# Patient Record
Sex: FEMALE | Race: WHITE | NOT HISPANIC OR LATINO | URBAN - METROPOLITAN AREA
[De-identification: names, ages, dates, MRNs, and addresses within clinical notes are randomized per-mention and may not be internally consistent; named-entity substitution may affect disease eponyms.]

---

## 2018-10-08 ENCOUNTER — OUTPATIENT (OUTPATIENT)
Dept: OUTPATIENT SERVICES | Facility: HOSPITAL | Age: 83
LOS: 1 days | End: 2018-10-08
Payer: MEDICARE

## 2018-10-08 DIAGNOSIS — Z98.41 CATARACT EXTRACTION STATUS, RIGHT EYE: Chronic | ICD-10-CM

## 2018-10-08 DIAGNOSIS — M54.16 RADICULOPATHY, LUMBAR REGION: ICD-10-CM

## 2018-10-08 PROCEDURE — 77003 FLUOROGUIDE FOR SPINE INJECT: CPT

## 2018-10-08 PROCEDURE — 62323 NJX INTERLAMINAR LMBR/SAC: CPT

## 2020-07-14 ENCOUNTER — OUTPATIENT (OUTPATIENT)
Dept: OUTPATIENT SERVICES | Facility: HOSPITAL | Age: 85
LOS: 1 days | End: 2020-07-14
Payer: MEDICARE

## 2020-07-14 DIAGNOSIS — Z98.41 CATARACT EXTRACTION STATUS, RIGHT EYE: Chronic | ICD-10-CM

## 2020-07-14 DIAGNOSIS — Z11.59 ENCOUNTER FOR SCREENING FOR OTHER VIRAL DISEASES: ICD-10-CM

## 2020-07-14 PROCEDURE — U0003: CPT

## 2020-07-15 LAB — SARS-COV-2 RNA SPEC QL NAA+PROBE: SIGNIFICANT CHANGE UP

## 2020-07-16 ENCOUNTER — OUTPATIENT (OUTPATIENT)
Dept: OUTPATIENT SERVICES | Facility: HOSPITAL | Age: 85
LOS: 1 days | Discharge: HOME | End: 2020-07-16
Payer: MEDICARE

## 2020-07-16 DIAGNOSIS — Z98.41 CATARACT EXTRACTION STATUS, RIGHT EYE: Chronic | ICD-10-CM

## 2020-07-16 DIAGNOSIS — M54.16 RADICULOPATHY, LUMBAR REGION: ICD-10-CM

## 2020-07-16 PROCEDURE — 62323 NJX INTERLAMINAR LMBR/SAC: CPT

## 2020-07-16 PROCEDURE — 77003 FLUOROGUIDE FOR SPINE INJECT: CPT

## 2020-10-26 ENCOUNTER — APPOINTMENT (OUTPATIENT)
Dept: OTOLARYNGOLOGY | Facility: CLINIC | Age: 85
End: 2020-10-26
Payer: MEDICARE

## 2020-10-26 ENCOUNTER — NON-APPOINTMENT (OUTPATIENT)
Age: 85
End: 2020-10-26

## 2020-10-26 VITALS
BODY MASS INDEX: 28 KG/M2 | HEIGHT: 63 IN | TEMPERATURE: 97.9 F | WEIGHT: 158 LBS | HEART RATE: 73 BPM | SYSTOLIC BLOOD PRESSURE: 167 MMHG | DIASTOLIC BLOOD PRESSURE: 74 MMHG

## 2020-10-26 DIAGNOSIS — H60.62 UNSPECIFIED CHRONIC OTITIS EXTERNA, LEFT EAR: ICD-10-CM

## 2020-10-26 DIAGNOSIS — Z87.09 PERSONAL HISTORY OF OTHER DISEASES OF THE RESPIRATORY SYSTEM: ICD-10-CM

## 2020-10-26 DIAGNOSIS — Z86.69 PERSONAL HISTORY OF OTHER DISEASES OF THE NERVOUS SYSTEM AND SENSE ORGANS: ICD-10-CM

## 2020-10-26 DIAGNOSIS — H60.399 OTHER INFECTIVE OTITIS EXTERNA, UNSPECIFIED EAR: ICD-10-CM

## 2020-10-26 DIAGNOSIS — H90.3 SENSORINEURAL HEARING LOSS, BILATERAL: ICD-10-CM

## 2020-10-26 PROCEDURE — 99213 OFFICE O/P EST LOW 20 MIN: CPT | Mod: 25

## 2020-10-26 PROCEDURE — 30901 CONTROL OF NOSEBLEED: CPT

## 2020-10-26 PROCEDURE — 69210 REMOVE IMPACTED EAR WAX UNI: CPT

## 2020-10-26 NOTE — PROCEDURE
[FreeTextEntry1] : Right Nasal Cauterization [FreeTextEntry3] : After topical 4% xylocaine and oxymetazoline, the nasal vault was re-evaluated. Bleeding site identified, small vascular lesion on the septum. Cauterized with silver nitrate. Post-procedure instructions given. Patient tolerated procedure well.

## 2020-10-26 NOTE — CONSULT LETTER
[Dear  ___] : Dear  [unfilled], [Courtesy Letter:] : I had the pleasure of seeing your patient, [unfilled], in my office today. [Please see my note below.] : Please see my note below. [Consult Closing:] : Thank you very much for allowing me to participate in the care of this patient.  If you have any questions, please do not hesitate to contact me. [Sincerely,] : Sincerely, [FreeTextEntry3] : Dae Wagoner MD FACS

## 2020-10-26 NOTE — PHYSICAL EXAM
[Midline] : trachea located in midline position [Normal] : no rashes [FreeTextEntry8] : cerumen removed via curettage [FreeTextEntry9] : cerumen impaction removed via suction and alligator forceps [FreeTextEntry1] : deviated septum, bleeding site right side [FreeTextEntry2] : sinuses nontender to percussion

## 2020-10-26 NOTE — ASSESSMENT
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 Right nasal cauterization. Saline gel spray 3-4x per day starting tonight. Don't blow the nose or smear, only dab and throw tissue away. Sneeze with the mouth open. Peroxide in the ears a few days before next appointment. Continue amplification. FU 1 month.

## 2020-10-26 NOTE — HISTORY OF PRESENT ILLNESS
[de-identified] : The patient presents with h/o bilateral SNHL for which she wears conchita hearing aids that are changed every 6-7 weeks. Pt states that she no longer uses the conchita hearing aids and switched to a different type of hearing aid but can't recall the name and didn't bring them with her. She presents today for cerumen removal because she feels the left ear is accumulating cerumen. She used peroxide in the left ear yesterday. Pt also c/o right epistaxis. Denies taking Aspirin but notes that she takes Aleve occasionally.

## 2020-11-17 ENCOUNTER — OUTPATIENT (OUTPATIENT)
Dept: OUTPATIENT SERVICES | Facility: HOSPITAL | Age: 85
LOS: 1 days | End: 2020-11-17
Payer: MEDICARE

## 2020-11-17 DIAGNOSIS — Z98.41 CATARACT EXTRACTION STATUS, RIGHT EYE: Chronic | ICD-10-CM

## 2020-11-17 DIAGNOSIS — Z11.59 ENCOUNTER FOR SCREENING FOR OTHER VIRAL DISEASES: ICD-10-CM

## 2020-11-17 LAB — SARS-COV-2 RNA SPEC QL NAA+PROBE: SIGNIFICANT CHANGE UP

## 2020-11-17 PROCEDURE — U0003: CPT

## 2020-11-19 ENCOUNTER — OUTPATIENT (OUTPATIENT)
Dept: OUTPATIENT SERVICES | Facility: HOSPITAL | Age: 85
LOS: 1 days | End: 2020-11-19
Payer: MEDICARE

## 2020-11-19 DIAGNOSIS — Z98.41 CATARACT EXTRACTION STATUS, RIGHT EYE: Chronic | ICD-10-CM

## 2020-11-19 DIAGNOSIS — M54.16 RADICULOPATHY, LUMBAR REGION: ICD-10-CM

## 2020-11-19 PROCEDURE — 62323 NJX INTERLAMINAR LMBR/SAC: CPT

## 2020-12-01 ENCOUNTER — APPOINTMENT (OUTPATIENT)
Dept: OTOLARYNGOLOGY | Facility: CLINIC | Age: 85
End: 2020-12-01
Payer: MEDICARE

## 2020-12-01 VITALS
BODY MASS INDEX: 27.64 KG/M2 | WEIGHT: 156 LBS | SYSTOLIC BLOOD PRESSURE: 165 MMHG | DIASTOLIC BLOOD PRESSURE: 74 MMHG | TEMPERATURE: 97.5 F | HEIGHT: 63 IN

## 2020-12-01 PROCEDURE — 99213 OFFICE O/P EST LOW 20 MIN: CPT | Mod: 25

## 2020-12-01 PROCEDURE — 30901 CONTROL OF NOSEBLEED: CPT

## 2020-12-01 RX ORDER — CIPROFLOXACIN AND DEXAMETHASONE 3; 1 MG/ML; MG/ML
0.3-0.1 SUSPENSION/ DROPS AURICULAR (OTIC)
Refills: 0 | Status: COMPLETED | COMMUNITY
End: 2020-12-01

## 2020-12-01 RX ORDER — TOBRAMYCIN AND DEXAMETHASONE 3; 1 MG/ML; MG/ML
0.3-0.1 SUSPENSION/ DROPS OPHTHALMIC
Refills: 0 | Status: COMPLETED | COMMUNITY
End: 2020-12-01

## 2020-12-01 NOTE — HISTORY OF PRESENT ILLNESS
[de-identified] : The patient presents with h/o bilateral SNHL for which she wears amplification. S/p Right Nasal Cauterization on 10/26/2020.  The patient reports that the bleeding in the nose did not stop. She says that her nose is dry and uses nasal gel. She started bleeding this morning while she was brushing her teeth. She denies going too deep or scraping. She denies taking blood thinners.

## 2020-12-01 NOTE — PROCEDURE
[FreeTextEntry1] : Right Nasal Cauterization. [FreeTextEntry3] : After topical 4% xylocaine and oxymetazoline, the nasal vault was re-evaluated. Bleeding site identified. Cauterized with silver nitrate. Post-procedure instructions given. Patient tolerated procedure well

## 2020-12-01 NOTE — ADDENDUM
[FreeTextEntry1] : Documented by Bety Mcdonnell acting as scribe for Dr. Wagoner on 12/01/2020.\par \par All Medical record entries made by the Scribe were at my, Dr. Wagoner, direction and personally dictated by me on 12/01/2020 . I have reviewed the chart and agree that the record accurately reflects my personal performance of the history, physical exam, assessment and plan. I have also personally directed, reviewed, and agreed with the discharge instructions.

## 2020-12-01 NOTE — ASSESSMENT
[FreeTextEntry1] : Recurrent right epistaxis\par S/p right nasal Cauterization 10/26/2020\par \par Plan: Right nasal cautery. Saline gel spray. Don't blow the nose or smear, only dab and throw tissue away. Sneeze with the mouth open. Bactroban - squeeze into the nose, sniff up and mush around, three times a day. FU 10 days. Discussed r/b/a of electrocautery if there is further bleeding.

## 2020-12-01 NOTE — PHYSICAL EXAM
[Normal] : mucosa is normal [Midline] : trachea located in midline position [FreeTextEntry8] : cerumen removed via curettage  [FreeTextEntry9] : A lip caused by implantable hearing aids.  [FreeTextEntry1] : Bleeding site right side.

## 2020-12-11 ENCOUNTER — APPOINTMENT (OUTPATIENT)
Dept: OTOLARYNGOLOGY | Facility: CLINIC | Age: 85
End: 2020-12-11
Payer: MEDICARE

## 2020-12-11 VITALS
BODY MASS INDEX: 27.64 KG/M2 | HEART RATE: 74 BPM | DIASTOLIC BLOOD PRESSURE: 98 MMHG | HEIGHT: 63 IN | WEIGHT: 156 LBS | SYSTOLIC BLOOD PRESSURE: 173 MMHG | TEMPERATURE: 97.3 F

## 2020-12-11 PROCEDURE — 30901 CONTROL OF NOSEBLEED: CPT

## 2020-12-11 PROCEDURE — 99213 OFFICE O/P EST LOW 20 MIN: CPT | Mod: 25

## 2020-12-11 NOTE — HISTORY OF PRESENT ILLNESS
[de-identified] : The patient presents with h/o bilateral SNHL for which she wears amplification, recurrent right epistaxis. s/p right nasal cauterization 10/26/2020 and 12/1/2020. Pt states that she had bleeding after blowing her nose. She has been using the Bactroban but not the saline gel spray.

## 2020-12-11 NOTE — ADDENDUM
[FreeTextEntry1] : Documented by Bety Mcdonnell acting as scribe for Dr. Wagoner on 12/11/2020.\par \par All Medical record entries made by the Scribe were at my, Dr. Wagoner, direction and personally dictated by me on 12/11/2020 . I have reviewed the chart and agree that the record accurately reflects my personal performance of the history, physical exam, assessment and plan. I have also personally directed, reviewed, and agreed with the discharge instructions.

## 2020-12-11 NOTE — PROCEDURE
[FreeTextEntry1] : Right Nasal Cauterization [FreeTextEntry3] : After topical 4% xylocaine and oxymetazoline, the nasal vault was re-evaluated. Bleeding site identified. Cauterized with silver nitrate. Post-procedure instructions given. Patient tolerated procedure well.

## 2020-12-11 NOTE — ASSESSMENT
[FreeTextEntry1] : h/o bilateral SNHL for which she wears amplification, recurrent right epistaxis\par s/p right nasal cauterization 10/26/2020 and 12/1/2020 - further bleeding with blowing the nose\par \par Plan:\par Right nasal cautery. Don't blow the nose or smear, only dab and throw tissue away. Sneeze with the mouth open. Continue Bactroban TID and saline gel spray. FU 2 weeks.

## 2020-12-24 ENCOUNTER — APPOINTMENT (OUTPATIENT)
Dept: OTOLARYNGOLOGY | Facility: CLINIC | Age: 85
End: 2020-12-24
Payer: MEDICARE

## 2020-12-24 VITALS
SYSTOLIC BLOOD PRESSURE: 173 MMHG | BODY MASS INDEX: 27.46 KG/M2 | TEMPERATURE: 97 F | HEIGHT: 63 IN | WEIGHT: 155 LBS | DIASTOLIC BLOOD PRESSURE: 97 MMHG

## 2020-12-24 PROCEDURE — 30901 CONTROL OF NOSEBLEED: CPT

## 2020-12-24 PROCEDURE — 99213 OFFICE O/P EST LOW 20 MIN: CPT | Mod: 25

## 2020-12-24 RX ORDER — MUPIROCIN 20 MG/G
2 OINTMENT TOPICAL 3 TIMES DAILY
Qty: 1 | Refills: 3 | Status: COMPLETED | COMMUNITY
Start: 2020-12-01 | End: 2020-12-24

## 2020-12-24 NOTE — HISTORY OF PRESENT ILLNESS
[de-identified] : The patient presents with h/o bilateral SNHL for which she wears amplification, recurrent right epistaxis. s/p right nasal cauterization 10/26/2020, 12/1/2020 and 12/11/2020. Pt denies any further bleeding and has not been blowing her nose. She has been using the saline gel spray 2-3x per day and the Bactroban.

## 2020-12-24 NOTE — PHYSICAL EXAM
[Normal] : mucosa is normal [Midline] : trachea located in midline position [FreeTextEntry1] : healing well, no scabs or bleeding, one bleeding site right side

## 2020-12-24 NOTE — ASSESSMENT
[FreeTextEntry1] : h/o bilateral SNHL - wears amplification, recurrent right epistaxis\par s/p right nasal cauterization 10/26/2020, 12/1/2020 and 12/11/2020 - no further bleeding\par one bleeding site right side on exam\par \par Plan:\par Right nasal cautery. Continue saline gel spray and Bactroban or nasal gel. FU 1 month.

## 2020-12-24 NOTE — ADDENDUM
[FreeTextEntry1] : Documented by Bety Mcdonnell acting as scribe for Dr. Wagoner on 12/24/2020.\par \par All Medical record entries made by the Scribe were at my, Dr. Wagoner, direction and personally dictated by me on 12/24/2020 . I have reviewed the chart and agree that the record accurately reflects my personal performance of the history, physical exam, assessment and plan. I have also personally directed, reviewed, and agreed with the discharge instructions.

## 2021-01-23 ENCOUNTER — OUTPATIENT (OUTPATIENT)
Dept: OUTPATIENT SERVICES | Facility: HOSPITAL | Age: 86
LOS: 1 days | End: 2021-01-23
Payer: MEDICARE

## 2021-01-23 DIAGNOSIS — Z98.41 CATARACT EXTRACTION STATUS, RIGHT EYE: Chronic | ICD-10-CM

## 2021-01-23 DIAGNOSIS — Z20.828 CONTACT WITH AND (SUSPECTED) EXPOSURE TO OTHER VIRAL COMMUNICABLE DISEASES: ICD-10-CM

## 2021-01-23 LAB — SARS-COV-2 RNA SPEC QL NAA+PROBE: SIGNIFICANT CHANGE UP

## 2021-01-23 PROCEDURE — U0005: CPT

## 2021-01-23 PROCEDURE — U0003: CPT

## 2021-01-25 ENCOUNTER — OUTPATIENT (OUTPATIENT)
Dept: OUTPATIENT SERVICES | Facility: HOSPITAL | Age: 86
LOS: 1 days | End: 2021-01-25
Payer: MEDICARE

## 2021-01-25 DIAGNOSIS — Z98.41 CATARACT EXTRACTION STATUS, RIGHT EYE: Chronic | ICD-10-CM

## 2021-01-25 DIAGNOSIS — M54.16 RADICULOPATHY, LUMBAR REGION: ICD-10-CM

## 2021-01-25 PROCEDURE — 62323 NJX INTERLAMINAR LMBR/SAC: CPT

## 2021-08-03 ENCOUNTER — APPOINTMENT (OUTPATIENT)
Dept: OTOLARYNGOLOGY | Facility: CLINIC | Age: 86
End: 2021-08-03
Payer: MEDICARE

## 2021-08-03 VITALS
BODY MASS INDEX: 27.11 KG/M2 | DIASTOLIC BLOOD PRESSURE: 79 MMHG | HEIGHT: 63 IN | WEIGHT: 153 LBS | HEART RATE: 70 BPM | SYSTOLIC BLOOD PRESSURE: 156 MMHG

## 2021-08-03 DIAGNOSIS — R04.0 EPISTAXIS: ICD-10-CM

## 2021-08-03 PROCEDURE — 69210 REMOVE IMPACTED EAR WAX UNI: CPT

## 2021-08-03 PROCEDURE — 99213 OFFICE O/P EST LOW 20 MIN: CPT | Mod: 25

## 2021-08-03 NOTE — HISTORY OF PRESENT ILLNESS
[de-identified] : The patient presents with h/o bilateral SNHL for which she wears amplification, recurrent right epistaxis. s/p right nasal cauterization 10/26/2020, 12/1/2020 and 12/11/2020. Her pt was cauterized on the right 7/24/21, and has not had an episode of epistaxis since. The pt states having dry ears and dry eyes. The pt is using oil drops in her ears as needed.

## 2021-08-03 NOTE — PHYSICAL EXAM
[Normal] : mucosa is normal [Midline] : trachea located in midline position [FreeTextEntry6] : cerumen removed via curettage and suction [FreeTextEntry7] : cerumen impaction removed via curettage and suction and alligator forcepsLayers of dead skin removed over TM and canal wall. . [FreeTextEntry1] : Deviated septum. No active bleeding.

## 2021-08-03 NOTE — REASON FOR VISIT
[Subsequent Evaluation] : a subsequent evaluation for [FreeTextEntry2] : Patient here for ear wax removal

## 2021-08-03 NOTE — ASSESSMENT
[FreeTextEntry1] : Reviewed and reconciled medications, allergies, PMHx, PSHx, SocHx, FMHx.\par \par h/o bilateral SNHL for which she wears amplification, recurrent right epistaxis. s/p right nasal cauterization 10/26/2020, 12/1/2020 and 12/11/2020\par left cerumen impaction\par Deviated septum. \par No active bleeding.\par \par Plan:\par Cerumen removed. One drop of olive oil in the ears before bed, alternating ears per night. Dropper for olive oil provided. FU 3-4 months\par

## 2021-08-03 NOTE — ADDENDUM
[FreeTextEntry1] : Documented by Constantino Lou acting as scribe for Dr. Wagoner on 08/03/2021.\par \par All Medical record entries made by the Scribe were at my, Dr. Wagoner, direction and personally dictated by me on 08/03/2021 . I have reviewed the chart and agree that the record accurately reflects my personal performance of the history, physical exam, assessment and plan. I have also personally directed, reviewed, and agreed with the discharge instructions.

## 2021-12-15 ENCOUNTER — APPOINTMENT (OUTPATIENT)
Dept: OTOLARYNGOLOGY | Facility: CLINIC | Age: 86
End: 2021-12-15
Payer: MEDICARE

## 2021-12-15 VITALS
BODY MASS INDEX: 27.82 KG/M2 | WEIGHT: 157 LBS | HEIGHT: 63 IN | SYSTOLIC BLOOD PRESSURE: 177 MMHG | DIASTOLIC BLOOD PRESSURE: 84 MMHG | HEART RATE: 67 BPM

## 2021-12-15 DIAGNOSIS — J34.89 OTHER SPECIFIED DISORDERS OF NOSE AND NASAL SINUSES: ICD-10-CM

## 2021-12-15 PROCEDURE — 99213 OFFICE O/P EST LOW 20 MIN: CPT

## 2021-12-15 NOTE — ADDENDUM
[FreeTextEntry1] : Documented by Tanner Camargo acting as scribe for Dr. Wagoner on 12/15/2021.\par \par All Medical record entries made by the Scribe were at my, Dr. Wagoner, direction and personally dictated by me on 12/15/2021. I have reviewed the chart and agree that the record accurately reflects my personal performance of the history, physical exam, assessment and plan. I have also personally directed, reviewed, and agreed with the discharge instructions.

## 2021-12-15 NOTE — PHYSICAL EXAM
[Normal] : mucosa is normal [Midline] : trachea located in midline position [FreeTextEntry8] : dry skin and dry cerumen removed via curettage and suction [FreeTextEntry9] : dry skin and dry cerumen removed via curettage, alligator forceps, and suction [FreeTextEntry5] : no response to tuning fork.  [de-identified] : minimal dried mucus [FreeTextEntry2] : Sinuses nontender to percussion.

## 2021-12-15 NOTE — ASSESSMENT
[FreeTextEntry1] : Reviewed and reconciled medications, allergies, PMHx, PSHx, SocHx, FMHx. \par \par h/o bilateral SNHL for which she wears amplification, right epistaxis, dry external canals b/l,  s/p right nasal cauterization 10/26/2020, 12/1/2020 and 12/11/2020, 7/24/21\par dry external canals b/l\par \par Plan:\par Cerumen removed. FU 4 months.

## 2021-12-15 NOTE — HISTORY OF PRESENT ILLNESS
[de-identified] : The patient presents with h/o bilateral SNHL for which she wears amplification, dry external canals b/l, right epistaxis, s/p right nasal cauterization 10/26/2020, 12/1/2020 and 12/11/2020, 7/24/21. Pt presents today for follow up for her dry ears. Denies change in hearing. Pt hasn't been putting olive oil in ears and instead has been using some ear drops she got from her hearing aid supplier. Reports that she doesn't like putting olive oil in her ears. States that she used to have permanent hearing aids but kept getting recurrent infections now has the removable hearing aids. However, she states that there is a piece that continues to break.

## 2022-04-04 ENCOUNTER — APPOINTMENT (OUTPATIENT)
Dept: OTOLARYNGOLOGY | Facility: CLINIC | Age: 87
End: 2022-04-04
Payer: MEDICARE

## 2022-04-04 VITALS
SYSTOLIC BLOOD PRESSURE: 157 MMHG | BODY MASS INDEX: 26.63 KG/M2 | DIASTOLIC BLOOD PRESSURE: 52 MMHG | HEIGHT: 64 IN | WEIGHT: 156 LBS | HEART RATE: 66 BPM

## 2022-04-04 PROCEDURE — 99213 OFFICE O/P EST LOW 20 MIN: CPT | Mod: 25

## 2022-04-04 PROCEDURE — 69210 REMOVE IMPACTED EAR WAX UNI: CPT

## 2022-04-04 NOTE — ADDENDUM
[FreeTextEntry1] : Documented by Tray Law acting as scribe for Dr. Wagoner on 04/04/2022. \par \par All Medical record entries made by the scribe were at my. Dr. Wagoner direction and personally dictated by me on 04/04/2022. I have reviewed the chart and agree that the record accurately reflects my personal performance of the history, physical exam, assessment and plan. I have also personally directed, reviewed, and agreed with the discharge instructions.

## 2022-04-04 NOTE — ASSESSMENT
[FreeTextEntry1] : Reviewed and reconciled medications, allergies, PMHx, PSHx, SocHx, FMHx. \par \par h/o bilateral SNHL for which she wears amplification, s/p right nasal cauterization 10/26/2020, 12/1/2020 and 12/11/2020, 7/24/21.\par \par pt doesn't know which meds she is taking \par \par Plan:\par cerumen removed. ciprodex prescribed. FU 6 months

## 2022-04-04 NOTE — PHYSICAL EXAM
[Normal] : no abnormal secretions [Hearing Loss Right Only] : normal [Hearing Loss Left Only] : normal [FreeTextEntry8] : cerumen removed via curettage - half filled with wax  [FreeTextEntry9] : cerumen impaction removed via curettage and forceps - dry crusting and scabbing; maybe dry blood - has an infection -   [FreeTextEntry1] : deviated septum

## 2022-04-04 NOTE — HISTORY OF PRESENT ILLNESS
[de-identified] : The patient presents with h/o bilateral SNHL for which she wears hearing aids, s/p right nasal cauterization 10/26/2020, 12/1/2020 and 12/11/2020, 7/24/21. The patient presents today for a follow up. Pt reports she will be going on a trip soon so she wants to get everything checked out. Pt

## 2022-04-25 ENCOUNTER — APPOINTMENT (OUTPATIENT)
Dept: OTOLARYNGOLOGY | Facility: CLINIC | Age: 87
End: 2022-04-25
Payer: MEDICARE

## 2022-04-25 VITALS
HEIGHT: 64 IN | SYSTOLIC BLOOD PRESSURE: 169 MMHG | WEIGHT: 156 LBS | HEART RATE: 68 BPM | DIASTOLIC BLOOD PRESSURE: 77 MMHG | BODY MASS INDEX: 26.63 KG/M2

## 2022-04-25 PROCEDURE — 99213 OFFICE O/P EST LOW 20 MIN: CPT

## 2022-04-25 NOTE — PHYSICAL EXAM
[Midline] : trachea located in midline position [Normal] : no rashes [FreeTextEntry8] : cerumen removed via curettage - dry, flaky, dead skin  [FreeTextEntry9] : cerumen removed via curettage and suction - dry excoriated and flaky  [FreeTextEntry1] : deviated septum

## 2022-04-25 NOTE — ADDENDUM
[FreeTextEntry1] : Documented by Tray Law acting as scribe for Dr. Wagoner on 04/25/2022. \par \par All Medical record entries made by the scribe were at my. Dr. Wagoner direction and personally dictated by me on 04/25/2022. I have reviewed the chart and agree that the record accurately reflects my personal performance of the history, physical exam, assessment and plan. I have also personally directed, reviewed, and agreed with the discharge instructions.

## 2022-04-25 NOTE — ASSESSMENT
[FreeTextEntry1] : Reviewed and reconciled medications, allergies, PMHx, PSHx, SocHx, FMHx. \par \par h/o bilateral SNHL for which she wears hearing aids, s/p right nasal cauterization 10/26/2020, 12/1/2020 and 12/11/2020, 7/24/21.\par \par The patient presents today for ears since she went to the audiologist and was told she still has ear infection \par \par Plan:\par cerumen removed. One drop of olive oil in the ears once per week. ofloxacin prescribed - use once a week in left ear. keep left ear dry and shower with wax or silicone ear plug. FU 4 weeks

## 2022-04-25 NOTE — HISTORY OF PRESENT ILLNESS
[de-identified] : The patient presents with h/o bilateral SNHL for which she wears hearing aids, s/p right nasal cauterization 10/26/2020, 12/1/2020 and 12/11/2020, 7/24/21. The patient presents today for ears. Pt reports going to the audiologist and was told she still have an infection in the ears. Pt reports using the ciprodex drops and finish them.  Pt wants to know if its okay to use to use different types of oil in her ears.

## 2022-05-23 ENCOUNTER — APPOINTMENT (OUTPATIENT)
Dept: OTOLARYNGOLOGY | Facility: CLINIC | Age: 87
End: 2022-05-23
Payer: MEDICARE

## 2022-05-23 VITALS — WEIGHT: 156 LBS | BODY MASS INDEX: 26.63 KG/M2 | HEIGHT: 64 IN | HEART RATE: 93 BPM

## 2022-05-23 DIAGNOSIS — E04.9 NONTOXIC GOITER, UNSPECIFIED: ICD-10-CM

## 2022-05-23 PROCEDURE — 99214 OFFICE O/P EST MOD 30 MIN: CPT

## 2022-05-23 RX ORDER — OFLOXACIN OTIC 3 MG/ML
0.3 SOLUTION AURICULAR (OTIC) WEEKLY
Qty: 1 | Refills: 5 | Status: DISCONTINUED | COMMUNITY
Start: 2022-04-25 | End: 2022-05-23

## 2022-05-23 RX ORDER — IBUPROFEN 200 MG/1
200 TABLET, FILM COATED ORAL 3 TIMES DAILY
Refills: 0 | Status: DISCONTINUED | COMMUNITY
Start: 2020-12-01 | End: 2022-05-23

## 2022-05-23 RX ORDER — ENALAPRIL MALEATE 2.5 MG/1
2.5 TABLET ORAL
Qty: 90 | Refills: 0 | Status: ACTIVE | COMMUNITY
Start: 2021-12-16

## 2022-05-23 RX ORDER — CIPROFLOXACIN AND DEXAMETHASONE 3; 1 MG/ML; MG/ML
0.3-0.1 SUSPENSION/ DROPS AURICULAR (OTIC)
Qty: 1 | Refills: 1 | Status: DISCONTINUED | COMMUNITY
Start: 2022-04-04 | End: 2022-05-23

## 2022-05-23 NOTE — ADDENDUM
[FreeTextEntry1] : Documented by Usha Oleary acting as scribe for Dr. Wagoner on 05/23/2022.\par \par All Medical record entries made by the scribe were at my, Dr. Wagoner,direction and personally dictated by me on 05/23/2022. I have reviewed the chart and agree that the record accurately reflects my personal performance of the history, physical exam, assessment and plan. I have also personally directed, reviewed, and agreed with the discharge instructions.

## 2022-05-23 NOTE — PHYSICAL EXAM
[Hearing Loss Right Only] : diminished [Hearing Loss Left Only] : diminished [Hearing Vaughan Test (Tuning Fork On Forehead)] : no lateralization of tone [Midline] : trachea located in midline position [Removed] : palatine tonsils previously removed [Normal] : orientation to person, place, and time: normal [de-identified] : enlarged thyroid b/l [FreeTextEntry6] : diffuse erythema [FreeTextEntry8] : debris, dead skin cerumen removed via curettage and suction [FreeTextEntry9] : cerumen removed via curettage  [FreeTextEntry1] : deviated septum\par inflamed turbs [FreeTextEntry2] : sinuses nontender to percussion.

## 2022-05-23 NOTE — ASSESSMENT
[FreeTextEntry1] : Reviewed and reconciled medications, allergies, PMHx, PSHx, SocHx, FMHx.\par \par h/o bilateral SNHL for which she wears hearing aids, s/p right nasal cauterization 10/26/2020, 12/1/2020 and 12/11/2020, 7/24/21.\par c/o dry ears\par diffuse erythema right external ear\par cerumen removed b/l\par enlarged thyroid b/l\par \par Plan:\par cerumen removed. keep using olive oil for ears. US thyroid ordered. start prednisolone 2 drops at bedtime in the right ear. FU 4-6 weeks.\par \par . \par \par \par \par

## 2022-05-23 NOTE — HISTORY OF PRESENT ILLNESS
[de-identified] : Pt presents with h/o bilateral SNHL for which she wears hearing aids, s/p right nasal cauterization 10/26/2020, 12/1/2020 and 12/11/2020, 7/24/21. Pt presents today feeling okay c/o dry ears. Pt reports getting covid within the last month. Pt reports she has a hard time hearing even with aids. Pt reports having dry ears and putting oil in them to help with the dryness.

## 2022-05-31 LAB
T3FREE SERPL-MCNC: 2.68 PG/ML
T4 FREE SERPL-MCNC: 1.3 NG/DL
TSH SERPL-ACNC: 0.39 UIU/ML

## 2022-06-01 LAB
THYROGLOB AB SERPL-ACNC: <20 IU/ML
THYROPEROXIDASE AB SERPL IA-ACNC: 30.2 IU/ML

## 2022-07-08 ENCOUNTER — APPOINTMENT (OUTPATIENT)
Dept: OTOLARYNGOLOGY | Facility: CLINIC | Age: 87
End: 2022-07-08

## 2022-07-08 VITALS
BODY MASS INDEX: 26.63 KG/M2 | SYSTOLIC BLOOD PRESSURE: 142 MMHG | HEART RATE: 66 BPM | HEIGHT: 64 IN | DIASTOLIC BLOOD PRESSURE: 78 MMHG | WEIGHT: 156 LBS

## 2022-07-08 PROCEDURE — 99213 OFFICE O/P EST LOW 20 MIN: CPT

## 2022-07-08 RX ORDER — SULFAMETHOXAZOLE AND TRIMETHOPRIM 800; 160 MG/1; MG/1
800-160 TABLET ORAL
Qty: 14 | Refills: 0 | Status: DISCONTINUED | COMMUNITY
Start: 2022-05-02

## 2022-07-08 RX ORDER — PREDNISOLONE SODIUM PHOSPHATE 10 MG/ML
1 SOLUTION/ DROPS OPHTHALMIC
Qty: 1 | Refills: 5 | Status: DISCONTINUED | COMMUNITY
Start: 2022-05-23 | End: 2022-07-08

## 2022-07-08 NOTE — HISTORY OF PRESENT ILLNESS
[de-identified] : The patient presents with h/o bilateral SNHL - wears oticon opn 2 hearing aids, s/p right nasal cauterization 10/26/2020, 12/1/2020 and 12/11/2020, 7/24/21. The patient presents today for ears follow up. Pt reports her hearing aids are not powerful enough and she will be going to get them evaluated. Pt states having left tragus pain - she thinks its because she sleeps on her left side.

## 2022-07-08 NOTE — ADDENDUM
[FreeTextEntry1] : Documented by Tray Law acting as scribe for Dr. Wagoner on 07/08/2022. \par \par All Medical record entries made by the scribe were at my. Dr. Wagoner direction and personally dictated by me on 07/08/2022. I have reviewed the chart and agree that the record accurately reflects my personal performance of the history, physical exam, assessment and plan. I have also personally directed, reviewed, and agreed with the discharge instructions.

## 2022-07-08 NOTE — ASSESSMENT
[FreeTextEntry1] : Reviewed and reconciled medications, allergies, PMHx, PSHx, SocHx, FMHx. \par \par h/o bilateral SNHL - wears oticon opn 2 hearing aids, s/p right nasal cauterization 10/26/2020, 12/1/2020 and 12/11/2020, 7/24/21. \par \par Physical exam - \par right ear: cerumen removed via curettage - hard crusting \par mildly deviated septum, mildly inflamed turbs \par \par Plan:\par cerumen removed. One drop of olive oil in the ears once per week. FU 3 months

## 2022-07-08 NOTE — PHYSICAL EXAM
[Normal] : mucosa is normal [Midline] : trachea located in midline position [Removed] : palatine tonsils previously removed [FreeTextEntry8] : cerumen removed via curettage - hard crusting  [FreeTextEntry1] : mildly deviated septum \par mildly inflamed turbs

## 2022-10-03 ENCOUNTER — APPOINTMENT (OUTPATIENT)
Dept: OTOLARYNGOLOGY | Facility: CLINIC | Age: 87
End: 2022-10-03

## 2022-10-17 ENCOUNTER — APPOINTMENT (OUTPATIENT)
Dept: OTOLARYNGOLOGY | Facility: CLINIC | Age: 87
End: 2022-10-17

## 2022-10-17 VITALS
DIASTOLIC BLOOD PRESSURE: 82 MMHG | SYSTOLIC BLOOD PRESSURE: 154 MMHG | WEIGHT: 156 LBS | HEART RATE: 67 BPM | HEIGHT: 63 IN | BODY MASS INDEX: 27.64 KG/M2

## 2022-10-17 DIAGNOSIS — H61.22 IMPACTED CERUMEN, LEFT EAR: ICD-10-CM

## 2022-10-17 DIAGNOSIS — H60.312 DIFFUSE OTITIS EXTERNA, LEFT EAR: ICD-10-CM

## 2022-10-17 PROCEDURE — 99213 OFFICE O/P EST LOW 20 MIN: CPT | Mod: 25

## 2022-10-17 PROCEDURE — 92567 TYMPANOMETRY: CPT

## 2022-10-17 PROCEDURE — G0268 REMOVAL OF IMPACTED WAX MD: CPT

## 2022-10-17 NOTE — HISTORY OF PRESENT ILLNESS
[de-identified] : Pt. with h/o bilateral SNHL - wears oticon opn 2 hearing aids, s/p right nasal cauterization 10/26/2020, 12/1/2020 and 12/11/2020, and 7/24/21 presents today stating that on Thursday complaining of left ear pain went to urgent care and was prescribed neomycin-polymyxin. States this morning woke up with worsening pain on her left ear.

## 2022-10-17 NOTE — ASSESSMENT
[FreeTextEntry1] : Reviewed and reconciled medications, allergies, PMHx, PSHx, SocHx, FMHx. \par \par h/o bilateral SNHL - wears oticon opn 2 hearing aids, s/p right nasal cauterization 10/26/2020, 12/1/2020 and 12/11/2020, 7/24/21. \par \par Physical Exam:\par right ear cerumen removed via curettage\par left ear fungal infection appearing ear with dead skin, cerumen, and debris of the left ear removed via suction \par \par Audio:Tympanic membrane intact\par \par Plan:\par Lotrimin 4 drops left ear twice a day\par Follow up in 10-14 days\par Don't use drops on day that your scheduled to come back.

## 2022-10-17 NOTE — CONSULT LETTER
[Dear  ___] : Dear  [unfilled], [Courtesy Letter:] : I had the pleasure of seeing your patient, [unfilled], in my office today. [Please see my note below.] : Please see my note below. [Consult Closing:] : Thank you very much for allowing me to participate in the care of this patient.  If you have any questions, please do not hesitate to contact me. [Sincerely,] : Sincerely, [FreeTextEntry1] : Dae Wagoner MD FACS

## 2022-10-17 NOTE — ADDENDUM
[FreeTextEntry1] : Documented by Alpesh Boucher acting as scribe for Dr. Wagoner on 10/17/2022 All Medical record entries made by the Scribe were at my, Dr. Wagoner, direction and personally dictated by me on 10/17/2022  . I have reviewed the chart and agree that the record accurately reflects my personal performance of the history, physical exam, assessment and plan. I have also personally directed, reviewed, and agreed with the discharge instructions.

## 2022-10-17 NOTE — PHYSICAL EXAM
[Normal] : no rashes [FreeTextEntry6] : cerumen removed via curettage [FreeTextEntry7] : fungal infection appearing  [FreeTextEntry9] : erosion of the bone  skin, cerumen debris in ear

## 2022-10-21 ENCOUNTER — APPOINTMENT (OUTPATIENT)
Dept: OTOLARYNGOLOGY | Facility: CLINIC | Age: 87
End: 2022-10-21

## 2022-10-21 VITALS
HEART RATE: 62 BPM | SYSTOLIC BLOOD PRESSURE: 155 MMHG | DIASTOLIC BLOOD PRESSURE: 80 MMHG | BODY MASS INDEX: 27.64 KG/M2 | HEIGHT: 63 IN | WEIGHT: 156 LBS

## 2022-10-21 PROCEDURE — 99213 OFFICE O/P EST LOW 20 MIN: CPT

## 2022-10-21 NOTE — PHYSICAL EXAM
[Normal] : the left mastoid was normal [FreeTextEntry8] : cerumen removed via suction - dry and flaky - large canal  [FreeTextEntry9] : cerumen removed via suction - dry and flaky - large canal - no fungas

## 2022-10-21 NOTE — ASSESSMENT
[FreeTextEntry1] : Reviewed and reconciled medications, allergies, PMHx, PSHx, SocHx, FMHx. \par \par h/o bilateral SNHL - wears oticon opn 2 hearing aids, s/p right nasal cauterization 10/26/2020, 12/1/2020 and 12/11/2020, and 7/24/21\par The patient presents today for follow up on left ear fungus \par \par Physical exam - \par cerumen removed via suction - dry and flaky b/l \par \par Plan:\par cerumen removed b/l. clean the tip of the hearing aids with alcohol wipes and don’t wear hearing aids for more then 3 hours at a time - take it out for 30 mins. when take out the hearing aids, dry the ear canal with hair dryer. FU 2 months

## 2022-10-21 NOTE — HISTORY OF PRESENT ILLNESS
[de-identified] : The patient presents with h/o bilateral SNHL - wears oticon opn 2 hearing aids, s/p right nasal cauterization 10/26/2020, 12/1/2020 and 12/11/2020, and 7/24/21. The patient presents today for follow up on left ear fungus. Pt reports she had to stop the ear drops after 1 day of use since she started feeling itchy.

## 2022-10-21 NOTE — ADDENDUM
[FreeTextEntry1] : Documented by Tray Law acting as scribe for Dr. Wagoner on 10/21/2022. \par \par All Medical record entries made by the scribe were at my. Dr. Wagoner direction and personally dictated by me on 10/21/2022. I have reviewed the chart and agree that the record accurately reflects my personal performance of the history, physical exam, assessment and plan. I have also personally directed, reviewed, and agreed with the discharge instructions.

## 2022-10-28 ENCOUNTER — APPOINTMENT (OUTPATIENT)
Dept: OTOLARYNGOLOGY | Facility: CLINIC | Age: 87
End: 2022-10-28

## 2022-10-28 VITALS — WEIGHT: 156 LBS | BODY MASS INDEX: 27.64 KG/M2 | HEIGHT: 63 IN

## 2022-10-28 PROCEDURE — 99213 OFFICE O/P EST LOW 20 MIN: CPT

## 2022-10-28 NOTE — ASSESSMENT
[FreeTextEntry1] : Reviewed and reconciled medications, allergies, PMHx, PSHx, SocHx, FMHx. \par \par h/o bilateral SNHL - wears oticon opn 2 hearing aids, s/p right nasal cauterization 10/26/2020, 12/1/2020 and 12/11/2020, and 7/24/21\par The patient presents today for 1 week follow up on ears\par \par Physical exam - \par cerumen removed via suction b/l \par \par Plan:\par cerumen removed b/l. One drop of olive oil in the ears every other night. FU 2 weeks

## 2022-10-28 NOTE — HISTORY OF PRESENT ILLNESS
[de-identified] : The patient presents with h/o bilateral SNHL - wears oticon opn 2 hearing aids, s/p right nasal cauterization 10/26/2020, 12/1/2020 and 12/11/2020, and 7/24/21. The patient presents today for 1 week follow up on ears. Pt reports she has very dry skin around her right ear which is causing lot of discomfort. Pt denies using olive oil in the ear but she does use some other oil around the ear but not inside.

## 2022-10-28 NOTE — ADDENDUM
[FreeTextEntry1] : Documented by Tray Law acting as scribe for Dr. Wagoner on 10/28/2022. \par \par All Medical record entries made by the scribe were at my. Dr. Wagoner direction and personally dictated by me on 10/28/2022. I have reviewed the chart and agree that the record accurately reflects my personal performance of the history, physical exam, assessment and plan. I have also personally directed, reviewed, and agreed with the discharge instructions.

## 2022-10-28 NOTE — PHYSICAL EXAM
[Normal] : the left mastoid was normal [FreeTextEntry8] : cerumen removed via suction - no fungus, new instruments was used  [FreeTextEntry9] : cerumen removed via suction

## 2022-11-08 ENCOUNTER — APPOINTMENT (OUTPATIENT)
Dept: OTOLARYNGOLOGY | Facility: CLINIC | Age: 87
End: 2022-11-08

## 2022-11-08 VITALS
DIASTOLIC BLOOD PRESSURE: 71 MMHG | HEIGHT: 63 IN | WEIGHT: 151 LBS | SYSTOLIC BLOOD PRESSURE: 162 MMHG | HEART RATE: 58 BPM | BODY MASS INDEX: 26.75 KG/M2

## 2022-11-08 DIAGNOSIS — B36.9 SUPERFICIAL MYCOSIS, UNSPECIFIED: ICD-10-CM

## 2022-11-08 DIAGNOSIS — H60.90 UNSPECIFIED OTITIS EXTERNA, UNSPECIFIED EAR: ICD-10-CM

## 2022-11-08 DIAGNOSIS — H62.42 SUPERFICIAL MYCOSIS, UNSPECIFIED: ICD-10-CM

## 2022-11-08 PROCEDURE — 99214 OFFICE O/P EST MOD 30 MIN: CPT

## 2022-11-08 NOTE — ASSESSMENT
[FreeTextEntry1] : Reviewed and reconciled medications, allergies, PMHx, PSHx, SocHx, FMHx \par \par Pt. with h/o bilateral SNHL - wears oticon opn 2 hearing aids, s/p right nasal cauterization 10/26/2020, 12/1/2020 and 12/11/2020, and 7/24/21. Patient presents stating her left ear started hurting over the weekend. She applied clotrimazole drops on Sunday. She states the left ear is still hurting now\par \par Physical Exam:\par -right ear: dry and flaky cerumen removed with curettage\par -left ear: completely filled with fungus, granulation, and peeling skin of the canal. Suctioned. Clotrimazole ointment applied. \par \par Plan: Apply Clotrimazole ointment to the left ear once a day for 5 days. (one alma from syringe). Patient instructed on how to apply the medication. FU on Monday.

## 2022-11-08 NOTE — HISTORY OF PRESENT ILLNESS
[de-identified] : Pt. with h/o bilateral SNHL - wears oticon opn 2 hearing aids, s/p right nasal cauterization 10/26/2020, 12/1/2020 and 12/11/2020, and 7/24/21. Patient presents stating her left ear started hurting over the weekend. She applied clotrimazole drops on Sunday. She states the left ear is still hurting now.

## 2022-11-08 NOTE — PHYSICAL EXAM
[Normal] : the left mastoid was normal [Hearing Loss Right Only] : normal [Hearing Loss Left Only] : normal [FreeTextEntry8] : dry and flaky cerumen removed with curettage [FreeTextEntry9] : completely filled with fungus, granulation, and peeling skin of the canal. Suctioned. Clotrimazole ointment applied.

## 2022-11-11 ENCOUNTER — APPOINTMENT (OUTPATIENT)
Dept: OTOLARYNGOLOGY | Facility: CLINIC | Age: 87
End: 2022-11-11

## 2022-11-14 ENCOUNTER — APPOINTMENT (OUTPATIENT)
Dept: OTOLARYNGOLOGY | Facility: CLINIC | Age: 87
End: 2022-11-14

## 2022-11-14 VITALS
WEIGHT: 151 LBS | SYSTOLIC BLOOD PRESSURE: 149 MMHG | HEART RATE: 70 BPM | BODY MASS INDEX: 26.75 KG/M2 | HEIGHT: 63 IN | DIASTOLIC BLOOD PRESSURE: 75 MMHG

## 2022-11-14 PROCEDURE — 99213 OFFICE O/P EST LOW 20 MIN: CPT

## 2022-11-14 NOTE — PHYSICAL EXAM
[Normal] : the left mastoid was normal [Hearing Loss Right Only] : normal [Hearing Loss Left Only] : normal [FreeTextEntry8] : Otomycosis. ear suctioned [FreeTextEntry9] : Full of Clotrimazole

## 2022-11-14 NOTE — HISTORY OF PRESENT ILLNESS
[de-identified] : Pt. with h/o bilateral SNHL - wears oticon opn 2 hearing aids, s/p right nasal cauterization 10/26/2020, 12/1/2020 and 12/11/2020, and 7/24/21, and ear pain from last visit. Patient presents stating she put the ointment in her ear for 5 days. Patient states her right ear is itchy now.

## 2022-11-14 NOTE — ASSESSMENT
[FreeTextEntry1] : Reviewed and reconciled medications, allergies, PMHx, PSHx, SocHx, FMHx \par \par Pt. with h/o bilateral SNHL - wears oticon opn 2 hearing aids, s/p right nasal cauterization 10/26/2020, 12/1/2020 and 12/11/2020, and 7/24/21, and ear pain from last visit. Patient presents stating she put the ointment in her ear for 5 days. Patient states her right ear is itchy now. \par \par Physical Exam:\par -right ear: otomycosis. ear suctioned \par -left ear: Full of Clotrimazole\par \par Plan: Start Clotrimazole drops in right ear - 2 in the morning and 4 at bedtime. Clean top of the bottle with alcohol before using. Leave left ear alone until the ointment dissolves. FU 7-10 days

## 2022-11-23 ENCOUNTER — APPOINTMENT (OUTPATIENT)
Dept: OTOLARYNGOLOGY | Facility: CLINIC | Age: 87
End: 2022-11-23

## 2022-11-23 VITALS
HEART RATE: 89 BPM | BODY MASS INDEX: 26.75 KG/M2 | WEIGHT: 151 LBS | DIASTOLIC BLOOD PRESSURE: 82 MMHG | SYSTOLIC BLOOD PRESSURE: 138 MMHG | HEIGHT: 63 IN

## 2022-11-23 DIAGNOSIS — B36.9 SUPERFICIAL MYCOSIS, UNSPECIFIED: ICD-10-CM

## 2022-11-23 DIAGNOSIS — H62.43 SUPERFICIAL MYCOSIS, UNSPECIFIED: ICD-10-CM

## 2022-11-23 PROCEDURE — 99213 OFFICE O/P EST LOW 20 MIN: CPT

## 2022-11-23 NOTE — PHYSICAL EXAM
[Normal] : orientation to person, place, and time: normal [FreeTextEntry9] : Has some ointment in it, suctioned out

## 2022-11-23 NOTE — ASSESSMENT
[FreeTextEntry1] : Reviewed and reconciled medications, allergies, PMHx, PSHx, SocHx, FMHx.\par \par h/o bilateral SNHL - wears oticon opn 2 hearing aids, s/p right nasal cauterization 10/26/2020, 12/1/2020 and 12/11/2020, and 7/24/21,\par presents today to check left ear.\par \par Physical Exam\par - left ear: ointment suctioned out\par no signs of fungus in left ear\par \par Plan:\par Continue Clotrimazole - Use 3 drops twice a week in both ears (alternate ears each night), wipe clean in the morning and use hearing aids. treat fungus first, before treating dryness in ears. FU 2-3 weeks.

## 2022-11-23 NOTE — ADDENDUM
[FreeTextEntry1] : Documented by Usha Oleary acting as scribe for Dr. Wagoner on 11/23/2022.\par \par All Medical record entries made by the scribe were at my, Dr. Wagoner,direction and personally dictated by me on 11/23/2022. I have reviewed the chart and agree that the record accurately reflects my personal performance of the history, physical exam, assessment and plan. I have also personally directed, reviewed, and agreed with the discharge instructions.

## 2022-11-23 NOTE — HISTORY OF PRESENT ILLNESS
[de-identified] : Pt presents with h/o bilateral SNHL - wears oticon opn 2 hearing aids, s/p right nasal cauterization 10/26/2020, 12/1/2020 and 12/11/2020, and 7/24/21. Pt presents today to check left ear fungus. Pt notes her ears are doing better. Pt notes she didn't put drops in her ears this morning.

## 2022-12-21 ENCOUNTER — APPOINTMENT (OUTPATIENT)
Dept: OTOLARYNGOLOGY | Facility: CLINIC | Age: 87
End: 2022-12-21

## 2022-12-21 VITALS
HEIGHT: 63 IN | SYSTOLIC BLOOD PRESSURE: 142 MMHG | DIASTOLIC BLOOD PRESSURE: 82 MMHG | BODY MASS INDEX: 26.75 KG/M2 | HEART RATE: 87 BPM | WEIGHT: 151 LBS

## 2022-12-21 DIAGNOSIS — J01.90 ACUTE SINUSITIS, UNSPECIFIED: ICD-10-CM

## 2022-12-21 DIAGNOSIS — E04.2 NONTOXIC MULTINODULAR GOITER: ICD-10-CM

## 2022-12-21 DIAGNOSIS — R05.9 COUGH, UNSPECIFIED: ICD-10-CM

## 2022-12-21 PROCEDURE — 99214 OFFICE O/P EST MOD 30 MIN: CPT

## 2022-12-21 NOTE — PHYSICAL EXAM
[Midline] : trachea located in midline position [Removed] : palatine tonsils previously removed [Normal] : orientation to person, place, and time: normal [FreeTextEntry8] : layers of dry dead skin removed via suction [FreeTextEntry9] : debris and cerumen impaction removed with suction, some scarring in the canal [FreeTextEntry2] : sinuses nontender to percussion.

## 2022-12-21 NOTE — ASSESSMENT
[FreeTextEntry1] : Reviewed and reconciled medications, allergies, PMHx, PSHx, SocHx, FMHx.\par \par h/o bilateral SNHL - wears oticon opn 2 hearing aids, s/p right nasal cauterization 10/26/2020, 12/1/2020 and 12/11/2020, and 7/24/21.\par presents today c/o congestion, cough, check ears \par \par Physical Exam\par right ear layers of dry skin removed\par left ear debris and cerumen impaction removed, some scarring \par \par Plan:\par Cerumen removed b/l. One drop of olive oil in the ears once per week. Saline gel spray for dryness. Mometasone drops - 2 drops both ears every other night as needed. Azithromycin - 1 pill a day for 7 days. FU 1 month to check ears - return sooner if cough doesn't improve in 1 week

## 2022-12-21 NOTE — HISTORY OF PRESENT ILLNESS
[de-identified] : Pt presents with h/o bilateral SNHL - wears oticon opn 2 hearing aids, s/p right nasal cauterization 10/26/2020, 12/1/2020 and 12/11/2020, and 7/24/21. Pt presents today c/o congestion, cough, check ears. Pt notes last week she woke up coughing a lot and congestion. Pt notes she went to urgent care and covid, flu, rsv all came back negative and she didn't have a fever. Pt notes she was prescribed tesslon perles and recommended to radha Nyquil/dayquil. Pt notes she coughs less, but still coughs. Pt notes nothing comes up when she coughs.

## 2022-12-21 NOTE — ADDENDUM
[FreeTextEntry1] : Documented by Usha Oleary acting as scribe for Dr. Waogner on 12/21/2022.\par \par All Medical record entries made by the scribe were at my, Dr. Wagoner,direction and personally dictated by me on 12/21/2022. I have reviewed the chart and agree that the record accurately reflects my personal performance of the history, physical exam, assessment and plan. I have also personally directed, reviewed, and agreed with the discharge instructions.

## 2023-07-04 ENCOUNTER — RESULT REVIEW (OUTPATIENT)
Age: 88
End: 2023-07-04

## 2023-08-29 ENCOUNTER — APPOINTMENT (OUTPATIENT)
Dept: OTOLARYNGOLOGY | Facility: CLINIC | Age: 88
End: 2023-08-29
Payer: MEDICARE

## 2023-08-29 VITALS
WEIGHT: 146 LBS | DIASTOLIC BLOOD PRESSURE: 85 MMHG | SYSTOLIC BLOOD PRESSURE: 175 MMHG | HEIGHT: 63 IN | BODY MASS INDEX: 25.87 KG/M2

## 2023-08-29 PROCEDURE — 99213 OFFICE O/P EST LOW 20 MIN: CPT

## 2023-08-29 NOTE — ADDENDUM
[FreeTextEntry1] : Documented by Brittny Zambrano acting as a scribe for Dr. Wagoner on [mm//dd/yyyy].   All Medical record entries made by the scribe were at my, Dr. Wagoner, direction and personally directed by me on 08/29/2023. I have reviewed the chart and agree that the record accurately reflects my personal performance of the history, physical exam, assessment, and plan. I have also personally directed, reviewed, and agreed with the discharge instructions.

## 2023-08-29 NOTE — PHYSICAL EXAM
[Hearing Loss Right Only] : normal [Hearing Loss Left Only] : normal [FreeTextEntry9] : cerumen removed via curettage  [de-identified] : mildly deviated septum Mildly Inflamed Turbinates  [Midline] : trachea located in midline position [Removed] : palatine tonsils previously removed [Normal] : orientation to person, place, and time: normal

## 2023-08-29 NOTE — HISTORY OF PRESENT ILLNESS
[de-identified] :  Pt presents with h/o bilateral SNHL - wears oticon opn 2 hearing aids, s/p right nasal cauterization 10/26/2020, 12/1/2020 and 12/11/2020, and 7/24/21. Pt presents today for follow-up. She recently had a pacemaker inserted. She recently slipped in her living room. She reports swelling of her leg

## 2023-08-29 NOTE — ASSESSMENT
[FreeTextEntry1] : Reviewed and Reconciled the pmhx, fmhx, sochx, and medhx  Pt presents with h/o bilateral SNHL - wears oticon opn 2 hearing aids, s/p right nasal cauterization 10/26/2020, 12/1/2020 and 12/11/2020, and 7/24/21. Pt presents today for follow-up. She recently had a pacemaker inserted. She recently slipped in her living room.   Physical Exam: cerumen removed via curettage left mildly deviated septum Mildly Inflamed Turbinates  Plan: FU 6 months

## 2023-11-10 ENCOUNTER — OUTPATIENT (OUTPATIENT)
Dept: OUTPATIENT SERVICES | Facility: HOSPITAL | Age: 88
LOS: 1 days | Discharge: HOME | End: 2023-11-10
Payer: MEDICARE

## 2023-11-10 ENCOUNTER — APPOINTMENT (OUTPATIENT)
Dept: WOUND CARE | Facility: HOSPITAL | Age: 88
End: 2023-11-10
Payer: MEDICARE

## 2023-11-10 VITALS
RESPIRATION RATE: 18 BRPM | DIASTOLIC BLOOD PRESSURE: 83 MMHG | SYSTOLIC BLOOD PRESSURE: 143 MMHG | TEMPERATURE: 97.7 F | HEIGHT: 63 IN | OXYGEN SATURATION: 97 % | HEART RATE: 64 BPM | WEIGHT: 146 LBS | BODY MASS INDEX: 25.87 KG/M2

## 2023-11-10 DIAGNOSIS — Z98.41 CATARACT EXTRACTION STATUS, RIGHT EYE: Chronic | ICD-10-CM

## 2023-11-10 DIAGNOSIS — L97.301 NON-PRESSURE CHRONIC ULCER OF UNSPECIFIED ANKLE LIMITED TO BREAKDOWN OF SKIN: ICD-10-CM

## 2023-11-10 PROCEDURE — 99203 OFFICE O/P NEW LOW 30 MIN: CPT

## 2023-11-10 PROCEDURE — G0463: CPT

## 2023-11-10 RX ORDER — AZITHROMYCIN 500 MG/1
500 TABLET, FILM COATED ORAL DAILY
Qty: 7 | Refills: 0 | Status: COMPLETED | COMMUNITY
Start: 2022-12-21 | End: 2023-11-10

## 2023-11-10 RX ORDER — DIAPER,BRIEF,ADULT, DISPOSABLE
1 EACH MISCELLANEOUS TWICE DAILY
Qty: 1 | Refills: 3 | Status: COMPLETED | COMMUNITY
Start: 2022-10-17 | End: 2023-11-10

## 2023-11-10 RX ORDER — ERYTHROMYCIN 5 MG/G
5 OINTMENT OPHTHALMIC
Qty: 4 | Refills: 0 | Status: COMPLETED | COMMUNITY
Start: 2022-03-11 | End: 2023-11-10

## 2023-11-10 RX ORDER — MOMETASONE FUROATE 1 MG/ML
0.1 SOLUTION TOPICAL
Qty: 1 | Refills: 5 | Status: COMPLETED | COMMUNITY
Start: 2022-12-21 | End: 2023-11-10

## 2023-11-10 RX ORDER — ADHESIVE TAPE 1.5"X360"
TAPE, NON-MEDICATED TOPICAL
Refills: 0 | Status: COMPLETED | COMMUNITY
Start: 2020-12-24 | End: 2023-11-10
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
## 2023-11-16 DIAGNOSIS — Z87.09 PERSONAL HISTORY OF OTHER DISEASES OF THE RESPIRATORY SYSTEM: ICD-10-CM

## 2023-11-16 DIAGNOSIS — E04.2 NONTOXIC MULTINODULAR GOITER: ICD-10-CM

## 2023-11-16 DIAGNOSIS — S81.812D LACERATION WITHOUT FOREIGN BODY, LEFT LOWER LEG, SUBSEQUENT ENCOUNTER: ICD-10-CM

## 2023-11-16 DIAGNOSIS — Z98.890 OTHER SPECIFIED POSTPROCEDURAL STATES: ICD-10-CM

## 2023-11-16 DIAGNOSIS — Z79.899 OTHER LONG TERM (CURRENT) DRUG THERAPY: ICD-10-CM
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
## 2023-11-16 DIAGNOSIS — Z86.69 PERSONAL HISTORY OF OTHER DISEASES OF THE NERVOUS SYSTEM AND SENSE ORGANS: ICD-10-CM

## 2023-11-16 DIAGNOSIS — Z90.12 ACQUIRED ABSENCE OF LEFT BREAST AND NIPPLE: ICD-10-CM

## 2023-11-16 DIAGNOSIS — Z80.3 FAMILY HISTORY OF MALIGNANT NEOPLASM OF BREAST: ICD-10-CM

## 2023-11-16 DIAGNOSIS — Z80.6 FAMILY HISTORY OF LEUKEMIA: ICD-10-CM

## 2023-11-16 DIAGNOSIS — Z85.3 PERSONAL HISTORY OF MALIGNANT NEOPLASM OF BREAST: ICD-10-CM
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
## 2023-11-16 DIAGNOSIS — Z88.0 ALLERGY STATUS TO PENICILLIN: ICD-10-CM

## 2023-11-17 ENCOUNTER — OUTPATIENT (OUTPATIENT)
Dept: OUTPATIENT SERVICES | Facility: HOSPITAL | Age: 88
LOS: 1 days | Discharge: HOME | End: 2023-11-17
Payer: MEDICARE

## 2023-11-17 ENCOUNTER — APPOINTMENT (OUTPATIENT)
Dept: WOUND CARE | Facility: HOSPITAL | Age: 88
End: 2023-11-17
Payer: MEDICARE

## 2023-11-17 VITALS
WEIGHT: 146 LBS | DIASTOLIC BLOOD PRESSURE: 108 MMHG | SYSTOLIC BLOOD PRESSURE: 162 MMHG | RESPIRATION RATE: 20 BRPM | BODY MASS INDEX: 25.87 KG/M2 | HEIGHT: 63 IN | TEMPERATURE: 98.3 F | HEART RATE: 89 BPM | OXYGEN SATURATION: 98 %

## 2023-11-17 DIAGNOSIS — Z98.41 CATARACT EXTRACTION STATUS, RIGHT EYE: Chronic | ICD-10-CM

## 2023-11-17 DIAGNOSIS — L97.301 NON-PRESSURE CHRONIC ULCER OF UNSPECIFIED ANKLE LIMITED TO BREAKDOWN OF SKIN: ICD-10-CM

## 2023-11-17 PROCEDURE — 99213 OFFICE O/P EST LOW 20 MIN: CPT

## 2023-11-17 PROCEDURE — G0463: CPT

## 2023-11-23 DIAGNOSIS — Z85.3 PERSONAL HISTORY OF MALIGNANT NEOPLASM OF BREAST: ICD-10-CM
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
## 2023-11-23 DIAGNOSIS — Z80.3 FAMILY HISTORY OF MALIGNANT NEOPLASM OF BREAST: ICD-10-CM

## 2023-11-23 DIAGNOSIS — E04.2 NONTOXIC MULTINODULAR GOITER: ICD-10-CM

## 2023-11-23 DIAGNOSIS — Z90.12 ACQUIRED ABSENCE OF LEFT BREAST AND NIPPLE: ICD-10-CM
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
## 2023-11-23 DIAGNOSIS — S81.812D LACERATION WITHOUT FOREIGN BODY, LEFT LOWER LEG, SUBSEQUENT ENCOUNTER: ICD-10-CM

## 2023-11-23 DIAGNOSIS — Z86.69 PERSONAL HISTORY OF OTHER DISEASES OF THE NERVOUS SYSTEM AND SENSE ORGANS: ICD-10-CM

## 2023-11-23 DIAGNOSIS — Z87.09 PERSONAL HISTORY OF OTHER DISEASES OF THE RESPIRATORY SYSTEM: ICD-10-CM
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
## 2023-11-23 DIAGNOSIS — Z80.6 FAMILY HISTORY OF LEUKEMIA: ICD-10-CM

## 2023-11-23 DIAGNOSIS — Z98.890 OTHER SPECIFIED POSTPROCEDURAL STATES: ICD-10-CM
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
## 2023-11-23 DIAGNOSIS — Z88.0 ALLERGY STATUS TO PENICILLIN: ICD-10-CM

## 2023-11-23 DIAGNOSIS — Z79.899 OTHER LONG TERM (CURRENT) DRUG THERAPY: ICD-10-CM

## 2023-11-29 ENCOUNTER — APPOINTMENT (OUTPATIENT)
Dept: WOUND CARE | Facility: HOSPITAL | Age: 88
End: 2023-11-29
Payer: MEDICARE

## 2023-11-29 ENCOUNTER — OUTPATIENT (OUTPATIENT)
Dept: OUTPATIENT SERVICES | Facility: HOSPITAL | Age: 88
LOS: 1 days | Discharge: HOME | End: 2023-11-29
Payer: MEDICARE

## 2023-11-29 VITALS
SYSTOLIC BLOOD PRESSURE: 148 MMHG | RESPIRATION RATE: 20 BRPM | HEIGHT: 63 IN | DIASTOLIC BLOOD PRESSURE: 66 MMHG | BODY MASS INDEX: 25.87 KG/M2 | TEMPERATURE: 97.7 F | WEIGHT: 146 LBS | HEART RATE: 77 BPM | OXYGEN SATURATION: 98 %

## 2023-11-29 DIAGNOSIS — S81.812D LACERATION W/OUT FOREIGN BODY, LEFT LOWER LEG, SUBSEQUENT ENCOUNTER: ICD-10-CM

## 2023-11-29 DIAGNOSIS — L97.301 NON-PRESSURE CHRONIC ULCER OF UNSPECIFIED ANKLE LIMITED TO BREAKDOWN OF SKIN: ICD-10-CM

## 2023-11-29 DIAGNOSIS — Z98.41 CATARACT EXTRACTION STATUS, RIGHT EYE: Chronic | ICD-10-CM

## 2023-11-29 PROCEDURE — G0463: CPT

## 2023-11-29 PROCEDURE — 99213 OFFICE O/P EST LOW 20 MIN: CPT

## 2023-11-30 DIAGNOSIS — Z87.09 PERSONAL HISTORY OF OTHER DISEASES OF THE RESPIRATORY SYSTEM: ICD-10-CM

## 2023-11-30 DIAGNOSIS — Z79.899 OTHER LONG TERM (CURRENT) DRUG THERAPY: ICD-10-CM

## 2023-11-30 DIAGNOSIS — S81.812D LACERATION WITHOUT FOREIGN BODY, LEFT LOWER LEG, SUBSEQUENT ENCOUNTER: ICD-10-CM
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
## 2023-11-30 DIAGNOSIS — Z80.6 FAMILY HISTORY OF LEUKEMIA: ICD-10-CM
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
## 2023-11-30 DIAGNOSIS — Z98.890 OTHER SPECIFIED POSTPROCEDURAL STATES: ICD-10-CM
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
## 2023-11-30 DIAGNOSIS — E04.2 NONTOXIC MULTINODULAR GOITER: ICD-10-CM

## 2023-11-30 DIAGNOSIS — Z90.12 ACQUIRED ABSENCE OF LEFT BREAST AND NIPPLE: ICD-10-CM

## 2023-11-30 DIAGNOSIS — Z86.69 PERSONAL HISTORY OF OTHER DISEASES OF THE NERVOUS SYSTEM AND SENSE ORGANS: ICD-10-CM

## 2023-11-30 DIAGNOSIS — Z85.3 PERSONAL HISTORY OF MALIGNANT NEOPLASM OF BREAST: ICD-10-CM
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
## 2023-11-30 DIAGNOSIS — Z88.0 ALLERGY STATUS TO PENICILLIN: ICD-10-CM

## 2023-11-30 DIAGNOSIS — Z80.3 FAMILY HISTORY OF MALIGNANT NEOPLASM OF BREAST: ICD-10-CM

## 2023-12-01 ENCOUNTER — NON-APPOINTMENT (OUTPATIENT)
Age: 88
End: 2023-12-01

## 2023-12-01 ENCOUNTER — APPOINTMENT (OUTPATIENT)
Dept: OTOLARYNGOLOGY | Facility: CLINIC | Age: 88
End: 2023-12-01
Payer: MEDICARE

## 2023-12-01 VITALS
WEIGHT: 148 LBS | SYSTOLIC BLOOD PRESSURE: 156 MMHG | HEART RATE: 60 BPM | BODY MASS INDEX: 26.22 KG/M2 | HEIGHT: 63 IN | DIASTOLIC BLOOD PRESSURE: 99 MMHG

## 2023-12-01 DIAGNOSIS — J31.0 CHRONIC RHINITIS: ICD-10-CM

## 2023-12-01 DIAGNOSIS — H60.63 UNSPECIFIED CHRONIC OTITIS EXTERNA, BILATERAL: ICD-10-CM

## 2023-12-01 DIAGNOSIS — J34.2 DEVIATED NASAL SEPTUM: ICD-10-CM

## 2023-12-01 DIAGNOSIS — H90.5 UNSPECIFIED SENSORINEURAL HEARING LOSS: ICD-10-CM

## 2023-12-01 PROCEDURE — 99213 OFFICE O/P EST LOW 20 MIN: CPT

## 2023-12-02 ENCOUNTER — INPATIENT (INPATIENT)
Facility: HOSPITAL | Age: 88
LOS: 5 days | Discharge: ANOTHER HOSPITAL | DRG: 64 | End: 2023-12-08
Attending: INTERNAL MEDICINE | Admitting: INTERNAL MEDICINE
Payer: MEDICARE

## 2023-12-02 VITALS
OXYGEN SATURATION: 96 % | TEMPERATURE: 98 F | RESPIRATION RATE: 20 BRPM | DIASTOLIC BLOOD PRESSURE: 81 MMHG | HEART RATE: 62 BPM | SYSTOLIC BLOOD PRESSURE: 176 MMHG | HEIGHT: 63 IN | WEIGHT: 149.91 LBS

## 2023-12-02 DIAGNOSIS — Z98.41 CATARACT EXTRACTION STATUS, RIGHT EYE: Chronic | ICD-10-CM

## 2023-12-02 DIAGNOSIS — R55 SYNCOPE AND COLLAPSE: ICD-10-CM

## 2023-12-02 LAB
ALBUMIN SERPL ELPH-MCNC: 3.5 G/DL — SIGNIFICANT CHANGE UP (ref 3.3–5)
ALBUMIN SERPL ELPH-MCNC: 3.5 G/DL — SIGNIFICANT CHANGE UP (ref 3.3–5)
ALP SERPL-CCNC: 105 U/L — SIGNIFICANT CHANGE UP (ref 30–120)
ALP SERPL-CCNC: 105 U/L — SIGNIFICANT CHANGE UP (ref 30–120)
ALT FLD-CCNC: 25 U/L — SIGNIFICANT CHANGE UP (ref 10–60)
ALT FLD-CCNC: 25 U/L — SIGNIFICANT CHANGE UP (ref 10–60)
ANION GAP SERPL CALC-SCNC: 8 MMOL/L — SIGNIFICANT CHANGE UP (ref 5–17)
ANION GAP SERPL CALC-SCNC: 8 MMOL/L — SIGNIFICANT CHANGE UP (ref 5–17)
APPEARANCE UR: CLEAR — SIGNIFICANT CHANGE UP
APPEARANCE UR: CLEAR — SIGNIFICANT CHANGE UP
APTT BLD: 29.6 SEC — SIGNIFICANT CHANGE UP (ref 24.5–35.6)
APTT BLD: 29.6 SEC — SIGNIFICANT CHANGE UP (ref 24.5–35.6)
AST SERPL-CCNC: 26 U/L — SIGNIFICANT CHANGE UP (ref 10–40)
AST SERPL-CCNC: 26 U/L — SIGNIFICANT CHANGE UP (ref 10–40)
BASE EXCESS BLDA CALC-SCNC: -5.6 MMOL/L — LOW (ref -2–3)
BASE EXCESS BLDA CALC-SCNC: -5.6 MMOL/L — LOW (ref -2–3)
BASOPHILS # BLD AUTO: 0.07 K/UL — SIGNIFICANT CHANGE UP (ref 0–0.2)
BASOPHILS # BLD AUTO: 0.07 K/UL — SIGNIFICANT CHANGE UP (ref 0–0.2)
BASOPHILS NFR BLD AUTO: 0.7 % — SIGNIFICANT CHANGE UP (ref 0–2)
BASOPHILS NFR BLD AUTO: 0.7 % — SIGNIFICANT CHANGE UP (ref 0–2)
BILIRUB SERPL-MCNC: 0.2 MG/DL — SIGNIFICANT CHANGE UP (ref 0.2–1.2)
BILIRUB SERPL-MCNC: 0.2 MG/DL — SIGNIFICANT CHANGE UP (ref 0.2–1.2)
BILIRUB UR-MCNC: NEGATIVE — SIGNIFICANT CHANGE UP
BILIRUB UR-MCNC: NEGATIVE — SIGNIFICANT CHANGE UP
BUN SERPL-MCNC: 24 MG/DL — HIGH (ref 7–23)
BUN SERPL-MCNC: 24 MG/DL — HIGH (ref 7–23)
CALCIUM SERPL-MCNC: 9.1 MG/DL — SIGNIFICANT CHANGE UP (ref 8.4–10.5)
CALCIUM SERPL-MCNC: 9.1 MG/DL — SIGNIFICANT CHANGE UP (ref 8.4–10.5)
CHLORIDE SERPL-SCNC: 101 MMOL/L — SIGNIFICANT CHANGE UP (ref 96–108)
CHLORIDE SERPL-SCNC: 101 MMOL/L — SIGNIFICANT CHANGE UP (ref 96–108)
CO2 SERPL-SCNC: 26 MMOL/L — SIGNIFICANT CHANGE UP (ref 22–31)
CO2 SERPL-SCNC: 26 MMOL/L — SIGNIFICANT CHANGE UP (ref 22–31)
COLOR SPEC: YELLOW — SIGNIFICANT CHANGE UP
COLOR SPEC: YELLOW — SIGNIFICANT CHANGE UP
CREAT SERPL-MCNC: 0.84 MG/DL — SIGNIFICANT CHANGE UP (ref 0.5–1.3)
CREAT SERPL-MCNC: 0.84 MG/DL — SIGNIFICANT CHANGE UP (ref 0.5–1.3)
DIFF PNL FLD: ABNORMAL
DIFF PNL FLD: ABNORMAL
EGFR: 67 ML/MIN/1.73M2 — SIGNIFICANT CHANGE UP
EGFR: 67 ML/MIN/1.73M2 — SIGNIFICANT CHANGE UP
EOSINOPHIL # BLD AUTO: 0.19 K/UL — SIGNIFICANT CHANGE UP (ref 0–0.5)
EOSINOPHIL # BLD AUTO: 0.19 K/UL — SIGNIFICANT CHANGE UP (ref 0–0.5)
EOSINOPHIL NFR BLD AUTO: 1.8 % — SIGNIFICANT CHANGE UP (ref 0–6)
EOSINOPHIL NFR BLD AUTO: 1.8 % — SIGNIFICANT CHANGE UP (ref 0–6)
EPI CELLS # UR: PRESENT
EPI CELLS # UR: PRESENT
GAS PNL BLDA: SIGNIFICANT CHANGE UP
GAS PNL BLDA: SIGNIFICANT CHANGE UP
GLUCOSE SERPL-MCNC: 121 MG/DL — HIGH (ref 70–99)
GLUCOSE SERPL-MCNC: 121 MG/DL — HIGH (ref 70–99)
GLUCOSE UR QL: NEGATIVE MG/DL — SIGNIFICANT CHANGE UP
GLUCOSE UR QL: NEGATIVE MG/DL — SIGNIFICANT CHANGE UP
HCO3 BLDA-SCNC: 26 MMOL/L — SIGNIFICANT CHANGE UP (ref 21–28)
HCO3 BLDA-SCNC: 26 MMOL/L — SIGNIFICANT CHANGE UP (ref 21–28)
HCT VFR BLD CALC: 46.2 % — HIGH (ref 34.5–45)
HCT VFR BLD CALC: 46.2 % — HIGH (ref 34.5–45)
HGB BLD-MCNC: 14 G/DL — SIGNIFICANT CHANGE UP (ref 11.5–15.5)
HGB BLD-MCNC: 14 G/DL — SIGNIFICANT CHANGE UP (ref 11.5–15.5)
HOROWITZ INDEX BLDA+IHG-RTO: 100 — SIGNIFICANT CHANGE UP
HOROWITZ INDEX BLDA+IHG-RTO: 100 — SIGNIFICANT CHANGE UP
IMM GRANULOCYTES NFR BLD AUTO: 0.4 % — SIGNIFICANT CHANGE UP (ref 0–0.9)
IMM GRANULOCYTES NFR BLD AUTO: 0.4 % — SIGNIFICANT CHANGE UP (ref 0–0.9)
INR BLD: 0.93 RATIO — SIGNIFICANT CHANGE UP (ref 0.85–1.18)
INR BLD: 0.93 RATIO — SIGNIFICANT CHANGE UP (ref 0.85–1.18)
KETONES UR-MCNC: NEGATIVE MG/DL — SIGNIFICANT CHANGE UP
KETONES UR-MCNC: NEGATIVE MG/DL — SIGNIFICANT CHANGE UP
LACTATE SERPL-SCNC: 1.8 MMOL/L — SIGNIFICANT CHANGE UP (ref 0.7–2)
LACTATE SERPL-SCNC: 1.8 MMOL/L — SIGNIFICANT CHANGE UP (ref 0.7–2)
LEUKOCYTE ESTERASE UR-ACNC: NEGATIVE — SIGNIFICANT CHANGE UP
LEUKOCYTE ESTERASE UR-ACNC: NEGATIVE — SIGNIFICANT CHANGE UP
LYMPHOCYTES # BLD AUTO: 29.8 % — SIGNIFICANT CHANGE UP (ref 13–44)
LYMPHOCYTES # BLD AUTO: 29.8 % — SIGNIFICANT CHANGE UP (ref 13–44)
LYMPHOCYTES # BLD AUTO: 3.11 K/UL — SIGNIFICANT CHANGE UP (ref 1–3.3)
LYMPHOCYTES # BLD AUTO: 3.11 K/UL — SIGNIFICANT CHANGE UP (ref 1–3.3)
MCHC RBC-ENTMCNC: 25.4 PG — LOW (ref 27–34)
MCHC RBC-ENTMCNC: 25.4 PG — LOW (ref 27–34)
MCHC RBC-ENTMCNC: 30.3 GM/DL — LOW (ref 32–36)
MCHC RBC-ENTMCNC: 30.3 GM/DL — LOW (ref 32–36)
MCV RBC AUTO: 83.8 FL — SIGNIFICANT CHANGE UP (ref 80–100)
MCV RBC AUTO: 83.8 FL — SIGNIFICANT CHANGE UP (ref 80–100)
MONOCYTES # BLD AUTO: 1.22 K/UL — HIGH (ref 0–0.9)
MONOCYTES # BLD AUTO: 1.22 K/UL — HIGH (ref 0–0.9)
MONOCYTES NFR BLD AUTO: 11.7 % — SIGNIFICANT CHANGE UP (ref 2–14)
MONOCYTES NFR BLD AUTO: 11.7 % — SIGNIFICANT CHANGE UP (ref 2–14)
NEUTROPHILS # BLD AUTO: 5.82 K/UL — SIGNIFICANT CHANGE UP (ref 1.8–7.4)
NEUTROPHILS # BLD AUTO: 5.82 K/UL — SIGNIFICANT CHANGE UP (ref 1.8–7.4)
NEUTROPHILS NFR BLD AUTO: 55.6 % — SIGNIFICANT CHANGE UP (ref 43–77)
NEUTROPHILS NFR BLD AUTO: 55.6 % — SIGNIFICANT CHANGE UP (ref 43–77)
NITRITE UR-MCNC: NEGATIVE — SIGNIFICANT CHANGE UP
NITRITE UR-MCNC: NEGATIVE — SIGNIFICANT CHANGE UP
NRBC # BLD: 0 /100 WBCS — SIGNIFICANT CHANGE UP (ref 0–0)
NRBC # BLD: 0 /100 WBCS — SIGNIFICANT CHANGE UP (ref 0–0)
NT-PROBNP SERPL-SCNC: 1066 PG/ML — HIGH (ref 0–450)
NT-PROBNP SERPL-SCNC: 1066 PG/ML — HIGH (ref 0–450)
PCO2 BLDA: 101 MMHG — CRITICAL HIGH (ref 32–35)
PCO2 BLDA: 101 MMHG — CRITICAL HIGH (ref 32–35)
PH BLDA: 7.01 — CRITICAL LOW (ref 7.35–7.45)
PH BLDA: 7.01 — CRITICAL LOW (ref 7.35–7.45)
PH UR: 5.5 — SIGNIFICANT CHANGE UP (ref 5–8)
PH UR: 5.5 — SIGNIFICANT CHANGE UP (ref 5–8)
PLATELET # BLD AUTO: 310 K/UL — SIGNIFICANT CHANGE UP (ref 150–400)
PLATELET # BLD AUTO: 310 K/UL — SIGNIFICANT CHANGE UP (ref 150–400)
PO2 BLDA: 79 MMHG — LOW (ref 83–108)
PO2 BLDA: 79 MMHG — LOW (ref 83–108)
POTASSIUM SERPL-MCNC: 4.5 MMOL/L — SIGNIFICANT CHANGE UP (ref 3.5–5.3)
POTASSIUM SERPL-MCNC: 4.5 MMOL/L — SIGNIFICANT CHANGE UP (ref 3.5–5.3)
POTASSIUM SERPL-SCNC: 4.5 MMOL/L — SIGNIFICANT CHANGE UP (ref 3.5–5.3)
POTASSIUM SERPL-SCNC: 4.5 MMOL/L — SIGNIFICANT CHANGE UP (ref 3.5–5.3)
PROT SERPL-MCNC: 7.9 G/DL — SIGNIFICANT CHANGE UP (ref 6–8.3)
PROT SERPL-MCNC: 7.9 G/DL — SIGNIFICANT CHANGE UP (ref 6–8.3)
PROT UR-MCNC: 30 MG/DL
PROT UR-MCNC: 30 MG/DL
PROTHROM AB SERPL-ACNC: 10.4 SEC — SIGNIFICANT CHANGE UP (ref 9.5–13)
PROTHROM AB SERPL-ACNC: 10.4 SEC — SIGNIFICANT CHANGE UP (ref 9.5–13)
RBC # BLD: 5.51 M/UL — HIGH (ref 3.8–5.2)
RBC # BLD: 5.51 M/UL — HIGH (ref 3.8–5.2)
RBC # FLD: 17.5 % — HIGH (ref 10.3–14.5)
RBC # FLD: 17.5 % — HIGH (ref 10.3–14.5)
RBC CASTS # UR COMP ASSIST: 1 /HPF — SIGNIFICANT CHANGE UP (ref 0–4)
RBC CASTS # UR COMP ASSIST: 1 /HPF — SIGNIFICANT CHANGE UP (ref 0–4)
SAO2 % BLDA: 90.7 % — LOW (ref 94–98)
SAO2 % BLDA: 90.7 % — LOW (ref 94–98)
SODIUM SERPL-SCNC: 135 MMOL/L — SIGNIFICANT CHANGE UP (ref 135–145)
SODIUM SERPL-SCNC: 135 MMOL/L — SIGNIFICANT CHANGE UP (ref 135–145)
SP GR SPEC: 1.02 — SIGNIFICANT CHANGE UP (ref 1–1.03)
SP GR SPEC: 1.02 — SIGNIFICANT CHANGE UP (ref 1–1.03)
TROPONIN I, HIGH SENSITIVITY RESULT: 16.2 NG/L — SIGNIFICANT CHANGE UP
TROPONIN I, HIGH SENSITIVITY RESULT: 16.2 NG/L — SIGNIFICANT CHANGE UP
TROPONIN I, HIGH SENSITIVITY RESULT: 64.3 NG/L — HIGH
TROPONIN I, HIGH SENSITIVITY RESULT: 64.3 NG/L — HIGH
UROBILINOGEN FLD QL: 0.2 MG/DL — SIGNIFICANT CHANGE UP (ref 0.2–1)
UROBILINOGEN FLD QL: 0.2 MG/DL — SIGNIFICANT CHANGE UP (ref 0.2–1)
WBC # BLD: 10.45 K/UL — SIGNIFICANT CHANGE UP (ref 3.8–10.5)
WBC # BLD: 10.45 K/UL — SIGNIFICANT CHANGE UP (ref 3.8–10.5)
WBC # FLD AUTO: 10.45 K/UL — SIGNIFICANT CHANGE UP (ref 3.8–10.5)
WBC # FLD AUTO: 10.45 K/UL — SIGNIFICANT CHANGE UP (ref 3.8–10.5)
WBC UR QL: 2 /HPF — SIGNIFICANT CHANGE UP (ref 0–5)
WBC UR QL: 2 /HPF — SIGNIFICANT CHANGE UP (ref 0–5)

## 2023-12-02 PROCEDURE — 70498 CT ANGIOGRAPHY NECK: CPT | Mod: 26,MA

## 2023-12-02 PROCEDURE — 31500 INSERT EMERGENCY AIRWAY: CPT

## 2023-12-02 PROCEDURE — 99285 EMERGENCY DEPT VISIT HI MDM: CPT | Mod: 25

## 2023-12-02 PROCEDURE — 70450 CT HEAD/BRAIN W/O DYE: CPT | Mod: 26,MA,XU

## 2023-12-02 PROCEDURE — 70496 CT ANGIOGRAPHY HEAD: CPT | Mod: 26,MA

## 2023-12-02 PROCEDURE — G0508: CPT | Mod: 95

## 2023-12-02 PROCEDURE — 71045 X-RAY EXAM CHEST 1 VIEW: CPT | Mod: 26,76

## 2023-12-02 PROCEDURE — 93010 ELECTROCARDIOGRAM REPORT: CPT

## 2023-12-02 PROCEDURE — 99223 1ST HOSP IP/OBS HIGH 75: CPT | Mod: AI

## 2023-12-02 RX ORDER — INFLUENZA VIRUS VACCINE 15; 15; 15; 15 UG/.5ML; UG/.5ML; UG/.5ML; UG/.5ML
0.7 SUSPENSION INTRAMUSCULAR ONCE
Refills: 0 | Status: DISCONTINUED | OUTPATIENT
Start: 2023-12-02 | End: 2023-12-08

## 2023-12-02 RX ORDER — CHLORHEXIDINE GLUCONATE 213 G/1000ML
15 SOLUTION TOPICAL EVERY 12 HOURS
Refills: 0 | Status: DISCONTINUED | OUTPATIENT
Start: 2023-12-02 | End: 2023-12-04

## 2023-12-02 RX ORDER — FUROSEMIDE 40 MG
80 TABLET ORAL DAILY
Refills: 0 | Status: DISCONTINUED | OUTPATIENT
Start: 2023-12-02 | End: 2023-12-03

## 2023-12-02 RX ORDER — ACETAZOLAMIDE 250 MG/1
500 TABLET ORAL ONCE
Refills: 0 | Status: COMPLETED | OUTPATIENT
Start: 2023-12-02 | End: 2023-12-02

## 2023-12-02 RX ORDER — BUMETANIDE 0.25 MG/ML
2 INJECTION INTRAMUSCULAR; INTRAVENOUS ONCE
Refills: 0 | Status: COMPLETED | OUTPATIENT
Start: 2023-12-02 | End: 2023-12-02

## 2023-12-02 RX ORDER — ALBUTEROL 90 UG/1
2.5 AEROSOL, METERED ORAL ONCE
Refills: 0 | Status: COMPLETED | OUTPATIENT
Start: 2023-12-02 | End: 2023-12-02

## 2023-12-02 RX ORDER — IPRATROPIUM/ALBUTEROL SULFATE 18-103MCG
3 AEROSOL WITH ADAPTER (GRAM) INHALATION ONCE
Refills: 0 | Status: COMPLETED | OUTPATIENT
Start: 2023-12-02 | End: 2023-12-02

## 2023-12-02 RX ORDER — MIDAZOLAM HYDROCHLORIDE 1 MG/ML
2 INJECTION, SOLUTION INTRAMUSCULAR; INTRAVENOUS ONCE
Refills: 0 | Status: DISCONTINUED | OUTPATIENT
Start: 2023-12-02 | End: 2023-12-02

## 2023-12-02 RX ORDER — DIPHENHYDRAMINE HCL 50 MG
50 CAPSULE ORAL ONCE
Refills: 0 | Status: COMPLETED | OUTPATIENT
Start: 2023-12-02 | End: 2023-12-02

## 2023-12-02 RX ORDER — PROPOFOL 10 MG/ML
20 INJECTION, EMULSION INTRAVENOUS
Qty: 1000 | Refills: 0 | Status: DISCONTINUED | OUTPATIENT
Start: 2023-12-02 | End: 2023-12-04

## 2023-12-02 RX ORDER — FAMOTIDINE 10 MG/ML
20 INJECTION INTRAVENOUS ONCE
Refills: 0 | Status: COMPLETED | OUTPATIENT
Start: 2023-12-02 | End: 2023-12-02

## 2023-12-02 RX ORDER — EPINEPHRINE 0.3 MG/.3ML
0.3 INJECTION INTRAMUSCULAR; SUBCUTANEOUS ONCE
Refills: 0 | Status: COMPLETED | OUTPATIENT
Start: 2023-12-02 | End: 2023-12-02

## 2023-12-02 RX ORDER — NITROGLYCERIN 6.5 MG
200 CAPSULE, EXTENDED RELEASE ORAL
Qty: 50 | Refills: 0 | Status: DISCONTINUED | OUTPATIENT
Start: 2023-12-02 | End: 2023-12-04

## 2023-12-02 RX ORDER — FENTANYL CITRATE 50 UG/ML
25 INJECTION INTRAVENOUS
Refills: 0 | Status: DISCONTINUED | OUTPATIENT
Start: 2023-12-02 | End: 2023-12-04

## 2023-12-02 RX ADMIN — BUMETANIDE 2 MILLIGRAM(S): 0.25 INJECTION INTRAMUSCULAR; INTRAVENOUS at 21:47

## 2023-12-02 RX ADMIN — Medication 15 MICROGRAM(S)/MIN: at 20:35

## 2023-12-02 RX ADMIN — Medication 125 MILLIGRAM(S): at 19:15

## 2023-12-02 RX ADMIN — ACETAZOLAMIDE 110 MILLIGRAM(S): 250 TABLET ORAL at 21:41

## 2023-12-02 RX ADMIN — Medication 50 MILLIGRAM(S): at 19:15

## 2023-12-02 RX ADMIN — FAMOTIDINE 20 MILLIGRAM(S): 10 INJECTION INTRAVENOUS at 19:15

## 2023-12-02 RX ADMIN — Medication 60 MICROGRAM(S)/MIN: at 21:37

## 2023-12-02 RX ADMIN — EPINEPHRINE 0.3 MILLIGRAM(S): 0.3 INJECTION INTRAMUSCULAR; SUBCUTANEOUS at 19:15

## 2023-12-02 RX ADMIN — Medication 80 MILLIGRAM(S): at 20:00

## 2023-12-02 RX ADMIN — PROPOFOL 8.16 MICROGRAM(S)/KG/MIN: 10 INJECTION, EMULSION INTRAVENOUS at 20:36

## 2023-12-02 RX ADMIN — MIDAZOLAM HYDROCHLORIDE 2 MILLIGRAM(S): 1 INJECTION, SOLUTION INTRAMUSCULAR; INTRAVENOUS at 19:50

## 2023-12-02 RX ADMIN — ALBUTEROL 2.5 MILLIGRAM(S): 90 AEROSOL, METERED ORAL at 19:15

## 2023-12-02 NOTE — ED PROVIDER NOTE - CARE PLAN
Principal Discharge DX:	Syncope   1 Principal Discharge DX:	Syncope  Secondary Diagnosis:	Acute pulmonary edema

## 2023-12-02 NOTE — ED PROVIDER NOTE - NSICDXPASTSURGICALHX_GEN_ALL_CORE_FT
PAST SURGICAL HISTORY:  Cataract extraction status of eye, right     History of Left Mastectomy     Status post right cataract extraction

## 2023-12-02 NOTE — PATIENT PROFILE ADULT - FALL HARM RISK - HARM RISK INTERVENTIONS
Assistance with ambulation/Assistance OOB with selected safe patient handling equipment/Communicate Risk of Fall with Harm to all staff/Discuss with provider need for PT consult/Monitor gait and stability/Provide patient with walking aids - walker, cane, crutches/Reinforce activity limits and safety measures with patient and family/Tailored Fall Risk Interventions/Visual Cue: Yellow wristband and red socks/Bed in lowest position, wheels locked, appropriate side rails in place/Call bell, personal items and telephone in reach/Instruct patient to call for assistance before getting out of bed or chair/Non-slip footwear when patient is out of bed/[place] to call system/Physically safe environment - no spills, clutter or unnecessary equipment/Purposeful Proactive Rounding/Room/bathroom lighting operational, light cord in reach Assistance with ambulation/Assistance OOB with selected safe patient handling equipment/Communicate Risk of Fall with Harm to all staff/Discuss with provider need for PT consult/Monitor gait and stability/Provide patient with walking aids - walker, cane, crutches/Reinforce activity limits and safety measures with patient and family/Tailored Fall Risk Interventions/Visual Cue: Yellow wristband and red socks/Bed in lowest position, wheels locked, appropriate side rails in place/Call bell, personal items and telephone in reach/Instruct patient to call for assistance before getting out of bed or chair/Non-slip footwear when patient is out of bed/[place] to call system/Physically safe environment - no spills, clutter or unnecessary equipment/Purposeful Proactive Rounding/Room/bathroom lighting operational, light cord in reach Assistance with ambulation/Assistance OOB with selected safe patient handling equipment/Communicate Risk of Fall with Harm to all staff/Discuss with provider need for PT consult/Monitor gait and stability/Provide patient with walking aids - walker, cane, crutches/Reinforce activity limits and safety measures with patient and family/Tailored Fall Risk Interventions/Visual Cue: Yellow wristband and red socks/Bed in lowest position, wheels locked, appropriate side rails in place/Call bell, personal items and telephone in reach/Instruct patient to call for assistance before getting out of bed or chair/Non-slip footwear when patient is out of bed/[place] to call system/Physically safe environment - no spills, clutter or unnecessary equipment/Purposeful Proactive Rounding/Room/bathroom lighting operational, light cord in reach
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
## 2023-12-02 NOTE — ED ADULT NURSE REASSESSMENT NOTE - NS ED NURSE REASSESS COMMENT FT1
@ aprox 1900 Pt returned to ED room from CT scan.  Pt seen to be having difficulty breathing, lips were ecchymotic, when placed on , O2 sat of 68% noted.  Dr. Camarena immediately called to bedside, pt was given allergic reaction meds and started to receive resp treatment.  No improvement was seen aprox 15 mins. after all meds were given, O2 sat dropped to 50% on nonrebreather and became unresponsive.  Pt intubated. @ aprox 1900 Pt returned to ED room from CT scan.  Pt seen to be having difficulty breathing, lips were ecchymotic, when placed on , O2 sat of 68% noted.  Dr. Camarena immediately called to bedside, pt was given allergic reaction meds and started to receive resp treatment.  No improvement was seen aprox 15 mins. after all meds were given, O2 sat dropped to 50% on nonrebreather and became unresponsive.  Pt intubated, no sedatives or paralytics given for intubation procedure due to unresponsiveness.

## 2023-12-02 NOTE — ED PROVIDER NOTE - PROGRESS NOTE DETAILS
Patient returned from CAT scan with shortness of breath. Wheezing and hypoxic. Meds given, patient required emergent intubation

## 2023-12-02 NOTE — H&P ADULT - NSTOBACCOSCREENHP_GEN_A_NCS
[facility]   [facility]  [address] [place] [id number]    Developmental and Behavioral Pediatric Follow Up    Name:  Se Daugherty   Patient ID:  [id number]   YOB: 2013   Age:  8 year old   Date of Service:  4/27/2022       Clinician: Ondina Richards CNP     As a precaution due to the Covid-19 pandemic, this visit was performed via telemedicine using two-way, real-time interactive telecommunications between the patient and the clinician. The interactive telecommunication technology included audio and video. The patient was offered telemedicine as an option for care delivery and consented to this option.    Date: April 27, 2022  Time of patient contact: 8:08am  Clinician Location: Provider from [facility], [address] [place] [address]. Provider located in offsite office in [place], [place].   Patient Location: Home  Other participants present with patient: Mother. Se not present, at school.   Other participants present with provider, with patient's verbal consent: none    Update from last visit:  Se is a 8 year old male with autism spectrum disorder.  Se was last seen 6 months ago by Dr. Yanez.     He was previously prescribed guanfacine but this was never started. He does drink caffeine in the morning, parent notices this helps him to focus throughout the day.     Se is in 2nd grade and is doing great this year. There are no complaints from his teachers this year. He is described as independent and is doing well completing academic work. He is asking questions when he doesn't understand a new concept. He can get distracted at times but responds well to redirection. He has classroom accommodations to allow him to take movement breaks as needed. Teachers report he is well liked in the classroom and has several friends that he will play with at  rece.     He was recently accepted to [employer] swim team. He plays piano.     At home, parent observes he is starting to test boundaries more. It is difficult to get him to do nonpreferred tasks. He struggles with more refusals. Parents have managed this by taking his ipad away.     Over the summer his family will be traveling to [facility]. He will also participate in summer camp through his school Parents hope to have more play dates over the summer.     Se is recovering from covid.     Current Medications:    Current Outpatient Medications   Medication Sig Dispense Refill   • guanFACINE (TENEX) 1 MG tablet Take 1 tablet by mouth daily. Take half a tab (0.5mg) in the evening for the first week and then half a tab (0.5mg) in the morning and half a tab (0.5mg) in the evening. 30 tablet 3     No current facility-administered medications for this visit.     ALLERGIES:  No Known Allergies    Educational and Therapeutic History:  School: [employer] in [place], [place]  Grade: 2nd grade   Educational Supports: None  Academic Performance:  Excelling in reading, math, and writing which are above grade level. MAP testing was in the 99th percentile for math and 98th percentile for reading.   Clinic Based Therapies: He has previously done occupational therapy and a social group. He attends social group when he is available on Saturdays.   Community Activities: swimming, piano     Sleep: No changes reported  Appetite/Diet: No changes reported     Past Medical History:  No changes reported since last visit.    Family History:  No changes reported since last visit.    Social History:  No changes reported since last visit.    Review of systems:  No reported ongoing health issues interfering with this child's development.      Impression:  Se is a 8 year old male with autism spectrum disorder.   Se benefits from the following home, community and school-based interventions.  Close follow up is  recommended.    Recommendations:  Medications: Medication deferred at this time. Se is doing great with current supports.      School: Continue current school supports    Therapies:  Continue private therapy services including outpatient social group.     Resources: Consider resources through the Social Thinking Curriculum.  The curriculum is intended for students with social learning disabilities.  Its main focus is on teaching students to think about how others perceive them.  It provides a framework that can help all people better understand the social experience and how it can be taught through cognitive behavioral techniques.  These strategies help individuals as young as four and across the lifespan develop their social thinking and social skills to meet their personal social goals.  Consider referencing the following website for more information:  Https://[email or web address]/     Follow up  in 6 months.                   If you have questions, please call [phone].    Sincerely,    Ondina Richards  Pediatric Nurse Practitioner  [facility]  [facility]  [address] [place] [address]    Duration of Visit: Total time spent on patient care, including face-to-face and non-face-to-face time on date of encounter: 40 minutes          No

## 2023-12-02 NOTE — ED PROVIDER NOTE - SKIN, MLM
Skin normal color for race, warm, dry and intact. There is a dry ulceration on the left lower leg no infection or drainage or bleeding.

## 2023-12-02 NOTE — H&P ADULT - NSICDXFAMILYHX_GEN_ALL_CORE_FT
FAMILY HISTORY:  Father  Still living? No  FH: leukemia, Age at diagnosis: Age Unknown    Mother  Still living? No  FH: breast cancer, Age at diagnosis: Age Unknown

## 2023-12-02 NOTE — ED ADULT NURSE NOTE - NS ED NURSE TRANSPORT WITH
Cardiac Monitor/Defib/ACLS/Rescue Kit/O2/BVM/oxygen/IV pump/pulse ox/ventilator/drains/ACLS Rescue Kit

## 2023-12-02 NOTE — ED ADULT NURSE NOTE - NSFALLRISKINTERV_ED_ALL_ED

## 2023-12-02 NOTE — ED PROVIDER NOTE - OBJECTIVE STATEMENT
88-year-old female with history of hypertension pacemaker right hip replacement brought by ambulance from home for evaluation of episode of weakness and facial droop around 3 PM today.  Daughter who is at bedside states patient was in usual state of health and then was noted to spill water all over herself.  While trying to clean up spilled water patient became profoundly weak and seem to be speaking out of 1 side of her mouth.  Lasted for few seconds and then resolved.  Patient denies any symptoms but has no memory of difficulty with speech or facial droop.  Feels well at present.    Her PCP is Dr. Dunham

## 2023-12-02 NOTE — ED PROVIDER NOTE - CLINICAL SUMMARY MEDICAL DECISION MAKING FREE TEXT BOX
88-year-old female with history of hypertension pacemaker right hip replacement brought by ambulance from home for evaluation of episode of weakness and facial droop around 3 PM today.  Daughter who is at bedside states patient was in usual state of health and then was noted to spill water all over herself.  While trying to clean up spilled water patient became profoundly weak and seem to be speaking out of 1 side of her mouth.  Lasted for few seconds and then resolved.  Patient denies any symptoms but has no memory of difficulty with speech or facial droop.  Feels well at present.    Her PCP is Dr. Dunham    Physical exam is now normal.  NIHSS equals 0.  Impression is syncope versus TIA.  Completely resolved with return to baseline.  Plan is we will get stroke workup.  May need cardio/neuro evaluation.  May need admission for MRI and further workup.

## 2023-12-02 NOTE — H&P ADULT - NSICDXPASTMEDICALHX_GEN_ALL_CORE_FT
PAST MEDICAL HISTORY:  Breast cancer 2009 left - s/neida castellanos radical mastectomy    Cyst of left ovary     [place] (hard of hearing) bilateral HA (Yvette)    Hyperlipidemia no current medications    Meniscus tear left - 10/2015    SDH (subdural hematoma) 3/2011 - s/p fall - no surgical intervention     PAST MEDICAL HISTORY:  Breast cancer 2009 left - s/neida castellanos radical mastectomy    Cyst of left ovary     [place] (hard of hearing) bilateral HA (Yvette)    Hyperlipidemia no current medications    Meniscus tear left - 10/2015    SDH (subdural hematoma) 3/2011 - s/p fall - no surgical intervention     PAST MEDICAL HISTORY:  Breast cancer 2009 left - s/neida castellanos radical mastectomy    Cyst of left ovary     [place] (hard of hearing) bilateral HA (Yvette)    Hyperlipidemia no current medications    Meniscus tear left - 10/2015    SDH (subdural hematoma) 3/2011 - s/p fall - no surgical intervention

## 2023-12-02 NOTE — H&P ADULT - NSHPLABSRESULTS_GEN_ALL_CORE
-                          14.0   10.45 )-----------( 310      ( 02 Dec 2023 19:26 )             46.2       02 Dec 2023 19:26    135    |  101    |  24     ----------------------------<  121    4.5     |  26     |  0.84     Ca    9.1        02 Dec 2023 19:26    TPro  7.9    /  Alb  3.5    /  TBili  0.2    /  DBili  x      /  AST  26     /  ALT  25     /  AlkPhos  105    02 Dec 2023 19:26    LIVER FUNCTIONS - ( 02 Dec 2023 19:26 )  Alb: 3.5 g/dL / Pro: 7.9 g/dL / ALK PHOS: 105 U/L / ALT: 25 U/L / AST: 26 U/L / GGT: x           PT/INR - ( 02 Dec 2023 19:26 )   PT: 10.4 sec;   INR: 0.93 ratio    PTT - ( 02 Dec 2023 19:26 )  PTT:29.6 sec    Urinalysis Basic - ( 02 Dec 2023 22:28 )  Color: Yellow / Appearance: Clear / S.022 / pH: x  Gluc: x / Ketone: Negative mg/dL  / Bili: Negative / Urobili: 0.2 mg/dL   Blood: x / Protein: 30 mg/dL / Nitrite: Negative   Leuk Esterase: Negative / RBC: 1 /HPF / WBC 2 /HPF   Sq Epi: x / Non Sq Epi: x / Bacteria: x -                          14.0   10.45 )-----------( 310      ( 02 Dec 2023 19:26 )             46.2       02 Dec 2023 19:26    135    |  101    |  24     ----------------------------<  121    4.5     |  26     |  0.84     Ca    9.1        02 Dec 2023 19:26    TPro  7.9    /  Alb  3.5    /  TBili  0.2    /  DBili  x      /  AST  26     /  ALT  25     /  AlkPhos  105    02 Dec 2023 19:26    LIVER FUNCTIONS - ( 02 Dec 2023 19:26 )  Alb: 3.5 g/dL / Pro: 7.9 g/dL / ALK PHOS: 105 U/L / ALT: 25 U/L / AST: 26 U/L / GGT: x           PT/INR - ( 02 Dec 2023 19:26 )   PT: 10.4 sec;   INR: 0.93 ratio    PTT - ( 02 Dec 2023 19: )  PTT:29.6 sec    Urinalysis Basic - ( 02 Dec 2023 22:28 )  Color: Yellow / Appearance: Clear / S.022 / pH: x  Gluc: x / Ketone: Negative mg/dL  / Bili: Negative / Urobili: 0.2 mg/dL   Blood: x / Protein: 30 mg/dL / Nitrite: Negative   Leuk Esterase: Negative / RBC: 1 /HPF / WBC 2 /HPF   Sq Epi: x / Non Sq Epi: x / Bacteria: x      ABG - ( 02 Dec 2023 23:37 )  pH, Arterial: 7.34  pH, Blood: x     /  pCO2: 50    /  pO2: 282   / HCO3: 27    / Base Excess: 1.2   /  SaO2: 99.9        Troponin I, High Sensitivity (23 @ 22:28)   Troponin I, High Sensitivity Result: 64.3  Troponin I, High Sensitivity (23 @ 19:26)   Troponin I, High Sensitivity Result: 16.2      Pro-Brain Natriuretic Peptide (23 @ 22:28)   Pro-Brain Natriuretic Peptide: 1066 pg/mL      Lactate, Blood (23 @ 22:28)   Lactate, Blood: 1.8 mmol/L       CT ANGIO BRAIN (W)AW IC,  CT ANGIO NECK (W)AW IC & CT BRAIN     PROCEDURE DATE:  2023    COMPARISON: CT head from 2011.  IMPRESSION:  NONCONTRAST HEAD CT SCAN: No CT evidence of acute intracranial pathology.  CT ANGIOGRAPHY NECK:  1.  Mild stenosis in the proximal right internal carotid artery measures approximately 40% using NASCET criteria.  2.  Minimal stenosis in the proximal left internal carotid artery measures approximately 20% using NASCET criteria.  3.  Multiple mild stenoses in the V2 and V3 segments of the left vertebral artery and a moderate stenosis in the left vertebral artery as it pierces the dura.  4.  Heterogeneous thyroid with multiple nodules measuring up to at least 1.5 cm.  A nonemergent outpatient thyroid ultrasound may be obtained as clinically warranted.  5.  Ill-defined groundglass opacity and interstitial or septal thickening in the lung apices may reflect pulmonary edema.  Small pleural effusions bilaterally.  CT ANGIOGRAPHY BRAIN:  1. No major vessel occlusion or aneurysm about the [place].  2. There is mild stenosis in the proximal M1 segment of the left middle cerebral artery followed by fusiform dilatation of the vessel to 3.5 mm, followed by a short segment severe stenosis in the distal left M1 segment, with distal flow.  3.  Diffuse long segment mild stenosis in the M1 segment of the right middle cerebral artery.  4.  Multiple stenoses in the A4 and A5 segments of the right anterior cerebral artery, without occlusion.  5.  Approximately 6 mm long moderate to severe stenosis in the distal aspect of the left vertebral artery, just proximal to the vertebrobasilar junction.  6.  Approximately 3 mm long moderate stenosis in the proximal left posterior inferior cerebellar artery.  7. Minimal stenosis in the mid basilar artery, which does not appear to be flow limiting.      CXR:    As per my review shows right sided dual chamber pacemaker with leads in place, normal cardiac shadow size, almost complete opacification of both lungs with a massive pulmonary edema, no pneumothorax. Pending official report.    -- -                          14.0   10.45 )-----------( 310      ( 02 Dec 2023 19:26 )             46.2       02 Dec 2023 19:26    135    |  101    |  24     ----------------------------<  121    4.5     |  26     |  0.84     Ca    9.1        02 Dec 2023 19:26    TPro  7.9    /  Alb  3.5    /  TBili  0.2    /  DBili  x      /  AST  26     /  ALT  25     /  AlkPhos  105    02 Dec 2023 19:26    LIVER FUNCTIONS - ( 02 Dec 2023 19:26 )  Alb: 3.5 g/dL / Pro: 7.9 g/dL / ALK PHOS: 105 U/L / ALT: 25 U/L / AST: 26 U/L / GGT: x           PT/INR - ( 02 Dec 2023 19:26 )   PT: 10.4 sec;   INR: 0.93 ratio    PTT - ( 02 Dec 2023 19: )  PTT:29.6 sec    Urinalysis Basic - ( 02 Dec 2023 22:28 )  Color: Yellow / Appearance: Clear / S.022 / pH: x  Gluc: x / Ketone: Negative mg/dL  / Bili: Negative / Urobili: 0.2 mg/dL   Blood: x / Protein: 30 mg/dL / Nitrite: Negative   Leuk Esterase: Negative / RBC: 1 /HPF / WBC 2 /HPF   Sq Epi: x / Non Sq Epi: x / Bacteria: x      ABG - ( 02 Dec 2023 23:37 )  pH, Arterial: 7.34  pH, Blood: x     /  pCO2: 50    /  pO2: 282   / HCO3: 27    / Base Excess: 1.2   /  SaO2: 99.9        Troponin I, High Sensitivity (23 @ 22:28)   Troponin I, High Sensitivity Result: 64.3  Troponin I, High Sensitivity (23 @ 19:26)   Troponin I, High Sensitivity Result: 16.2      Pro-Brain Natriuretic Peptide (23 @ 22:28)   Pro-Brain Natriuretic Peptide: 1066 pg/mL      Lactate, Blood (23 @ 22:28)   Lactate, Blood: 1.8 mmol/L       CT ANGIO BRAIN (W)AW IC,  CT ANGIO NECK (W)AW IC & CT BRAIN     PROCEDURE DATE:  2023    COMPARISON: CT head from 2011.  IMPRESSION:  NONCONTRAST HEAD CT SCAN: No CT evidence of acute intracranial pathology.  CT ANGIOGRAPHY NECK:  1.  Mild stenosis in the proximal right internal carotid artery measures approximately 40% using NASCET criteria.  2.  Minimal stenosis in the proximal left internal carotid artery measures approximately 20% using NASCET criteria.  3.  Multiple mild stenoses in the V2 and V3 segments of the left vertebral artery and a moderate stenosis in the left vertebral artery as it pierces the dura.  4.  Heterogeneous thyroid with multiple nodules measuring up to at least 1.5 cm.  A nonemergent outpatient thyroid ultrasound may be obtained as clinically warranted.  5.  Ill-defined groundglass opacity and interstitial or septal thickening in the lung apices may reflect pulmonary edema.  Small pleural effusions bilaterally.  CT ANGIOGRAPHY BRAIN:  1. No major vessel occlusion or aneurysm about the [place].  2. There is mild stenosis in the proximal M1 segment of the left middle cerebral artery followed by fusiform dilatation of the vessel to 3.5 mm, followed by a short segment severe stenosis in the distal left M1 segment, with distal flow.  3.  Diffuse long segment mild stenosis in the M1 segment of the right middle cerebral artery.  4.  Multiple stenoses in the A4 and A5 segments of the right anterior cerebral artery, without occlusion.  5.  Approximately 6 mm long moderate to severe stenosis in the distal aspect of the left vertebral artery, just proximal to the vertebrobasilar junction.  6.  Approximately 3 mm long moderate stenosis in the proximal left posterior inferior cerebellar artery.  7. Minimal stenosis in the mid basilar artery, which does not appear to be flow limiting.      CXR:    As per my review shows right sided dual chamber pacemaker with leads in place, normal cardiac shadow size, almost complete opacification of both lungs with a massive pulmonary edema, no pneumothorax. Pending official report.    -- -                          14.0   10.45 )-----------( 310      ( 02 Dec 2023 19:26 )             46.2       02 Dec 2023 19:26    135    |  101    |  24     ----------------------------<  121    4.5     |  26     |  0.84     Ca    9.1        02 Dec 2023 19:26    TPro  7.9    /  Alb  3.5    /  TBili  0.2    /  DBili  x      /  AST  26     /  ALT  25     /  AlkPhos  105    02 Dec 2023 19:26    LIVER FUNCTIONS - ( 02 Dec 2023 19:26 )  Alb: 3.5 g/dL / Pro: 7.9 g/dL / ALK PHOS: 105 U/L / ALT: 25 U/L / AST: 26 U/L / GGT: x           PT/INR - ( 02 Dec 2023 19:26 )   PT: 10.4 sec;   INR: 0.93 ratio    PTT - ( 02 Dec 2023 19: )  PTT:29.6 sec    Urinalysis Basic - ( 02 Dec 2023 22:28 )  Color: Yellow / Appearance: Clear / S.022 / pH: x  Gluc: x / Ketone: Negative mg/dL  / Bili: Negative / Urobili: 0.2 mg/dL   Blood: x / Protein: 30 mg/dL / Nitrite: Negative   Leuk Esterase: Negative / RBC: 1 /HPF / WBC 2 /HPF   Sq Epi: x / Non Sq Epi: x / Bacteria: x      ABG - ( 02 Dec 2023 23:37 )  pH, Arterial: 7.34  pH, Blood: x     /  pCO2: 50    /  pO2: 282   / HCO3: 27    / Base Excess: 1.2   /  SaO2: 99.9        Troponin I, High Sensitivity (23 @ 22:28)   Troponin I, High Sensitivity Result: 64.3  Troponin I, High Sensitivity (23 @ 19:26)   Troponin I, High Sensitivity Result: 16.2      Pro-Brain Natriuretic Peptide (23 @ 22:28)   Pro-Brain Natriuretic Peptide: 1066 pg/mL      Lactate, Blood (23 @ 22:28)   Lactate, Blood: 1.8 mmol/L       CT ANGIO BRAIN (W)AW IC,  CT ANGIO NECK (W)AW IC & CT BRAIN     PROCEDURE DATE:  2023    COMPARISON: CT head from 2011.  IMPRESSION:  NONCONTRAST HEAD CT SCAN: No CT evidence of acute intracranial pathology.  CT ANGIOGRAPHY NECK:  1.  Mild stenosis in the proximal right internal carotid artery measures approximately 40% using NASCET criteria.  2.  Minimal stenosis in the proximal left internal carotid artery measures approximately 20% using NASCET criteria.  3.  Multiple mild stenoses in the V2 and V3 segments of the left vertebral artery and a moderate stenosis in the left vertebral artery as it pierces the dura.  4.  Heterogeneous thyroid with multiple nodules measuring up to at least 1.5 cm.  A nonemergent outpatient thyroid ultrasound may be obtained as clinically warranted.  5.  Ill-defined groundglass opacity and interstitial or septal thickening in the lung apices may reflect pulmonary edema.  Small pleural effusions bilaterally.  CT ANGIOGRAPHY BRAIN:  1. No major vessel occlusion or aneurysm about the [place].  2. There is mild stenosis in the proximal M1 segment of the left middle cerebral artery followed by fusiform dilatation of the vessel to 3.5 mm, followed by a short segment severe stenosis in the distal left M1 segment, with distal flow.  3.  Diffuse long segment mild stenosis in the M1 segment of the right middle cerebral artery.  4.  Multiple stenoses in the A4 and A5 segments of the right anterior cerebral artery, without occlusion.  5.  Approximately 6 mm long moderate to severe stenosis in the distal aspect of the left vertebral artery, just proximal to the vertebrobasilar junction.  6.  Approximately 3 mm long moderate stenosis in the proximal left posterior inferior cerebellar artery.  7. Minimal stenosis in the mid basilar artery, which does not appear to be flow limiting.      CXR:    As per my review shows right sided dual chamber pacemaker with leads in place, normal cardiac shadow size, almost complete opacification of both lungs with a massive pulmonary edema, no pneumothorax. Pending official report.    --

## 2023-12-02 NOTE — H&P ADULT - NSHPPHYSICALEXAM_GEN_ALL_CORE
-    Vital Signs Last 24 Hrs  T(C): 36.8 (03 Dec 2023 04:30), Max: 37.1 (02 Dec 2023 21:24)  T(F): 98.2 (03 Dec 2023 04:30), Max: 98.8 (02 Dec 2023 21:24)  HR: 67 (03 Dec 2023 04:47) (58 - 99)  BP: 122/58 (03 Dec 2023 04:30) (69/54 - 245/114)  BP(mean): 78 (03 Dec 2023 04:30) (59 - 164)  RR: 22 (03 Dec 2023 04:30) (18 - 48)  SpO2: 99% (03 Dec 2023 04:47) (50% - 100%)    Parameters below as of 03 Dec 2023 04:30  Patient On (Oxygen Delivery Method): ventilator    O2 Concentration (%): 40        PHYSICAL EXAM:  		  GENERAL: NAD, well-groomed, well-developed.  HEAD:  Atraumatic, Norm cephalic.  EYES: PERRLA, conjunctiva clear, (+) IOL.  ENMT: Intubated on vent support.   NECK: Supple, No JVD.  NERVOUS SYSTEM:  Currently intubated on Propofol, half awake, responds to tactile stimuli.  CHEST/LUNG: Good air entry B/L, no bibasilar rales, currently no rhonchi, or wheezing.  HEART: Normal S1 & S2, no murmurs, or extra sounds.  ABDOMEN: Soft, non-distended; bowel sounds present, no palpable masses or organomegaly, just got a large BM.  EXTREMITIES:  No clubbing, cyanosis, or edema, left lower leg wound with dressing on.  VASCULAR: 2+ radial, brachial pulses B/L.  SKIN: No more rashes, no lesions.  PSYCH: no agitation.

## 2023-12-02 NOTE — ED PROVIDER NOTE - NSICDXPASTMEDICALHX_GEN_ALL_CORE_FT
PAST MEDICAL HISTORY:  Breast cancer 2009 left - s/p radical mastectomy    Cyst of left ovary     [place] (hard of hearing) bilateral HA ([place])    Hyperlipidemia no current medications    Meniscus tear left - 10/2015    SDH (subdural hematoma) 3/2011 - s/p fall - no surgical intervention     PAST MEDICAL HISTORY:  Breast cancer 2009 left - s/p radical mastectomy    Cyst of left ovary     [place] (hard of hearing) bilateral HA (Yvette)    Hyperlipidemia no current medications    Meniscus tear left - 10/2015    SDH (subdural hematoma) 3/2011 - s/p fall - no surgical intervention     PAST MEDICAL HISTORY:  Breast cancer 2009 left - s/p radical mastectomy    Cyst of left ovary     [place] (hard of hearing) bilateral HA (Yvette)    Hyperlipidemia no current medications    Meniscus tear left - 10/2015    SDH (subdural hematoma) 3/2011 - s/p fall - no surgical intervention

## 2023-12-02 NOTE — H&P ADULT - PROBLEM SELECTOR PLAN 5
mild elevation, ML a demand ischemia in relation to the above, trend, get a TTE in am , cardiology consulted.

## 2023-12-02 NOTE — ED ADULT NURSE NOTE - CCCP TRG CHIEF CMPLNT
[FreeTextEntry1] : banged  right lower leg 1 mo ago the area got black  and hurt a lot but the pain went away but 5 days ago she says  a small part of the injured area split open and the area is denuded she says she  has a history of  circulation problems  she also has had  intermittant  diarrhea over the last 2-3 months.  it is watery but does not occur every day. she  has a lot of gas associated with it   she has taken probiotics  says does not help.  she cannot linf to any particular food
see chief complaint quote

## 2023-12-02 NOTE — H&P ADULT - PROBLEM SELECTOR PLAN 2
ML a part of the severe allergic reaction with possible lung injury, also acute LV afterload surge in response to hypertensive emergency, received IV steroid, IVP H1 & H2 blockers, Albuterol & Albuterol/Ipratropium nebs, in addition to Epinephrine, also received IVP Furosemide, IVP Bumetanide, and Acetazolamide IVPB, was started on IV infusion of NG with good response, trend troponin, TTE in am, cardiology consult with Dr. Charles was called.

## 2023-12-02 NOTE — H&P ADULT - HISTORY OF PRESENT ILLNESS
This is an 89 y/o F with PMH of HTN, PPM, Breast CA s/p left mastectomy, Nodular Goitre, and Allergic Rhinitis who presented with an acute onset of right sided weakness with right facial droop & slurred speech that was witnessed by the daughter, no AMS, no seizure activity. By time she arrived at the ED she was already back to her baseline, NIHSS was zero by ED staff. Patient was sent to CT suit for a brain CT & Brain CTA, and after she returned form CT she started showing diffuse skin rash, SOB with loud chest wheeze, didn't respond to steroids, Epinephrine, and H1 blockers, her SPO2 dropped, and had to be emergently intubated by ED team. She was found with massive acute pulmonary edema. No known previous history of allergy to iodine or IV contrast. This is an 87 y/o F with PMH of HTN, PPM, Breast CA s/p left mastectomy, Nodular Goitre, and Allergic Rhinitis who presented with an acute onset of right sided weakness with right facial droop & slurred speech that was witnessed by the daughter, no AMS, no seizure activity. By time she arrived at the ED she was already back to her baseline, NIHSS was zero by ED staff. Patient was sent to CT suit for a brain CT & Brain CTA, and after she returned form CT she started showing diffuse skin rash, SOB with loud chest wheeze, didn't respond to steroids, Epinephrine, and H1 blockers, her SPO2 dropped, and had to be emergently intubated by ED team. She was found with massive acute pulmonary edema. No known previous history of allergy to iodine or IV contrast. This is an 89 y/o F with PMH of HTN, PPM, Breast CA s/p left mastectomy, Nodular Goitre, and Allergic Rhinitis who presented with an acute onset of right sided weakness with right facial droop & slurred speech that was witnessed by the daughter, no AMS, no seizure activity. By time she arrived at the ED she was already back to her baseline, NIHSS was zero by ED staff. Patient was sent to CT suit for a brain CT & Brain CTA, and after she returned form CT she started showing diffuse skin rash, SOB with loud chest wheeze, didn't respond to steroids, Epinephrine, and H1 blockers, her SPO2 dropped, had a severe hypertensive response, and had to be emergently intubated by ED team. She was found with massive acute pulmonary edema. No known previous history of allergy to iodine or IV contrast. This is an 87 y/o F with PMH of HTN, PPM, Breast CA s/p left mastectomy, Nodular Goitre, and Allergic Rhinitis who presented with an acute onset of right sided weakness with right facial droop & slurred speech that was witnessed by the daughter, no AMS, no seizure activity. By time she arrived at the ED she was already back to her baseline, NIHSS was zero by ED staff. Patient was sent to CT suit for a brain CT & Brain CTA, and after she returned form CT she started showing diffuse skin rash, SOB with loud chest wheeze, didn't respond to steroids, Epinephrine, and H1 blockers, her SPO2 dropped, had a severe hypertensive response, and had to be emergently intubated by ED team. She was found with massive acute pulmonary edema. No known previous history of allergy to iodine or IV contrast.

## 2023-12-02 NOTE — H&P ADULT - ASSESSMENT
87 y/o F with PMH of HTN, PPM, Breast CA s/p left mastectomy, Nodular Goitre, and Allergic Rhinitis presented for a right sided weakness with right facial droop & slurred speech, developed a massive reaction to IV contrast.  89 y/o F with PMH of HTN, PPM, Breast CA s/p left mastectomy, Nodular Goitre, and Allergic Rhinitis presented for a right sided weakness with right facial droop & slurred speech, developed a massive reaction to IV contrast.

## 2023-12-02 NOTE — PROVIDER CONTACT NOTE (EICU) - RECOMMENDATIONS
Admit to ICU. Continue with NTG gtt for BP control. Aggressive diuresis for edema. Vasopressors to maintain MAP<65.

## 2023-12-02 NOTE — ED ADULT TRIAGE NOTE - CHIEF COMPLAINT QUOTE
At the dinner table the pt drop a glass and the daughter mention she noticed a facial droop as per ems

## 2023-12-02 NOTE — H&P ADULT - PROBLEM SELECTOR PLAN 3
Neurological manifestations were already resolved completely upon arrival to ED, CT & CTA head & neck showed advanced atherosclerotic disease with multiple severely stenotic arterial segments, consider neurology consult after extubation to evaluate.

## 2023-12-02 NOTE — PATIENT PROFILE ADULT - FUNCTIONAL ASSESSMENT - DAILY ACTIVITY SECTION LABEL
This is an 88 year old female with a PMH/PSH of HTN, HLD, CHF, CAD s/p CABG, osteoporosis, umbilical and right inguinal hernia repair, and acute cholecystitis s/p perc tony on 6/5/2020 and removal of tube on 9/22/2020 who presented to the ED with worsening RUQ pain x 2 days. Patient is afebrile and diffusely TTP worse over RUQ/epigastrum. Labs are significant for a WBC 14.26 and ultrasound revealed cholelithiasis.    PLAN:  -CT abdomen/pelvis  -IVFs  -Pain control  -Nausea control This is an 88 year old female with a PMH/PSH of HTN, HLD, CHF, CAD s/p CABG, osteoporosis, umbilical and right inguinal hernia repair, and acute cholecystitis s/p perc tony on 6/5/2020 and removal of tube on 9/22/2020 who presented to the ED with worsening RUQ pain x 2 days. Patient is afebrile and diffusely TTP worse over RUQ/epigastrum. Labs are significant for a WBC 14.26 and ultrasound revealed cholelithiasis.    PLAN:  -CT abdomen/pelvis  -IVFs  -Pain control  -Nausea control      Senior Resident Addendum  Please see H&P for detailed assessment and plan .

## 2023-12-02 NOTE — PATIENT PROFILE ADULT - NS PRO AD PATIENT TYPE
----- Message from Chelsea Chua NP sent at 4/21/2023 12:56 PM CDT -----  Please call Vicky, the xray shows moderate to severe disc space height loss from L5-S1 and degenerative changes at L4-L5 that may be contributing to her pain. She should keep the appointment with neurology as discussed.    patient to bring in/Living Will

## 2023-12-02 NOTE — PROVIDER CONTACT NOTE (EICU) - SITUATION
88 y.o. female with HTN, PPM presenting with TIA symptoms. Following CTA of the head and neck patient developed anaphylaxis requiring intubation. Developed flash pulmonary edema due to HTN emergency.

## 2023-12-02 NOTE — ED PROVIDER NOTE - NS ED MD SHIFT CHANGE PLANNED DISPOSITION
clinical impression documented on template/pending test results documented on template/counseled patient/family regarding diagnosis and plan

## 2023-12-02 NOTE — PATIENT PROFILE ADULT - NSPROHMSYMPCOND_GEN_A_NUR
Chief Complaint   Patient presents with    Hyperlipidemia     Pre diabetes: A1C has improved. Trying to watch his diet and exercise. Denies any chest pain, SOB at rest.  No cough. No abdominal pain, nausea, vomiting. Hyperlipidemia: was on pravastatin but ran out of medications. Elevated liver enzymes: is drinking only ocassionally. No abdominal pain, fever, chills. Tobacco use: wants to quit. Past Medical History:   Diagnosis Date    Encounter for screening colonoscopy 1/17/22    Hyperlipidemia 10/26/2015    Vitamin D deficiency 7/29/2015     O: Blood pressure (!) 99/53, pulse 61, temperature 97.5 °F (36.4 °C), temperature source Temporal, resp. rate 16, height 5' 6\" (1.676 m), weight 160 lb (72.6 kg). Physical Examination:  General appearance - alert, well appearing, and in no distress  Chest - clear to auscultation, no wheezes, rales or rhonchi, symmetric air entry  CVS: S1S2, RRR  Neurological - alert, oriented, normal speech, no focal findings or movement disorder noted  Extremities - no pedal edema  Skin- warm, dry  Mental status - Normal mood, judgement and thought process    A/P:    Allen was seen today for hyperlipidemia. Diagnoses and all orders for this visit:    Hyperlipidemia, unspecified hyperlipidemia type  -     pravastatin (PRAVACHOL) 40 MG tablet; TAKE 1 TABLET BY MOUTH DAILY  -     Comprehensive Metabolic Panel; Future  -     Lipid Panel; Future  -     TSH; Future  -      continue lifestyle modification    Pre-diabetes  -     CBC; Future  -     Hemoglobin A1C; Future  -     TSH; Future    Need for pneumococcal vaccination  -     Pneumococcal, PCV20, PREVNAR 20, (age 25 yrs+), IM, PF    Tobacco use  -     nicotine (NICODERM CQ) 14 MG/24HR; Place 1 patch onto the skin daily       Counseled regarding above diagnosis, including possible risks and complications,  especially if left uncontrolled. Dot Fermin was instructed to call if any new symptoms develop prior to next visit.
musculoskeletal

## 2023-12-03 DIAGNOSIS — J96.01 ACUTE RESPIRATORY FAILURE WITH HYPOXIA: ICD-10-CM

## 2023-12-03 DIAGNOSIS — I10 ESSENTIAL (PRIMARY) HYPERTENSION: ICD-10-CM

## 2023-12-03 DIAGNOSIS — G45.9 TRANSIENT CEREBRAL ISCHEMIC ATTACK, UNSPECIFIED: ICD-10-CM

## 2023-12-03 DIAGNOSIS — Z29.9 ENCOUNTER FOR PROPHYLACTIC MEASURES, UNSPECIFIED: ICD-10-CM

## 2023-12-03 DIAGNOSIS — R79.89 OTHER SPECIFIED ABNORMAL FINDINGS OF BLOOD CHEMISTRY: ICD-10-CM

## 2023-12-03 DIAGNOSIS — J81.0 ACUTE PULMONARY EDEMA: ICD-10-CM

## 2023-12-03 LAB
ANION GAP SERPL CALC-SCNC: 13 MMOL/L — SIGNIFICANT CHANGE UP (ref 5–17)
ANION GAP SERPL CALC-SCNC: 13 MMOL/L — SIGNIFICANT CHANGE UP (ref 5–17)
BASE EXCESS BLDA CALC-SCNC: 1.2 MMOL/L — SIGNIFICANT CHANGE UP (ref -2–3)
BASE EXCESS BLDA CALC-SCNC: 1.2 MMOL/L — SIGNIFICANT CHANGE UP (ref -2–3)
BLOOD GAS COMMENTS ARTERIAL: SIGNIFICANT CHANGE UP
BLOOD GAS COMMENTS ARTERIAL: SIGNIFICANT CHANGE UP
BUN SERPL-MCNC: 33 MG/DL — HIGH (ref 7–23)
BUN SERPL-MCNC: 33 MG/DL — HIGH (ref 7–23)
CALCIUM SERPL-MCNC: 8.7 MG/DL — SIGNIFICANT CHANGE UP (ref 8.4–10.5)
CALCIUM SERPL-MCNC: 8.7 MG/DL — SIGNIFICANT CHANGE UP (ref 8.4–10.5)
CHLORIDE SERPL-SCNC: 102 MMOL/L — SIGNIFICANT CHANGE UP (ref 96–108)
CHLORIDE SERPL-SCNC: 102 MMOL/L — SIGNIFICANT CHANGE UP (ref 96–108)
CO2 SERPL-SCNC: 24 MMOL/L — SIGNIFICANT CHANGE UP (ref 22–31)
CO2 SERPL-SCNC: 24 MMOL/L — SIGNIFICANT CHANGE UP (ref 22–31)
CREAT SERPL-MCNC: 1.13 MG/DL — SIGNIFICANT CHANGE UP (ref 0.5–1.3)
CREAT SERPL-MCNC: 1.13 MG/DL — SIGNIFICANT CHANGE UP (ref 0.5–1.3)
EGFR: 47 ML/MIN/1.73M2 — LOW
EGFR: 47 ML/MIN/1.73M2 — LOW
GAS PNL BLDA: SIGNIFICANT CHANGE UP
GAS PNL BLDA: SIGNIFICANT CHANGE UP
GLUCOSE SERPL-MCNC: 146 MG/DL — HIGH (ref 70–99)
GLUCOSE SERPL-MCNC: 146 MG/DL — HIGH (ref 70–99)
HCO3 BLDA-SCNC: 27 MMOL/L — SIGNIFICANT CHANGE UP (ref 21–28)
HCO3 BLDA-SCNC: 27 MMOL/L — SIGNIFICANT CHANGE UP (ref 21–28)
HCT VFR BLD CALC: 36.8 % — SIGNIFICANT CHANGE UP (ref 34.5–45)
HCT VFR BLD CALC: 36.8 % — SIGNIFICANT CHANGE UP (ref 34.5–45)
HGB BLD-MCNC: 11.7 G/DL — SIGNIFICANT CHANGE UP (ref 11.5–15.5)
HGB BLD-MCNC: 11.7 G/DL — SIGNIFICANT CHANGE UP (ref 11.5–15.5)
HOROWITZ INDEX BLDA+IHG-RTO: 100 — SIGNIFICANT CHANGE UP
HOROWITZ INDEX BLDA+IHG-RTO: 100 — SIGNIFICANT CHANGE UP
LACTATE SERPL-SCNC: 1.2 MMOL/L — SIGNIFICANT CHANGE UP (ref 0.7–2)
LACTATE SERPL-SCNC: 1.2 MMOL/L — SIGNIFICANT CHANGE UP (ref 0.7–2)
MAGNESIUM SERPL-MCNC: 1.9 MG/DL — SIGNIFICANT CHANGE UP (ref 1.6–2.6)
MAGNESIUM SERPL-MCNC: 1.9 MG/DL — SIGNIFICANT CHANGE UP (ref 1.6–2.6)
MCHC RBC-ENTMCNC: 26.2 PG — LOW (ref 27–34)
MCHC RBC-ENTMCNC: 26.2 PG — LOW (ref 27–34)
MCHC RBC-ENTMCNC: 31.8 GM/DL — LOW (ref 32–36)
MCHC RBC-ENTMCNC: 31.8 GM/DL — LOW (ref 32–36)
MCV RBC AUTO: 82.3 FL — SIGNIFICANT CHANGE UP (ref 80–100)
MCV RBC AUTO: 82.3 FL — SIGNIFICANT CHANGE UP (ref 80–100)
NRBC # BLD: 0 /100 WBCS — SIGNIFICANT CHANGE UP (ref 0–0)
NRBC # BLD: 0 /100 WBCS — SIGNIFICANT CHANGE UP (ref 0–0)
PCO2 BLDA: 50 MMHG — HIGH (ref 32–35)
PCO2 BLDA: 50 MMHG — HIGH (ref 32–35)
PH BLDA: 7.34 — LOW (ref 7.35–7.45)
PH BLDA: 7.34 — LOW (ref 7.35–7.45)
PHOSPHATE SERPL-MCNC: 5.4 MG/DL — HIGH (ref 2.5–4.5)
PHOSPHATE SERPL-MCNC: 5.4 MG/DL — HIGH (ref 2.5–4.5)
PLATELET # BLD AUTO: 227 K/UL — SIGNIFICANT CHANGE UP (ref 150–400)
PLATELET # BLD AUTO: 227 K/UL — SIGNIFICANT CHANGE UP (ref 150–400)
PO2 BLDA: 282 MMHG — HIGH (ref 83–108)
PO2 BLDA: 282 MMHG — HIGH (ref 83–108)
POTASSIUM SERPL-MCNC: 3.4 MMOL/L — LOW (ref 3.5–5.3)
POTASSIUM SERPL-MCNC: 3.4 MMOL/L — LOW (ref 3.5–5.3)
POTASSIUM SERPL-SCNC: 3.4 MMOL/L — LOW (ref 3.5–5.3)
POTASSIUM SERPL-SCNC: 3.4 MMOL/L — LOW (ref 3.5–5.3)
RBC # BLD: 4.47 M/UL — SIGNIFICANT CHANGE UP (ref 3.8–5.2)
RBC # BLD: 4.47 M/UL — SIGNIFICANT CHANGE UP (ref 3.8–5.2)
RBC # FLD: 17.3 % — HIGH (ref 10.3–14.5)
RBC # FLD: 17.3 % — HIGH (ref 10.3–14.5)
SAO2 % BLDA: 99.9 % — HIGH (ref 94–98)
SAO2 % BLDA: 99.9 % — HIGH (ref 94–98)
SODIUM SERPL-SCNC: 139 MMOL/L — SIGNIFICANT CHANGE UP (ref 135–145)
SODIUM SERPL-SCNC: 139 MMOL/L — SIGNIFICANT CHANGE UP (ref 135–145)
TROPONIN I, HIGH SENSITIVITY RESULT: 84.6 NG/L — HIGH
TROPONIN I, HIGH SENSITIVITY RESULT: 84.6 NG/L — HIGH
WBC # BLD: 12.98 K/UL — HIGH (ref 3.8–10.5)
WBC # BLD: 12.98 K/UL — HIGH (ref 3.8–10.5)
WBC # FLD AUTO: 12.98 K/UL — HIGH (ref 3.8–10.5)
WBC # FLD AUTO: 12.98 K/UL — HIGH (ref 3.8–10.5)

## 2023-12-03 PROCEDURE — 71045 X-RAY EXAM CHEST 1 VIEW: CPT | Mod: 26

## 2023-12-03 PROCEDURE — 99233 SBSQ HOSP IP/OBS HIGH 50: CPT

## 2023-12-03 RX ORDER — HEPARIN SODIUM 5000 [USP'U]/ML
5000 INJECTION INTRAVENOUS; SUBCUTANEOUS EVERY 8 HOURS
Refills: 0 | Status: DISCONTINUED | OUTPATIENT
Start: 2023-12-03 | End: 2023-12-05

## 2023-12-03 RX ORDER — ACETAMINOPHEN 500 MG
650 TABLET ORAL EVERY 6 HOURS
Refills: 0 | Status: DISCONTINUED | OUTPATIENT
Start: 2023-12-03 | End: 2023-12-07

## 2023-12-03 RX ORDER — POTASSIUM CHLORIDE 20 MEQ
40 PACKET (EA) ORAL EVERY 4 HOURS
Refills: 0 | Status: DISCONTINUED | OUTPATIENT
Start: 2023-12-03 | End: 2023-12-03

## 2023-12-03 RX ORDER — POTASSIUM CHLORIDE 20 MEQ
40 PACKET (EA) ORAL EVERY 4 HOURS
Refills: 0 | Status: COMPLETED | OUTPATIENT
Start: 2023-12-03 | End: 2023-12-03

## 2023-12-03 RX ORDER — PANTOPRAZOLE SODIUM 20 MG/1
40 TABLET, DELAYED RELEASE ORAL DAILY
Refills: 0 | Status: DISCONTINUED | OUTPATIENT
Start: 2023-12-03 | End: 2023-12-04

## 2023-12-03 RX ADMIN — FENTANYL CITRATE 25 MICROGRAM(S): 50 INJECTION INTRAVENOUS at 04:55

## 2023-12-03 RX ADMIN — FENTANYL CITRATE 25 MICROGRAM(S): 50 INJECTION INTRAVENOUS at 05:33

## 2023-12-03 RX ADMIN — Medication 40 MILLIEQUIVALENT(S): at 18:25

## 2023-12-03 RX ADMIN — HEPARIN SODIUM 5000 UNIT(S): 5000 INJECTION INTRAVENOUS; SUBCUTANEOUS at 13:21

## 2023-12-03 RX ADMIN — FENTANYL CITRATE 25 MICROGRAM(S): 50 INJECTION INTRAVENOUS at 23:51

## 2023-12-03 RX ADMIN — PANTOPRAZOLE SODIUM 40 MILLIGRAM(S): 20 TABLET, DELAYED RELEASE ORAL at 13:22

## 2023-12-03 RX ADMIN — CHLORHEXIDINE GLUCONATE 15 MILLILITER(S): 213 SOLUTION TOPICAL at 06:15

## 2023-12-03 RX ADMIN — CHLORHEXIDINE GLUCONATE 15 MILLILITER(S): 213 SOLUTION TOPICAL at 18:25

## 2023-12-03 RX ADMIN — PROPOFOL 8.16 MICROGRAM(S)/KG/MIN: 10 INJECTION, EMULSION INTRAVENOUS at 06:19

## 2023-12-03 RX ADMIN — PROPOFOL 8.16 MICROGRAM(S)/KG/MIN: 10 INJECTION, EMULSION INTRAVENOUS at 18:25

## 2023-12-03 RX ADMIN — Medication 650 MILLIGRAM(S): at 23:52

## 2023-12-03 RX ADMIN — HEPARIN SODIUM 5000 UNIT(S): 5000 INJECTION INTRAVENOUS; SUBCUTANEOUS at 21:40

## 2023-12-03 RX ADMIN — Medication 40 MILLIEQUIVALENT(S): at 13:22

## 2023-12-03 NOTE — PROGRESS NOTE ADULT - ASSESSMENT
88 year old female with acute hypoxic respiratory failure      acute hypoxic respiratory failure   -thought to be secondary to Anaphylactic Shock from Iodine Contrast Dye  -also element of Sympathetic Crashing Pulmonary Edema) Hypertensive crisis   successful diuresis with Bumex and Diamox   -Patient currently on Full vent support  -overnight will titrate settings to maintain SaO2 >90%, or pH >7.25  -consider low titdal volume ventilation strategy w/ goal Tv 4-6 cc/kg of ideal body weight  -plateu pressure goal <30  -Peridex oral care and VAP prophylaxis with HOB 30 degrees   -aggressive chest PT and suctioning   -will repeart chest xray in AM to look for significant improvement in pulmonary edema  -daily sedation vacation with spontaneous breathing trial if clinical condition warrants, discuss with respiratory therapy for plans of possible extubation      elevated troponin   -likley demand ischemia form hypoxia   -not thought to to be myocardial damage     prophylaxtic measures   -continue lovenox  for DVT with SD'S  -continue protonix for GI  -contiue aspiration precautions by keeping head of bed at 30 degrees     Note: Date of entry of this note is equal to the date of services rendered   Critical Care time: 35 mins assessing presenting problems of acute illness that poses high probability of life threatening deterioration or end organ damage/dysfunction.  Medical decision making inculding Initiating plan of care, reviewing data, reviewing radiology,direct patient bedside evaluation and interpretation of vital signs, any necessary ventilator management , discusion with multidisciplinary team, discussing goals of care with patient/family, all non inclusive of procedures, COVID 19 specific considerations and therapeutic  options based on the available and rapidly changing literature  88 year old female with acute hypoxic respiratory failure      acute hypoxic respiratory failure   -thought to be secondary to Anaphylactic Shock from Iodine Contrast Dye  -also element of Sympathetic Crashing Pulmonary Edema /  Hypertensive crisis   -successful diuresis with Bumex and Diamox   -Patient currently on Full vent support  -overnight will titrate settings to maintain SaO2 >90%, or pH >7.25  -consider low titdal volume ventilation strategy w/ goal Tv 4-6 cc/kg of ideal body weight  -plateu pressure goal <30  -Peridex oral care and VAP prophylaxis with HOB 30 degrees   -aggressive chest PT and suctioning   -will repeart chest xray in AM to look for significant improvement in pulmonary edema  -daily sedation vacation with spontaneous breathing trial if clinical condition warrants, discuss with respiratory therapy for plans of possible extubation      elevated troponin   -likley demand ischemia form hypoxia   -not thought to to be myocardial damage     prophylaxtic measures   -continue lovenox  for DVT with SD'S  -continue protonix for GI  -contiue aspiration precautions by keeping head of bed at 30 degrees     Note: Date of entry of this note is equal to the date of services rendered   Critical Care time: 35 mins assessing presenting problems of acute illness that poses high probability of life threatening deterioration or end organ damage/dysfunction.  Medical decision making inculding Initiating plan of care, reviewing data, reviewing radiology,direct patient bedside evaluation and interpretation of vital signs, any necessary ventilator management , discusion with multidisciplinary team, discussing goals of care with patient/family, all non inclusive of procedures, COVID 19 specific considerations and therapeutic  options based on the available and rapidly changing literature

## 2023-12-03 NOTE — CONSULT NOTE ADULT - ASSESSMENT
88-year-old female with history of hypertension pacemaker right hip replacement brought by ambulance from home for evaluation of episode of weakness and facial droop 88-year-old female with history of hypertension pacemaker right hip replacement brought by ambulance from home for evaluation of episode of weakness and facial droop    wean to CPAP PS - SBT in am  CNS imaging reviewed  HOB elev  asp prec  oral hygiene  suction PRN  ABG in AM  hopeful for am Extubation  I and O  SPCU care and support

## 2023-12-03 NOTE — PROGRESS NOTE ADULT - SUBJECTIVE AND OBJECTIVE BOX
Subjective: Patient seen and examined. Remains intubated and sedated.  Daughter was at bedside inquiring about extubation.     MEDICATIONS  (STANDING):  chlorhexidine 0.12% Liquid 15 milliLiter(s) Oral Mucosa every 12 hours  heparin   Injectable 5000 Unit(s) SubCutaneous every 8 hours  influenza  Vaccine (HIGH DOSE) 0.7 milliLiter(s) IntraMuscular once  nitroglycerin  Infusion 200 MICROgram(s)/Min (60 mL/Hr) IV Continuous <Continuous>  pantoprazole  Injectable 40 milliGRAM(s) IV Push daily  propofol Infusion 20 MICROgram(s)/kG/Min (8.16 mL/Hr) IV Continuous <Continuous>    MEDICATIONS  (PRN):  fentaNYL    Injectable 25 MICROGram(s) IV Push every 1 hour PRN sedation      Allergies    penicillin (Rash)  IV Contrast (Anaphylaxis; Urticaria)    Intolerances        Vital Signs Last 24 Hrs  T(C): 36.9 (03 Dec 2023 08:00), Max: 37.1 (02 Dec 2023 21:24)  T(F): 98.4 (03 Dec 2023 08:00), Max: 98.8 (02 Dec 2023 21:24)  HR: 78 (03 Dec 2023 08:00) (58 - 99)  BP: 103/68 (03 Dec 2023 08:00) (69/54 - 245/114)  BP(mean): 76 (03 Dec 2023 08:00) (59 - 164)  RR: 19 (03 Dec 2023 08:00) (18 - 48)  SpO2: 95% (03 Dec 2023 08:00) (50% - 100%)    Parameters below as of 03 Dec 2023 08:00  Patient On (Oxygen Delivery Method): ventilator  O2 Flow (L/min): 40      PHYSICAL EXAM:  GENERAL: Intubated and Sedated  HEAD:  Atraumatic, Normocephalic  ENMT: Moist mucous membranes,   NECK: Supple, No JVD, Normal thyroid  CHEST/LUNG: Clear to auscultation bilaterally; No rales, rhonchi, wheezing, or rubs  HEART: Regular rate and rhythm; No murmurs, rubs, or gallops  ABDOMEN: Soft, Nontender, Nondistended; Bowel sounds present  EXTREMITIES:  2+ Peripheral Pulses, No clubbing, cyanosis, or edema      LABS:                        11.7   12.98 )-----------( 227      ( 03 Dec 2023 06:11 )             36.8     03 Dec 2023 06:11    139    |  102    |  33     ----------------------------<  146    3.4     |  24     |  1.13     Ca    8.7        03 Dec 2023 06:11  Phos  5.4       03 Dec 2023 06:11  Mg     1.9       03 Dec 2023 06:11    TPro  7.9    /  Alb  3.5    /  TBili  0.2    /  DBili  x      /  AST  26     /  ALT  25     /  AlkPhos  105    02 Dec 2023 19:26    PT/INR - ( 02 Dec 2023 19:26 )   PT: 10.4 sec;   INR: 0.93 ratio         PTT - ( 02 Dec 2023 19:26 )  PTT:29.6 sec  Urinalysis Basic - ( 03 Dec 2023 06:11 )    Color: x / Appearance: x / SG: x / pH: x  Gluc: 146 mg/dL / Ketone: x  / Bili: x / Urobili: x   Blood: x / Protein: x / Nitrite: x   Leuk Esterase: x / RBC: x / WBC x   Sq Epi: x / Non Sq Epi: x / Bacteria: x      CAPILLARY BLOOD GLUCOSE          RADIOLOGY & ADDITIONAL TESTS:    Imaging Personally Reviewed:  [ ] YES     Consultant(s) Notes Reviewed:      Care Discussed with Consultants/Other Providers:    Advanced Directives: [ ] DNR  [ ] No feeding tube  [ ] MOLST in chart  [ ] MOLST completed today  [ ] Unknown

## 2023-12-03 NOTE — DIETITIAN INITIAL EVALUATION ADULT - PROBLEM SELECTOR PLAN 1
ML 2ry to acute pulmonary edema, was intubated emergently at the ED, now improving, repeat ABG in am, f/u CXR in am, vent management as per CC team, CC/pulmonary consult with Dr. Reeves was called.

## 2023-12-03 NOTE — CONSULT NOTE ADULT - ASSESSMENT
87y/o seen at [facility] ICU. Daughter at beside who states that patient is normally very active  History HTN, thyroid disease  S/P PPM for slow heart rate  S/P right hip surgery  S/P left mastectomy for cancer  S/P left knee surgery    Admitted for transient weakness, facial droop, right-sided weakness, slurred speech  Upon returning from CAT scan patient developed skin rash, low PO2 and shortness of breath  Presently patient is intubated, on propofol  Troponin-16.2/64.3/84.6     BNP-1066  Potassium-3.4    Impression  Probably allergic reaction to contrast dye as above  PPM-ventricular paced with probable p-waves marching through    Plan:  - Slightly elevated troponin probably does not represent significant myocardial damage, but probably from demand ischemia  - Will try to interrogate the [employer] pacemaker  - K-supps and follow labs  - Neurology consult  - Seen by Pulmonary 89y/o seen at [facility] ICU. Daughter at beside who states that patient is normally very active  History HTN, thyroid disease  S/P PPM for slow heart rate  S/P right hip surgery  S/P left mastectomy for cancer  S/P left knee surgery    Admitted for transient weakness, facial droop, right-sided weakness, slurred speech  Upon returning from CAT scan patient developed skin rash, low PO2 and shortness of breath  Presently patient is intubated, on propofol  Troponin-16.2/64.3/84.6     BNP-1066  Potassium-3.4    Impression  Probably allergic reaction to contrast dye as above  PPM-ventricular paced with probable p-waves marching through    Plan:  - Slightly elevated troponin probably does not represent significant myocardial damage, but probably from demand ischemia  - Will try to interrogate the [employer] pacemaker  - K-supps and follow labs  - Neurology consult  - Seen by Pulmonary 89y/o seen at [facility] ICU. Daughter at beside who states that patient is normally very active  History HTN, thyroid disease  S/P PPM for slow heart rate  S/P right hip surgery  S/P left mastectomy for cancer  S/P left knee surgery    Admitted for transient weakness, facial droop, right-sided weakness, slurred speech  Upon returning from CAT scan patient developed skin rash, low PO2 and shortness of breath  Presently patient is intubated, on propofol  Troponin-16.2/64.3/84.6     BNP-1066  Potassium-3.4    Impression  Probably allergic reaction to contrast dye as above  PPM-ventricular paced with probable p-waves marching through    Plan:  - Slightly elevated troponin probably does not represent significant myocardial damage, but probably from demand ischemia  - Will try to interrogate the [employer] pacemaker  - K-supps and follow labs  - Neurology consult  - Seen by Pulmonary

## 2023-12-03 NOTE — DIETITIAN INITIAL EVALUATION ADULT - REASON FOR ADMISSION
Syncope and collapse    Per H&P, "This is an 89 y/o F with PMH of HTN, PPM, Breast CA s/p left mastectomy, Nodular Goitre, and Allergic Rhinitis who presented with an acute onset of right sided weakness with right facial droop & slurred speech that was witnessed by the daughter, no AMS, no seizure activity. By time she arrived at the ED she was already back to her baseline, NIHSS was zero by ED staff. Patient was sent to CT suit for a brain CT & Brain CTA, and after she returned form CT she started showing diffuse skin rash, SOB with loud chest wheeze, didn't respond to steroids, Epinephrine, and H1 blockers, her SPO2 dropped, had a severe hypertensive response, and had to be emergently intubated by ED team. She was found with massive acute pulmonary edema. No known previous history of allergy to iodine or IV contrast." Syncope and collapse    Per H&P, "This is an 87 y/o F with PMH of HTN, PPM, Breast CA s/p left mastectomy, Nodular Goitre, and Allergic Rhinitis who presented with an acute onset of right sided weakness with right facial droop & slurred speech that was witnessed by the daughter, no AMS, no seizure activity. By time she arrived at the ED she was already back to her baseline, NIHSS was zero by ED staff. Patient was sent to CT suit for a brain CT & Brain CTA, and after she returned form CT she started showing diffuse skin rash, SOB with loud chest wheeze, didn't respond to steroids, Epinephrine, and H1 blockers, her SPO2 dropped, had a severe hypertensive response, and had to be emergently intubated by ED team. She was found with massive acute pulmonary edema. No known previous history of allergy to iodine or IV contrast."

## 2023-12-03 NOTE — DIETITIAN INITIAL EVALUATION ADULT - PERTINENT LABORATORY DATA
12-03    139  |  102  |  33<H>  ----------------------------<  146<H>  3.4<L>   |  24  |  1.13    Ca    8.7      03 Dec 2023 06:11  Phos  5.4     12-03  Mg     1.9     12-03    TPro  7.9  /  Alb  3.5  /  TBili  0.2  /  DBili  x   /  AST  26  /  ALT  25  /  AlkPhos  105  12-02

## 2023-12-03 NOTE — DIETITIAN INITIAL EVALUATION ADULT - NSICDXPASTMEDICALHX_GEN_ALL_CORE_FT
PAST MEDICAL HISTORY:  Breast cancer 2009 left - s/neida castellanos radical mastectomy    Cyst of left ovary     [place] (hard of hearing) bilateral HA (Yvette)    Hyperlipidemia no current medications    Meniscus tear left - 10/2015    SDH (subdural hematoma) 3/2011 - s/p fall - no surgical intervention     PAST MEDICAL HISTORY:  Breast cancer 2009 left - s/neida castellanos radical mastectomy    Cyst of left ovary     [place] (hard of hearing) bilateral HA (Yvette)    Hyperlipidemia no current medications    Meniscus tear left - 10/2015    SDH (subdural hematoma) 3/2011 - s/p fall - no surgical intervention     PAST MEDICAL HISTORY:  Breast cancer 2009 left - s/neida castellanos radical mastectomy    Cyst of left ovary     [place] (hard of hearing) bilateral HA (Yvette)    Hyperlipidemia no current medications    Meniscus tear left - 10/2015    SDH (subdural hematoma) 3/2011 - s/p fall - no surgical intervention

## 2023-12-03 NOTE — DIETITIAN INITIAL EVALUATION ADULT - ORAL INTAKE PTA/DIET HISTORY
No wt loss noted x 1 year per HIE. UBW 68.5kg. No wt loss noted x 1 year per HIE. UBW 68.5kg. Unable to obtain diet/weight hx at this time.

## 2023-12-03 NOTE — PROGRESS NOTE ADULT - SUBJECTIVE AND OBJECTIVE BOX
88y  Female  penicillin (Rash)  IV Contrast (Anaphylaxis; Urticaria)  She has no known previous history of allergy to iodine or IV contrast       CC; Patient is a 88y old  Female who presents with a chief complaint of Syncope and collapse    HPI:  88 year old female  PMHx of HTN, PPM, Breast CA s/p left mastectomy, Nodular Goitre, and Allergic Rhinitis who presented with an acute onset of right sided weakness with right facial droop & slurred speech that was witnessed by the daughter, no AMS, no seizure activity. By time she arrived at the ED she was already back to her baseline, NIHSS was zero by ED staff. Patient was sent to CT suit for a brain CT & Brain CTA, and after she returned form CT she started showing diffuse skin rash, SOB with loud chest wheeze, didn't respond to steroids, Epinephrine, and H1 blockers, her SPO2 dropped, found with massive acute pulmonary edema, had a severe hypertensive response and was emergently intubated in the ER  team. when the Profound hypoxemia was noted bedside pocus reported diffuse BP lines and imaging shosed bilateral diffuse pulm edema. There was aslo report of  copious pink frothy secretions from ETT combined is consistent with SCAPE she was treated with  Bumex and a sose Diamox for the pulmary edema.       PAST MEDICAL & SURGICAL HISTORY:  Hyperlipidemia  no current medications  Breast cancer  2009 left - s/pineitan castellanos radical mastectomy  hard of hearing  Meniscus tear  left - 10/2015  Cyst of left ovary  SDH (subdural hematoma)  3/2011 - s/p fall - no surgical intervention  History of Left Mastecto  Cataract extraction status of eye, rig  Status post right cataract extractio    FAMILY HISTORY:  FH: leukemia (Father)  FH: breast cancer (Mother)    Vital Signs Last 24 Hrs  T(C): 38 (03 Dec 2023 19:00), Max: 38.2 (03 Dec 2023 17:00)  T(F): 100.4 (03 Dec 2023 19:00), Max: 100.8 (03 Dec 2023 17:00)  HR: 79 (03 Dec 2023 19:00) (62 - 99)  BP: 123/66 (03 Dec 2023 19:00) (69/54 - 231/109)  BP(mean): 74 (03 Dec 2023 19:00) (59 - 157)  RR: 18 (03 Dec 2023 19:00) (16 - 48)  SpO2: 95% (03 Dec 2023 19:00) (70% - 100%)    Parameters below as of 03 Dec 2023 19:00  Patient On (Oxygen Delivery Method): ventilator    O2 Concentration (%): 40  ABG - ( 02 Dec 2023 23:37 )  pH, Arterial: 7.34  pH, Blood: x     /  pCO2: 50    /  pO2: 282   / HCO3: 27    / Base Excess: 1.2   /  SaO2: 99.9        I&O's Summary  02 Dec 2023 07:01  -  03 Dec 2023 07:00  --------------------------------------------------------  IN: 48.9 mL / OUT: 1070 mL / NET: -1021.1 mL    03 Dec 2023 07:01  -  03 Dec 2023 20:15  --------------------------------------------------------  IN: 0 mL / OUT: 350 mL / NET: -350 mL    LABS  12-03  139  |  102  |  33<H>  ----------------------------<  146<H>  3.4<L>   |  24  |  1.13  Ca    8.7      03 Dec 2023 06:11  Phos  5.4     12-03  Mg     1.9     12-03  TPro  7.9  /  Alb  3.5  /  TBili  0.2  /  DBili  x   /  AST  26  /  ALT  25  /  AlkPhos  105  12-02                        11.7   12.98 )-----------( 227      ( 03 Dec 2023 06:11 )             36.8   PT/INR - ( 02 Dec 2023 19:26 )   PT: 10.4 sec;   INR: 0.93 ratio       PTT - ( 02 Dec 2023 19:26 )  PTT:29.6 sec    LIVER FUNCTIONS - ( 02 Dec 2023 19:26 )  Alb: 3.5 g/dL / Pro: 7.9 g/dL / ALK PHOS: 105 U/L / ALT: 25 U/L / AST: 26 U/L / GGT: x           Urinalysis Basic - ( 03 Dec 2023 06:11 )  Color: x / Appearance: x / SG: x / pH: x  Gluc: 146 mg/dL / Ketone: x  / Bili: x / Urobili: x   Blood: x / Protein: x / Nitrite: x   Leuk Esterase: x / RBC: x / WBC x   Sq Epi: x / Non Sq Epi: x / Bacteria: x    VENT SETTINGS   Mode: AC/ CMV (Assist Control/ Continuous Mandatory Ventilation)  RR (machine): 18  TV (machine): 400  FiO2: 40  PEEP: 5  ITime: 1  MAP: 9  PIP: 18  MEDICATIONS  (STANDING):  chlorhexidine 0.12% Liquid 15 milliLiter(s) Oral Mucosa every 12 hours  heparin   Injectable 5000 Unit(s) SubCutaneous every 8 hours  influenza  Vaccine (HIGH DOSE) 0.7 milliLiter(s) IntraMuscular once  nitroglycerin  Infusion 200 MICROgram(s)/Min (60 mL/Hr) IV Continuous <Continuous>  pantoprazole  Injectable 40 milliGRAM(s) IV Push daily  propofol Infusion 20 MICROgram(s)/kG/Min (8.16 mL/Hr) IV Continuous <Continuous>      REVIEW OF SYSTEMS:  Unable to obtain due to mechanical ventilation, sedation     Physicial Exam:   HEENT: PERRLA, EOMI, no drainage or redness  Neck:   No JVD  Respiratory: Breath Sounds equal & clear to percussion & auscultation, no accessory muscle use  Cardiovascular: Regular rate & rhythm, normal S1, S2; no murmurs, gallops or rubs; no S3, S4  Gastrointestinal: Soft, non-tender, non distended no hepatosplenomegaly, normal bowel sounds  Extremities: No peripheral edema, No cyanosis, clubbing   Vascular: Equal and normal pulses: 2+ peripheral pulses throughout  Neurological: intubated and sedated but appropriate response to tactile stimulation ; no sensory, motor  deficits, normal reflexes  Musculoskeletal: No joint pain, swelling or deformity; no limitation of movement  Skin: No rashes

## 2023-12-03 NOTE — DIETITIAN INITIAL EVALUATION ADULT - OTHER INFO
Visited patient in room, pt unable to answer questions as she remains intubated/sedated. Currently NPO. No v/d/c noted. last BM 12/2 . No reported difficulty chewing or swallowing. NKFA. Current adm weight 68kg, weight appears to be stable, will continue to monitor weight trends as able.    Pertinent labs/meds reviewed: pt receiving propofol at rate of 8ml/hr (to provide 211kcal/day if continued over 24hr)    Education provided/not provided at this time. Pt to progress to enteral feeds or PO diet as prescribed and medically indicated by MD. RD to remain available per protocol.  Visited patient in room, pt unable to answer questions as she remains intubated/sedated. Currently NPO. No v/d/c noted. last BM 12/2. Current adm weight 68kg, weight appears to be stable, will continue to monitor weight trends as able.    Pertinent labs/meds reviewed: pt receiving propofol at rate of 8ml/hr (to provide 211kcal/day if continued over 24hr)    Education provided/not provided at this time. Pt to progress to enteral feeds or PO diet as prescribed and medically indicated by MD. Recommend SLP eval if progressing to PO diet. RD to remain available per protocol.

## 2023-12-03 NOTE — CONSULT NOTE ADULT - SUBJECTIVE AND OBJECTIVE BOX
CARDIOLOGY CONSULT NOTE    Patient is a 88y Female with a known history of :  Acute respiratory failure with hypoxia and hypercapnia [J96.01]    Acute pulmonary edema [J81.0]    TIA (transient ischemic attack) [G45.9]    Acute pulmonary edema [J81.0]    HTN (hypertension) [I10]    Elevated troponin [R79.89]    Need for prophylactic measure [Z29.9]      HPI:  This is an 87 y/o F with PMH of HTN, PPM, Breast CA s/p left mastectomy, Nodular Goitre, and Allergic Rhinitis who presented with an acute onset of right sided weakness with right facial droop & slurred speech that was witnessed by the daughter, no AMS, no seizure activity. By time she arrived at the ED she was already back to her baseline, NIHSS was zero by ED staff. Patient was sent to CT suit for a brain CT & Brain CTA, and after she returned form CT she started showing diffuse skin rash, SOB with loud chest wheeze, didn't respond to steroids, Epinephrine, and H1 blockers, her SPO2 dropped, had a severe hypertensive response, and had to be emergently intubated by ED team. She was found with massive acute pulmonary edema. No known previous history of allergy to iodine or IV contrast. (02 Dec 2023 21:38)      REVIEW OF SYSTEMS:    CONSTITUTIONAL: No fever, weight loss, or fatigue  EYES: No eye pain, visual disturbances, or discharge  ENMT:  No difficulty hearing, tinnitus, vertigo; No sinus or throat pain  NECK: No pain or stiffness  BREASTS: No pain, masses, or nipple discharge  RESPIRATORY: No cough, wheezing, chills or hemoptysis; No shortness of breath  CARDIOVASCULAR: No chest pain, palpitations, dizziness, or leg swelling  GASTROINTESTINAL: No abdominal or epigastric pain. No nausea, vomiting, or hematemesis; No diarrhea or constipation. No melena or hematochezia.  GENITOURINARY: No dysuria, frequency, hematuria, or incontinence  NEUROLOGICAL: No headaches, memory loss, loss of strength, numbness, or tremors  SKIN: No itching, burning, rashes, or lesions   LYMPH NODES: No enlarged glands  ENDOCRINE: No heat or cold intolerance; No hair loss  MUSCULOSKELETAL: No joint pain or swelling; No muscle, back, or extremity pain  PSYCHIATRIC: No depression, anxiety, mood swings, or difficulty sleeping  HEME/LYMPH: No easy bruising, or bleeding gums  ALLERGY AND IMMUNOLOGIC: No hives or eczema    MEDICATIONS  (STANDING):  chlorhexidine 0.12% Liquid 15 milliLiter(s) Oral Mucosa every 12 hours  heparin   Injectable 5000 Unit(s) SubCutaneous every 8 hours  influenza  Vaccine (HIGH DOSE) 0.7 milliLiter(s) IntraMuscular once  nitroglycerin  Infusion 200 MICROgram(s)/Min (60 mL/Hr) IV Continuous <Continuous>  pantoprazole  Injectable 40 milliGRAM(s) IV Push daily  propofol Infusion 20 MICROgram(s)/kG/Min (8.16 mL/Hr) IV Continuous <Continuous>    MEDICATIONS  (PRN):  fentaNYL    Injectable 25 MICROGram(s) IV Push every 1 hour PRN sedation      ALLERGIES: penicillin (Rash)  IV Contrast (Anaphylaxis; Urticaria)      FAMILY HISTORY:  FH: leukemia (Father)    FH: breast cancer (Mother)        Social History:  Alochol:   Smoking:   Drug Use:   Marital Status:     I&O's Detail    02 Dec 2023 07:01  -  03 Dec 2023 07:00  --------------------------------------------------------  IN:    Propofol: 48.9 mL  Total IN: 48.9 mL    OUT:    Ureteral Catheter (mL): 1070 mL  Total OUT: 1070 mL    Total NET: -1021.1 mL          PHYSICAL EXAMINATION:  -----------------------------  T(C): 36.8 (12-03-23 @ 04:30), Max: 37.1 (12-02-23 @ 21:24)  HR: 65 (12-03-23 @ 06:58) (58 - 99)  BP: 122/58 (12-03-23 @ 04:30) (69/54 - 245/114)  RR: 22 (12-03-23 @ 04:30) (18 - 48)  SpO2: 94% (12-03-23 @ 06:58) (50% - 100%)  Wt(kg): --    12-02 @ 07:01  -  12-03 @ 07:00  --------------------------------------------------------  IN:    Propofol: 48.9 mL  Total IN: 48.9 mL    OUT:    Ureteral Catheter (mL): 1070 mL  Total OUT: 1070 mL    Total NET: -1021.1 mL        Height (cm): 160 (12-02 @ 16:30)  Weight (kg): 68 (12-02 @ 16:30)  BMI (kg/m2): 26.6 (12-02 @ 16:30)  BSA (m2): 1.71 (12-02 @ 16:30)    Constitutional: well developed, normal appearance, well groomed, well nourished, no deformities and no acute distress.   Eyes: the conjunctiva exhibited no abnormalities and the eyelids demonstrated no xanthelasmas.   HEENT: normal oral mucosa, no oral pallor and no oral cyanosis.   Neck: normal jugular venous A waves present, normal jugular venous V waves present and no jugular venous doshi A waves.   Pulmonary: no respiratory distress, normal respiratory rhythm and effort, no accessory muscle use and lungs were clear to auscultation bilaterally. Anteriorly  Cardiovascular: heart rate and rhythm were normal, normal S1 and S2 and no murmur, gallop, rub, heave or thrill are present.   Musculoskeletal: the gait could not be assessed.   Extremities: no clubbing of the fingernails, no localized cyanosis, no petechial hemorrhages and no ischemic changes.   Skin: normal skin color and pigmentation, no rash, no venous stasis, no skin lesions, no skin ulcer and no xanthoma was observed.       LABS:   --------  12-03    139  |  102  |  33<H>  ----------------------------<  146<H>  3.4<L>   |  24  |  1.13    Ca    8.7      03 Dec 2023 06:11  Phos  5.4     12-03  Mg     1.9     12-03    TPro  7.9  /  Alb  3.5  /  TBili  0.2  /  DBili  x   /  AST  26  /  ALT  25  /  AlkPhos  105  12-02                         11.7   12.98 )-----------( 227      ( 03 Dec 2023 06:11 )             36.8     PT/INR - ( 02 Dec 2023 19:26 )   PT: 10.4 sec;   INR: 0.93 ratio         PTT - ( 02 Dec 2023 19:26 )  PTT:29.6 sec              RADIOLOGY:  -----------------        ECG: Ventricular paced rhythm with p-waves marching through CARDIOLOGY CONSULT NOTE    Patient is a 88y Female with a known history of :  Acute respiratory failure with hypoxia and hypercapnia [J96.01]    Acute pulmonary edema [J81.0]    TIA (transient ischemic attack) [G45.9]    Acute pulmonary edema [J81.0]    HTN (hypertension) [I10]    Elevated troponin [R79.89]    Need for prophylactic measure [Z29.9]      HPI:  This is an 89 y/o F with PMH of HTN, PPM, Breast CA s/p left mastectomy, Nodular Goitre, and Allergic Rhinitis who presented with an acute onset of right sided weakness with right facial droop & slurred speech that was witnessed by the daughter, no AMS, no seizure activity. By time she arrived at the ED she was already back to her baseline, NIHSS was zero by ED staff. Patient was sent to CT suit for a brain CT & Brain CTA, and after she returned form CT she started showing diffuse skin rash, SOB with loud chest wheeze, didn't respond to steroids, Epinephrine, and H1 blockers, her SPO2 dropped, had a severe hypertensive response, and had to be emergently intubated by ED team. She was found with massive acute pulmonary edema. No known previous history of allergy to iodine or IV contrast. (02 Dec 2023 21:38)      REVIEW OF SYSTEMS:    CONSTITUTIONAL: No fever, weight loss, or fatigue  EYES: No eye pain, visual disturbances, or discharge  ENMT:  No difficulty hearing, tinnitus, vertigo; No sinus or throat pain  NECK: No pain or stiffness  BREASTS: No pain, masses, or nipple discharge  RESPIRATORY: No cough, wheezing, chills or hemoptysis; No shortness of breath  CARDIOVASCULAR: No chest pain, palpitations, dizziness, or leg swelling  GASTROINTESTINAL: No abdominal or epigastric pain. No nausea, vomiting, or hematemesis; No diarrhea or constipation. No melena or hematochezia.  GENITOURINARY: No dysuria, frequency, hematuria, or incontinence  NEUROLOGICAL: No headaches, memory loss, loss of strength, numbness, or tremors  SKIN: No itching, burning, rashes, or lesions   LYMPH NODES: No enlarged glands  ENDOCRINE: No heat or cold intolerance; No hair loss  MUSCULOSKELETAL: No joint pain or swelling; No muscle, back, or extremity pain  PSYCHIATRIC: No depression, anxiety, mood swings, or difficulty sleeping  HEME/LYMPH: No easy bruising, or bleeding gums  ALLERGY AND IMMUNOLOGIC: No hives or eczema    MEDICATIONS  (STANDING):  chlorhexidine 0.12% Liquid 15 milliLiter(s) Oral Mucosa every 12 hours  heparin   Injectable 5000 Unit(s) SubCutaneous every 8 hours  influenza  Vaccine (HIGH DOSE) 0.7 milliLiter(s) IntraMuscular once  nitroglycerin  Infusion 200 MICROgram(s)/Min (60 mL/Hr) IV Continuous <Continuous>  pantoprazole  Injectable 40 milliGRAM(s) IV Push daily  propofol Infusion 20 MICROgram(s)/kG/Min (8.16 mL/Hr) IV Continuous <Continuous>    MEDICATIONS  (PRN):  fentaNYL    Injectable 25 MICROGram(s) IV Push every 1 hour PRN sedation      ALLERGIES: penicillin (Rash)  IV Contrast (Anaphylaxis; Urticaria)      FAMILY HISTORY:  FH: leukemia (Father)    FH: breast cancer (Mother)        Social History:  Alochol:   Smoking:   Drug Use:   Marital Status:     I&O's Detail    02 Dec 2023 07:01  -  03 Dec 2023 07:00  --------------------------------------------------------  IN:    Propofol: 48.9 mL  Total IN: 48.9 mL    OUT:    Ureteral Catheter (mL): 1070 mL  Total OUT: 1070 mL    Total NET: -1021.1 mL          PHYSICAL EXAMINATION:  -----------------------------  T(C): 36.8 (12-03-23 @ 04:30), Max: 37.1 (12-02-23 @ 21:24)  HR: 65 (12-03-23 @ 06:58) (58 - 99)  BP: 122/58 (12-03-23 @ 04:30) (69/54 - 245/114)  RR: 22 (12-03-23 @ 04:30) (18 - 48)  SpO2: 94% (12-03-23 @ 06:58) (50% - 100%)  Wt(kg): --    12-02 @ 07:01  -  12-03 @ 07:00  --------------------------------------------------------  IN:    Propofol: 48.9 mL  Total IN: 48.9 mL    OUT:    Ureteral Catheter (mL): 1070 mL  Total OUT: 1070 mL    Total NET: -1021.1 mL        Height (cm): 160 (12-02 @ 16:30)  Weight (kg): 68 (12-02 @ 16:30)  BMI (kg/m2): 26.6 (12-02 @ 16:30)  BSA (m2): 1.71 (12-02 @ 16:30)    Constitutional: well developed, normal appearance, well groomed, well nourished, no deformities and no acute distress.   Eyes: the conjunctiva exhibited no abnormalities and the eyelids demonstrated no xanthelasmas.   HEENT: normal oral mucosa, no oral pallor and no oral cyanosis.   Neck: normal jugular venous A waves present, normal jugular venous V waves present and no jugular venous doshi A waves.   Pulmonary: no respiratory distress, normal respiratory rhythm and effort, no accessory muscle use and lungs were clear to auscultation bilaterally. Anteriorly  Cardiovascular: heart rate and rhythm were normal, normal S1 and S2 and no murmur, gallop, rub, heave or thrill are present.   Musculoskeletal: the gait could not be assessed.   Extremities: no clubbing of the fingernails, no localized cyanosis, no petechial hemorrhages and no ischemic changes.   Skin: normal skin color and pigmentation, no rash, no venous stasis, no skin lesions, no skin ulcer and no xanthoma was observed.       LABS:   --------  12-03    139  |  102  |  33<H>  ----------------------------<  146<H>  3.4<L>   |  24  |  1.13    Ca    8.7      03 Dec 2023 06:11  Phos  5.4     12-03  Mg     1.9     12-03    TPro  7.9  /  Alb  3.5  /  TBili  0.2  /  DBili  x   /  AST  26  /  ALT  25  /  AlkPhos  105  12-02                         11.7   12.98 )-----------( 227      ( 03 Dec 2023 06:11 )             36.8     PT/INR - ( 02 Dec 2023 19:26 )   PT: 10.4 sec;   INR: 0.93 ratio         PTT - ( 02 Dec 2023 19:26 )  PTT:29.6 sec              RADIOLOGY:  -----------------        ECG: Ventricular paced rhythm with p-waves marching through

## 2023-12-03 NOTE — PROGRESS NOTE ADULT - ASSESSMENT
88F HTN, Breast CA admitted for Acute Hypoxic Respiratory Failure    Acute Hypoxic Respiratory Failure  Related to Anaphylactic Shock from Iodine Contrast Dye from CT  Explained to daughter that it maybe too soon to extubate  CXR from this AM shows significant improvement in pulmonary edema  Will repeat in AM and attempt weaning trial tomorrow  No need for antibiotics at this time as no signs of infection  Pulmonary Consult noted    HTN  Hold medications as patient is on Propofol    Breast CA  History of Mastectomy    TIA  CTA of head neck demonstrates multiple areas of stenosis  Will check A1C and Lipid Profile   Neurology consult when more stable can accurately assess neurologic status and function    Diet  Enteral Feeding     DVT Prophylaxis  Heparin SC TID    Disposition   Discharge planning pending hospital course

## 2023-12-03 NOTE — DIETITIAN INITIAL EVALUATION ADULT - PERTINENT MEDS FT
MEDICATIONS  (STANDING):  chlorhexidine 0.12% Liquid 15 milliLiter(s) Oral Mucosa every 12 hours  heparin   Injectable 5000 Unit(s) SubCutaneous every 8 hours  influenza  Vaccine (HIGH DOSE) 0.7 milliLiter(s) IntraMuscular once  nitroglycerin  Infusion 200 MICROgram(s)/Min (60 mL/Hr) IV Continuous <Continuous>  pantoprazole  Injectable 40 milliGRAM(s) IV Push daily  potassium chloride    Tablet ER 40 milliEquivalent(s) Oral every 4 hours  propofol Infusion 20 MICROgram(s)/kG/Min (8.16 mL/Hr) IV Continuous <Continuous>    MEDICATIONS  (PRN):  fentaNYL    Injectable 25 MICROGram(s) IV Push every 1 hour PRN sedation

## 2023-12-03 NOTE — DIETITIAN INITIAL EVALUATION ADULT - ADD RECOMMEND
1) Pt to progress to diet order as medically feasible (EN vs PO diet) 1) Pt to progress to diet order as medically feasible (EN vs PO diet)  2) SLP evaluation if pt progresses to PO diet

## 2023-12-03 NOTE — CONSULT NOTE ADULT - SUBJECTIVE AND OBJECTIVE BOX
Date/Time Patient Seen:  		  Referring MD:   Data Reviewed	       Patient is a 88y old  Female who presents with a chief complaint of     Subjective/HPI   ief Complaint: see chief complaint quote.    · Chief Complaint: The patient is a 88y Female complaining of see chief complaint quote.  · HPI Objective Statement: 88-year-old female with history of hypertension pacemaker right hip replacement brought by ambulance from home for evaluation of episode of weakness and facial droop around 3 PM today.  Daughter who is at bedside states patient was in usual state of health and then was noted to spill water all over herself.  While trying to clean up spilled water patient became profoundly weak and seem to be speaking out of 1 side of her mouth.  Lasted for few seconds and then resolved.  Patient denies any symptoms but has no memory of difficulty with speech or facial droop.  Feels well at present.    Her PCP is Dr. Dunham    PAST MEDICAL & SURGICAL HISTORY:  Hyperlipidemia  no current medications    Breast cancer  2009 left - s/ping castellanos radical mastectomy    [place] (hard of hearing)  bilateral HA (Yvette)    Meniscus tear  left - 10/2015    Cyst of left ovary    SDH (subdural hematoma)  3/2011 - s/p fall - no surgical intervention    History of Left Mastectomy    Cataract extraction status of eye, right    Status post right cataract extraction     Past Medical, Past Surgical, and Family History:  PAST MEDICAL HISTORY:  Breast cancer 2009 left - s/p radical mastectomy    Cyst of left ovary     [place] (hard of hearing) bilateral HA (Yvette)    Hyperlipidemia no current medications    Meniscus tear left - 10/2015    SDH (subdural hematoma) 3/2011 - s/p fall - no surgical intervention.     PAST SURGICAL HISTORY:  Cataract extraction status of eye, right     History of Left Mastectomy     Status post right cataract extraction.     Tobacco Usage:  · Tobacco Usage	Never smoker    · Attestation Comment: I have reviewed and confirmed nurses' notes for patient's medications, allergies, medical history, and surgical history.    ALLERGIES AND HOME MEDICATIONS:   Allergies:        Allergies:  	penicillin: Drug, Rash    Home Medications:   * Patient Currently Takes Medications as of 19-Jan-2016 17:43 documented in Structured Notes  · 	Tylenol 325 mg oral tablet: 2 tab(s) orally every 6 hours, As Needed  · 	Motrin 600 mg oral tablet: 1 tab(s) orally every 6 hours, As Needed  · 	oxyCODONE 5 mg oral tablet: 1 tab(s) orally every 4 hours, As Needed    REVIEW OF SYSTEMS:    Review of Systems:  · CONSTITUTIONAL: no fever and no chills.  · EYES: no discharge, no irritation, no pain, no redness, and no visual changes.  · ENMT: Ears: no ear pain and no hearing problems. Nose: no nasal congestion and no nasal drainage. Mouth/Throat: no dysphagia, no hoarseness and no throat pain. Neck: no lumps, no pain, no stiffness and no swollen glands.  · CARDIOVASCULAR: no chest pain and no edema.  · RESPIRATORY: no chest pain, no cough, and no shortness of breath.  · GASTROINTESTINAL: no abdominal pain, no bloating, no constipation, no diarrhea, no nausea and no vomiting.  · MUSCULOSKELETAL: no back pain, no gout, no musculoskeletal pain, no neck pain, and no weakness.  · SKIN: no abrasions, no jaundice, no lesions, no pruritis, and no rashes.  · NEUROLOGICAL: - - -  · Neurological [-]: no change in level of consciousness, no difficulty walking/imbalance, no headache, no seizures        Medication list         MEDICATIONS  (STANDING):  chlorhexidine 0.12% Liquid 15 milliLiter(s) Oral Mucosa every 12 hours  furosemide   Injectable 80 milliGRAM(s) IV Push daily  influenza  Vaccine (HIGH DOSE) 0.7 milliLiter(s) IntraMuscular once  nitroglycerin  Infusion 200 MICROgram(s)/Min (60 mL/Hr) IV Continuous <Continuous>  propofol Infusion 20 MICROgram(s)/kG/Min (8.16 mL/Hr) IV Continuous <Continuous>    MEDICATIONS  (PRN):  fentaNYL    Injectable 25 MICROGram(s) IV Push every 1 hour PRN sedation         Vitals log        ICU Vital Signs Last 24 Hrs  T(C): 36.8 (03 Dec 2023 04:30), Max: 37.1 (02 Dec 2023 21:24)  T(F): 98.2 (03 Dec 2023 04:30), Max: 98.8 (02 Dec 2023 21:24)  HR: 67 (03 Dec 2023 04:47) (58 - 99)  BP: 122/58 (03 Dec 2023 04:30) (69/54 - 245/114)  BP(mean): 78 (03 Dec 2023 04:30) (59 - 164)  ABP: --  ABP(mean): --  RR: 22 (03 Dec 2023 04:30) (18 - 48)  SpO2: 99% (03 Dec 2023 04:47) (50% - 100%)    O2 Parameters below as of 03 Dec 2023 04:30  Patient On (Oxygen Delivery Method): ventilator    O2 Concentration (%): 40         Mode: AC/ CMV (Assist Control/ Continuous Mandatory Ventilation)  RR (machine): 24  TV (machine): 400  FiO2: 40  PEEP: 10  ITime: 1  MAP: 14  PIP: 27      Input and Output:  I&O's Detail    02 Dec 2023 07:01  -  03 Dec 2023 05:00  --------------------------------------------------------  IN:    Propofol: 48.9 mL  Total IN: 48.9 mL    OUT:    Ureteral Catheter (mL): 1070 mL  Total OUT: 1070 mL    Total NET: -1021.1 mL          Lab Data                        14.0   10.45 )-----------( 310      ( 02 Dec 2023 19:26 )             46.2     12-02    135  |  101  |  24<H>  ----------------------------<  121<H>  4.5   |  26  |  0.84    Ca    9.1      02 Dec 2023 19:26    TPro  7.9  /  Alb  3.5  /  TBili  0.2  /  DBili  x   /  AST  26  /  ALT  25  /  AlkPhos  105  12-02    ABG - ( 02 Dec 2023 23:37 )  pH, Arterial: 7.34  pH, Blood: x     /  pCO2: 50    /  pO2: 282   / HCO3: 27    / Base Excess: 1.2   /  SaO2: 99.9                    Review of Systems	      Objective     Physical Examination        Pertinent Lab findings & Imaging      Christiane:  NO   Adequate UO     I&O's Detail    02 Dec 2023 07:01  -  03 Dec 2023 05:00  --------------------------------------------------------  IN:    Propofol: 48.9 mL  Total IN: 48.9 mL    OUT:    Ureteral Catheter (mL): 1070 mL  Total OUT: 1070 mL    Total NET: -1021.1 mL               Discussed with:     Cultures:	        Radiology    ACC: [id number] EXAM:  CT ANGIO BRAIN (W)AW IC   ORDERED BY: MAGDY NESS     ACC: [id number] EXAM:  CT ANGIO NECK (W)AW IC   ORDERED BY: MAGDY NESS     ACC: [id number] EXAM:  CT BRAIN   ORDERED BY: MAGDY NESS     PROCEDURE DATE:  12/02/2023          INTERPRETATION:  CLINICAL INFORMATION: Syncope.    COMPARISON: CT head from 04/06/2011.    CONTRAST/COMPLICATIONS:  IV Contrast: Omnipaque 350 (accession [id number]), IV contrast documented   in unlinked concurrent exam (accession [id number]), Omnipaque 350   (accession [id number])  90 cc administered   10 cc discarded  Complications: None reported at time of study completion    TECHNIQUE:  1.  Noncontrast head CT scan was performed; sagittal and coronal   reformats were generated.  2.  Contrast enhanced CT angiography of the neck and head was performed.     MIP reformats were generated.      FINDINGS:  NONCONTRAST HEAD CT SCAN:  There is no CT evidence of acute intracranial hemorrhage, mass effect or   midline shift.  Gray matter-white matter differentiation is grossly   preserved.    There is moderate generalized cerebral volume loss and moderate confluent   periventricular white matter hypoattenuation compatible with chronic   microvascular ischemic disease.    An empty sella is incidentally noted.    The paranasal sinuses and mastoids are clear.    The patient is status post intraocular lens replacement bilaterally.    The calvarium and skull base appear within normal limits.      CT ANGIOGRAPHY NECK:  Thoracic aorta and branch vessels: Three-vessel arch with moderate   atherosclerotic changes.  No occlusion, flow limiting stenosis or   dissection.    Right carotid system: There is calcified and noncalcified atherosclerotic   plaque at the distal common carotid artery, carotid bifurcation and   proximal right internal carotid artery.  There is a mild stenosis in the   proximal right internal carotid artery measuring approximately 40% using   NASCET criteria (minimal vessel caliber approximately 2.7 mm and normal   caliber distal ICA of approximately 4.5 mm).  No carotid occlusion or   dissection.    Left carotid system: There is mild calcified and noncalcified   atherosclerotic plaque throughout the left common carotid artery.  A mild   stenosis in the proximal left common carotid artery does not appear to be   flow-limiting.  There is moderate calcified and noncalcified   atherosclerotic plaque at the left carotid bifurcation, left carotid bulb   and proximal left internal carotid artery.  A minimal stenosis in the   proximal left internal carotid artery is approximately 20% using NASCET   criteria (minimal vessel caliber of approximately 4.1 mm and normal   caliber distal ICA of approximately 5.1 mm).    Vertebral arteries: There are multiple mild stenoses in the V2 and V3   segments of the left vertebral artery, and a moderate stenosis in the   left vertebral artery as it pierces the dura.  No significant right   vertebral artery stenosis.  No vertebral occlusion or dissection.  The   right vertebral artery is larger than the left.    Soft tissues of the neck: Diffusely heterogeneous thyroid, partially   obscured by artifact with multiple poorly visualized nodules measuring up   to at least 1.5 cm in the right thyroid lobe (3:109) and at least 1.4 cm   a left thyroid lobe (3:150).    Cervical spine: Straightening of the cervical lordosis.  Multilevel   degenerative changes.  Minimal anterolisthesis at C2-C3.  Small disc   osteophyte complexes at C3-C4, C4-C5, C5-C6 and C6-C7.  Minimal diastases   at C7-T1.  Diffuse mild cervical spinal canal stenosis greatest at C4-C5.    Diffuse moderate neural foraminal narrowing at C3-C4, C4-C5 and C5-C6   bilaterally.    Visualized upper chest:  Small pleural effusions are partially visualized   bilaterally.  There is ill-defined groundglass opacity and interlobular   septal thickening in the lung apices which may reflect pulmonary edema.    A right chest wall pacemaker battery is partially visualized.  A   retropectoral left breast implant is partially visualized.    CT ANGIOGRAPHY BRAIN:  Internal carotid arteries: There is noncalcified atherosclerotic plaque   in the skull base segment of left internal carotid artery with areas of   minimal luminal narrowing.  There is mild atherosclerotic calcification   in the cavernous segments of both internal carotid arteries and in the   proximal cervical segment right internal carotid artery.  No occlusion,   flow-limiting stenosis or aneurysm.    Anterior cerebral arteries: Multiple stenoses in the A4 and A5 segments   of the right anterior cerebral artery, without occlusion (4:31 and 6:59).    No occlusion, proximal stenosis or aneurysm.    Middle cerebral arteries: There is mild stenosis in the proximal M1   segment of the left middle cerebral artery followed by fusiform   dilatation of the vessel to 3.5 mm, followed by a short segment severe   stenosis in the distal left M1 segment, with distal flow (4:54 and 5:56).    There is long segment diffuse mild stenosis in the M1 segment of the   right middle cerebral artery which does not appear to be flow-limiting.    No occlusion or aneurysm.    Anterior communicating artery: Patent without aneurysm.  Right posterior communicating artery: Not visualized.  No aneurysm.  Left posterior communicating artery: Not visualized.  No aneurysm.    Posterior cerebral arteries: Patent bilaterally without stenosis or   aneurysm.    Vertebrobasilar: There is an approximately 6 mm long moderate to severe   stenosis in the distal aspect of the left vertebral artery, just proximal   to the vertebrobasilar junction.  There is a 3 mm long moderate stenosis   in the proximal portion of the left posterior inferior cerebellar artery   (4:70).  No stenosis in the intradural right vertebral artery.  Minimal   in the mid basilar artery which does not appear to be flow-limiting.  No   occlusion or aneurysm.  The distal right vertebral artery is dominant.    Posterior inferior cerebellar arteries, anterior inferior cerebellar   arteries and superior cerebellar arteries are patent bilaterally.    Venous sinuses: The superficial and deep venous systems are patent.    Brain: No abnormal early arterial phase enhancement on CTA images.  No   evidence of a large arteriovenous malformation.    IMPRESSION:  NONCONTRAST HEAD CT SCAN: No CT evidence of acute intracranial pathology.    CT ANGIOGRAPHY NECK:  1.  Mild stenosis in the proximal right internal carotid artery measures   approximately 40% using NASCET criteria.  2.  Minimal stenosis in the proximal left internal carotid artery   measures approximately 20% using NASCET criteria.  3.  Multiple mild stenoses in the V2 and V3 segments of the left   vertebral artery and a moderate stenosis in the left vertebral artery as   it pierces the dura.  4.  Heterogeneous thyroid with multiple nodules measuring up to at least   1.5 cm.  A nonemergent outpatient thyroid ultrasound may be obtained as   clinically warranted.  5.  Ill-defined groundglass opacity and interstitial or septal thickening   in the lung apices may reflect pulmonary edema.  Small pleural effusions   bilaterally.    CT ANGIOGRAPHY BRAIN:  1. No major vessel occlusion or aneurysm about the [place].  2. There is mild stenosis in the proximal M1 segment of the left middle   cerebral artery followed by fusiform dilatation of the vessel to 3.5 mm,   followed by a short segment severe stenosis in the distal left M1   segment, with distal flow.  3.  Diffuse long segment mild stenosis in the M1 segment of the right   middle cerebral artery.  4.  Multiple stenoses in the A4 and A5 segments of the right anterior   cerebral artery, without occlusion.  5.  Approximately 6 mm long moderate to severe stenosis in the distal   aspect of the left vertebral artery, just proximal to the vertebrobasilar   junction.  6.  Approximately 3 mm long moderate stenosis in the proximal left   posterior inferior cerebellar artery.  7. Minimal stenosis in the mid basilar artery, which does not appear to   be flow limiting.    --- End of Report ---            JODI WYNNE MD; Attending Radiologist  This document has been electronically signed. Dec  2 2023  9:23PM                           Date/Time Patient Seen:  		  Referring MD:   Data Reviewed	       Patient is a 88y old  Female who presents with a chief complaint of     Subjective/HPI   ief Complaint: see chief complaint quote.    · Chief Complaint: The patient is a 88y Female complaining of see chief complaint quote.  · HPI Objective Statement: 88-year-old female with history of hypertension pacemaker right hip replacement brought by ambulance from home for evaluation of episode of weakness and facial droop around 3 PM today.  Daughter who is at bedside states patient was in usual state of health and then was noted to spill water all over herself.  While trying to clean up spilled water patient became profoundly weak and seem to be speaking out of 1 side of her mouth.  Lasted for few seconds and then resolved.  Patient denies any symptoms but has no memory of difficulty with speech or facial droop.  Feels well at present.    Her PCP is Dr. Dunham    PAST MEDICAL & SURGICAL HISTORY:  Hyperlipidemia  no current medications    Breast cancer  2009 left - s/ping castellanos radical mastectomy    [place] (hard of hearing)  bilateral HA (Yvette)    Meniscus tear  left - 10/2015    Cyst of left ovary    SDH (subdural hematoma)  3/2011 - s/p fall - no surgical intervention    History of Left Mastectomy    Cataract extraction status of eye, right    Status post right cataract extraction     Past Medical, Past Surgical, and Family History:  PAST MEDICAL HISTORY:  Breast cancer 2009 left - s/p radical mastectomy    Cyst of left ovary     [place] (hard of hearing) bilateral HA (Yvette)    Hyperlipidemia no current medications    Meniscus tear left - 10/2015    SDH (subdural hematoma) 3/2011 - s/p fall - no surgical intervention.     PAST SURGICAL HISTORY:  Cataract extraction status of eye, right     History of Left Mastectomy     Status post right cataract extraction.     Tobacco Usage:  · Tobacco Usage	Never smoker    · Attestation Comment: I have reviewed and confirmed nurses' notes for patient's medications, allergies, medical history, and surgical history.    ALLERGIES AND HOME MEDICATIONS:   Allergies:        Allergies:  	penicillin: Drug, Rash    Home Medications:   * Patient Currently Takes Medications as of 19-Jan-2016 17:43 documented in Structured Notes  · 	Tylenol 325 mg oral tablet: 2 tab(s) orally every 6 hours, As Needed  · 	Motrin 600 mg oral tablet: 1 tab(s) orally every 6 hours, As Needed  · 	oxyCODONE 5 mg oral tablet: 1 tab(s) orally every 4 hours, As Needed    REVIEW OF SYSTEMS:    Review of Systems:  · CONSTITUTIONAL: no fever and no chills.  · EYES: no discharge, no irritation, no pain, no redness, and no visual changes.  · ENMT: Ears: no ear pain and no hearing problems. Nose: no nasal congestion and no nasal drainage. Mouth/Throat: no dysphagia, no hoarseness and no throat pain. Neck: no lumps, no pain, no stiffness and no swollen glands.  · CARDIOVASCULAR: no chest pain and no edema.  · RESPIRATORY: no chest pain, no cough, and no shortness of breath.  · GASTROINTESTINAL: no abdominal pain, no bloating, no constipation, no diarrhea, no nausea and no vomiting.  · MUSCULOSKELETAL: no back pain, no gout, no musculoskeletal pain, no neck pain, and no weakness.  · SKIN: no abrasions, no jaundice, no lesions, no pruritis, and no rashes.  · NEUROLOGICAL: - - -  · Neurological [-]: no change in level of consciousness, no difficulty walking/imbalance, no headache, no seizures        Medication list         MEDICATIONS  (STANDING):  chlorhexidine 0.12% Liquid 15 milliLiter(s) Oral Mucosa every 12 hours  furosemide   Injectable 80 milliGRAM(s) IV Push daily  influenza  Vaccine (HIGH DOSE) 0.7 milliLiter(s) IntraMuscular once  nitroglycerin  Infusion 200 MICROgram(s)/Min (60 mL/Hr) IV Continuous <Continuous>  propofol Infusion 20 MICROgram(s)/kG/Min (8.16 mL/Hr) IV Continuous <Continuous>    MEDICATIONS  (PRN):  fentaNYL    Injectable 25 MICROGram(s) IV Push every 1 hour PRN sedation         Vitals log        ICU Vital Signs Last 24 Hrs  T(C): 36.8 (03 Dec 2023 04:30), Max: 37.1 (02 Dec 2023 21:24)  T(F): 98.2 (03 Dec 2023 04:30), Max: 98.8 (02 Dec 2023 21:24)  HR: 67 (03 Dec 2023 04:47) (58 - 99)  BP: 122/58 (03 Dec 2023 04:30) (69/54 - 245/114)  BP(mean): 78 (03 Dec 2023 04:30) (59 - 164)  ABP: --  ABP(mean): --  RR: 22 (03 Dec 2023 04:30) (18 - 48)  SpO2: 99% (03 Dec 2023 04:47) (50% - 100%)    O2 Parameters below as of 03 Dec 2023 04:30  Patient On (Oxygen Delivery Method): ventilator    O2 Concentration (%): 40         Mode: AC/ CMV (Assist Control/ Continuous Mandatory Ventilation)  RR (machine): 24  TV (machine): 400  FiO2: 40  PEEP: 10  ITime: 1  MAP: 14  PIP: 27      Input and Output:  I&O's Detail    02 Dec 2023 07:01  -  03 Dec 2023 05:00  --------------------------------------------------------  IN:    Propofol: 48.9 mL  Total IN: 48.9 mL    OUT:    Ureteral Catheter (mL): 1070 mL  Total OUT: 1070 mL    Total NET: -1021.1 mL          Lab Data                        14.0   10.45 )-----------( 310      ( 02 Dec 2023 19:26 )             46.2     12-02    135  |  101  |  24<H>  ----------------------------<  121<H>  4.5   |  26  |  0.84    Ca    9.1      02 Dec 2023 19:26    TPro  7.9  /  Alb  3.5  /  TBili  0.2  /  DBili  x   /  AST  26  /  ALT  25  /  AlkPhos  105  12-02    ABG - ( 02 Dec 2023 23:37 )  pH, Arterial: 7.34  pH, Blood: x     /  pCO2: 50    /  pO2: 282   / HCO3: 27    / Base Excess: 1.2   /  SaO2: 99.9                    Review of Systems	      Objective     Physical Examination        Pertinent Lab findings & Imaging      Christiane:  NO   Adequate UO     I&O's Detail    02 Dec 2023 07:01  -  03 Dec 2023 05:00  --------------------------------------------------------  IN:    Propofol: 48.9 mL  Total IN: 48.9 mL    OUT:    Ureteral Catheter (mL): 1070 mL  Total OUT: 1070 mL    Total NET: -1021.1 mL               Discussed with:     Cultures:	        Radiology    ACC: [id number] EXAM:  CT ANGIO BRAIN (W)AW IC   ORDERED BY: MAGDY NESS     ACC: [id number] EXAM:  CT ANGIO NECK (W)AW IC   ORDERED BY: MAGDY NESS     ACC: [id number] EXAM:  CT BRAIN   ORDERED BY: MAGDY NESS     PROCEDURE DATE:  12/02/2023          INTERPRETATION:  CLINICAL INFORMATION: Syncope.    COMPARISON: CT head from 04/06/2011.    CONTRAST/COMPLICATIONS:  IV Contrast: Omnipaque 350 (accession [id number]), IV contrast documented   in unlinked concurrent exam (accession [id number]), Omnipaque 350   (accession [id number])  90 cc administered   10 cc discarded  Complications: None reported at time of study completion    TECHNIQUE:  1.  Noncontrast head CT scan was performed; sagittal and coronal   reformats were generated.  2.  Contrast enhanced CT angiography of the neck and head was performed.     MIP reformats were generated.      FINDINGS:  NONCONTRAST HEAD CT SCAN:  There is no CT evidence of acute intracranial hemorrhage, mass effect or   midline shift.  Gray matter-white matter differentiation is grossly   preserved.    There is moderate generalized cerebral volume loss and moderate confluent   periventricular white matter hypoattenuation compatible with chronic   microvascular ischemic disease.    An empty sella is incidentally noted.    The paranasal sinuses and mastoids are clear.    The patient is status post intraocular lens replacement bilaterally.    The calvarium and skull base appear within normal limits.      CT ANGIOGRAPHY NECK:  Thoracic aorta and branch vessels: Three-vessel arch with moderate   atherosclerotic changes.  No occlusion, flow limiting stenosis or   dissection.    Right carotid system: There is calcified and noncalcified atherosclerotic   plaque at the distal common carotid artery, carotid bifurcation and   proximal right internal carotid artery.  There is a mild stenosis in the   proximal right internal carotid artery measuring approximately 40% using   NASCET criteria (minimal vessel caliber approximately 2.7 mm and normal   caliber distal ICA of approximately 4.5 mm).  No carotid occlusion or   dissection.    Left carotid system: There is mild calcified and noncalcified   atherosclerotic plaque throughout the left common carotid artery.  A mild   stenosis in the proximal left common carotid artery does not appear to be   flow-limiting.  There is moderate calcified and noncalcified   atherosclerotic plaque at the left carotid bifurcation, left carotid bulb   and proximal left internal carotid artery.  A minimal stenosis in the   proximal left internal carotid artery is approximately 20% using NASCET   criteria (minimal vessel caliber of approximately 4.1 mm and normal   caliber distal ICA of approximately 5.1 mm).    Vertebral arteries: There are multiple mild stenoses in the V2 and V3   segments of the left vertebral artery, and a moderate stenosis in the   left vertebral artery as it pierces the dura.  No significant right   vertebral artery stenosis.  No vertebral occlusion or dissection.  The   right vertebral artery is larger than the left.    Soft tissues of the neck: Diffusely heterogeneous thyroid, partially   obscured by artifact with multiple poorly visualized nodules measuring up   to at least 1.5 cm in the right thyroid lobe (3:109) and at least 1.4 cm   a left thyroid lobe (3:150).    Cervical spine: Straightening of the cervical lordosis.  Multilevel   degenerative changes.  Minimal anterolisthesis at C2-C3.  Small disc   osteophyte complexes at C3-C4, C4-C5, C5-C6 and C6-C7.  Minimal diastases   at C7-T1.  Diffuse mild cervical spinal canal stenosis greatest at C4-C5.    Diffuse moderate neural foraminal narrowing at C3-C4, C4-C5 and C5-C6   bilaterally.    Visualized upper chest:  Small pleural effusions are partially visualized   bilaterally.  There is ill-defined groundglass opacity and interlobular   septal thickening in the lung apices which may reflect pulmonary edema.    A right chest wall pacemaker battery is partially visualized.  A   retropectoral left breast implant is partially visualized.    CT ANGIOGRAPHY BRAIN:  Internal carotid arteries: There is noncalcified atherosclerotic plaque   in the skull base segment of left internal carotid artery with areas of   minimal luminal narrowing.  There is mild atherosclerotic calcification   in the cavernous segments of both internal carotid arteries and in the   proximal cervical segment right internal carotid artery.  No occlusion,   flow-limiting stenosis or aneurysm.    Anterior cerebral arteries: Multiple stenoses in the A4 and A5 segments   of the right anterior cerebral artery, without occlusion (4:31 and 6:59).    No occlusion, proximal stenosis or aneurysm.    Middle cerebral arteries: There is mild stenosis in the proximal M1   segment of the left middle cerebral artery followed by fusiform   dilatation of the vessel to 3.5 mm, followed by a short segment severe   stenosis in the distal left M1 segment, with distal flow (4:54 and 5:56).    There is long segment diffuse mild stenosis in the M1 segment of the   right middle cerebral artery which does not appear to be flow-limiting.    No occlusion or aneurysm.    Anterior communicating artery: Patent without aneurysm.  Right posterior communicating artery: Not visualized.  No aneurysm.  Left posterior communicating artery: Not visualized.  No aneurysm.    Posterior cerebral arteries: Patent bilaterally without stenosis or   aneurysm.    Vertebrobasilar: There is an approximately 6 mm long moderate to severe   stenosis in the distal aspect of the left vertebral artery, just proximal   to the vertebrobasilar junction.  There is a 3 mm long moderate stenosis   in the proximal portion of the left posterior inferior cerebellar artery   (4:70).  No stenosis in the intradural right vertebral artery.  Minimal   in the mid basilar artery which does not appear to be flow-limiting.  No   occlusion or aneurysm.  The distal right vertebral artery is dominant.    Posterior inferior cerebellar arteries, anterior inferior cerebellar   arteries and superior cerebellar arteries are patent bilaterally.    Venous sinuses: The superficial and deep venous systems are patent.    Brain: No abnormal early arterial phase enhancement on CTA images.  No   evidence of a large arteriovenous malformation.    IMPRESSION:  NONCONTRAST HEAD CT SCAN: No CT evidence of acute intracranial pathology.    CT ANGIOGRAPHY NECK:  1.  Mild stenosis in the proximal right internal carotid artery measures   approximately 40% using NASCET criteria.  2.  Minimal stenosis in the proximal left internal carotid artery   measures approximately 20% using NASCET criteria.  3.  Multiple mild stenoses in the V2 and V3 segments of the left   vertebral artery and a moderate stenosis in the left vertebral artery as   it pierces the dura.  4.  Heterogeneous thyroid with multiple nodules measuring up to at least   1.5 cm.  A nonemergent outpatient thyroid ultrasound may be obtained as   clinically warranted.  5.  Ill-defined groundglass opacity and interstitial or septal thickening   in the lung apices may reflect pulmonary edema.  Small pleural effusions   bilaterally.    CT ANGIOGRAPHY BRAIN:  1. No major vessel occlusion or aneurysm about the [place].  2. There is mild stenosis in the proximal M1 segment of the left middle   cerebral artery followed by fusiform dilatation of the vessel to 3.5 mm,   followed by a short segment severe stenosis in the distal left M1   segment, with distal flow.  3.  Diffuse long segment mild stenosis in the M1 segment of the right   middle cerebral artery.  4.  Multiple stenoses in the A4 and A5 segments of the right anterior   cerebral artery, without occlusion.  5.  Approximately 6 mm long moderate to severe stenosis in the distal   aspect of the left vertebral artery, just proximal to the vertebrobasilar   junction.  6.  Approximately 3 mm long moderate stenosis in the proximal left   posterior inferior cerebellar artery.  7. Minimal stenosis in the mid basilar artery, which does not appear to   be flow limiting.    --- End of Report ---            JODI WYNNE MD; Attending Radiologist  This document has been electronically signed. Dec  2 2023  9:23PM                           Date/Time Patient Seen:  		  Referring MD:   Data Reviewed	       Patient is a 88y old  Female who presents with a chief complaint of     Subjective/HPI   ief Complaint: see chief complaint quote.    · Chief Complaint: The patient is a 88y Female complaining of see chief complaint quote.  · HPI Objective Statement: 88-year-old female with history of hypertension pacemaker right hip replacement brought by ambulance from home for evaluation of episode of weakness and facial droop around 3 PM today.  Daughter who is at bedside states patient was in usual state of health and then was noted to spill water all over herself.  While trying to clean up spilled water patient became profoundly weak and seem to be speaking out of 1 side of her mouth.  Lasted for few seconds and then resolved.  Patient denies any symptoms but has no memory of difficulty with speech or facial droop.  Feels well at present.    Her PCP is Dr. Dunham    PAST MEDICAL & SURGICAL HISTORY:  Hyperlipidemia  no current medications    Breast cancer  2009 left - s/ping castellanos radical mastectomy    [place] (hard of hearing)  bilateral HA (Yvette)    Meniscus tear  left - 10/2015    Cyst of left ovary    SDH (subdural hematoma)  3/2011 - s/p fall - no surgical intervention    History of Left Mastectomy    Cataract extraction status of eye, right    Status post right cataract extraction     Past Medical, Past Surgical, and Family History:  PAST MEDICAL HISTORY:  Breast cancer 2009 left - s/p radical mastectomy    Cyst of left ovary     [place] (hard of hearing) bilateral HA (Yvette)    Hyperlipidemia no current medications    Meniscus tear left - 10/2015    SDH (subdural hematoma) 3/2011 - s/p fall - no surgical intervention.     PAST SURGICAL HISTORY:  Cataract extraction status of eye, right     History of Left Mastectomy     Status post right cataract extraction.     Tobacco Usage:  · Tobacco Usage	Never smoker    · Attestation Comment: I have reviewed and confirmed nurses' notes for patient's medications, allergies, medical history, and surgical history.    ALLERGIES AND HOME MEDICATIONS:   Allergies:        Allergies:  	penicillin: Drug, Rash    Home Medications:   * Patient Currently Takes Medications as of 19-Jan-2016 17:43 documented in Structured Notes  · 	Tylenol 325 mg oral tablet: 2 tab(s) orally every 6 hours, As Needed  · 	Motrin 600 mg oral tablet: 1 tab(s) orally every 6 hours, As Needed  · 	oxyCODONE 5 mg oral tablet: 1 tab(s) orally every 4 hours, As Needed    REVIEW OF SYSTEMS:    Review of Systems:  · CONSTITUTIONAL: no fever and no chills.  · EYES: no discharge, no irritation, no pain, no redness, and no visual changes.  · ENMT: Ears: no ear pain and no hearing problems. Nose: no nasal congestion and no nasal drainage. Mouth/Throat: no dysphagia, no hoarseness and no throat pain. Neck: no lumps, no pain, no stiffness and no swollen glands.  · CARDIOVASCULAR: no chest pain and no edema.  · RESPIRATORY: no chest pain, no cough, and no shortness of breath.  · GASTROINTESTINAL: no abdominal pain, no bloating, no constipation, no diarrhea, no nausea and no vomiting.  · MUSCULOSKELETAL: no back pain, no gout, no musculoskeletal pain, no neck pain, and no weakness.  · SKIN: no abrasions, no jaundice, no lesions, no pruritis, and no rashes.  · NEUROLOGICAL: - - -  · Neurological [-]: no change in level of consciousness, no difficulty walking/imbalance, no headache, no seizures        Medication list         MEDICATIONS  (STANDING):  chlorhexidine 0.12% Liquid 15 milliLiter(s) Oral Mucosa every 12 hours  furosemide   Injectable 80 milliGRAM(s) IV Push daily  influenza  Vaccine (HIGH DOSE) 0.7 milliLiter(s) IntraMuscular once  nitroglycerin  Infusion 200 MICROgram(s)/Min (60 mL/Hr) IV Continuous <Continuous>  propofol Infusion 20 MICROgram(s)/kG/Min (8.16 mL/Hr) IV Continuous <Continuous>    MEDICATIONS  (PRN):  fentaNYL    Injectable 25 MICROGram(s) IV Push every 1 hour PRN sedation         Vitals log        ICU Vital Signs Last 24 Hrs  T(C): 36.8 (03 Dec 2023 04:30), Max: 37.1 (02 Dec 2023 21:24)  T(F): 98.2 (03 Dec 2023 04:30), Max: 98.8 (02 Dec 2023 21:24)  HR: 67 (03 Dec 2023 04:47) (58 - 99)  BP: 122/58 (03 Dec 2023 04:30) (69/54 - 245/114)  BP(mean): 78 (03 Dec 2023 04:30) (59 - 164)  ABP: --  ABP(mean): --  RR: 22 (03 Dec 2023 04:30) (18 - 48)  SpO2: 99% (03 Dec 2023 04:47) (50% - 100%)    O2 Parameters below as of 03 Dec 2023 04:30  Patient On (Oxygen Delivery Method): ventilator    O2 Concentration (%): 40         Mode: AC/ CMV (Assist Control/ Continuous Mandatory Ventilation)  RR (machine): 24  TV (machine): 400  FiO2: 40  PEEP: 10  ITime: 1  MAP: 14  PIP: 27      Input and Output:  I&O's Detail    02 Dec 2023 07:01  -  03 Dec 2023 05:00  --------------------------------------------------------  IN:    Propofol: 48.9 mL  Total IN: 48.9 mL    OUT:    Ureteral Catheter (mL): 1070 mL  Total OUT: 1070 mL    Total NET: -1021.1 mL          Lab Data                        14.0   10.45 )-----------( 310      ( 02 Dec 2023 19:26 )             46.2     12-02    135  |  101  |  24<H>  ----------------------------<  121<H>  4.5   |  26  |  0.84    Ca    9.1      02 Dec 2023 19:26    TPro  7.9  /  Alb  3.5  /  TBili  0.2  /  DBili  x   /  AST  26  /  ALT  25  /  AlkPhos  105  12-02    ABG - ( 02 Dec 2023 23:37 )  pH, Arterial: 7.34  pH, Blood: x     /  pCO2: 50    /  pO2: 282   / HCO3: 27    / Base Excess: 1.2   /  SaO2: 99.9                    Review of Systems	      Objective     Physical Examination        Pertinent Lab findings & Imaging      Christiane:  NO   Adequate UO     I&O's Detail    02 Dec 2023 07:01  -  03 Dec 2023 05:00  --------------------------------------------------------  IN:    Propofol: 48.9 mL  Total IN: 48.9 mL    OUT:    Ureteral Catheter (mL): 1070 mL  Total OUT: 1070 mL    Total NET: -1021.1 mL               Discussed with:     Cultures:	        Radiology    ACC: [id number] EXAM:  CT ANGIO BRAIN (W)AW IC   ORDERED BY: MAGDY NESS     ACC: [id number] EXAM:  CT ANGIO NECK (W)AW IC   ORDERED BY: MAGDY NESS     ACC: [id number] EXAM:  CT BRAIN   ORDERED BY: MAGDY NESS     PROCEDURE DATE:  12/02/2023          INTERPRETATION:  CLINICAL INFORMATION: Syncope.    COMPARISON: CT head from 04/06/2011.    CONTRAST/COMPLICATIONS:  IV Contrast: Omnipaque 350 (accession [id number]), IV contrast documented   in unlinked concurrent exam (accession [id number]), Omnipaque 350   (accession [id number])  90 cc administered   10 cc discarded  Complications: None reported at time of study completion    TECHNIQUE:  1.  Noncontrast head CT scan was performed; sagittal and coronal   reformats were generated.  2.  Contrast enhanced CT angiography of the neck and head was performed.     MIP reformats were generated.      FINDINGS:  NONCONTRAST HEAD CT SCAN:  There is no CT evidence of acute intracranial hemorrhage, mass effect or   midline shift.  Gray matter-white matter differentiation is grossly   preserved.    There is moderate generalized cerebral volume loss and moderate confluent   periventricular white matter hypoattenuation compatible with chronic   microvascular ischemic disease.    An empty sella is incidentally noted.    The paranasal sinuses and mastoids are clear.    The patient is status post intraocular lens replacement bilaterally.    The calvarium and skull base appear within normal limits.      CT ANGIOGRAPHY NECK:  Thoracic aorta and branch vessels: Three-vessel arch with moderate   atherosclerotic changes.  No occlusion, flow limiting stenosis or   dissection.    Right carotid system: There is calcified and noncalcified atherosclerotic   plaque at the distal common carotid artery, carotid bifurcation and   proximal right internal carotid artery.  There is a mild stenosis in the   proximal right internal carotid artery measuring approximately 40% using   NASCET criteria (minimal vessel caliber approximately 2.7 mm and normal   caliber distal ICA of approximately 4.5 mm).  No carotid occlusion or   dissection.    Left carotid system: There is mild calcified and noncalcified   atherosclerotic plaque throughout the left common carotid artery.  A mild   stenosis in the proximal left common carotid artery does not appear to be   flow-limiting.  There is moderate calcified and noncalcified   atherosclerotic plaque at the left carotid bifurcation, left carotid bulb   and proximal left internal carotid artery.  A minimal stenosis in the   proximal left internal carotid artery is approximately 20% using NASCET   criteria (minimal vessel caliber of approximately 4.1 mm and normal   caliber distal ICA of approximately 5.1 mm).    Vertebral arteries: There are multiple mild stenoses in the V2 and V3   segments of the left vertebral artery, and a moderate stenosis in the   left vertebral artery as it pierces the dura.  No significant right   vertebral artery stenosis.  No vertebral occlusion or dissection.  The   right vertebral artery is larger than the left.    Soft tissues of the neck: Diffusely heterogeneous thyroid, partially   obscured by artifact with multiple poorly visualized nodules measuring up   to at least 1.5 cm in the right thyroid lobe (3:109) and at least 1.4 cm   a left thyroid lobe (3:150).    Cervical spine: Straightening of the cervical lordosis.  Multilevel   degenerative changes.  Minimal anterolisthesis at C2-C3.  Small disc   osteophyte complexes at C3-C4, C4-C5, C5-C6 and C6-C7.  Minimal diastases   at C7-T1.  Diffuse mild cervical spinal canal stenosis greatest at C4-C5.    Diffuse moderate neural foraminal narrowing at C3-C4, C4-C5 and C5-C6   bilaterally.    Visualized upper chest:  Small pleural effusions are partially visualized   bilaterally.  There is ill-defined groundglass opacity and interlobular   septal thickening in the lung apices which may reflect pulmonary edema.    A right chest wall pacemaker battery is partially visualized.  A   retropectoral left breast implant is partially visualized.    CT ANGIOGRAPHY BRAIN:  Internal carotid arteries: There is noncalcified atherosclerotic plaque   in the skull base segment of left internal carotid artery with areas of   minimal luminal narrowing.  There is mild atherosclerotic calcification   in the cavernous segments of both internal carotid arteries and in the   proximal cervical segment right internal carotid artery.  No occlusion,   flow-limiting stenosis or aneurysm.    Anterior cerebral arteries: Multiple stenoses in the A4 and A5 segments   of the right anterior cerebral artery, without occlusion (4:31 and 6:59).    No occlusion, proximal stenosis or aneurysm.    Middle cerebral arteries: There is mild stenosis in the proximal M1   segment of the left middle cerebral artery followed by fusiform   dilatation of the vessel to 3.5 mm, followed by a short segment severe   stenosis in the distal left M1 segment, with distal flow (4:54 and 5:56).    There is long segment diffuse mild stenosis in the M1 segment of the   right middle cerebral artery which does not appear to be flow-limiting.    No occlusion or aneurysm.    Anterior communicating artery: Patent without aneurysm.  Right posterior communicating artery: Not visualized.  No aneurysm.  Left posterior communicating artery: Not visualized.  No aneurysm.    Posterior cerebral arteries: Patent bilaterally without stenosis or   aneurysm.    Vertebrobasilar: There is an approximately 6 mm long moderate to severe   stenosis in the distal aspect of the left vertebral artery, just proximal   to the vertebrobasilar junction.  There is a 3 mm long moderate stenosis   in the proximal portion of the left posterior inferior cerebellar artery   (4:70).  No stenosis in the intradural right vertebral artery.  Minimal   in the mid basilar artery which does not appear to be flow-limiting.  No   occlusion or aneurysm.  The distal right vertebral artery is dominant.    Posterior inferior cerebellar arteries, anterior inferior cerebellar   arteries and superior cerebellar arteries are patent bilaterally.    Venous sinuses: The superficial and deep venous systems are patent.    Brain: No abnormal early arterial phase enhancement on CTA images.  No   evidence of a large arteriovenous malformation.    IMPRESSION:  NONCONTRAST HEAD CT SCAN: No CT evidence of acute intracranial pathology.    CT ANGIOGRAPHY NECK:  1.  Mild stenosis in the proximal right internal carotid artery measures   approximately 40% using NASCET criteria.  2.  Minimal stenosis in the proximal left internal carotid artery   measures approximately 20% using NASCET criteria.  3.  Multiple mild stenoses in the V2 and V3 segments of the left   vertebral artery and a moderate stenosis in the left vertebral artery as   it pierces the dura.  4.  Heterogeneous thyroid with multiple nodules measuring up to at least   1.5 cm.  A nonemergent outpatient thyroid ultrasound may be obtained as   clinically warranted.  5.  Ill-defined groundglass opacity and interstitial or septal thickening   in the lung apices may reflect pulmonary edema.  Small pleural effusions   bilaterally.    CT ANGIOGRAPHY BRAIN:  1. No major vessel occlusion or aneurysm about the [place].  2. There is mild stenosis in the proximal M1 segment of the left middle   cerebral artery followed by fusiform dilatation of the vessel to 3.5 mm,   followed by a short segment severe stenosis in the distal left M1   segment, with distal flow.  3.  Diffuse long segment mild stenosis in the M1 segment of the right   middle cerebral artery.  4.  Multiple stenoses in the A4 and A5 segments of the right anterior   cerebral artery, without occlusion.  5.  Approximately 6 mm long moderate to severe stenosis in the distal   aspect of the left vertebral artery, just proximal to the vertebrobasilar   junction.  6.  Approximately 3 mm long moderate stenosis in the proximal left   posterior inferior cerebellar artery.  7. Minimal stenosis in the mid basilar artery, which does not appear to   be flow limiting.    --- End of Report ---            JODI WYNNE MD; Attending Radiologist  This document has been electronically signed. Dec  2 2023  9:23PM                           Date/Time Patient Seen:  		  Referring MD:   Data Reviewed	       Patient is a 88y old  Female who presents with a chief complaint of     Subjective/HPI   ief Complaint: see chief complaint quote.    · Chief Complaint: The patient is a 88y Female complaining of see chief complaint quote.  · HPI Objective Statement: 88-year-old female with history of hypertension pacemaker right hip replacement brought by ambulance from home for evaluation of episode of weakness and facial droop around 3 PM today.  Daughter who is at bedside states patient was in usual state of health and then was noted to spill water all over herself.  While trying to clean up spilled water patient became profoundly weak and seem to be speaking out of 1 side of her mouth.  Lasted for few seconds and then resolved.  Patient denies any symptoms but has no memory of difficulty with speech or facial droop.  Feels well at present.    Her PCP is Dr. Dunham    PAST MEDICAL & SURGICAL HISTORY:  Hyperlipidemia  no current medications    Breast cancer  2009 left - s/ping castellanos radical mastectomy    [place] (hard of hearing)  bilateral HA (Yvette)    Meniscus tear  left - 10/2015    Cyst of left ovary    SDH (subdural hematoma)  3/2011 - s/p fall - no surgical intervention    History of Left Mastectomy    Cataract extraction status of eye, right    Status post right cataract extraction     Past Medical, Past Surgical, and Family History:  PAST MEDICAL HISTORY:  Breast cancer 2009 left - s/p radical mastectomy    Cyst of left ovary     [place] (hard of hearing) bilateral HA (Yvette)    Hyperlipidemia no current medications    Meniscus tear left - 10/2015    SDH (subdural hematoma) 3/2011 - s/p fall - no surgical intervention.     PAST SURGICAL HISTORY:  Cataract extraction status of eye, right     History of Left Mastectomy     Status post right cataract extraction.     Tobacco Usage:  · Tobacco Usage	Never smoker    · Attestation Comment: I have reviewed and confirmed nurses' notes for patient's medications, allergies, medical history, and surgical history.    ALLERGIES AND HOME MEDICATIONS:   Allergies:        Allergies:  	penicillin: Drug, Rash    Home Medications:   * Patient Currently Takes Medications as of 19-Jan-2016 17:43 documented in Structured Notes  · 	Tylenol 325 mg oral tablet: 2 tab(s) orally every 6 hours, As Needed  · 	Motrin 600 mg oral tablet: 1 tab(s) orally every 6 hours, As Needed  · 	oxyCODONE 5 mg oral tablet: 1 tab(s) orally every 4 hours, As Needed    REVIEW OF SYSTEMS:    Review of Systems:  · CONSTITUTIONAL: no fever and no chills.  · EYES: no discharge, no irritation, no pain, no redness, and no visual changes.  · ENMT: Ears: no ear pain and no hearing problems. Nose: no nasal congestion and no nasal drainage. Mouth/Throat: no dysphagia, no hoarseness and no throat pain. Neck: no lumps, no pain, no stiffness and no swollen glands.  · CARDIOVASCULAR: no chest pain and no edema.  · RESPIRATORY: no chest pain, no cough, and no shortness of breath.  · GASTROINTESTINAL: no abdominal pain, no bloating, no constipation, no diarrhea, no nausea and no vomiting.  · MUSCULOSKELETAL: no back pain, no gout, no musculoskeletal pain, no neck pain, and no weakness.  · SKIN: no abrasions, no jaundice, no lesions, no pruritis, and no rashes.  · NEUROLOGICAL: - - -  · Neurological [-]: no change in level of consciousness, no difficulty walking/imbalance, no headache, no seizures        Medication list         MEDICATIONS  (STANDING):  chlorhexidine 0.12% Liquid 15 milliLiter(s) Oral Mucosa every 12 hours  furosemide   Injectable 80 milliGRAM(s) IV Push daily  influenza  Vaccine (HIGH DOSE) 0.7 milliLiter(s) IntraMuscular once  nitroglycerin  Infusion 200 MICROgram(s)/Min (60 mL/Hr) IV Continuous <Continuous>  propofol Infusion 20 MICROgram(s)/kG/Min (8.16 mL/Hr) IV Continuous <Continuous>    MEDICATIONS  (PRN):  fentaNYL    Injectable 25 MICROGram(s) IV Push every 1 hour PRN sedation         Vitals log        ICU Vital Signs Last 24 Hrs  T(C): 36.8 (03 Dec 2023 04:30), Max: 37.1 (02 Dec 2023 21:24)  T(F): 98.2 (03 Dec 2023 04:30), Max: 98.8 (02 Dec 2023 21:24)  HR: 67 (03 Dec 2023 04:47) (58 - 99)  BP: 122/58 (03 Dec 2023 04:30) (69/54 - 245/114)  BP(mean): 78 (03 Dec 2023 04:30) (59 - 164)  ABP: --  ABP(mean): --  RR: 22 (03 Dec 2023 04:30) (18 - 48)  SpO2: 99% (03 Dec 2023 04:47) (50% - 100%)    O2 Parameters below as of 03 Dec 2023 04:30  Patient On (Oxygen Delivery Method): ventilator    O2 Concentration (%): 40         Mode: AC/ CMV (Assist Control/ Continuous Mandatory Ventilation)  RR (machine): 24  TV (machine): 400  FiO2: 40  PEEP: 10  ITime: 1  MAP: 14  PIP: 27      Input and Output:  I&O's Detail    02 Dec 2023 07:01  -  03 Dec 2023 05:00  --------------------------------------------------------  IN:    Propofol: 48.9 mL  Total IN: 48.9 mL    OUT:    Ureteral Catheter (mL): 1070 mL  Total OUT: 1070 mL    Total NET: -1021.1 mL          Lab Data                        14.0   10.45 )-----------( 310      ( 02 Dec 2023 19:26 )             46.2     12-02    135  |  101  |  24<H>  ----------------------------<  121<H>  4.5   |  26  |  0.84    Ca    9.1      02 Dec 2023 19:26    TPro  7.9  /  Alb  3.5  /  TBili  0.2  /  DBili  x   /  AST  26  /  ALT  25  /  AlkPhos  105  12-02    ABG - ( 02 Dec 2023 23:37 )  pH, Arterial: 7.34  pH, Blood: x     /  pCO2: 50    /  pO2: 282   / HCO3: 27    / Base Excess: 1.2   /  SaO2: 99.9                    Review of Systems	  resp failure      Objective     Physical Examination    heart 1s2  lung dc BS  head nc      Pertinent Lab findings & Imaging      Christiane:  NO   Adequate UO     I&O's Detail    02 Dec 2023 07:01  -  03 Dec 2023 05:00  --------------------------------------------------------  IN:    Propofol: 48.9 mL  Total IN: 48.9 mL    OUT:    Ureteral Catheter (mL): 1070 mL  Total OUT: 1070 mL    Total NET: -1021.1 mL               Discussed with:     Cultures:	        Radiology    ACC: [id number] EXAM:  CT ANGIO BRAIN (W)AW IC   ORDERED BY: MAGDY NESS     ACC: [id number] EXAM:  CT ANGIO NECK (W)AW IC   ORDERED BY: MAGDY NESS     ACC: [id number] EXAM:  CT BRAIN   ORDERED BY: MAGDY NESS     PROCEDURE DATE:  12/02/2023          INTERPRETATION:  CLINICAL INFORMATION: Syncope.    COMPARISON: CT head from 04/06/2011.    CONTRAST/COMPLICATIONS:  IV Contrast: Omnipaque 350 (accession [id number]), IV contrast documented   in unlinked concurrent exam (accession [id number]), Omnipaque 350   (accession [id number])  90 cc administered   10 cc discarded  Complications: None reported at time of study completion    TECHNIQUE:  1.  Noncontrast head CT scan was performed; sagittal and coronal   reformats were generated.  2.  Contrast enhanced CT angiography of the neck and head was performed.     MIP reformats were generated.      FINDINGS:  NONCONTRAST HEAD CT SCAN:  There is no CT evidence of acute intracranial hemorrhage, mass effect or   midline shift.  Gray matter-white matter differentiation is grossly   preserved.    There is moderate generalized cerebral volume loss and moderate confluent   periventricular white matter hypoattenuation compatible with chronic   microvascular ischemic disease.    An empty sella is incidentally noted.    The paranasal sinuses and mastoids are clear.    The patient is status post intraocular lens replacement bilaterally.    The calvarium and skull base appear within normal limits.      CT ANGIOGRAPHY NECK:  Thoracic aorta and branch vessels: Three-vessel arch with moderate   atherosclerotic changes.  No occlusion, flow limiting stenosis or   dissection.    Right carotid system: There is calcified and noncalcified atherosclerotic   plaque at the distal common carotid artery, carotid bifurcation and   proximal right internal carotid artery.  There is a mild stenosis in the   proximal right internal carotid artery measuring approximately 40% using   NASCET criteria (minimal vessel caliber approximately 2.7 mm and normal   caliber distal ICA of approximately 4.5 mm).  No carotid occlusion or   dissection.    Left carotid system: There is mild calcified and noncalcified   atherosclerotic plaque throughout the left common carotid artery.  A mild   stenosis in the proximal left common carotid artery does not appear to be   flow-limiting.  There is moderate calcified and noncalcified   atherosclerotic plaque at the left carotid bifurcation, left carotid bulb   and proximal left internal carotid artery.  A minimal stenosis in the   proximal left internal carotid artery is approximately 20% using NASCET   criteria (minimal vessel caliber of approximately 4.1 mm and normal   caliber distal ICA of approximately 5.1 mm).    Vertebral arteries: There are multiple mild stenoses in the V2 and V3   segments of the left vertebral artery, and a moderate stenosis in the   left vertebral artery as it pierces the dura.  No significant right   vertebral artery stenosis.  No vertebral occlusion or dissection.  The   right vertebral artery is larger than the left.    Soft tissues of the neck: Diffusely heterogeneous thyroid, partially   obscured by artifact with multiple poorly visualized nodules measuring up   to at least 1.5 cm in the right thyroid lobe (3:109) and at least 1.4 cm   a left thyroid lobe (3:150).    Cervical spine: Straightening of the cervical lordosis.  Multilevel   degenerative changes.  Minimal anterolisthesis at C2-C3.  Small disc   osteophyte complexes at C3-C4, C4-C5, C5-C6 and C6-C7.  Minimal diastases   at C7-T1.  Diffuse mild cervical spinal canal stenosis greatest at C4-C5.    Diffuse moderate neural foraminal narrowing at C3-C4, C4-C5 and C5-C6   bilaterally.    Visualized upper chest:  Small pleural effusions are partially visualized   bilaterally.  There is ill-defined groundglass opacity and interlobular   septal thickening in the lung apices which may reflect pulmonary edema.    A right chest wall pacemaker battery is partially visualized.  A   retropectoral left breast implant is partially visualized.    CT ANGIOGRAPHY BRAIN:  Internal carotid arteries: There is noncalcified atherosclerotic plaque   in the skull base segment of left internal carotid artery with areas of   minimal luminal narrowing.  There is mild atherosclerotic calcification   in the cavernous segments of both internal carotid arteries and in the   proximal cervical segment right internal carotid artery.  No occlusion,   flow-limiting stenosis or aneurysm.    Anterior cerebral arteries: Multiple stenoses in the A4 and A5 segments   of the right anterior cerebral artery, without occlusion (4:31 and 6:59).    No occlusion, proximal stenosis or aneurysm.    Middle cerebral arteries: There is mild stenosis in the proximal M1   segment of the left middle cerebral artery followed by fusiform   dilatation of the vessel to 3.5 mm, followed by a short segment severe   stenosis in the distal left M1 segment, with distal flow (4:54 and 5:56).    There is long segment diffuse mild stenosis in the M1 segment of the   right middle cerebral artery which does not appear to be flow-limiting.    No occlusion or aneurysm.    Anterior communicating artery: Patent without aneurysm.  Right posterior communicating artery: Not visualized.  No aneurysm.  Left posterior communicating artery: Not visualized.  No aneurysm.    Posterior cerebral arteries: Patent bilaterally without stenosis or   aneurysm.    Vertebrobasilar: There is an approximately 6 mm long moderate to severe   stenosis in the distal aspect of the left vertebral artery, just proximal   to the vertebrobasilar junction.  There is a 3 mm long moderate stenosis   in the proximal portion of the left posterior inferior cerebellar artery   (4:70).  No stenosis in the intradural right vertebral artery.  Minimal   in the mid basilar artery which does not appear to be flow-limiting.  No   occlusion or aneurysm.  The distal right vertebral artery is dominant.    Posterior inferior cerebellar arteries, anterior inferior cerebellar   arteries and superior cerebellar arteries are patent bilaterally.    Venous sinuses: The superficial and deep venous systems are patent.    Brain: No abnormal early arterial phase enhancement on CTA images.  No   evidence of a large arteriovenous malformation.    IMPRESSION:  NONCONTRAST HEAD CT SCAN: No CT evidence of acute intracranial pathology.    CT ANGIOGRAPHY NECK:  1.  Mild stenosis in the proximal right internal carotid artery measures   approximately 40% using NASCET criteria.  2.  Minimal stenosis in the proximal left internal carotid artery   measures approximately 20% using NASCET criteria.  3.  Multiple mild stenoses in the V2 and V3 segments of the left   vertebral artery and a moderate stenosis in the left vertebral artery as   it pierces the dura.  4.  Heterogeneous thyroid with multiple nodules measuring up to at least   1.5 cm.  A nonemergent outpatient thyroid ultrasound may be obtained as   clinically warranted.  5.  Ill-defined groundglass opacity and interstitial or septal thickening   in the lung apices may reflect pulmonary edema.  Small pleural effusions   bilaterally.    CT ANGIOGRAPHY BRAIN:  1. No major vessel occlusion or aneurysm about the [place].  2. There is mild stenosis in the proximal M1 segment of the left middle   cerebral artery followed by fusiform dilatation of the vessel to 3.5 mm,   followed by a short segment severe stenosis in the distal left M1   segment, with distal flow.  3.  Diffuse long segment mild stenosis in the M1 segment of the right   middle cerebral artery.  4.  Multiple stenoses in the A4 and A5 segments of the right anterior   cerebral artery, without occlusion.  5.  Approximately 6 mm long moderate to severe stenosis in the distal   aspect of the left vertebral artery, just proximal to the vertebrobasilar   junction.  6.  Approximately 3 mm long moderate stenosis in the proximal left   posterior inferior cerebellar artery.  7. Minimal stenosis in the mid basilar artery, which does not appear to   be flow limiting.    --- End of Report ---            JODI WYNNE MD; Attending Radiologist  This document has been electronically signed. Dec  2 2023  9:23PM                           Date/Time Patient Seen:  		  Referring MD:   Data Reviewed	       Patient is a 88y old  Female who presents with a chief complaint of     Subjective/HPI   ief Complaint: see chief complaint quote.    · Chief Complaint: The patient is a 88y Female complaining of see chief complaint quote.  · HPI Objective Statement: 88-year-old female with history of hypertension pacemaker right hip replacement brought by ambulance from home for evaluation of episode of weakness and facial droop around 3 PM today.  Daughter who is at bedside states patient was in usual state of health and then was noted to spill water all over herself.  While trying to clean up spilled water patient became profoundly weak and seem to be speaking out of 1 side of her mouth.  Lasted for few seconds and then resolved.  Patient denies any symptoms but has no memory of difficulty with speech or facial droop.  Feels well at present.    Her PCP is Dr. Dunham    PAST MEDICAL & SURGICAL HISTORY:  Hyperlipidemia  no current medications    Breast cancer  2009 left - s/ping castellanos radical mastectomy    [place] (hard of hearing)  bilateral HA ([place])    Meniscus tear  left - 10/2015    Cyst of left ovary    SDH (subdural hematoma)  3/2011 - s/p fall - no surgical intervention    History of Left Mastectomy    Cataract extraction status of eye, right    Status post right cataract extraction     Past Medical, Past Surgical, and Family History:  PAST MEDICAL HISTORY:  Breast cancer 2009 left - s/p radical mastectomy    Cyst of left ovary     [place] (hard of hearing) bilateral HA (Yvette)    Hyperlipidemia no current medications    Meniscus tear left - 10/2015    SDH (subdural hematoma) 3/2011 - s/p fall - no surgical intervention.     PAST SURGICAL HISTORY:  Cataract extraction status of eye, right     History of Left Mastectomy     Status post right cataract extraction.     Tobacco Usage:  · Tobacco Usage	Never smoker    · Attestation Comment: I have reviewed and confirmed nurses' notes for patient's medications, allergies, medical history, and surgical history.    ALLERGIES AND HOME MEDICATIONS:   Allergies:        Allergies:  	penicillin: Drug, Rash    Home Medications:   * Patient Currently Takes Medications as of 19-Jan-2016 17:43 documented in Structured Notes  · 	Tylenol 325 mg oral tablet: 2 tab(s) orally every 6 hours, As Needed  · 	Motrin 600 mg oral tablet: 1 tab(s) orally every 6 hours, As Needed  · 	oxyCODONE 5 mg oral tablet: 1 tab(s) orally every 4 hours, As Needed    REVIEW OF SYSTEMS:    Review of Systems:  · CONSTITUTIONAL: no fever and no chills.  · EYES: no discharge, no irritation, no pain, no redness, and no visual changes.  · ENMT: Ears: no ear pain and no hearing problems. Nose: no nasal congestion and no nasal drainage. Mouth/Throat: no dysphagia, no hoarseness and no throat pain. Neck: no lumps, no pain, no stiffness and no swollen glands.  · CARDIOVASCULAR: no chest pain and no edema.  · RESPIRATORY: no chest pain, no cough, and no shortness of breath.  · GASTROINTESTINAL: no abdominal pain, no bloating, no constipation, no diarrhea, no nausea and no vomiting.  · MUSCULOSKELETAL: no back pain, no gout, no musculoskeletal pain, no neck pain, and no weakness.  · SKIN: no abrasions, no jaundice, no lesions, no pruritis, and no rashes.  · NEUROLOGICAL: - - -  · Neurological [-]: no change in level of consciousness, no difficulty walking/imbalance, no headache, no seizures        Medication list         MEDICATIONS  (STANDING):  chlorhexidine 0.12% Liquid 15 milliLiter(s) Oral Mucosa every 12 hours  furosemide   Injectable 80 milliGRAM(s) IV Push daily  influenza  Vaccine (HIGH DOSE) 0.7 milliLiter(s) IntraMuscular once  nitroglycerin  Infusion 200 MICROgram(s)/Min (60 mL/Hr) IV Continuous <Continuous>  propofol Infusion 20 MICROgram(s)/kG/Min (8.16 mL/Hr) IV Continuous <Continuous>    MEDICATIONS  (PRN):  fentaNYL    Injectable 25 MICROGram(s) IV Push every 1 hour PRN sedation         Vitals log        ICU Vital Signs Last 24 Hrs  T(C): 36.8 (03 Dec 2023 04:30), Max: 37.1 (02 Dec 2023 21:24)  T(F): 98.2 (03 Dec 2023 04:30), Max: 98.8 (02 Dec 2023 21:24)  HR: 67 (03 Dec 2023 04:47) (58 - 99)  BP: 122/58 (03 Dec 2023 04:30) (69/54 - 245/114)  BP(mean): 78 (03 Dec 2023 04:30) (59 - 164)  ABP: --  ABP(mean): --  RR: 22 (03 Dec 2023 04:30) (18 - 48)  SpO2: 99% (03 Dec 2023 04:47) (50% - 100%)    O2 Parameters below as of 03 Dec 2023 04:30  Patient On (Oxygen Delivery Method): ventilator    O2 Concentration (%): 40         Mode: AC/ CMV (Assist Control/ Continuous Mandatory Ventilation)  RR (machine): 24  TV (machine): 400  FiO2: 40  PEEP: 10  ITime: 1  MAP: 14  PIP: 27      Input and Output:  I&O's Detail    02 Dec 2023 07:01  -  03 Dec 2023 05:00  --------------------------------------------------------  IN:    Propofol: 48.9 mL  Total IN: 48.9 mL    OUT:    Ureteral Catheter (mL): 1070 mL  Total OUT: 1070 mL    Total NET: -1021.1 mL          Lab Data                        14.0   10.45 )-----------( 310      ( 02 Dec 2023 19:26 )             46.2     12-02    135  |  101  |  24<H>  ----------------------------<  121<H>  4.5   |  26  |  0.84    Ca    9.1      02 Dec 2023 19:26    TPro  7.9  /  Alb  3.5  /  TBili  0.2  /  DBili  x   /  AST  26  /  ALT  25  /  AlkPhos  105  12-02    ABG - ( 02 Dec 2023 23:37 )  pH, Arterial: 7.34  pH, Blood: x     /  pCO2: 50    /  pO2: 282   / HCO3: 27    / Base Excess: 1.2   /  SaO2: 99.9                    Review of Systems	  resp failure      Objective     Physical Examination    heart 1s2  lung dc BS  head nc      Pertinent Lab findings & Imaging      Christiane:  NO   Adequate UO     I&O's Detail    02 Dec 2023 07:01  -  03 Dec 2023 05:00  --------------------------------------------------------  IN:    Propofol: 48.9 mL  Total IN: 48.9 mL    OUT:    Ureteral Catheter (mL): 1070 mL  Total OUT: 1070 mL    Total NET: -1021.1 mL               Discussed with:     Cultures:	        Radiology    ACC: [id number] EXAM:  CT ANGIO BRAIN (W)AW IC   ORDERED BY: MAGDY NESS     ACC: [id number] EXAM:  CT ANGIO NECK (W)AW IC   ORDERED BY: MAGDY NESS     ACC: [id number] EXAM:  CT BRAIN   ORDERED BY: MAGDY NESS     PROCEDURE DATE:  12/02/2023          INTERPRETATION:  CLINICAL INFORMATION: Syncope.    COMPARISON: CT head from 04/06/2011.    CONTRAST/COMPLICATIONS:  IV Contrast: Omnipaque 350 (accession [id number]), IV contrast documented   in unlinked concurrent exam (accession [id number]), Omnipaque 350   (accession [id number])  90 cc administered   10 cc discarded  Complications: None reported at time of study completion    TECHNIQUE:  1.  Noncontrast head CT scan was performed; sagittal and coronal   reformats were generated.  2.  Contrast enhanced CT angiography of the neck and head was performed.     MIP reformats were generated.      FINDINGS:  NONCONTRAST HEAD CT SCAN:  There is no CT evidence of acute intracranial hemorrhage, mass effect or   midline shift.  Gray matter-white matter differentiation is grossly   preserved.    There is moderate generalized cerebral volume loss and moderate confluent   periventricular white matter hypoattenuation compatible with chronic   microvascular ischemic disease.    An empty sella is incidentally noted.    The paranasal sinuses and mastoids are clear.    The patient is status post intraocular lens replacement bilaterally.    The calvarium and skull base appear within normal limits.      CT ANGIOGRAPHY NECK:  Thoracic aorta and branch vessels: Three-vessel arch with moderate   atherosclerotic changes.  No occlusion, flow limiting stenosis or   dissection.    Right carotid system: There is calcified and noncalcified atherosclerotic   plaque at the distal common carotid artery, carotid bifurcation and   proximal right internal carotid artery.  There is a mild stenosis in the   proximal right internal carotid artery measuring approximately 40% using   NASCET criteria (minimal vessel caliber approximately 2.7 mm and normal   caliber distal ICA of approximately 4.5 mm).  No carotid occlusion or   dissection.    Left carotid system: There is mild calcified and noncalcified   atherosclerotic plaque throughout the left common carotid artery.  A mild   stenosis in the proximal left common carotid artery does not appear to be   flow-limiting.  There is moderate calcified and noncalcified   atherosclerotic plaque at the left carotid bifurcation, left carotid bulb   and proximal left internal carotid artery.  A minimal stenosis in the   proximal left internal carotid artery is approximately 20% using NASCET   criteria (minimal vessel caliber of approximately 4.1 mm and normal   caliber distal ICA of approximately 5.1 mm).    Vertebral arteries: There are multiple mild stenoses in the V2 and V3   segments of the left vertebral artery, and a moderate stenosis in the   left vertebral artery as it pierces the dura.  No significant right   vertebral artery stenosis.  No vertebral occlusion or dissection.  The   right vertebral artery is larger than the left.    Soft tissues of the neck: Diffusely heterogeneous thyroid, partially   obscured by artifact with multiple poorly visualized nodules measuring up   to at least 1.5 cm in the right thyroid lobe (3:109) and at least 1.4 cm   a left thyroid lobe (3:150).    Cervical spine: Straightening of the cervical lordosis.  Multilevel   degenerative changes.  Minimal anterolisthesis at C2-C3.  Small disc   osteophyte complexes at C3-C4, C4-C5, C5-C6 and C6-C7.  Minimal diastases   at C7-T1.  Diffuse mild cervical spinal canal stenosis greatest at C4-C5.    Diffuse moderate neural foraminal narrowing at C3-C4, C4-C5 and C5-C6   bilaterally.    Visualized upper chest:  Small pleural effusions are partially visualized   bilaterally.  There is ill-defined groundglass opacity and interlobular   septal thickening in the lung apices which may reflect pulmonary edema.    A right chest wall pacemaker battery is partially visualized.  A   retropectoral left breast implant is partially visualized.    CT ANGIOGRAPHY BRAIN:  Internal carotid arteries: There is noncalcified atherosclerotic plaque   in the skull base segment of left internal carotid artery with areas of   minimal luminal narrowing.  There is mild atherosclerotic calcification   in the cavernous segments of both internal carotid arteries and in the   proximal cervical segment right internal carotid artery.  No occlusion,   flow-limiting stenosis or aneurysm.    Anterior cerebral arteries: Multiple stenoses in the A4 and A5 segments   of the right anterior cerebral artery, without occlusion (4:31 and 6:59).    No occlusion, proximal stenosis or aneurysm.    Middle cerebral arteries: There is mild stenosis in the proximal M1   segment of the left middle cerebral artery followed by fusiform   dilatation of the vessel to 3.5 mm, followed by a short segment severe   stenosis in the distal left M1 segment, with distal flow (4:54 and 5:56).    There is long segment diffuse mild stenosis in the M1 segment of the   right middle cerebral artery which does not appear to be flow-limiting.    No occlusion or aneurysm.    Anterior communicating artery: Patent without aneurysm.  Right posterior communicating artery: Not visualized.  No aneurysm.  Left posterior communicating artery: Not visualized.  No aneurysm.    Posterior cerebral arteries: Patent bilaterally without stenosis or   aneurysm.    Vertebrobasilar: There is an approximately 6 mm long moderate to severe   stenosis in the distal aspect of the left vertebral artery, just proximal   to the vertebrobasilar junction.  There is a 3 mm long moderate stenosis   in the proximal portion of the left posterior inferior cerebellar artery   (4:70).  No stenosis in the intradural right vertebral artery.  Minimal   in the mid basilar artery which does not appear to be flow-limiting.  No   occlusion or aneurysm.  The distal right vertebral artery is dominant.    Posterior inferior cerebellar arteries, anterior inferior cerebellar   arteries and superior cerebellar arteries are patent bilaterally.    Venous sinuses: The superficial and deep venous systems are patent.    Brain: No abnormal early arterial phase enhancement on CTA images.  No   evidence of a large arteriovenous malformation.    IMPRESSION:  NONCONTRAST HEAD CT SCAN: No CT evidence of acute intracranial pathology.    CT ANGIOGRAPHY NECK:  1.  Mild stenosis in the proximal right internal carotid artery measures   approximately 40% using NASCET criteria.  2.  Minimal stenosis in the proximal left internal carotid artery   measures approximately 20% using NASCET criteria.  3.  Multiple mild stenoses in the V2 and V3 segments of the left   vertebral artery and a moderate stenosis in the left vertebral artery as   it pierces the dura.  4.  Heterogeneous thyroid with multiple nodules measuring up to at least   1.5 cm.  A nonemergent outpatient thyroid ultrasound may be obtained as   clinically warranted.  5.  Ill-defined groundglass opacity and interstitial or septal thickening   in the lung apices may reflect pulmonary edema.  Small pleural effusions   bilaterally.    CT ANGIOGRAPHY BRAIN:  1. No major vessel occlusion or aneurysm about the [place].  2. There is mild stenosis in the proximal M1 segment of the left middle   cerebral artery followed by fusiform dilatation of the vessel to 3.5 mm,   followed by a short segment severe stenosis in the distal left M1   segment, with distal flow.  3.  Diffuse long segment mild stenosis in the M1 segment of the right   middle cerebral artery.  4.  Multiple stenoses in the A4 and A5 segments of the right anterior   cerebral artery, without occlusion.  5.  Approximately 6 mm long moderate to severe stenosis in the distal   aspect of the left vertebral artery, just proximal to the vertebrobasilar   junction.  6.  Approximately 3 mm long moderate stenosis in the proximal left   posterior inferior cerebellar artery.  7. Minimal stenosis in the mid basilar artery, which does not appear to   be flow limiting.    --- End of Report ---            JODI WYNNE MD; Attending Radiologist  This document has been electronically signed. Dec  2 2023  9:23PM                           Date/Time Patient Seen:  		  Referring MD:   Data Reviewed	       Patient is a 88y old  Female who presents with a chief complaint of     Subjective/HPI   ief Complaint: see chief complaint quote.    · Chief Complaint: The patient is a 88y Female complaining of see chief complaint quote.  · HPI Objective Statement: 88-year-old female with history of hypertension pacemaker right hip replacement brought by ambulance from home for evaluation of episode of weakness and facial droop around 3 PM today.  Daughter who is at bedside states patient was in usual state of health and then was noted to spill water all over herself.  While trying to clean up spilled water patient became profoundly weak and seem to be speaking out of 1 side of her mouth.  Lasted for few seconds and then resolved.  Patient denies any symptoms but has no memory of difficulty with speech or facial droop.  Feels well at present.    Her PCP is Dr. Dunham    PAST MEDICAL & SURGICAL HISTORY:  Hyperlipidemia  no current medications    Breast cancer  2009 left - s/ping castellanos radical mastectomy    [place] (hard of hearing)  bilateral HA ([place]vette)    Meniscus tear  left - 10/2015    Cyst of left ovary    SDH (subdural hematoma)  3/2011 - s/p fall - no surgical intervention    History of Left Mastectomy    Cataract extraction status of eye, right    Status post right cataract extraction     Past Medical, Past Surgical, and Family History:  PAST MEDICAL HISTORY:  Breast cancer 2009 left - s/p radical mastectomy    Cyst of left ovary     [place] (hard of hearing) bilateral HA ([place])    Hyperlipidemia no current medications    Meniscus tear left - 10/2015    SDH (subdural hematoma) 3/2011 - s/p fall - no surgical intervention.     PAST SURGICAL HISTORY:  Cataract extraction status of eye, right     History of Left Mastectomy     Status post right cataract extraction.     Tobacco Usage:  · Tobacco Usage	Never smoker    · Attestation Comment: I have reviewed and confirmed nurses' notes for patient's medications, allergies, medical history, and surgical history.    ALLERGIES AND HOME MEDICATIONS:   Allergies:        Allergies:  	penicillin: Drug, Rash    Home Medications:   * Patient Currently Takes Medications as of 19-Jan-2016 17:43 documented in Structured Notes  · 	Tylenol 325 mg oral tablet: 2 tab(s) orally every 6 hours, As Needed  · 	Motrin 600 mg oral tablet: 1 tab(s) orally every 6 hours, As Needed  · 	oxyCODONE 5 mg oral tablet: 1 tab(s) orally every 4 hours, As Needed    REVIEW OF SYSTEMS:    Review of Systems:  · CONSTITUTIONAL: no fever and no chills.  · EYES: no discharge, no irritation, no pain, no redness, and no visual changes.  · ENMT: Ears: no ear pain and no hearing problems. Nose: no nasal congestion and no nasal drainage. Mouth/Throat: no dysphagia, no hoarseness and no throat pain. Neck: no lumps, no pain, no stiffness and no swollen glands.  · CARDIOVASCULAR: no chest pain and no edema.  · RESPIRATORY: no chest pain, no cough, and no shortness of breath.  · GASTROINTESTINAL: no abdominal pain, no bloating, no constipation, no diarrhea, no nausea and no vomiting.  · MUSCULOSKELETAL: no back pain, no gout, no musculoskeletal pain, no neck pain, and no weakness.  · SKIN: no abrasions, no jaundice, no lesions, no pruritis, and no rashes.  · NEUROLOGICAL: - - -  · Neurological [-]: no change in level of consciousness, no difficulty walking/imbalance, no headache, no seizures        Medication list         MEDICATIONS  (STANDING):  chlorhexidine 0.12% Liquid 15 milliLiter(s) Oral Mucosa every 12 hours  furosemide   Injectable 80 milliGRAM(s) IV Push daily  influenza  Vaccine (HIGH DOSE) 0.7 milliLiter(s) IntraMuscular once  nitroglycerin  Infusion 200 MICROgram(s)/Min (60 mL/Hr) IV Continuous <Continuous>  propofol Infusion 20 MICROgram(s)/kG/Min (8.16 mL/Hr) IV Continuous <Continuous>    MEDICATIONS  (PRN):  fentaNYL    Injectable 25 MICROGram(s) IV Push every 1 hour PRN sedation         Vitals log        ICU Vital Signs Last 24 Hrs  T(C): 36.8 (03 Dec 2023 04:30), Max: 37.1 (02 Dec 2023 21:24)  T(F): 98.2 (03 Dec 2023 04:30), Max: 98.8 (02 Dec 2023 21:24)  HR: 67 (03 Dec 2023 04:47) (58 - 99)  BP: 122/58 (03 Dec 2023 04:30) (69/54 - 245/114)  BP(mean): 78 (03 Dec 2023 04:30) (59 - 164)  ABP: --  ABP(mean): --  RR: 22 (03 Dec 2023 04:30) (18 - 48)  SpO2: 99% (03 Dec 2023 04:47) (50% - 100%)    O2 Parameters below as of 03 Dec 2023 04:30  Patient On (Oxygen Delivery Method): ventilator    O2 Concentration (%): 40         Mode: AC/ CMV (Assist Control/ Continuous Mandatory Ventilation)  RR (machine): 24  TV (machine): 400  FiO2: 40  PEEP: 10  ITime: 1  MAP: 14  PIP: 27      Input and Output:  I&O's Detail    02 Dec 2023 07:01  -  03 Dec 2023 05:00  --------------------------------------------------------  IN:    Propofol: 48.9 mL  Total IN: 48.9 mL    OUT:    Ureteral Catheter (mL): 1070 mL  Total OUT: 1070 mL    Total NET: -1021.1 mL          Lab Data                        14.0   10.45 )-----------( 310      ( 02 Dec 2023 19:26 )             46.2     12-02    135  |  101  |  24<H>  ----------------------------<  121<H>  4.5   |  26  |  0.84    Ca    9.1      02 Dec 2023 19:26    TPro  7.9  /  Alb  3.5  /  TBili  0.2  /  DBili  x   /  AST  26  /  ALT  25  /  AlkPhos  105  12-02    ABG - ( 02 Dec 2023 23:37 )  pH, Arterial: 7.34  pH, Blood: x     /  pCO2: 50    /  pO2: 282   / HCO3: 27    / Base Excess: 1.2   /  SaO2: 99.9                    Review of Systems	  resp failure      Objective     Physical Examination    heart 1s2  lung dc BS  head nc      Pertinent Lab findings & Imaging      Christiane:  NO   Adequate UO     I&O's Detail    02 Dec 2023 07:01  -  03 Dec 2023 05:00  --------------------------------------------------------  IN:    Propofol: 48.9 mL  Total IN: 48.9 mL    OUT:    Ureteral Catheter (mL): 1070 mL  Total OUT: 1070 mL    Total NET: -1021.1 mL               Discussed with:     Cultures:	        Radiology    ACC: [id number] EXAM:  CT ANGIO BRAIN (W)AW IC   ORDERED BY: AMGDY NESS     ACC: [id number] EXAM:  CT ANGIO NECK (W)AW IC   ORDERED BY: MAGDY NESS     ACC: [id number] EXAM:  CT BRAIN   ORDERED BY: MAGDY NESS     PROCEDURE DATE:  12/02/2023          INTERPRETATION:  CLINICAL INFORMATION: Syncope.    COMPARISON: CT head from 04/06/2011.    CONTRAST/COMPLICATIONS:  IV Contrast: Omnipaque 350 (accession [id number]), IV contrast documented   in unlinked concurrent exam (accession [id number]), Omnipaque 350   (accession [id number])  90 cc administered   10 cc discarded  Complications: None reported at time of study completion    TECHNIQUE:  1.  Noncontrast head CT scan was performed; sagittal and coronal   reformats were generated.  2.  Contrast enhanced CT angiography of the neck and head was performed.     MIP reformats were generated.      FINDINGS:  NONCONTRAST HEAD CT SCAN:  There is no CT evidence of acute intracranial hemorrhage, mass effect or   midline shift.  Gray matter-white matter differentiation is grossly   preserved.    There is moderate generalized cerebral volume loss and moderate confluent   periventricular white matter hypoattenuation compatible with chronic   microvascular ischemic disease.    An empty sella is incidentally noted.    The paranasal sinuses and mastoids are clear.    The patient is status post intraocular lens replacement bilaterally.    The calvarium and skull base appear within normal limits.      CT ANGIOGRAPHY NECK:  Thoracic aorta and branch vessels: Three-vessel arch with moderate   atherosclerotic changes.  No occlusion, flow limiting stenosis or   dissection.    Right carotid system: There is calcified and noncalcified atherosclerotic   plaque at the distal common carotid artery, carotid bifurcation and   proximal right internal carotid artery.  There is a mild stenosis in the   proximal right internal carotid artery measuring approximately 40% using   NASCET criteria (minimal vessel caliber approximately 2.7 mm and normal   caliber distal ICA of approximately 4.5 mm).  No carotid occlusion or   dissection.    Left carotid system: There is mild calcified and noncalcified   atherosclerotic plaque throughout the left common carotid artery.  A mild   stenosis in the proximal left common carotid artery does not appear to be   flow-limiting.  There is moderate calcified and noncalcified   atherosclerotic plaque at the left carotid bifurcation, left carotid bulb   and proximal left internal carotid artery.  A minimal stenosis in the   proximal left internal carotid artery is approximately 20% using NASCET   criteria (minimal vessel caliber of approximately 4.1 mm and normal   caliber distal ICA of approximately 5.1 mm).    Vertebral arteries: There are multiple mild stenoses in the V2 and V3   segments of the left vertebral artery, and a moderate stenosis in the   left vertebral artery as it pierces the dura.  No significant right   vertebral artery stenosis.  No vertebral occlusion or dissection.  The   right vertebral artery is larger than the left.    Soft tissues of the neck: Diffusely heterogeneous thyroid, partially   obscured by artifact with multiple poorly visualized nodules measuring up   to at least 1.5 cm in the right thyroid lobe (3:109) and at least 1.4 cm   a left thyroid lobe (3:150).    Cervical spine: Straightening of the cervical lordosis.  Multilevel   degenerative changes.  Minimal anterolisthesis at C2-C3.  Small disc   osteophyte complexes at C3-C4, C4-C5, C5-C6 and C6-C7.  Minimal diastases   at C7-T1.  Diffuse mild cervical spinal canal stenosis greatest at C4-C5.    Diffuse moderate neural foraminal narrowing at C3-C4, C4-C5 and C5-C6   bilaterally.    Visualized upper chest:  Small pleural effusions are partially visualized   bilaterally.  There is ill-defined groundglass opacity and interlobular   septal thickening in the lung apices which may reflect pulmonary edema.    A right chest wall pacemaker battery is partially visualized.  A   retropectoral left breast implant is partially visualized.    CT ANGIOGRAPHY BRAIN:  Internal carotid arteries: There is noncalcified atherosclerotic plaque   in the skull base segment of left internal carotid artery with areas of   minimal luminal narrowing.  There is mild atherosclerotic calcification   in the cavernous segments of both internal carotid arteries and in the   proximal cervical segment right internal carotid artery.  No occlusion,   flow-limiting stenosis or aneurysm.    Anterior cerebral arteries: Multiple stenoses in the A4 and A5 segments   of the right anterior cerebral artery, without occlusion (4:31 and 6:59).    No occlusion, proximal stenosis or aneurysm.    Middle cerebral arteries: There is mild stenosis in the proximal M1   segment of the left middle cerebral artery followed by fusiform   dilatation of the vessel to 3.5 mm, followed by a short segment severe   stenosis in the distal left M1 segment, with distal flow (4:54 and 5:56).    There is long segment diffuse mild stenosis in the M1 segment of the   right middle cerebral artery which does not appear to be flow-limiting.    No occlusion or aneurysm.    Anterior communicating artery: Patent without aneurysm.  Right posterior communicating artery: Not visualized.  No aneurysm.  Left posterior communicating artery: Not visualized.  No aneurysm.    Posterior cerebral arteries: Patent bilaterally without stenosis or   aneurysm.    Vertebrobasilar: There is an approximately 6 mm long moderate to severe   stenosis in the distal aspect of the left vertebral artery, just proximal   to the vertebrobasilar junction.  There is a 3 mm long moderate stenosis   in the proximal portion of the left posterior inferior cerebellar artery   (4:70).  No stenosis in the intradural right vertebral artery.  Minimal   in the mid basilar artery which does not appear to be flow-limiting.  No   occlusion or aneurysm.  The distal right vertebral artery is dominant.    Posterior inferior cerebellar arteries, anterior inferior cerebellar   arteries and superior cerebellar arteries are patent bilaterally.    Venous sinuses: The superficial and deep venous systems are patent.    Brain: No abnormal early arterial phase enhancement on CTA images.  No   evidence of a large arteriovenous malformation.    IMPRESSION:  NONCONTRAST HEAD CT SCAN: No CT evidence of acute intracranial pathology.    CT ANGIOGRAPHY NECK:  1.  Mild stenosis in the proximal right internal carotid artery measures   approximately 40% using NASCET criteria.  2.  Minimal stenosis in the proximal left internal carotid artery   measures approximately 20% using NASCET criteria.  3.  Multiple mild stenoses in the V2 and V3 segments of the left   vertebral artery and a moderate stenosis in the left vertebral artery as   it pierces the dura.  4.  Heterogeneous thyroid with multiple nodules measuring up to at least   1.5 cm.  A nonemergent outpatient thyroid ultrasound may be obtained as   clinically warranted.  5.  Ill-defined groundglass opacity and interstitial or septal thickening   in the lung apices may reflect pulmonary edema.  Small pleural effusions   bilaterally.    CT ANGIOGRAPHY BRAIN:  1. No major vessel occlusion or aneurysm about the [place].  2. There is mild stenosis in the proximal M1 segment of the left middle   cerebral artery followed by fusiform dilatation of the vessel to 3.5 mm,   followed by a short segment severe stenosis in the distal left M1   segment, with distal flow.  3.  Diffuse long segment mild stenosis in the M1 segment of the right   middle cerebral artery.  4.  Multiple stenoses in the A4 and A5 segments of the right anterior   cerebral artery, without occlusion.  5.  Approximately 6 mm long moderate to severe stenosis in the distal   aspect of the left vertebral artery, just proximal to the vertebrobasilar   junction.  6.  Approximately 3 mm long moderate stenosis in the proximal left   posterior inferior cerebellar artery.  7. Minimal stenosis in the mid basilar artery, which does not appear to   be flow limiting.    --- End of Report ---            JODI WYNNE MD; Attending Radiologist  This document has been electronically signed. Dec  2 2023  9:23PM

## 2023-12-04 ENCOUNTER — TRANSCRIPTION ENCOUNTER (OUTPATIENT)
Age: 88
End: 2023-12-04

## 2023-12-04 ENCOUNTER — NON-APPOINTMENT (OUTPATIENT)
Age: 88
End: 2023-12-04

## 2023-12-04 LAB
A1C WITH ESTIMATED AVERAGE GLUCOSE RESULT: 5.4 % — SIGNIFICANT CHANGE UP (ref 4–5.6)
A1C WITH ESTIMATED AVERAGE GLUCOSE RESULT: 5.4 % — SIGNIFICANT CHANGE UP (ref 4–5.6)
ANION GAP SERPL CALC-SCNC: 8 MMOL/L — SIGNIFICANT CHANGE UP (ref 5–17)
ANION GAP SERPL CALC-SCNC: 8 MMOL/L — SIGNIFICANT CHANGE UP (ref 5–17)
BASE EXCESS BLDA CALC-SCNC: 4.2 MMOL/L — HIGH (ref -2–3)
BASE EXCESS BLDA CALC-SCNC: 4.2 MMOL/L — HIGH (ref -2–3)
BLOOD GAS COMMENTS ARTERIAL: SIGNIFICANT CHANGE UP
BLOOD GAS COMMENTS ARTERIAL: SIGNIFICANT CHANGE UP
BUN SERPL-MCNC: 40 MG/DL — HIGH (ref 7–23)
BUN SERPL-MCNC: 40 MG/DL — HIGH (ref 7–23)
CALCIUM SERPL-MCNC: 8.7 MG/DL — SIGNIFICANT CHANGE UP (ref 8.4–10.5)
CALCIUM SERPL-MCNC: 8.7 MG/DL — SIGNIFICANT CHANGE UP (ref 8.4–10.5)
CHLORIDE SERPL-SCNC: 105 MMOL/L — SIGNIFICANT CHANGE UP (ref 96–108)
CHLORIDE SERPL-SCNC: 105 MMOL/L — SIGNIFICANT CHANGE UP (ref 96–108)
CHOLEST SERPL-MCNC: 213 MG/DL — HIGH
CHOLEST SERPL-MCNC: 213 MG/DL — HIGH
CO2 SERPL-SCNC: 26 MMOL/L — SIGNIFICANT CHANGE UP (ref 22–31)
CO2 SERPL-SCNC: 26 MMOL/L — SIGNIFICANT CHANGE UP (ref 22–31)
CREAT SERPL-MCNC: 1.05 MG/DL — SIGNIFICANT CHANGE UP (ref 0.5–1.3)
CREAT SERPL-MCNC: 1.05 MG/DL — SIGNIFICANT CHANGE UP (ref 0.5–1.3)
EGFR: 51 ML/MIN/1.73M2 — LOW
EGFR: 51 ML/MIN/1.73M2 — LOW
ESTIMATED AVERAGE GLUCOSE: 108 MG/DL — SIGNIFICANT CHANGE UP (ref 68–114)
ESTIMATED AVERAGE GLUCOSE: 108 MG/DL — SIGNIFICANT CHANGE UP (ref 68–114)
GAS PNL BLDA: SIGNIFICANT CHANGE UP
GAS PNL BLDA: SIGNIFICANT CHANGE UP
GLUCOSE SERPL-MCNC: 118 MG/DL — HIGH (ref 70–99)
GLUCOSE SERPL-MCNC: 118 MG/DL — HIGH (ref 70–99)
HCO3 BLDA-SCNC: 28 MMOL/L — SIGNIFICANT CHANGE UP (ref 21–28)
HCO3 BLDA-SCNC: 28 MMOL/L — SIGNIFICANT CHANGE UP (ref 21–28)
HCT VFR BLD CALC: 33.5 % — LOW (ref 34.5–45)
HCT VFR BLD CALC: 33.5 % — LOW (ref 34.5–45)
HDLC SERPL-MCNC: 68 MG/DL — SIGNIFICANT CHANGE UP
HDLC SERPL-MCNC: 68 MG/DL — SIGNIFICANT CHANGE UP
HGB BLD-MCNC: 10.3 G/DL — LOW (ref 11.5–15.5)
HGB BLD-MCNC: 10.3 G/DL — LOW (ref 11.5–15.5)
HOROWITZ INDEX BLDA+IHG-RTO: 40 — SIGNIFICANT CHANGE UP
HOROWITZ INDEX BLDA+IHG-RTO: 40 — SIGNIFICANT CHANGE UP
LIPID PNL WITH DIRECT LDL SERPL: 125 MG/DL — HIGH
LIPID PNL WITH DIRECT LDL SERPL: 125 MG/DL — HIGH
MCHC RBC-ENTMCNC: 25.4 PG — LOW (ref 27–34)
MCHC RBC-ENTMCNC: 25.4 PG — LOW (ref 27–34)
MCHC RBC-ENTMCNC: 30.7 GM/DL — LOW (ref 32–36)
MCHC RBC-ENTMCNC: 30.7 GM/DL — LOW (ref 32–36)
MCV RBC AUTO: 82.7 FL — SIGNIFICANT CHANGE UP (ref 80–100)
MCV RBC AUTO: 82.7 FL — SIGNIFICANT CHANGE UP (ref 80–100)
NON HDL CHOLESTEROL: 144 MG/DL — HIGH
NON HDL CHOLESTEROL: 144 MG/DL — HIGH
NRBC # BLD: 0 /100 WBCS — SIGNIFICANT CHANGE UP (ref 0–0)
NRBC # BLD: 0 /100 WBCS — SIGNIFICANT CHANGE UP (ref 0–0)
PCO2 BLDA: 43 MMHG — HIGH (ref 32–35)
PCO2 BLDA: 43 MMHG — HIGH (ref 32–35)
PH BLDA: 7.43 — SIGNIFICANT CHANGE UP (ref 7.35–7.45)
PH BLDA: 7.43 — SIGNIFICANT CHANGE UP (ref 7.35–7.45)
PLATELET # BLD AUTO: 200 K/UL — SIGNIFICANT CHANGE UP (ref 150–400)
PLATELET # BLD AUTO: 200 K/UL — SIGNIFICANT CHANGE UP (ref 150–400)
PO2 BLDA: 112 MMHG — HIGH (ref 83–108)
PO2 BLDA: 112 MMHG — HIGH (ref 83–108)
POTASSIUM SERPL-MCNC: 4 MMOL/L — SIGNIFICANT CHANGE UP (ref 3.5–5.3)
POTASSIUM SERPL-MCNC: 4 MMOL/L — SIGNIFICANT CHANGE UP (ref 3.5–5.3)
POTASSIUM SERPL-SCNC: 4 MMOL/L — SIGNIFICANT CHANGE UP (ref 3.5–5.3)
POTASSIUM SERPL-SCNC: 4 MMOL/L — SIGNIFICANT CHANGE UP (ref 3.5–5.3)
RBC # BLD: 4.05 M/UL — SIGNIFICANT CHANGE UP (ref 3.8–5.2)
RBC # BLD: 4.05 M/UL — SIGNIFICANT CHANGE UP (ref 3.8–5.2)
RBC # FLD: 17.9 % — HIGH (ref 10.3–14.5)
RBC # FLD: 17.9 % — HIGH (ref 10.3–14.5)
SAO2 % BLDA: 99.4 % — HIGH (ref 94–98)
SAO2 % BLDA: 99.4 % — HIGH (ref 94–98)
SODIUM SERPL-SCNC: 139 MMOL/L — SIGNIFICANT CHANGE UP (ref 135–145)
SODIUM SERPL-SCNC: 139 MMOL/L — SIGNIFICANT CHANGE UP (ref 135–145)
TRIGL SERPL-MCNC: 108 MG/DL — SIGNIFICANT CHANGE UP
TRIGL SERPL-MCNC: 108 MG/DL — SIGNIFICANT CHANGE UP
WBC # BLD: 7.68 K/UL — SIGNIFICANT CHANGE UP (ref 3.8–10.5)
WBC # BLD: 7.68 K/UL — SIGNIFICANT CHANGE UP (ref 3.8–10.5)
WBC # FLD AUTO: 7.68 K/UL — SIGNIFICANT CHANGE UP (ref 3.8–10.5)
WBC # FLD AUTO: 7.68 K/UL — SIGNIFICANT CHANGE UP (ref 3.8–10.5)

## 2023-12-04 PROCEDURE — 70450 CT HEAD/BRAIN W/O DYE: CPT | Mod: 26

## 2023-12-04 PROCEDURE — 99233 SBSQ HOSP IP/OBS HIGH 50: CPT

## 2023-12-04 RX ORDER — ASPIRIN/CALCIUM CARB/MAGNESIUM 324 MG
300 TABLET ORAL DAILY
Refills: 0 | Status: DISCONTINUED | OUTPATIENT
Start: 2023-12-04 | End: 2023-12-05

## 2023-12-04 RX ORDER — ACETAMINOPHEN 500 MG
1000 TABLET ORAL ONCE
Refills: 0 | Status: COMPLETED | OUTPATIENT
Start: 2023-12-04 | End: 2023-12-04

## 2023-12-04 RX ORDER — HYDRALAZINE HCL 50 MG
10 TABLET ORAL EVERY 8 HOURS
Refills: 0 | Status: DISCONTINUED | OUTPATIENT
Start: 2023-12-04 | End: 2023-12-05

## 2023-12-04 RX ORDER — ATORVASTATIN CALCIUM 80 MG/1
80 TABLET, FILM COATED ORAL AT BEDTIME
Refills: 0 | Status: DISCONTINUED | OUTPATIENT
Start: 2023-12-04 | End: 2023-12-08

## 2023-12-04 RX ADMIN — Medication 10 MILLIGRAM(S): at 09:42

## 2023-12-04 RX ADMIN — Medication 300 MILLIGRAM(S): at 13:43

## 2023-12-04 RX ADMIN — Medication 400 MILLIGRAM(S): at 20:42

## 2023-12-04 RX ADMIN — Medication 650 MILLIGRAM(S): at 00:22

## 2023-12-04 RX ADMIN — HEPARIN SODIUM 5000 UNIT(S): 5000 INJECTION INTRAVENOUS; SUBCUTANEOUS at 05:54

## 2023-12-04 RX ADMIN — FENTANYL CITRATE 25 MICROGRAM(S): 50 INJECTION INTRAVENOUS at 00:22

## 2023-12-04 RX ADMIN — CHLORHEXIDINE GLUCONATE 15 MILLILITER(S): 213 SOLUTION TOPICAL at 05:55

## 2023-12-04 RX ADMIN — HEPARIN SODIUM 5000 UNIT(S): 5000 INJECTION INTRAVENOUS; SUBCUTANEOUS at 13:43

## 2023-12-04 RX ADMIN — Medication 10 MILLIGRAM(S): at 21:20

## 2023-12-04 RX ADMIN — HEPARIN SODIUM 5000 UNIT(S): 5000 INJECTION INTRAVENOUS; SUBCUTANEOUS at 21:20

## 2023-12-04 RX ADMIN — PROPOFOL 8.16 MICROGRAM(S)/KG/MIN: 10 INJECTION, EMULSION INTRAVENOUS at 04:16

## 2023-12-04 RX ADMIN — Medication 1000 MILLIGRAM(S): at 21:30

## 2023-12-04 NOTE — CHART NOTE - NSCHARTNOTEFT_GEN_A_CORE
Patient seen and examined after extubation.  Noticed right sided deficits and concern for stroke.  Repeat STAT CT and Neurology to help guide management.

## 2023-12-04 NOTE — PROGRESS NOTE ADULT - ASSESSMENT
88F HTN, Breast CA admitted for Acute Hypoxic Respiratory Failure    Acute Hypoxic Respiratory Failure  Related to Anaphylactic Shock from Iodine Contrast Dye from CT  Will attempt to extubate later today; Breathing trial ongoing  No need for antibiotics at this time as no signs of infection  Pulmonary Consult noted    HTN  Hold medications as patient is on Propofol    Breast CA  History of Mastectomy    TIA  CTA of head neck demonstrates multiple areas of stenosis  Follow up A1C and Lipid Profile   Neurology consult when more stable can accurately assess neurologic status and function    Diet  Enteral Feeding     DVT Prophylaxis  Heparin SC TID    Disposition   Discharge planning pending hospital course

## 2023-12-04 NOTE — PROGRESS NOTE ADULT - ASSESSMENT
88-year-old female with history of hypertension pacemaker right hip replacement brought by ambulance from home for evaluation of episode of weakness and facial droop    wean to CPAP PS - SBT in am  CNS imaging reviewed  HOB elev  asp prec  oral hygiene  suction PRN  ABG in AM  hopeful for am Extubation  I and O  SPCU care and support

## 2023-12-04 NOTE — DISCHARGE NOTE NURSING/CASE MANAGEMENT/SOCIAL WORK - NSDCPEFALRISK_GEN_ALL_CORE
For information on Fall & Injury Prevention, visit: [email or web address] OR  [email or web address] OR  https://www.cdc.gov/steadi/patient.html For information on Fall & Injury Prevention, visit: [email or web address] OR  [email or web address] OR  https://www.cdc.gov/steadi/patient.html

## 2023-12-04 NOTE — PROGRESS NOTE ADULT - SUBJECTIVE AND OBJECTIVE BOX
Subjective: Patient seen and examined. No overnight events. Doing well.     MEDICATIONS  (STANDING):  chlorhexidine 0.12% Liquid 15 milliLiter(s) Oral Mucosa every 12 hours  heparin   Injectable 5000 Unit(s) SubCutaneous every 8 hours  influenza  Vaccine (HIGH DOSE) 0.7 milliLiter(s) IntraMuscular once  nitroglycerin  Infusion 200 MICROgram(s)/Min (60 mL/Hr) IV Continuous <Continuous>  pantoprazole  Injectable 40 milliGRAM(s) IV Push daily  propofol Infusion 20 MICROgram(s)/kG/Min (8.16 mL/Hr) IV Continuous <Continuous>    MEDICATIONS  (PRN):  acetaminophen     Tablet .. 650 milliGRAM(s) Oral every 6 hours PRN Temp greater or equal to 38C (100.4F)  fentaNYL    Injectable 25 MICROGram(s) IV Push every 1 hour PRN sedation      Allergies    penicillin (Rash)  IV Contrast (Anaphylaxis; Urticaria)    Intolerances        Vital Signs Last 24 Hrs  T(C): 37.6 (04 Dec 2023 08:00), Max: 38.2 (03 Dec 2023 17:00)  T(F): 99.7 (04 Dec 2023 08:00), Max: 100.8 (03 Dec 2023 17:00)  HR: 95 (04 Dec 2023 08:00) (62 - 97)  BP: 120/62 (04 Dec 2023 07:00) (95/61 - 160/94)  BP(mean): 80 (04 Dec 2023 07:00) (66 - 113)  RR: 18 (04 Dec 2023 08:00) (14 - 20)  SpO2: 96% (04 Dec 2023 08:00) (93% - 99%)    Parameters below as of 04 Dec 2023 08:00  Patient On (Oxygen Delivery Method): ventilator, P/S 15/5    O2 Concentration (%): 35    PHYSICAL EXAM:  GENERAL: NAD, well-groomed, well-developed  HEAD:  Atraumatic, Normocephalic  ENMT: Moist mucous membranes,   NECK: Supple, No JVD, Normal thyroid  NERVOUS SYSTEM:  All 4 extremities mobile, no gross sensory deficits.   CHEST/LUNG: Clear to auscultation bilaterally; No rales, rhonchi, wheezing, or rubs  HEART: Regular rate and rhythm; No murmurs, rubs, or gallops  ABDOMEN: Soft, Nontender, Nondistended; Bowel sounds present  EXTREMITIES:  2+ Peripheral Pulses, No clubbing, cyanosis, or edema      LABS:                        10.3   7.68  )-----------( 200      ( 04 Dec 2023 05:30 )             33.5     04 Dec 2023 05:30    139    |  105    |  40     ----------------------------<  118    4.0     |  26     |  1.05     Ca    8.7        04 Dec 2023 05:30      PT/INR - ( 02 Dec 2023 19:26 )   PT: 10.4 sec;   INR: 0.93 ratio         PTT - ( 02 Dec 2023 19:26 )  PTT:29.6 sec  Urinalysis Basic - ( 04 Dec 2023 05:30 )    Color: x / Appearance: x / SG: x / pH: x  Gluc: 118 mg/dL / Ketone: x  / Bili: x / Urobili: x   Blood: x / Protein: x / Nitrite: x   Leuk Esterase: x / RBC: x / WBC x   Sq Epi: x / Non Sq Epi: x / Bacteria: x      CAPILLARY BLOOD GLUCOSE          RADIOLOGY & ADDITIONAL TESTS:    Imaging Personally Reviewed:  [ ] YES     Consultant(s) Notes Reviewed:      Care Discussed with Consultants/Other Providers:    Advanced Directives: [ ] DNR  [ ] No feeding tube  [ ] MOLST in chart  [ ] MOLST completed today  [ ] Unknown

## 2023-12-04 NOTE — CARE COORDINATION ASSESSMENT. - OTHER PERTINENT DISCHARGE PLANNING INFORMATION:
Continue Alfuzosin    Follow up in 3 months  
Met with patient and family at bedside to discuss the role of case management with verbalized understanding.  Patient unable to participate in CC, information gathered from sig other and daughter at bedside.  Patient currently in CPU unit on venti mask, restaints and continuing medical management.  WIll remain available from a case management perspective.

## 2023-12-04 NOTE — PROGRESS NOTE ADULT - SUBJECTIVE AND OBJECTIVE BOX
Chief Complaint: Facial droop, right-sided weakness    Interval Events: No events overnight.    Review of Systems:  Unable to obtain    Physical Exam:  Vitals:        Vital Signs Last 24 Hrs  T(C): 37.7 (04 Dec 2023 10:00), Max: 38.2 (03 Dec 2023 17:00)  T(F): 99.9 (04 Dec 2023 10:00), Max: 100.8 (03 Dec 2023 17:00)  HR: 76 (04 Dec 2023 10:00) (62 - 97)  BP: 170/52 (04 Dec 2023 10:00) (96/57 - 183/69)  BP(mean): 78 (04 Dec 2023 10:00) (70 - 113)  RR: 18 (04 Dec 2023 10:00) (14 - 20)  SpO2: 95% (04 Dec 2023 10:00) (91% - 99%)  Parameters below as of 04 Dec 2023 10:00  Patient On (Oxygen Delivery Method): mask, aerosol  O2 Concentration (%): 35  General: NAD  HEENT: Intubated  Neck: No JVD, no carotid bruit  Lungs: CTAB  CV: RRR, nl S1/S2, no M/R/G  Abdomen: S/NT/ND, +BS  Extremities: No LE edema, no cyanosis  Neuro: AAOx3, non-focal  Skin: No rash    Labs:                        10.3   7.68  )-----------( 200      ( 04 Dec 2023 05:30 )             33.5     12-04    139  |  105  |  40<H>  ----------------------------<  118<H>  4.0   |  26  |  1.05    Ca    8.7      04 Dec 2023 05:30  Phos  5.4     12-03  Mg     1.9     12-03    TPro  7.9  /  Alb  3.5  /  TBili  0.2  /  DBili  x   /  AST  26  /  ALT  25  /  AlkPhos  105  12-02        PT/INR - ( 02 Dec 2023 19:26 )   PT: 10.4 sec;   INR: 0.93 ratio         PTT - ( 02 Dec 2023 19:26 )  PTT:29.6 sec    ECG/Telemetry: NSR, ventricular paced

## 2023-12-04 NOTE — PROGRESS NOTE ADULT - ASSESSMENT
The patient is an 88 year old female with a history of HTN, hypothyroidism, pacemaker, breast cancer who is admitted with TIA, acute respiratory failure due to IV contrast anaphylaxis.    Plan:  - ECG with sinus rhythm and VVI pacing  - Troponin mildly elevated at 84 in the setting of demand ischemia from respiratory failure  - CXR with florid pulm edema in the setting of anaphylaxis - now resolved  - Plan for extubation this am  - Neurologic work-up as indicated. If concern for TIA/CVA, check echo.  - Monitor on telemetry

## 2023-12-04 NOTE — PROGRESS NOTE ADULT - SUBJECTIVE AND OBJECTIVE BOX
Date/Time Patient Seen:  		  Referring MD:   Data Reviewed	       Patient is a 88y old  Female who presents with a chief complaint of Unresponsive. (03 Dec 2023 20:13)      Subjective/HPI     PAST MEDICAL & SURGICAL HISTORY:  Hyperlipidemia  no current medications    Breast cancer  2009 left - s/ping castellanos radical mastectomy    [place] (hard of hearing)  bilateral HA (Yvette)    Meniscus tear  left - 10/2015    Cyst of left ovary    SDH (subdural hematoma)  3/2011 - s/p fall - no surgical intervention    History of Left Mastectomy    Cataract extraction status of eye, right    Status post right cataract extraction          Medication list         MEDICATIONS  (STANDING):  chlorhexidine 0.12% Liquid 15 milliLiter(s) Oral Mucosa every 12 hours  heparin   Injectable 5000 Unit(s) SubCutaneous every 8 hours  influenza  Vaccine (HIGH DOSE) 0.7 milliLiter(s) IntraMuscular once  nitroglycerin  Infusion 200 MICROgram(s)/Min (60 mL/Hr) IV Continuous <Continuous>  pantoprazole  Injectable 40 milliGRAM(s) IV Push daily  propofol Infusion 20 MICROgram(s)/kG/Min (8.16 mL/Hr) IV Continuous <Continuous>    MEDICATIONS  (PRN):  acetaminophen     Tablet .. 650 milliGRAM(s) Oral every 6 hours PRN Temp greater or equal to 38C (100.4F)  fentaNYL    Injectable 25 MICROGram(s) IV Push every 1 hour PRN sedation         Vitals log        ICU Vital Signs Last 24 Hrs  T(C): 37.6 (04 Dec 2023 04:00), Max: 38.2 (03 Dec 2023 17:00)  T(F): 99.7 (04 Dec 2023 04:00), Max: 100.8 (03 Dec 2023 17:00)  HR: 83 (04 Dec 2023 05:34) (62 - 97)  BP: 128/74 (04 Dec 2023 04:00) (95/61 - 160/94)  BP(mean): 91 (04 Dec 2023 04:00) (66 - 113)  ABP: --  ABP(mean): --  RR: 18 (04 Dec 2023 04:00) (16 - 20)  SpO2: 97% (04 Dec 2023 05:34) (93% - 99%)    O2 Parameters below as of 04 Dec 2023 04:00  Patient On (Oxygen Delivery Method): ventilator    O2 Concentration (%): 40         Mode: AC/ CMV (Assist Control/ Continuous Mandatory Ventilation)  RR (machine): 18  TV (machine): 400  FiO2: 40  PEEP: 5  ITime: 1  MAP: 9  PIP: 21      Input and Output:  I&O's Detail    02 Dec 2023 07:01  -  03 Dec 2023 07:00  --------------------------------------------------------  IN:    Propofol: 48.9 mL  Total IN: 48.9 mL    OUT:    Ureteral Catheter (mL): 1070 mL  Total OUT: 1070 mL    Total NET: -1021.1 mL      03 Dec 2023 07:01  -  04 Dec 2023 06:13  --------------------------------------------------------  IN:    Propofol: 134.2 mL  Total IN: 134.2 mL    OUT:    Ureteral Catheter (mL): 660 mL  Total OUT: 660 mL    Total NET: -525.8 mL          Lab Data                        11.7   12.98 )-----------( 227      ( 03 Dec 2023 06:11 )             36.8     12-03    139  |  102  |  33<H>  ----------------------------<  146<H>  3.4<L>   |  24  |  1.13    Ca    8.7      03 Dec 2023 06:11  Phos  5.4     12-03  Mg     1.9     12-03    TPro  7.9  /  Alb  3.5  /  TBili  0.2  /  DBili  x   /  AST  26  /  ALT  25  /  AlkPhos  105  12-02    ABG - ( 04 Dec 2023 06:03 )  pH, Arterial: 7.43  pH, Blood: x     /  pCO2: 43    /  pO2: 112   / HCO3: 28    / Base Excess: 4.2   /  SaO2: 99.4                    Review of Systems	      Objective     Physical Examination    heart s1s2  lung dc bS  head nc      Pertinent Lab findings & Imaging      Christiane:  NO   Adequate UO     I&O's Detail    02 Dec 2023 07:01  -  03 Dec 2023 07:00  --------------------------------------------------------  IN:    Propofol: 48.9 mL  Total IN: 48.9 mL    OUT:    Ureteral Catheter (mL): 1070 mL  Total OUT: 1070 mL    Total NET: -1021.1 mL      03 Dec 2023 07:01  -  04 Dec 2023 06:13  --------------------------------------------------------  IN:    Propofol: 134.2 mL  Total IN: 134.2 mL    OUT:    Ureteral Catheter (mL): 660 mL  Total OUT: 660 mL    Total NET: -525.8 mL               Discussed with:     Cultures:	        Radiology                             Date/Time Patient Seen:  		  Referring MD:   Data Reviewed	       Patient is a 88y old  Female who presents with a chief complaint of Unresponsive. (03 Dec 2023 20:13)      Subjective/HPI     PAST MEDICAL & SURGICAL HISTORY:  Hyperlipidemia  no current medications    Breast cancer  2009 left - s/ping castellanos radical mastectomy    [place] (hard of hearing)  bilateral HA (Yvette)    Meniscus tear  left - 10/2015    Cyst of left ovary    SDH (subdural hematoma)  3/2011 - s/p fall - no surgical intervention    History of Left Mastectomy    Cataract extraction status of eye, right    Status post right cataract extraction          Medication list         MEDICATIONS  (STANDING):  chlorhexidine 0.12% Liquid 15 milliLiter(s) Oral Mucosa every 12 hours  heparin   Injectable 5000 Unit(s) SubCutaneous every 8 hours  influenza  Vaccine (HIGH DOSE) 0.7 milliLiter(s) IntraMuscular once  nitroglycerin  Infusion 200 MICROgram(s)/Min (60 mL/Hr) IV Continuous <Continuous>  pantoprazole  Injectable 40 milliGRAM(s) IV Push daily  propofol Infusion 20 MICROgram(s)/kG/Min (8.16 mL/Hr) IV Continuous <Continuous>    MEDICATIONS  (PRN):  acetaminophen     Tablet .. 650 milliGRAM(s) Oral every 6 hours PRN Temp greater or equal to 38C (100.4F)  fentaNYL    Injectable 25 MICROGram(s) IV Push every 1 hour PRN sedation         Vitals log        ICU Vital Signs Last 24 Hrs  T(C): 37.6 (04 Dec 2023 04:00), Max: 38.2 (03 Dec 2023 17:00)  T(F): 99.7 (04 Dec 2023 04:00), Max: 100.8 (03 Dec 2023 17:00)  HR: 83 (04 Dec 2023 05:34) (62 - 97)  BP: 128/74 (04 Dec 2023 04:00) (95/61 - 160/94)  BP(mean): 91 (04 Dec 2023 04:00) (66 - 113)  ABP: --  ABP(mean): --  RR: 18 (04 Dec 2023 04:00) (16 - 20)  SpO2: 97% (04 Dec 2023 05:34) (93% - 99%)    O2 Parameters below as of 04 Dec 2023 04:00  Patient On (Oxygen Delivery Method): ventilator    O2 Concentration (%): 40         Mode: AC/ CMV (Assist Control/ Continuous Mandatory Ventilation)  RR (machine): 18  TV (machine): 400  FiO2: 40  PEEP: 5  ITime: 1  MAP: 9  PIP: 21      Input and Output:  I&O's Detail    02 Dec 2023 07:01  -  03 Dec 2023 07:00  --------------------------------------------------------  IN:    Propofol: 48.9 mL  Total IN: 48.9 mL    OUT:    Ureteral Catheter (mL): 1070 mL  Total OUT: 1070 mL    Total NET: -1021.1 mL      03 Dec 2023 07:01  -  04 Dec 2023 06:13  --------------------------------------------------------  IN:    Propofol: 134.2 mL  Total IN: 134.2 mL    OUT:    Ureteral Catheter (mL): 660 mL  Total OUT: 660 mL    Total NET: -525.8 mL          Lab Data                        11.7   12.98 )-----------( 227      ( 03 Dec 2023 06:11 )             36.8     12-03    139  |  102  |  33<H>  ----------------------------<  146<H>  3.4<L>   |  24  |  1.13    Ca    8.7      03 Dec 2023 06:11  Phos  5.4     12-03  Mg     1.9     12-03    TPro  7.9  /  Alb  3.5  /  TBili  0.2  /  DBili  x   /  AST  26  /  ALT  25  /  AlkPhos  105  12-02    ABG - ( 04 Dec 2023 06:03 )  pH, Arterial: 7.43  pH, Blood: x     /  pCO2: 43    /  pO2: 112   / HCO3: 28    / Base Excess: 4.2   /  SaO2: 99.4                    Review of Systems	      Objective     Physical Examination    heart s1s2  lung dc bS  head nc      Pertinent Lab findings & Imaging      Christiane:  NO   Adequate UO     I&O's Detail    02 Dec 2023 07:01  -  03 Dec 2023 07:00  --------------------------------------------------------  IN:    Propofol: 48.9 mL  Total IN: 48.9 mL    OUT:    Ureteral Catheter (mL): 1070 mL  Total OUT: 1070 mL    Total NET: -1021.1 mL      03 Dec 2023 07:01  -  04 Dec 2023 06:13  --------------------------------------------------------  IN:    Propofol: 134.2 mL  Total IN: 134.2 mL    OUT:    Ureteral Catheter (mL): 660 mL  Total OUT: 660 mL    Total NET: -525.8 mL               Discussed with:     Cultures:	        Radiology

## 2023-12-04 NOTE — CONSULT NOTE ADULT - SUBJECTIVE AND OBJECTIVE BOX
cva  asa per rectum   zocor   s/s   allow for hypertension next 48 hrs don't treat sbp unless above 190 or dbp above 105  pt evaluation   spoke to family

## 2023-12-04 NOTE — DISCHARGE NOTE NURSING/CASE MANAGEMENT/SOCIAL WORK - PATIENT PORTAL LINK FT
You can access the FollowMyHealth Patient Portal offered by [facility] by registering at the following website: [email or web address]. By joining [employer]’s FollowMyHealth portal, you will also be able to view your health information using other applications (apps) compatible with our system. You can access the FollowMyHealth Patient Portal offered by [facility] by registering at the following website: [email or web address]. By joining [employer]’s FollowMyHealth portal, you will also be able to view your health information using other applications (apps) compatible with our system.

## 2023-12-04 NOTE — CARE COORDINATION ASSESSMENT. - NSCAREPROVIDERS_GEN_ALL_CORE_FT
CARE PROVIDERS:  Accepting Physician: Benji Briones  Access Services: Iggy Bryant  Administration: Jaun Humphries  Admitting: Benji Briones  Attending: Benji Briones  Case Management: Tacos Shaver  Clinical Doc. Improvement: Kiley Laureano  Consultant: Bettina Carroll  Consultant: Soren Reeves  Consultant: Hunter Carroll  ED Attending: Yemi Avendaño  ED Nurse: Nora Abbott  ED Nurse 2: Vidhi Zaragoza  Infection Control: Cate Casillas  Nurse: Robyn Farrell  Nurse: Yolis Sousa  Nurse: Maria R Polo  PCA/Nursing Assistant: Crispin Parkinson  PCA/Nursing Assistant: Sade Lundberg  Primary Team: Arun Recinos  Primary Team: Marialuisa Wilson  Primary Team: Tonny Carlisle  Primary Team: Rosa Marti  Research: Kian Romo  Respiratory Therapy: Liliam Stark  : Bill Woodruff  : Shanell Lopez  Team: [facility]  Hospitalists, Team  Team: [place], Team  UR// Supp. Assoc.: Henri Álvarez   CARE PROVIDERS:  Accepting Physician: Benji Briones  Access Services: Iggy Bryant  Administration: Jaun Humphries  Admitting: Benji Briones  Attending: Benji Briones  Case Management: Tacos Shaver  Clinical Doc. Improvement: Kiley Laureano  Consultant: Bettina Carroll  Consultant: Soren Reeves  Consultant: Hunter Carroll  ED Attending: Yemi Avendaño  ED Nurse: Nora Abbott  ED Nurse 2: Vidhi Zaragoza  Infection Control: Cate Casillas  Nurse: Robyn Farrell  Nurse: Yolis Sousa  Nurse: Maria R Polo  PCA/Nursing Assistant: Crispin Parkinson  PCA/Nursing Assistant: Sade Lundberg  Primary Team: Arun Recinos  Primary Team: Marialuisa Wilson  Primary Team: Tonny Carlisle  Primary Team: Rosa Marti  Research: Kian Romo  Respiratory Therapy: Liliam Stark  : Bill Woodruff  : Shanell Lopez  Team: [facility]  Hospitalists, Team  Team: [place], Team  UR// Supp. Assoc.: Henri Álvarez

## 2023-12-04 NOTE — CARE COORDINATION ASSESSMENT. - NSPASTMEDSURGHISTORY_GEN_ALL_CORE_FT
PAST MEDICAL & SURGICAL HISTORY:  Hyperlipidemia  no current medications      History of Left Mastectomy      Breast cancer  2009 left - s/neida castellanos radical mastectomy      Cataract extraction status of eye, right      SDH (subdural hematoma)  3/2011 - s/p fall - no surgical intervention      Cyst of left ovary      Meniscus tear  left - 10/2015      [place] (hard of hearing)  bilateral HA (Yvette)      Status post right cataract extraction       PAST MEDICAL & SURGICAL HISTORY:  Hyperlipidemia  no current medications      History of Left Mastectomy      Breast cancer  2009 left - s/neida castellanos radical mastectomy      Cataract extraction status of eye, right      SDH (subdural hematoma)  3/2011 - s/p fall - no surgical intervention      Cyst of left ovary      Meniscus tear  left - 10/2015      [place] (hard of hearing)  bilateral HA (Yvette)      Status post right cataract extraction

## 2023-12-05 DIAGNOSIS — I83.009 VARICOSE VEINS OF UNSPECIFIED LOWER EXTREMITY WITH ULCER OF UNSPECIFIED SITE: ICD-10-CM

## 2023-12-05 LAB
ANION GAP SERPL CALC-SCNC: 10 MMOL/L — SIGNIFICANT CHANGE UP (ref 5–17)
ANION GAP SERPL CALC-SCNC: 10 MMOL/L — SIGNIFICANT CHANGE UP (ref 5–17)
BUN SERPL-MCNC: 27 MG/DL — HIGH (ref 7–23)
BUN SERPL-MCNC: 27 MG/DL — HIGH (ref 7–23)
CALCIUM SERPL-MCNC: 9.3 MG/DL — SIGNIFICANT CHANGE UP (ref 8.4–10.5)
CALCIUM SERPL-MCNC: 9.3 MG/DL — SIGNIFICANT CHANGE UP (ref 8.4–10.5)
CHLORIDE SERPL-SCNC: 104 MMOL/L — SIGNIFICANT CHANGE UP (ref 96–108)
CHLORIDE SERPL-SCNC: 104 MMOL/L — SIGNIFICANT CHANGE UP (ref 96–108)
CO2 SERPL-SCNC: 25 MMOL/L — SIGNIFICANT CHANGE UP (ref 22–31)
CO2 SERPL-SCNC: 25 MMOL/L — SIGNIFICANT CHANGE UP (ref 22–31)
CREAT SERPL-MCNC: 0.75 MG/DL — SIGNIFICANT CHANGE UP (ref 0.5–1.3)
CREAT SERPL-MCNC: 0.75 MG/DL — SIGNIFICANT CHANGE UP (ref 0.5–1.3)
EGFR: 77 ML/MIN/1.73M2 — SIGNIFICANT CHANGE UP
EGFR: 77 ML/MIN/1.73M2 — SIGNIFICANT CHANGE UP
GLUCOSE SERPL-MCNC: 116 MG/DL — HIGH (ref 70–99)
GLUCOSE SERPL-MCNC: 116 MG/DL — HIGH (ref 70–99)
POTASSIUM SERPL-MCNC: 3.3 MMOL/L — LOW (ref 3.5–5.3)
POTASSIUM SERPL-MCNC: 3.3 MMOL/L — LOW (ref 3.5–5.3)
POTASSIUM SERPL-SCNC: 3.3 MMOL/L — LOW (ref 3.5–5.3)
POTASSIUM SERPL-SCNC: 3.3 MMOL/L — LOW (ref 3.5–5.3)
SODIUM SERPL-SCNC: 139 MMOL/L — SIGNIFICANT CHANGE UP (ref 135–145)
SODIUM SERPL-SCNC: 139 MMOL/L — SIGNIFICANT CHANGE UP (ref 135–145)

## 2023-12-05 PROCEDURE — 99233 SBSQ HOSP IP/OBS HIGH 50: CPT

## 2023-12-05 PROCEDURE — 93306 TTE W/DOPPLER COMPLETE: CPT | Mod: 26

## 2023-12-05 RX ORDER — MUPIROCIN 20 MG/G
1 OINTMENT TOPICAL ONCE
Refills: 0 | Status: COMPLETED | OUTPATIENT
Start: 2023-12-05 | End: 2023-12-05

## 2023-12-05 RX ORDER — POTASSIUM CHLORIDE 20 MEQ
10 PACKET (EA) ORAL
Refills: 0 | Status: COMPLETED | OUTPATIENT
Start: 2023-12-05 | End: 2023-12-05

## 2023-12-05 RX ORDER — ASPIRIN/CALCIUM CARB/MAGNESIUM 324 MG
325 TABLET ORAL DAILY
Refills: 0 | Status: DISCONTINUED | OUTPATIENT
Start: 2023-12-05 | End: 2023-12-08

## 2023-12-05 RX ADMIN — Medication 100 MILLIEQUIVALENT(S): at 11:32

## 2023-12-05 RX ADMIN — Medication 100 MILLIEQUIVALENT(S): at 09:25

## 2023-12-05 RX ADMIN — Medication 325 MILLIGRAM(S): at 16:05

## 2023-12-05 RX ADMIN — HEPARIN SODIUM 5000 UNIT(S): 5000 INJECTION INTRAVENOUS; SUBCUTANEOUS at 06:07

## 2023-12-05 RX ADMIN — ATORVASTATIN CALCIUM 80 MILLIGRAM(S): 80 TABLET, FILM COATED ORAL at 22:03

## 2023-12-05 RX ADMIN — Medication 100 MILLIEQUIVALENT(S): at 10:31

## 2023-12-05 NOTE — SWALLOW BEDSIDE ASSESSMENT ADULT - SWALLOW EVAL: RECOMMENDED FEEDING/EATING TECHNIQUES
allow for swallow between intakes/check mouth frequently for oral residue/pocketing/crush medication (when feasible)/position upright (90 degrees)/small sips/bites

## 2023-12-05 NOTE — PHYSICAL THERAPY INITIAL EVALUATION ADULT - RANGE OF MOTION EXAMINATION, REHAB EVAL
right UE WFL PROM/Left LE ROM was WFL (within functional limits)/bilateral lower extremity ROM was WFL (within functional limits)

## 2023-12-05 NOTE — SWALLOW BEDSIDE ASSESSMENT ADULT - SWALLOW EVAL: DIAGNOSIS
Pt presents with oral dysphagia for puree and moderately thick liquids marked by reduced oral grading, slow bolus manipulation and posterior transfer. Intermittent right sided buccal stasis noted which is cleared with cues for second swallow. Advanced solid textures not administered due to oral stage deficits for puree. Also noted intermittent right sided anterior loss of all consistencies, which was alleviated with small bolus volumes. 3 Pharyngeal stage marked by suspected delay in swallow initiation across consistencies. Present hyolaryngeal elevation. Pt with coughing/throat clearing immediately post swallow for mildly thick and thin liquids (despite bolus volume) suggestive of aspiration/penetration. No overt signs of aspiration for puree/moderately thick liquids Pt presents with oral dysphagia for puree and moderately thick liquids marked by reduced oral grading, slow bolus manipulation and posterior transfer. Intermittent right sided buccal stasis noted which is cleared with cues for second swallow. Advanced solid textures not administered due to oral stage deficits for puree. Also noted intermittent right sided anterior loss of all consistencies, which was alleviated with small bolus volumes. Pharyngeal stage marked by suspected delay in swallow initiation across consistencies. Present hyolaryngeal elevation. Pt with coughing/throat clearing immediately post swallow for mildly thick and thin liquids (despite bolus volume) suggestive of aspiration/penetration. No overt signs of aspiration for puree/moderately thick liquids

## 2023-12-05 NOTE — SWALLOW BEDSIDE ASSESSMENT ADULT - COMMENTS
per charting - 88F HTN, Breast CA admitted for Acute Hypoxic Respiratory Failure for which patient was intubated. Extubated 12/4  CT CHEST - Dual-chamber right-sided pacer. Left middle and lower and right lower   lung zone ill-defined opacities.  BRAIN CT - Large left MCA territory acute to early subacute infarction. 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  Pt seen for swallow eval, pt is AAOX1, follows commands intermittently. Screen of expressive language reveals some word finding deficits and perseverations on previously produced words. Speech is dysarthric and intelligibility declines as complexity of utterance increases. Pt is in NAD prior to, during, following assessment.

## 2023-12-05 NOTE — PROGRESS NOTE ADULT - SUBJECTIVE AND OBJECTIVE BOX
Date/Time Patient Seen:  		  Referring MD:   Data Reviewed	       Patient is a 88y old  Female who presents with a chief complaint of Unresponsive. (04 Dec 2023 12:48)      Subjective/HPI     PAST MEDICAL & SURGICAL HISTORY:  Hyperlipidemia  no current medications    Breast cancer  2009 left - s/ping castellanos radical mastectomy    [place] (hard of hearing)  bilateral HA (Yvette)    Meniscus tear  left - 10/2015    Cyst of left ovary    SDH (subdural hematoma)  3/2011 - s/p fall - no surgical intervention    History of Left Mastectomy    Cataract extraction status of eye, right    Status post right cataract extraction          Medication list         MEDICATIONS  (STANDING):  aspirin Suppository 300 milliGRAM(s) Rectal daily  atorvastatin 80 milliGRAM(s) Oral at bedtime  heparin   Injectable 5000 Unit(s) SubCutaneous every 8 hours  influenza  Vaccine (HIGH DOSE) 0.7 milliLiter(s) IntraMuscular once    MEDICATIONS  (PRN):  acetaminophen     Tablet .. 650 milliGRAM(s) Oral every 6 hours PRN Temp greater or equal to 38C (100.4F)  hydrALAZINE Injectable 10 milliGRAM(s) IV Push every 8 hours PRN If SBP >170         Vitals log        ICU Vital Signs Last 24 Hrs  T(C): 37.4 (05 Dec 2023 05:00), Max: 37.9 (04 Dec 2023 09:00)  T(F): 99.3 (05 Dec 2023 05:00), Max: 100.2 (04 Dec 2023 09:00)  HR: 71 (05 Dec 2023 05:00) (68 - 101)  BP: 164/66 (05 Dec 2023 05:00) (120/62 - 183/77)  BP(mean): 91 (05 Dec 2023 05:00) (68 - 111)  ABP: --  ABP(mean): --  RR: 20 (05 Dec 2023 05:00) (13 - 20)  SpO2: 98% (05 Dec 2023 05:00) (91% - 98%)    O2 Parameters below as of 04 Dec 2023 23:00  Patient On (Oxygen Delivery Method): nasal cannula  O2 Flow (L/min): 3           Mode: CPAP with PS  FiO2: 30  PEEP: 5  PS: 10  ITime: 1  MAP: 10  PIP: 23      Input and Output:  I&O's Detail    03 Dec 2023 07:01  -  04 Dec 2023 07:00  --------------------------------------------------------  IN:    Propofol: 134.2 mL  Total IN: 134.2 mL    OUT:    Ureteral Catheter (mL): 660 mL  Total OUT: 660 mL    Total NET: -525.8 mL      04 Dec 2023 07:01  -  05 Dec 2023 05:54  --------------------------------------------------------  IN:  Total IN: 0 mL    OUT:    Ureteral Catheter (mL): 755 mL  Total OUT: 755 mL    Total NET: -755 mL          Lab Data                        10.3   7.68  )-----------( 200      ( 04 Dec 2023 05:30 )             33.5     12-04    139  |  105  |  40<H>  ----------------------------<  118<H>  4.0   |  26  |  1.05    Ca    8.7      04 Dec 2023 05:30  Phos  5.4     12-03  Mg     1.9     12-03      ABG - ( 04 Dec 2023 06:03 )  pH, Arterial: 7.43  pH, Blood: x     /  pCO2: 43    /  pO2: 112   / HCO3: 28    / Base Excess: 4.2   /  SaO2: 99.4                    Review of Systems	      Objective     Physical Examination    heart s1s2  lung dc BS  head nc      Pertinent Lab findings & Imaging      Christiane:  NO   Adequate UO     I&O's Detail    03 Dec 2023 07:01  -  04 Dec 2023 07:00  --------------------------------------------------------  IN:    Propofol: 134.2 mL  Total IN: 134.2 mL    OUT:    Ureteral Catheter (mL): 660 mL  Total OUT: 660 mL    Total NET: -525.8 mL      04 Dec 2023 07:01  -  05 Dec 2023 05:54  --------------------------------------------------------  IN:  Total IN: 0 mL    OUT:    Ureteral Catheter (mL): 755 mL  Total OUT: 755 mL    Total NET: -755 mL               Discussed with:     Cultures:	        Radiology                             Date/Time Patient Seen:  		  Referring MD:   Data Reviewed	       Patient is a 88y old  Female who presents with a chief complaint of Unresponsive. (04 Dec 2023 12:48)      Subjective/HPI     PAST MEDICAL & SURGICAL HISTORY:  Hyperlipidemia  no current medications    Breast cancer  2009 left - s/ping castellanos radical mastectomy    [place] (hard of hearing)  bilateral HA (Yvette)    Meniscus tear  left - 10/2015    Cyst of left ovary    SDH (subdural hematoma)  3/2011 - s/p fall - no surgical intervention    History of Left Mastectomy    Cataract extraction status of eye, right    Status post right cataract extraction          Medication list         MEDICATIONS  (STANDING):  aspirin Suppository 300 milliGRAM(s) Rectal daily  atorvastatin 80 milliGRAM(s) Oral at bedtime  heparin   Injectable 5000 Unit(s) SubCutaneous every 8 hours  influenza  Vaccine (HIGH DOSE) 0.7 milliLiter(s) IntraMuscular once    MEDICATIONS  (PRN):  acetaminophen     Tablet .. 650 milliGRAM(s) Oral every 6 hours PRN Temp greater or equal to 38C (100.4F)  hydrALAZINE Injectable 10 milliGRAM(s) IV Push every 8 hours PRN If SBP >170         Vitals log        ICU Vital Signs Last 24 Hrs  T(C): 37.4 (05 Dec 2023 05:00), Max: 37.9 (04 Dec 2023 09:00)  T(F): 99.3 (05 Dec 2023 05:00), Max: 100.2 (04 Dec 2023 09:00)  HR: 71 (05 Dec 2023 05:00) (68 - 101)  BP: 164/66 (05 Dec 2023 05:00) (120/62 - 183/77)  BP(mean): 91 (05 Dec 2023 05:00) (68 - 111)  ABP: --  ABP(mean): --  RR: 20 (05 Dec 2023 05:00) (13 - 20)  SpO2: 98% (05 Dec 2023 05:00) (91% - 98%)    O2 Parameters below as of 04 Dec 2023 23:00  Patient On (Oxygen Delivery Method): nasal cannula  O2 Flow (L/min): 3           Mode: CPAP with PS  FiO2: 30  PEEP: 5  PS: 10  ITime: 1  MAP: 10  PIP: 23      Input and Output:  I&O's Detail    03 Dec 2023 07:01  -  04 Dec 2023 07:00  --------------------------------------------------------  IN:    Propofol: 134.2 mL  Total IN: 134.2 mL    OUT:    Ureteral Catheter (mL): 660 mL  Total OUT: 660 mL    Total NET: -525.8 mL      04 Dec 2023 07:01  -  05 Dec 2023 05:54  --------------------------------------------------------  IN:  Total IN: 0 mL    OUT:    Ureteral Catheter (mL): 755 mL  Total OUT: 755 mL    Total NET: -755 mL          Lab Data                        10.3   7.68  )-----------( 200      ( 04 Dec 2023 05:30 )             33.5     12-04    139  |  105  |  40<H>  ----------------------------<  118<H>  4.0   |  26  |  1.05    Ca    8.7      04 Dec 2023 05:30  Phos  5.4     12-03  Mg     1.9     12-03      ABG - ( 04 Dec 2023 06:03 )  pH, Arterial: 7.43  pH, Blood: x     /  pCO2: 43    /  pO2: 112   / HCO3: 28    / Base Excess: 4.2   /  SaO2: 99.4                    Review of Systems	      Objective     Physical Examination    heart s1s2  lung dc BS  head nc      Pertinent Lab findings & Imaging      Christiane:  NO   Adequate UO     I&O's Detail    03 Dec 2023 07:01  -  04 Dec 2023 07:00  --------------------------------------------------------  IN:    Propofol: 134.2 mL  Total IN: 134.2 mL    OUT:    Ureteral Catheter (mL): 660 mL  Total OUT: 660 mL    Total NET: -525.8 mL      04 Dec 2023 07:01  -  05 Dec 2023 05:54  --------------------------------------------------------  IN:  Total IN: 0 mL    OUT:    Ureteral Catheter (mL): 755 mL  Total OUT: 755 mL    Total NET: -755 mL               Discussed with:     Cultures:	        Radiology

## 2023-12-05 NOTE — PHYSICAL THERAPY INITIAL EVALUATION ADULT - PERTINENT HX OF CURRENT PROBLEM, REHAB EVAL
This is an 87 y/o F with PMH of HTN, PPM, Breast CA s/p left mastectomy, Nodular Goitre, and Allergic Rhinitis who presented with an acute onset of right sided weakness with right facial droop & slurred speech that was witnessed by the daughter, no AMS, no seizure activity. By time she arrived at the ED she was already back to her baseline, NIHSS was zero by ED staff. Patient was sent to CT suit for a brain CT & Brain CTA, and after she returned form CT she started showing diffuse skin rash, SOB with loud chest wheeze, didn't respond to steroids, Epinephrine, and H1 blockers, her SPO2 dropped, had a severe hypertensive response, and had to be emergently intubated by ED team. She was found with massive acute pulmonary edema. No known previous history of allergy to iodine or IV contrast.

## 2023-12-05 NOTE — CONSULT NOTE ADULT - PROBLEM SELECTOR RECOMMENDATION 9
Chart reviewed and Patient evaluated  Discussed diagnosis and treatment with patient's family  Applied dry sterile dressing  Rec Continue with IV antibiotics As Per ID  Weight bearing as tolerated with assistance  Offloading to bilateral Heels.   Discussed importance of daily foot examinations and proper shoe gear and to importance of lower Fasting Blood Glucose levels.   Podiatry will follow while in house.   will discuss care plan  with all  Attendings Chart reviewed and Patient evaluated  Discussed diagnosis and treatment with patient's family  Bactroban ordered  Applied bactroban with dry sterile dressing  Rec Continue with IV antibiotics As Per ID  Weight bearing as tolerated with assistance  Offloading to bilateral Heels.   Discussed importance of daily foot examinations and proper shoe gear and to importance of lower Fasting Blood Glucose levels.   Podiatry will follow while in house.   will discuss care plan  with all  Attendings

## 2023-12-05 NOTE — PROGRESS NOTE ADULT - SUBJECTIVE AND OBJECTIVE BOX
Subjective: Seen and examined.     MEDICATIONS  (STANDING):  aspirin Suppository 300 milliGRAM(s) Rectal daily  atorvastatin 80 milliGRAM(s) Oral at bedtime  heparin   Injectable 5000 Unit(s) SubCutaneous every 8 hours  influenza  Vaccine (HIGH DOSE) 0.7 milliLiter(s) IntraMuscular once  potassium chloride  10 mEq/100 mL IVPB 10 milliEquivalent(s) IV Intermittent every 1 hour    MEDICATIONS  (PRN):  acetaminophen     Tablet .. 650 milliGRAM(s) Oral every 6 hours PRN Temp greater or equal to 38C (100.4F)      Allergies    penicillin (Rash)  IV Contrast (Anaphylaxis; Urticaria)    Intolerances        Vital Signs Last 24 Hrs  T(C): 37.1 (05 Dec 2023 10:00), Max: 37.9 (04 Dec 2023 12:00)  T(F): 98.8 (05 Dec 2023 10:00), Max: 100.2 (04 Dec 2023 12:00)  HR: 73 (05 Dec 2023 10:00) (63 - 101)  BP: 156/82 (05 Dec 2023 10:00) (126/79 - 183/83)  BP(mean): 100 (05 Dec 2023 10:00) (67 - 157)  RR: 19 (05 Dec 2023 10:00) (12 - 28)  SpO2: 97% (05 Dec 2023 10:00) (95% - 98%)    Parameters below as of 05 Dec 2023 10:00  Patient On (Oxygen Delivery Method): nasal cannula  O2 Flow (L/min): 3      PHYSICAL EXAM:  GENERAL: Non verbal  HEAD:  Atraumatic, Normocephalic  ENMT: Moist mucous membranes,   NECK: Supple, No JVD, Normal thyroid  NERVOUS SYSTEM:  Right Sided tiffanie paresis with minimal stregnth Positive expressive aphasia.  Positive dysarthria was noted.  CHEST/LUNG: Clear to auscultation bilaterally; No rales, rhonchi, wheezing, or rubs  HEART: Regular rate and rhythm; No murmurs, rubs, or gallops  ABDOMEN: Soft, Nontender, Nondistended; Bowel sounds present  EXTREMITIES:  2+ Peripheral Pulses, No clubbing, cyanosis, or edema      LABS:    05 Dec 2023 06:00    139    |  104    |  27     ----------------------------<  116    3.3     |  25     |  0.75     Ca    9.3        05 Dec 2023 06:00        Urinalysis Basic - ( 05 Dec 2023 06:00 )    Color: x / Appearance: x / SG: x / pH: x  Gluc: 116 mg/dL / Ketone: x  / Bili: x / Urobili: x   Blood: x / Protein: x / Nitrite: x   Leuk Esterase: x / RBC: x / WBC x   Sq Epi: x / Non Sq Epi: x / Bacteria: x      CAPILLARY BLOOD GLUCOSE          RADIOLOGY & ADDITIONAL TESTS:    Imaging Personally Reviewed:  [ ] YES     Consultant(s) Notes Reviewed:      Care Discussed with Consultants/Other Providers:    Advanced Directives: [ ] DNR  [ ] No feeding tube  [ ] MOLST in chart  [ ] MOLST completed today  [ ] Unknown

## 2023-12-05 NOTE — SWALLOW BEDSIDE ASSESSMENT ADULT - ORAL PHASE
Within functional limits stasis cleared with cued second swallow when indicated/Decreased anterior-posterior movement of the bolus/Stasis in lateral sulci Decreased anterior-posterior movement of the bolus

## 2023-12-05 NOTE — PROGRESS NOTE ADULT - SUBJECTIVE AND OBJECTIVE BOX
Neurology follow up note    REBEKAH [id number]      Interval History:    Patient feels ok no new complaints.    MEDICATIONS    acetaminophen     Tablet .. 650 milliGRAM(s) Oral every 6 hours PRN  aspirin Suppository 300 milliGRAM(s) Rectal daily  atorvastatin 80 milliGRAM(s) Oral at bedtime  heparin   Injectable 5000 Unit(s) SubCutaneous every 8 hours  influenza  Vaccine (HIGH DOSE) 0.7 milliLiter(s) IntraMuscular once      Allergies    penicillin (Rash)  IV Contrast (Anaphylaxis; Urticaria)    Intolerances            Vital Signs Last 24 Hrs  T(C): 37.1 (05 Dec 2023 10:00), Max: 37.9 (04 Dec 2023 12:00)  T(F): 98.8 (05 Dec 2023 10:00), Max: 100.2 (04 Dec 2023 12:00)  HR: 73 (05 Dec 2023 10:00) (63 - 101)  BP: 156/82 (05 Dec 2023 10:00) (126/79 - 183/83)  BP(mean): 100 (05 Dec 2023 10:00) (67 - 157)  RR: 19 (05 Dec 2023 10:00) (12 - 28)  SpO2: 97% (05 Dec 2023 10:00) (95% - 98%)    Parameters below as of 05 Dec 2023 10:00  Patient On (Oxygen Delivery Method): nasal cannula  O2 Flow (L/min): 3        REVIEW OF SYSTEMS:    Constitutional: No fever, chills, fatigue, right sided weakness  Eyes: no eye pain, visual disturbances, or discharge  ENT:  No difficulty hearing, tinnitus, vertigo; No sinus or throat pain  Neck: No pain or stiffness  Respiratory: No cough, dyspnea, wheezing   Cardiovascular: No chest pain, palpitations,   Gastrointestinal: No abdominal or epigastric pain. No nausea, vomiting  No diarrhea or constipation.   Genitourinary: No dysuria, frequency, hematuria or incontinence  Neurological: No headaches, lightheadedness, vertigo, numbness or tremors  Psychiatric: No depression, anxiety, mood swings or difficulty sleeping  Musculoskeletal: No joint pain or swelling; No muscle, back or extremity pain  Skin: No itching, burning, rashes or lesions   Lymph Nodes: No enlarged glands  Endocrine: No heat or cold intolerance; No hair loss   Allergy and Immunologic: No hives or eczema    On Neurological Examination:    Mental Status - Patient is alert, awak      Follow simple commands      Speech -   Dysarthria  Aphasia                    Cranial Nerves - Pupils 3 mm equal and reactive to light,   extraocular eye movements intact.   right face droop     Motor Exam -   Right upper 2/5  Left upper5/5  Right lower2-/5  Left lower 4/5    Muscle tone - is normal all over.  No asymmetry is seen.      Sensory    Bilateral intact to light touch    GENERAL Exam: Nontoxic , No Acute Distress   	  HEENT:  normocephalic, atraumatic  		  LUNGS:  Decreased bilaterally  	  HEART: Normal S1S2   No murmur RRR        	  GI/ ABDOMEN:  Soft  Non tender    EXTREMITIES:   No Edema  No Clubbing  No Cyanosis     MUSCULOSKELETAL: Normal Range of Motion  	   SKIN: Normal  No Ecchymosis               LABS:  CBC Full  -  ( 04 Dec 2023 05:30 )  WBC Count : 7.68 K/uL  RBC Count : 4.05 M/uL  Hemoglobin : 10.3 g/dL  Hematocrit : 33.5 %  Platelet Count - Automated : 200 K/uL  Mean Cell Volume : 82.7 fl  Mean Cell Hemoglobin : 25.4 pg  Mean Cell Hemoglobin Concentration : 30.7 gm/dL  Auto Neutrophil # : x  Auto Lymphocyte # : x  Auto Monocyte # : x  Auto Eosinophil # : x  Auto Basophil # : x  Auto Neutrophil % : x  Auto Lymphocyte % : x  Auto Monocyte % : x  Auto Eosinophil % : x  Auto Basophil % : x    Urinalysis Basic - ( 05 Dec 2023 06:00 )    Color: x / Appearance: x / SG: x / pH: x  Gluc: 116 mg/dL / Ketone: x  / Bili: x / Urobili: x   Blood: x / Protein: x / Nitrite: x   Leuk Esterase: x / RBC: x / WBC x   Sq Epi: x / Non Sq Epi: x / Bacteria: x      12-05    139  |  104  |  27<H>  ----------------------------<  116<H>  3.3<L>   |  25  |  0.75    Ca    9.3      05 Dec 2023 06:00      Hemoglobin A1C:       Vitamin B12         RADIOLOGY  < from: CT Brain Stroke Protocol (12.04.23 @ 11:46) >    CLINICAL INDICATION: Stroke.    TECHNIQUE: Volumetric CT acquisition was performed through the brain and   reviewed using brain and bone window technique.      COMPARISON: CT head 12/2/2020    FINDINGS:  Left frontal, temporal parietal and occipital large region of   hypoattenuation, loss of gray-white differentiation and sulcal effacement   consistent with acute to early subacute infarction. There is asymmetric   hyperdensity of several distal branches of the left middle cerebral   artery consistent with thrombosis.    There is a 7 x 8 mm hyperattenuating partiallycalcified extra-axial   lesion along the left greater sphenoid wing likely compatible with a   small meningioma. No significant associated mass effect.    The ventricular and sulcal size and configuration is age appropriate.   There are moderate patchy and confluent areas of hypodensity in the   periventricular and subcortical white matter which are likely related to   chronic microangiopathic changes.    There is no evidence of midline shift, acute intracranial hemorrhage, or   extra-axial collections.     The calvarium is intact. The paranasal sinuses are clear.The mastoid air   cells are predominantly clear. There has been prior bilateral cataract   surgery.      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  No acute intracranial hemorrhage.          ANALYSIS AND PLAN:  This is an 88-year-old with left gaze preference, dysarthria, expressive aphasia, right facial droop, and right hemiparesis.  Clinical impression is cerebrovascular accident of the left MCA territory, what could be calculated by NIH Stroke Scale with me would be 10.  The patient would not be a tPA candidate secondary to onset of symptoms over 3 hours.  I will recommend telemetry evaluation and interrogation of pacemakerto evaluate for atrial fibrillation.  For now, we will allow permissive hypertensive for the next 24-48 hours, then after that can start lowering the blood pressure by 15% per day.  I will start the patient on aspirin per rectum.  I will recommend high-dose statin.  Echocardiogram.  Speech and Swallow evaluation consider MBS if needed   Physical Therapy evaluation.  overall patient is making improvement motor and speech wise      Spoke with cousin, Dae, at bedside, who is the healthcare proxy, also called daughter, updated her, Dae's telephone number is [phone] 12/5      52 minutes of time was spent with the patient, plan of care, reviewing data, with greater than  50% of the visit was spent counseling and/or coordinating care with multidisciplinary healthcare team       Neurology follow up note    REBEKAH [id number]      Interval History:    Patient feels ok no new complaints.    MEDICATIONS    acetaminophen     Tablet .. 650 milliGRAM(s) Oral every 6 hours PRN  aspirin Suppository 300 milliGRAM(s) Rectal daily  atorvastatin 80 milliGRAM(s) Oral at bedtime  heparin   Injectable 5000 Unit(s) SubCutaneous every 8 hours  influenza  Vaccine (HIGH DOSE) 0.7 milliLiter(s) IntraMuscular once      Allergies    penicillin (Rash)  IV Contrast (Anaphylaxis; Urticaria)    Intolerances            Vital Signs Last 24 Hrs  T(C): 37.1 (05 Dec 2023 10:00), Max: 37.9 (04 Dec 2023 12:00)  T(F): 98.8 (05 Dec 2023 10:00), Max: 100.2 (04 Dec 2023 12:00)  HR: 73 (05 Dec 2023 10:00) (63 - 101)  BP: 156/82 (05 Dec 2023 10:00) (126/79 - 183/83)  BP(mean): 100 (05 Dec 2023 10:00) (67 - 157)  RR: 19 (05 Dec 2023 10:00) (12 - 28)  SpO2: 97% (05 Dec 2023 10:00) (95% - 98%)    Parameters below as of 05 Dec 2023 10:00  Patient On (Oxygen Delivery Method): nasal cannula  O2 Flow (L/min): 3        REVIEW OF SYSTEMS:    Constitutional: No fever, chills, fatigue, right sided weakness  Eyes: no eye pain, visual disturbances, or discharge  ENT:  No difficulty hearing, tinnitus, vertigo; No sinus or throat pain  Neck: No pain or stiffness  Respiratory: No cough, dyspnea, wheezing   Cardiovascular: No chest pain, palpitations,   Gastrointestinal: No abdominal or epigastric pain. No nausea, vomiting  No diarrhea or constipation.   Genitourinary: No dysuria, frequency, hematuria or incontinence  Neurological: No headaches, lightheadedness, vertigo, numbness or tremors  Psychiatric: No depression, anxiety, mood swings or difficulty sleeping  Musculoskeletal: No joint pain or swelling; No muscle, back or extremity pain  Skin: No itching, burning, rashes or lesions   Lymph Nodes: No enlarged glands  Endocrine: No heat or cold intolerance; No hair loss   Allergy and Immunologic: No hives or eczema    On Neurological Examination:    Mental Status - Patient is alert, awak      Follow simple commands      Speech -   Dysarthria  Aphasia                    Cranial Nerves - Pupils 3 mm equal and reactive to light,   extraocular eye movements intact.   right face droop     Motor Exam -   Right upper 2/5  Left upper5/5  Right lower2-/5  Left lower 4/5    Muscle tone - is normal all over.  No asymmetry is seen.      Sensory    Bilateral intact to light touch    GENERAL Exam: Nontoxic , No Acute Distress   	  HEENT:  normocephalic, atraumatic  		  LUNGS:  Decreased bilaterally  	  HEART: Normal S1S2   No murmur RRR        	  GI/ ABDOMEN:  Soft  Non tender    EXTREMITIES:   No Edema  No Clubbing  No Cyanosis     MUSCULOSKELETAL: Normal Range of Motion  	   SKIN: Normal  No Ecchymosis               LABS:  CBC Full  -  ( 04 Dec 2023 05:30 )  WBC Count : 7.68 K/uL  RBC Count : 4.05 M/uL  Hemoglobin : 10.3 g/dL  Hematocrit : 33.5 %  Platelet Count - Automated : 200 K/uL  Mean Cell Volume : 82.7 fl  Mean Cell Hemoglobin : 25.4 pg  Mean Cell Hemoglobin Concentration : 30.7 gm/dL  Auto Neutrophil # : x  Auto Lymphocyte # : x  Auto Monocyte # : x  Auto Eosinophil # : x  Auto Basophil # : x  Auto Neutrophil % : x  Auto Lymphocyte % : x  Auto Monocyte % : x  Auto Eosinophil % : x  Auto Basophil % : x    Urinalysis Basic - ( 05 Dec 2023 06:00 )    Color: x / Appearance: x / SG: x / pH: x  Gluc: 116 mg/dL / Ketone: x  / Bili: x / Urobili: x   Blood: x / Protein: x / Nitrite: x   Leuk Esterase: x / RBC: x / WBC x   Sq Epi: x / Non Sq Epi: x / Bacteria: x      12-05    139  |  104  |  27<H>  ----------------------------<  116<H>  3.3<L>   |  25  |  0.75    Ca    9.3      05 Dec 2023 06:00      Hemoglobin A1C:       Vitamin B12         RADIOLOGY  < from: CT Brain Stroke Protocol (12.04.23 @ 11:46) >    CLINICAL INDICATION: Stroke.    TECHNIQUE: Volumetric CT acquisition was performed through the brain and   reviewed using brain and bone window technique.      COMPARISON: CT head 12/2/2020    FINDINGS:  Left frontal, temporal parietal and occipital large region of   hypoattenuation, loss of gray-white differentiation and sulcal effacement   consistent with acute to early subacute infarction. There is asymmetric   hyperdensity of several distal branches of the left middle cerebral   artery consistent with thrombosis.    There is a 7 x 8 mm hyperattenuating partiallycalcified extra-axial   lesion along the left greater sphenoid wing likely compatible with a   small meningioma. No significant associated mass effect.    The ventricular and sulcal size and configuration is age appropriate.   There are moderate patchy and confluent areas of hypodensity in the   periventricular and subcortical white matter which are likely related to   chronic microangiopathic changes.    There is no evidence of midline shift, acute intracranial hemorrhage, or   extra-axial collections.     The calvarium is intact. The paranasal sinuses are clear.The mastoid air   cells are predominantly clear. There has been prior bilateral cataract   surgery.      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  No acute intracranial hemorrhage.          ANALYSIS AND PLAN:  This is an 88-year-old with left gaze preference, dysarthria, expressive aphasia, right facial droop, and right hemiparesis.  Clinical impression is cerebrovascular accident of the left MCA territory, what could be calculated by NIH Stroke Scale with me would be 10.  The patient would not be a tPA candidate secondary to onset of symptoms over 3 hours.  I will recommend telemetry evaluation and interrogation of pacemakerto evaluate for atrial fibrillation.  For now, we will allow permissive hypertensive for the next 24-48 hours, then after that can start lowering the blood pressure by 15% per day.  I will start the patient on aspirin per rectum.  I will recommend high-dose statin.  Echocardiogram.  Speech and Swallow evaluation consider MBS if needed   Physical Therapy evaluation.  overall patient is making improvement motor and speech wise      Spoke with cousin, Dae, at bedside, who is the healthcare proxy, also called daughter, updated her, Dae's telephone number is [phone] 12/5      52 minutes of time was spent with the patient, plan of care, reviewing data, with greater than  50% of the visit was spent counseling and/or coordinating care with multidisciplinary healthcare team       Neurology follow up note    REBEKAH [id number]      Interval History:    Patient feels ok no new complaints.    MEDICATIONS    acetaminophen     Tablet .. 650 milliGRAM(s) Oral every 6 hours PRN  aspirin Suppository 300 milliGRAM(s) Rectal daily  atorvastatin 80 milliGRAM(s) Oral at bedtime  heparin   Injectable 5000 Unit(s) SubCutaneous every 8 hours  influenza  Vaccine (HIGH DOSE) 0.7 milliLiter(s) IntraMuscular once      Allergies    penicillin (Rash)  IV Contrast (Anaphylaxis; Urticaria)    Intolerances            Vital Signs Last 24 Hrs  T(C): 37.1 (05 Dec 2023 10:00), Max: 37.9 (04 Dec 2023 12:00)  T(F): 98.8 (05 Dec 2023 10:00), Max: 100.2 (04 Dec 2023 12:00)  HR: 73 (05 Dec 2023 10:00) (63 - 101)  BP: 156/82 (05 Dec 2023 10:00) (126/79 - 183/83)  BP(mean): 100 (05 Dec 2023 10:00) (67 - 157)  RR: 19 (05 Dec 2023 10:00) (12 - 28)  SpO2: 97% (05 Dec 2023 10:00) (95% - 98%)    Parameters below as of 05 Dec 2023 10:00  Patient On (Oxygen Delivery Method): nasal cannula  O2 Flow (L/min): 3        REVIEW OF SYSTEMS:    Constitutional: No fever, chills, fatigue, right sided weakness  Eyes: no eye pain, visual disturbances, or discharge  ENT:  No difficulty hearing, tinnitus, vertigo; No sinus or throat pain  Neck: No pain or stiffness  Respiratory: No cough, dyspnea, wheezing   Cardiovascular: No chest pain, palpitations,   Gastrointestinal: No abdominal or epigastric pain. No nausea, vomiting  No diarrhea or constipation.   Genitourinary: No dysuria, frequency, hematuria or incontinence  Neurological: No headaches, lightheadedness, vertigo, numbness or tremors  Psychiatric: No depression, anxiety, mood swings or difficulty sleeping  Musculoskeletal: No joint pain or swelling; No muscle, back or extremity pain  Skin: No itching, burning, rashes or lesions   Lymph Nodes: No enlarged glands  Endocrine: No heat or cold intolerance; No hair loss   Allergy and Immunologic: No hives or eczema    On Neurological Examination:    Mental Status - Patient is alert, awak      Follow simple commands      Speech -   Dysarthria  Aphasia                    Cranial Nerves - Pupils 3 mm equal and reactive to light,   extraocular eye movements intact.   right face droop     Motor Exam -   Right upper 2/5  Left upper5/5  Right lower2-/5  Left lower 4/5    Muscle tone - is normal all over.  No asymmetry is seen.      Sensory    Bilateral intact to light touch    GENERAL Exam: Nontoxic , No Acute Distress   	  HEENT:  normocephalic, atraumatic  		  LUNGS:  Decreased bilaterally  	  HEART: Normal S1S2   No murmur RRR        	  GI/ ABDOMEN:  Soft  Non tender    EXTREMITIES:   No Edema  No Clubbing  No Cyanosis     MUSCULOSKELETAL: Normal Range of Motion  	   SKIN: Normal  No Ecchymosis               LABS:  CBC Full  -  ( 04 Dec 2023 05:30 )  WBC Count : 7.68 K/uL  RBC Count : 4.05 M/uL  Hemoglobin : 10.3 g/dL  Hematocrit : 33.5 %  Platelet Count - Automated : 200 K/uL  Mean Cell Volume : 82.7 fl  Mean Cell Hemoglobin : 25.4 pg  Mean Cell Hemoglobin Concentration : 30.7 gm/dL  Auto Neutrophil # : x  Auto Lymphocyte # : x  Auto Monocyte # : x  Auto Eosinophil # : x  Auto Basophil # : x  Auto Neutrophil % : x  Auto Lymphocyte % : x  Auto Monocyte % : x  Auto Eosinophil % : x  Auto Basophil % : x    Urinalysis Basic - ( 05 Dec 2023 06:00 )    Color: x / Appearance: x / SG: x / pH: x  Gluc: 116 mg/dL / Ketone: x  / Bili: x / Urobili: x   Blood: x / Protein: x / Nitrite: x   Leuk Esterase: x / RBC: x / WBC x   Sq Epi: x / Non Sq Epi: x / Bacteria: x      12-05    139  |  104  |  27<H>  ----------------------------<  116<H>  3.3<L>   |  25  |  0.75    Ca    9.3      05 Dec 2023 06:00      Hemoglobin A1C:       Vitamin B12         RADIOLOGY  < from: CT Brain Stroke Protocol (12.04.23 @ 11:46) >    CLINICAL INDICATION: Stroke.    TECHNIQUE: Volumetric CT acquisition was performed through the brain and   reviewed using brain and bone window technique.      COMPARISON: CT head 12/2/2020    FINDINGS:  Left frontal, temporal parietal and occipital large region of   hypoattenuation, loss of gray-white differentiation and sulcal effacement   consistent with acute to early subacute infarction. There is asymmetric   hyperdensity of several distal branches of the left middle cerebral   artery consistent with thrombosis.    There is a 7 x 8 mm hyperattenuating partiallycalcified extra-axial   lesion along the left greater sphenoid wing likely compatible with a   small meningioma. No significant associated mass effect.    The ventricular and sulcal size and configuration is age appropriate.   There are moderate patchy and confluent areas of hypodensity in the   periventricular and subcortical white matter which are likely related to   chronic microangiopathic changes.    There is no evidence of midline shift, acute intracranial hemorrhage, or   extra-axial collections.     The calvarium is intact. The paranasal sinuses are clear.The mastoid air   cells are predominantly clear. There has been prior bilateral cataract   surgery.      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  No acute intracranial hemorrhage.          ANALYSIS AND PLAN:  This is an 88-year-old with left gaze preference, dysarthria, expressive aphasia, right facial droop, and right hemiparesis.  Clinical impression is cerebrovascular accident of the left MCA territory, what could be calculated by NIH Stroke Scale with me would be 10.  The patient would not be a tPA candidate secondary to onset of symptoms over 3 hours.   interrogation of pacemaker no  atrial fibrillation.  For now, we will allow permissive hypertensive for the next 24 hours, then after that can start lowering the blood pressure by 15% per day.  I will start the patient on aspirin per rectum.  I will recommend high-dose statin.  Echocardiogram.  Speech and Swallow evaluation consider MBS if needed   Physical Therapy evaluation.  overall patient is making improvement motor and speech wise      Spoke with cousin, Dae, at bedside, who is the healthcare proxy, also called daughter, updated her, Dae's telephone number is [phone] 12/5      52 minutes of time was spent with the patient, plan of care, reviewing data, with greater than  50% of the visit was spent counseling and/or coordinating care with multidisciplinary healthcare team       Neurology follow up note    REBEKAH [id number]      Interval History:    Patient feels ok no new complaints.    MEDICATIONS    acetaminophen     Tablet .. 650 milliGRAM(s) Oral every 6 hours PRN  aspirin Suppository 300 milliGRAM(s) Rectal daily  atorvastatin 80 milliGRAM(s) Oral at bedtime  heparin   Injectable 5000 Unit(s) SubCutaneous every 8 hours  influenza  Vaccine (HIGH DOSE) 0.7 milliLiter(s) IntraMuscular once      Allergies    penicillin (Rash)  IV Contrast (Anaphylaxis; Urticaria)    Intolerances            Vital Signs Last 24 Hrs  T(C): 37.1 (05 Dec 2023 10:00), Max: 37.9 (04 Dec 2023 12:00)  T(F): 98.8 (05 Dec 2023 10:00), Max: 100.2 (04 Dec 2023 12:00)  HR: 73 (05 Dec 2023 10:00) (63 - 101)  BP: 156/82 (05 Dec 2023 10:00) (126/79 - 183/83)  BP(mean): 100 (05 Dec 2023 10:00) (67 - 157)  RR: 19 (05 Dec 2023 10:00) (12 - 28)  SpO2: 97% (05 Dec 2023 10:00) (95% - 98%)    Parameters below as of 05 Dec 2023 10:00  Patient On (Oxygen Delivery Method): nasal cannula  O2 Flow (L/min): 3        REVIEW OF SYSTEMS:    Constitutional: No fever, chills, fatigue, right sided weakness  Eyes: no eye pain, visual disturbances, or discharge  ENT:  No difficulty hearing, tinnitus, vertigo; No sinus or throat pain  Neck: No pain or stiffness  Respiratory: No cough, dyspnea, wheezing   Cardiovascular: No chest pain, palpitations,   Gastrointestinal: No abdominal or epigastric pain. No nausea, vomiting  No diarrhea or constipation.   Genitourinary: No dysuria, frequency, hematuria or incontinence  Neurological: No headaches, lightheadedness, vertigo, numbness or tremors  Psychiatric: No depression, anxiety, mood swings or difficulty sleeping  Musculoskeletal: No joint pain or swelling; No muscle, back or extremity pain  Skin: No itching, burning, rashes or lesions   Lymph Nodes: No enlarged glands  Endocrine: No heat or cold intolerance; No hair loss   Allergy and Immunologic: No hives or eczema    On Neurological Examination:    Mental Status - Patient is alert, awak      Follow simple commands      Speech -   Dysarthria  Aphasia                    Cranial Nerves - Pupils 3 mm equal and reactive to light,   extraocular eye movements intact.   right face droop     Motor Exam -   Right upper 2/5  Left upper5/5  Right lower2-/5  Left lower 4/5    Muscle tone - is normal all over.  No asymmetry is seen.      Sensory    Bilateral intact to light touch    GENERAL Exam: Nontoxic , No Acute Distress   	  HEENT:  normocephalic, atraumatic  		  LUNGS:  Decreased bilaterally  	  HEART: Normal S1S2   No murmur RRR        	  GI/ ABDOMEN:  Soft  Non tender    EXTREMITIES:   No Edema  No Clubbing  No Cyanosis     MUSCULOSKELETAL: Normal Range of Motion  	   SKIN: Normal  No Ecchymosis               LABS:  CBC Full  -  ( 04 Dec 2023 05:30 )  WBC Count : 7.68 K/uL  RBC Count : 4.05 M/uL  Hemoglobin : 10.3 g/dL  Hematocrit : 33.5 %  Platelet Count - Automated : 200 K/uL  Mean Cell Volume : 82.7 fl  Mean Cell Hemoglobin : 25.4 pg  Mean Cell Hemoglobin Concentration : 30.7 gm/dL  Auto Neutrophil # : x  Auto Lymphocyte # : x  Auto Monocyte # : x  Auto Eosinophil # : x  Auto Basophil # : x  Auto Neutrophil % : x  Auto Lymphocyte % : x  Auto Monocyte % : x  Auto Eosinophil % : x  Auto Basophil % : x    Urinalysis Basic - ( 05 Dec 2023 06:00 )    Color: x / Appearance: x / SG: x / pH: x  Gluc: 116 mg/dL / Ketone: x  / Bili: x / Urobili: x   Blood: x / Protein: x / Nitrite: x   Leuk Esterase: x / RBC: x / WBC x   Sq Epi: x / Non Sq Epi: x / Bacteria: x      12-05    139  |  104  |  27<H>  ----------------------------<  116<H>  3.3<L>   |  25  |  0.75    Ca    9.3      05 Dec 2023 06:00      Hemoglobin A1C:       Vitamin B12         RADIOLOGY  < from: CT Brain Stroke Protocol (12.04.23 @ 11:46) >    CLINICAL INDICATION: Stroke.    TECHNIQUE: Volumetric CT acquisition was performed through the brain and   reviewed using brain and bone window technique.      COMPARISON: CT head 12/2/2020    FINDINGS:  Left frontal, temporal parietal and occipital large region of   hypoattenuation, loss of gray-white differentiation and sulcal effacement   consistent with acute to early subacute infarction. There is asymmetric   hyperdensity of several distal branches of the left middle cerebral   artery consistent with thrombosis.    There is a 7 x 8 mm hyperattenuating partiallycalcified extra-axial   lesion along the left greater sphenoid wing likely compatible with a   small meningioma. No significant associated mass effect.    The ventricular and sulcal size and configuration is age appropriate.   There are moderate patchy and confluent areas of hypodensity in the   periventricular and subcortical white matter which are likely related to   chronic microangiopathic changes.    There is no evidence of midline shift, acute intracranial hemorrhage, or   extra-axial collections.     The calvarium is intact. The paranasal sinuses are clear.The mastoid air   cells are predominantly clear. There has been prior bilateral cataract   surgery.      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  No acute intracranial hemorrhage.          ANALYSIS AND PLAN:  This is an 88-year-old with left gaze preference, dysarthria, expressive aphasia, right facial droop, and right hemiparesis.  Clinical impression is cerebrovascular accident of the left MCA territory, what could be calculated by NIH Stroke Scale with me would be 10.  The patient would not be a tPA candidate secondary to onset of symptoms over 3 hours.   interrogation of pacemaker no  atrial fibrillation.  For now, we will allow permissive hypertensive for the next 24 hours, then after that can start lowering the blood pressure by 15% per day.  I will start the patient on aspirin per rectum.  I will recommend high-dose statin.  Echocardiogram.  Speech and Swallow evaluation consider MBS if needed   Physical Therapy evaluation.  overall patient is making improvement motor and speech wise      Spoke with cousin, Dae, at bedside, who is the healthcare proxy, also called daughter, updated her, Dae's telephone number is [phone] 12/5      52 minutes of time was spent with the patient, plan of care, reviewing data, with greater than  50% of the visit was spent counseling and/or coordinating care with multidisciplinary healthcare team

## 2023-12-05 NOTE — SOCIAL WORK PROGRESS NOTE - NSSWPROGRESSNOTE_GEN_ALL_CORE
Pt experienced anaphylaxis from contrast dye and is in the process of having a full stroke work up. Per RN, right side is improving. SBIRT consult received. SW attempted to interview the pt, who was agitated about her discomfort in her bed and was having difficulty communicating to me. Interview for SBIRT is not appropriate at this time.

## 2023-12-05 NOTE — PROGRESS NOTE ADULT - ASSESSMENT
88-year-old female with history of hypertension pacemaker right hip replacement brought by ambulance from home for evaluation of episode of weakness and facial droop    extubated  neuro eval noted  npo  HOB elev  asp prec  w.u. in progress  vs noted  labs reviewed  CNS imaging reviewed  monitor VS and HD and Sat  asp prec

## 2023-12-05 NOTE — CONSULT NOTE ADULT - SUBJECTIVE AND OBJECTIVE BOX
S : 88y year old Female seen at bedside for left leg ulcer.  Pt is unable to communicate, Family member present bedside.  States patient had a breakdown of her skin left leg around 1 month ago, but has since noticed improvement    Chief Complaint : Patient is a 88y old  Female who presents with a chief complaint of Unresponsive. (05 Dec 2023 11:39)    HPI : HPI:  This is an 87 y/o F with PMH of HTN, PPM, Breast CA s/p left mastectomy, Nodular Goitre, and Allergic Rhinitis who presented with an acute onset of right sided weakness with right facial droop & slurred speech that was witnessed by the daughter, no AMS, no seizure activity. By time she arrived at the ED she was already back to her baseline, NIHSS was zero by ED staff. Patient was sent to CT suit for a brain CT & Brain CTA, and after she returned form CT she started showing diffuse skin rash, SOB with loud chest wheeze, didn't respond to steroids, Epinephrine, and H1 blockers, her SPO2 dropped, had a severe hypertensive response, and had to be emergently intubated by ED team. She was found with massive acute pulmonary edema. No known previous history of allergy to iodine or IV contrast. (02 Dec 2023 21:38)      Patient admits to  (-) Fevers, (-) Chills, (-) Nausea, (-) Vomiting, (-) Shortness of Breath      PMH: Hyperlipidemia    Breast cancer    [place] (hard of hearing)    Meniscus tear    Cyst of left ovary    SDH (subdural hematoma)      PSH:History of Left Mastectomy    Cataract extraction status of eye, right    Status post right cataract extraction        Allergies:penicillin (Rash)  IV Contrast (Anaphylaxis; Urticaria)      Labs:                          10.3   7.68  )-----------( 200      ( 04 Dec 2023 05:30 )             33.5     WBC Trend  7.68 Date (12-04 @ 05:30)  12.98<H> Date (12-03 @ 06:11)  10.45 Date (12-02 @ 19:26)      Chem  12-05    139  |  104  |  27<H>  ----------------------------<  116<H>  3.3<L>   |  25  |  0.75    Ca    9.3      05 Dec 2023 06:00            T(F): 98.8 (12-05-23 @ 10:00), Max: 100.2 (12-04-23 @ 21:00)  HR: 81 (12-05-23 @ 12:25) (63 - 101)  BP: 155/81 (12-05-23 @ 12:25) (126/79 - 183/83)  RR: 17 (12-05-23 @ 12:25) (12 - 28)  SpO2: 97% (12-05-23 @ 12:25) (95% - 99%)  Wt(kg): --    O:   General: Pleasant  female NAD   Integument:  Skin warm, dry and supple bilateral.    Venous ulcer left anterior leg, - hyperkeratotic border, wound base Granular, wound size (2.5cm X 3 cm X 0.1cm) - edema, - gila-wound erythema, - purulence, - fluctuance, - tracking/tunneling, - probe to bone.   Vascular: Dorsalis Pedis and Posterior Tibial pulses 2/4.  Capillary re-fill time less than 3 seconds digits 1-5 bilateral.  Varicosities noted b/l lower extremity  Neuro: Unable to perform due to mental baseline  MSK: Unable to perform due to mental baseline   S : 88y year old Female seen at bedside for left leg ulcer.  Pt is unable to communicate, Family member present bedside.  States patient had a breakdown of her skin left leg around 1 month ago, but has since noticed improvement    Chief Complaint : Patient is a 88y old  Female who presents with a chief complaint of Unresponsive. (05 Dec 2023 11:39)    HPI : HPI:  This is an 87 y/o F with PMH of HTN, PPM, Breast CA s/p left mastectomy, Nodular Goitre, and Allergic Rhinitis who presented with an acute onset of right sided weakness with right facial droop & slurred speech that was witnessed by the daughter, no AMS, no seizure activity. By time she arrived at the ED she was already back to her baseline, NIHSS was zero by ED staff. Patient was sent to CT suit for a brain CT & Brain CTA, and after she returned form CT she started showing diffuse skin rash, SOB with loud chest wheeze, didn't respond to steroids, Epinephrine, and H1 blockers, her SPO2 dropped, had a severe hypertensive response, and had to be emergently intubated by ED team. She was found with massive acute pulmonary edema. No known previous history of allergy to iodine or IV contrast. (02 Dec 2023 21:38)      Patient admits to  (-) Fevers, (-) Chills, (-) Nausea, (-) Vomiting, (-) Shortness of Breath      PMH: Hyperlipidemia    Breast cancer    [place] (hard of hearing)    Meniscus tear    Cyst of left ovary    SDH (subdural hematoma)      PSH:History of Left Mastectomy    Cataract extraction status of eye, right    Status post right cataract extraction        Allergies:penicillin (Rash)  IV Contrast (Anaphylaxis; Urticaria)      Labs:                          10.3   7.68  )-----------( 200      ( 04 Dec 2023 05:30 )             33.5     WBC Trend  7.68 Date (12-04 @ 05:30)  12.98<H> Date (12-03 @ 06:11)  10.45 Date (12-02 @ 19:26)      Chem  12-05    139  |  104  |  27<H>  ----------------------------<  116<H>  3.3<L>   |  25  |  0.75    Ca    9.3      05 Dec 2023 06:00            T(F): 98.8 (12-05-23 @ 10:00), Max: 100.2 (12-04-23 @ 21:00)  HR: 81 (12-05-23 @ 12:25) (63 - 101)  BP: 155/81 (12-05-23 @ 12:25) (126/79 - 183/83)  RR: 17 (12-05-23 @ 12:25) (12 - 28)  SpO2: 97% (12-05-23 @ 12:25) (95% - 99%)  Wt(kg): --    O:   General: Pleasant  female NAD   Integument:  Skin warm, dry and supple bilateral.    Venous ulcer left anterior leg, - hyperkeratotic border, wound base Granular, wound size (2.5cm X 3 cm X 0.1cm) - edema, - gila-wound erythema, - purulence, - fluctuance, - tracking/tunneling, - probe to bone.   Vascular: Dorsalis Pedis and Posterior Tibial pulses 2/4.  Capillary re-fill time less than 3 seconds digits 1-5 bilateral.  Varicosities noted b/l lower extremity  Neuro: Unable to perform due to mental baseline  MSK: Unable to perform due to mental baseline

## 2023-12-05 NOTE — SWALLOW BEDSIDE ASSESSMENT ADULT - ADDITIONAL RECOMMENDATIONS
Please reconsult this service as indicated, with change in status that could impact swallow function

## 2023-12-05 NOTE — SWALLOW BEDSIDE ASSESSMENT ADULT - ASR SWALLOW RECOMMEND DIAG
will defer at this time given overt signs of aspiration at bedside, will determine need for objective testing upon SLP follow up

## 2023-12-05 NOTE — CHART NOTE - NSCHARTNOTEFT_GEN_A_CORE
Pacemaker Interrogation    Company: [employer]    Type: Dual chamber pacemaker    Battery: 10.2-10.8 years    Parameters: DDD 60/105 bpm    Pacing: AP 23%,  38%    Events: Brief atrial runs - no sustained AT/AF    Leads:  A: sense >5.0 mV, threshold 0.75 V @ 0.4 ms, impedance 510 ohms  V: sense >12.0 mV, threshold 0.75 V @ 0.4 ms, impedance 480 ohms    Impression:  Normally functioning dual chamber pacemaker

## 2023-12-05 NOTE — PROGRESS NOTE ADULT - ASSESSMENT
The patient is an 88 year old female with a history of HTN, hypothyroidism, pacemaker, breast cancer who is admitted with TIA, acute respiratory failure due to IV contrast anaphylaxis.    Plan:  - ECG with sinus rhythm and intermittent ventricular pacing  - Troponin mildly elevated at 84 in the setting of demand ischemia from respiratory failure  - CXR with florid pulm edema in the setting of anaphylaxis - now resolved  - Repeat CT head with large CVA  - Pacemaker interrogated - no evidence of AF  - Check echo  - Monitor on telemetry  - Neurology follow-up

## 2023-12-05 NOTE — PROGRESS NOTE ADULT - ASSESSMENT
88F HTN, Breast CA admitted for Acute Hypoxic Respiratory Failure    Acute Left MCA Stroke  Multiple embolic areas noted on CT Imaging   Right Sided Hemiparesis; Expressive Aphasia noted   Unclear if patient can swallow and evaluation pending   May need PEG Tube for feeding   Aspirin 300mg Rectal and PO High Dose statin when PO can be tolerated or Feeding Tube  Pacemaker interrogated showing Atrial runs but no sustained Atrial Fibrillation   Neurology Consult noted    Acute Hypoxic Respiratory Failure  Resolved and Extubated 12/4/23  Related to Anaphylactic Shock from Iodine Contrast Dye from CT    HTN  Allowing for permissive HTN for first 24-48 hours  Will address if SBP >190    Breast CA  History of Mastectomy    Diet  Enteral Feeding may need needed    DVT Prophylaxis  Heparin SC TID    Disposition   Discharge planning pending hospital course

## 2023-12-05 NOTE — PHYSICAL THERAPY INITIAL EVALUATION ADULT - GENERAL OBSERVATIONS, REHAB EVAL
+tele, BP cuff, Pulseox, primafit, left lower leg dressing +tele, BP cuff, Pulseox, primafit, left lower leg dressing, +right facial droop,

## 2023-12-05 NOTE — PHYSICAL THERAPY INITIAL EVALUATION ADULT - FOLLOWS COMMANDS/ANSWERS QUESTIONS, REHAB EVAL
50% of the time/able to follow single-step instructions 50% of the time/able to follow single-step instructions/aphasia

## 2023-12-05 NOTE — PROGRESS NOTE ADULT - SUBJECTIVE AND OBJECTIVE BOX
Chief Complaint: Facial droop, right-sided weakness    Interval Events: No events overnight.    Review of Systems:  Unable to obtain    Physical Exam:  Vital Signs Last 24 Hrs  T(C): 37.3 (05 Dec 2023 08:00), Max: 37.9 (04 Dec 2023 12:00)  T(F): 99.1 (05 Dec 2023 08:00), Max: 100.2 (04 Dec 2023 12:00)  HR: 76 (05 Dec 2023 08:00) (63 - 101)  BP: 177/146 (05 Dec 2023 08:00) (126/79 - 183/83)  BP(mean): 157 (05 Dec 2023 08:00) (68 - 157)  RR: 28 (05 Dec 2023 08:00) (13 - 28)  SpO2: 97% (05 Dec 2023 08:00) (91% - 98%)  Parameters below as of 05 Dec 2023 08:00  Patient On (Oxygen Delivery Method): nasal cannula  O2 Flow (L/min): 3  General: NAD  HEENT: Intubated  Neck: No JVD, no carotid bruit  Lungs: CTAB  CV: RRR, nl S1/S2, no M/R/G  Abdomen: S/NT/ND, +BS  Extremities: No LE edema, no cyanosis  Neuro: AAOx3, non-focal  Skin: No rash    Labs:    12-05    139  |  104  |  27<H>  ----------------------------<  116<H>  3.3<L>   |  25  |  0.75    Ca    9.3      05 Dec 2023 06:00                          10.3   7.68  )-----------( 200      ( 04 Dec 2023 05:30 )             33.5       ECG/Telemetry: NSR, ventricular paced

## 2023-12-05 NOTE — SWALLOW BEDSIDE ASSESSMENT ADULT - SWALLOW EVAL: THERAPY FREQUENCY
as schedule permits f/u to include re assessment to determine diet tolerance, potential for diet advancement, therapy candidacy, appropriate further management/as schedule permits

## 2023-12-06 LAB
ANION GAP SERPL CALC-SCNC: 10 MMOL/L — SIGNIFICANT CHANGE UP (ref 5–17)
ANION GAP SERPL CALC-SCNC: 10 MMOL/L — SIGNIFICANT CHANGE UP (ref 5–17)
BUN SERPL-MCNC: 29 MG/DL — HIGH (ref 7–23)
BUN SERPL-MCNC: 29 MG/DL — HIGH (ref 7–23)
CALCIUM SERPL-MCNC: 8.9 MG/DL — SIGNIFICANT CHANGE UP (ref 8.4–10.5)
CALCIUM SERPL-MCNC: 8.9 MG/DL — SIGNIFICANT CHANGE UP (ref 8.4–10.5)
CHLORIDE SERPL-SCNC: 101 MMOL/L — SIGNIFICANT CHANGE UP (ref 96–108)
CHLORIDE SERPL-SCNC: 101 MMOL/L — SIGNIFICANT CHANGE UP (ref 96–108)
CO2 SERPL-SCNC: 25 MMOL/L — SIGNIFICANT CHANGE UP (ref 22–31)
CO2 SERPL-SCNC: 25 MMOL/L — SIGNIFICANT CHANGE UP (ref 22–31)
CREAT SERPL-MCNC: 0.68 MG/DL — SIGNIFICANT CHANGE UP (ref 0.5–1.3)
CREAT SERPL-MCNC: 0.68 MG/DL — SIGNIFICANT CHANGE UP (ref 0.5–1.3)
EGFR: 84 ML/MIN/1.73M2 — SIGNIFICANT CHANGE UP
EGFR: 84 ML/MIN/1.73M2 — SIGNIFICANT CHANGE UP
GLUCOSE SERPL-MCNC: 111 MG/DL — HIGH (ref 70–99)
GLUCOSE SERPL-MCNC: 111 MG/DL — HIGH (ref 70–99)
HCT VFR BLD CALC: 32.3 % — LOW (ref 34.5–45)
HCT VFR BLD CALC: 32.3 % — LOW (ref 34.5–45)
HGB BLD-MCNC: 10.2 G/DL — LOW (ref 11.5–15.5)
HGB BLD-MCNC: 10.2 G/DL — LOW (ref 11.5–15.5)
MCHC RBC-ENTMCNC: 25.6 PG — LOW (ref 27–34)
MCHC RBC-ENTMCNC: 25.6 PG — LOW (ref 27–34)
MCHC RBC-ENTMCNC: 31.6 GM/DL — LOW (ref 32–36)
MCHC RBC-ENTMCNC: 31.6 GM/DL — LOW (ref 32–36)
MCV RBC AUTO: 81.2 FL — SIGNIFICANT CHANGE UP (ref 80–100)
MCV RBC AUTO: 81.2 FL — SIGNIFICANT CHANGE UP (ref 80–100)
NRBC # BLD: 0 /100 WBCS — SIGNIFICANT CHANGE UP (ref 0–0)
NRBC # BLD: 0 /100 WBCS — SIGNIFICANT CHANGE UP (ref 0–0)
PLATELET # BLD AUTO: 210 K/UL — SIGNIFICANT CHANGE UP (ref 150–400)
PLATELET # BLD AUTO: 210 K/UL — SIGNIFICANT CHANGE UP (ref 150–400)
POTASSIUM SERPL-MCNC: 3.3 MMOL/L — LOW (ref 3.5–5.3)
POTASSIUM SERPL-MCNC: 3.3 MMOL/L — LOW (ref 3.5–5.3)
POTASSIUM SERPL-SCNC: 3.3 MMOL/L — LOW (ref 3.5–5.3)
POTASSIUM SERPL-SCNC: 3.3 MMOL/L — LOW (ref 3.5–5.3)
RBC # BLD: 3.98 M/UL — SIGNIFICANT CHANGE UP (ref 3.8–5.2)
RBC # BLD: 3.98 M/UL — SIGNIFICANT CHANGE UP (ref 3.8–5.2)
RBC # FLD: 17.7 % — HIGH (ref 10.3–14.5)
RBC # FLD: 17.7 % — HIGH (ref 10.3–14.5)
SODIUM SERPL-SCNC: 136 MMOL/L — SIGNIFICANT CHANGE UP (ref 135–145)
SODIUM SERPL-SCNC: 136 MMOL/L — SIGNIFICANT CHANGE UP (ref 135–145)
WBC # BLD: 7.4 K/UL — SIGNIFICANT CHANGE UP (ref 3.8–10.5)
WBC # BLD: 7.4 K/UL — SIGNIFICANT CHANGE UP (ref 3.8–10.5)
WBC # FLD AUTO: 7.4 K/UL — SIGNIFICANT CHANGE UP (ref 3.8–10.5)
WBC # FLD AUTO: 7.4 K/UL — SIGNIFICANT CHANGE UP (ref 3.8–10.5)

## 2023-12-06 PROCEDURE — 99233 SBSQ HOSP IP/OBS HIGH 50: CPT

## 2023-12-06 RX ORDER — LISINOPRIL 2.5 MG/1
10 TABLET ORAL DAILY
Refills: 0 | Status: DISCONTINUED | OUTPATIENT
Start: 2023-12-06 | End: 2023-12-08

## 2023-12-06 RX ORDER — ACETAMINOPHEN 500 MG
650 TABLET ORAL ONCE
Refills: 0 | Status: COMPLETED | OUTPATIENT
Start: 2023-12-06 | End: 2023-12-06

## 2023-12-06 RX ORDER — SODIUM CHLORIDE 9 MG/ML
1000 INJECTION, SOLUTION INTRAVENOUS
Refills: 0 | Status: DISCONTINUED | OUTPATIENT
Start: 2023-12-06 | End: 2023-12-07

## 2023-12-06 RX ORDER — MUPIROCIN 20 MG/G
1 OINTMENT TOPICAL
Refills: 0 | Status: DISCONTINUED | OUTPATIENT
Start: 2023-12-06 | End: 2023-12-08

## 2023-12-06 RX ORDER — POTASSIUM CHLORIDE 20 MEQ
40 PACKET (EA) ORAL
Refills: 0 | Status: COMPLETED | OUTPATIENT
Start: 2023-12-06 | End: 2023-12-06

## 2023-12-06 RX ORDER — POTASSIUM CHLORIDE 20 MEQ
40 PACKET (EA) ORAL EVERY 4 HOURS
Refills: 0 | Status: DISCONTINUED | OUTPATIENT
Start: 2023-12-06 | End: 2023-12-06

## 2023-12-06 RX ADMIN — Medication 650 MILLIGRAM(S): at 11:05

## 2023-12-06 RX ADMIN — MUPIROCIN 1 APPLICATION(S): 20 OINTMENT TOPICAL at 10:30

## 2023-12-06 RX ADMIN — Medication 325 MILLIGRAM(S): at 11:04

## 2023-12-06 RX ADMIN — SODIUM CHLORIDE 75 MILLILITER(S): 9 INJECTION, SOLUTION INTRAVENOUS at 11:04

## 2023-12-06 RX ADMIN — ATORVASTATIN CALCIUM 80 MILLIGRAM(S): 80 TABLET, FILM COATED ORAL at 22:56

## 2023-12-06 RX ADMIN — Medication 40 MILLIEQUIVALENT(S): at 12:38

## 2023-12-06 RX ADMIN — Medication 40 MILLIEQUIVALENT(S): at 17:10

## 2023-12-06 RX ADMIN — Medication 650 MILLIGRAM(S): at 12:00

## 2023-12-06 NOTE — PROGRESS NOTE ADULT - SUBJECTIVE AND OBJECTIVE BOX
PROGRESS NOTE   Patient is a 88y old  Female who presents with a chief complaint of Unresponsive. (06 Dec 2023 05:58)      HPI:  This is an 87 y/o F with PMH of HTN, PPM, Breast CA s/p left mastectomy, Nodular Goitre, and Allergic Rhinitis who presented with an acute onset of right sided weakness with right facial droop & slurred speech that was witnessed by the daughter, no AMS, no seizure activity. By time she arrived at the ED she was already back to her baseline, NIHSS was zero by ED staff. Patient was sent to CT suit for a brain CT & Brain CTA, and after she returned form CT she started showing diffuse skin rash, SOB with loud chest wheeze, didn't respond to steroids, Epinephrine, and H1 blockers, her SPO2 dropped, had a severe hypertensive response, and had to be emergently intubated by ED team. She was found with massive acute pulmonary edema. No known previous history of allergy to iodine or IV contrast. (02 Dec 2023 21:38)      Vital Signs Last 24 Hrs  T(C): 37.3 (06 Dec 2023 07:00), Max: 37.6 (05 Dec 2023 20:00)  T(F): 99.1 (06 Dec 2023 07:00), Max: 99.7 (05 Dec 2023 20:00)  HR: 67 (06 Dec 2023 07:00) (67 - 102)  BP: 171/72 (06 Dec 2023 07:00) (129/77 - 178/58)  BP(mean): 99 (06 Dec 2023 07:00) (81 - 116)  RR: 25 (06 Dec 2023 07:00) (14 - 28)  SpO2: 96% (06 Dec 2023 07:00) (93% - 99%)    Parameters below as of 06 Dec 2023 06:00  Patient On (Oxygen Delivery Method): room air                              10.2   7.40  )-----------( 210      ( 06 Dec 2023 05:50 )             32.3               12-06    136  |  101  |  29<H>  ----------------------------<  111<H>  3.3<L>   |  25  |  0.68    Ca    8.9      06 Dec 2023 05:50        PHYSICAL EXAM  General: Pleasant  female NAD   Integument:  Skin warm, dry and supple bilateral.    Venous ulcer left anterior leg, - hyperkeratotic border, wound base Granular, wound size (2.5cm X 3 cm X 0.1cm) - edema, - gila-wound erythema, - purulence, - fluctuance, - tracking/tunneling, - probe to bone.   Vascular: Dorsalis Pedis and Posterior Tibial pulses 2/4.  Capillary re-fill time less than 3 seconds digits 1-5 bilateral.  Varicosities noted b/l lower extremity  Neuro: Unable to perform due to mental baseline  MSK: Unable to perform due to mental baseline

## 2023-12-06 NOTE — PROGRESS NOTE ADULT - SUBJECTIVE AND OBJECTIVE BOX
Chief Complaint: Facial droop, right-sided weakness    Interval Events: No events overnight.    Review of Systems:  Unable to obtain    Physical Exam:  Vital Signs Last 24 Hrs  T(C): 37.3 (06 Dec 2023 07:00), Max: 37.6 (05 Dec 2023 20:00)  T(F): 99.1 (06 Dec 2023 07:00), Max: 99.7 (05 Dec 2023 20:00)  HR: 67 (06 Dec 2023 07:00) (67 - 102)  BP: 171/72 (06 Dec 2023 07:00) (129/77 - 178/58)  BP(mean): 99 (06 Dec 2023 07:00) (81 - 116)  RR: 25 (06 Dec 2023 07:00) (14 - 28)  SpO2: 96% (06 Dec 2023 07:00) (93% - 99%)  Parameters below as of 06 Dec 2023 06:00  Patient On (Oxygen Delivery Method): room air  General: NAD  HEENT: Intubated  Neck: No JVD, no carotid bruit  Lungs: CTAB  CV: RRR, nl S1/S2, no M/R/G  Abdomen: S/NT/ND, +BS  Extremities: No LE edema, no cyanosis  Neuro: AAOx3, non-focal  Skin: No rash    Labs:    12-06    136  |  101  |  29<H>  ----------------------------<  111<H>  3.3<L>   |  25  |  0.68    Ca    8.9      06 Dec 2023 05:50                          10.2   7.40  )-----------( 210      ( 06 Dec 2023 05:50 )             32.3       ECG/Telemetry: NSR, ventricular paced

## 2023-12-06 NOTE — PROGRESS NOTE ADULT - SUBJECTIVE AND OBJECTIVE BOX
Subjective: Patient seen and examined. No overnight events.  Right sided weakness is improving.     MEDICATIONS  (STANDING):  aspirin 325 milliGRAM(s) Oral daily  atorvastatin 80 milliGRAM(s) Oral at bedtime  dextrose 5% + sodium chloride 0.45%. 1000 milliLiter(s) (75 mL/Hr) IV Continuous <Continuous>  influenza  Vaccine (HIGH DOSE) 0.7 milliLiter(s) IntraMuscular once  mupirocin 2% Ointment 1 Application(s) Topical <User Schedule>  potassium chloride    Tablet ER 40 milliEquivalent(s) Oral every 4 hours    MEDICATIONS  (PRN):  acetaminophen     Tablet .. 650 milliGRAM(s) Oral every 6 hours PRN Temp greater or equal to 38C (100.4F)      Allergies    penicillin (Rash)  IV Contrast (Anaphylaxis; Urticaria)    Intolerances        Vital Signs Last 24 Hrs  T(C): 37.3 (06 Dec 2023 07:00), Max: 37.6 (05 Dec 2023 20:00)  T(F): 99.1 (06 Dec 2023 07:00), Max: 99.7 (05 Dec 2023 20:00)  HR: 67 (06 Dec 2023 07:00) (67 - 102)  BP: 171/72 (06 Dec 2023 07:00) (129/77 - 178/58)  BP(mean): 99 (06 Dec 2023 07:00) (81 - 116)  RR: 25 (06 Dec 2023 07:00) (14 - 28)  SpO2: 96% (06 Dec 2023 07:00) (93% - 99%)    Parameters below as of 06 Dec 2023 06:00  Patient On (Oxygen Delivery Method): room air        PHYSICAL EXAM:  GENERAL: NAD, well-groomed, well-developed  HEAD:  Atraumatic, Normocephalic  ENMT: Moist mucous membranes,   NECK: Supple, No JVD, Normal thyroid  NERVOUS SYSTEM:  All 4 extremities mobile, no gross sensory deficits.   CHEST/LUNG: Clear to auscultation bilaterally; No rales, rhonchi, wheezing, or rubs  HEART: Regular rate and rhythm; No murmurs, rubs, or gallops  ABDOMEN: Soft, Nontender, Nondistended; Bowel sounds present  EXTREMITIES:  Right sided weakness improving       LABS:                        10.2   7.40  )-----------( 210      ( 06 Dec 2023 05:50 )             32.3     06 Dec 2023 05:50    136    |  101    |  29     ----------------------------<  111    3.3     |  25     |  0.68     Ca    8.9        06 Dec 2023 05:50        Urinalysis Basic - ( 06 Dec 2023 05:50 )    Color: x / Appearance: x / SG: x / pH: x  Gluc: 111 mg/dL / Ketone: x  / Bili: x / Urobili: x   Blood: x / Protein: x / Nitrite: x   Leuk Esterase: x / RBC: x / WBC x   Sq Epi: x / Non Sq Epi: x / Bacteria: x      CAPILLARY BLOOD GLUCOSE          RADIOLOGY & ADDITIONAL TESTS:    Imaging Personally Reviewed:  [ ] YES     Consultant(s) Notes Reviewed:      Care Discussed with Consultants/Other Providers:    Advanced Directives: [ ] DNR  [ ] No feeding tube  [ ] MOLST in chart  [ ] MOLST completed today  [ ] Unknown

## 2023-12-06 NOTE — OCCUPATIONAL THERAPY INITIAL EVALUATION ADULT - NS ASR FOLLOW COMMAND OT EVAL
Slurred speech & R facial droop/25% of the time/unable to follow multi-step instructions/speech unintelligible Slurred speech & R facial droop/50% of the time/unable to follow multi-step instructions/speech unintelligible

## 2023-12-06 NOTE — OCCUPATIONAL THERAPY INITIAL EVALUATION ADULT - LIVES WITH, PROFILE
Pt unable to provide social history 2* confusion. As per Care Coordination Assessment note, pt lives in home with her spouse.

## 2023-12-06 NOTE — OCCUPATIONAL THERAPY INITIAL EVALUATION ADULT - GENERAL OBSERVATIONS, REHAB EVAL
Pt found seated in bedside chair, +BP cuff, +pulse ox, + LLE dessing, +tele, +one-on-one observation

## 2023-12-06 NOTE — PROGRESS NOTE ADULT - ASSESSMENT
88-year-old female with history of hypertension pacemaker right hip replacement brought by ambulance from home for evaluation of episode of weakness and facial droop    po diet  TTE reviewed  neuro and cardio f/u  vs noted    extubated  neuro eval noted  HOB elev  asp prec  vs noted  labs reviewed  CNS imaging reviewed  monitor VS and HD and Sat  asp prec

## 2023-12-06 NOTE — PROGRESS NOTE ADULT - ASSESSMENT
88F HTN, Breast CA admitted for Acute Hypoxic Respiratory Failure    Acute Left MCA Stroke  Multiple embolic areas noted on CT Imaging   Right Sided Hemiparesis; Expressive Aphasia noted but all of it improving  Aspirin + Statin   Pacemaker interrogated showing Atrial runs but no sustained Atrial Fibrillation   Neurology Consult noted    Acute Hypoxic Respiratory Failure  Resolved and Extubated 12/4/23  Related to Anaphylactic Shock from Iodine Contrast Dye from CT    HTN  Allowing for permissive HTN for first 24-48 hours  Will address if SBP >190  Restart Lisinopril 10mg daily     Breast CA  History of Mastectomy    Diet  Passed swallow evaluation and diet changed to pureed    DVT Prophylaxis  Heparin SC TID    Disposition   Will transfer to Telemetry   Most likely will need rehab

## 2023-12-06 NOTE — OCCUPATIONAL THERAPY INITIAL EVALUATION ADULT - COGNITIVE, VISUAL PERCEPTUAL, OT EVAL
Pt will be able to follow 50% of single-step commands with min VC for redirection by 2-3 sessions. Pt will be able to follow 75% of single-step commands with min VC for redirection by 2-3 sessions.

## 2023-12-06 NOTE — PROGRESS NOTE ADULT - SUBJECTIVE AND OBJECTIVE BOX
Neurology follow up note    REBEKAH [id number]      Interval History:    Patient feels ok no new complaints.    Allergies    penicillin (Rash)  IV Contrast (Anaphylaxis; Urticaria)    Intolerances        MEDICATIONS    acetaminophen     Tablet .. 650 milliGRAM(s) Oral every 6 hours PRN  aspirin 325 milliGRAM(s) Oral daily  atorvastatin 80 milliGRAM(s) Oral at bedtime  dextrose 5% + sodium chloride 0.45%. 1000 milliLiter(s) IV Continuous <Continuous>  influenza  Vaccine (HIGH DOSE) 0.7 milliLiter(s) IntraMuscular once  lisinopril 10 milliGRAM(s) Oral daily  mupirocin 2% Ointment 1 Application(s) Topical <User Schedule>  potassium chloride   Powder 40 milliEquivalent(s) Oral every 2 hours              Vital Signs Last 24 Hrs  T(C): 37.3 (06 Dec 2023 07:00), Max: 37.6 (05 Dec 2023 20:00)  T(F): 99.1 (06 Dec 2023 07:00), Max: 99.7 (05 Dec 2023 20:00)  HR: 67 (06 Dec 2023 07:00) (67 - 102)  BP: 171/72 (06 Dec 2023 07:00) (129/77 - 178/58)  BP(mean): 99 (06 Dec 2023 07:00) (81 - 116)  RR: 25 (06 Dec 2023 07:00) (14 - 28)  SpO2: 96% (06 Dec 2023 07:00) (93% - 99%)    Parameters below as of 06 Dec 2023 06:00  Patient On (Oxygen Delivery Method): room air        REVIEW OF SYSTEMS:    Constitutional: No fever, chills, fatigue, right sided weakness  Eyes: no eye pain, visual disturbances, or discharge  ENT:  No difficulty hearing, tinnitus, vertigo; No sinus or throat pain  Neck: No pain or stiffness  Respiratory: No cough, dyspnea, wheezing   Cardiovascular: No chest pain, palpitations,   Gastrointestinal: No abdominal or epigastric pain. No nausea, vomiting  No diarrhea or constipation.   Genitourinary: No dysuria, frequency, hematuria or incontinence  Neurological: No headaches, lightheadedness, vertigo, numbness or tremors  Psychiatric: No depression, anxiety, mood swings or difficulty sleeping  Musculoskeletal: No joint pain or swelling; No muscle, back or extremity pain  Skin: No itching, burning, rashes or lesions   Lymph Nodes: No enlarged glands  Endocrine: No heat or cold intolerance; No hair loss   Allergy and Immunologic: No hives or eczema    On Neurological Examination:    Mental Status - Patient is alert, awak      Follow simple commands      Speech -   Dysarthria  Aphasia                    Cranial Nerves - Pupils 3 mm equal and reactive to light,   extraocular eye movements intact.   right face droop     Motor Exam -   Right upper 2/5  Left upper5/5  Right lower2-/5  Left lower 4/5    Muscle tone - is normal all over.  No asymmetry is seen.      Sensory    Bilateral intact to light touch    GENERAL Exam: Nontoxic , No Acute Distress   	  HEENT:  normocephalic, atraumatic  		  LUNGS:  Decreased bilaterally  	  HEART: Normal S1S2   No murmur RRR        	  GI/ ABDOMEN:  Soft  Non tender    EXTREMITIES:   No Edema  No Clubbing  No Cyanosis     MUSCULOSKELETAL: Normal Range of Motion  	   SKIN: Normal  No Ecchymosis               LABS:  CBC Full  -  ( 06 Dec 2023 05:50 )  WBC Count : 7.40 K/uL  RBC Count : 3.98 M/uL  Hemoglobin : 10.2 g/dL  Hematocrit : 32.3 %  Platelet Count - Automated : 210 K/uL  Mean Cell Volume : 81.2 fl  Mean Cell Hemoglobin : 25.6 pg  Mean Cell Hemoglobin Concentration : 31.6 gm/dL  Auto Neutrophil # : x  Auto Lymphocyte # : x  Auto Monocyte # : x  Auto Eosinophil # : x  Auto Basophil # : x  Auto Neutrophil % : x  Auto Lymphocyte % : x  Auto Monocyte % : x  Auto Eosinophil % : x  Auto Basophil % : x    Urinalysis Basic - ( 06 Dec 2023 05:50 )    Color: x / Appearance: x / SG: x / pH: x  Gluc: 111 mg/dL / Ketone: x  / Bili: x / Urobili: x   Blood: x / Protein: x / Nitrite: x   Leuk Esterase: x / RBC: x / WBC x   Sq Epi: x / Non Sq Epi: x / Bacteria: x      12-06    136  |  101  |  29<H>  ----------------------------<  111<H>  3.3<L>   |  25  |  0.68    Ca    8.9      06 Dec 2023 05:50      Hemoglobin A1C:       Vitamin B12         RADIOLOGY    < from: CT Brain Stroke Protocol (12.04.23 @ 11:46) >    CLINICAL INDICATION: Stroke.    TECHNIQUE: Volumetric CT acquisition was performed through the brain and   reviewed using brain and bone window technique.      COMPARISON: CT head 12/2/2020    FINDINGS:  Left frontal, temporal parietal and occipital large region of   hypoattenuation, loss of gray-white differentiation and sulcal effacement   consistent with acute to early subacute infarction. There is asymmetric   hyperdensity of several distal branches of the left middle cerebral   artery consistent with thrombosis.    There is a 7 x 8 mm hyperattenuating partiallycalcified extra-axial   lesion along the left greater sphenoid wing likely compatible with a   small meningioma. No significant associated mass effect.    The ventricular and sulcal size and configuration is age appropriate.   There are moderate patchy and confluent areas of hypodensity in the   periventricular and subcortical white matter which are likely related to   chronic microangiopathic changes.    There is no evidence of midline shift, acute intracranial hemorrhage, or   extra-axial collections.     The calvarium is intact. The paranasal sinuses are clear.The mastoid air   cells are predominantly clear. There has been prior bilateral cataract   surgery.      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  No acute intracranial hemorrhage.          ANALYSIS AND PLAN:  This is an 88-year-old with left gaze preference, dysarthria, expressive aphasia, right facial droop, and right hemiparesis.  Clinical impression is cerebrovascular accident of the left MCA territory, what could be calculated by NIH Stroke Scale with me would be 10.  The patient would not be a tPA candidate secondary to onset of symptoms over 3 hours.   interrogation of pacemaker no  atrial fibrillation.  ok to start lowering the blood pressure by 15% per day.  I will recommend high-dose statin.  Physical Therapy evaluation.  overall patient is making improvement motor and speech wise    monitor oral intake as needed    then 2 weeks can cut down 81 mg     Spoke with cousin, Dae, at bedside, who is the healthcare proxy, also called daughter, updated her, Dae's telephone number is [phone] 12/6      52 minutes of time was spent with the patient, plan of care, reviewing data, with greater than  50% of the visit was spent counseling and/or coordinating care with multidisciplinary healthcare team   Neurology follow up note    REBEKAH [id number]      Interval History:    Patient feels ok no new complaints.    Allergies    penicillin (Rash)  IV Contrast (Anaphylaxis; Urticaria)    Intolerances        MEDICATIONS    acetaminophen     Tablet .. 650 milliGRAM(s) Oral every 6 hours PRN  aspirin 325 milliGRAM(s) Oral daily  atorvastatin 80 milliGRAM(s) Oral at bedtime  dextrose 5% + sodium chloride 0.45%. 1000 milliLiter(s) IV Continuous <Continuous>  influenza  Vaccine (HIGH DOSE) 0.7 milliLiter(s) IntraMuscular once  lisinopril 10 milliGRAM(s) Oral daily  mupirocin 2% Ointment 1 Application(s) Topical <User Schedule>  potassium chloride   Powder 40 milliEquivalent(s) Oral every 2 hours              Vital Signs Last 24 Hrs  T(C): 37.3 (06 Dec 2023 07:00), Max: 37.6 (05 Dec 2023 20:00)  T(F): 99.1 (06 Dec 2023 07:00), Max: 99.7 (05 Dec 2023 20:00)  HR: 67 (06 Dec 2023 07:00) (67 - 102)  BP: 171/72 (06 Dec 2023 07:00) (129/77 - 178/58)  BP(mean): 99 (06 Dec 2023 07:00) (81 - 116)  RR: 25 (06 Dec 2023 07:00) (14 - 28)  SpO2: 96% (06 Dec 2023 07:00) (93% - 99%)    Parameters below as of 06 Dec 2023 06:00  Patient On (Oxygen Delivery Method): room air        REVIEW OF SYSTEMS:    Constitutional: No fever, chills, fatigue, right sided weakness  Eyes: no eye pain, visual disturbances, or discharge  ENT:  No difficulty hearing, tinnitus, vertigo; No sinus or throat pain  Neck: No pain or stiffness  Respiratory: No cough, dyspnea, wheezing   Cardiovascular: No chest pain, palpitations,   Gastrointestinal: No abdominal or epigastric pain. No nausea, vomiting  No diarrhea or constipation.   Genitourinary: No dysuria, frequency, hematuria or incontinence  Neurological: No headaches, lightheadedness, vertigo, numbness or tremors  Psychiatric: No depression, anxiety, mood swings or difficulty sleeping  Musculoskeletal: No joint pain or swelling; No muscle, back or extremity pain  Skin: No itching, burning, rashes or lesions   Lymph Nodes: No enlarged glands  Endocrine: No heat or cold intolerance; No hair loss   Allergy and Immunologic: No hives or eczema    On Neurological Examination:    Mental Status - Patient is alert, awak      Follow simple commands      Speech -   Dysarthria  Aphasia                    Cranial Nerves - Pupils 3 mm equal and reactive to light,   extraocular eye movements intact.   right face droop     Motor Exam -   Right upper 2/5  Left upper5/5  Right lower2-/5  Left lower 4/5    Muscle tone - is normal all over.  No asymmetry is seen.      Sensory    Bilateral intact to light touch    GENERAL Exam: Nontoxic , No Acute Distress   	  HEENT:  normocephalic, atraumatic  		  LUNGS:  Decreased bilaterally  	  HEART: Normal S1S2   No murmur RRR        	  GI/ ABDOMEN:  Soft  Non tender    EXTREMITIES:   No Edema  No Clubbing  No Cyanosis     MUSCULOSKELETAL: Normal Range of Motion  	   SKIN: Normal  No Ecchymosis               LABS:  CBC Full  -  ( 06 Dec 2023 05:50 )  WBC Count : 7.40 K/uL  RBC Count : 3.98 M/uL  Hemoglobin : 10.2 g/dL  Hematocrit : 32.3 %  Platelet Count - Automated : 210 K/uL  Mean Cell Volume : 81.2 fl  Mean Cell Hemoglobin : 25.6 pg  Mean Cell Hemoglobin Concentration : 31.6 gm/dL  Auto Neutrophil # : x  Auto Lymphocyte # : x  Auto Monocyte # : x  Auto Eosinophil # : x  Auto Basophil # : x  Auto Neutrophil % : x  Auto Lymphocyte % : x  Auto Monocyte % : x  Auto Eosinophil % : x  Auto Basophil % : x    Urinalysis Basic - ( 06 Dec 2023 05:50 )    Color: x / Appearance: x / SG: x / pH: x  Gluc: 111 mg/dL / Ketone: x  / Bili: x / Urobili: x   Blood: x / Protein: x / Nitrite: x   Leuk Esterase: x / RBC: x / WBC x   Sq Epi: x / Non Sq Epi: x / Bacteria: x      12-06    136  |  101  |  29<H>  ----------------------------<  111<H>  3.3<L>   |  25  |  0.68    Ca    8.9      06 Dec 2023 05:50      Hemoglobin A1C:       Vitamin B12         RADIOLOGY    < from: CT Brain Stroke Protocol (12.04.23 @ 11:46) >    CLINICAL INDICATION: Stroke.    TECHNIQUE: Volumetric CT acquisition was performed through the brain and   reviewed using brain and bone window technique.      COMPARISON: CT head 12/2/2020    FINDINGS:  Left frontal, temporal parietal and occipital large region of   hypoattenuation, loss of gray-white differentiation and sulcal effacement   consistent with acute to early subacute infarction. There is asymmetric   hyperdensity of several distal branches of the left middle cerebral   artery consistent with thrombosis.    There is a 7 x 8 mm hyperattenuating partiallycalcified extra-axial   lesion along the left greater sphenoid wing likely compatible with a   small meningioma. No significant associated mass effect.    The ventricular and sulcal size and configuration is age appropriate.   There are moderate patchy and confluent areas of hypodensity in the   periventricular and subcortical white matter which are likely related to   chronic microangiopathic changes.    There is no evidence of midline shift, acute intracranial hemorrhage, or   extra-axial collections.     The calvarium is intact. The paranasal sinuses are clear.The mastoid air   cells are predominantly clear. There has been prior bilateral cataract   surgery.      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  No acute intracranial hemorrhage.          ANALYSIS AND PLAN:  This is an 88-year-old with left gaze preference, dysarthria, expressive aphasia, right facial droop, and right hemiparesis.  Clinical impression is cerebrovascular accident of the left MCA territory, what could be calculated by NIH Stroke Scale with me would be 10.  The patient would not be a tPA candidate secondary to onset of symptoms over 3 hours.   interrogation of pacemaker no  atrial fibrillation.  ok to start lowering the blood pressure by 15% per day.  I will recommend high-dose statin.  Physical Therapy evaluation.  overall patient is making improvement motor and speech wise    monitor oral intake as needed    then 2 weeks can cut down 81 mg     Spoke with cousin, Dae, at bedside, who is the healthcare proxy, also called daughter, updated her, Dae's telephone number is [phone] 12/6      52 minutes of time was spent with the patient, plan of care, reviewing data, with greater than  50% of the visit was spent counseling and/or coordinating care with multidisciplinary healthcare team   Neurology follow up note    REBEKAH [id number]      Interval History:    Patient feels ok no new complaints.    Allergies    penicillin (Rash)  IV Contrast (Anaphylaxis; Urticaria)    Intolerances        MEDICATIONS    acetaminophen     Tablet .. 650 milliGRAM(s) Oral every 6 hours PRN  aspirin 325 milliGRAM(s) Oral daily  atorvastatin 80 milliGRAM(s) Oral at bedtime  dextrose 5% + sodium chloride 0.45%. 1000 milliLiter(s) IV Continuous <Continuous>  influenza  Vaccine (HIGH DOSE) 0.7 milliLiter(s) IntraMuscular once  lisinopril 10 milliGRAM(s) Oral daily  mupirocin 2% Ointment 1 Application(s) Topical <User Schedule>  potassium chloride   Powder 40 milliEquivalent(s) Oral every 2 hours              Vital Signs Last 24 Hrs  T(C): 37.3 (06 Dec 2023 07:00), Max: 37.6 (05 Dec 2023 20:00)  T(F): 99.1 (06 Dec 2023 07:00), Max: 99.7 (05 Dec 2023 20:00)  HR: 67 (06 Dec 2023 07:00) (67 - 102)  BP: 171/72 (06 Dec 2023 07:00) (129/77 - 178/58)  BP(mean): 99 (06 Dec 2023 07:00) (81 - 116)  RR: 25 (06 Dec 2023 07:00) (14 - 28)  SpO2: 96% (06 Dec 2023 07:00) (93% - 99%)    Parameters below as of 06 Dec 2023 06:00  Patient On (Oxygen Delivery Method): room air        REVIEW OF SYSTEMS:    Constitutional: No fever, chills, fatigue, right sided weakness  Eyes: no eye pain, visual disturbances, or discharge  ENT:  No difficulty hearing, tinnitus, vertigo; No sinus or throat pain  Neck: No pain or stiffness  Respiratory: No cough, dyspnea, wheezing   Cardiovascular: No chest pain, palpitations,   Gastrointestinal: No abdominal or epigastric pain. No nausea, vomiting  No diarrhea or constipation.   Genitourinary: No dysuria, frequency, hematuria or incontinence  Neurological: No headaches, lightheadedness, vertigo, numbness or tremors  Psychiatric: No depression, anxiety, mood swings or difficulty sleeping  Musculoskeletal: No joint pain or swelling; No muscle, back or extremity pain  Skin: No itching, burning, rashes or lesions   Lymph Nodes: No enlarged glands  Endocrine: No heat or cold intolerance; No hair loss   Allergy and Immunologic: No hives or eczema    On Neurological Examination:    Mental Status - Patient is alert, awake      Follow simple commands      Speech -   Dysarthria  Aphasia                    Cranial Nerves - ,   extraocular eye movements intact. able to look to left now  right face droop     Motor Exam -   Right upper 2/5  Left upper5/5  Right lower2-/5  Left lower 4/5    Muscle tone - is normal all over.  No asymmetry is seen.      Sensory    Bilateral intact to light touch    GENERAL Exam: Nontoxic , No Acute Distress   	  HEENT:  normocephalic, atraumatic  		  LUNGS:  Decreased bilaterally  	  HEART: Normal S1S2   No murmur RRR        	  GI/ ABDOMEN:  Soft  Non tender    EXTREMITIES:   No Edema  No Clubbing  No Cyanosis  	   SKIN: Normal  No Ecchymosis               LABS:  CBC Full  -  ( 06 Dec 2023 05:50 )  WBC Count : 7.40 K/uL  RBC Count : 3.98 M/uL  Hemoglobin : 10.2 g/dL  Hematocrit : 32.3 %  Platelet Count - Automated : 210 K/uL  Mean Cell Volume : 81.2 fl  Mean Cell Hemoglobin : 25.6 pg  Mean Cell Hemoglobin Concentration : 31.6 gm/dL  Auto Neutrophil # : x  Auto Lymphocyte # : x  Auto Monocyte # : x  Auto Eosinophil # : x  Auto Basophil # : x  Auto Neutrophil % : x  Auto Lymphocyte % : x  Auto Monocyte % : x  Auto Eosinophil % : x  Auto Basophil % : x    Urinalysis Basic - ( 06 Dec 2023 05:50 )    Color: x / Appearance: x / SG: x / pH: x  Gluc: 111 mg/dL / Ketone: x  / Bili: x / Urobili: x   Blood: x / Protein: x / Nitrite: x   Leuk Esterase: x / RBC: x / WBC x   Sq Epi: x / Non Sq Epi: x / Bacteria: x      12-06    136  |  101  |  29<H>  ----------------------------<  111<H>  3.3<L>   |  25  |  0.68    Ca    8.9      06 Dec 2023 05:50      Hemoglobin A1C:       Vitamin B12         RADIOLOGY    < from: CT Brain Stroke Protocol (12.04.23 @ 11:46) >    CLINICAL INDICATION: Stroke.    TECHNIQUE: Volumetric CT acquisition was performed through the brain and   reviewed using brain and bone window technique.      COMPARISON: CT head 12/2/2020    FINDINGS:  Left frontal, temporal parietal and occipital large region of   hypoattenuation, loss of gray-white differentiation and sulcal effacement   consistent with acute to early subacute infarction. There is asymmetric   hyperdensity of several distal branches of the left middle cerebral   artery consistent with thrombosis.    There is a 7 x 8 mm hyperattenuating partiallycalcified extra-axial   lesion along the left greater sphenoid wing likely compatible with a   small meningioma. No significant associated mass effect.    The ventricular and sulcal size and configuration is age appropriate.   There are moderate patchy and confluent areas of hypodensity in the   periventricular and subcortical white matter which are likely related to   chronic microangiopathic changes.    There is no evidence of midline shift, acute intracranial hemorrhage, or   extra-axial collections.     The calvarium is intact. The paranasal sinuses are clear.The mastoid air   cells are predominantly clear. There has been prior bilateral cataract   surgery.      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  No acute intracranial hemorrhage.          ANALYSIS AND PLAN:  This is an 88-year-old with left gaze preference, dysarthria, expressive aphasia, right facial droop, and right hemiparesis.  Clinical impression is cerebrovascular accident of the left MCA territory, what could be calculated by NIH Stroke Scale with me would be 10.  The patient would not be a tPA candidate secondary to onset of symptoms over 3 hours.   interrogation of pacemaker no  atrial fibrillation.  ok to start lowering the blood pressure by 15% per day.  I will recommend high-dose statin.  Physical Therapy evaluation.  overall patient is making improvement motor and speech wise    monitor oral intake as needed    then 2 weeks can cut down 81 mg     Spoke with cousin, Dae, at bedside, who is the healthcare proxy, also called daughter, updated her in the past, Dae's telephone number is [phone] 12/6      52 minutes of time was spent with the patient, plan of care, reviewing data, with greater than  50% of the visit was spent counseling and/or coordinating care with multidisciplinary healthcare team   Neurology follow up note    REBEKAH [id number]      Interval History:    Patient feels ok no new complaints.    Allergies    penicillin (Rash)  IV Contrast (Anaphylaxis; Urticaria)    Intolerances        MEDICATIONS    acetaminophen     Tablet .. 650 milliGRAM(s) Oral every 6 hours PRN  aspirin 325 milliGRAM(s) Oral daily  atorvastatin 80 milliGRAM(s) Oral at bedtime  dextrose 5% + sodium chloride 0.45%. 1000 milliLiter(s) IV Continuous <Continuous>  influenza  Vaccine (HIGH DOSE) 0.7 milliLiter(s) IntraMuscular once  lisinopril 10 milliGRAM(s) Oral daily  mupirocin 2% Ointment 1 Application(s) Topical <User Schedule>  potassium chloride   Powder 40 milliEquivalent(s) Oral every 2 hours              Vital Signs Last 24 Hrs  T(C): 37.3 (06 Dec 2023 07:00), Max: 37.6 (05 Dec 2023 20:00)  T(F): 99.1 (06 Dec 2023 07:00), Max: 99.7 (05 Dec 2023 20:00)  HR: 67 (06 Dec 2023 07:00) (67 - 102)  BP: 171/72 (06 Dec 2023 07:00) (129/77 - 178/58)  BP(mean): 99 (06 Dec 2023 07:00) (81 - 116)  RR: 25 (06 Dec 2023 07:00) (14 - 28)  SpO2: 96% (06 Dec 2023 07:00) (93% - 99%)    Parameters below as of 06 Dec 2023 06:00  Patient On (Oxygen Delivery Method): room air        REVIEW OF SYSTEMS:    Constitutional: No fever, chills, fatigue, right sided weakness  Eyes: no eye pain, visual disturbances, or discharge  ENT:  No difficulty hearing, tinnitus, vertigo; No sinus or throat pain  Neck: No pain or stiffness  Respiratory: No cough, dyspnea, wheezing   Cardiovascular: No chest pain, palpitations,   Gastrointestinal: No abdominal or epigastric pain. No nausea, vomiting  No diarrhea or constipation.   Genitourinary: No dysuria, frequency, hematuria or incontinence  Neurological: No headaches, lightheadedness, vertigo, numbness or tremors  Psychiatric: No depression, anxiety, mood swings or difficulty sleeping  Musculoskeletal: No joint pain or swelling; No muscle, back or extremity pain  Skin: No itching, burning, rashes or lesions   Lymph Nodes: No enlarged glands  Endocrine: No heat or cold intolerance; No hair loss   Allergy and Immunologic: No hives or eczema    On Neurological Examination:    Mental Status - Patient is alert, awake      Follow simple commands      Speech -   Dysarthria  Aphasia                    Cranial Nerves - ,   extraocular eye movements intact. able to look to left now  right face droop     Motor Exam -   Right upper 2/5  Left upper5/5  Right lower2-/5  Left lower 4/5    Muscle tone - is normal all over.  No asymmetry is seen.      Sensory    Bilateral intact to light touch    GENERAL Exam: Nontoxic , No Acute Distress   	  HEENT:  normocephalic, atraumatic  		  LUNGS:  Decreased bilaterally  	  HEART: Normal S1S2   No murmur RRR        	  GI/ ABDOMEN:  Soft  Non tender    EXTREMITIES:   No Edema  No Clubbing  No Cyanosis  	   SKIN: Normal  No Ecchymosis               LABS:  CBC Full  -  ( 06 Dec 2023 05:50 )  WBC Count : 7.40 K/uL  RBC Count : 3.98 M/uL  Hemoglobin : 10.2 g/dL  Hematocrit : 32.3 %  Platelet Count - Automated : 210 K/uL  Mean Cell Volume : 81.2 fl  Mean Cell Hemoglobin : 25.6 pg  Mean Cell Hemoglobin Concentration : 31.6 gm/dL  Auto Neutrophil # : x  Auto Lymphocyte # : x  Auto Monocyte # : x  Auto Eosinophil # : x  Auto Basophil # : x  Auto Neutrophil % : x  Auto Lymphocyte % : x  Auto Monocyte % : x  Auto Eosinophil % : x  Auto Basophil % : x    Urinalysis Basic - ( 06 Dec 2023 05:50 )    Color: x / Appearance: x / SG: x / pH: x  Gluc: 111 mg/dL / Ketone: x  / Bili: x / Urobili: x   Blood: x / Protein: x / Nitrite: x   Leuk Esterase: x / RBC: x / WBC x   Sq Epi: x / Non Sq Epi: x / Bacteria: x      12-06    136  |  101  |  29<H>  ----------------------------<  111<H>  3.3<L>   |  25  |  0.68    Ca    8.9      06 Dec 2023 05:50      Hemoglobin A1C:       Vitamin B12         RADIOLOGY    < from: CT Brain Stroke Protocol (12.04.23 @ 11:46) >    CLINICAL INDICATION: Stroke.    TECHNIQUE: Volumetric CT acquisition was performed through the brain and   reviewed using brain and bone window technique.      COMPARISON: CT head 12/2/2020    FINDINGS:  Left frontal, temporal parietal and occipital large region of   hypoattenuation, loss of gray-white differentiation and sulcal effacement   consistent with acute to early subacute infarction. There is asymmetric   hyperdensity of several distal branches of the left middle cerebral   artery consistent with thrombosis.    There is a 7 x 8 mm hyperattenuating partiallycalcified extra-axial   lesion along the left greater sphenoid wing likely compatible with a   small meningioma. No significant associated mass effect.    The ventricular and sulcal size and configuration is age appropriate.   There are moderate patchy and confluent areas of hypodensity in the   periventricular and subcortical white matter which are likely related to   chronic microangiopathic changes.    There is no evidence of midline shift, acute intracranial hemorrhage, or   extra-axial collections.     The calvarium is intact. The paranasal sinuses are clear.The mastoid air   cells are predominantly clear. There has been prior bilateral cataract   surgery.      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  No acute intracranial hemorrhage.          ANALYSIS AND PLAN:  This is an 88-year-old with left gaze preference, dysarthria, expressive aphasia, right facial droop, and right hemiparesis.  Clinical impression is cerebrovascular accident of the left MCA territory, what could be calculated by NIH Stroke Scale with me would be 10.  The patient would not be a tPA candidate secondary to onset of symptoms over 3 hours.   interrogation of pacemaker no  atrial fibrillation.  ok to start lowering the blood pressure by 15% per day.  I will recommend high-dose statin.  Physical Therapy evaluation.  overall patient is making improvement motor and speech wise    monitor oral intake as needed    then 2 weeks can cut down 81 mg     Spoke with cousin, Dae, at bedside, who is the healthcare proxy, also called daughter, updated her in the past, Dae's telephone number is [phone] 12/6      52 minutes of time was spent with the patient, plan of care, reviewing data, with greater than  50% of the visit was spent counseling and/or coordinating care with multidisciplinary healthcare team

## 2023-12-06 NOTE — PROGRESS NOTE ADULT - ASSESSMENT
The patient is an 88 year old female with a history of HTN, hypothyroidism, pacemaker, breast cancer who is admitted with CVA, acute respiratory failure due to IV contrast anaphylaxis.    Plan:  - ECG with sinus rhythm and intermittent ventricular pacing  - Troponin mildly elevated at 84 in the setting of demand ischemia from respiratory failure  - CXR with florid pulm edema in the setting of anaphylaxis - now resolved  - Repeat CT head with large CVA  - Pacemaker interrogated - no evidence of AF  - Echo with normal LV systolic function, mod pulm HTN  - Monitor on telemetry  - Neurology follow-up  - If ok from a neurology standpoint, start lisinopril 10 mg daily for HTN

## 2023-12-06 NOTE — CHART NOTE - NSCHARTNOTEFT_GEN_A_CORE
Assessment: Per H&P, "This is an 87 y/o F with PMH of HTN, PPM, Breast CA s/p left mastectomy, Nodular Goitre, and Allergic Rhinitis who presented with an acute onset of right sided weakness with right facial droop & slurred speech that was witnessed by the daughter, no AMS, no seizure activity. By time she arrived at the ED she was already back to her baseline, NIHSS was zero by ED staff. Patient was sent to CT suit for a brain CT & Brain CTA, and after she returned form CT she started showing diffuse skin rash, SOB with loud chest wheeze, didn't respond to steroids, Epinephrine, and H1 blockers, her SPO2 dropped, had a severe hypertensive response, and had to be emergently intubated by ED team. She was found with massive acute pulmonary edema. No known previous history of allergy to iodine or IV contrast."    Interim: Visited patient in room, pt unable to answer questions as she remains tired, confused s/p stroke. Extubated . No family currently present at bedside. Obtained info from RN, rounds, and extensive chart review. Pt was NPO x 4 days, and was cleared by SLP on :  "Recommended Consistencies - puree/moderately thick liquids. Recommend small bites/sips". However at rounds on  today, noted that pt was pocketing food in mouth, sometimes coughing with liquids and is refusing purees. Will take some ice cream and apple sauce without difficulty. Estimated intake of 0-25% of ENN x 1 week plus wt loss noted indicating acute malnutrition. NFPE not conducted at this time as pt could not give consent. No v/d/c noted. last BM . Pertinent labs/meds reviewed: propofol d/c, hypokalemia noted, on KCl, Dex 5%/NaCl, elev BUN    Education not appropriate at this time. Pt to remain on PO diet of purees/mod thick liquids as prescribed and medically indicated by SLP/MD. Will begin prosource BID, to be added to amenable foods to meet protein needs. Discussed at rounds that PEG may be required to meet nutrient needs if pt continues with poor intake of purees with limited swallowing/chewing improvement. RD to remain available per protocol.    Factors impacting intake: [ ] none [ ] nausea  [ ] vomiting [ ] diarrhea [ ] constipation  [ x]chewing problems [ x] swallowing issues  [ x] other: AMS    Diet Prescription: Diet, Pureed:   Moderately Thick Liquids (MODTHICKLIQS) (23 @ 12:19)    Intake: 0-25%    Daily Weight in k.4 (06 Dec 2023 04:02)  Weight in k.4 (05 Dec 2023 06:00)  Weight in k.2 (04 Dec 2023 04:00)  Weight in k.1 (03 Dec 2023 03:47)  Weight in k.7 (02 Dec 2023 21:24)  89% of admission wt 68kg, indicating 11% loss of UBW x 5 days, also ? accuracy of admission wt; pt has 7% loss of UBW from -  % Weight Change    Pertinent Medications: MEDICATIONS  (STANDING):  aspirin 325 milliGRAM(s) Oral daily  atorvastatin 80 milliGRAM(s) Oral at bedtime  dextrose 5% + sodium chloride 0.45%. 1000 milliLiter(s) (75 mL/Hr) IV Continuous <Continuous>  influenza  Vaccine (HIGH DOSE) 0.7 milliLiter(s) IntraMuscular once  lisinopril 10 milliGRAM(s) Oral daily  mupirocin 2% Ointment 1 Application(s) Topical <User Schedule>  potassium chloride   Powder 40 milliEquivalent(s) Oral every 2 hours    MEDICATIONS  (PRN):  acetaminophen     Tablet .. 650 milliGRAM(s) Oral every 6 hours PRN Temp greater or equal to 38C (100.4F)    Pertinent Labs:  Na136 mmol/L Glu 111 mg/dL<H> K+ 3.3 mmol/L<L> Cr  0.68 mg/dL BUN 29 mg/dL<H>  Phos 5.4 mg/dL<H>  Alb 3.5 g/dL  Chol 213 mg/dL<H> LDL --    HDL 68 mg/dL Trig 108 mg/dL      Edema per flowsheets: none  Skin: WDL      Estimated Needs:   [x ] no change since previous assessment  [ ] recalculated:     Previous Nutrition Diagnosis:   [x ] Inadequate Energy Intake [ ]Inadequate Oral Intake [ ] Excessive Energy Intake   [ ] Underweight [ ] Increased Nutrient Needs [ ] Overweight/Obesity [ ] Altered Nutrition related lab values  [ ] Altered GI Function [ ] Unintended Weight Loss [ ] Food & Nutrition Related Knowledge Deficit [ ] Malnutrition     Nutrition Diagnosis is [x ] ongoing  [ ] resolved [ ] not applicable     New Nutrition Diagnosis: Severe acute malnutrition related to inability to consume sufficient energy 2/2 stroke/dysphagia AEB 7-11% loss of UBW x 1 week and estimated <25% intake of ENN x 1 week      Interventions: Pt to remain on PO diet of purees/mod thick liquids as prescribed and medically indicated by SLP/MD  Recommend  [ ] Change Diet To:  [x ] Nutrition Supplement: prosource BID to be added to amenable foods to meet protein needs.   [ x] Nutrition Support: Discussed at rounds that PEG may be required to meet nutrient needs if pt continues with poor intake of purees with limited swallowing/chewing improvement  [ ] Other: malnutrition sticker placed in chart    Monitoring and Evaluation:   [X ] Intake [ x ] Tolerance to diet prescription [ x ] weights [ x ] labs[ x ] follow up per protocol  [ ] other: Assessment: Per H&P, "This is an 89 y/o F with PMH of HTN, PPM, Breast CA s/p left mastectomy, Nodular Goitre, and Allergic Rhinitis who presented with an acute onset of right sided weakness with right facial droop & slurred speech that was witnessed by the daughter, no AMS, no seizure activity. By time she arrived at the ED she was already back to her baseline, NIHSS was zero by ED staff. Patient was sent to CT suit for a brain CT & Brain CTA, and after she returned form CT she started showing diffuse skin rash, SOB with loud chest wheeze, didn't respond to steroids, Epinephrine, and H1 blockers, her SPO2 dropped, had a severe hypertensive response, and had to be emergently intubated by ED team. She was found with massive acute pulmonary edema. No known previous history of allergy to iodine or IV contrast."    Interim: Visited patient in room, pt unable to answer questions as she remains tired, confused s/p stroke. Extubated . No family currently present at bedside. Obtained info from RN, rounds, and extensive chart review. Pt was NPO x 4 days, and was cleared by SLP on :  "Recommended Consistencies - puree/moderately thick liquids. Recommend small bites/sips". However at rounds on  today, noted that pt was pocketing food in mouth, sometimes coughing with liquids and is refusing purees. Will take some ice cream and apple sauce without difficulty. Estimated intake of 0-25% of ENN x 1 week plus wt loss noted indicating acute malnutrition. NFPE not conducted at this time as pt could not give consent. No v/d/c noted. last BM . Pertinent labs/meds reviewed: propofol d/c, hypokalemia noted, on KCl, Dex 5%/NaCl, elev BUN    Education not appropriate at this time. Pt to remain on PO diet of purees/mod thick liquids as prescribed and medically indicated by SLP/MD. Will begin prosource BID, to be added to amenable foods to meet protein needs. Discussed at rounds that PEG may be required to meet nutrient needs if pt continues with poor intake of purees with limited swallowing/chewing improvement. RD to remain available per protocol.    Factors impacting intake: [ ] none [ ] nausea  [ ] vomiting [ ] diarrhea [ ] constipation  [ x]chewing problems [ x] swallowing issues  [ x] other: AMS    Diet Prescription: Diet, Pureed:   Moderately Thick Liquids (MODTHICKLIQS) (23 @ 12:19)    Intake: 0-25%    Daily Weight in k.4 (06 Dec 2023 04:02)  Weight in k.4 (05 Dec 2023 06:00)  Weight in k.2 (04 Dec 2023 04:00)  Weight in k.1 (03 Dec 2023 03:47)  Weight in k.7 (02 Dec 2023 21:24)  89% of admission wt 68kg, indicating 11% loss of UBW x 5 days, also ? accuracy of admission wt; pt has 7% loss of UBW from -  % Weight Change    Pertinent Medications: MEDICATIONS  (STANDING):  aspirin 325 milliGRAM(s) Oral daily  atorvastatin 80 milliGRAM(s) Oral at bedtime  dextrose 5% + sodium chloride 0.45%. 1000 milliLiter(s) (75 mL/Hr) IV Continuous <Continuous>  influenza  Vaccine (HIGH DOSE) 0.7 milliLiter(s) IntraMuscular once  lisinopril 10 milliGRAM(s) Oral daily  mupirocin 2% Ointment 1 Application(s) Topical <User Schedule>  potassium chloride   Powder 40 milliEquivalent(s) Oral every 2 hours    MEDICATIONS  (PRN):  acetaminophen     Tablet .. 650 milliGRAM(s) Oral every 6 hours PRN Temp greater or equal to 38C (100.4F)    Pertinent Labs:  Na136 mmol/L Glu 111 mg/dL<H> K+ 3.3 mmol/L<L> Cr  0.68 mg/dL BUN 29 mg/dL<H>  Phos 5.4 mg/dL<H>  Alb 3.5 g/dL  Chol 213 mg/dL<H> LDL --    HDL 68 mg/dL Trig 108 mg/dL      Edema per flowsheets: none  Skin: WDL      Estimated Needs:   [x ] no change since previous assessment  [ ] recalculated:     Previous Nutrition Diagnosis:   [x ] Inadequate Energy Intake [ ]Inadequate Oral Intake [ ] Excessive Energy Intake   [ ] Underweight [ ] Increased Nutrient Needs [ ] Overweight/Obesity [ ] Altered Nutrition related lab values  [ ] Altered GI Function [ ] Unintended Weight Loss [ ] Food & Nutrition Related Knowledge Deficit [ ] Malnutrition     Nutrition Diagnosis is [x ] ongoing  [ ] resolved [ ] not applicable     New Nutrition Diagnosis: Severe acute malnutrition related to inability to consume sufficient energy 2/2 stroke/dysphagia AEB 7-11% loss of UBW x 1 week and estimated <25% intake of ENN x 1 week      Interventions: Pt to remain on PO diet of purees/mod thick liquids as prescribed and medically indicated by SLP/MD  Recommend  [ ] Change Diet To:  [x ] Nutrition Supplement: prosource BID to be added to amenable foods to meet protein needs.   [ x] Nutrition Support: Discussed at rounds that PEG may be required to meet nutrient needs if pt continues with poor intake of purees with limited swallowing/chewing improvement  [ ] Other: malnutrition sticker placed in chart    Monitoring and Evaluation:   [X ] Intake [ x ] Tolerance to diet prescription [ x ] weights [ x ] labs[ x ] follow up per protocol  [ ] other:

## 2023-12-06 NOTE — PROVIDER CONTACT NOTE (CHANGE IN STATUS NOTIFICATION) - SITUATION
Patient Restless Climbing out of bed, constantly redirected and repositioned, patient persistently climbing out of bed. 1.Patient Restless Climbing out of bed, constantly redirected and repositioned, patient persistently climbing out of bed.  2. BP Parameter

## 2023-12-06 NOTE — PROVIDER CONTACT NOTE (CHANGE IN STATUS NOTIFICATION) - ACTION/TREATMENT ORDERED:
Constant observation inlace. Constant observation inlace.  Dr. Meehan stated he will review BP parameter and will put order

## 2023-12-06 NOTE — OCCUPATIONAL THERAPY INITIAL EVALUATION ADULT - DIAGNOSIS, OT EVAL
Pt with impaired strength in RUE, impaired balance in LLE, and impaired cognition impacting ability to complete ADLs, IADLs, functional mobility/transfers.

## 2023-12-06 NOTE — DIETITIAN NUTRITION RISK NOTIFICATION - TREATMENT: THE FOLLOWING DIET HAS BEEN RECOMMENDED
Diet, Pureed:   Moderately Thick Liquids (MODTHICKLIQS)  No Carb Prosource (1pkg = 15gms Protein)     Qty per Day:  2 (12-06-23 @ 12:05) [Pending Verification By Attending]  Diet, Pureed:   Moderately Thick Liquids (MODTHICKLIQS) (12-05-23 @ 12:19) [Active]

## 2023-12-06 NOTE — CASE MANAGEMENT PROGRESS NOTE - NSCMPROGRESSNOTE_GEN_ALL_CORE
Patient discussed in morning rounds today, patient improving mobility. Patient is on 1:1 for safety,  due to confusion and climbing out of bed

## 2023-12-06 NOTE — OCCUPATIONAL THERAPY INITIAL EVALUATION ADULT - PERTINENT HX OF CURRENT PROBLEM, REHAB EVAL
As per H&P: "This is an 89 y/o F with PMH of HTN, PPM, Breast CA s/p left mastectomy, Nodular Goitre, and Allergic Rhinitis who presented with an acute onset of right sided weakness with right facial droop & slurred speech that was witnessed by the daughter, no AMS, no seizure activity. By time she arrived at the ED she was already back to her baseline, NIHSS was zero by ED staff. Patient was sent to CT suit for a brain CT & Brain CTA, and after she returned form CT she started showing diffuse skin rash, SOB with loud chest wheeze, didn't respond to steroids, Epinephrine, and H1 blockers, her SPO2 dropped, had a severe hypertensive response, and had to be emergently intubated by ED team. She was found with massive acute pulmonary edema. No known previous history of allergy to iodine or IV contrast."  CT Brain Stroke Protocol: "Large L MCA territory acute to early subacute infarction. 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 No acute intracranial hemorrhage." As per H&P: "This is an 87 y/o F with PMH of HTN, PPM, Breast CA s/p left mastectomy, Nodular Goitre, and Allergic Rhinitis who presented with an acute onset of right sided weakness with right facial droop & slurred speech that was witnessed by the daughter, no AMS, no seizure activity. By time she arrived at the ED she was already back to her baseline, NIHSS was zero by ED staff. Patient was sent to CT suit for a brain CT & Brain CTA, and after she returned form CT she started showing diffuse skin rash, SOB with loud chest wheeze, didn't respond to steroids, Epinephrine, and H1 blockers, her SPO2 dropped, had a severe hypertensive response, and had to be emergently intubated by ED team. She was found with massive acute pulmonary edema. No known previous history of allergy to iodine or IV contrast."  CT Brain Stroke Protocol: "Large L MCA territory acute to early subacute infarction. 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 No acute intracranial hemorrhage."

## 2023-12-06 NOTE — DIETITIAN NUTRITION RISK NOTIFICATION - ADDITIONAL COMMENTS/DIETITIAN RECOMMENDATIONS
Prosource BID; if PO intake remains suboptimal on dysphagia, recommend eval for PEG placement to meet nutrient needs

## 2023-12-06 NOTE — PROGRESS NOTE ADULT - SUBJECTIVE AND OBJECTIVE BOX
Date/Time Patient Seen:  		  Referring MD:   Data Reviewed	       Patient is a 88y old  Female who presents with a chief complaint of Unresponsive. (05 Dec 2023 14:06)      Subjective/HPI     PAST MEDICAL & SURGICAL HISTORY:  Hyperlipidemia  no current medications    Breast cancer  2009 left - s/pineitan castellanos radical mastectomy    [place] (hard of hearing)  bilateral HA (Yvette)    Meniscus tear  left - 10/2015    Cyst of left ovary    SDH (subdural hematoma)  3/2011 - s/p fall - no surgical intervention    History of Left Mastectomy    Cataract extraction status of eye, right    Status post right cataract extraction          Medication list         MEDICATIONS  (STANDING):  aspirin 325 milliGRAM(s) Oral daily  atorvastatin 80 milliGRAM(s) Oral at bedtime  influenza  Vaccine (HIGH DOSE) 0.7 milliLiter(s) IntraMuscular once    MEDICATIONS  (PRN):  acetaminophen     Tablet .. 650 milliGRAM(s) Oral every 6 hours PRN Temp greater or equal to 38C (100.4F)         Vitals log        ICU Vital Signs Last 24 Hrs  T(C): 37.3 (06 Dec 2023 04:02), Max: 37.6 (05 Dec 2023 20:00)  T(F): 99.1 (06 Dec 2023 04:02), Max: 99.7 (05 Dec 2023 20:00)  HR: 73 (06 Dec 2023 04:00) (63 - 102)  BP: 168/65 (06 Dec 2023 04:00) (129/77 - 183/83)  BP(mean): 95 (06 Dec 2023 04:00) (67 - 157)  ABP: --  ABP(mean): --  RR: 18 (06 Dec 2023 04:00) (12 - 28)  SpO2: 94% (06 Dec 2023 04:00) (93% - 99%)    O2 Parameters below as of 06 Dec 2023 04:00  Patient On (Oxygen Delivery Method): room air                 Input and Output:  I&O's Detail    04 Dec 2023 07:01  -  05 Dec 2023 07:00  --------------------------------------------------------  IN:  Total IN: 0 mL    OUT:    Ureteral Catheter (mL): 1455 mL  Total OUT: 1455 mL    Total NET: -1455 mL      05 Dec 2023 07:01  -  06 Dec 2023 05:58  --------------------------------------------------------  IN:    Oral Fluid: 125 mL  Total IN: 125 mL    OUT:    Intermittent Catheterization - Urethral (mL): 530 mL    Voided (mL): 100 mL  Total OUT: 630 mL    Total NET: -505 mL          Lab Data    12-05    139  |  104  |  27<H>  ----------------------------<  116<H>  3.3<L>   |  25  |  0.75    Ca    9.3      05 Dec 2023 06:00      ABG - ( 04 Dec 2023 06:03 )  pH, Arterial: 7.43  pH, Blood: x     /  pCO2: 43    /  pO2: 112   / HCO3: 28    / Base Excess: 4.2   /  SaO2: 99.4                    Review of Systems	      Objective     Physical Examination    heart s1s2  lung dc BS  head nc      Pertinent Lab findings & Imaging      Christiane:  NO   Adequate UO     I&O's Detail    04 Dec 2023 07:01  -  05 Dec 2023 07:00  --------------------------------------------------------  IN:  Total IN: 0 mL    OUT:    Ureteral Catheter (mL): 1455 mL  Total OUT: 1455 mL    Total NET: -1455 mL      05 Dec 2023 07:01  -  06 Dec 2023 05:58  --------------------------------------------------------  IN:    Oral Fluid: 125 mL  Total IN: 125 mL    OUT:    Intermittent Catheterization - Urethral (mL): 530 mL    Voided (mL): 100 mL  Total OUT: 630 mL    Total NET: -505 mL               Discussed with:     Cultures:	        Radiology                             Date/Time Patient Seen:  		  Referring MD:   Data Reviewed	       Patient is a 88y old  Female who presents with a chief complaint of Unresponsive. (05 Dec 2023 14:06)      Subjective/HPI     PAST MEDICAL & SURGICAL HISTORY:  Hyperlipidemia  no current medications    Breast cancer  2009 left - s/pineitan castellanos radical mastectomy    [place] (hard of hearing)  bilateral HA (Yvette)    Meniscus tear  left - 10/2015    Cyst of left ovary    SDH (subdural hematoma)  3/2011 - s/p fall - no surgical intervention    History of Left Mastectomy    Cataract extraction status of eye, right    Status post right cataract extraction          Medication list         MEDICATIONS  (STANDING):  aspirin 325 milliGRAM(s) Oral daily  atorvastatin 80 milliGRAM(s) Oral at bedtime  influenza  Vaccine (HIGH DOSE) 0.7 milliLiter(s) IntraMuscular once    MEDICATIONS  (PRN):  acetaminophen     Tablet .. 650 milliGRAM(s) Oral every 6 hours PRN Temp greater or equal to 38C (100.4F)         Vitals log        ICU Vital Signs Last 24 Hrs  T(C): 37.3 (06 Dec 2023 04:02), Max: 37.6 (05 Dec 2023 20:00)  T(F): 99.1 (06 Dec 2023 04:02), Max: 99.7 (05 Dec 2023 20:00)  HR: 73 (06 Dec 2023 04:00) (63 - 102)  BP: 168/65 (06 Dec 2023 04:00) (129/77 - 183/83)  BP(mean): 95 (06 Dec 2023 04:00) (67 - 157)  ABP: --  ABP(mean): --  RR: 18 (06 Dec 2023 04:00) (12 - 28)  SpO2: 94% (06 Dec 2023 04:00) (93% - 99%)    O2 Parameters below as of 06 Dec 2023 04:00  Patient On (Oxygen Delivery Method): room air                 Input and Output:  I&O's Detail    04 Dec 2023 07:01  -  05 Dec 2023 07:00  --------------------------------------------------------  IN:  Total IN: 0 mL    OUT:    Ureteral Catheter (mL): 1455 mL  Total OUT: 1455 mL    Total NET: -1455 mL      05 Dec 2023 07:01  -  06 Dec 2023 05:58  --------------------------------------------------------  IN:    Oral Fluid: 125 mL  Total IN: 125 mL    OUT:    Intermittent Catheterization - Urethral (mL): 530 mL    Voided (mL): 100 mL  Total OUT: 630 mL    Total NET: -505 mL          Lab Data    12-05    139  |  104  |  27<H>  ----------------------------<  116<H>  3.3<L>   |  25  |  0.75    Ca    9.3      05 Dec 2023 06:00      ABG - ( 04 Dec 2023 06:03 )  pH, Arterial: 7.43  pH, Blood: x     /  pCO2: 43    /  pO2: 112   / HCO3: 28    / Base Excess: 4.2   /  SaO2: 99.4                    Review of Systems	      Objective     Physical Examination    heart s1s2  lung dc BS  head nc      Pertinent Lab findings & Imaging      Christiane:  NO   Adequate UO     I&O's Detail    04 Dec 2023 07:01  -  05 Dec 2023 07:00  --------------------------------------------------------  IN:  Total IN: 0 mL    OUT:    Ureteral Catheter (mL): 1455 mL  Total OUT: 1455 mL    Total NET: -1455 mL      05 Dec 2023 07:01  -  06 Dec 2023 05:58  --------------------------------------------------------  IN:    Oral Fluid: 125 mL  Total IN: 125 mL    OUT:    Intermittent Catheterization - Urethral (mL): 530 mL    Voided (mL): 100 mL  Total OUT: 630 mL    Total NET: -505 mL               Discussed with:     Cultures:	        Radiology

## 2023-12-07 LAB
ANION GAP SERPL CALC-SCNC: 10 MMOL/L — SIGNIFICANT CHANGE UP (ref 5–17)
ANION GAP SERPL CALC-SCNC: 10 MMOL/L — SIGNIFICANT CHANGE UP (ref 5–17)
BUN SERPL-MCNC: 16 MG/DL — SIGNIFICANT CHANGE UP (ref 7–23)
BUN SERPL-MCNC: 16 MG/DL — SIGNIFICANT CHANGE UP (ref 7–23)
CALCIUM SERPL-MCNC: 9 MG/DL — SIGNIFICANT CHANGE UP (ref 8.4–10.5)
CALCIUM SERPL-MCNC: 9 MG/DL — SIGNIFICANT CHANGE UP (ref 8.4–10.5)
CHLORIDE SERPL-SCNC: 97 MMOL/L — SIGNIFICANT CHANGE UP (ref 96–108)
CHLORIDE SERPL-SCNC: 97 MMOL/L — SIGNIFICANT CHANGE UP (ref 96–108)
CO2 SERPL-SCNC: 27 MMOL/L — SIGNIFICANT CHANGE UP (ref 22–31)
CO2 SERPL-SCNC: 27 MMOL/L — SIGNIFICANT CHANGE UP (ref 22–31)
CREAT SERPL-MCNC: 0.55 MG/DL — SIGNIFICANT CHANGE UP (ref 0.5–1.3)
CREAT SERPL-MCNC: 0.55 MG/DL — SIGNIFICANT CHANGE UP (ref 0.5–1.3)
EGFR: 89 ML/MIN/1.73M2 — SIGNIFICANT CHANGE UP
EGFR: 89 ML/MIN/1.73M2 — SIGNIFICANT CHANGE UP
GLUCOSE SERPL-MCNC: 116 MG/DL — HIGH (ref 70–99)
GLUCOSE SERPL-MCNC: 116 MG/DL — HIGH (ref 70–99)
HCT VFR BLD CALC: 35.6 % — SIGNIFICANT CHANGE UP (ref 34.5–45)
HCT VFR BLD CALC: 35.6 % — SIGNIFICANT CHANGE UP (ref 34.5–45)
HGB BLD-MCNC: 11.4 G/DL — LOW (ref 11.5–15.5)
HGB BLD-MCNC: 11.4 G/DL — LOW (ref 11.5–15.5)
MCHC RBC-ENTMCNC: 25.7 PG — LOW (ref 27–34)
MCHC RBC-ENTMCNC: 25.7 PG — LOW (ref 27–34)
MCHC RBC-ENTMCNC: 32 GM/DL — SIGNIFICANT CHANGE UP (ref 32–36)
MCHC RBC-ENTMCNC: 32 GM/DL — SIGNIFICANT CHANGE UP (ref 32–36)
MCV RBC AUTO: 80.2 FL — SIGNIFICANT CHANGE UP (ref 80–100)
MCV RBC AUTO: 80.2 FL — SIGNIFICANT CHANGE UP (ref 80–100)
NRBC # BLD: 0 /100 WBCS — SIGNIFICANT CHANGE UP (ref 0–0)
NRBC # BLD: 0 /100 WBCS — SIGNIFICANT CHANGE UP (ref 0–0)
PLATELET # BLD AUTO: 232 K/UL — SIGNIFICANT CHANGE UP (ref 150–400)
PLATELET # BLD AUTO: 232 K/UL — SIGNIFICANT CHANGE UP (ref 150–400)
POTASSIUM SERPL-MCNC: 3.7 MMOL/L — SIGNIFICANT CHANGE UP (ref 3.5–5.3)
POTASSIUM SERPL-MCNC: 3.7 MMOL/L — SIGNIFICANT CHANGE UP (ref 3.5–5.3)
POTASSIUM SERPL-SCNC: 3.7 MMOL/L — SIGNIFICANT CHANGE UP (ref 3.5–5.3)
POTASSIUM SERPL-SCNC: 3.7 MMOL/L — SIGNIFICANT CHANGE UP (ref 3.5–5.3)
RBC # BLD: 4.44 M/UL — SIGNIFICANT CHANGE UP (ref 3.8–5.2)
RBC # BLD: 4.44 M/UL — SIGNIFICANT CHANGE UP (ref 3.8–5.2)
RBC # FLD: 17.3 % — HIGH (ref 10.3–14.5)
RBC # FLD: 17.3 % — HIGH (ref 10.3–14.5)
SODIUM SERPL-SCNC: 134 MMOL/L — LOW (ref 135–145)
SODIUM SERPL-SCNC: 134 MMOL/L — LOW (ref 135–145)
WBC # BLD: 8.75 K/UL — SIGNIFICANT CHANGE UP (ref 3.8–10.5)
WBC # BLD: 8.75 K/UL — SIGNIFICANT CHANGE UP (ref 3.8–10.5)
WBC # FLD AUTO: 8.75 K/UL — SIGNIFICANT CHANGE UP (ref 3.8–10.5)
WBC # FLD AUTO: 8.75 K/UL — SIGNIFICANT CHANGE UP (ref 3.8–10.5)

## 2023-12-07 PROCEDURE — 99233 SBSQ HOSP IP/OBS HIGH 50: CPT

## 2023-12-07 RX ORDER — LABETALOL HCL 100 MG
100 TABLET ORAL EVERY 6 HOURS
Refills: 0 | Status: DISCONTINUED | OUTPATIENT
Start: 2023-12-07 | End: 2023-12-08

## 2023-12-07 RX ORDER — ACETAMINOPHEN 500 MG
650 TABLET ORAL EVERY 6 HOURS
Refills: 0 | Status: DISCONTINUED | OUTPATIENT
Start: 2023-12-07 | End: 2023-12-08

## 2023-12-07 RX ORDER — HYDRALAZINE HCL 50 MG
10 TABLET ORAL ONCE
Refills: 0 | Status: COMPLETED | OUTPATIENT
Start: 2023-12-07 | End: 2023-12-07

## 2023-12-07 RX ORDER — ACETAMINOPHEN 500 MG
650 TABLET ORAL EVERY 6 HOURS
Refills: 0 | Status: DISCONTINUED | OUTPATIENT
Start: 2023-12-07 | End: 2023-12-07

## 2023-12-07 RX ADMIN — Medication 10 MILLIGRAM(S): at 00:40

## 2023-12-07 RX ADMIN — MUPIROCIN 1 APPLICATION(S): 20 OINTMENT TOPICAL at 15:34

## 2023-12-07 RX ADMIN — Medication 325 MILLIGRAM(S): at 14:43

## 2023-12-07 RX ADMIN — SODIUM CHLORIDE 75 MILLILITER(S): 9 INJECTION, SOLUTION INTRAVENOUS at 03:05

## 2023-12-07 RX ADMIN — ATORVASTATIN CALCIUM 80 MILLIGRAM(S): 80 TABLET, FILM COATED ORAL at 21:56

## 2023-12-07 NOTE — CHART NOTE - NSCHARTNOTEFT_GEN_A_CORE
pt w/ SBP >190s, neuro notes reviewed ok to reduce SBP by appx 15% per day, will aim for goals of 160-180s. Pt ordered 10mg of IV hydralazine of SBP >190 x2 reading

## 2023-12-07 NOTE — CONSULT NOTE ADULT - SUBJECTIVE AND OBJECTIVE BOX
Physical Medicine and Rehabilitation Initial Evaluation    Patients acute care records reviewed and are summarized as follows:     Patient is a 88y Female who is admitted to acute care for CVA. Now with residual hemiparesis, dysarthria, aphasia. Referred for rehab and dispo recs.    Radiological studies reviewed, including CT head: IMPRESSION:  Large left MCA territory acute to early subacute infarction. 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  No acute intracranial hemorrhage.    Medical studies/laboratory studies reviewed, includin-07-23 @ 06:00    134<L>  |  97  |  16             --------------------------< 116<H>     3.7  |  27  | 0.55    eGFR AA: --  eGFR N-AA: --    Calcium: 9.0  Phosphorus: --  Magnesium: --    AST: --    ALT: --  AlkPhos: --  Protein: --  Albumin: --  TBili: --  D-Bili: --    The patient was seen and examined at bedside.    PT notes reviewed:      Sit-Stand Transfer Training  Transfer Training Sit-to-Stand Transfer: minimum assist (75% patient effort);  2 person assist;  full weight-bearing   HHAx2  Transfer Training Stand-to-Sit Transfer: minimum assist (75% patient effort);  2 person assist;  full weight-bearing   HHax2  Sit-to-Stand Transfer Training Transfer Safety Analysis: decreased balance;  decreased cognition;  decreased step length;  decreased strength;  impaired balance;  abnormal muscle tone;  impaired postural control;  impaired motor control;  cognitive, decreased safety awareness;  HHAx2    Gait Training  Gait Training: minimum assist (75% patient effort);  2 person assist;  full weight-bearing   HHAx2;  75 feet  Gait Analysis: decreased sp;  crouch;  decreased step length;  decreased stride length;  decreased weight-shifting ability;  decreased strength;  abnormal muscle tone;  impaired balance;  impaired postural control;  impaired motor control;  cognitive, decreased safety awareness;  75 feet;  HHAx2      ROS: expressive aphasia, weakness    PAST MEDICAL & SURGICAL HISTORY:  Hyperlipidemia  no current medications      Breast cancer   left - s/ping castellanos radical mastectomy      [place] (hard of hearing)  bilateral HA (Yvette)      Meniscus tear  left - 10/2015      Cyst of left ovary      SDH (subdural hematoma)  3/2011 - s/p fall - no surgical intervention      History of Left Mastectomy      Cataract extraction status of eye, right      Status post right cataract extraction    Medications:   acetaminophen     Tablet .. 650 milliGRAM(s) Oral every 6 hours PRN  aspirin 325 milliGRAM(s) Oral daily  atorvastatin 80 milliGRAM(s) Oral at bedtime  influenza  Vaccine (HIGH DOSE) 0.7 milliLiter(s) IntraMuscular once  labetalol 100 milliGRAM(s) Oral every 6 hours PRN  lisinopril 10 milliGRAM(s) Oral daily  mupirocin 2% Ointment 1 Application(s) Topical <User Schedule>      Physical Exam:   Vitals: T(C): 36.5 (23 @ 15:34), Max: 37.3 (23 @ 07:48)  HR: 80 (23 @ 16:00) (76 - 91)  BP: 145/93 (23 @ 16:00) (117/85 - 197/94)  RR: 22 (23 @ 16:00) (15 - 27)  SpO2: 94% (23 @ 16:00) (94% - 98%)    Constitutional: Gen: In no acute distress, cooperative with exam and questioning   Neuro: Expressive aphasia, R hemiparesis, R facial droop, R gaze preferences, sensation intact all limbs to light touch       Physical Medicine and Rehabilitation Initial Evaluation    Patients acute care records reviewed and are summarized as follows:     Patient is a 88y Female who is admitted to acute care for CVA. Now with residual hemiparesis, dysarthria, aphasia. Referred for rehab and dispo recs.    Radiological studies reviewed, including CT head: IMPRESSION:  Large left MCA territory acute to early subacute infarction. 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  No acute intracranial hemorrhage.    Medical studies/laboratory studies reviewed, includin-07-23 @ 06:00    134<L>  |  97  |  16             --------------------------< 116<H>     3.7  |  27  | 0.55    eGFR AA: --  eGFR N-AA: --    Calcium: 9.0  Phosphorus: --  Magnesium: --    AST: --    ALT: --  AlkPhos: --  Protein: --  Albumin: --  TBili: --  D-Bili: --    The patient was seen and examined at bedside.    PT notes reviewed:      Sit-Stand Transfer Training  Transfer Training Sit-to-Stand Transfer: minimum assist (75% patient effort);  2 person assist;  full weight-bearing   HHAx2  Transfer Training Stand-to-Sit Transfer: minimum assist (75% patient effort);  2 person assist;  full weight-bearing   HHax2  Sit-to-Stand Transfer Training Transfer Safety Analysis: decreased balance;  decreased cognition;  decreased step length;  decreased strength;  impaired balance;  abnormal muscle tone;  impaired postural control;  impaired motor control;  cognitive, decreased safety awareness;  HHAx2    Gait Training  Gait Training: minimum assist (75% patient effort);  2 person assist;  full weight-bearing   HHAx2;  75 feet  Gait Analysis: decreased sp;  crouch;  decreased step length;  decreased stride length;  decreased weight-shifting ability;  decreased strength;  abnormal muscle tone;  impaired balance;  impaired postural control;  impaired motor control;  cognitive, decreased safety awareness;  75 feet;  HHAx2      ROS: expressive aphasia, weakness    PAST MEDICAL & SURGICAL HISTORY:  Hyperlipidemia  no current medications      Breast cancer   left - s/ping castellanos radical mastectomy      [place] (hard of hearing)  bilateral HA (Yvette)      Meniscus tear  left - 10/2015      Cyst of left ovary      SDH (subdural hematoma)  3/2011 - s/p fall - no surgical intervention      History of Left Mastectomy      Cataract extraction status of eye, right      Status post right cataract extraction    Medications:   acetaminophen     Tablet .. 650 milliGRAM(s) Oral every 6 hours PRN  aspirin 325 milliGRAM(s) Oral daily  atorvastatin 80 milliGRAM(s) Oral at bedtime  influenza  Vaccine (HIGH DOSE) 0.7 milliLiter(s) IntraMuscular once  labetalol 100 milliGRAM(s) Oral every 6 hours PRN  lisinopril 10 milliGRAM(s) Oral daily  mupirocin 2% Ointment 1 Application(s) Topical <User Schedule>      Physical Exam:   Vitals: T(C): 36.5 (23 @ 15:34), Max: 37.3 (23 @ 07:48)  HR: 80 (23 @ 16:00) (76 - 91)  BP: 145/93 (23 @ 16:00) (117/85 - 197/94)  RR: 22 (23 @ 16:00) (15 - 27)  SpO2: 94% (23 @ 16:00) (94% - 98%)    Constitutional: Gen: In no acute distress, cooperative with exam and questioning   Neuro: Expressive aphasia, R hemiparesis, R facial droop, R gaze preferences, sensation intact all limbs to light touch

## 2023-12-07 NOTE — PROGRESS NOTE ADULT - SUBJECTIVE AND OBJECTIVE BOX
Subjective: Patient seen and examined. No overnight events.  BP improved.     MEDICATIONS  (STANDING):  aspirin 325 milliGRAM(s) Oral daily  atorvastatin 80 milliGRAM(s) Oral at bedtime  influenza  Vaccine (HIGH DOSE) 0.7 milliLiter(s) IntraMuscular once  lisinopril 10 milliGRAM(s) Oral daily  mupirocin 2% Ointment 1 Application(s) Topical <User Schedule>    MEDICATIONS  (PRN):  acetaminophen     Tablet .. 650 milliGRAM(s) Oral every 6 hours PRN Temp greater or equal to 38C (100.4F)  labetalol 100 milliGRAM(s) Oral every 6 hours PRN SBP>190      Allergies    penicillin (Rash)  IV Contrast (Anaphylaxis; Urticaria)    Intolerances        Vital Signs Last 24 Hrs  T(C): 37.3 (07 Dec 2023 07:48), Max: 37.3 (06 Dec 2023 11:00)  T(F): 99.2 (07 Dec 2023 07:48), Max: 99.2 (07 Dec 2023 07:48)  HR: 84 (07 Dec 2023 09:15) (65 - 84)  BP: 117/85 (07 Dec 2023 09:15) (117/85 - 197/94)  BP(mean): 95 (07 Dec 2023 09:15) (84 - 118)  RR: 18 (07 Dec 2023 09:15) (14 - 27)  SpO2: 98% (07 Dec 2023 09:15) (93% - 98%)    Parameters below as of 07 Dec 2023 04:00  Patient On (Oxygen Delivery Method): room air        PHYSICAL EXAM:  GENERAL: NAD, slurring speech  HEAD:  Atraumatic, Normocephalic  ENMT: Moist mucous membranes,   NECK: Supple, No JVD, Normal thyroid  NERVOUS SYSTEM:  All 4 extremities mobile, no gross sensory deficits.   CHEST/LUNG: Clear to auscultation bilaterally; No rales, rhonchi, wheezing, or rubs  HEART: Regular rate and rhythm; No murmurs, rubs, or gallops  ABDOMEN: Soft, Nontender, Nondistended; Bowel sounds present  EXTREMITIES:  Right sided weakness improving       LABS:                        11.4   8.75  )-----------( 232      ( 07 Dec 2023 06:00 )             35.6     07 Dec 2023 06:00    134    |  97     |  16     ----------------------------<  116    3.7     |  27     |  0.55     Ca    9.0        07 Dec 2023 06:00        Urinalysis Basic - ( 07 Dec 2023 06:00 )    Color: x / Appearance: x / SG: x / pH: x  Gluc: 116 mg/dL / Ketone: x  / Bili: x / Urobili: x   Blood: x / Protein: x / Nitrite: x   Leuk Esterase: x / RBC: x / WBC x   Sq Epi: x / Non Sq Epi: x / Bacteria: x      CAPILLARY BLOOD GLUCOSE          RADIOLOGY & ADDITIONAL TESTS:    Imaging Personally Reviewed:  [ ] YES     Consultant(s) Notes Reviewed:      Care Discussed with Consultants/Other Providers:    Advanced Directives: [ ] DNR  [ ] No feeding tube  [ ] MOLST in chart  [ ] MOLST completed today  [ ] Unknown

## 2023-12-07 NOTE — PROGRESS NOTE ADULT - SUBJECTIVE AND OBJECTIVE BOX
Date/Time Patient Seen:  		  Referring MD:   Data Reviewed	       Patient is a 88y old  Female who presents with a chief complaint of Unresponsive. (06 Dec 2023 11:57)      Subjective/HPI     PAST MEDICAL & SURGICAL HISTORY:  Hyperlipidemia  no current medications    Breast cancer  2009 left - s/ping castellanos radical mastectomy    [place] (hard of hearing)  bilateral HA (Yvette)    Meniscus tear  left - 10/2015    Cyst of left ovary    SDH (subdural hematoma)  3/2011 - s/p fall - no surgical intervention    History of Left Mastectomy    Cataract extraction status of eye, right    Status post right cataract extraction          Medication list         MEDICATIONS  (STANDING):  aspirin 325 milliGRAM(s) Oral daily  atorvastatin 80 milliGRAM(s) Oral at bedtime  dextrose 5% + sodium chloride 0.45%. 1000 milliLiter(s) (75 mL/Hr) IV Continuous <Continuous>  influenza  Vaccine (HIGH DOSE) 0.7 milliLiter(s) IntraMuscular once  lisinopril 10 milliGRAM(s) Oral daily  mupirocin 2% Ointment 1 Application(s) Topical <User Schedule>    MEDICATIONS  (PRN):  acetaminophen     Tablet .. 650 milliGRAM(s) Oral every 6 hours PRN Temp greater or equal to 38C (100.4F)  labetalol 100 milliGRAM(s) Oral every 6 hours PRN SBP>190         Vitals log        ICU Vital Signs Last 24 Hrs  T(C): 37.3 (07 Dec 2023 07:48), Max: 37.4 (06 Dec 2023 09:00)  T(F): 99.2 (07 Dec 2023 07:48), Max: 99.3 (06 Dec 2023 09:00)  HR: 77 (07 Dec 2023 04:00) (65 - 82)  BP: 168/72 (07 Dec 2023 04:00) (147/62 - 197/94)  BP(mean): 97 (07 Dec 2023 04:00) (84 - 144)  ABP: --  ABP(mean): --  RR: 15 (07 Dec 2023 04:00) (14 - 27)  SpO2: 97% (07 Dec 2023 04:00) (93% - 98%)    O2 Parameters below as of 07 Dec 2023 04:00  Patient On (Oxygen Delivery Method): room air                 Input and Output:  I&O's Detail    06 Dec 2023 07:01  -  07 Dec 2023 07:00  --------------------------------------------------------  IN:    dextrose 5% + sodium chloride 0.45%: 900 mL    Oral Fluid: 100 mL  Total IN: 1000 mL    OUT:    Voided (mL): 552 mL  Total OUT: 552 mL    Total NET: 448 mL          Lab Data                        11.4   8.75  )-----------( 232      ( 07 Dec 2023 06:00 )             35.6     12-07    134<L>  |  97  |  16  ----------------------------<  116<H>  3.7   |  27  |  0.55    Ca    9.0      07 Dec 2023 06:00              Review of Systems	      Objective     Physical Examination  heart s1s2  lung dc BS  head nc        Pertinent Lab findings & Imaging      Christiane:  NO   Adequate UO     I&O's Detail    06 Dec 2023 07:01  -  07 Dec 2023 07:00  --------------------------------------------------------  IN:    dextrose 5% + sodium chloride 0.45%: 900 mL    Oral Fluid: 100 mL  Total IN: 1000 mL    OUT:    Voided (mL): 552 mL  Total OUT: 552 mL    Total NET: 448 mL               Discussed with:     Cultures:	        Radiology                             Date/Time Patient Seen:  		  Referring MD:   Data Reviewed	       Patient is a 88y old  Female who presents with a chief complaint of Unresponsive. (06 Dec 2023 11:57)      Subjective/HPI     PAST MEDICAL & SURGICAL HISTORY:  Hyperlipidemia  no current medications    Breast cancer  2009 left - s/ping castellanos radical mastectomy    [place] (hard of hearing)  bilateral HA (Yvette)    Meniscus tear  left - 10/2015    Cyst of left ovary    SDH (subdural hematoma)  3/2011 - s/p fall - no surgical intervention    History of Left Mastectomy    Cataract extraction status of eye, right    Status post right cataract extraction          Medication list         MEDICATIONS  (STANDING):  aspirin 325 milliGRAM(s) Oral daily  atorvastatin 80 milliGRAM(s) Oral at bedtime  dextrose 5% + sodium chloride 0.45%. 1000 milliLiter(s) (75 mL/Hr) IV Continuous <Continuous>  influenza  Vaccine (HIGH DOSE) 0.7 milliLiter(s) IntraMuscular once  lisinopril 10 milliGRAM(s) Oral daily  mupirocin 2% Ointment 1 Application(s) Topical <User Schedule>    MEDICATIONS  (PRN):  acetaminophen     Tablet .. 650 milliGRAM(s) Oral every 6 hours PRN Temp greater or equal to 38C (100.4F)  labetalol 100 milliGRAM(s) Oral every 6 hours PRN SBP>190         Vitals log        ICU Vital Signs Last 24 Hrs  T(C): 37.3 (07 Dec 2023 07:48), Max: 37.4 (06 Dec 2023 09:00)  T(F): 99.2 (07 Dec 2023 07:48), Max: 99.3 (06 Dec 2023 09:00)  HR: 77 (07 Dec 2023 04:00) (65 - 82)  BP: 168/72 (07 Dec 2023 04:00) (147/62 - 197/94)  BP(mean): 97 (07 Dec 2023 04:00) (84 - 144)  ABP: --  ABP(mean): --  RR: 15 (07 Dec 2023 04:00) (14 - 27)  SpO2: 97% (07 Dec 2023 04:00) (93% - 98%)    O2 Parameters below as of 07 Dec 2023 04:00  Patient On (Oxygen Delivery Method): room air                 Input and Output:  I&O's Detail    06 Dec 2023 07:01  -  07 Dec 2023 07:00  --------------------------------------------------------  IN:    dextrose 5% + sodium chloride 0.45%: 900 mL    Oral Fluid: 100 mL  Total IN: 1000 mL    OUT:    Voided (mL): 552 mL  Total OUT: 552 mL    Total NET: 448 mL          Lab Data                        11.4   8.75  )-----------( 232      ( 07 Dec 2023 06:00 )             35.6     12-07    134<L>  |  97  |  16  ----------------------------<  116<H>  3.7   |  27  |  0.55    Ca    9.0      07 Dec 2023 06:00              Review of Systems	      Objective     Physical Examination  heart s1s2  lung dc BS  head nc        Pertinent Lab findings & Imaging      Christiane:  NO   Adequate UO     I&O's Detail    06 Dec 2023 07:01  -  07 Dec 2023 07:00  --------------------------------------------------------  IN:    dextrose 5% + sodium chloride 0.45%: 900 mL    Oral Fluid: 100 mL  Total IN: 1000 mL    OUT:    Voided (mL): 552 mL  Total OUT: 552 mL    Total NET: 448 mL               Discussed with:     Cultures:	        Radiology

## 2023-12-07 NOTE — PROGRESS NOTE ADULT - ASSESSMENT
88-year-old female with history of hypertension pacemaker right hip replacement brought by ambulance from home for evaluation of episode of weakness and facial droop    VS noted  labs reviewed  imaging reviewed  on Pureed Diet  BP optimization    extubated  neuro eval noted  HOB elev  asp prec  vs noted  labs reviewed  CNS imaging reviewed  monitor VS and HD and Sat  asp prec

## 2023-12-07 NOTE — PROGRESS NOTE ADULT - SUBJECTIVE AND OBJECTIVE BOX
Chief Complaint: Facial droop, right-sided weakness    Interval Events: No events overnight.    Review of Systems:  Unable to obtain    Physical Exam:  Vital Signs Last 24 Hrs  T(C): 37.3 (07 Dec 2023 07:48), Max: 37.3 (06 Dec 2023 10:00)  T(F): 99.2 (07 Dec 2023 07:48), Max: 99.2 (07 Dec 2023 07:48)  HR: 84 (07 Dec 2023 09:15) (65 - 84)  BP: 117/85 (07 Dec 2023 09:15) (117/85 - 197/94)  BP(mean): 95 (07 Dec 2023 09:15) (84 - 118)  RR: 18 (07 Dec 2023 09:15) (14 - 27)  SpO2: 98% (07 Dec 2023 09:15) (93% - 98%)  Parameters below as of 07 Dec 2023 04:00  Patient On (Oxygen Delivery Method): room air  General: NAD  HEENT: Intubated  Neck: No JVD, no carotid bruit  Lungs: CTAB  CV: RRR, nl S1/S2, no M/R/G  Abdomen: S/NT/ND, +BS  Extremities: No LE edema, no cyanosis  Neuro: AAOx1  Skin: No rash    Labs:    12-07    134<L>  |  97  |  16  ----------------------------<  116<H>  3.7   |  27  |  0.55    Ca    9.0      07 Dec 2023 06:00                          11.4   8.75  )-----------( 232      ( 07 Dec 2023 06:00 )             35.6     ECG/Telemetry: NSR, ventricular paced

## 2023-12-07 NOTE — SOCIAL WORK PROGRESS NOTE - NSSWPROGRESSNOTE_GEN_ALL_CORE
Pt was taken off 1:1 today. Although she is still slurring her speech, her residuals are improving. Pt walker to the bathroom. Pt has 3 dtrs: (Donna, who is her emergency contact has ALS and has difficulty speaking). ISRRAEL spoke to her dtr Karlie , who lives in [place] about dc plan. Pt was referred to [facility] and is still under review, as she was just taken off of 1:1. Her dtr Lois  is involved as well, as is her friend Dae. Karlie is aware and in agreement with plan of acute rehab. ISRRAEL emailed her dtrs a izabela list in the event that pt is denied or bed not available at [facility] to review. ISRRAEL to f/u. Pt was taken off 1:1 today. Although she is still slurring her speech, her residuals are improving. Pt walker to the bathroom. Pt has 3 dtrs: (Donna, who is her emergency contact has ALS and has difficulty speaking). ISRRAEL spoke to her dtr Karlie , who lives in [place] about dc plan. Pt was referred to [facility] and is still under review, as she was just taken off of 1:1. Her dtr Lois  is involved as well, as is her friend Dae. Karlie is aware and in agreement with plan of acute rehab. ISRRAEL emailed her dtrs a izabela list in the event that pt is denied or bed not available at [facility] to review. ISRRAEL to f/u.

## 2023-12-07 NOTE — CONSULT NOTE ADULT - ASSESSMENT
A/P 88y year old Female with CVA, R hemiparesis, expressive aphasia    Acute inpatient rehab candidate, will be able to participate and tolerate 3 hours therapy, will require PT OT and SLP to address immobility, aphasia, hemiparesis.    Primary team notes are reviewed  Therapy notes are reviewed    Laboratory studies reviewed including those mentioned earlier/above  Discussed management/coordinated care with primary team/referring provider.    55 minutes spent during patient encounter:    ¦ preparing to see the patient   ¦ performing a medically appropriate examination and/or evaluation   ¦ referring and communicating with other health care professionals  ¦ documenting clinical information in the electronic or other health record

## 2023-12-07 NOTE — PROGRESS NOTE ADULT - SUBJECTIVE AND OBJECTIVE BOX
PROGRESS NOTE   Patient is a 88y old  Female who presents with a chief complaint of Unresponsive. (07 Dec 2023 11:18)      HPI:  This is an 89 y/o F with PMH of HTN, PPM, Breast CA s/p left mastectomy, Nodular Goitre, and Allergic Rhinitis who presented with an acute onset of right sided weakness with right facial droop & slurred speech that was witnessed by the daughter, no AMS, no seizure activity. By time she arrived at the ED she was already back to her baseline, NIHSS was zero by ED staff. Patient was sent to CT suit for a brain CT & Brain CTA, and after she returned form CT she started showing diffuse skin rash, SOB with loud chest wheeze, didn't respond to steroids, Epinephrine, and H1 blockers, her SPO2 dropped, had a severe hypertensive response, and had to be emergently intubated by ED team. She was found with massive acute pulmonary edema. No known previous history of allergy to iodine or IV contrast. (02 Dec 2023 21:38)      Vital Signs Last 24 Hrs  T(C): 36.5 (07 Dec 2023 15:34), Max: 37.3 (07 Dec 2023 07:48)  T(F): 97.7 (07 Dec 2023 15:34), Max: 99.2 (07 Dec 2023 07:48)  HR: 80 (07 Dec 2023 16:00) (76 - 91)  BP: 145/93 (07 Dec 2023 16:00) (117/85 - 197/94)  BP(mean): 108 (07 Dec 2023 16:00) (84 - 118)  RR: 22 (07 Dec 2023 16:00) (15 - 27)  SpO2: 94% (07 Dec 2023 16:00) (94% - 98%)    Parameters below as of 07 Dec 2023 04:00  Patient On (Oxygen Delivery Method): room air                              11.4   8.75  )-----------( 232      ( 07 Dec 2023 06:00 )             35.6               12-07    134<L>  |  97  |  16  ----------------------------<  116<H>  3.7   |  27  |  0.55    Ca    9.0      07 Dec 2023 06:00        PHYSICAL EXAM  General: Pleasant  female NAD   Integument:  Skin warm, dry and supple bilateral.    Venous ulcer left anterior leg, - hyperkeratotic border, wound base Granular, wound size (2.5cm X 3 cm X 0.1cm) - edema, - gila-wound erythema, - purulence, - fluctuance, - tracking/tunneling, - probe to bone.   Vascular: Dorsalis Pedis and Posterior Tibial pulses 2/4.  Capillary re-fill time less than 3 seconds digits 1-5 bilateral.  Varicosities noted b/l lower extremity  Neuro: Unable to perform due to mental baseline  MSK: Unable to perform due to mental baseline

## 2023-12-07 NOTE — PROGRESS NOTE ADULT - SUBJECTIVE AND OBJECTIVE BOX
Neurology follow up note    REBEKAH [id number]      Interval History:    Patient feels ok no new complaints.    Allergies    penicillin (Rash)  IV Contrast (Anaphylaxis; Urticaria)    Intolerances        MEDICATIONS    acetaminophen     Tablet .. 650 milliGRAM(s) Oral every 6 hours PRN  aspirin 325 milliGRAM(s) Oral daily  atorvastatin 80 milliGRAM(s) Oral at bedtime  influenza  Vaccine (HIGH DOSE) 0.7 milliLiter(s) IntraMuscular once  labetalol 100 milliGRAM(s) Oral every 6 hours PRN  lisinopril 10 milliGRAM(s) Oral daily  mupirocin 2% Ointment 1 Application(s) Topical <User Schedule>              Vital Signs Last 24 Hrs  T(C): 37.3 (07 Dec 2023 07:48), Max: 37.3 (06 Dec 2023 11:26)  T(F): 99.2 (07 Dec 2023 07:48), Max: 99.2 (07 Dec 2023 07:48)  HR: 84 (07 Dec 2023 09:15) (65 - 84)  BP: 117/85 (07 Dec 2023 09:15) (117/85 - 197/94)  BP(mean): 95 (07 Dec 2023 09:15) (84 - 118)  RR: 18 (07 Dec 2023 09:15) (15 - 27)  SpO2: 98% (07 Dec 2023 09:15) (93% - 98%)    Parameters below as of 07 Dec 2023 04:00  Patient On (Oxygen Delivery Method): room air        REVIEW OF SYSTEMS:    Constitutional: No fever, chills, fatigue, right sided weakness  Eyes: no eye pain, visual disturbances, or discharge  ENT:  No difficulty hearing, tinnitus, vertigo; No sinus or throat pain  Neck: No pain or stiffness  Respiratory: No cough, dyspnea, wheezing   Cardiovascular: No chest pain, palpitations,   Gastrointestinal: No abdominal or epigastric pain. No nausea, vomiting  No diarrhea or constipation.   Genitourinary: No dysuria, frequency, hematuria or incontinence  Neurological: No headaches, lightheadedness, vertigo, numbness or tremors  Psychiatric: No depression, anxiety, mood swings or difficulty sleeping  Musculoskeletal: No joint pain or swelling; No muscle, back or extremity pain  Skin: No itching, burning, rashes or lesions   Lymph Nodes: No enlarged glands  Endocrine: No heat or cold intolerance; No hair loss   Allergy and Immunologic: No hives or eczema    On Neurological Examination:    Mental Status - Patient is alert, awak      Follow simple commands      Speech -   Dysarthria             Cranial Nerves - Pupils 3 mm equal and reactive to light,   extraocular eye movements intact.   right face droop     Motor Exam -   Right upper 3/5-- was able to maintain arm over 10 sec elevated   Left upper5/5  Right lower2-/5  Left lower 4/5    Muscle tone - is normal all over.  No asymmetry is seen.      Sensory    Bilateral intact to light touch    GENERAL Exam: Nontoxic , No Acute Distress   	  HEENT:  normocephalic, atraumatic  		  LUNGS:  Decreased bilaterally  	  HEART: Normal S1S2   No murmur RRR        	  GI/ ABDOMEN:  Soft  Non tender    EXTREMITIES:   No Edema  No Clubbing  No Cyanosis     MUSCULOSKELETAL: Normal Range of Motion  	   SKIN: Normal  No Ecchymosis               LABS:  CBC Full  -  ( 07 Dec 2023 06:00 )  WBC Count : 8.75 K/uL  RBC Count : 4.44 M/uL  Hemoglobin : 11.4 g/dL  Hematocrit : 35.6 %  Platelet Count - Automated : 232 K/uL  Mean Cell Volume : 80.2 fl  Mean Cell Hemoglobin : 25.7 pg  Mean Cell Hemoglobin Concentration : 32.0 gm/dL  Auto Neutrophil # : x  Auto Lymphocyte # : x  Auto Monocyte # : x  Auto Eosinophil # : x  Auto Basophil # : x  Auto Neutrophil % : x  Auto Lymphocyte % : x  Auto Monocyte % : x  Auto Eosinophil % : x  Auto Basophil % : x    Urinalysis Basic - ( 07 Dec 2023 06:00 )    Color: x / Appearance: x / SG: x / pH: x  Gluc: 116 mg/dL / Ketone: x  / Bili: x / Urobili: x   Blood: x / Protein: x / Nitrite: x   Leuk Esterase: x / RBC: x / WBC x   Sq Epi: x / Non Sq Epi: x / Bacteria: x      12-07    134<L>  |  97  |  16  ----------------------------<  116<H>  3.7   |  27  |  0.55    Ca    9.0      07 Dec 2023 06:00      Hemoglobin A1C:       Vitamin B12         RADIOLOGY    TECHNIQUE: Volumetric CT acquisition was performed through the brain and   reviewed using brain and bone window technique.      COMPARISON: CT head 12/2/2020    FINDINGS:  Left frontal, temporal parietal and occipital large region of   hypoattenuation, loss of gray-white differentiation and sulcal effacement   consistent with acute to early subacute infarction. There is asymmetric   hyperdensity of several distal branches of the left middle cerebral   artery consistent with thrombosis.    There is a 7 x 8 mm hyperattenuating partiallycalcified extra-axial   lesion along the left greater sphenoid wing likely compatible with a   small meningioma. No significant associated mass effect.    The ventricular and sulcal size and configuration is age appropriate.   There are moderate patchy and confluent areas of hypodensity in the   periventricular and subcortical white matter which are likely related to   chronic microangiopathic changes.    There is no evidence of midline shift, acute intracranial hemorrhage, or   extra-axial collections.     The calvarium is intact. The paranasal sinuses are clear.The mastoid air   cells are predominantly clear. There has been prior bilateral cataract   surgery.      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  No acute intracranial hemorrhage.          ANALYSIS AND PLAN:  This is an 88-year-old with left gaze preference, dysarthria, expressive aphasia, right facial droop, and right hemiparesis.  Clinical impression is cerebrovascular accident of the left MCA territory, what could be calculated by NIH Stroke Scale with me would be 8   Interrogation of pacemaker no  atrial fibrillation.  ok to start lowering the blood pressure by 15% per day please avoid hypotension and large drops in SBP   I will recommend high-dose statin.  Physical Therapy evaluation.  overall patient is making improvement motor and speech wise    monitor oral intake as needed    then 2 weeks can cut down 81 mg     neurologic wise stable for dc planning     Spoke with cousin, Dae, at bedside, who is the healthcare proxy, also called daughter, updated her, Dae's telephone number is [phone] 12/7     50 minutes of time was spent with the patient, plan of care, reviewing data, with greater than 50% of the visit was spent counseling and/or coordinating care with multidisciplinary healthcare team   Neurology follow up note    RBEEKAH [id number]      Interval History:    Patient feels ok no new complaints.    Allergies    penicillin (Rash)  IV Contrast (Anaphylaxis; Urticaria)    Intolerances        MEDICATIONS    acetaminophen     Tablet .. 650 milliGRAM(s) Oral every 6 hours PRN  aspirin 325 milliGRAM(s) Oral daily  atorvastatin 80 milliGRAM(s) Oral at bedtime  influenza  Vaccine (HIGH DOSE) 0.7 milliLiter(s) IntraMuscular once  labetalol 100 milliGRAM(s) Oral every 6 hours PRN  lisinopril 10 milliGRAM(s) Oral daily  mupirocin 2% Ointment 1 Application(s) Topical <User Schedule>              Vital Signs Last 24 Hrs  T(C): 37.3 (07 Dec 2023 07:48), Max: 37.3 (06 Dec 2023 11:26)  T(F): 99.2 (07 Dec 2023 07:48), Max: 99.2 (07 Dec 2023 07:48)  HR: 84 (07 Dec 2023 09:15) (65 - 84)  BP: 117/85 (07 Dec 2023 09:15) (117/85 - 197/94)  BP(mean): 95 (07 Dec 2023 09:15) (84 - 118)  RR: 18 (07 Dec 2023 09:15) (15 - 27)  SpO2: 98% (07 Dec 2023 09:15) (93% - 98%)    Parameters below as of 07 Dec 2023 04:00  Patient On (Oxygen Delivery Method): room air        REVIEW OF SYSTEMS:    Constitutional: No fever, chills, fatigue, right sided weakness  Eyes: no eye pain, visual disturbances, or discharge  ENT:  No difficulty hearing, tinnitus, vertigo; No sinus or throat pain  Neck: No pain or stiffness  Respiratory: No cough, dyspnea, wheezing   Cardiovascular: No chest pain, palpitations,   Gastrointestinal: No abdominal or epigastric pain. No nausea, vomiting  No diarrhea or constipation.   Genitourinary: No dysuria, frequency, hematuria or incontinence  Neurological: No headaches, lightheadedness, vertigo, numbness or tremors  Psychiatric: No depression, anxiety, mood swings or difficulty sleeping  Musculoskeletal: No joint pain or swelling; No muscle, back or extremity pain  Skin: No itching, burning, rashes or lesions   Lymph Nodes: No enlarged glands  Endocrine: No heat or cold intolerance; No hair loss   Allergy and Immunologic: No hives or eczema    On Neurological Examination:    Mental Status - Patient is alert, awak      Follow simple commands      Speech -   Dysarthria             Cranial Nerves - Pupils 3 mm equal and reactive to light,   extraocular eye movements intact.   right face droop     Motor Exam -   Right upper 3/5-- was able to maintain arm over 10 sec elevated   Left upper5/5  Right lower2-/5  Left lower 4/5    Muscle tone - is normal all over.  No asymmetry is seen.      Sensory    Bilateral intact to light touch    GENERAL Exam: Nontoxic , No Acute Distress   	  HEENT:  normocephalic, atraumatic  		  LUNGS:  Decreased bilaterally  	  HEART: Normal S1S2   No murmur RRR        	  GI/ ABDOMEN:  Soft  Non tender    EXTREMITIES:   No Edema  No Clubbing  No Cyanosis     MUSCULOSKELETAL: Normal Range of Motion  	   SKIN: Normal  No Ecchymosis               LABS:  CBC Full  -  ( 07 Dec 2023 06:00 )  WBC Count : 8.75 K/uL  RBC Count : 4.44 M/uL  Hemoglobin : 11.4 g/dL  Hematocrit : 35.6 %  Platelet Count - Automated : 232 K/uL  Mean Cell Volume : 80.2 fl  Mean Cell Hemoglobin : 25.7 pg  Mean Cell Hemoglobin Concentration : 32.0 gm/dL  Auto Neutrophil # : x  Auto Lymphocyte # : x  Auto Monocyte # : x  Auto Eosinophil # : x  Auto Basophil # : x  Auto Neutrophil % : x  Auto Lymphocyte % : x  Auto Monocyte % : x  Auto Eosinophil % : x  Auto Basophil % : x    Urinalysis Basic - ( 07 Dec 2023 06:00 )    Color: x / Appearance: x / SG: x / pH: x  Gluc: 116 mg/dL / Ketone: x  / Bili: x / Urobili: x   Blood: x / Protein: x / Nitrite: x   Leuk Esterase: x / RBC: x / WBC x   Sq Epi: x / Non Sq Epi: x / Bacteria: x      12-07    134<L>  |  97  |  16  ----------------------------<  116<H>  3.7   |  27  |  0.55    Ca    9.0      07 Dec 2023 06:00      Hemoglobin A1C:       Vitamin B12         RADIOLOGY    TECHNIQUE: Volumetric CT acquisition was performed through the brain and   reviewed using brain and bone window technique.      COMPARISON: CT head 12/2/2020    FINDINGS:  Left frontal, temporal parietal and occipital large region of   hypoattenuation, loss of gray-white differentiation and sulcal effacement   consistent with acute to early subacute infarction. There is asymmetric   hyperdensity of several distal branches of the left middle cerebral   artery consistent with thrombosis.    There is a 7 x 8 mm hyperattenuating partiallycalcified extra-axial   lesion along the left greater sphenoid wing likely compatible with a   small meningioma. No significant associated mass effect.    The ventricular and sulcal size and configuration is age appropriate.   There are moderate patchy and confluent areas of hypodensity in the   periventricular and subcortical white matter which are likely related to   chronic microangiopathic changes.    There is no evidence of midline shift, acute intracranial hemorrhage, or   extra-axial collections.     The calvarium is intact. The paranasal sinuses are clear.The mastoid air   cells are predominantly clear. There has been prior bilateral cataract   surgery.      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  No acute intracranial hemorrhage.          ANALYSIS AND PLAN:  This is an 88-year-old with left gaze preference, dysarthria, expressive aphasia, right facial droop, and right hemiparesis.  Clinical impression is cerebrovascular accident of the left MCA territory, what could be calculated by NIH Stroke Scale with me would be 8   Interrogation of pacemaker no  atrial fibrillation.  ok to start lowering the blood pressure by 15% per day please avoid hypotension and large drops in SBP   I will recommend high-dose statin.  Physical Therapy evaluation.  overall patient is making improvement motor and speech wise    monitor oral intake as needed    then 2 weeks can cut down 81 mg     neurologic wise stable for dc planning     Spoke with cousin, Dae, at bedside, who is the healthcare proxy, also called daughter, updated her, Dae's telephone number is [phone] 12/7     50 minutes of time was spent with the patient, plan of care, reviewing data, with greater than 50% of the visit was spent counseling and/or coordinating care with multidisciplinary healthcare team   Neurology follow up note    REBEKAH [id number]      Interval History:    Patient feels ok no new complaints.    Allergies    penicillin (Rash)  IV Contrast (Anaphylaxis; Urticaria)    Intolerances        MEDICATIONS    acetaminophen     Tablet .. 650 milliGRAM(s) Oral every 6 hours PRN  aspirin 325 milliGRAM(s) Oral daily  atorvastatin 80 milliGRAM(s) Oral at bedtime  influenza  Vaccine (HIGH DOSE) 0.7 milliLiter(s) IntraMuscular once  labetalol 100 milliGRAM(s) Oral every 6 hours PRN  lisinopril 10 milliGRAM(s) Oral daily  mupirocin 2% Ointment 1 Application(s) Topical <User Schedule>              Vital Signs Last 24 Hrs  T(C): 37.3 (07 Dec 2023 07:48), Max: 37.3 (06 Dec 2023 11:26)  T(F): 99.2 (07 Dec 2023 07:48), Max: 99.2 (07 Dec 2023 07:48)  HR: 84 (07 Dec 2023 09:15) (65 - 84)  BP: 117/85 (07 Dec 2023 09:15) (117/85 - 197/94)  BP(mean): 95 (07 Dec 2023 09:15) (84 - 118)  RR: 18 (07 Dec 2023 09:15) (15 - 27)  SpO2: 98% (07 Dec 2023 09:15) (93% - 98%)    Parameters below as of 07 Dec 2023 04:00  Patient On (Oxygen Delivery Method): room air        REVIEW OF SYSTEMS:    Constitutional: No fever, chills, fatigue, right sided weakness  Eyes: no eye pain, visual disturbances, or discharge  ENT:  No difficulty hearing, tinnitus, vertigo; No sinus or throat pain  Neck: No pain or stiffness  Respiratory: No cough, dyspnea, wheezing   Cardiovascular: No chest pain, palpitations,   Gastrointestinal: No abdominal or epigastric pain. No nausea, vomiting  No diarrhea or constipation.   Genitourinary: No dysuria, frequency, hematuria or incontinence  Neurological: No headaches, lightheadedness, vertigo, numbness or tremors  Psychiatric: No depression, anxiety, mood swings or difficulty sleeping  Musculoskeletal: No joint pain or swelling; No muscle, back or extremity pain    On Neurological Examination:    Mental Status - Patient is alert, awake      Follow simple commands      Speech -   Dysarthria             Cranial Nerves - Pupils 3 mm equal and reactive to light,   extraocular eye movements intact.   right face droop     Motor Exam -   Right upper 3/5-- was able to maintain arm over 10 sec elevated   Left upper5/5  Right lower2-/5  Left lower 4/5    Muscle tone - is normal all over.  No asymmetry is seen.      Sensory    Bilateral intact to light touch    GENERAL Exam: Nontoxic , No Acute Distress   	  HEENT:  normocephalic, atraumatic  		  LUNGS:  Decreased bilaterally  	  HEART: Normal S1S2   No murmur RRR        	  GI/ ABDOMEN:  Soft  Non tender    EXTREMITIES:   No Edema  No Clubbing  No Cyanosis   	   SKIN: Normal  No Ecchymosis               LABS:  CBC Full  -  ( 07 Dec 2023 06:00 )  WBC Count : 8.75 K/uL  RBC Count : 4.44 M/uL  Hemoglobin : 11.4 g/dL  Hematocrit : 35.6 %  Platelet Count - Automated : 232 K/uL  Mean Cell Volume : 80.2 fl  Mean Cell Hemoglobin : 25.7 pg  Mean Cell Hemoglobin Concentration : 32.0 gm/dL  Auto Neutrophil # : x  Auto Lymphocyte # : x  Auto Monocyte # : x  Auto Eosinophil # : x  Auto Basophil # : x  Auto Neutrophil % : x  Auto Lymphocyte % : x  Auto Monocyte % : x  Auto Eosinophil % : x  Auto Basophil % : x    Urinalysis Basic - ( 07 Dec 2023 06:00 )    Color: x / Appearance: x / SG: x / pH: x  Gluc: 116 mg/dL / Ketone: x  / Bili: x / Urobili: x   Blood: x / Protein: x / Nitrite: x   Leuk Esterase: x / RBC: x / WBC x   Sq Epi: x / Non Sq Epi: x / Bacteria: x      12-07    134<L>  |  97  |  16  ----------------------------<  116<H>  3.7   |  27  |  0.55    Ca    9.0      07 Dec 2023 06:00      Hemoglobin A1C:       Vitamin B12         RADIOLOGY    TECHNIQUE: Volumetric CT acquisition was performed through the brain and   reviewed using brain and bone window technique.      COMPARISON: CT head 12/2/2020    FINDINGS:  Left frontal, temporal parietal and occipital large region of   hypoattenuation, loss of gray-white differentiation and sulcal effacement   consistent with acute to early subacute infarction. There is asymmetric   hyperdensity of several distal branches of the left middle cerebral   artery consistent with thrombosis.    There is a 7 x 8 mm hyperattenuating partiallycalcified extra-axial   lesion along the left greater sphenoid wing likely compatible with a   small meningioma. No significant associated mass effect.    The ventricular and sulcal size and configuration is age appropriate.   There are moderate patchy and confluent areas of hypodensity in the   periventricular and subcortical white matter which are likely related to   chronic microangiopathic changes.    There is no evidence of midline shift, acute intracranial hemorrhage, or   extra-axial collections.     The calvarium is intact. The paranasal sinuses are clear.The mastoid air   cells are predominantly clear. There has been prior bilateral cataract   surgery.      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  No acute intracranial hemorrhage.          ANALYSIS AND PLAN:  This is an 88-year-old with left gaze preference, dysarthria, expressive aphasia, right facial droop, and right hemiparesis.  Clinical impression is cerebrovascular accident of the left MCA territory, what could be calculated by NIH Stroke Scale with me would be 8   Interrogation of pacemaker no atrial fibrillation.  ok to start lowering the blood pressure by 15% per day please avoid hypotension and large drops in SBP   I will recommend high-dose statin.  Physical Therapy evaluation.  overall patient is making improvement motor and speech wise    monitor oral intake as needed    then 2 weeks can cut down 81 mg     neurologic wise stable for dc planning     Spoke with cousinDae, who is the healthcare proxy, , Dae's telephone number is [phone] 12/7     50 minutes of time was spent with the patient, plan of care, reviewing data, with greater than 50% of the visit was spent counseling and/or coordinating care with multidisciplinary healthcare team   Neurology follow up note    REBEKAH [id number]      Interval History:    Patient feels ok no new complaints.    Allergies    penicillin (Rash)  IV Contrast (Anaphylaxis; Urticaria)    Intolerances        MEDICATIONS    acetaminophen     Tablet .. 650 milliGRAM(s) Oral every 6 hours PRN  aspirin 325 milliGRAM(s) Oral daily  atorvastatin 80 milliGRAM(s) Oral at bedtime  influenza  Vaccine (HIGH DOSE) 0.7 milliLiter(s) IntraMuscular once  labetalol 100 milliGRAM(s) Oral every 6 hours PRN  lisinopril 10 milliGRAM(s) Oral daily  mupirocin 2% Ointment 1 Application(s) Topical <User Schedule>              Vital Signs Last 24 Hrs  T(C): 37.3 (07 Dec 2023 07:48), Max: 37.3 (06 Dec 2023 11:26)  T(F): 99.2 (07 Dec 2023 07:48), Max: 99.2 (07 Dec 2023 07:48)  HR: 84 (07 Dec 2023 09:15) (65 - 84)  BP: 117/85 (07 Dec 2023 09:15) (117/85 - 197/94)  BP(mean): 95 (07 Dec 2023 09:15) (84 - 118)  RR: 18 (07 Dec 2023 09:15) (15 - 27)  SpO2: 98% (07 Dec 2023 09:15) (93% - 98%)    Parameters below as of 07 Dec 2023 04:00  Patient On (Oxygen Delivery Method): room air        REVIEW OF SYSTEMS:    Constitutional: No fever, chills, fatigue, right sided weakness  Eyes: no eye pain, visual disturbances, or discharge  ENT:  No difficulty hearing, tinnitus, vertigo; No sinus or throat pain  Neck: No pain or stiffness  Respiratory: No cough, dyspnea, wheezing   Cardiovascular: No chest pain, palpitations,   Gastrointestinal: No abdominal or epigastric pain. No nausea, vomiting  No diarrhea or constipation.   Genitourinary: No dysuria, frequency, hematuria or incontinence  Neurological: No headaches, lightheadedness, vertigo, numbness or tremors  Psychiatric: No depression, anxiety, mood swings or difficulty sleeping  Musculoskeletal: No joint pain or swelling; No muscle, back or extremity pain    On Neurological Examination:    Mental Status - Patient is alert, awake      Follow simple commands      Speech -   Dysarthria             Cranial Nerves - Pupils 3 mm equal and reactive to light,   extraocular eye movements intact.   right face droop     Motor Exam -   Right upper 3/5-- was able to maintain arm over 10 sec elevated   Left upper5/5  Right lower2-/5  Left lower 4/5    Muscle tone - is normal all over.  No asymmetry is seen.      Sensory    Bilateral intact to light touch    GENERAL Exam: Nontoxic , No Acute Distress   	  HEENT:  normocephalic, atraumatic  		  LUNGS:  Decreased bilaterally  	  HEART: Normal S1S2   No murmur RRR        	  GI/ ABDOMEN:  Soft  Non tender    EXTREMITIES:   No Edema  No Clubbing  No Cyanosis   	   SKIN: Normal  No Ecchymosis               LABS:  CBC Full  -  ( 07 Dec 2023 06:00 )  WBC Count : 8.75 K/uL  RBC Count : 4.44 M/uL  Hemoglobin : 11.4 g/dL  Hematocrit : 35.6 %  Platelet Count - Automated : 232 K/uL  Mean Cell Volume : 80.2 fl  Mean Cell Hemoglobin : 25.7 pg  Mean Cell Hemoglobin Concentration : 32.0 gm/dL  Auto Neutrophil # : x  Auto Lymphocyte # : x  Auto Monocyte # : x  Auto Eosinophil # : x  Auto Basophil # : x  Auto Neutrophil % : x  Auto Lymphocyte % : x  Auto Monocyte % : x  Auto Eosinophil % : x  Auto Basophil % : x    Urinalysis Basic - ( 07 Dec 2023 06:00 )    Color: x / Appearance: x / SG: x / pH: x  Gluc: 116 mg/dL / Ketone: x  / Bili: x / Urobili: x   Blood: x / Protein: x / Nitrite: x   Leuk Esterase: x / RBC: x / WBC x   Sq Epi: x / Non Sq Epi: x / Bacteria: x      12-07    134<L>  |  97  |  16  ----------------------------<  116<H>  3.7   |  27  |  0.55    Ca    9.0      07 Dec 2023 06:00      Hemoglobin A1C:       Vitamin B12         RADIOLOGY    TECHNIQUE: Volumetric CT acquisition was performed through the brain and   reviewed using brain and bone window technique.      COMPARISON: CT head 12/2/2020    FINDINGS:  Left frontal, temporal parietal and occipital large region of   hypoattenuation, loss of gray-white differentiation and sulcal effacement   consistent with acute to early subacute infarction. There is asymmetric   hyperdensity of several distal branches of the left middle cerebral   artery consistent with thrombosis.    There is a 7 x 8 mm hyperattenuating partiallycalcified extra-axial   lesion along the left greater sphenoid wing likely compatible with a   small meningioma. No significant associated mass effect.    The ventricular and sulcal size and configuration is age appropriate.   There are moderate patchy and confluent areas of hypodensity in the   periventricular and subcortical white matter which are likely related to   chronic microangiopathic changes.    There is no evidence of midline shift, acute intracranial hemorrhage, or   extra-axial collections.     The calvarium is intact. The paranasal sinuses are clear.The mastoid air   cells are predominantly clear. There has been prior bilateral cataract   surgery.      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  No acute intracranial hemorrhage.          ANALYSIS AND PLAN:  This is an 88-year-old with left gaze preference, dysarthria, expressive aphasia, right facial droop, and right hemiparesis.  Clinical impression is cerebrovascular accident of the left MCA territory, what could be calculated by NIH Stroke Scale with me would be 8   Interrogation of pacemaker no atrial fibrillation.  ok to start lowering the blood pressure by 15% per day please avoid hypotension and large drops in SBP   I will recommend high-dose statin.  Physical Therapy evaluation.  overall patient is making improvement motor and speech wise    monitor oral intake as needed    then 2 weeks can cut down 81 mg     neurologic wise stable for dc planning     Spoke with cousinDae, who is the healthcare proxy, , Dae's telephone number is [phone] 12/7     50 minutes of time was spent with the patient, plan of care, reviewing data, with greater than 50% of the visit was spent counseling and/or coordinating care with multidisciplinary healthcare team

## 2023-12-07 NOTE — PROGRESS NOTE ADULT - ASSESSMENT
88F HTN, Breast CA admitted for Acute Hypoxic Respiratory Failure    Acute Left MCA Stroke  Multiple embolic areas noted on CT Imaging   Right Sided Hemiparesis; Expressive Aphasia noted but all of it improving  Aspirin + Statin   Pacemaker interrogated showing Atrial runs but no sustained Atrial Fibrillation   Neurology Consult noted    Acute Hypoxic Respiratory Failure  Resolved and Extubated 12/4/23  Related to Anaphylactic Shock from Iodine Contrast Dye from CT    HTN  Resumed Lisinopril     Breast CA  History of Mastectomy    Diet  Passed swallow evaluation and diet changed to pureed    DVT Prophylaxis  Heparin SC TID    Disposition   Discharge planning to rehab

## 2023-12-07 NOTE — CONSULT NOTE ADULT - CONSULT REQUESTED DATE/TIME
02-Dec-2023 19:46
04-Dec-2023 12:48
07-Dec-2023 18:43
03-Dec-2023 05:00
05-Dec-2023 14:06
03-Dec-2023 08:44

## 2023-12-07 NOTE — PROGRESS NOTE ADULT - ASSESSMENT
The patient is an 88 year old female with a history of HTN, hypothyroidism, pacemaker, breast cancer who is admitted with CVA, acute respiratory failure due to IV contrast anaphylaxis.    Plan:  - ECG with sinus rhythm and intermittent ventricular pacing  - Troponin mildly elevated at 84 in the setting of demand ischemia from respiratory failure  - CXR with florid pulm edema in the setting of anaphylaxis - now resolved  - Repeat CT head with large CVA - consistent with concerning symptoms prior to admission  - Pacemaker interrogated - no evidence of AF  - Echo with normal LV systolic function, mod pulm HTN  - Monitor on telemetry  - Neurology follow-up  - BP elevated overnight. Received IV hydralazine. BP this am lower at 117/85.  - Would avoid additional IV hydralazine  - Start labetalol 100 mg PO q6h prn SBP>190  - If BP trends up this am, start lisinopril 10 mg daily

## 2023-12-07 NOTE — CONSULT NOTE ADULT - CONSULT REASON
Unresponsive
CVA, Rehabilitation, Dispo
Type 1 respiratory failure  Anaphylaxis
allergic rxn
.
Anterior left leg ulcer

## 2023-12-08 ENCOUNTER — TRANSCRIPTION ENCOUNTER (OUTPATIENT)
Age: 88
End: 2023-12-08

## 2023-12-08 ENCOUNTER — INPATIENT (INPATIENT)
Facility: HOSPITAL | Age: 88
LOS: 4 days | Discharge: ANOTHER HOSPITAL | DRG: 57 | End: 2023-12-13
Attending: PHYSICAL MEDICINE & REHABILITATION | Admitting: PHYSICAL MEDICINE & REHABILITATION
Payer: MEDICARE

## 2023-12-08 VITALS — WEIGHT: 132.28 LBS

## 2023-12-08 VITALS
RESPIRATION RATE: 16 BRPM | OXYGEN SATURATION: 95 % | WEIGHT: 143.74 LBS | HEIGHT: 63 IN | SYSTOLIC BLOOD PRESSURE: 137 MMHG | DIASTOLIC BLOOD PRESSURE: 67 MMHG | HEART RATE: 81 BPM | TEMPERATURE: 98 F

## 2023-12-08 DIAGNOSIS — I63.9 CEREBRAL INFARCTION, UNSPECIFIED: ICD-10-CM

## 2023-12-08 DIAGNOSIS — Z98.41 CATARACT EXTRACTION STATUS, RIGHT EYE: Chronic | ICD-10-CM

## 2023-12-08 LAB
ANION GAP SERPL CALC-SCNC: 5 MMOL/L — SIGNIFICANT CHANGE UP (ref 5–17)
ANION GAP SERPL CALC-SCNC: 5 MMOL/L — SIGNIFICANT CHANGE UP (ref 5–17)
BUN SERPL-MCNC: 16 MG/DL — SIGNIFICANT CHANGE UP (ref 7–23)
BUN SERPL-MCNC: 16 MG/DL — SIGNIFICANT CHANGE UP (ref 7–23)
CALCIUM SERPL-MCNC: 8.7 MG/DL — SIGNIFICANT CHANGE UP (ref 8.4–10.5)
CALCIUM SERPL-MCNC: 8.7 MG/DL — SIGNIFICANT CHANGE UP (ref 8.4–10.5)
CHLORIDE SERPL-SCNC: 104 MMOL/L — SIGNIFICANT CHANGE UP (ref 96–108)
CHLORIDE SERPL-SCNC: 104 MMOL/L — SIGNIFICANT CHANGE UP (ref 96–108)
CO2 SERPL-SCNC: 28 MMOL/L — SIGNIFICANT CHANGE UP (ref 22–31)
CO2 SERPL-SCNC: 28 MMOL/L — SIGNIFICANT CHANGE UP (ref 22–31)
CREAT SERPL-MCNC: 0.63 MG/DL — SIGNIFICANT CHANGE UP (ref 0.5–1.3)
CREAT SERPL-MCNC: 0.63 MG/DL — SIGNIFICANT CHANGE UP (ref 0.5–1.3)
EGFR: 86 ML/MIN/1.73M2 — SIGNIFICANT CHANGE UP
EGFR: 86 ML/MIN/1.73M2 — SIGNIFICANT CHANGE UP
GLUCOSE SERPL-MCNC: 111 MG/DL — HIGH (ref 70–99)
GLUCOSE SERPL-MCNC: 111 MG/DL — HIGH (ref 70–99)
POTASSIUM SERPL-MCNC: 3.9 MMOL/L — SIGNIFICANT CHANGE UP (ref 3.5–5.3)
POTASSIUM SERPL-MCNC: 3.9 MMOL/L — SIGNIFICANT CHANGE UP (ref 3.5–5.3)
POTASSIUM SERPL-SCNC: 3.9 MMOL/L — SIGNIFICANT CHANGE UP (ref 3.5–5.3)
POTASSIUM SERPL-SCNC: 3.9 MMOL/L — SIGNIFICANT CHANGE UP (ref 3.5–5.3)
SODIUM SERPL-SCNC: 137 MMOL/L — SIGNIFICANT CHANGE UP (ref 135–145)
SODIUM SERPL-SCNC: 137 MMOL/L — SIGNIFICANT CHANGE UP (ref 135–145)

## 2023-12-08 PROCEDURE — 97161 PT EVAL LOW COMPLEX 20 MIN: CPT

## 2023-12-08 PROCEDURE — 81001 URINALYSIS AUTO W/SCOPE: CPT

## 2023-12-08 PROCEDURE — 84484 ASSAY OF TROPONIN QUANT: CPT

## 2023-12-08 PROCEDURE — 94760 N-INVAS EAR/PLS OXIMETRY 1: CPT

## 2023-12-08 PROCEDURE — 83735 ASSAY OF MAGNESIUM: CPT

## 2023-12-08 PROCEDURE — 80061 LIPID PANEL: CPT

## 2023-12-08 PROCEDURE — 70450 CT HEAD/BRAIN W/O DYE: CPT

## 2023-12-08 PROCEDURE — 82803 BLOOD GASES ANY COMBINATION: CPT

## 2023-12-08 PROCEDURE — 83880 ASSAY OF NATRIURETIC PEPTIDE: CPT

## 2023-12-08 PROCEDURE — 99222 1ST HOSP IP/OBS MODERATE 55: CPT | Mod: GC

## 2023-12-08 PROCEDURE — 96372 THER/PROPH/DIAG INJ SC/IM: CPT | Mod: XU

## 2023-12-08 PROCEDURE — 70496 CT ANGIOGRAPHY HEAD: CPT | Mod: MA

## 2023-12-08 PROCEDURE — 96374 THER/PROPH/DIAG INJ IV PUSH: CPT

## 2023-12-08 PROCEDURE — 99285 EMERGENCY DEPT VISIT HI MDM: CPT | Mod: 25

## 2023-12-08 PROCEDURE — 97116 GAIT TRAINING THERAPY: CPT

## 2023-12-08 PROCEDURE — 99239 HOSP IP/OBS DSCHRG MGMT >30: CPT

## 2023-12-08 PROCEDURE — 80048 BASIC METABOLIC PNL TOTAL CA: CPT

## 2023-12-08 PROCEDURE — 97166 OT EVAL MOD COMPLEX 45 MIN: CPT

## 2023-12-08 PROCEDURE — 83036 HEMOGLOBIN GLYCOSYLATED A1C: CPT

## 2023-12-08 PROCEDURE — 83605 ASSAY OF LACTIC ACID: CPT

## 2023-12-08 PROCEDURE — 97535 SELF CARE MNGMENT TRAINING: CPT

## 2023-12-08 PROCEDURE — 94003 VENT MGMT INPAT SUBQ DAY: CPT

## 2023-12-08 PROCEDURE — 92526 ORAL FUNCTION THERAPY: CPT

## 2023-12-08 PROCEDURE — 96375 TX/PRO/DX INJ NEW DRUG ADDON: CPT

## 2023-12-08 PROCEDURE — 93306 TTE W/DOPPLER COMPLETE: CPT

## 2023-12-08 PROCEDURE — 94002 VENT MGMT INPAT INIT DAY: CPT

## 2023-12-08 PROCEDURE — 71045 X-RAY EXAM CHEST 1 VIEW: CPT

## 2023-12-08 PROCEDURE — 94640 AIRWAY INHALATION TREATMENT: CPT

## 2023-12-08 PROCEDURE — 80053 COMPREHEN METABOLIC PANEL: CPT

## 2023-12-08 PROCEDURE — 85027 COMPLETE CBC AUTOMATED: CPT

## 2023-12-08 PROCEDURE — 94799 UNLISTED PULMONARY SVC/PX: CPT

## 2023-12-08 PROCEDURE — 70498 CT ANGIOGRAPHY NECK: CPT | Mod: MA

## 2023-12-08 PROCEDURE — 36600 WITHDRAWAL OF ARTERIAL BLOOD: CPT

## 2023-12-08 PROCEDURE — 36415 COLL VENOUS BLD VENIPUNCTURE: CPT

## 2023-12-08 PROCEDURE — 93005 ELECTROCARDIOGRAM TRACING: CPT

## 2023-12-08 PROCEDURE — 85610 PROTHROMBIN TIME: CPT

## 2023-12-08 PROCEDURE — 85730 THROMBOPLASTIN TIME PARTIAL: CPT

## 2023-12-08 PROCEDURE — 92610 EVALUATE SWALLOWING FUNCTION: CPT

## 2023-12-08 PROCEDURE — 84100 ASSAY OF PHOSPHORUS: CPT

## 2023-12-08 PROCEDURE — 85025 COMPLETE CBC W/AUTO DIFF WBC: CPT

## 2023-12-08 PROCEDURE — 97530 THERAPEUTIC ACTIVITIES: CPT

## 2023-12-08 RX ORDER — LISINOPRIL 2.5 MG/1
20 TABLET ORAL DAILY
Refills: 0 | Status: DISCONTINUED | OUTPATIENT
Start: 2023-12-09 | End: 2023-12-13

## 2023-12-08 RX ORDER — LISINOPRIL 2.5 MG/1
10 TABLET ORAL ONCE
Refills: 0 | Status: COMPLETED | OUTPATIENT
Start: 2023-12-08 | End: 2023-12-08

## 2023-12-08 RX ORDER — LIDOCAINE 4 G/100G
1 CREAM TOPICAL DAILY
Refills: 0 | Status: DISCONTINUED | OUTPATIENT
Start: 2023-12-08 | End: 2023-12-08

## 2023-12-08 RX ORDER — ATORVASTATIN CALCIUM 80 MG/1
1 TABLET, FILM COATED ORAL
Qty: 0 | Refills: 0 | DISCHARGE
Start: 2023-12-08

## 2023-12-08 RX ORDER — ATORVASTATIN CALCIUM 80 MG/1
80 TABLET, FILM COATED ORAL AT BEDTIME
Refills: 0 | Status: DISCONTINUED | OUTPATIENT
Start: 2023-12-08 | End: 2023-12-13

## 2023-12-08 RX ORDER — LIDOCAINE 4 G/100G
1 CREAM TOPICAL
Qty: 0 | Refills: 0 | DISCHARGE
Start: 2023-12-08

## 2023-12-08 RX ORDER — ASPIRIN/CALCIUM CARB/MAGNESIUM 324 MG
325 TABLET ORAL DAILY
Refills: 0 | Status: DISCONTINUED | OUTPATIENT
Start: 2023-12-09 | End: 2023-12-13

## 2023-12-08 RX ORDER — LISINOPRIL 2.5 MG/1
1 TABLET ORAL
Qty: 0 | Refills: 0 | DISCHARGE
Start: 2023-12-08

## 2023-12-08 RX ORDER — LABETALOL HCL 100 MG
1 TABLET ORAL
Qty: 0 | Refills: 0 | DISCHARGE
Start: 2023-12-08

## 2023-12-08 RX ORDER — ACETAMINOPHEN 500 MG
650 TABLET ORAL EVERY 6 HOURS
Refills: 0 | Status: DISCONTINUED | OUTPATIENT
Start: 2023-12-08 | End: 2023-12-13

## 2023-12-08 RX ORDER — LABETALOL HCL 100 MG
100 TABLET ORAL EVERY 6 HOURS
Refills: 0 | Status: DISCONTINUED | OUTPATIENT
Start: 2023-12-08 | End: 2023-12-13

## 2023-12-08 RX ORDER — LIDOCAINE 4 G/100G
1 CREAM TOPICAL DAILY
Refills: 0 | Status: DISCONTINUED | OUTPATIENT
Start: 2023-12-08 | End: 2023-12-13

## 2023-12-08 RX ORDER — LISINOPRIL 2.5 MG/1
20 TABLET ORAL DAILY
Refills: 0 | Status: DISCONTINUED | OUTPATIENT
Start: 2023-12-09 | End: 2023-12-08

## 2023-12-08 RX ADMIN — LISINOPRIL 10 MILLIGRAM(S): 2.5 TABLET ORAL at 08:32

## 2023-12-08 RX ADMIN — LISINOPRIL 10 MILLIGRAM(S): 2.5 TABLET ORAL at 06:38

## 2023-12-08 RX ADMIN — Medication 650 MILLIGRAM(S): at 23:05

## 2023-12-08 RX ADMIN — ATORVASTATIN CALCIUM 80 MILLIGRAM(S): 80 TABLET, FILM COATED ORAL at 22:00

## 2023-12-08 RX ADMIN — MUPIROCIN 1 APPLICATION(S): 20 OINTMENT TOPICAL at 08:40

## 2023-12-08 RX ADMIN — Medication 325 MILLIGRAM(S): at 12:11

## 2023-12-08 NOTE — PROGRESS NOTE ADULT - ASSESSMENT
The patient is an 88 year old female with a history of HTN, hypothyroidism, pacemaker, breast cancer who is admitted with CVA, acute respiratory failure due to IV contrast anaphylaxis.    Plan:  - ECG with sinus rhythm and intermittent ventricular pacing  - Troponin mildly elevated at 84 in the setting of demand ischemia from respiratory failure  - CXR with florid pulm edema in the setting of anaphylaxis - now resolved  - Repeat CT head with large CVA - consistent with concerning symptoms prior to admission  - Pacemaker interrogated - no evidence of AF  - Echo with normal LV systolic function, mod pulm HTN  - Monitor on telemetry  - Neurology follow-up  - Would avoid additional IV hydralazine  - Increase lisinopril to 20 mg daily

## 2023-12-08 NOTE — H&P ADULT - ASSESSMENT
The patient is an 88 year old female with a history of HTN, hypothyroidism, pacemaker, breast cancer who is admitted with CVA and acute respiratory failure due to IV contrast anaphylaxis.CT head (12/4) showed Large left MCA territory acute to early subacute infarction, thrombosis of multiple distal branches of the left MCA and no acute intracranial hemorrhage.Admitted for multidisciplinary rehab program    #L MCA CVA   -Pacemaker interrogated showing Atrial runs but no sustained Atrial Fibrillation   CT head (12/4) showed Large left MCA territory acute to early subacute infarction, thrombosis of multiple distal branches of the left MCA and no acute intracranial hemorrhage.  -ASA 325mg PO QD   -atorvastatin 80mg PO at bedtime   #Comprehensive Multidisciplinary Rehab Program:  - Gait, ADL, Functional impairments  - PT/OT/ SLP 3 hours a day 5 days a week    #HTN  -c/w labetalol 100mg PO Q6 PRN for SBP>190  -c/w lisinopril 20mg PO QD     #Mood / Cognition:  - Neuropsych evaluation     #Sleep:  - Maintain quiet hours and low stim environment    #Pain:  - Tylenol PRN  - lidocaine patch for backpain  - avoid sedating meds that may affect cognitive recovery      #/Bladder:  - Monitor PVR if no void in 8h; SC for >400 cc  - Toileting schedule q4h    #Diet / Dysphagia:    - Diet: Pureed, Moderately thick liquids  - ongoing SLP assessment  - Nutrition to follow    #Skin/ Pressure Injury Prevention:  - assessment on admission   - Incisions:  - Turn Q2hrs in bed while awake, OOB to Chair, PT/OT/SLP     #DVT prophylaxis:  - ASA 325mg PO QD     #Precautions/ Restrictions  - Falls, Aspiration  - COVID PCR:     --------------------------------------------  Outpatient Follow up:    --------------------------------------------      MEDICAL PROGNOSIS: GOOD            REHAB POTENTIAL: GOOD             ESTIMATED DISPOSITION: HOME WITH HOME CARE            ELOS: 10-14 Days   EXPECTED THERAPY:     P.T. 1hr/day       O.T. 1hr/day      S.L.P. 1hr/day     P&O Unnecessary     EXP FREQUENCY: 5 days per 7 day period     PRESCREEN COMPARISON:   I have reviewed the prescreen information and I have found no relevant changes between the preadmission screening and my post admission evaluation     RATIONALE FOR INPATIENT ADMISSION - Patient demonstrates the following: (check all that apply)  [X] Medically appropriate for rehabilitation admission  [X] Has attainable rehab goals with an appropriate initial discharge plan  [X] Has rehabilitation potential (expected to make a significant improvement within a reasonable period of time)   [X] Requires close medical management by a rehab physician, rehab nursing care, Hospitalist and comprehensive interdisciplinary team (including PT, OT, & or SLP, Prosthetics and Orthotics)       The patient is an 88 year old female with a history of HTN, hypothyroidism, pacemaker, breast cancer who is admitted with CVA and acute respiratory failure due to IV contrast anaphylaxis.CT head (12/4) showed Large left MCA territory acute to early subacute infarction, thrombosis of multiple distal branches of the left MCA and no acute intracranial hemorrhage.Admitted for multidisciplinary rehab program    #L MCA CVA   -Pacemaker interrogated showing Atrial runs but no sustained Atrial Fibrillation   CT head (12/4) showed Large left MCA territory acute to early subacute infarction, thrombosis of multiple distal branches of the left MCA and no acute intracranial hemorrhage.  -ASA 325mg PO QD   -atorvastatin 80mg PO at bedtime   #Comprehensive Multidisciplinary Rehab Program:  - Gait, ADL, Functional impairments  - PT/OT/ SLP 3 hours a day 5 days a week    #HTN  -c/w labetalol 100mg PO Q6 PRN for SBP>190  -c/w lisinopril 20mg PO QD     #Mood / Cognition:  - Neuropsych evaluation     #Sleep:  - Maintain quiet hours and low stim environment    #Pain:  - Tylenol PRN  - lidocaine patch for backpain  - avoid sedating meds that may affect cognitive recovery      #/Bladder:  - Monitor PVR if no void in 8h; SC for >400 cc  - Toileting schedule q4h    #Diet / Dysphagia:    - Diet: Pureed, Moderately thick liquids  - ongoing SLP assessment  - Nutrition to follow    #Skin/ Pressure Injury Prevention:  - assessment on admission   - Incisions: venous stasis ulcer in L anterior shin  [id number]   - Turn Q2hrs in bed while awake, OOB to Chair, PT/OT/SLP     #DVT prophylaxis:  - ASA 325mg PO QD     #Precautions/ Restrictions  - Falls, Aspiration  - COVID PCR:     --------------------------------------------  Outpatient Follow up:    --------------------------------------------      MEDICAL PROGNOSIS: GOOD            REHAB POTENTIAL: GOOD             ESTIMATED DISPOSITION: HOME WITH HOME CARE            ELOS: 10-14 Days   EXPECTED THERAPY:     P.T. 1hr/day       O.T. 1hr/day      S.L.P. 1hr/day     P&O Unnecessary     EXP FREQUENCY: 5 days per 7 day period     PRESCREEN COMPARISON:   I have reviewed the prescreen information and I have found no relevant changes between the preadmission screening and my post admission evaluation     RATIONALE FOR INPATIENT ADMISSION - Patient demonstrates the following: (check all that apply)  [X] Medically appropriate for rehabilitation admission  [X] Has attainable rehab goals with an appropriate initial discharge plan  [X] Has rehabilitation potential (expected to make a significant improvement within a reasonable period of time)   [X] Requires close medical management by a rehab physician, rehab nursing care, Hospitalist and comprehensive interdisciplinary team (including PT, OT, & or SLP, Prosthetics and Orthotics)       The patient is an 88 year old female with a history of HTN, hypothyroidism, pacemaker, breast cancer who is admitted with CVA and acute respiratory failure due to IV contrast anaphylaxis.CT head (12/4) showed Large left MCA territory acute to early subacute infarction, thrombosis of multiple distal branches of the left MCA and no acute intracranial hemorrhage.Admitted for multidisciplinary rehab program    #L MCA CVA   -Pacemaker interrogated showing Atrial runs but no sustained Atrial Fibrillation   CT head (12/4) showed Large left MCA territory acute to early subacute infarction, thrombosis of multiple distal branches of the left MCA and no acute intracranial hemorrhage.  -ASA 325mg PO QD   -atorvastatin 80mg PO at bedtime   #Comprehensive Multidisciplinary Rehab Program:  - Gait, ADL, Functional impairments  - PT/OT/ SLP 3 hours a day 5 days a week    #HTN  -c/w labetalol 100mg PO Q6 PRN for SBP>190  -c/w lisinopril 20mg PO QD     #Mood / Cognition:  - Neuropsych evaluation     #Sleep:  - Maintain quiet hours and low stim environment    #Pain:  - Tylenol PRN  - lidocaine patch for backpain  - avoid sedating meds that may affect cognitive recovery      #/Bladder:  - Monitor PVR if no void in 8h; SC for >400 cc  - Toileting schedule q4h    #Diet / Dysphagia:    - Diet: Pureed, Moderately thick liquids  - ongoing SLP assessment  - Nutrition to follow    #Skin/ Pressure Injury Prevention:  - assessment on admission   - Incisions: venous stasis ulcer in L anterior shin  [id number]   - Turn Q2hrs in bed while awake, OOB to Chair, PT/OT/SLP     #DVT prophylaxis:  - ASA 325mg PO QD     #Precautions/ Restrictions  - Falls, Aspiration  - COVID PCR:     --------------------------------------------  Outpatient Follow up:    --------------------------------------------      MEDICAL PROGNOSIS: GOOD            REHAB POTENTIAL: GOOD             ESTIMATED DISPOSITION: HOME WITH HOME CARE            ELOS: 10-14 Days   EXPECTED THERAPY:     P.T. 1hr/day       O.T. 1hr/day      S.L.P. 1hr/day     P&O Unnecessary     EXP FREQUENCY: 5 days per 7 day period     PRESCREEN COMPARISON:   I have reviewed the prescreen information and I have found no relevant changes between the preadmission screening and my post admission evaluation     RATIONALE FOR INPATIENT ADMISSION - Patient demonstrates the following: (check all that apply)  [X] Medically appropriate for rehabilitation admission  [X] Has attainable rehab goals with an appropriate initial discharge plan  [X] Has rehabilitation potential (expected to make a significant improvement within a reasonable period of time)   [X] Requires close medical management by a rehab physician, rehab nursing care, Hospitalist and comprehensive interdisciplinary team (including PT, OT, & or SLP, Prosthetics and Orthotics)       The patient is an 88 year old female with a history of HTN, hypothyroidism, pacemaker, breast cancer who is admitted with CVA and acute respiratory failure due to IV contrast anaphylaxis.CT head (12/4) showed Large left MCA territory acute to early subacute infarction, thrombosis of multiple distal branches of the left MCA and no acute intracranial hemorrhage.Admitted for multidisciplinary rehab program    #L MCA CVA, Right dominant hemiparesis    -Pacemaker interrogated showing Atrial runs but no sustained Atrial Fibrillation   CT head (12/4) showed Large left MCA territory acute to early subacute infarction, thrombosis of multiple distal branches of the left MCA and no acute intracranial hemorrhage.  -ASA 325mg PO QD   -atorvastatin 80mg PO at bedtime   #Comprehensive Multidisciplinary Rehab Program:  - Gait, ADL, Functional impairments  - PT/OT/ SLP 3 hours a day 5 days a week, 1 hour each     #HTN  - Labetalol 100mg PO Q6 PRN for SBP>190  - Lisinopril 20mg PO QD     #Mood / Cognition:  - Neuropsych evaluation PRN    #Sleep:  - Maintain quiet hours and low stim environment    #Pain:  - Tylenol PRN  - lidocaine patch for backpain  - avoid sedating meds that may affect cognitive recovery    #/Bladder:  - Monitor PVR if no void in 8h; SC for >400 cc  - Toileting schedule q4h  - (+) Incontinence     #Diet / Dysphagia:    - Diet: Pureed, Moderately thick liquids  - ongoing SLP assessment  - Nutrition to follow    #Skin/ Pressure Injury Prevention:  - assessment on admission   - Incisions: venous stasis ulcer in L anterior shin  [id number]   - Turn Q2hrs in bed while awake, OOB to Chair, PT/OT/SLP     #DVT prophylaxis:  - ASA 325mg PO QD     #Precautions/ Restrictions  - Falls, Aspiration  - COVID PCR:     --------------------------------------------  Outpatient Follow up:    --------------------------------------------      MEDICAL PROGNOSIS: GOOD            REHAB POTENTIAL: GOOD             ESTIMATED DISPOSITION: HOME WITH HOME CARE            ELOS: 10-14 Days   EXPECTED THERAPY:     P.T. 1hr/day       O.T. 1hr/day      S.L.P. 1hr/day     P&O Unnecessary     EXP FREQUENCY: 5 days per 7 day period     PRESCREEN COMPARISON:   I have reviewed the prescreen information and I have found no relevant changes between the preadmission screening and my post admission evaluation     RATIONALE FOR INPATIENT ADMISSION - Patient demonstrates the following: (check all that apply)  [X] Medically appropriate for rehabilitation admission  [X] Has attainable rehab goals with an appropriate initial discharge plan  [X] Has rehabilitation potential (expected to make a significant improvement within a reasonable period of time)   [X] Requires close medical management by a rehab physician, rehab nursing care, Hospitalist and comprehensive interdisciplinary team (including PT, OT, & or SLP, Prosthetics and Orthotics)       The patient is an 88 year old female with a history of HTN, hypothyroidism, pacemaker, breast cancer who is admitted with CVA and acute respiratory failure due to IV contrast anaphylaxis.CT head (12/4) showed Large left MCA territory acute to early subacute infarction, thrombosis of multiple distal branches of the left MCA and no acute intracranial hemorrhage.Admitted for multidisciplinary rehab program    #L MCA CVA, Right dominant hemiparesis    -Pacemaker interrogated showing Atrial runs but no sustained Atrial Fibrillation   CT head (12/4) showed Large left MCA territory acute to early subacute infarction, thrombosis of multiple distal branches of the left MCA and no acute intracranial hemorrhage.  -ASA 325mg PO QD   -atorvastatin 80mg PO at bedtime   #Comprehensive Multidisciplinary Rehab Program:  - Gait, ADL, Functional impairments  - PT/OT/ SLP 3 hours a day 5 days a week, 1 hour each     #HTN  - Labetalol 100mg PO Q6 PRN for SBP>190  - Lisinopril 20mg PO QD     #Mood / Cognition:  - Neuropsych evaluation PRN    #Sleep:  - Maintain quiet hours and low stim environment    #Pain:  - Tylenol PRN  - lidocaine patch for backpain  - avoid sedating meds that may affect cognitive recovery    #/Bladder:  - Monitor PVR if no void in 8h; SC for >400 cc  - Toileting schedule q4h  - (+) Incontinence     #Diet / Dysphagia:    - Diet: Pureed, Moderately thick liquids  - ongoing SLP assessment  - Nutrition to follow    #Skin/ Pressure Injury Prevention:  - assessment on admission   - Incisions: venous stasis ulcer in L anterior shin  [id number]   - Turn Q2hrs in bed while awake, OOB to Chair, PT/OT/SLP     #DVT prophylaxis:  - ASA 325mg PO QD     #Precautions/ Restrictions  - Falls, Aspiration  - COVID PCR:     --------------------------------------------  Outpatient Follow up:    --------------------------------------------      MEDICAL PROGNOSIS: GOOD            REHAB POTENTIAL: GOOD             ESTIMATED DISPOSITION: HOME WITH HOME CARE            ELOS: 10-14 Days   EXPECTED THERAPY:     P.T. 1hr/day       O.T. 1hr/day      S.L.P. 1hr/day     P&O Unnecessary     EXP FREQUENCY: 5 days per 7 day period     PRESCREEN COMPARISON:   I have reviewed the prescreen information and I have found no relevant changes between the preadmission screening and my post admission evaluation     RATIONALE FOR INPATIENT ADMISSION - Patient demonstrates the following: (check all that apply)  [X] Medically appropriate for rehabilitation admission  [X] Has attainable rehab goals with an appropriate initial discharge plan  [X] Has rehabilitation potential (expected to make a significant improvement within a reasonable period of time)   [X] Requires close medical management by a rehab physician, rehab nursing care, Hospitalist and comprehensive interdisciplinary team (including PT, OT, & or SLP, Prosthetics and Orthotics)

## 2023-12-08 NOTE — DISCHARGE NOTE PROVIDER - NSDCMRMEDTOKEN_GEN_ALL_CORE_FT
aspirin 81 mg oral tablet: 1 tab(s) orally once a day  atorvastatin 80 mg oral tablet: 1 tab(s) orally once a day (at bedtime)  labetalol 100 mg oral tablet: 1 tab(s) orally every 6 hours As needed SBP&gt;190  lisinopril 20 mg oral tablet: 1 tab(s) orally once a day  Motrin 600 mg oral tablet: 1 tab(s) orally every 6 hours, As Needed  oxyCODONE 5 mg oral tablet: 1 tab(s) orally every 4 hours, As Needed  Tylenol 325 mg oral tablet: 2 tab(s) orally every 6 hours, As Needed   aspirin 81 mg oral tablet: 1 tab(s) orally once a day  atorvastatin 80 mg oral tablet: 1 tab(s) orally once a day (at bedtime)  labetalol 100 mg oral tablet: 1 tab(s) orally every 6 hours As needed SBP&gt;190  lidocaine 4% topical film: Apply topically to affected area once a day as needed for  moderate pain  lisinopril 20 mg oral tablet: 1 tab(s) orally once a day  Motrin 600 mg oral tablet: 1 tab(s) orally every 6 hours, As Needed  oxyCODONE 5 mg oral tablet: 1 tab(s) orally every 4 hours, As Needed  Tylenol 325 mg oral tablet: 2 tab(s) orally every 6 hours, As Needed

## 2023-12-08 NOTE — H&P ADULT - NSHPADDITIONALINFOADULT_GEN_ALL_CORE
Saw and evaluated patient independently in the presence of her  and daughter, took history, examined, started an assessment and management plan. Spent 120 minutes excluding teaching time

## 2023-12-08 NOTE — H&P ADULT - NSHPLABSRESULTS_GEN_ALL_CORE
11.4   8.75  )-----------( 232      ( 07 Dec 2023 06:00 )             35.6     12-08    137  |  104  |  16  ----------------------------<  111<H>  3.9   |  28  |  0.63    Ca    8.7      08 Dec 2023 06:00        Urinalysis Basic - ( 08 Dec 2023 06:00 )    Color: x / Appearance: x / SG: x / pH: x  Gluc: 111 mg/dL / Ketone: x  / Bili: x / Urobili: x   Blood: x / Protein: x / Nitrite: x   Leuk Esterase: x / RBC: x / WBC x   Sq Epi: x / Non Sq Epi: x / Bacteria: x      CAPILLARY BLOOD GLUCOSE      CT Brain Stroke Protocol (12.04.23 @ 11:46)  IMPRESSION:  Large left MCA territory acute to early subacute infarction. 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  No acute intracranial hemorrhage.

## 2023-12-08 NOTE — H&P ADULT - NSHPREVIEWOFSYSTEMS_GEN_ALL_CORE
*ROS limited due to confusion     REVIEW OF SYSTEMS  Constitutional: No fever, No Chills, No fatigue  HEENT: No eye pain, No visual disturbances, No difficulty hearing, No Dysphagia   Pulm: No cough,  No shortness of breath  Cardio: No chest pain, No palpitations  GI:  No abdominal pain, No nausea, No vomiting, No diarrhea, No constipation, No incontinence, Last Bowel Movement unknown  : No dysuria, No frequency, No hematuria, No incontinence  Neuro: No headaches, No memory loss, No loss of strength, No numbness, No tremors  Skin: No itching, No rashes, No lesions   Endo: No temperature intolerance  MSK: No joint pain, No joint swelling, No muscle pain, No Neck or back pain  Psych:  No depression, No anxiety *ROS limited due to confusion     REVIEW OF SYSTEMS  Constitutional: No fever, No Chills, No fatigue  HEENT: No eye pain, No visual disturbances, No difficulty hearing, No Dysphagia   Pulm: No cough,  No shortness of breath  Cardio: No chest pain, No palpitations  GI:  No abdominal pain, No nausea, No vomiting, No diarrhea, No constipation, No incontinence, Last Bowel Movement unknown  : No dysuria, No frequency, No hematuria, No incontinence  Neuro: No headaches, No memory loss, No loss of strength, No numbness, No tremors  Skin: venous stasis ulcer in L anterior shin   Endo: No temperature intolerance  MSK: No joint pain, No joint swelling, No muscle pain, No Neck or back pain  Psych:  No depression, No anxiety REVIEW OF SYSTEMS  Constitutional: No fever, No Chills, No fatigue  HEENT: No eye pain, No visual disturbances, No difficulty hearing,   Pulm: No cough,  No shortness of breath  Cardio: No chest pain, No palpitations  GI:  No abdominal pain, No nausea, No vomiting, No diarrhea, No constipation,  No  incontinence, Last Bowel Movement unknown  : No dysuria, No frequency, No hematuria, (+) incontinence  Neuro: No headaches, No memory loss, (+) Loss of strength, No numbness, No tremors  Skin: venous stasis ulcer in L anterior shin   Endo: No temperature intolerance  MSK: No joint pain, No joint swelling, No muscle pain, No Neck or back pain  Psych:  No depression, No anxiety

## 2023-12-08 NOTE — PROGRESS NOTE ADULT - NUTRITIONAL ASSESSMENT
This patient has been assessed with a concern for Malnutrition and has been determined to have a diagnosis/diagnoses of Severe protein-calorie malnutrition.    This patient is being managed with:   Diet Pureed-  Moderately Thick Liquids (MODTHICKLIQS)  No Carb Prosource (1pkg = 15gms Protein)     Qty per Day:  2  Entered: Dec  6 2023 12:03PM    Diet Pureed-  Moderately Thick Liquids (MODTHICKLIQS)  Entered: Dec  5 2023 12:19PM    The following pending diet order is being considered for treatment of Severe protein-calorie malnutrition:null
This patient has been assessed with a concern for Malnutrition and has been determined to have a diagnosis/diagnoses of Severe protein-calorie malnutrition.    This patient is being managed with:   Diet Pureed-  Moderately Thick Liquids (MODTHICKLIQS)  No Carb Prosource (1pkg = 15gms Protein)     Qty per Day:  2  Entered: Dec  6 2023 12:03PM    Diet Pureed-  Moderately Thick Liquids (MODTHICKLIQS)  Entered: Dec  5 2023 12:19PM    The following pending diet order is being considered for treatment of Severe protein-calorie malnutrition:null

## 2023-12-08 NOTE — PROGRESS NOTE ADULT - SUBJECTIVE AND OBJECTIVE BOX
Neurology follow up note    REBEKAH [id number]      Interval History:    Patient feels back pain     Allergies    penicillin (Rash)  IV Contrast (Anaphylaxis; Urticaria)    Intolerances        MEDICATIONS    acetaminophen   Oral Liquid .. 650 milliGRAM(s) Oral every 6 hours PRN  aspirin 325 milliGRAM(s) Oral daily  atorvastatin 80 milliGRAM(s) Oral at bedtime  influenza  Vaccine (HIGH DOSE) 0.7 milliLiter(s) IntraMuscular once  labetalol 100 milliGRAM(s) Oral every 6 hours PRN  mupirocin 2% Ointment 1 Application(s) Topical <User Schedule>              Vital Signs Last 24 Hrs  T(C): 36.4 (08 Dec 2023 11:51), Max: 36.7 (08 Dec 2023 08:12)  T(F): 97.5 (08 Dec 2023 11:51), Max: 98 (08 Dec 2023 08:12)  HR: 79 (08 Dec 2023 06:20) (77 - 91)  BP: 177/69 (08 Dec 2023 06:20) (118/68 - 178/94)  BP(mean): 92 (08 Dec 2023 06:20) (85 - 115)  RR: 17 (08 Dec 2023 06:20) (10 - 22)  SpO2: 98% (08 Dec 2023 06:20) (93% - 98%)    Parameters below as of 08 Dec 2023 06:20  Patient On (Oxygen Delivery Method): room air        REVIEW OF SYSTEMS:    Constitutional: No fever, chills, fatigue, right sided weakness  Eyes: no eye pain, visual disturbances, or discharge  ENT:  No difficulty hearing, tinnitus, vertigo; No sinus or throat pain  Neck: No pain or stiffness  Respiratory: No cough, dyspnea, wheezing   Cardiovascular: No chest pain, palpitations,   Gastrointestinal: No abdominal or epigastric pain. No nausea, vomiting  No diarrhea or constipation.   Genitourinary: No dysuria, frequency, hematuria or incontinence  Neurological: No headaches, lightheadedness, vertigo, numbness or tremors  Psychiatric: No depression, anxiety, mood swings or difficulty sleeping  Musculoskeletal: No joint pain or swelling; No muscle, back or extremity pain  Skin: No itching, burning, rashes or lesions   Lymph Nodes: No enlarged glands  Endocrine: No heat or cold intolerance; No hair loss   Allergy and Immunologic: No hives or eczema    On Neurological Examination:    Mental Status - Patient is alert, awak      Follow simple commands      Speech -   Dysarthria             Cranial Nerves - Pupils 3 mm equal and reactive to light,   extraocular eye movements intact.   right face droop     Motor Exam -   Right upper 3/5-- was able to maintain arm over 10 sec elevated   Left upper5/5  Right lower2-/5  Left lower 4/5    Muscle tone - is normal all over.  No asymmetry is seen.      Sensory    Bilateral intact to light touch    GENERAL Exam: Nontoxic , No Acute Distress   	  HEENT:  normocephalic, atraumatic  		  LUNGS:  Decreased bilaterally  	  HEART: Normal S1S2   No murmur RRR        	  GI/ ABDOMEN:  Soft  Non tender    EXTREMITIES:   No Edema  No Clubbing  No Cyanosis     MUSCULOSKELETAL: Normal Range of Motion  	   SKIN: Normal  No Ecchymosis               LABS:  CBC Full  -  ( 07 Dec 2023 06:00 )  WBC Count : 8.75 K/uL  RBC Count : 4.44 M/uL  Hemoglobin : 11.4 g/dL  Hematocrit : 35.6 %  Platelet Count - Automated : 232 K/uL  Mean Cell Volume : 80.2 fl  Mean Cell Hemoglobin : 25.7 pg  Mean Cell Hemoglobin Concentration : 32.0 gm/dL  Auto Neutrophil # : x  Auto Lymphocyte # : x  Auto Monocyte # : x  Auto Eosinophil # : x  Auto Basophil # : x  Auto Neutrophil % : x  Auto Lymphocyte % : x  Auto Monocyte % : x  Auto Eosinophil % : x  Auto Basophil % : x    Urinalysis Basic - ( 08 Dec 2023 06:00 )    Color: x / Appearance: x / SG: x / pH: x  Gluc: 111 mg/dL / Ketone: x  / Bili: x / Urobili: x   Blood: x / Protein: x / Nitrite: x   Leuk Esterase: x / RBC: x / WBC x   Sq Epi: x / Non Sq Epi: x / Bacteria: x      12-08    137  |  104  |  16  ----------------------------<  111<H>  3.9   |  28  |  0.63    Ca    8.7      08 Dec 2023 06:00      Hemoglobin A1C:       Vitamin B12         RADIOLOGY      COMPARISON: CT head 12/2/2020    FINDINGS:  Left frontal, temporal parietal and occipital large region of   hypoattenuation, loss of gray-white differentiation and sulcal effacement   consistent with acute to early subacute infarction. There is asymmetric   hyperdensity of several distal branches of the left middle cerebral   artery consistent with thrombosis.    There is a 7 x 8 mm hyperattenuating partiallycalcified extra-axial   lesion along the left greater sphenoid wing likely compatible with a   small meningioma. No significant associated mass effect.    The ventricular and sulcal size and configuration is age appropriate.   There are moderate patchy and confluent areas of hypodensity in the   periventricular and subcortical white matter which are likely related to   chronic microangiopathic changes.    There is no evidence of midline shift, acute intracranial hemorrhage, or   extra-axial collections.     The calvarium is intact. The paranasal sinuses are clear.The mastoid air   cells are predominantly clear. There has been prior bilateral cataract   surgery.      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  No acute intracranial hemorrhage.          ANALYSIS AND PLAN:  This is an 88-year-old with left gaze preference, dysarthria, expressive aphasia, right facial droop, and right hemiparesis.  Clinical impression is cerebrovascular accident of the left MCA territory, what could be calculated by NIH Stroke Scale with me would be 8   Interrogation of pacemaker no  atrial fibrillation.  ok to start lowering the blood pressure by 15% per day please avoid hypotension and large drops in SBP   I will recommend high-dose statin.  Physical Therapy evaluation.  overall patient is making improvement motor and speech wise    monitor oral intake as needed    then 2 weeks can cut down 81 mg   back pain will try Lidoderm patch     neurologic wise stable for dc planning     Spoke with cousin, Dae, at bedside, who is the healthcare proxy, also called daughter, updated her, Dae's telephone number is [phone] 12/7     50 minutes of time was spent with the patient, plan of care, reviewing data, with greater than 50% of the visit was spent counseling and/or coordinating care with multidisciplinary healthcare team   Neurology follow up note    REBEKAH [id number]      Interval History:    Patient feels back pain     Allergies    penicillin (Rash)  IV Contrast (Anaphylaxis; Urticaria)    Intolerances        MEDICATIONS    acetaminophen   Oral Liquid .. 650 milliGRAM(s) Oral every 6 hours PRN  aspirin 325 milliGRAM(s) Oral daily  atorvastatin 80 milliGRAM(s) Oral at bedtime  influenza  Vaccine (HIGH DOSE) 0.7 milliLiter(s) IntraMuscular once  labetalol 100 milliGRAM(s) Oral every 6 hours PRN  mupirocin 2% Ointment 1 Application(s) Topical <User Schedule>              Vital Signs Last 24 Hrs  T(C): 36.4 (08 Dec 2023 11:51), Max: 36.7 (08 Dec 2023 08:12)  T(F): 97.5 (08 Dec 2023 11:51), Max: 98 (08 Dec 2023 08:12)  HR: 79 (08 Dec 2023 06:20) (77 - 91)  BP: 177/69 (08 Dec 2023 06:20) (118/68 - 178/94)  BP(mean): 92 (08 Dec 2023 06:20) (85 - 115)  RR: 17 (08 Dec 2023 06:20) (10 - 22)  SpO2: 98% (08 Dec 2023 06:20) (93% - 98%)    Parameters below as of 08 Dec 2023 06:20  Patient On (Oxygen Delivery Method): room air        REVIEW OF SYSTEMS:    Constitutional: No fever, chills, fatigue, right sided weakness  Eyes: no eye pain, visual disturbances, or discharge  ENT:  No difficulty hearing, tinnitus, vertigo; No sinus or throat pain  Neck: No pain or stiffness  Respiratory: No cough, dyspnea, wheezing   Cardiovascular: No chest pain, palpitations,   Gastrointestinal: No abdominal or epigastric pain. No nausea, vomiting  No diarrhea or constipation.   Genitourinary: No dysuria, frequency, hematuria or incontinence  Neurological: No headaches, lightheadedness, vertigo, numbness or tremors  Psychiatric: No depression, anxiety, mood swings or difficulty sleeping  Musculoskeletal: No joint pain or swelling; No muscle, back or extremity pain  Skin: No itching, burning, rashes or lesions   Lymph Nodes: No enlarged glands  Endocrine: No heat or cold intolerance; No hair loss   Allergy and Immunologic: No hives or eczema    On Neurological Examination:    Mental Status - Patient is alert, awak      Follow simple commands      Speech -   Dysarthria             Cranial Nerves - Pupils 3 mm equal and reactive to light,   extraocular eye movements intact.   right face droop     Motor Exam -   Right upper 3/5-- was able to maintain arm over 10 sec elevated   Left upper5/5  Right lower2-/5  Left lower 4/5    Muscle tone - is normal all over.  No asymmetry is seen.      Sensory    Bilateral intact to light touch    GENERAL Exam: Nontoxic , No Acute Distress   	  HEENT:  normocephalic, atraumatic  		  LUNGS:  Decreased bilaterally  	  HEART: Normal S1S2   No murmur RRR        	  GI/ ABDOMEN:  Soft  Non tender    EXTREMITIES:   No Edema  No Clubbing  No Cyanosis     MUSCULOSKELETAL: Normal Range of Motion  	   SKIN: Normal  No Ecchymosis               LABS:  CBC Full  -  ( 07 Dec 2023 06:00 )  WBC Count : 8.75 K/uL  RBC Count : 4.44 M/uL  Hemoglobin : 11.4 g/dL  Hematocrit : 35.6 %  Platelet Count - Automated : 232 K/uL  Mean Cell Volume : 80.2 fl  Mean Cell Hemoglobin : 25.7 pg  Mean Cell Hemoglobin Concentration : 32.0 gm/dL  Auto Neutrophil # : x  Auto Lymphocyte # : x  Auto Monocyte # : x  Auto Eosinophil # : x  Auto Basophil # : x  Auto Neutrophil % : x  Auto Lymphocyte % : x  Auto Monocyte % : x  Auto Eosinophil % : x  Auto Basophil % : x    Urinalysis Basic - ( 08 Dec 2023 06:00 )    Color: x / Appearance: x / SG: x / pH: x  Gluc: 111 mg/dL / Ketone: x  / Bili: x / Urobili: x   Blood: x / Protein: x / Nitrite: x   Leuk Esterase: x / RBC: x / WBC x   Sq Epi: x / Non Sq Epi: x / Bacteria: x      12-08    137  |  104  |  16  ----------------------------<  111<H>  3.9   |  28  |  0.63    Ca    8.7      08 Dec 2023 06:00      Hemoglobin A1C:       Vitamin B12         RADIOLOGY      COMPARISON: CT head 12/2/2020    FINDINGS:  Left frontal, temporal parietal and occipital large region of   hypoattenuation, loss of gray-white differentiation and sulcal effacement   consistent with acute to early subacute infarction. There is asymmetric   hyperdensity of several distal branches of the left middle cerebral   artery consistent with thrombosis.    There is a 7 x 8 mm hyperattenuating partiallycalcified extra-axial   lesion along the left greater sphenoid wing likely compatible with a   small meningioma. No significant associated mass effect.    The ventricular and sulcal size and configuration is age appropriate.   There are moderate patchy and confluent areas of hypodensity in the   periventricular and subcortical white matter which are likely related to   chronic microangiopathic changes.    There is no evidence of midline shift, acute intracranial hemorrhage, or   extra-axial collections.     The calvarium is intact. The paranasal sinuses are clear.The mastoid air   cells are predominantly clear. There has been prior bilateral cataract   surgery.      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  No acute intracranial hemorrhage.          ANALYSIS AND PLAN:  This is an 88-year-old with left gaze preference, dysarthria, expressive aphasia, right facial droop, and right hemiparesis.  Clinical impression is cerebrovascular accident of the left MCA territory, what could be calculated by NIH Stroke Scale with me would be 8   Interrogation of pacemaker no  atrial fibrillation.  ok to start lowering the blood pressure by 15% per day please avoid hypotension and large drops in SBP   I will recommend high-dose statin.  Physical Therapy evaluation.  overall patient is making improvement motor and speech wise    monitor oral intake as needed    then 2 weeks can cut down 81 mg   back pain will try Lidoderm patch     neurologic wise stable for dc planning     Spoke with cousin, Dae, at bedside, who is the healthcare proxy, also called daughter, updated her, Dae's telephone number is [phone] 12/7     50 minutes of time was spent with the patient, plan of care, reviewing data, with greater than 50% of the visit was spent counseling and/or coordinating care with multidisciplinary healthcare team   Neurology follow up note    REBEKAH [id number]      Interval History:    Patient feels back pain     Allergies    penicillin (Rash)  IV Contrast (Anaphylaxis; Urticaria)    Intolerances        MEDICATIONS    acetaminophen   Oral Liquid .. 650 milliGRAM(s) Oral every 6 hours PRN  aspirin 325 milliGRAM(s) Oral daily  atorvastatin 80 milliGRAM(s) Oral at bedtime  influenza  Vaccine (HIGH DOSE) 0.7 milliLiter(s) IntraMuscular once  labetalol 100 milliGRAM(s) Oral every 6 hours PRN  mupirocin 2% Ointment 1 Application(s) Topical <User Schedule>              Vital Signs Last 24 Hrs  T(C): 36.4 (08 Dec 2023 11:51), Max: 36.7 (08 Dec 2023 08:12)  T(F): 97.5 (08 Dec 2023 11:51), Max: 98 (08 Dec 2023 08:12)  HR: 79 (08 Dec 2023 06:20) (77 - 91)  BP: 177/69 (08 Dec 2023 06:20) (118/68 - 178/94)  BP(mean): 92 (08 Dec 2023 06:20) (85 - 115)  RR: 17 (08 Dec 2023 06:20) (10 - 22)  SpO2: 98% (08 Dec 2023 06:20) (93% - 98%)    Parameters below as of 08 Dec 2023 06:20  Patient On (Oxygen Delivery Method): room air        REVIEW OF SYSTEMS:    Constitutional: No fever, chills, fatigue, right sided weakness  Eyes: no eye pain, visual disturbances, or discharge  ENT:  No difficulty hearing, tinnitus, vertigo; No sinus or throat pain  Neck: No pain or stiffness  Respiratory: No cough, dyspnea, wheezing   Cardiovascular: No chest pain, palpitations,   Gastrointestinal: No abdominal or epigastric pain. No nausea, vomiting  No diarrhea or constipation.   Genitourinary: No dysuria, frequency, hematuria or incontinence  Neurological: No headaches, lightheadedness, vertigo, numbness or tremors  Psychiatric: No depression, anxiety, mood swings or difficulty sleeping  Musculoskeletal: No joint pain or swelling; No muscle, back or extremity pain      On Neurological Examination:    Mental Status - Patient is alert, awake sitting in chair    Follow simple commands      Speech -   Dysarthria             Cranial Nerves - Pupils 3 mm equal and reactive to light,   extraocular eye movements intact.   right face droop     Motor Exam -   Right upper 3/5-- was able to maintain arm over 10 sec elevated   Left upper5/5  Right lower2-/5  Left lower 4/5    Muscle tone - is normal all over.  No asymmetry is seen.      Sensory    Bilateral intact to light touch    GENERAL Exam: Nontoxic , No Acute Distress   	  HEENT:  normocephalic, atraumatic  		  LUNGS:  Decreased bilaterally  	  HEART: Normal S1S2   No murmur RRR        	  GI/ ABDOMEN:  Soft  Non tender    EXTREMITIES:   No Edema  No Clubbing   	   SKIN: Normal  No Ecchymosis               LABS:  CBC Full  -  ( 07 Dec 2023 06:00 )  WBC Count : 8.75 K/uL  RBC Count : 4.44 M/uL  Hemoglobin : 11.4 g/dL  Hematocrit : 35.6 %  Platelet Count - Automated : 232 K/uL  Mean Cell Volume : 80.2 fl  Mean Cell Hemoglobin : 25.7 pg  Mean Cell Hemoglobin Concentration : 32.0 gm/dL  Auto Neutrophil # : x  Auto Lymphocyte # : x  Auto Monocyte # : x  Auto Eosinophil # : x  Auto Basophil # : x  Auto Neutrophil % : x  Auto Lymphocyte % : x  Auto Monocyte % : x  Auto Eosinophil % : x  Auto Basophil % : x    Urinalysis Basic - ( 08 Dec 2023 06:00 )    Color: x / Appearance: x / SG: x / pH: x  Gluc: 111 mg/dL / Ketone: x  / Bili: x / Urobili: x   Blood: x / Protein: x / Nitrite: x   Leuk Esterase: x / RBC: x / WBC x   Sq Epi: x / Non Sq Epi: x / Bacteria: x      12-08    137  |  104  |  16  ----------------------------<  111<H>  3.9   |  28  |  0.63    Ca    8.7      08 Dec 2023 06:00      Hemoglobin A1C:       Vitamin B12         RADIOLOGY      COMPARISON: CT head 12/2/2020    FINDINGS:  Left frontal, temporal parietal and occipital large region of   hypoattenuation, loss of gray-white differentiation and sulcal effacement   consistent with acute to early subacute infarction. There is asymmetric   hyperdensity of several distal branches of the left middle cerebral   artery consistent with thrombosis.    There is a 7 x 8 mm hyperattenuating partiallycalcified extra-axial   lesion along the left greater sphenoid wing likely compatible with a   small meningioma. No significant associated mass effect.    The ventricular and sulcal size and configuration is age appropriate.   There are moderate patchy and confluent areas of hypodensity in the   periventricular and subcortical white matter which are likely related to   chronic microangiopathic changes.    There is no evidence of midline shift, acute intracranial hemorrhage, or   extra-axial collections.     The calvarium is intact. The paranasal sinuses are clear.The mastoid air   cells are predominantly clear. There has been prior bilateral cataract   surgery.      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  No acute intracranial hemorrhage.          ANALYSIS AND PLAN:  This is an 88-year-old with left gaze preference, dysarthria, expressive aphasia, right facial droop, and right hemiparesis.  Clinical impression is cerebrovascular accident of the left MCA territory, what could be calculated by NIH Stroke Scale with me would be 8   Interrogation of pacemaker no atrial fibrillation as per cardiology.  ok to start lowering the blood pressure by 15% per day please avoid hypotension and large drops in SBP   I will recommend high-dose statin.  Physical Therapy evaluation.  overall patient is making improvement motor and speech wise    monitor oral intake as needed    then 2 weeks can cut down 81 mg   back pain will try Lidoderm patch     neurologic wise stable for dc planning     Spoke with cousinDae,, who is the healthcare proxy, , Dae's telephone number is [phone] 12/7     50 minutes of time was spent with the patient, plan of care, reviewing data, with greater than 50% of the visit was spent counseling and/or coordinating care with multidisciplinary healthcare team   Neurology follow up note    REBEKAH [id number]      Interval History:    Patient feels back pain     Allergies    penicillin (Rash)  IV Contrast (Anaphylaxis; Urticaria)    Intolerances        MEDICATIONS    acetaminophen   Oral Liquid .. 650 milliGRAM(s) Oral every 6 hours PRN  aspirin 325 milliGRAM(s) Oral daily  atorvastatin 80 milliGRAM(s) Oral at bedtime  influenza  Vaccine (HIGH DOSE) 0.7 milliLiter(s) IntraMuscular once  labetalol 100 milliGRAM(s) Oral every 6 hours PRN  mupirocin 2% Ointment 1 Application(s) Topical <User Schedule>              Vital Signs Last 24 Hrs  T(C): 36.4 (08 Dec 2023 11:51), Max: 36.7 (08 Dec 2023 08:12)  T(F): 97.5 (08 Dec 2023 11:51), Max: 98 (08 Dec 2023 08:12)  HR: 79 (08 Dec 2023 06:20) (77 - 91)  BP: 177/69 (08 Dec 2023 06:20) (118/68 - 178/94)  BP(mean): 92 (08 Dec 2023 06:20) (85 - 115)  RR: 17 (08 Dec 2023 06:20) (10 - 22)  SpO2: 98% (08 Dec 2023 06:20) (93% - 98%)    Parameters below as of 08 Dec 2023 06:20  Patient On (Oxygen Delivery Method): room air        REVIEW OF SYSTEMS:    Constitutional: No fever, chills, fatigue, right sided weakness  Eyes: no eye pain, visual disturbances, or discharge  ENT:  No difficulty hearing, tinnitus, vertigo; No sinus or throat pain  Neck: No pain or stiffness  Respiratory: No cough, dyspnea, wheezing   Cardiovascular: No chest pain, palpitations,   Gastrointestinal: No abdominal or epigastric pain. No nausea, vomiting  No diarrhea or constipation.   Genitourinary: No dysuria, frequency, hematuria or incontinence  Neurological: No headaches, lightheadedness, vertigo, numbness or tremors  Psychiatric: No depression, anxiety, mood swings or difficulty sleeping  Musculoskeletal: No joint pain or swelling; No muscle, back or extremity pain      On Neurological Examination:    Mental Status - Patient is alert, awake sitting in chair    Follow simple commands      Speech -   Dysarthria             Cranial Nerves - Pupils 3 mm equal and reactive to light,   extraocular eye movements intact.   right face droop     Motor Exam -   Right upper 3/5-- was able to maintain arm over 10 sec elevated   Left upper5/5  Right lower2-/5  Left lower 4/5    Muscle tone - is normal all over.  No asymmetry is seen.      Sensory    Bilateral intact to light touch    GENERAL Exam: Nontoxic , No Acute Distress   	  HEENT:  normocephalic, atraumatic  		  LUNGS:  Decreased bilaterally  	  HEART: Normal S1S2   No murmur RRR        	  GI/ ABDOMEN:  Soft  Non tender    EXTREMITIES:   No Edema  No Clubbing   	   SKIN: Normal  No Ecchymosis               LABS:  CBC Full  -  ( 07 Dec 2023 06:00 )  WBC Count : 8.75 K/uL  RBC Count : 4.44 M/uL  Hemoglobin : 11.4 g/dL  Hematocrit : 35.6 %  Platelet Count - Automated : 232 K/uL  Mean Cell Volume : 80.2 fl  Mean Cell Hemoglobin : 25.7 pg  Mean Cell Hemoglobin Concentration : 32.0 gm/dL  Auto Neutrophil # : x  Auto Lymphocyte # : x  Auto Monocyte # : x  Auto Eosinophil # : x  Auto Basophil # : x  Auto Neutrophil % : x  Auto Lymphocyte % : x  Auto Monocyte % : x  Auto Eosinophil % : x  Auto Basophil % : x    Urinalysis Basic - ( 08 Dec 2023 06:00 )    Color: x / Appearance: x / SG: x / pH: x  Gluc: 111 mg/dL / Ketone: x  / Bili: x / Urobili: x   Blood: x / Protein: x / Nitrite: x   Leuk Esterase: x / RBC: x / WBC x   Sq Epi: x / Non Sq Epi: x / Bacteria: x      12-08    137  |  104  |  16  ----------------------------<  111<H>  3.9   |  28  |  0.63    Ca    8.7      08 Dec 2023 06:00      Hemoglobin A1C:       Vitamin B12         RADIOLOGY      COMPARISON: CT head 12/2/2020    FINDINGS:  Left frontal, temporal parietal and occipital large region of   hypoattenuation, loss of gray-white differentiation and sulcal effacement   consistent with acute to early subacute infarction. There is asymmetric   hyperdensity of several distal branches of the left middle cerebral   artery consistent with thrombosis.    There is a 7 x 8 mm hyperattenuating partiallycalcified extra-axial   lesion along the left greater sphenoid wing likely compatible with a   small meningioma. No significant associated mass effect.    The ventricular and sulcal size and configuration is age appropriate.   There are moderate patchy and confluent areas of hypodensity in the   periventricular and subcortical white matter which are likely related to   chronic microangiopathic changes.    There is no evidence of midline shift, acute intracranial hemorrhage, or   extra-axial collections.     The calvarium is intact. The paranasal sinuses are clear.The mastoid air   cells are predominantly clear. There has been prior bilateral cataract   surgery.      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  No acute intracranial hemorrhage.          ANALYSIS AND PLAN:  This is an 88-year-old with left gaze preference, dysarthria, expressive aphasia, right facial droop, and right hemiparesis.  Clinical impression is cerebrovascular accident of the left MCA territory, what could be calculated by NIH Stroke Scale with me would be 8   Interrogation of pacemaker no atrial fibrillation as per cardiology.  ok to start lowering the blood pressure by 15% per day please avoid hypotension and large drops in SBP   I will recommend high-dose statin.  Physical Therapy evaluation.  overall patient is making improvement motor and speech wise    monitor oral intake as needed    then 2 weeks can cut down 81 mg   back pain will try Lidoderm patch     neurologic wise stable for dc planning     Spoke with cousinDae,, who is the healthcare proxy, , Dae's telephone number is [phone] 12/7     50 minutes of time was spent with the patient, plan of care, reviewing data, with greater than 50% of the visit was spent counseling and/or coordinating care with multidisciplinary healthcare team

## 2023-12-08 NOTE — SOCIAL WORK PROGRESS NOTE - NSSWPROGRESSNOTE_GEN_ALL_CORE
Pt was accepted to acute rehab at [facility] and is scheduled for dc at 2pm today via elidia. Pt's dtr Denise Abreu team and  hospital rehab admissions aware.  Pt was accepted to acute rehab at [facility] and is scheduled for dc at 2pm today via elidia. Pt's dtr Denise Abreu team and  hospital rehab admissions aware.

## 2023-12-08 NOTE — H&P ADULT - HISTORY OF PRESENT ILLNESS
The patient is an 88 year old female with a history of HTN, hypothyroidism, pacemaker, breast cancer who is admitted with CVA and acute respiratory failure due to IV contrast anaphylaxis. Patient had R sided hemiparesis, expressive aphasia. CT imaging confirmed multiple embolic areas. Pacemaker interrogated showing Atrial runs but no sustained Atrial Fibrillation. Patient currently on course of ASA and statin. Hospital course complicated by severe anaphylactic due to iodine contrast allergy from CT performed on admission to ED. 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      Patient was evaluated by PM&R and therapy for functional deficits and gait/ ADL impairments and recommended acute rehabilitation. Patient was medically optimized for discharge to [facility] on 12/8         The patient is an 88 year old female with a history of HTN, hypothyroidism, pacemaker, breast cancer who is admitted with CVA and acute respiratory failure due to IV contrast anaphylaxis. Patient had R sided hemiparesis, expressive aphasia. CT imaging confirmed multiple embolic areas. Pacemaker interrogated showing Atrial runs but no sustained Atrial Fibrillation. Patient currently on course of ASA and statin. Hospital course complicated by severe anaphylactic due to iodine contrast allergy from CT performed on admission to ED. 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      Patient was evaluated by PM&R and therapy for functional deficits and gait/ ADL impairments and recommended acute rehabilitation. Patient was medically optimized for discharge to [facility] on 12/8

## 2023-12-08 NOTE — H&P ADULT - NSICDXPASTMEDICALHX_GEN_ALL_CORE_FT
PAST MEDICAL HISTORY:  Breast cancer 2009 left - s/neida castellanos radical mastectomy    Cyst of left ovary     [place] (hard of hearing) bilateral HA (Yvette)    Hyperlipidemia no current medications    Meniscus tear left - 10/2015    SDH (subdural hematoma) 3/2011 - s/p fall - no surgical intervention     PAST MEDICAL HISTORY:  Breast cancer 2009 left - s/neida castellanos radical mastectomy    Cyst of left ovary     [place] (hard of hearing) bilateral HA (Yvette)    Hyperlipidemia no current medications    Meniscus tear left - 10/2015    SDH (subdural hematoma) 3/2011 - s/p fall - no surgical intervention

## 2023-12-08 NOTE — H&P ADULT - NSHPPHYSICALEXAM_GEN_ALL_CORE
*Physical Exam limited due to confusion     Constitutional - NAD, Comfortable, confused, impulsive   HEENT - NCAT, EOMI  Neck - Supple, No limited ROM  Chest - good chest expansion, good respiratory effort, CTAB   Cardio - warm and well perfused, RRR, no murmur  Abdomen -  Soft, NTND  Extremities - No peripheral edema, No calf tenderness   Neurologic Exam:                    Cognitive -             Orientation: AAOx1,  oriented to Self, Believes it is "[place] place", June 28th, 1935     Speech - Fluent, Comprehensible, + dysarthria, No aphasia      Cranial Nerves - + facial asymmetry, Tongue midline, EOMI, Shoulder shrug intact     Motor -                      LEFT    UE - ShAB 5/5, EF 5/5, EE 5/5, WE 5/5,  WNL                    RIGHT UE - ShAB 5/5, EF 4/5, EE 4/5, WE 2/5,  3/5                    LEFT    LE - HF 5/5, KE 5/5, DF 5/5, PF 5/5                    RIGHT LE - HF 4/5, KE 4/5, DF 5/5, PF 4/5        Sensory - (could not perform)     Reflexes - DTR+2, intact     Coordination - FTN impaired at RUE     OculoVestibular -  No nystagmus  Psychiatric - Mood stable, Affect WNL  Skin on admission: *Physical Exam limited due to confusion     Constitutional - NAD, Comfortable, confused, impulsive   HEENT - NCAT, EOMI  Neck - Supple, No limited ROM  Chest - good chest expansion, good respiratory effort, CTAB   Cardio - warm and well perfused, RRR, no murmur  Abdomen -  Soft, NTND  Extremities - No peripheral edema, No calf tenderness   Neurologic Exam:                    Cognitive -             Orientation: AAOx2, knows she had a stroke, oriented to Self, Believes it is "[place]gg[place]'s place", June 28th, 1935     Speech - Fluent, Comprehensible, + dysarthria, No aphasia      Cranial Nerves - + facial asymmetry, Tongue midline, EOMI, Shoulder shrug intact     Motor -                      LEFT    UE - ShAB 5/5, EF 5/5, EE 5/5, WE 5/5,  WNL                    RIGHT UE - ShAB 5/5, EF 4/5, EE 4/5, WE 2/5,  3/5                    LEFT    LE - HF 5/5, KE 5/5, DF 5/5, PF 5/5                    RIGHT LE - HF 4/5, KE 4/5, DF 5/5, PF 4/5        Sensory - (could not perform)     Reflexes - DTR+2, intact     Coordination - FTN impaired at RUE     OculoVestibular -  No nystagmus  Psychiatric - Mood stable, Affect WNL  Skin on admission: venous stasis ulcer on anterior shin 4cm x 4cm. Constitutional - NAD, Comfortable, confused, impulsive, follows 1 step simple command   HEENT - NCAT, EOMI, AMANDA  Neck - Supple, No limited ROM  Chest - good chest expansion, good respiratory effort, CTAB   Cardio - warm and well perfused, RRR, no murmur  Abdomen -  Soft, NT, ND, (+) BS  Extremities - No peripheral edema, No calf tenderness   Neurologic Exam:                    Cognitive -             Orientation: AAOx 2, knows she had a stroke, oriented to Self, Believes it is "[place]gg[place] place", June 28th, 1935     Speech - Fluent, Comprehensible, + dysarthria, No aphasia      Cranial Nerves - (+) right facial asymmetry, Tongue midline, EOMI, Shoulder shrug intact     Motor -                      LEFT    UE - ShAB 5/5, EF 5/5, EE 5/5, WE 5/5,  WNL                    RIGHT UE - ShAB 4-/5, EF 4-/5, EE 3/5, WE 2/5,  3/5                    LEFT    LE - HF 5/5, KE 5/5, DF 5/5, PF 5/5                    RIGHT LE - HF 4/5, KE 4/5, DF 4/5, PF 4/5        Sensory - (could not perform)     Reflexes - DTR+2, intact     Coordination - FTN impaired at RUE     OculoVestibular -  No nystagmus  Psychiatric - Mood stable, Affect WNL  Skin on admission: venous stasis ulcer on anterior shin 4cm x 4cm. Constitutional - NAD, Comfortable, confused, impulsive, follows 1 step simple command   HEENT - NCAT, EOMI, AMANDA  Neck - Supple, No limited ROM  Chest - good chest expansion, good respiratory effort, CTAB   Cardio - warm and well perfused, RRR, no murmur  Abdomen -  Soft, NT, ND, (+) BS  Extremities - No peripheral edema, No calf tenderness   Neurologic Exam:                    Cognitive -             Orientation: AAOx 2, knows she had a stroke, oriented to Self, Believes it is "[place] place", June 28th, 1935     Speech - Fluent, + dysarthria, No aphasia      Cranial Nerves - (+) right facial asymmetry, Tongue midline, EOMI, Shoulder shrug intact     Motor -                      LEFT    UE - ShAB 5/5, EF 5/5, EE 5/5, WE 5/5,  WNL                    RIGHT UE - ShAB 4-/5, EF 4-/5, EE 3/5, WE 2/5,  3/5                    LEFT    LE - HF 5/5, KE 5/5, DF 5/5, PF 5/5                    RIGHT LE - HF 4/5, KE 4/5, DF 4/5, PF 4/5        Sensory - (could not perform)     Reflexes - DTR+2, intact     Coordination - FTN impaired at RUE     OculoVestibular -  No nystagmus  Psychiatric - Mood stable, Affect WNL  Skin on admission: venous stasis ulcer on anterior shin 4cm x 4cm.

## 2023-12-08 NOTE — H&P ADULT - NSHPSOCIALHISTORY_GEN_ALL_CORE
SOCIAL HISTORY  Smoking -   EtOH -   Marital Status -       FUNCTIONAL HISTORY  Previous Functional Status:   Pt unable to provide social history 2* confusion. As per Care Coordination Assessment note, pt lives in home with her spouse.    Current Functional Status:  Transfers: min-A x1 person assist   Gait / ambulation: 75ft  x 2 min-A  ADL's:  Speech: Pureed Diet with Moderately Thick Liquids SOCIAL HISTORY  Smoking - None  EtOH - None  Marital Status -       FUNCTIONAL HISTORY  Previous Functional Status:   Pt unable to provide social history 2* confusion. As per Care Coordination Assessment note, pt lives in home with her spouse.    Current Functional Status:  Transfers: min-A x1 person assist   Gait / ambulation: 75ft  x 2 min-A  ADL's:  Speech: Pureed Diet with Moderately Thick Liquids

## 2023-12-08 NOTE — PROGRESS NOTE ADULT - ASSESSMENT
88-year-old female with history of hypertension pacemaker right hip replacement brought by ambulance from home for evaluation of episode of weakness and facial droop    PMR eval noted  vs noted    extubated  neuro eval noted  HOB elev  asp prec  vs noted  labs reviewed  CNS imaging reviewed  monitor VS and HD and Sat  asp prec

## 2023-12-08 NOTE — DISCHARGE NOTE PROVIDER - HOSPITAL COURSE
88F HTN, Breast CA admitted for Acute Hypoxic Respiratory Failure    Acute Left MCA Stroke  Multiple embolic areas noted on CT Imaging   Right Sided Hemiparesis; Expressive Aphasia noted but all of it improving  Aspirin + Statin   Pacemaker interrogated showing Atrial runs but no sustained Atrial Fibrillation   Neurology Consult noted    Acute Hypoxic Respiratory Failure  Resolved and Extubated 12/4/23  Related to Anaphylactic Shock from Iodine Contrast Dye from CT    Anaphylactic Shock  Due to Iodine Constrast from CT  Temporarily on Vent Support  Resolved    HTN  Resumed Lisinopril   Started Labetolol PRN    Breast CA  History of Mastectomy    Diet  Passed swallow evaluation and diet changed to pureed    DVT Prophylaxis  Heparin SC TID    Disposition   Discharge planning to rehab

## 2023-12-08 NOTE — DISCHARGE NOTE PROVIDER - NSDCCPCAREPLAN_GEN_ALL_CORE_FT
PRINCIPAL DISCHARGE DIAGNOSIS  Diagnosis: CVA (cerebrovascular accident)  Assessment and Plan of Treatment: You were diagnosed with a stroke as per imaging.  This explains your right sided weakness, speech and swallowing difficulities. There has been improvement in the past 72 hours and it is recommended that your go to rehab for more intesnse therapy to regain as much function as possible.  You will be prescirbed Aspiring and high dose Statin.    Neurology evaluation was obtained.      SECONDARY DISCHARGE DIAGNOSES  Diagnosis: HTN (hypertension), benign  Assessment and Plan of Treatment: You have elevated BP and this will need to be controlled.  Medications have been prescribed and adjusted.    Diagnosis: Anaphylactic shock  Assessment and Plan of Treatment: You experienced severe anaphylactic shock due to Iodine Contrast Dye that was given to obtain CT imaging in pursuit of diagnosing your stroke. This contrast allergy has been added to your medical record. You temporarily required ventilator support.

## 2023-12-08 NOTE — DISCHARGE NOTE PROVIDER - NSDCQMPATIENTREHAB_NEU_A_CORE
Assessment performed
54M no PMH presenting for LLQ abdominal pain x2d associated with nausea, decreased appetite, and constipation (small BM today) admit c/f pancreatitis

## 2023-12-08 NOTE — PROGRESS NOTE ADULT - SUBJECTIVE AND OBJECTIVE BOX
Subjective: No overnight events.  BP controlled better.      MEDICATIONS  (STANDING):  aspirin 325 milliGRAM(s) Oral daily  atorvastatin 80 milliGRAM(s) Oral at bedtime  influenza  Vaccine (HIGH DOSE) 0.7 milliLiter(s) IntraMuscular once  mupirocin 2% Ointment 1 Application(s) Topical <User Schedule>    MEDICATIONS  (PRN):  acetaminophen   Oral Liquid .. 650 milliGRAM(s) Oral every 6 hours PRN Temp greater or equal to 38C (100.4F), Mild Pain (1 - 3)  labetalol 100 milliGRAM(s) Oral every 6 hours PRN SBP>190      Allergies    penicillin (Rash)  IV Contrast (Anaphylaxis; Urticaria)    Intolerances        Vital Signs Last 24 Hrs  T(C): 36.7 (08 Dec 2023 08:12), Max: 36.7 (08 Dec 2023 08:12)  T(F): 98 (08 Dec 2023 08:12), Max: 98 (08 Dec 2023 08:12)  HR: 79 (08 Dec 2023 06:20) (77 - 91)  BP: 177/69 (08 Dec 2023 06:20) (118/68 - 178/94)  BP(mean): 92 (08 Dec 2023 06:20) (85 - 115)  RR: 17 (08 Dec 2023 06:20) (10 - 22)  SpO2: 98% (08 Dec 2023 06:20) (93% - 98%)    Parameters below as of 08 Dec 2023 06:20  Patient On (Oxygen Delivery Method): room air        PHYSICAL EXAM:  GENERAL: NAD, well-groomed, well-developed  HEAD:  Atraumatic, Normocephalic  ENMT: Moist mucous membranes,   NECK: Supple, No JVD, Normal thyroid  NERVOUS SYSTEM:  All 4 extremities mobile, no gross sensory deficits.   CHEST/LUNG: Clear to auscultation bilaterally; No rales, rhonchi, wheezing, or rubs  HEART: Regular rate and rhythm; No murmurs, rubs, or gallops  ABDOMEN: Soft, Nontender, Nondistended; Bowel sounds present  EXTREMITIES:  Right sided weakness > Left Side      LABS:    08 Dec 2023 06:00    137    |  104    |  16     ----------------------------<  111    3.9     |  28     |  0.63     Ca    8.7        08 Dec 2023 06:00        Urinalysis Basic - ( 08 Dec 2023 06:00 )    Color: x / Appearance: x / SG: x / pH: x  Gluc: 111 mg/dL / Ketone: x  / Bili: x / Urobili: x   Blood: x / Protein: x / Nitrite: x   Leuk Esterase: x / RBC: x / WBC x   Sq Epi: x / Non Sq Epi: x / Bacteria: x      CAPILLARY BLOOD GLUCOSE          RADIOLOGY & ADDITIONAL TESTS:    Imaging Personally Reviewed:  [ ] YES     Consultant(s) Notes Reviewed:      Care Discussed with Consultants/Other Providers:    Advanced Directives: [ ] DNR  [ ] No feeding tube  [ ] MOLST in chart  [ ] MOLST completed today  [ ] Unknown

## 2023-12-08 NOTE — PROGRESS NOTE ADULT - PROVIDER SPECIALTY LIST ADULT
Cardiology
Hospitalist
Neurology
Neurology
Podiatry
Critical Care
Hospitalist
Cardiology
Cardiology
Hospitalist
Neurology
Pulmonology
Podiatry
Pulmonology
Cardiology
Cardiology
Pulmonology
Hospitalist

## 2023-12-08 NOTE — PROGRESS NOTE ADULT - SUBJECTIVE AND OBJECTIVE BOX
Date/Time Patient Seen:  		  Referring MD:   Data Reviewed	       Patient is a 88y old  Female who presents with a chief complaint of Unresponsive. (07 Dec 2023 18:43)      Subjective/HPI     PAST MEDICAL & SURGICAL HISTORY:  Hyperlipidemia  no current medications    Breast cancer  2009 left - s/diazg castellanos radical mastectomy    [place] (hard of hearing)  bilateral HA (Yvette)    Meniscus tear  left - 10/2015    Cyst of left ovary    SDH (subdural hematoma)  3/2011 - s/p fall - no surgical intervention    History of Left Mastectomy    Cataract extraction status of eye, right    Status post right cataract extraction          Medication list         MEDICATIONS  (STANDING):  aspirin 325 milliGRAM(s) Oral daily  atorvastatin 80 milliGRAM(s) Oral at bedtime  influenza  Vaccine (HIGH DOSE) 0.7 milliLiter(s) IntraMuscular once  lisinopril 10 milliGRAM(s) Oral daily  mupirocin 2% Ointment 1 Application(s) Topical <User Schedule>    MEDICATIONS  (PRN):  acetaminophen   Oral Liquid .. 650 milliGRAM(s) Oral every 6 hours PRN Temp greater or equal to 38C (100.4F), Mild Pain (1 - 3)  labetalol 100 milliGRAM(s) Oral every 6 hours PRN SBP>190         Vitals log        ICU Vital Signs Last 24 Hrs  T(C): 36.6 (08 Dec 2023 00:01), Max: 37.3 (07 Dec 2023 07:48)  T(F): 97.9 (08 Dec 2023 00:01), Max: 99.2 (07 Dec 2023 07:48)  HR: 82 (08 Dec 2023 02:23) (77 - 91)  BP: 155/80 (08 Dec 2023 02:23) (117/85 - 178/94)  BP(mean): 92 (08 Dec 2023 02:23) (85 - 115)  ABP: --  ABP(mean): --  RR: 22 (08 Dec 2023 02:23) (10 - 22)  SpO2: 97% (08 Dec 2023 02:23) (93% - 98%)    O2 Parameters below as of 08 Dec 2023 02:23  Patient On (Oxygen Delivery Method): room air                 Input and Output:  I&O's Detail    06 Dec 2023 07:01  -  07 Dec 2023 07:00  --------------------------------------------------------  IN:    dextrose 5% + sodium chloride 0.45%: 900 mL    Oral Fluid: 100 mL  Total IN: 1000 mL    OUT:    Voided (mL): 552 mL  Total OUT: 552 mL    Total NET: 448 mL          Lab Data                        11.4   8.75  )-----------( 232      ( 07 Dec 2023 06:00 )             35.6     12-07    134<L>  |  97  |  16  ----------------------------<  116<H>  3.7   |  27  |  0.55    Ca    9.0      07 Dec 2023 06:00              Review of Systems	      Objective     Physical Examination    heart s1s2  lung dc BS  head nc      Pertinent Lab findings & Imaging      Christiane:  NO   Adequate UO     I&O's Detail    06 Dec 2023 07:01  -  07 Dec 2023 07:00  --------------------------------------------------------  IN:    dextrose 5% + sodium chloride 0.45%: 900 mL    Oral Fluid: 100 mL  Total IN: 1000 mL    OUT:    Voided (mL): 552 mL  Total OUT: 552 mL    Total NET: 448 mL               Discussed with:     Cultures:	        Radiology                             Date/Time Patient Seen:  		  Referring MD:   Data Reviewed	       Patient is a 88y old  Female who presents with a chief complaint of Unresponsive. (07 Dec 2023 18:43)      Subjective/HPI     PAST MEDICAL & SURGICAL HISTORY:  Hyperlipidemia  no current medications    Breast cancer  2009 left - s/idazg castellanos radical mastectomy    [place] (hard of hearing)  bilateral HA (Yvette)    Meniscus tear  left - 10/2015    Cyst of left ovary    SDH (subdural hematoma)  3/2011 - s/p fall - no surgical intervention    History of Left Mastectomy    Cataract extraction status of eye, right    Status post right cataract extraction          Medication list         MEDICATIONS  (STANDING):  aspirin 325 milliGRAM(s) Oral daily  atorvastatin 80 milliGRAM(s) Oral at bedtime  influenza  Vaccine (HIGH DOSE) 0.7 milliLiter(s) IntraMuscular once  lisinopril 10 milliGRAM(s) Oral daily  mupirocin 2% Ointment 1 Application(s) Topical <User Schedule>    MEDICATIONS  (PRN):  acetaminophen   Oral Liquid .. 650 milliGRAM(s) Oral every 6 hours PRN Temp greater or equal to 38C (100.4F), Mild Pain (1 - 3)  labetalol 100 milliGRAM(s) Oral every 6 hours PRN SBP>190         Vitals log        ICU Vital Signs Last 24 Hrs  T(C): 36.6 (08 Dec 2023 00:01), Max: 37.3 (07 Dec 2023 07:48)  T(F): 97.9 (08 Dec 2023 00:01), Max: 99.2 (07 Dec 2023 07:48)  HR: 82 (08 Dec 2023 02:23) (77 - 91)  BP: 155/80 (08 Dec 2023 02:23) (117/85 - 178/94)  BP(mean): 92 (08 Dec 2023 02:23) (85 - 115)  ABP: --  ABP(mean): --  RR: 22 (08 Dec 2023 02:23) (10 - 22)  SpO2: 97% (08 Dec 2023 02:23) (93% - 98%)    O2 Parameters below as of 08 Dec 2023 02:23  Patient On (Oxygen Delivery Method): room air                 Input and Output:  I&O's Detail    06 Dec 2023 07:01  -  07 Dec 2023 07:00  --------------------------------------------------------  IN:    dextrose 5% + sodium chloride 0.45%: 900 mL    Oral Fluid: 100 mL  Total IN: 1000 mL    OUT:    Voided (mL): 552 mL  Total OUT: 552 mL    Total NET: 448 mL          Lab Data                        11.4   8.75  )-----------( 232      ( 07 Dec 2023 06:00 )             35.6     12-07    134<L>  |  97  |  16  ----------------------------<  116<H>  3.7   |  27  |  0.55    Ca    9.0      07 Dec 2023 06:00              Review of Systems	      Objective     Physical Examination    heart s1s2  lung dc BS  head nc      Pertinent Lab findings & Imaging      Christiane:  NO   Adequate UO     I&O's Detail    06 Dec 2023 07:01  -  07 Dec 2023 07:00  --------------------------------------------------------  IN:    dextrose 5% + sodium chloride 0.45%: 900 mL    Oral Fluid: 100 mL  Total IN: 1000 mL    OUT:    Voided (mL): 552 mL  Total OUT: 552 mL    Total NET: 448 mL               Discussed with:     Cultures:	        Radiology

## 2023-12-08 NOTE — PROGRESS NOTE ADULT - SUBJECTIVE AND OBJECTIVE BOX
ADDENDUM    Was called by nurse to speak to daughter-- she appears upset that when a repeat CT of the head was done it was done without contrast.  Explained to her that to look for a stroke, that a CT head without contrast would be standard of care and that a repeat ct head with contrast would not be warrant and especially since when she had a CTA contrast  had a possible allergic reaction.   She was stating than why I did a ct with contrast when the patient came in, explained to her I was not involved initially but the standard of care in any institution if a stoke to TIA is suspected -- is that a CTA head and neck is done to look of clot or significant stenosis to see if any other intervention could be done. SHE did not appear to believe anything I was saying to her. I requested her to do some research on the internet to help her further understand the difference between CTA and CT head when it is done and that sometimes a CT head, may take up to 48 hours before changes of a stroke may be seen on imaging. I did my best to explain to her in detail-- the impression i got is that she did not believe anything the physicians in the hospital were telling her and they were covering up what had transpired with her mother. Explained to her that she was having what is called an evolving stroke and at the beginning of a stroke CT may no show any changes.  DID my best to explain these things to her.

## 2023-12-08 NOTE — PROGRESS NOTE ADULT - SUBJECTIVE AND OBJECTIVE BOX
Chief Complaint: Facial droop, right-sided weakness    Interval Events: No events overnight.    Review of Systems:  Unable to obtain    Physical Exam:  Vital Signs Last 24 Hrs  T(C): 36.7 (08 Dec 2023 08:12), Max: 36.7 (08 Dec 2023 08:12)  T(F): 98 (08 Dec 2023 08:12), Max: 98 (08 Dec 2023 08:12)  HR: 79 (08 Dec 2023 06:20) (77 - 91)  BP: 177/69 (08 Dec 2023 06:20) (118/68 - 178/94)  BP(mean): 92 (08 Dec 2023 06:20) (85 - 115)  RR: 17 (08 Dec 2023 06:20) (10 - 22)  SpO2: 98% (08 Dec 2023 06:20) (93% - 98%)  Parameters below as of 08 Dec 2023 06:20  Patient On (Oxygen Delivery Method): room air  General: NAD  HEENT: Intubated  Neck: No JVD, no carotid bruit  Lungs: CTAB  CV: RRR, nl S1/S2, no M/R/G  Abdomen: S/NT/ND, +BS  Extremities: No LE edema, no cyanosis  Neuro: AAOx1  Skin: No rash    Labs:    12-08    137  |  104  |  16  ----------------------------<  111<H>  3.9   |  28  |  0.63    Ca    8.7      08 Dec 2023 06:00                          11.4   8.75  )-----------( 232      ( 07 Dec 2023 06:00 )             35.6       ECG/Telemetry: NSR, ventricular paced

## 2023-12-08 NOTE — PROGRESS NOTE ADULT - REASON FOR ADMISSION
Unresponsive.

## 2023-12-09 LAB
ALBUMIN SERPL ELPH-MCNC: 2.6 G/DL — LOW (ref 3.3–5)
ALBUMIN SERPL ELPH-MCNC: 2.6 G/DL — LOW (ref 3.3–5)
ALP SERPL-CCNC: 75 U/L — SIGNIFICANT CHANGE UP (ref 40–120)
ALP SERPL-CCNC: 75 U/L — SIGNIFICANT CHANGE UP (ref 40–120)
ALT FLD-CCNC: 31 U/L — SIGNIFICANT CHANGE UP (ref 10–45)
ALT FLD-CCNC: 31 U/L — SIGNIFICANT CHANGE UP (ref 10–45)
ANION GAP SERPL CALC-SCNC: 7 MMOL/L — SIGNIFICANT CHANGE UP (ref 5–17)
ANION GAP SERPL CALC-SCNC: 7 MMOL/L — SIGNIFICANT CHANGE UP (ref 5–17)
AST SERPL-CCNC: 35 U/L — SIGNIFICANT CHANGE UP (ref 10–40)
AST SERPL-CCNC: 35 U/L — SIGNIFICANT CHANGE UP (ref 10–40)
BASOPHILS # BLD AUTO: 0.03 K/UL — SIGNIFICANT CHANGE UP (ref 0–0.2)
BASOPHILS # BLD AUTO: 0.03 K/UL — SIGNIFICANT CHANGE UP (ref 0–0.2)
BASOPHILS NFR BLD AUTO: 0.4 % — SIGNIFICANT CHANGE UP (ref 0–2)
BASOPHILS NFR BLD AUTO: 0.4 % — SIGNIFICANT CHANGE UP (ref 0–2)
BILIRUB SERPL-MCNC: 0.4 MG/DL — SIGNIFICANT CHANGE UP (ref 0.2–1.2)
BILIRUB SERPL-MCNC: 0.4 MG/DL — SIGNIFICANT CHANGE UP (ref 0.2–1.2)
BUN SERPL-MCNC: 14 MG/DL — SIGNIFICANT CHANGE UP (ref 7–23)
BUN SERPL-MCNC: 14 MG/DL — SIGNIFICANT CHANGE UP (ref 7–23)
CALCIUM SERPL-MCNC: 8.5 MG/DL — SIGNIFICANT CHANGE UP (ref 8.4–10.5)
CALCIUM SERPL-MCNC: 8.5 MG/DL — SIGNIFICANT CHANGE UP (ref 8.4–10.5)
CHLORIDE SERPL-SCNC: 105 MMOL/L — SIGNIFICANT CHANGE UP (ref 96–108)
CHLORIDE SERPL-SCNC: 105 MMOL/L — SIGNIFICANT CHANGE UP (ref 96–108)
CO2 SERPL-SCNC: 26 MMOL/L — SIGNIFICANT CHANGE UP (ref 22–31)
CO2 SERPL-SCNC: 26 MMOL/L — SIGNIFICANT CHANGE UP (ref 22–31)
CREAT SERPL-MCNC: 0.52 MG/DL — SIGNIFICANT CHANGE UP (ref 0.5–1.3)
CREAT SERPL-MCNC: 0.52 MG/DL — SIGNIFICANT CHANGE UP (ref 0.5–1.3)
EGFR: 90 ML/MIN/1.73M2 — SIGNIFICANT CHANGE UP
EGFR: 90 ML/MIN/1.73M2 — SIGNIFICANT CHANGE UP
EOSINOPHIL # BLD AUTO: 0.23 K/UL — SIGNIFICANT CHANGE UP (ref 0–0.5)
EOSINOPHIL # BLD AUTO: 0.23 K/UL — SIGNIFICANT CHANGE UP (ref 0–0.5)
EOSINOPHIL NFR BLD AUTO: 3.2 % — SIGNIFICANT CHANGE UP (ref 0–6)
EOSINOPHIL NFR BLD AUTO: 3.2 % — SIGNIFICANT CHANGE UP (ref 0–6)
GLUCOSE SERPL-MCNC: 99 MG/DL — SIGNIFICANT CHANGE UP (ref 70–99)
GLUCOSE SERPL-MCNC: 99 MG/DL — SIGNIFICANT CHANGE UP (ref 70–99)
HCT VFR BLD CALC: 35.2 % — SIGNIFICANT CHANGE UP (ref 34.5–45)
HCT VFR BLD CALC: 35.2 % — SIGNIFICANT CHANGE UP (ref 34.5–45)
HGB BLD-MCNC: 11.1 G/DL — LOW (ref 11.5–15.5)
HGB BLD-MCNC: 11.1 G/DL — LOW (ref 11.5–15.5)
IMM GRANULOCYTES NFR BLD AUTO: 1.1 % — HIGH (ref 0–0.9)
IMM GRANULOCYTES NFR BLD AUTO: 1.1 % — HIGH (ref 0–0.9)
LYMPHOCYTES # BLD AUTO: 0.7 K/UL — LOW (ref 1–3.3)
LYMPHOCYTES # BLD AUTO: 0.7 K/UL — LOW (ref 1–3.3)
LYMPHOCYTES # BLD AUTO: 9.8 % — LOW (ref 13–44)
LYMPHOCYTES # BLD AUTO: 9.8 % — LOW (ref 13–44)
MCHC RBC-ENTMCNC: 25.8 PG — LOW (ref 27–34)
MCHC RBC-ENTMCNC: 25.8 PG — LOW (ref 27–34)
MCHC RBC-ENTMCNC: 31.5 GM/DL — LOW (ref 32–36)
MCHC RBC-ENTMCNC: 31.5 GM/DL — LOW (ref 32–36)
MCV RBC AUTO: 81.9 FL — SIGNIFICANT CHANGE UP (ref 80–100)
MCV RBC AUTO: 81.9 FL — SIGNIFICANT CHANGE UP (ref 80–100)
MONOCYTES # BLD AUTO: 0.81 K/UL — SIGNIFICANT CHANGE UP (ref 0–0.9)
MONOCYTES # BLD AUTO: 0.81 K/UL — SIGNIFICANT CHANGE UP (ref 0–0.9)
MONOCYTES NFR BLD AUTO: 11.3 % — SIGNIFICANT CHANGE UP (ref 2–14)
MONOCYTES NFR BLD AUTO: 11.3 % — SIGNIFICANT CHANGE UP (ref 2–14)
NEUTROPHILS # BLD AUTO: 5.3 K/UL — SIGNIFICANT CHANGE UP (ref 1.8–7.4)
NEUTROPHILS # BLD AUTO: 5.3 K/UL — SIGNIFICANT CHANGE UP (ref 1.8–7.4)
NEUTROPHILS NFR BLD AUTO: 74.2 % — SIGNIFICANT CHANGE UP (ref 43–77)
NEUTROPHILS NFR BLD AUTO: 74.2 % — SIGNIFICANT CHANGE UP (ref 43–77)
NRBC # BLD: 0 /100 WBCS — SIGNIFICANT CHANGE UP (ref 0–0)
NRBC # BLD: 0 /100 WBCS — SIGNIFICANT CHANGE UP (ref 0–0)
PLATELET # BLD AUTO: 239 K/UL — SIGNIFICANT CHANGE UP (ref 150–400)
PLATELET # BLD AUTO: 239 K/UL — SIGNIFICANT CHANGE UP (ref 150–400)
POTASSIUM SERPL-MCNC: 3.5 MMOL/L — SIGNIFICANT CHANGE UP (ref 3.5–5.3)
POTASSIUM SERPL-MCNC: 3.5 MMOL/L — SIGNIFICANT CHANGE UP (ref 3.5–5.3)
POTASSIUM SERPL-SCNC: 3.5 MMOL/L — SIGNIFICANT CHANGE UP (ref 3.5–5.3)
POTASSIUM SERPL-SCNC: 3.5 MMOL/L — SIGNIFICANT CHANGE UP (ref 3.5–5.3)
PROT SERPL-MCNC: 6 G/DL — SIGNIFICANT CHANGE UP (ref 6–8.3)
PROT SERPL-MCNC: 6 G/DL — SIGNIFICANT CHANGE UP (ref 6–8.3)
RBC # BLD: 4.3 M/UL — SIGNIFICANT CHANGE UP (ref 3.8–5.2)
RBC # BLD: 4.3 M/UL — SIGNIFICANT CHANGE UP (ref 3.8–5.2)
RBC # FLD: 17.3 % — HIGH (ref 10.3–14.5)
RBC # FLD: 17.3 % — HIGH (ref 10.3–14.5)
SODIUM SERPL-SCNC: 138 MMOL/L — SIGNIFICANT CHANGE UP (ref 135–145)
SODIUM SERPL-SCNC: 138 MMOL/L — SIGNIFICANT CHANGE UP (ref 135–145)
WBC # BLD: 7.15 K/UL — SIGNIFICANT CHANGE UP (ref 3.8–10.5)
WBC # BLD: 7.15 K/UL — SIGNIFICANT CHANGE UP (ref 3.8–10.5)
WBC # FLD AUTO: 7.15 K/UL — SIGNIFICANT CHANGE UP (ref 3.8–10.5)
WBC # FLD AUTO: 7.15 K/UL — SIGNIFICANT CHANGE UP (ref 3.8–10.5)

## 2023-12-09 PROCEDURE — 99223 1ST HOSP IP/OBS HIGH 75: CPT

## 2023-12-09 PROCEDURE — 99232 SBSQ HOSP IP/OBS MODERATE 35: CPT | Mod: GC

## 2023-12-09 RX ORDER — FLUCONAZOLE 150 MG/1
100 TABLET ORAL DAILY
Refills: 0 | Status: DISCONTINUED | OUTPATIENT
Start: 2023-12-09 | End: 2023-12-10

## 2023-12-09 RX ORDER — LANOLIN ALCOHOL/MO/W.PET/CERES
3 CREAM (GRAM) TOPICAL AT BEDTIME
Refills: 0 | Status: DISCONTINUED | OUTPATIENT
Start: 2023-12-09 | End: 2023-12-13

## 2023-12-09 RX ADMIN — Medication 3 MILLIGRAM(S): at 02:23

## 2023-12-09 RX ADMIN — Medication 650 MILLIGRAM(S): at 06:58

## 2023-12-09 RX ADMIN — ATORVASTATIN CALCIUM 80 MILLIGRAM(S): 80 TABLET, FILM COATED ORAL at 21:40

## 2023-12-09 RX ADMIN — FLUCONAZOLE 100 MILLIGRAM(S): 150 TABLET ORAL at 15:44

## 2023-12-09 RX ADMIN — LIDOCAINE 1 PATCH: 4 CREAM TOPICAL at 15:39

## 2023-12-09 RX ADMIN — Medication 325 MILLIGRAM(S): at 12:43

## 2023-12-09 RX ADMIN — Medication 650 MILLIGRAM(S): at 12:45

## 2023-12-09 NOTE — CONSULT NOTE ADULT - ASSESSMENT
The patient is an 88 year old female with a history of HTN, hypothyroidism, pacemaker, breast cancer who is admitted with CVA and acute respiratory failure due to IV contrast anaphylaxis.CT head (12/4) showed Large left MCA territory acute to early subacute infarction, thrombosis of multiple distal branches of the left MCA and no acute intracranial hemorrhage.Admitted for multidisciplinary rehab program    #L MCA CVA, Right dominant hemiparesis    -Pacemaker interrogated showing Atrial runs but no sustained Atrial Fibrillation   CT head (12/4) showed Large left MCA territory acute to early subacute infarction, thrombosis of multiple distal branches of the left MCA and no acute intracranial hemorrhage.  -ASA 325mg PO QD   -atorvastatin 80mg PO at bedtime   #Comprehensive Multidisciplinary Rehab Program:  - Gait, ADL, Functional impairments  - PT/OT/ SLP 3 hours a day 5 days a week, 1 hour each     #HTN  - Labetalol 100mg PO Q6 PRN for SBP>190  - Lisinopril 20mg PO QD     #Mood / Cognition:  - Neuropsych evaluation PRN    #Sleep:  - Maintain quiet hours and low stim environment    #Pain:  - Tylenol PRN  - lidocaine patch for backpain  - avoid sedating meds that may affect cognitive recovery    #/Bladder:  - Monitor PVR if no void in 8h; SC for >400 cc  - Toileting schedule q4h  - (+) Incontinence     #Diet / Dysphagia:    - Diet: Pureed, Moderately thick liquids  - ongoing SLP assessment  - Nutrition to follow    #Skin/ Pressure Injury Prevention:  - assessment on admission   - Incisions: venous stasis ulcer in L anterior shin  [id number]   - Turn Q2hrs in bed while awake, OOB to Chair, PT/OT/SLP     #DVT prophylaxis:  - ASA 325mg PO QD     #Precautions/ Restrictions  - Falls, Aspiration  - COVID PCR:     --------------------------------------------  Outpatient Follow up:    --------------------------------------------      MEDICAL PROGNOSIS: GOOD            REHAB POTENTIAL: GOOD             ESTIMATED DISPOSITION: HOME WITH HOME CARE            ELOS: 10-14 Days   EXPECTED THERAPY:     P.T. 1hr/day       O.T. 1hr/day      S.L.P. 1hr/day     P&O Unnecessary     EXP FREQUENCY: 5 days per 7 day period     PRESCREEN COMPARISON:   I have reviewed the prescreen information and I have found no relevant changes between the preadmission screening and my post admission evaluation     RATIONALE FOR INPATIENT ADMISSION - Patient demonstrates the following: (check all that apply)  [X] Medically appropriate for rehabilitation admission  [X] Has attainable rehab goals with an appropriate initial discharge plan  [X] Has rehabilitation potential (expected to make a significant improvement within a reasonable period of time)   [X] Requires close medical management by a rehab physician, rehab nursing care, Hospitalist and comprehensive interdisciplinary team (including PT, OT, & or SLP, Prosthetics and Orthotics)       The patient is an 88 year old female with a history of HTN, hypothyroidism, pacemaker, breast cancer who is admitted with CVA and acute respiratory failure due to IV contrast anaphylaxis.CT head (12/4) showed Large left MCA territory acute to early subacute infarction, thrombosis of multiple distal branches of the left MCA and no acute intracranial hemorrhage.Admitted for multidisciplinary rehab program    #L MCA CVA, Right dominant hemiparesis    -Pacemaker interrogated showing Atrial runs but no sustained Atrial Fibrillation   CT head (12/4) showed Large left MCA territory acute to early subacute infarction, thrombosis of multiple distal branches of the left MCA and no acute intracranial hemorrhage.  -ASA 325mg PO QD   -atorvastatin 80mg PO at bedtime   #Comprehensive Multidisciplinary Rehab Program:  - Gait, ADL, Functional impairments  - PT/OT/ SLP 3 hours a day 5 days a week, 1 hour each     #HTN  - Labetalol 100mg PO Q6 PRN for SBP>190  - Lisinopril 20mg PO QD     #Mood / Cognition:  - Neuropsych evaluation PRN    #Sleep:  - Maintain quiet hours and low stim environment    #Pain:  - Tylenol PRN  - lidocaine patch for backpain  - avoid sedating meds that may affect cognitive recovery    #/Bladder:  - Monitor PVR if no void in 8h; SC for >400 cc  - Toileting schedule q4h  - (+) Incontinence     #Diet / Dysphagia:    - Diet: Pureed, Moderately thick liquids  - ongoing SLP assessment  - Nutrition to follow    #Skin/ Pressure Injury Prevention:  - assessment on admission   - Incisions: venous stasis ulcer in L anterior shin  [id number]   - Turn Q2hrs in bed while awake, OOB to Chair, PT/OT/SLP     #DVT prophylaxis:  - ASA 325mg PO QD     #Precautions/ Restrictions  - Falls, Aspiration  - COVID PCR:     --------------------------------------------  Outpatient Follow up:    --------------------------------------------      MEDICAL PROGNOSIS: GOOD            REHAB POTENTIAL: GOOD             ESTIMATED DISPOSITION: HOME WITH HOME CARE            ELOS: 10-14 Days   EXPECTED THERAPY:     P.T. 1hr/day       O.T. 1hr/day      S.L.P. 1hr/day     P&O Unnecessary     EXP FREQUENCY: 5 days per 7 day period     PRESCREEN COMPARISON:   I have reviewed the prescreen information and I have found no relevant changes between the preadmission screening and my post admission evaluation     RATIONALE FOR INPATIENT ADMISSION - Patient demonstrates the following: (check all that apply)  [X] Medically appropriate for rehabilitation admission  [X] Has attainable rehab goals with an appropriate initial discharge plan  [X] Has rehabilitation potential (expected to make a significant improvement within a reasonable period of time)   [X] Requires close medical management by a rehab physician, rehab nursing care, Hospitalist and comprehensive interdisciplinary team (including PT, OT, & or SLP, Prosthetics and Orthotics)       88 year old female with a history of HTN, hypothyroidism, pacemaker, breast cancer who is admitted with CVA and acute respiratory failure due to IV contrast anaphylaxis. 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 Admitted for multidisciplinary rehab program    #L MCA CVA, Right   -ASA 325mg daily  -lipitor 80mg at bedtime   -Pacemaker interrogated showing Atrial runs but no sustained Atrial Fibrillation   CT head (12/4) showed Large left MCA territory acute to early subacute infarction, thrombosis of multiple distal branches of the left MCA and no acute intracranial hemorrhage.    #Recent anaphylactic shock from Iodine contrast dye from CT  - resolved and extubated.      #Thrush  - start fluconazole 100mg daily for 7 days    #HTN  - BP currently controlled  - Lisinopril 20mg PO QD     #Breast ca  - history of mastectomy       #DVT prophylaxis:  - ASA 325mg PO QD

## 2023-12-09 NOTE — CONSULT NOTE ADULT - SUBJECTIVE AND OBJECTIVE BOX
HPI:  The patient is an 88 year old female with a history of HTN, hypothyroidism, pacemaker, breast cancer who is admitted with CVA and acute respiratory failure due to IV contrast anaphylaxis. Patient had R sided hemiparesis, expressive aphasia. CT imaging confirmed multiple embolic areas. Pacemaker interrogated showing Atrial runs but no sustained Atrial Fibrillation. Patient currently on course of ASA and statin. Hospital course complicated by severe anaphylactic due to iodine contrast allergy from CT performed on admission to ED. 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    Subjective and overnight events:  Patient seen and examined at bedside. reports feeling thirsty and wants to have thin liquid. no     ALLERGIES:  IV Contrast (Anaphylaxis; Urticaria)  penicillin (Rash)    MEDICATIONS  (STANDING):  aspirin 325 milliGRAM(s) Oral daily  atorvastatin 80 milliGRAM(s) Oral at bedtime  lidocaine   4% Patch 1 Patch Transdermal daily  lisinopril 20 milliGRAM(s) Oral daily  melatonin 3 milliGRAM(s) Oral at bedtime    MEDICATIONS  (PRN):  acetaminophen   Oral Liquid .. 650 milliGRAM(s) Oral every 6 hours PRN Temp greater or equal to 38.5C (101.3F), Mild Pain (1 - 3)  labetalol 100 milliGRAM(s) Oral every 6 hours PRN SBP>190    Vital Signs Last 24 Hrs  T(F): 98.5 (08 Dec 2023 23:08), Max: 98.5 (08 Dec 2023 23:08)  HR: 92 (09 Dec 2023 07:30) (81 - 92)  BP: 143/72 (09 Dec 2023 07:30) (135/70 - 143/72)  RR: 16 (09 Dec 2023 07:30) (16 - 17)  SpO2: 96% (09 Dec 2023 07:30) (95% - 96%)  I&O's Summary    08 Dec 2023 07:01  -  09 Dec 2023 07:00  --------------------------------------------------------  IN: 0 mL / OUT: 1 mL / NET: -1 mL      PHYSICAL EXAM:  General: NAD, A/O x 3  ENT: MMM  Neck: Supple, No JVD  Lungs: Clear to auscultation bilaterally  Cardio: RRR, S1/S2, No murmurs  Abdomen: Soft, Nontender, Nondistended; Bowel sounds present  Extremities: No calf tenderness, No pitting edema    LABS:                        11.1   7.15  )-----------( 239      ( 09 Dec 2023 08:50 )             35.2     12-09    138  |  105  |  14  ----------------------------<  99  3.5   |  26  |  0.52    Ca    8.5      09 Dec 2023 08:50    TPro  6.0  /  Alb  2.6  /  TBili  0.4  /  DBili  x   /  AST  35  /  ALT  31  /  AlkPhos  75  12-09      12-04 Chol 213 mg/dL LDL -- HDL 68 mg/dL Trig 108 mg/dL        Urinalysis Basic - ( 09 Dec 2023 08:50 )    Color: x / Appearance: x / SG: x / pH: x  Gluc: 99 mg/dL / Ketone: x  / Bili: x / Urobili: x   Blood: x / Protein: x / Nitrite: x   Leuk Esterase: x / RBC: x / WBC x   Sq Epi: x / Non Sq Epi: x / Bacteria: x            RADIOLOGY & ADDITIONAL TESTS:    Care Discussed with Consultants/Other Providers:    HPI:  The patient is an 88 year old female with a history of HTN, hypothyroidism, pacemaker, breast cancer who is admitted with CVA and acute respiratory failure due to IV contrast anaphylaxis. Patient had R sided hemiparesis, expressive aphasia. CT imaging confirmed multiple embolic areas. Pacemaker interrogated showing Atrial runs but no sustained Atrial Fibrillation. Patient currently on course of ASA and statin. Hospital course complicated by severe anaphylactic due to iodine contrast allergy from CT performed on admission to ED. 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    Subjective and overnight events:  Patient seen and examined at bedside. reports feeling thirsty and wants to have thin liquid. pt is not following commands well. answering simple questions occasionally only. no fever, chills, headahce, sob, cp, abd pain.     ALLERGIES:  IV Contrast (Anaphylaxis; Urticaria)  penicillin (Rash)    MEDICATIONS  (STANDING):  aspirin 325 milliGRAM(s) Oral daily  atorvastatin 80 milliGRAM(s) Oral at bedtime  lidocaine   4% Patch 1 Patch Transdermal daily  lisinopril 20 milliGRAM(s) Oral daily  melatonin 3 milliGRAM(s) Oral at bedtime    MEDICATIONS  (PRN):  acetaminophen   Oral Liquid .. 650 milliGRAM(s) Oral every 6 hours PRN Temp greater or equal to 38.5C (101.3F), Mild Pain (1 - 3)  labetalol 100 milliGRAM(s) Oral every 6 hours PRN SBP>190    Vital Signs Last 24 Hrs  T(F): 98.5 (08 Dec 2023 23:08), Max: 98.5 (08 Dec 2023 23:08)  HR: 92 (09 Dec 2023 07:30) (81 - 92)  BP: 143/72 (09 Dec 2023 07:30) (135/70 - 143/72)  RR: 16 (09 Dec 2023 07:30) (16 - 17)  SpO2: 96% (09 Dec 2023 07:30) (95% - 96%)  I&O's Summary    08 Dec 2023 07:01  -  09 Dec 2023 07:00  --------------------------------------------------------  IN: 0 mL / OUT: 1 mL / NET: -1 mL      PHYSICAL EXAM:  General: NAD, A/O x 1-2.   ENT: MMM + thrush   Neck: Supple, No JVD  Lungs: Clear to auscultation bilaterally  Cardio: RRR, S1/S2, No murmurs  Abdomen: Soft, Nontender, Nondistended; Bowel sounds present  Extremities: No calf tenderness, No pitting edema  neuro: dysarthria. right facial asymmetry, unable to properly assess muscle strength, pt is not following commands well. right hemiparesis     LABS:                        11.1   7.15  )-----------( 239      ( 09 Dec 2023 08:50 )             35.2     12-09    138  |  105  |  14  ----------------------------<  99  3.5   |  26  |  0.52    Ca    8.5      09 Dec 2023 08:50    TPro  6.0  /  Alb  2.6  /  TBili  0.4  /  DBili  x   /  AST  35  /  ALT  31  /  AlkPhos  75  12-09      12-04 Chol 213 mg/dL LDL -- HDL 68 mg/dL Trig 108 mg/dL        Urinalysis Basic - ( 09 Dec 2023 08:50 )    Color: x / Appearance: x / SG: x / pH: x  Gluc: 99 mg/dL / Ketone: x  / Bili: x / Urobili: x   Blood: x / Protein: x / Nitrite: x   Leuk Esterase: x / RBC: x / WBC x   Sq Epi: x / Non Sq Epi: x / Bacteria: x            RADIOLOGY & ADDITIONAL TESTS:    Care Discussed with Consultants/Other Providers:

## 2023-12-09 NOTE — PROGRESS NOTE ADULT - SUBJECTIVE AND OBJECTIVE BOX
Chief complaint - abnormality of gait SP CVA    The patient is an 88 year old female with a history of HTN, hypothyroidism, pacemaker, breast cancer who is admitted with CVA and acute respiratory failure due to IV contrast anaphylaxis. Patient had R sided hemiparesis, expressive aphasia. CT imaging confirmed multiple embolic areas. Pacemaker interrogated showing Atrial runs but no sustained Atrial Fibrillation. Patient currently on course of ASA and statin. Hospital course complicated by severe anaphylactic due to iodine contrast allergy from CT performed on admission to ED. 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      Patient was evaluated by PM&R and therapy for functional deficits and gait/ ADL impairments and recommended acute rehabilitation. Patient was medically optimized for discharge to [facility] on 12/8    ROS/subjective:  patient seen and evaluated bedside  reports need for hearing aids- Noted in drawer bedside  Confused- perseverating  about her belongings in black leather bag but cooperative with exam  last BM 12/8, voiding but incontinent  no dizziness, no headaches, no nausea, no vomiting, no SOB, no Chest pain    MEDICATIONS  (STANDING):  aspirin 325 milliGRAM(s) Oral daily  atorvastatin 80 milliGRAM(s) Oral at bedtime  fluconAZOLE   Tablet 100 milliGRAM(s) Oral daily  lidocaine   4% Patch 1 Patch Transdermal daily  lisinopril 20 milliGRAM(s) Oral daily  melatonin 3 milliGRAM(s) Oral at bedtime    MEDICATIONS  (PRN):  acetaminophen   Oral Liquid .. 650 milliGRAM(s) Oral every 6 hours PRN Temp greater or equal to 38.5C (101.3F), Mild Pain (1 - 3)  labetalol 100 milliGRAM(s) Oral every 6 hours PRN SBP>190                       11.1   7.15  )-----------( 239      ( 09 Dec 2023 08:50 )             35.2     12-09    138  |  105  |  14  ----------------------------<  99  3.5   |  26  |  0.52    Ca    8.5      09 Dec 2023 08:50    TPro  6.0  /  Alb  2.6<L>  /  TBili  0.4  /  DBili  x   /  AST  35  /  ALT  31  /  AlkPhos  75  12-09    Vital Signs Last 24 Hrs  T(C): 36.9 (08 Dec 2023 23:08), Max: 36.9 (08 Dec 2023 23:08)  T(F): 98.5 (08 Dec 2023 23:08), Max: 98.5 (08 Dec 2023 23:08)  HR: 92 (09 Dec 2023 07:30) (84 - 92)  BP: 143/72 (09 Dec 2023 07:30) (135/70 - 143/72)  BP(mean): --  RR: 16 (09 Dec 2023 07:30) (16 - 17)  SpO2: 96% (09 Dec 2023 07:30) (96% - 96%)    Parameters below as of 09 Dec 2023 07:30  Patient On (Oxygen Delivery Method): room air    Constitutional - NAD, Comfortable, confused, impulsive, follows 1 step simple command   HEENT - NCAT, EOMI, AMANDA  Neck - Supple, No limited ROM  Chest - good chest expansion, good respiratory effort, CTAB   Cardio - warm and well perfused, RRR, no murmur  Abdomen -  Soft, NT, ND, (+) BS  Extremities - No peripheral edema, No calf tenderness   Neurologic Exam:                    Cognitive -             Orientation: AAOx 2, knows she had a stroke, oriented to Self- fixates with me about her Black leather bag and its belongings     Speech - Fluent, + dysarthria, No aphasia      Cranial Nerves - (+) right facial asymmetry, Tongue midline, EOMI, Shoulder shrug intact     Motor -                      LEFT    UE - ShAB 5/5, EF 5/5, EE 5/5, WE 5/5,  WNL                    RIGHT UE - ShAB 4-/5, EF 4-/5, EE 3/5, WE 4/5,  4/5                    LEFT    LE - HF 5/5, KE 5/5, DF 5/5, PF 5/5                    RIGHT LE - HF 4/5, KE 4/5, DF 4+/5, PF 4/5        Sensory - (could not perform)     Reflexes - DTR+2, intact  ? Drift RUE  Psychiatric - confuse0- impulsive  Skin on admission: venous stasis ulcer on anterior shin 4cm x 4cm. LLE Chief complaint - abnormality of gait SP CVA    The patient is an 88 year old female with a history of HTN, hypothyroidism, pacemaker, breast cancer who is admitted with CVA and acute respiratory failure due to IV contrast anaphylaxis. Patient had R sided hemiparesis, expressive aphasia. CT imaging confirmed multiple embolic areas. Pacemaker interrogated showing Atrial runs but no sustained Atrial Fibrillation. Patient currently on course of ASA and statin. Hospital course complicated by severe anaphylactic due to iodine contrast allergy from CT performed on admission to ED. 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      Patient was evaluated by PM&R and therapy for functional deficits and gait/ ADL impairments and recommended acute rehabilitation. Patient was medically optimized for discharge to [facility] on 12/8    ROS/subjective:  patient seen and evaluated bedside  reports need for hearing aids- Noted in drawer bedside  Confused- perseverating  about her belongings in black leather bag but cooperative with exam  last BM 12/8, voiding but incontinent  no dizziness, no headaches, no nausea, no vomiting, no SOB, no Chest pain    MEDICATIONS  (STANDING):  aspirin 325 milliGRAM(s) Oral daily  atorvastatin 80 milliGRAM(s) Oral at bedtime  fluconAZOLE   Tablet 100 milliGRAM(s) Oral daily  lidocaine   4% Patch 1 Patch Transdermal daily  lisinopril 20 milliGRAM(s) Oral daily  melatonin 3 milliGRAM(s) Oral at bedtime    MEDICATIONS  (PRN):  acetaminophen   Oral Liquid .. 650 milliGRAM(s) Oral every 6 hours PRN Temp greater or equal to 38.5C (101.3F), Mild Pain (1 - 3)  labetalol 100 milliGRAM(s) Oral every 6 hours PRN SBP>190                       11.1   7.15  )-----------( 239      ( 09 Dec 2023 08:50 )             35.2     12-09    138  |  105  |  14  ----------------------------<  99  3.5   |  26  |  0.52    Ca    8.5      09 Dec 2023 08:50    TPro  6.0  /  Alb  2.6<L>  /  TBili  0.4  /  DBili  x   /  AST  35  /  ALT  31  /  AlkPhos  75  12-09    Vital Signs Last 24 Hrs  T(C): 36.9 (08 Dec 2023 23:08), Max: 36.9 (08 Dec 2023 23:08)  T(F): 98.5 (08 Dec 2023 23:08), Max: 98.5 (08 Dec 2023 23:08)  HR: 92 (09 Dec 2023 07:30) (84 - 92)  BP: 143/72 (09 Dec 2023 07:30) (135/70 - 143/72)  BP(mean): --  RR: 16 (09 Dec 2023 07:30) (16 - 17)  SpO2: 96% (09 Dec 2023 07:30) (96% - 96%)    Parameters below as of 09 Dec 2023 07:30  Patient On (Oxygen Delivery Method): room air    Constitutional - NAD, Comfortable, confused, impulsive, follows 1 step simple command   HEENT - NCAT, EOMI, AMANDA  Neck - Supple, No limited ROM  Chest - good chest expansion, good respiratory effort, CTAB   Cardio - warm and well perfused, RRR, no murmur  Abdomen -  Soft, NT, ND, (+) BS  Extremities - No peripheral edema, No calf tenderness   Neurologic Exam:                    Cognitive -             Orientation: AAOx 2, knows she had a stroke, oriented to Self- fixates with me about her Black leather bag and its belongings     Speech - Fluent, + dysarthria, No aphasia      Cranial Nerves - (+) right facial asymmetry, Tongue midline, EOMI, Shoulder shrug intact     Motor -                      LEFT    UE - ShAB 5/5, EF 5/5, EE 5/5, WE 5/5,  WNL                    RIGHT UE - ShAB 4-/5, EF 4-/5, EE 3/5, WE 4/5,  4/5                    LEFT    LE - HF 5/5, KE 5/5, DF 5/5, PF 5/5                    RIGHT LE - HF 4/5, KE 4/5, DF 4+/5, PF 4/5        Sensory - (could not perform)     Reflexes - DTR+2, intact  ? Drift RUE  Psychiatric - confuse0- impulsive  Skin on admission: venous stasis ulcer on anterior shin 4cm x 4cm. LLE

## 2023-12-09 NOTE — PROGRESS NOTE ADULT - ASSESSMENT
The patient is an 88 year old female with a history of HTN, hypothyroidism, pacemaker, breast cancer who is admitted with CVA and acute respiratory failure due to IV contrast anaphylaxis.CT head (12/4) showed Large left MCA territory acute to early subacute infarction, thrombosis of multiple distal branches of the left MCA and no acute intracranial hemorrhage. Admitted for multidisciplinary rehab program    #L MCA CVA, Right dominant hemiparesis    -Pacemaker interrogated showing Atrial runs but no sustained Atrial Fibrillation   CT head (12/4) showed Large left MCA territory acute to early subacute infarction, thrombosis of multiple distal branches of the left MCA and no acute intracranial hemorrhage.  -ASA 325mg PO QD   -atorvastatin 80mg PO at bedtime   #Comprehensive Multidisciplinary Rehab Program:  - Gait, ADL, Functional impairments  - PT/OT/ SLP 3 hours a day 5 days a week, 1 hour each     #HTN  - Labetalol 100mg PO Q6 PRN for SBP>190  - Lisinopril 20mg PO QD     #Mood / Cognition:  - Neuropsych evaluation PRN    #Sleep:  - Maintain quiet hours and low stim environment  will add melatonin 3mg PRN for sleep    #Pain:  - Tylenol PRN  - lidocaine patch for backpain  - avoid sedating meds that may affect cognitive recovery    #/Bladder:  - Monitor PVR if no void in 8h; SC for >400 cc  - Toileting schedule q4h  - (+) Incontinence     #Diet / Dysphagia:    - Diet: Pureed, Moderately thick liquids  - ongoing SLP assessment  - Nutrition to follow    #Skin/ Pressure Injury Prevention:  - assessment on admission   - Incisions: venous stasis ulcer in L anterior shin  [id number]   - Turn Q2hrs in bed while awake, OOB to Chair, PT/OT/SLP     #DVT prophylaxis:  - ASA 325mg PO QD     #Precautions/ Restrictions  - Falls, Aspiration  - COVID PCR:     --------------------------------------------  Outpatient Follow up:    --------------------------------------------      MEDICAL PROGNOSIS: GOOD            REHAB POTENTIAL: GOOD             ESTIMATED DISPOSITION: HOME WITH HOME CARE            ELOS: 10-14 Days   EXPECTED THERAPY:     P.T. 1hr/day       O.T. 1hr/day      S.L.P. 1hr/day     P&O Unnecessary     EXP FREQUENCY: 5 days per 7 day period  The patient is an 88 year old female with a history of HTN, hypothyroidism, pacemaker, breast cancer who is admitted with CVA and acute respiratory failure due to IV contrast anaphylaxis.CT head (12/4) showed Large left MCA territory acute to early subacute infarction, thrombosis of multiple distal branches of the left MCA and no acute intracranial hemorrhage. Admitted for multidisciplinary rehab program    #L MCA CVA, Right dominant hemiparesis    -Pacemaker interrogated showing Atrial runs but no sustained Atrial Fibrillation   CT head (12/4) showed Large left MCA territory acute to early subacute infarction, thrombosis of multiple distal branches of the left MCA and no acute intracranial hemorrhage.  -ASA 325mg PO QD   -atorvastatin 80mg PO at bedtime   #Comprehensive Multidisciplinary Rehab Program:  - Gait, ADL, Functional impairments  - PT/OT/ SLP 3 hours a day 5 days a week, 1 hour each     #HTN  - Labetalol 100mg PO Q6 PRN for SBP>190  - Lisinopril 20mg PO QD     #Mood / Cognition:  - Neuropsych evaluation PRN    #Sleep:  - Maintain quiet hours and low stim environment  will add melatonin 3mg PRN for sleep    #Pain:  - Tylenol PRN  - lidocaine patch for backpain  - avoid sedating meds that may affect cognitive recovery    #/Bladder:  - Monitor PVR if no void in 8h; SC for >400 cc  - Toileting schedule q4h  - (+) Incontinence     #Diet / Dysphagia:    - Diet: Pureed, Moderately thick liquids  - ongoing SLP assessment  - Nutrition to follow    #Skin/ Pressure Injury Prevention:  - assessment on admission   - Incisions: venous stasis ulcer in L anterior shin  [id number]   - Turn Q2hrs in bed while awake, OOB to Chair, PT/OT/SLP     #DVT prophylaxis:  - ASA 325mg PO QD     #Precautions/ Restrictions  - Falls, Aspiration  - COVID PCR:     --------------------------------------------  Outpatient Follow up:    --------------------------------------------      MEDICAL PROGNOSIS: GOOD            REHAB POTENTIAL: GOOD             ESTIMATED DISPOSITION: HOME WITH HOME CARE            ELOS: 10-14 Days   EXPECTED THERAPY:     P.T. 1hr/day       O.T. 1hr/day      S.L.P. 1hr/day     P&O Unnecessary     EXP FREQUENCY: 5 days per 7 day period  The patient is an 88 year old female with a history of HTN, hypothyroidism, pacemaker, breast cancer who is admitted with CVA and acute respiratory failure due to IV contrast anaphylaxis.CT head (12/4) showed Large left MCA territory acute to early subacute infarction, thrombosis of multiple distal branches of the left MCA and no acute intracranial hemorrhage. Admitted for multidisciplinary rehab program    #L MCA CVA, Right dominant hemiparesis    -Pacemaker interrogated showing Atrial runs but no sustained Atrial Fibrillation   CT head (12/4) showed Large left MCA territory acute to early subacute infarction, thrombosis of multiple distal branches of the left MCA and no acute intracranial hemorrhage.  -ASA 325mg PO QD   -atorvastatin 80mg PO at bedtime   #Comprehensive Multidisciplinary Rehab Program:  - Gait, ADL, Functional impairments  - PT/OT/ SLP 3 hours a day 5 days a week, 1 hour each     #HTN  - Labetalol 100mg PO Q6 PRN for SBP>190  - Lisinopril 20mg PO QD     #Mood / Cognition:  - Neuropsych evaluation PRN    #Sleep:  - Maintain quiet hours and low stim environment   melatonin 3mg HSfor sleep    #Pain:  - Tylenol PRN  - lidocaine patch for backpain  - avoid sedating meds that may affect cognitive recovery    #/Bladder:  - Monitor PVR if no void in 8h; SC for >400 cc  - Toileting schedule q4h  - (+) Incontinence     #Diet / Dysphagia:    - Diet: Pureed, Moderately thick liquids  - ongoing SLP assessment  - Nutrition to follow    #Skin/ Pressure Injury Prevention:  - assessment on admission   - Incisions: venous stasis ulcer in L anterior shin  [id number]   - Turn Q2hrs in bed while awake, OOB to Chair, PT/OT/SLP     #DVT prophylaxis:  - ASA 325mg PO QD     #Precautions/ Restrictions  - Falls, Aspiration  - COVID PCR:     --------------------------------------------  Outpatient Follow up:    --------------------------------------------      MEDICAL PROGNOSIS: GOOD            REHAB POTENTIAL: GOOD             ESTIMATED DISPOSITION: HOME WITH HOME CARE            ELOS: 10-14 Days   EXPECTED THERAPY:     P.T. 1hr/day       O.T. 1hr/day      S.L.P. 1hr/day     P&O Unnecessary     EXP FREQUENCY: 5 days per 7 day period  The patient is an 88 year old female with a history of HTN, hypothyroidism, pacemaker, breast cancer who is admitted with CVA and acute respiratory failure due to IV contrast anaphylaxis.CT head (12/4) showed Large left MCA territory acute to early subacute infarction, thrombosis of multiple distal branches of the left MCA and no acute intracranial hemorrhage. Admitted for multidisciplinary rehab program    #L MCA CVA, Right dominant hemiparesis    -Pacemaker interrogated showing Atrial runs but no sustained Atrial Fibrillation   CT head (12/4) showed Large left MCA territory acute to early subacute infarction, thrombosis of multiple distal branches of the left MCA and no acute intracranial hemorrhage.  -ASA 325mg PO QD   -atorvastatin 80mg PO at bedtime   #Comprehensive Multidisciplinary Rehab Program:  - Gait, ADL, Functional impairments  - PT/OT/ SLP 3 hours a day 5 days a week, 1 hour each     #HTN  - Labetalol 100mg PO Q6 PRN for SBP>190  - Lisinopril 20mg PO QD     #Mood / Cognition:  - Neuropsych evaluation PRN    #Sleep:  - Maintain quiet hours and low stim environment   melatonin 3mg HSfor sleep    #Pain:  - Tylenol PRN  - lidocaine patch for backpain  - avoid sedating meds that may affect cognitive recovery    #/Bladder:  - Monitor PVR if no void in 8h; SC for >400 cc  - Toileting schedule q4h  - (+) Incontinence     #Diet / Dysphagia:    - Diet: Pureed, Moderately thick liquids  - ongoing SLP assessment  - Nutrition to follow    #Skin/ Pressure Injury Prevention:  - assessment on admission   - Incisions: venous stasis ulcer in L anterior shin  [id number]   - Turn Q2hrs in bed while awake, OOB to Chair, PT/OT/SLP     #DVT prophylaxis:  - ASA 325mg PO QD     #Precautions/ Restrictions  - Falls, Aspiration  - COVID PCR:     --------------------------------------------  Outpatient Follow up:    --------------------------------------------      MEDICAL PROGNOSIS: GOOD            REHAB POTENTIAL: GOOD             ESTIMATED DISPOSITION: HOME WITH HOME CARE            ELOS: 10-14 Days   EXPECTED THERAPY:     P.T. 1hr/day       O.T. 1hr/day      S.L.P. 1hr/day     P&O Unnecessary     EXP FREQUENCY: 5 days per 7 day period

## 2023-12-10 PROCEDURE — 99232 SBSQ HOSP IP/OBS MODERATE 35: CPT | Mod: GC

## 2023-12-10 PROCEDURE — 99232 SBSQ HOSP IP/OBS MODERATE 35: CPT

## 2023-12-10 RX ORDER — LACTOBACILLUS ACIDOPHILUS 100MM CELL
1 CAPSULE ORAL ONCE
Refills: 0 | Status: COMPLETED | OUTPATIENT
Start: 2023-12-10 | End: 2023-12-10

## 2023-12-10 RX ORDER — INFLUENZA VIRUS VACCINE 15; 15; 15; 15 UG/.5ML; UG/.5ML; UG/.5ML; UG/.5ML
0.7 SUSPENSION INTRAMUSCULAR ONCE
Refills: 0 | Status: DISCONTINUED | OUTPATIENT
Start: 2023-12-10 | End: 2023-12-13

## 2023-12-10 RX ORDER — FLUCONAZOLE 150 MG/1
100 TABLET ORAL DAILY
Refills: 0 | Status: DISCONTINUED | OUTPATIENT
Start: 2023-12-10 | End: 2023-12-13

## 2023-12-10 RX ADMIN — LISINOPRIL 20 MILLIGRAM(S): 2.5 TABLET ORAL at 07:09

## 2023-12-10 RX ADMIN — Medication 325 MILLIGRAM(S): at 11:18

## 2023-12-10 RX ADMIN — Medication 1 TABLET(S): at 21:40

## 2023-12-10 RX ADMIN — Medication 3 MILLIGRAM(S): at 21:39

## 2023-12-10 RX ADMIN — FLUCONAZOLE 100 MILLIGRAM(S): 150 TABLET ORAL at 11:18

## 2023-12-10 RX ADMIN — LIDOCAINE 1 PATCH: 4 CREAM TOPICAL at 19:13

## 2023-12-10 RX ADMIN — LIDOCAINE 1 PATCH: 4 CREAM TOPICAL at 11:19

## 2023-12-10 RX ADMIN — ATORVASTATIN CALCIUM 80 MILLIGRAM(S): 80 TABLET, FILM COATED ORAL at 21:40

## 2023-12-10 RX ADMIN — Medication 650 MILLIGRAM(S): at 13:15

## 2023-12-10 NOTE — PROGRESS NOTE ADULT - SUBJECTIVE AND OBJECTIVE BOX
Patient is a 88y old  Female who presents with a chief complaint of Large left MCA ischemic stroke with right dominant hemiparesis (09 Dec 2023 16:24)      Subjective and overnight events:  Patient seen and examined at bedside.  no complaints. no fever, chills, sob, cp, abd pain.     ALLERGIES:  IV Contrast (Anaphylaxis; Urticaria)  penicillin (Rash)    MEDICATIONS  (STANDING):  aspirin 325 milliGRAM(s) Oral daily  atorvastatin 80 milliGRAM(s) Oral at bedtime  fluconAZOLE   Tablet 100 milliGRAM(s) Oral daily  lidocaine   4% Patch 1 Patch Transdermal daily  lisinopril 20 milliGRAM(s) Oral daily  melatonin 3 milliGRAM(s) Oral at bedtime    MEDICATIONS  (PRN):  acetaminophen   Oral Liquid .. 650 milliGRAM(s) Oral every 6 hours PRN Temp greater or equal to 38.5C (101.3F), Mild Pain (1 - 3)  labetalol 100 milliGRAM(s) Oral every 6 hours PRN SBP>190    Vital Signs Last 24 Hrs  T(F): 98.4 (10 Dec 2023 08:00), Max: 98.4 (10 Dec 2023 08:00)  HR: 72 (10 Dec 2023 08:00) (72 - 72)  BP: 122/72 (10 Dec 2023 08:00) (122/72 - 138/76)  RR: 16 (10 Dec 2023 08:00) (16 - 18)  SpO2: 95% (10 Dec 2023 08:00) (95% - 97%)  I&O's Summary    09 Dec 2023 07:01  -  10 Dec 2023 07:00  --------------------------------------------------------  IN: 0 mL / OUT: 2 mL / NET: -2 mL      PHYSICAL EXAM:  General: NAD, A/O x 2  ENT: MMM. + thrush   Neck: Supple, No JVD  Lungs: Clear to auscultation bilaterally  Cardio: RRR, S1/S2, No murmurs  Abdomen: Soft, Nontender, Nondistended; Bowel sounds present  Extremities: No calf tenderness, No pitting edema + L lower leg ulcer     LABS:                        11.1   7.15  )-----------( 239      ( 09 Dec 2023 08:50 )             35.2     12-09    138  |  105  |  14  ----------------------------<  99  3.5   |  26  |  0.52    Ca    8.5      09 Dec 2023 08:50    TPro  6.0  /  Alb  2.6  /  TBili  0.4  /  DBili  x   /  AST  35  /  ALT  31  /  AlkPhos  75  12-09        12-04 Chol 213 mg/dL LDL -- HDL 68 mg/dL Trig 108 mg/dL        Urinalysis Basic - ( 09 Dec 2023 08:50 )    Color: x / Appearance: x / SG: x / pH: x  Gluc: 99 mg/dL / Ketone: x  / Bili: x / Urobili: x   Blood: x / Protein: x / Nitrite: x   Leuk Esterase: x / RBC: x / WBC x   Sq Epi: x / Non Sq Epi: x / Bacteria: x            RADIOLOGY & ADDITIONAL TESTS:    Care Discussed with Consultants/Other Providers:

## 2023-12-10 NOTE — PATIENT PROFILE ADULT - FALL HARM RISK - HARM RISK INTERVENTIONS
Assistance with ambulation/Assistance OOB with selected safe patient handling equipment/Communicate Risk of Fall with Harm to all staff/Discuss with provider need for PT consult/Monitor gait and stability/Provide patient with walking aids - walker, cane, crutches/Reinforce activity limits and safety measures with patient and family/Tailored Fall Risk Interventions/Use of alarms - bed, chair and/or voice tab/Visual Cue: Yellow wristband and red socks/Bed in lowest position, wheels locked, appropriate side rails in place/Call bell, personal items and telephone in reach/Instruct patient to call for assistance before getting out of bed or chair/Non-slip footwear when patient is out of bed/[place] to call system/Physically safe environment - no spills, clutter or unnecessary equipment/Purposeful Proactive Rounding/Room/bathroom lighting operational, light cord in reach Assistance with ambulation/Assistance OOB with selected safe patient handling equipment/Communicate Risk of Fall with Harm to all staff/Discuss with provider need for PT consult/Monitor gait and stability/Provide patient with walking aids - walker, cane, crutches/Reinforce activity limits and safety measures with patient and family/Tailored Fall Risk Interventions/Use of alarms - bed, chair and/or voice tab/Visual Cue: Yellow wristband and red socks/Bed in lowest position, wheels locked, appropriate side rails in place/Call bell, personal items and telephone in reach/Instruct patient to call for assistance before getting out of bed or chair/Non-slip footwear when patient is out of bed/[place] to call system/Physically safe environment - no spills, clutter or unnecessary equipment/Purposeful Proactive Rounding/Room/bathroom lighting operational, light cord in reach

## 2023-12-10 NOTE — PATIENT PROFILE ADULT - FUNCTIONAL ASSESSMENT - BASIC MOBILITY 6.
2-calculated by average/Not able to assess (calculate score using [facility] averaging method)  2-calculated by average/Not able to assess (calculate score using [facility] averaging method)

## 2023-12-10 NOTE — PROGRESS NOTE ADULT - SUBJECTIVE AND OBJECTIVE BOX
Chief complaint - abnormality of gait SP CVA    The patient is an 88 year old female with a history of HTN, hypothyroidism, pacemaker, breast cancer who is admitted with CVA and acute respiratory failure due to IV contrast anaphylaxis. Patient had R sided hemiparesis, expressive aphasia. CT imaging confirmed multiple embolic areas. Pacemaker interrogated showing Atrial runs but no sustained Atrial Fibrillation. Patient currently on course of ASA and statin. Hospital course complicated by severe anaphylactic due to iodine contrast allergy from CT performed on admission to ED. 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      Patient was evaluated by PM&R and therapy for functional deficits and gait/ ADL impairments and recommended acute rehabilitation. Patient was medically optimized for discharge to [facility] on 12/8    ROS/subjective:  patient seen and evaluated bedside  slept well overnight - no agitation noted by nursing  moved bowels 12/9- fecally incontinent  no dizziness, no headaches, no nausea, no vomiting, no SOB, no Chest pain    MEDICATIONS  (STANDING):  aspirin 325 milliGRAM(s) Oral daily  atorvastatin 80 milliGRAM(s) Oral at bedtime  fluconAZOLE   Tablet 100 milliGRAM(s) Oral daily  influenza  Vaccine (HIGH DOSE) 0.7 milliLiter(s) IntraMuscular once  lidocaine   4% Patch 1 Patch Transdermal daily  lisinopril 20 milliGRAM(s) Oral daily  melatonin 3 milliGRAM(s) Oral at bedtime    MEDICATIONS  (PRN):  acetaminophen   Oral Liquid .. 650 milliGRAM(s) Oral every 6 hours PRN Temp greater or equal to 38.5C (101.3F), Mild Pain (1 - 3)  labetalol 100 milliGRAM(s) Oral every 6 hours PRN SBP>190                            11.1   7.15  )-----------( 239      ( 09 Dec 2023 08:50 )             35.2     12-09    138  |  105  |  14  ----------------------------<  99  3.5   |  26  |  0.52    Ca    8.5      09 Dec 2023 08:50    TPro  6.0  /  Alb  2.6<L>  /  TBili  0.4  /  DBili  x   /  AST  35  /  ALT  31  /  AlkPhos  75  12-09    Urinalysis Basic - ( 09 Dec 2023 08:50 )    Color: x / Appearance: x / SG: x / pH: x  Gluc: 99 mg/dL / Ketone: x  / Bili: x / Urobili: x   Blood: x / Protein: x / Nitrite: x   Leuk Esterase: x / RBC: x / WBC x   Sq Epi: x / Non Sq Epi: x / Bacteria: x        CAPILLARY BLOOD GLUCOSE          Vital Signs Last 24 Hrs  T(C): 36.9 (10 Dec 2023 08:00), Max: 36.9 (10 Dec 2023 08:00)  T(F): 98.4 (10 Dec 2023 08:00), Max: 98.4 (10 Dec 2023 08:00)  HR: 72 (10 Dec 2023 08:00) (72 - 72)  BP: 122/72 (10 Dec 2023 08:00) (122/72 - 138/76)  BP(mean): --  RR: 16 (10 Dec 2023 08:00) (16 - 18)  SpO2: 95% (10 Dec 2023 08:00) (95% - 97%)    Parameters below as of 10 Dec 2023 08:00  Patient On (Oxygen Delivery Method): room air      Constitutional - NAD, Comfortable, less confused this AM  HEENT - NCAT, EOMI, AMANDA  Neck - Supple, No limited ROM  Chest - good chest expansion, good respiratory effort, CTAB   Cardio - warm and well perfused, RRR, no murmur  Abdomen -  Soft, NT, ND, (+) BS  Extremities - No peripheral edema, No calf tenderness   Neurologic Exam:                    Cognitive -             Orientation: AAOx 2, knows she had a stroke, oriented to Self-      Speech - Fluent, + dysarthria, No aphasia      Cranial Nerves - (+) right facial asymmetry, Tongue midline, EOMI, Shoulder shrug intact     Motor -                      LEFT    UE - ShAB 5/5, EF 5/5, EE 5/5, WE 5/5,  WNL                    RIGHT UE - ShAB 4-/5, EF 4-/5, EE 3/5, WE 4/5,  4/5                    LEFT    LE - HF 5/5, KE 5/5, DF 5/5, PF 5/5                    RIGHT LE - HF 4/5, KE 4/5, DF 4+/5, PF 4/5        Sensory - intact to lt touch all ext     Reflexes - DTR+2, intact  ? Drift RUE  Skin on admission: venous stasis ulcer on anterior shin 4cm x 4cm. LLE Chief complaint - abnormality of gait SP CVA    The patient is an 88 year old female with a history of HTN, hypothyroidism, pacemaker, breast cancer who is admitted with CVA and acute respiratory failure due to IV contrast anaphylaxis. Patient had R sided hemiparesis, expressive aphasia. CT imaging confirmed multiple embolic areas. Pacemaker interrogated showing Atrial runs but no sustained Atrial Fibrillation. Patient currently on course of ASA and statin. Hospital course complicated by severe anaphylactic due to iodine contrast allergy from CT performed on admission to ED. 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      Patient was evaluated by PM&R and therapy for functional deficits and gait/ ADL impairments and recommended acute rehabilitation. Patient was medically optimized for discharge to [facility] on 12/8    ROS/subjective:  patient seen and evaluated bedside  slept well overnight - no agitation noted by nursing  moved bowels 12/9- fecally incontinent  no dizziness, no headaches, no nausea, no vomiting, no SOB, no Chest pain    MEDICATIONS  (STANDING):  aspirin 325 milliGRAM(s) Oral daily  atorvastatin 80 milliGRAM(s) Oral at bedtime  fluconAZOLE   Tablet 100 milliGRAM(s) Oral daily  influenza  Vaccine (HIGH DOSE) 0.7 milliLiter(s) IntraMuscular once  lidocaine   4% Patch 1 Patch Transdermal daily  lisinopril 20 milliGRAM(s) Oral daily  melatonin 3 milliGRAM(s) Oral at bedtime    MEDICATIONS  (PRN):  acetaminophen   Oral Liquid .. 650 milliGRAM(s) Oral every 6 hours PRN Temp greater or equal to 38.5C (101.3F), Mild Pain (1 - 3)  labetalol 100 milliGRAM(s) Oral every 6 hours PRN SBP>190                            11.1   7.15  )-----------( 239      ( 09 Dec 2023 08:50 )             35.2     12-09    138  |  105  |  14  ----------------------------<  99  3.5   |  26  |  0.52    Ca    8.5      09 Dec 2023 08:50    TPro  6.0  /  Alb  2.6<L>  /  TBili  0.4  /  DBili  x   /  AST  35  /  ALT  31  /  AlkPhos  75  12-09    Urinalysis Basic - ( 09 Dec 2023 08:50 )    Color: x / Appearance: x / SG: x / pH: x  Gluc: 99 mg/dL / Ketone: x  / Bili: x / Urobili: x   Blood: x / Protein: x / Nitrite: x   Leuk Esterase: x / RBC: x / WBC x   Sq Epi: x / Non Sq Epi: x / Bacteria: x        CAPILLARY BLOOD GLUCOSE          Vital Signs Last 24 Hrs  T(C): 36.9 (10 Dec 2023 08:00), Max: 36.9 (10 Dec 2023 08:00)  T(F): 98.4 (10 Dec 2023 08:00), Max: 98.4 (10 Dec 2023 08:00)  HR: 72 (10 Dec 2023 08:00) (72 - 72)  BP: 122/72 (10 Dec 2023 08:00) (122/72 - 138/76)  BP(mean): --  RR: 16 (10 Dec 2023 08:00) (16 - 18)  SpO2: 95% (10 Dec 2023 08:00) (95% - 97%)    Parameters below as of 10 Dec 2023 08:00  Patient On (Oxygen Delivery Method): room air      Constitutional - NAD, Comfortable, less confused this AM  HEENT - NCAT, EOMI, AMANDA  Neck - Supple, No limited ROM  Chest - good chest expansion, good respiratory effort, CTAB   Cardio - warm and well perfused, RRR, no murmur  Abdomen -  Soft, NT, ND, (+) BS  Extremities - No peripheral edema, No calf tenderness   Neurologic Exam:                    Cognitive -             Orientation: AAOx 2, knows she had a stroke, oriented to Self-      Speech - Fluent, + dysarthria, No aphasia      Cranial Nerves - (+) right facial asymmetry, Tongue midline, EOMI, Shoulder shrug intact     Motor -                      LEFT    UE - ShAB 5/5, EF 5/5, EE 5/5, WE 5/5,  WNL                    RIGHT UE - ShAB 4-/5, EF 4-/5, EE 3/5, WE 4/5,  4/5                    LEFT    LE - HF 5/5, KE 5/5, DF 5/5, PF 5/5                    RIGHT LE - HF 4/5, KE 4/5, DF 4+/5, PF 4/5        Sensory - intact to lt touch all ext     Reflexes - DTR+2, intact  ? Drift RUE  Skin on admission: venous stasis ulcer on anterior shin 4cm x 4cm. LLE

## 2023-12-10 NOTE — PATIENT PROFILE ADULT - CENTRAL VENOUS CATHETER/PICC LINE
Dear Nita,     Thank you for connecting with us today on the [facility] service of [facility]. If you have any questions after your visit, please feel free to contact us at [phone]. If you are experiencing a medical emergency, call 911 immediately.  If your symptoms worsen or do not improve, please contact your primary care provider to schedule an in-person visit. Symptoms that require immediate attention require a visit at Urgent Care ([place]), Im[facility] Care Center ([place]) or the Emergency Room of a nearby hospital.    If you have any billing questions please call the billing department.    Phone: [phone]. Hours: Mon. - Thu. 7:30 a.m. - 6 p.m. and Friday 7:30 p.m. - 5 p.m.     Sinusitis/Sinus Infection is a very common problem that is also caused by a virus. Mucous and swelling can block the spaces above and below the eyes. This blockage contributes to the sinus inflammation and tenderness. Very often sinus infections clear up on their own. The typical virus will last 7-10 days. Antibiotics will not help a viral infection but there are several OTC medications that will help with symptoms. If symptoms worsen an in-person visit is needed. If symptoms are no better after 10 days return to [facility] [employer] for a follow up visit.       Contact your employee health or human resources department for further guidance on employer specific quarantine and return to work guidelines. If your school age child has symptoms of COVID or has had a COVID exposure, contact your child's school as they may have additional instructions and requirements to return to school.  _________________________________________________________________________________________________________________________________________    COVID Treatment:  There are two oral antivirals approved by the FDA for emergency use, Paxlovid and Molnupiravir.     Paxlovid is an oral treatment available for mild-moderate  COVID-19 infections in patients at high risk of progression to severe COVID-19. This medication has been shown to reduce hospitalization and death by 88%.    Notify your healthcare immediately if,  you have renal disease or impairment, you will receive a lower dose.   you did not list all medications you were taking at the time of your visit.There are several medications that interact with Paxlovid.     Instructions for use:     Paxlovid blister pack contains two medications Nirmatrelvir 300 mg (two 150 mg tablets) with Ritonavir 100 mg, all three tablets taken together twice daily x 5 days.   Missed dose: If a dose is missed within 8 hours of usual administration time, the missed dose should be administered as soon as possible, and normal dosing schedule should resume. If a dose is missed by more than 8 hours, the missed dose should not be administered, and dosing should resume at the next scheduled administration time. Do not double the dose to make up for a missed dose.     Side effects:   Diarrhea, HTN, myalgia, taste disturbance.      Molnupiravir is another oral treatment available providing reduction of hospitalization and death by 30%.    Notify your healthcare immediately if,  you think you may be pregnant. This medication is not recommended for those that are pregnant or breastfeeding.     Instructions for use: Dosing is 800 mg (four 200 mg capsules) twice daily x 5 days.  Missed dose: If a dose is missed within 10 hours of usual administration time, administer the missed dose as soon as possible, and resume normal dosing schedule. If a dose is missed by more than 10 hours, do not administer the missed dose, and resume dosing at the next scheduled administration time. Do not double the dose to make up for a missed dose.   Females need to use contraception during and + 4 days after treatment.   Males need to use condoms during and + 3 months after treatment.      Side effects: Diarrhea, nausea and  dizziness.    You will find the full Emergency use authorization for each of these medications in this document.       __________________________________________________________________________________________________________________________________________  Quarantine Guidelines:     COVID-19 EXPOSURE:    IF YOU:    1. ARE NOT VACCINATED OR    2. Completed the Pfizer or Moderna series MORE THAN 6 months ago without booster OR    3. Completed the J&J vaccine MORE THAN 2 months ago without booster:    · Isolate for 5 days    · Wear a mask around others on Day 6-10.    · Get tested on Day 5.    · Get tested and isolate immediately if experiencing COVID-19 symptoms.    *If you are not able to isolate from the person with COVID-19, (a family member or you are a caregiver), your quarantine would start when their isolation period ends.    IF YOU:    1. Have been boosted OR    2. Completed the Pfizer or Moderna series within the last 6 months OR    3. Completed the J&J vaccine within the last 2 months:    · Wear a mask around others for 10 days    · Get tested on Day 5.    · Get tested and isolate immediately if experiencing COVID-19 symptoms.      COVID-19 Symptoms:   People with COVID-19 have had a wide range of symptoms reported - from mild symptoms to severe illness. Symptoms may appear 2-14 days after exposure to the virus. Symptoms may include any of the following:  (Any person with symptoms of COVID-19 should be tested regardless of vaccination status).         • Fever or chills      • Cough      • Shortness of breath or difficulty breathing      • Fatigue      • Muscle or body aches      • Headache      • New loss of taste or smell      • Sore throat      • Congestion or runny nose      • Nausea or vomiting      • Diarrhea      POSITIVE TESTS RESULTS:    You cannot return to normal public activities (work, school, etc.) until ALL CDC return to work criteria are met:    · 5 days since symptoms first appeared  and    · After Day 5: You can leave your house wearing a mask around others if you have no symptoms or symptoms are resolving with no fever.    · Continue to social distance and wash your hands.    *Loss of taste and smell may persist for weeks or months after recovery and need not delay the end of isolation    NEGATIVE TEST RESULTS:    You cannot return to normal public activities (work, school, etc) until ALL CDC return to work criteria are met:    · 5 days since symptoms first appeared and    · After Day 5: You can leave your house wearing a mask around others if you have no symptoms or symptoms are resolving with no fever.    · Continue to social distance and wash your hands.    *Loss of taste and smell may persist for weeks or months after recovery and need not delay the end of isolation    __________________________________________________________________________________________________________________________________________     Symptoms Management:     Instructions for Adults:     • Take Mucinex (guaifenesin) 600 mg twice daily, this is an expectorant which means it thins the mucus/phlegm so one can blow, cough or spit mucus/phlegm out better.    • Take Flonase nasal spray 2 sprays in each nostril daily for 2 weeks for nasal congestion.     • If needed you may take Sudafed as instructed on the package, if sinus pressure is present (do not take if you have history of high blood pressure/hypertension, are pregnant or breastfeeding).      • If needed you may take Tylenol every 4 hours as needed for fever or aches (Do not exceed 4,000mg in 24 hours).    • Drink Warm tea with honey.    • Salt water gargles and throat lozenges for sore throat.    Instructions for Children:       • Give Tylenol or ibuprofen as needed for fever.    • Do not give ibuprofen if your child is not drinking enough fluids or are throwing up a lot.   • Use Nose Gabbie and nasal saline to help suction mucus.  • Salt water gargles if able to  follow instructions.  • Throat lozenges may be given to children 4 and over for a sore throat. Follow package instructions.   • Do not give over the counter cough and cold medications to children under 6 years of age. These medications can have serious side effects.    • Use mentholated rubs over the chest and front of neck. This can help soothe a cough.    • Honey can also help a cough (do not given to babies under 1 year of age):   1-5 years old give ½ teaspoon.    6-11 years old give 1 teaspoon.   12 and older give 2 teaspoons of honey.        Instructions for Everyone:     • Drink lots of fluids.     • Use a cool mist Humidifier.     • Use a warm moist compress to sinuses 4-6 times daily to help facilitate sinus drainage.      • Sterile saline rinses 3 times daily (nasal saline spray)    • Cover coughs or sneezes.      • Practice good hand hygiene.        If symptoms worsen an in-person visit is needed. If symptoms are no better after 10 days return to [facility] for a follow up visit.           Medication refills: If you were prescribed a medication today please be aware that the [facility] providers are not able to provide refills on these medications. If you require a refill, please contact your primary care provider. If you do not have a primary care provider please call [phone] and we can schedule a visit with an [facility] primary care provider.      Billing department:    Call with any billing questions or concerns.    Phone: [phone]. Hours: Mon. - Thu. 7:30 a.m. - 6 p.m. and Friday 7:30 p.m. - 5 p.m.        Thank you for connecting with us today on the [facility] service of [facility]. If you have any questions after your visit, please feel free to contact us at [phone]. If you are experiencing a medical emergency, call [phone] immediately.  If your symptoms worsen or do not improve, please contact your primary care provider to  schedule an in-person visit. Symptoms that require immediate attention require a visit at Urgent Care ([place]), [facility] ([place]) or the Emergency Room of a nearby hospital.         _________________________________________________________________________________________________________________________________      Fact Sheet for Patients And Caregivers      FACT SHEET FOR PATIENTS, PARENTS, AND CAREGIVERS  EMERGENCY USE AUTHORIZATION (EUA) OF PAXLOVID    FOR CORONAVIRUS DISEASE 2019 (COVID-19)  You are being given this Fact Sheet because your healthcare provider believes it is necessary to provide you with PAXLOVID for the treatment of mild-to-moderate  coronavirus disease (COVID-19) caused by the SARS-CoV-2 virus. This Fact Sheet contains information to help you understand the risks and benefits of taking the  PAXLOVID you have received or may receive.    The U.S. Food and Drug Administration (FDA) has issued an Emergency Use Authorization (EUA) to make PAXLOVID available during the COVID-19 pandemic (for  more details about an EUA please see “What is an Emergency Use Authorization?” at the end of this document). PAXLOVID is not an FDA-approved medicine in the  [place]. Read this Fact Sheet for information about PAXLOVID. Talk to your healthcare provider about your options or if you have any questions. It is your choice to  take PAXLOVID.    What is COVID-19?  COVID-19 is caused by a virus called a coronavirus. You can get COVID-19 through close contact with another person who has the virus.COVID-19 illnesses have ranged from very mild-to-severe, including illness resulting in death. While information so far suggests that most COVID-19 illness is mild, serious illness can happen and may cause some of your other medical conditions to become worse. Older people and people of all ages with severe, long lasting (chronic) medical conditions like heart disease, lung disease, and diabetes, for example seem to  be at higher risk of being hospitalized for COVID-19.    What is PAXLOVID?  PAXLOVID is an investigational medicine used to treat mild-to-moderate COVID-19 in adults and children [12 years of age and older weighing at least 88 pounds (40 kg)] with  positive results of direct SARS-CoV-2 viral testing, and who are at high risk for progression to severe COVID-19, including hospitalization or death. PAXLOVID is  investigational because it is still being studied. There is limited information about the safety and effectiveness of using PAXLOVID to treat people with mild-to-moderate  COVID-19.    The FDA has authorized the emergency use of PAXLOVID for the treatment of mild-tomoderate COVID-19 in adults and children [12 years of age and older weighing at least  88 pounds (40 kg)] with a positive test for the virus that causes COVID-19, and who are at high risk for progression to severe COVID-19, including hospitalization or death,  under an EUA.       What should I tell my healthcare provider before I take PAXLOVID?    Tell your healthcare provider if you:  • Have any allergies  • Have liver or kidney disease  • Are pregnant or plan to become pregnant  • Are breastfeeding a child  • Have any serious illnesses  Tell your healthcare provider about all the medicines you take, including prescription and over-the-counter medicines, vitamins, and herbal supplements.  Some medicines may interact with PAXLOVID and may cause serious side effects.Keep a list of your medicines to show your healthcare provider and pharmacist when  you get a new medicine.    You can ask your healthcare provider or pharmacist for a list of medicines that interact with PAXLOVID. Do not start taking a new medicine without telling your  healthcare provider. Your healthcare provider can tell you if it is safe to take PAXLOVID with other medicines.    Tell your healthcare provider if you are taking combined hormonal contraceptive. PAXLOVID may affect how  your birth control pills work. Females who are able to  become pregnant should use another effective alternative form of contraception or an additional barrier method of contraception. Talk to your healthcare provider if you have  any questions about contraceptive methods that might be right for you.    How do I take PAXLOVID?  • PAXLOVID consists of 2 medicines: nirmatrelvir and ritonavir.  o Take 2 pink tablets of nirmatrelvir with 1 white tablet of ritonavir by mouth 2 times each day (in the morning and in the evening) for 5 days. For each dose, take all 3 tablets at the same time.  o If you have kidney disease, talk to your healthcare provider. You may need a different dose.  • Swallow the tablets whole. Do not chew, break, or crush the tablets.  • Take PAXLOVID with or without food.  • Do not stop taking PAXLOVID without talking to your healthcare provider, even if you feel better.  • If you miss a dose of PAXLOVID within 8 hours of the time it is usually taken, take it as soon as you remember. If you miss a dose by more than 8 hours, skip the missed dose and take the next dose at your regular time. Do not take 2 doses of PAXLOVID at the same time.  • If you take too much PAXLOVID, call your healthcare provider or go to the nearest hospital emergency room right away.  • If you are taking a ritonavir- or cobicistat-containing medicine to treat hepatitis C or Human Immunodeficiency Virus (HIV), you should continue to take your medicine as prescribed by your healthcare provider.       Talk to your healthcare provider if you do not feel better or if you feel worse after 5 days.  Who should generally not take PAXLOVID?  Do not take PAXLOVID if:  • You are allergic to nirmatrelvir, ritonavir, or any of the ingredients in PAXLOVID.  • You are taking any of the following medicines:  o Alfuzosin  o Pethidine, piroxicam, propoxyphene  o Ranolazine  o Amiodarone, dronedarone, flecainide, propafenone, quinidine  o  Colchicine  o Lurasidone, pimozide, clozapine  o Dihydroergotamine, ergotamine, methylergonovine  o Lovastatin, simvastatin  o Sildenafil (Revatio®) for pulmonary arterial hypertension (PAH)  o Triazolam, oral midazolam  o Apalutamide  o Carbamazepine, phenobarbital, phenytoin  o Rifampin  o Gavi’s Wort (hypericum perforatum)    Taking PAXLOVID with these medicines may cause serious or life-threatening side  effects or affect how PAXLOVID works.    These are not the only medicines that may cause serious side effects if taken with PAXLOVID. PAXLOVID may increase or decrease the levels of multiple other  medicines. It is very important to tell your healthcare provider about all of the medicines you are taking because additional laboratory tests or changes in the dose of your other  medicines may be necessary while you are taking PAXLOVID. Your healthcare provider may also tell you about specific symptoms to watch out for that may indicate that you  need to stop or decrease the dose of some of your other medicines.    What are the important possible side effects of PAXLOVID?  Possible side effects of PAXLOVID are:  • Liver Problems. Tell your healthcare provider right away if you have any of these signs and symptoms of liver problems: loss of appetite, yellowing of your  skin and the whites of eyes (jaundice), dark-colored urine, pale colored stools and itchy skin, stomach area (abdominal) pain.    • Resistance to HIV Medicines. If you have untreated HIV infection, PAXLOVID may lead to some HIV medicines not working as well in the future.      • Other possible side effects include:  o altered sense of taste  o diarrhea  o high blood pressure  o muscle aches    These are not all the possible side effects of PAXLOVID. Not many people have taken PAXLOVID. Serious and unexpected side effects may happen. PAXLOVID is still being  studied, so it is possible that all of the risks are not known at this time.    What other  treatment choices are there?  Like PAXLOVID, FDA may allow for the emergency use of other medicines to treat people with COVID-19. Go to https://www.fda.gov/emergency-preparedness-andr  esponse/mcm-legal-regulatory-and-policy-framework/emergency-use-authorization for information on the emergency use of other medicines that are authorized by FDA   to treat people with COVID-19. Your healthcare provider may talk with you about clinical trials for which you may be eligible.    It is your choice to be treated or not to be treated with PAXLOVID. Should you decide not to receive it or for your child not to receive it, it will not change your standard  medical care.    What if I am pregnant or breastfeeding?  There is no experience treating pregnant women or breastfeeding mothers with PAXLOVID. For a mother and unborn baby, the benefit of taking PAXLOVID may be  greater than the risk from the treatment. If you are pregnant, discuss your options and specific situation with your healthcare provider. It is recommended that you use effective barrier contraception or do not have sexual activity while taking PAXLOVID.    If you are breastfeeding, discuss your options and specific situation with your healthcare provider.    How do I report side effects with PAXLOVID?  Contact your healthcare provider if you have any side effects that bother you or do not go away.Report side effects to FDA MedWatch at www.fda.gov/medwatch or call 6-226-TSL8868 or you can report side effects to [employer] at the contact information provided Below.    Website Fax number Telephone number [employer] [phone]      How should I store PAXLOVID?  Store PAXLOVID tablets at room temperature, between 68?F to 77?F (20?C to 25?C).  How can I learn more about COVID-19?  • Ask your healthcare provider.  • Visit https://www.cdc.gov/COVID19.  • Contact your local or state public health department.  What is an Emergency Use  Authorization (EUA)?  The [place] FDA has made PAXLOVID available under an emergency access mechanism called an Emergency Use Authorization (EUA). The EUA is supported by a   of Health and Human Service ([facility]) declaration that circumstances exist to justify the emergency use of drugs and biological products during the COVID-19  pandemic.  PAXLOVID for the treatment of mild-to-moderate COVID-19 in adults and children [12 years of age and older weighing at least 88 pounds (40 kg)] with positive results of  direct SARS-CoV-2 viral testing, and who are at high risk for progression to severe COVID-19, including hospitalization or death, has not undergone the same type of  review as an FDA-approved product. In issuing an EUA under the COVID-19 public health emergency, the FDA has determined, among other things, that based on the  total amount of scientific evidence available including data from adequate and well-controlled clinical trials, if available, it is reasonable to believe that the product may  be effective for diagnosing, treating, or preventing COVID-19, or a serious or life-threatening disease or condition caused by COVID-19; that the known and potential  benefits of the product, when used to diagnose, treat, or prevent such disease or condition, outweigh the known and potential risks of such product; and that there are no  adequate, approved, and available alternatives. All of these criteria must be met to allow for the product to be used in the treatment of patients during the COVID-19 pandemic. The EUA for PAXLOVID is in effect for the duration of the COVID-19 declaration justifying emergency use of this product, unless  terminated or revoked (after which the products may no longer be used under the EUA)      Emergency Use Authorization (EUA) Of LAGEVRIO™ (molnupiravir) capsules For Coronavirus Disease 2019 (COVID-19)  What is the most important information I should know about  LAGEVRIO?    LAGEVRIO may cause serious side effects, including:    LAGEVRIO may cause harm to your unborn baby. It is not known if LAGEVRIO will harm your baby if you take LAGEVRIO during pregnancy.  o LAGEVRIO is not recommended for use in pregnancy.   o LAGEVRIO has not been studied in pregnancy. LAGEVRIO was studied in pregnant animals only. When LAGEVRIO was given to pregnant animals, LAGEVRIO caused  harm to their unborn babies.  o You and your healthcare provider may decide that you should take LAGEVRIO during pregnancy if there are no other COVID-19 treatment options approved or authorized by  the FDA that are accessible or clinically appropriate for you.  o If you and your healthcare provider decide that you should take LAGEVRIO during pregnancy, you and your healthcare provider should discuss the known and potential  benefits and the potential risks of taking LAGEVRIO during pregnancy.    For individuals who are able to become pregnant:  -You should use a reliable method of birth control (contraception) consistently and correctly during treatment with LAGEVRIO and for 4 days after the last dose of LAGEVRIO. Talk to your healthcare provider about reliable birth control methods.  -Before starting treatment with LAGEVRIO your healthcare provider may do a pregnancy test to see if you are pregnant before starting treatment with LAGEVRIO.  -Tell your healthcare provider right away if you become pregnant or think you may be pregnant during treatment with LAGEVRIO.    Pregnancy Surveillance Program:  There is a pregnancy surveillance program for individuals who take LAGEVRIO during pregnancy. The purpose of this program is to collect information about the health of you and your baby. Talk to your healthcare provider about how to take part in this program. If you take LAGEVRIO during pregnancy and you agree to participate in the pregnancy  surveillance program and allow your healthcare provider to share your  information with [facility] [employer], then your healthcare provider will report your use of LAGEVRIO  during pregnancy to [employer]. by calling [phone] or Pregnancy reporting.[employer].    For individuals who are sexually active with partners who are able to become pregnant:  It is not known if LAGEVRIO can affect sperm. While the risk is regarded as low, animal studies to fully assess the potential for LAGEVRIO to affect the babies of males treated with LAGEVRIO have not been completed. A reliable method of birth control (contraception) should be used consistently and correctly during treatment with LAGEVRIO and for at least 3 months after the last dose. The risk to sperm beyond 3 months is not known. Studies to understand the risk to sperm beyond 3 months are ongoing. Talk to your healthcare provider about reliable birth control methods. Talk to your healthcare provider if you have questions or concerns about how LAGEVRIO may affect sperm.    You are being given this fact sheet because your healthcare provider believes it is necessary to provide you with LAGEVRIO for the treatment of adults with mild-to-moderate coronavirus disease 2019 (COVID-19) with positive results of direct SARS-CoV-2 viral testing, and who are at high risk for progression to severe COVID-19 including hospitalization or death, and for whom other COVID-19 treatment options approved or authorized by the FDA are not accessible or clinically appropriate.     The U.S. Food and Drug Administration (FDA) has issued an Emergency Use Authorization  (EUA) to make LAGEVRIO available during the COVID-19 pandemic (for more details about anEUA please see “What is an Emergency Use Authorization?” at the end of this document). LAGEVRIO is not an FDA-approved medicine in the [place]. Read this Fact Sheet for information about LAGEVRIO. Talk to your healthcare provider about your options if you have any questions. It is your choice to  take LAGEVRIO.    What is COVID-19?  COVID-19 is caused by a virus called a coronavirus. You can get COVID-19 through close contact with another person who has the virus. COVID-19 illnesses have ranged from very mild-to-severe, including illness resulting in death. While information so far suggests that most COVID-19 illness is mild, serious illness can happen and may cause some of your other medical conditions to become worse. Older people and people of all ages with severe, long lasting (chronic) medical conditions like heart disease,lung disease and diabetes, for example seem to be at higher risk of being hospitalized for COVID-19.    What is LAGEVRIO?  LAGEVRIO is an investigational medicine used to treat mild-to-moderate COVID-19 in adults:  with positive results of direct SARS-CoV-2 viral testing, and who are at high risk for progression to severe COVID-19 including hospitalization or death, and for whom other COVID-19 treatment options approved or authorized by the FDA are not accessible or clinically appropriate.    The FDA has authorized the emergency use of LAGEVRIO for the treatment of mild-tomoderate COVID-19 in adults under an EUA. For more information on EUA, see the   “What is an Emergency Use Authorization (EUA)?” section at the end of this Fact Sheet.     LAGEVRIO is not authorized:  • for use in people less than 18 years of age.   • for prevention of COVID-19.  • for people needing hospitalization for COVID-19.  • for use for longer than 5 consecutive days.    What should I tell my healthcare provider before I take LAGEVRIO?  Tell your healthcare provider if you:  • Have any allergies  • Are breastfeeding or plan to breastfeed  • Have any serious illnesses  • Are taking any medicines (prescription, over-the-counter, vitamins, or herbal products).     How do I take LAGEVRIO?  • Take LAGEVRIO exactly as your healthcare provider tells you to take it.  • Take 4 capsules of LAGEVRIO every 12 hours (for  example, at 8 am and at 8 pm)       Take LAGEVRIO for 5 days. It is important that you complete the full 5 days of        treatment with LAGEVRIO. Do not stop taking LAGEVRIO before you complete the full 5       days of treatment, even if you feel better.    Take LAGEVRIO with or without food.    You should stay in isolation for as long as your healthcare provider tells you to. Talk to your healthcare provider if you are not sure about how to properly isolate while you have  COVID-19. Swallow LAGEVRIO capsules whole. Do not open, break, or crush the capsules. If you cannot swallow capsules whole, tell your healthcare provider.  •     What to do if you miss a dose:  o If it has been less than 10 hours since the missed dose, take it as soon as you remember  o If it has been more than 10 hours since the missed dose, skip the missed dose and take your dose at the next scheduled time. Do not double the dose of LAGEVRIO to make up for a missed dose.    What are the important possible side effects of LAGEVRIO?  See, “What is the most important information I should know about LAGEVRIO?”  Allergic Reactions. Allergic reactions can happen in people taking LAGEVRIO, even after only 1 dose. Stop taking LAGEVRIO and call your healthcare provider right away if    You get any of the following symptoms of an allergic reaction:  o hives  o rapid heartbeat  o trouble swallowing or breathing  o swelling of the mouth, lips, or face  o throat tightness  o hoarseness  o skin rash    The most common side effects of LAGEVRIO are:  • diarrhea  • nausea  • dizziness    These are not all the possible side effects of LAGEVRIO. Not many people have taken LAGEVRIO. Serious and unexpected side effects may happen. This medicine is still being  studied, so it is possible that all of the risks are not known at this time.    What other treatment choices are there?  Veklury (remdesivir) is FDA-approved as an intravenous (IV) infusion for the treatment  of mildto-moderate COVID-19 in certain adults and children. Talk with your doctor to see if Veklury is appropriate for you. Like LAGEVRIO, FDA may also allow for the emergency use of other medicines to treat people with COVID-19.     Go to https://www.fda.gov/emergency-preparedness-and-response/mcm-legalregulatory-and-policy-framework/emergency-use-authorization for more information.  It is your choice to be treated or not to be treated with LAGEVRIO. Should you decide not to take it, it will not change your standard medical care.    What if I am breastfeeding?  Breastfeeding is not recommended during treatment with LAGEVRIO and for 4 days after the last dose of LAGEVRIO. If you are breastfeeding or plan to breastfeed, talk to your healthcare provider about your options and specific situation before taking LAGEVRIO.    How do I report side effects with LAGEVRIO?  Contact your healthcare provider if you have any side effects that bother you or do not go away.     Report side effects to FDA MedWatch at www.fda.gov/medwatch or call 7-393-YTD-4824 (1- [phone]).    How should I store LAGEVRIO?  Store LAGEVRIO capsules at room temperature between 68°F to 77°F (20°C to 25°C).   Keep LAGEVRIO and all medicines out of the reach of children and pets.    How can I learn more about COVID-19?  • Ask your healthcare provider.  • Visit www.cdc.gov/COVID19  • Contact your local or state public health department.  • Call [employer] at [phone] (toll free in the [place]  • Visit [email or web address]    What Is an Emergency Use Authorization (EUA)?  The [place] FDA has made LAGEVRIO available under an emergency access mechanism called an Emergency Use Authorization (EUA) The EUA is supported by a  [place] of Health and Human Service (HHS) declaration that circumstances exist to justify emergency use of drugs and biological products during the COVID-19 pandemic.  LAGEVRIO for the treatment of mild-to-moderate  COVID-19 in adults with positive results of direct SARS-CoV-2 viral testing, who are at high risk for progression to severe COVID-19, including hospitalization or death, and for whom alternative COVID-19 treatment options approved or authorized by FDA are not accessible or clinically appropriate, has not undergone the same type of review as an FDA-approved product. In issuing an EUA under the COVID-19 public health emergency, the FDA has determined, among other things, that based on the total amount of scientific evidence available including data from adequate and well-controlled clinical trials, if available, it is reasonable to believe that the product may be effective for diagnosing, treating, or preventing COVID-19, or a serious or life-threatening disease or condition caused by COVID-19; that the known and potential benefits of the product, when used to diagnose, treat, or prevent such disease or condition, outweigh the known and potential risks of such product; and that there are no adequate, approved, and available alternatives.     All of these criteria must be met to allow for the product to be used in the treatment of patients during the COVID-19 pandemic. The EUA for LAGEVRIO is in effect for the duration of the COVID-19 declaration justifying emergency use of LAGEVRIO, unless terminated or revoked (after which LAGEVRIO may no longer be used under the EUA).      For patent information: [email or web address]  Copyright © 7679-1054 [employer] [place], [place] and its affiliates.  All rights reserved.  [id number]           no

## 2023-12-10 NOTE — CHART NOTE - NSCHARTNOTEFT_GEN_A_CORE
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 First 2 bowel movements per nurse were solid and formed and the second two bowel movements were loose and watery and still had formed stool. Patient denies any abdominal pain or discomfort. Patient denies any fevers or chills. Patient also started diflucan today for thrush.       Given patient probiotic will continue to monitor patient.     Vital Signs Last 24 Hrs  T(C): 36.6 (10 Dec 2023 19:57), Max: 36.9 (10 Dec 2023 08:00)  T(F): 97.8 (10 Dec 2023 19:57), Max: 98.4 (10 Dec 2023 08:00)  HR: 77 (10 Dec 2023 19:57) (72 - 77)  BP: 136/57 (10 Dec 2023 19:57) (122/72 - 136/57)  BP(mean): --  RR: 16 (10 Dec 2023 19:57) (16 - 16)  SpO2: 99% (10 Dec 2023 19:57) (95% - 99%)    Parameters below as of 10 Dec 2023 19:57  Patient On (Oxygen Delivery Method): room air                                11.1   7.15  )-----------( 239      ( 09 Dec 2023 08:50 )             35.2         12-09    138  |  105  |  14  ----------------------------<  99  3.5   |  26  |  0.52    Ca    8.5      09 Dec 2023 08:50    TPro  6.0  /  Alb  2.6<L>  /  TBili  0.4  /  DBili  x   /  AST  35  /  ALT  31  /  AlkPhos  75  12-09

## 2023-12-10 NOTE — PROGRESS NOTE ADULT - ASSESSMENT
The patient is an 88 year old female with a history of HTN, hypothyroidism, pacemaker, breast cancer who is admitted with CVA and acute respiratory failure due to IV contrast anaphylaxis.CT head (12/4) showed Large left MCA territory acute to early subacute infarction, thrombosis of multiple distal branches of the left MCA and no acute intracranial hemorrhage. Admitted for multidisciplinary rehab program    #L MCA CVA, Right dominant hemiparesis    -Pacemaker interrogated showing Atrial runs but no sustained Atrial Fibrillation   CT head (12/4) showed Large left MCA territory acute to early subacute infarction, thrombosis of multiple distal branches of the left MCA and no acute intracranial hemorrhage.  -ASA 325mg PO QD   -atorvastatin 80mg PO at bedtime   #Comprehensive Multidisciplinary Rehab Program:  - Gait, ADL, Functional impairments  - PT/OT/ SLP 3 hours a day 5 days a week, 1 hour each     #HTN  - Labetalol 100mg PO Q6 PRN for SBP>190  - Lisinopril 20mg PO QD     #Mood / Cognition:  - Neuropsych evaluation PRN    #Sleep:  - Maintain quiet hours and low stim environment   melatonin 3mg HSfor sleep    #Pain:  - Tylenol PRN  - lidocaine patch for backpain  - avoid sedating meds that may affect cognitive recovery    #/Bladder:  - Monitor PVR if no void in 8h; SC for >400 cc  - Toileting schedule q4h  - (+) Incontinence     #Diet / Dysphagia:    - Diet: Pureed, Moderately thick liquids  - ongoing SLP assessment- MBS?  - Nutrition to follow    #Skin/ Pressure Injury Prevention:  - assessment on admission   - Incisions: venous stasis ulcer in L anterior shin  [id number]   - Turn Q2hrs in bed while awake, OOB to Chair, PT/OT/SLP     #DVT prophylaxis:  - ASA 325mg PO QD     #Precautions/ Restrictions  - Falls, Aspiration  - COVID PCR:     --------------------------------------------  Outpatient Follow up:    --------------------------------------------      MEDICAL PROGNOSIS: GOOD            REHAB POTENTIAL: GOOD             ESTIMATED DISPOSITION: HOME WITH HOME CARE            ELOS: 10-14 Days   EXPECTED THERAPY:     P.T. 1hr/day       O.T. 1hr/day      S.L.P. 1hr/day     P&O Unnecessary     EXP FREQUENCY: 5 days per 7 day period  The patient is an 88 year old female with a history of HTN, hypothyroidism, pacemaker, breast cancer who is admitted with CVA and acute respiratory failure due to IV contrast anaphylaxis.CT head (12/4) showed Large left MCA territory acute to early subacute infarction, thrombosis of multiple distal branches of the left MCA and no acute intracranial hemorrhage. Admitted for multidisciplinary rehab program    #L MCA CVA, Right dominant hemiparesis    -Pacemaker interrogated showing Atrial runs but no sustained Atrial Fibrillation   CT head (12/4) showed Large left MCA territory acute to early subacute infarction, thrombosis of multiple distal branches of the left MCA and no acute intracranial hemorrhage.  -ASA 325mg PO QD   -atorvastatin 80mg PO at bedtime   #Comprehensive Multidisciplinary Rehab Program:  - Gait, ADL, Functional impairments  - PT/OT/ SLP 3 hours a day 5 days a week, 1 hour each     #HTN  - Labetalol 100mg PO Q6 PRN for SBP>190  - Lisinopril 20mg PO QD     #Mood / Cognition:  - Neuropsych evaluation PRN    #Sleep:  - Maintain quiet hours and low stim environment   melatonin 3mg HSfor sleep    #Pain:  - Tylenol PRN  - lidocaine patch for backpain  - avoid sedating meds that may affect cognitive recovery    #/Bladder:  - Monitor PVR if no void in 8h; SC for >400 cc  - Toileting schedule q4h  - (+) Incontinence     #Diet / Dysphagia:    - Diet: Pureed, Moderately thick liquids  - ongoing SLP assessment- MBS?  - Nutrition to follow    #Skin/ Pressure Injury Prevention:  - assessment on admission   - Incisions: venous stasis ulcer in L anterior shin  [id number]   - Turn Q2hrs in bed while awake, OOB to Chair, PT/OT/SLP     #DVT prophylaxis:  - ASA 325mg PO QD     #Precautions/ Restrictions  - Falls, Aspiration  - COVID PCR:     --------------------------------------------  Outpatient Follow up:    --------------------------------------------      MEDICAL PROGNOSIS: GOOD            REHAB POTENTIAL: GOOD             ESTIMATED DISPOSITION: HOME WITH HOME CARE            ELOS: 10-14 Days   EXPECTED THERAPY:     P.T. 1hr/day       O.T. 1hr/day      S.L.P. 1hr/day     P&O Unnecessary     EXP FREQUENCY: 5 days per 7 day period

## 2023-12-10 NOTE — PROGRESS NOTE ADULT - ASSESSMENT
88 year old female with a history of HTN, hypothyroidism, pacemaker, breast cancer who is admitted with CVA and acute respiratory failure due to IV contrast anaphylaxis. 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 Admitted for multidisciplinary rehab program    #L MCA CVA, Right   -ASA 325mg daily  -lipitor 80mg at bedtime   -Pacemaker interrogated showing Atrial runs but no sustained Atrial Fibrillation   CT head (12/4) showed Large left MCA territory acute to early subacute infarction, thrombosis of multiple distal branches of the left MCA and no acute intracranial hemorrhage.    #Recent anaphylactic shock from Iodine contrast dye from CT  - resolved and extubated.      #Thrush  - cont with nystatin swish and spit and oral care     #HTN  - BP currently controlled  - Lisinopril 20mg PO QD     #Breast ca  - history of mastectomy       DVT ppx    d/w rehab team 88 year old female with a history of HTN, hypothyroidism, pacemaker, breast cancer who is admitted with CVA and acute respiratory failure due to IV contrast anaphylaxis. 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 Admitted for multidisciplinary rehab program    #L MCA CVA, Right   -ASA 325mg daily  -lipitor 80mg at bedtime   -Pacemaker interrogated showing Atrial runs but no sustained Atrial Fibrillation   CT head (12/4) showed Large left MCA territory acute to early subacute infarction, thrombosis of multiple distal branches of the left MCA and no acute intracranial hemorrhage.    #Recent anaphylactic shock from Iodine contrast dye from CT  - resolved and extubated.      #Thrush  - cont with fluconazole for total 7 days and continue with oral care     #Leg lower leg non healing wound  - wound care consult    #HTN  - BP currently controlled  - Lisinopril 20mg PO QD     #Breast ca  - history of mastectomy       DVT ppx    d/w rehab team

## 2023-12-10 NOTE — CHART NOTE - NSCHARTNOTEFT_GEN_A_CORE
[facility] Plan of Care    REHABILITATION DIAGNOSIS:  Cerebral infarction          COMORBIDITIES/COMPLICATING CONDITIONS IMPACTING REHABILITATION:  HEALTH ISSUES - PROBLEM Dx:  Chronic back pain  cognitive decline  [place]  dysphagia      PAST MEDICAL & SURGICAL HISTORY:  Hyperlipidemia  no current medications      Breast cancer  2009 left - s/ping castellanos radical mastectomy      [place] (hard of hearing)  bilateral HA (Yvette)      Meniscus tear  left - 10/2015      Cyst of left ovary      SDH (subdural hematoma)  3/2011 - s/p fall - no surgical intervention      History of Left Mastectomy      Cataract extraction status of eye, right      Status post right cataract extraction          Based upon consideration of the patient's impairments, functional status, complicating conditions and any other contributing factors and after information garnered from the assessments of all therapy disciplines involved in treating the patient and other pertinent clinicians:    INTERDISCIPLINARY REHABILITATION INTERVENTIONS:    [ X  ] Transfer Training  [ X  ] Bed Mobility  [ X  ] Therapeutic Exercise  [ X ] Balance/Coordination Exercises  [ X ] Locomotion retraining  [ X  ] Stairs  [  X ] Functional Transfer Training  [   ] Bowel/Bladder program  [x   ] Pain Management  [   ] Skin/Wound Care  [   ] Visual/Perceptual Training  [   ] Therapeutic Recreation Activities  [   ] Neuromuscular Re-education  [ X  ] Activities of Daily Living  [   ] Speech Exercise  [  x ] Swallowing Exercises  [   ] Vital Stim  [   ] Dietary Supplements  [   ] Calorie Count  [ x  ] Cognitive Exercises  [ x  ] Congnitive/Linguistic Treatment  [  x ] Behavior Program  [ x  ] Neuropsych Therapy  [ X  ] Patient/Family Counseling  [ X ] Family Training  [ X  ] Community Re-entry  [   ] Orthotic Evaluation  [   ] Prosthetic Eval/Training    MEDICAL PROGNOSIS:  good    REHAB POTENTIAL:  good  EXPECTED DAILY THERAPY:         PT:1hr       OT:1hr       ST:1hr       P&O:    EXPECTED INTENSITY OF PROGRAM:  3 hrs / Day    EXPECTED FREQUENCY OF PROGRAM: 5 Days/ Week    ESTIMATED LOS:  [  ] 5-7 Days  [  ] 7-10 Days  [  ] 10- 14 Days  [ x ] 14- 18 Days  [  ] 18- 21 Days    ESTIMATED DISPOSITION:  [  ] Home   [  ] Home with Outpatient Therapies  [ x ] Home with Home Therapies  [  ] Assisted Living  [  ] Nursing Home  [  ] Long Term Acute Care    INTERDISCIPLINARY FUNCTIONAL OUTCOMES/GOALS:         Gait/Mobility:supervision       Transfers:supervision       ADLs: CG       Functional Transfers: supervision       Medication Management: supervision       Communication: CG       Cognitive: [place]       Dysphagia:  independent       Bladder independent       Bowel: independent     Functional Independent Measures:   7 = Independent  6 = Modified Independent  5 = Supervision  4 = Minimal Assist/ Contact Guard  3 = Moderate Assistance  2 = Maximum Assistance  1 = Total Assistance  0 = Unable to assess [facility]    REHABILITATION DIAGNOSIS:  Cerebral infarction          COMORBIDITIES/COMPLICATING CONDITIONS IMPACTING REHABILITATION:  HEALTH ISSUES - PROBLEM Dx:  Chronic back pain  cognitive decline  [place]  dysphagia      PAST MEDICAL & SURGICAL HISTORY:  Hyperlipidemia  no current medications      Breast cancer  2009 left - s/ping castellanos radical mastectomy      [place] (hard of hearing)  bilateral HA (Yvette)      Meniscus tear  left - 10/2015      Cyst of left ovary      SDH (subdural hematoma)  3/2011 - s/p fall - no surgical intervention      History of Left Mastectomy      Cataract extraction status of eye, right      Status post right cataract extraction          Based upon consideration of the patient's impairments, functional status, complicating conditions and any other contributing factors and after information garnered from the assessments of all therapy disciplines involved in treating the patient and other pertinent clinicians:    INTERDISCIPLINARY REHABILITATION INTERVENTIONS:    [ X  ] Transfer Training  [ X  ] Bed Mobility  [ X  ] Therapeutic Exercise  [ X ] Balance/Coordination Exercises  [ X ] Locomotion retraining  [ X  ] Stairs  [  X ] Functional Transfer Training  [   ] Bowel/Bladder program  [x   ] Pain Management  [   ] Skin/Wound Care  [   ] Visual/Perceptual Training  [   ] Therapeutic Recreation Activities  [   ] Neuromuscular Re-education  [ X  ] Activities of Daily Living  [   ] Speech Exercise  [  x ] Swallowing Exercises  [   ] Vital Stim  [   ] Dietary Supplements  [   ] Calorie Count  [ x  ] Cognitive Exercises  [ x  ] Congnitive/Linguistic Treatment  [  x ] Behavior Program  [ x  ] Neuropsych Therapy  [ X  ] Patient/Family Counseling  [ X ] Family Training  [ X  ] Community Re-entry  [   ] Orthotic Evaluation  [   ] Prosthetic Eval/Training    MEDICAL PROGNOSIS:  good    REHAB POTENTIAL:  good  EXPECTED DAILY THERAPY:         PT:1hr       OT:1hr       ST:1hr       P&O:    EXPECTED INTENSITY OF PROGRAM:  3 hrs / Day    EXPECTED FREQUENCY OF PROGRAM: 5 Days/ Week    ESTIMATED LOS:  [  ] 5-7 Days  [  ] 7-10 Days  [  ] 10- 14 Days  [ x ] 14- 18 Days  [  ] 18- 21 Days    ESTIMATED DISPOSITION:  [  ] Home   [  ] Home with Outpatient Therapies  [ x ] Home with Home Therapies  [  ] Assisted Living  [  ] Nursing Home  [  ] Long Term Acute Care    INTERDISCIPLINARY FUNCTIONAL OUTCOMES/GOALS:         Gait/Mobility:supervision       Transfers:supervision       ADLs: CG       Functional Transfers: supervision       Medication Management: supervision       Communication: CG       Cognitive: [place]       Dysphagia:  independent       Bladder independent       Bowel: independent     Functional Independent Measures:   7 = Independent  6 = Modified Independent  5 = Supervision  4 = Minimal Assist/ Contact Guard  3 = Moderate Assistance  2 = Maximum Assistance  1 = Total Assistance  0 = Unable to assess

## 2023-12-11 LAB
ALBUMIN SERPL ELPH-MCNC: 2.6 G/DL — LOW (ref 3.3–5)
ALBUMIN SERPL ELPH-MCNC: 2.6 G/DL — LOW (ref 3.3–5)
ALP SERPL-CCNC: 74 U/L — SIGNIFICANT CHANGE UP (ref 40–120)
ALP SERPL-CCNC: 74 U/L — SIGNIFICANT CHANGE UP (ref 40–120)
ALT FLD-CCNC: 30 U/L — SIGNIFICANT CHANGE UP (ref 10–45)
ALT FLD-CCNC: 30 U/L — SIGNIFICANT CHANGE UP (ref 10–45)
ANION GAP SERPL CALC-SCNC: 8 MMOL/L — SIGNIFICANT CHANGE UP (ref 5–17)
ANION GAP SERPL CALC-SCNC: 8 MMOL/L — SIGNIFICANT CHANGE UP (ref 5–17)
AST SERPL-CCNC: 36 U/L — SIGNIFICANT CHANGE UP (ref 10–40)
AST SERPL-CCNC: 36 U/L — SIGNIFICANT CHANGE UP (ref 10–40)
BILIRUB SERPL-MCNC: 0.4 MG/DL — SIGNIFICANT CHANGE UP (ref 0.2–1.2)
BILIRUB SERPL-MCNC: 0.4 MG/DL — SIGNIFICANT CHANGE UP (ref 0.2–1.2)
BUN SERPL-MCNC: 12 MG/DL — SIGNIFICANT CHANGE UP (ref 7–23)
BUN SERPL-MCNC: 12 MG/DL — SIGNIFICANT CHANGE UP (ref 7–23)
CALCIUM SERPL-MCNC: 8.6 MG/DL — SIGNIFICANT CHANGE UP (ref 8.4–10.5)
CALCIUM SERPL-MCNC: 8.6 MG/DL — SIGNIFICANT CHANGE UP (ref 8.4–10.5)
CHLORIDE SERPL-SCNC: 103 MMOL/L — SIGNIFICANT CHANGE UP (ref 96–108)
CHLORIDE SERPL-SCNC: 103 MMOL/L — SIGNIFICANT CHANGE UP (ref 96–108)
CO2 SERPL-SCNC: 28 MMOL/L — SIGNIFICANT CHANGE UP (ref 22–31)
CO2 SERPL-SCNC: 28 MMOL/L — SIGNIFICANT CHANGE UP (ref 22–31)
CREAT SERPL-MCNC: 0.73 MG/DL — SIGNIFICANT CHANGE UP (ref 0.5–1.3)
CREAT SERPL-MCNC: 0.73 MG/DL — SIGNIFICANT CHANGE UP (ref 0.5–1.3)
EGFR: 80 ML/MIN/1.73M2 — SIGNIFICANT CHANGE UP
EGFR: 80 ML/MIN/1.73M2 — SIGNIFICANT CHANGE UP
GLUCOSE SERPL-MCNC: 121 MG/DL — HIGH (ref 70–99)
GLUCOSE SERPL-MCNC: 121 MG/DL — HIGH (ref 70–99)
HCT VFR BLD CALC: 32.6 % — LOW (ref 34.5–45)
HCT VFR BLD CALC: 32.6 % — LOW (ref 34.5–45)
HGB BLD-MCNC: 10.3 G/DL — LOW (ref 11.5–15.5)
HGB BLD-MCNC: 10.3 G/DL — LOW (ref 11.5–15.5)
MCHC RBC-ENTMCNC: 26.3 PG — LOW (ref 27–34)
MCHC RBC-ENTMCNC: 26.3 PG — LOW (ref 27–34)
MCHC RBC-ENTMCNC: 31.6 GM/DL — LOW (ref 32–36)
MCHC RBC-ENTMCNC: 31.6 GM/DL — LOW (ref 32–36)
MCV RBC AUTO: 83.4 FL — SIGNIFICANT CHANGE UP (ref 80–100)
MCV RBC AUTO: 83.4 FL — SIGNIFICANT CHANGE UP (ref 80–100)
NRBC # BLD: 0 /100 WBCS — SIGNIFICANT CHANGE UP (ref 0–0)
NRBC # BLD: 0 /100 WBCS — SIGNIFICANT CHANGE UP (ref 0–0)
PLATELET # BLD AUTO: 241 K/UL — SIGNIFICANT CHANGE UP (ref 150–400)
PLATELET # BLD AUTO: 241 K/UL — SIGNIFICANT CHANGE UP (ref 150–400)
POTASSIUM SERPL-MCNC: 3.7 MMOL/L — SIGNIFICANT CHANGE UP (ref 3.5–5.3)
POTASSIUM SERPL-MCNC: 3.7 MMOL/L — SIGNIFICANT CHANGE UP (ref 3.5–5.3)
POTASSIUM SERPL-SCNC: 3.7 MMOL/L — SIGNIFICANT CHANGE UP (ref 3.5–5.3)
POTASSIUM SERPL-SCNC: 3.7 MMOL/L — SIGNIFICANT CHANGE UP (ref 3.5–5.3)
PROT SERPL-MCNC: 6 G/DL — SIGNIFICANT CHANGE UP (ref 6–8.3)
PROT SERPL-MCNC: 6 G/DL — SIGNIFICANT CHANGE UP (ref 6–8.3)
RBC # BLD: 3.91 M/UL — SIGNIFICANT CHANGE UP (ref 3.8–5.2)
RBC # BLD: 3.91 M/UL — SIGNIFICANT CHANGE UP (ref 3.8–5.2)
RBC # FLD: 17.4 % — HIGH (ref 10.3–14.5)
RBC # FLD: 17.4 % — HIGH (ref 10.3–14.5)
SODIUM SERPL-SCNC: 139 MMOL/L — SIGNIFICANT CHANGE UP (ref 135–145)
SODIUM SERPL-SCNC: 139 MMOL/L — SIGNIFICANT CHANGE UP (ref 135–145)
WBC # BLD: 6.27 K/UL — SIGNIFICANT CHANGE UP (ref 3.8–10.5)
WBC # BLD: 6.27 K/UL — SIGNIFICANT CHANGE UP (ref 3.8–10.5)
WBC # FLD AUTO: 6.27 K/UL — SIGNIFICANT CHANGE UP (ref 3.8–10.5)
WBC # FLD AUTO: 6.27 K/UL — SIGNIFICANT CHANGE UP (ref 3.8–10.5)

## 2023-12-11 PROCEDURE — 99232 SBSQ HOSP IP/OBS MODERATE 35: CPT

## 2023-12-11 PROCEDURE — 99232 SBSQ HOSP IP/OBS MODERATE 35: CPT | Mod: GC

## 2023-12-11 RX ORDER — BACITRACIN ZINC 500 UNIT/G
1 OINTMENT IN PACKET (EA) TOPICAL
Refills: 0 | Status: DISCONTINUED | OUTPATIENT
Start: 2023-12-11 | End: 2023-12-13

## 2023-12-11 RX ORDER — AMANTADINE HCL 100 MG
50 CAPSULE ORAL
Refills: 0 | Status: DISCONTINUED | OUTPATIENT
Start: 2023-12-11 | End: 2023-12-13

## 2023-12-11 RX ADMIN — Medication 3 MILLIGRAM(S): at 21:44

## 2023-12-11 RX ADMIN — FLUCONAZOLE 100 MILLIGRAM(S): 150 TABLET ORAL at 11:42

## 2023-12-11 RX ADMIN — Medication 325 MILLIGRAM(S): at 11:42

## 2023-12-11 RX ADMIN — Medication 50 MILLIGRAM(S): at 13:13

## 2023-12-11 RX ADMIN — ATORVASTATIN CALCIUM 80 MILLIGRAM(S): 80 TABLET, FILM COATED ORAL at 21:44

## 2023-12-11 RX ADMIN — LIDOCAINE 1 PATCH: 4 CREAM TOPICAL at 21:53

## 2023-12-11 RX ADMIN — LIDOCAINE 1 PATCH: 4 CREAM TOPICAL at 11:41

## 2023-12-11 RX ADMIN — Medication 1 APPLICATION(S): at 21:44

## 2023-12-11 RX ADMIN — LIDOCAINE 1 PATCH: 4 CREAM TOPICAL at 06:07

## 2023-12-11 RX ADMIN — LISINOPRIL 20 MILLIGRAM(S): 2.5 TABLET ORAL at 06:21

## 2023-12-11 NOTE — ADVANCED PRACTICE NURSE CONSULT - ASSESSMENT
Patient admitted to Acute Rehab Unit s/p cerebral infarct, A&Ox4, with noted slowed/slurred speech.  Patient reports she had fallen at home & scraped her anterior shin on the stairs, was treated at Urgent Care center.  Presents with small skin tear/abrasion to pretibial area, 4 x 3 x 0.3 cm.  Wound is full thickness, irregularly shaped, with red granular tissue, there is noted some yellow crusting at edges.  Periwound skin is intact, with slight erythema surrounding.  Skin is generally very dry, thin, paper-like.

## 2023-12-11 NOTE — PROGRESS NOTE ADULT - ASSESSMENT
88 year old female with a history of HTN, hypothyroidism, pacemaker, breast cancer who is admitted with CVA and acute respiratory failure due to IV contrast anaphylaxis. 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 Admitted for multidisciplinary rehab program    #L MCA CVA, Right   -ASA 325mg daily  -lipitor 80mg at bedtime   -Pacemaker interrogated showing Atrial runs but no sustained Atrial Fibrillation   CT head (12/4) showed Large left MCA territory acute to early subacute infarction, thrombosis of multiple distal branches of the left MCA and no acute intracranial hemorrhage.    #Recent anaphylactic shock from Iodine contrast dye from CT  - resolved and extubated.      #Thrush  - cont with fluconazole for total 7 days and continue with oral care     #Leg lower leg non healing wound  - wound care consult    #HTN  - BP currently controlled  - Lisinopril 20mg PO QD     #Breast ca  - history of mastectomy       DVT ppx

## 2023-12-11 NOTE — ADVANCED PRACTICE NURSE CONSULT - RECOMMEDATIONS
Suggest twice daily Normal Saline Cleanse of LLE wound, pat dry.  Apply Cavillon film barrier to intact periwound skin, allow to dry.  Apply Bacitracin twice daily to wound bed.  Cover with 'cut to fit' Adpatic non-adherent dressing, leave in place for 3 days.  (Normal Saline cleanse & Bacitracin twice daily OVER Adaptic)   Place dry non woven gauze, then wrap with Laura.  Avoid tape directly on skin.    Patient may benefit from twice daily application of emollient such as Lac-Hydrin lotion to bilateral lower extremities (except over wound).  Nutritional support & hydration per Dietician's recommendations.

## 2023-12-11 NOTE — PROGRESS NOTE ADULT - SUBJECTIVE AND OBJECTIVE BOX
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    ALLERGIES:  IV Contrast (Anaphylaxis; Urticaria)  penicillin (Rash)    MEDICATIONS  (STANDING):  amantadine Syrup 50 milliGRAM(s) Oral <User Schedule>  aspirin 325 milliGRAM(s) Oral daily  atorvastatin 80 milliGRAM(s) Oral at bedtime  bacitracin   Ointment 1 Application(s) Topical two times a day  fluconAZOLE   Tablet 100 milliGRAM(s) Oral daily  influenza  Vaccine (HIGH DOSE) 0.7 milliLiter(s) IntraMuscular once  lidocaine   4% Patch 1 Patch Transdermal daily  lisinopril 20 milliGRAM(s) Oral daily  melatonin 3 milliGRAM(s) Oral at bedtime    MEDICATIONS  (PRN):  acetaminophen   Oral Liquid .. 650 milliGRAM(s) Oral every 6 hours PRN Temp greater or equal to 38.5C (101.3F), Mild Pain (1 - 3)  labetalol 100 milliGRAM(s) Oral every 6 hours PRN SBP>190    Vital Signs Last 24 Hrs  T(F): 98 (11 Dec 2023 07:50), Max: 98 (11 Dec 2023 07:50)  HR: 69 (11 Dec 2023 07:50) (68 - 77)  BP: 148/70 (11 Dec 2023 07:50) (136/57 - 170/70)  RR: 18 (11 Dec 2023 07:50) (16 - 18)  SpO2: 94% (11 Dec 2023 07:50) (94% - 99%)  I&O's Summary    BMI (kg/m2): 25.5 (12-08-23 @ 16:00)    PHYSICAL EXAM:  General: NAD, A/O x 2  ENT: MMM. + thrush   Neck: Supple, No JVD  Lungs: Clear to auscultation bilaterally  Cardio: RRR, S1/S2, No murmurs  Abdomen: Soft, Nontender, Nondistended; Bowel sounds present  Extremities: No calf tenderness, No pitting edema + L lower leg ulcer     LABS:                        10.3   6.27  )-----------( 241      ( 11 Dec 2023 07:03 )             32.6       12-11    139  |  103  |  12  ----------------------------<  121  3.7   |  28  |  0.73    Ca    8.6      11 Dec 2023 07:03    TPro  6.0  /  Alb  2.6  /  TBili  0.4  /  DBili  x   /  AST  36  /  ALT  30  /  AlkPhos  74  12-11     12-04 Chol 213 mg/dL LDL -- HDL 68 mg/dL Trig 108 mg/dL    Urinalysis Basic - ( 11 Dec 2023 07:03 )    Color: x / Appearance: x / SG: x / pH: x  Gluc: 121 mg/dL / Ketone: x  / Bili: x / Urobili: x   Blood: x / Protein: x / Nitrite: x   Leuk Esterase: x / RBC: x / WBC x   Sq Epi: x / Non Sq Epi: x / Bacteria: x

## 2023-12-11 NOTE — PROGRESS NOTE ADULT - SUBJECTIVE AND OBJECTIVE BOX
Chief complaint - abnormality of gait SP CVA    HPI:  The patient is an 88 year old female with a history of HTN, hypothyroidism, pacemaker, breast cancer who is admitted with CVA and acute respiratory failure due to IV contrast anaphylaxis. Patient had R sided hemiparesis, expressive aphasia. CT imaging confirmed multiple embolic areas. Pacemaker interrogated showing Atrial runs but no sustained Atrial Fibrillation. Patient currently on course of ASA and statin. Hospital course complicated by severe anaphylactic due to iodine contrast allergy from CT performed on admission to ED. 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  Patient was evaluated by PM&R and therapy for functional deficits and gait/ ADL impairments and recommended acute rehabilitation. Patient was medically optimized for discharge to [facility] on 12/8    Allergies:   IV Contrast (Anaphylaxis; Urticaria)  penicillin (Rash)    Subjective:  - patient seen and evaluated at bedside, comfortable in her bed.  No overnight events   - slept well overnight, no new complaints   - No pain at this time  - Wound care for her L lower leg venous stasis ulcer.   - GI/, Last BM (12/10) + incontinence , voiding   - MBS scheduled participating in 3 hours of daily therapy and tolerating that.      ROS:  - Denies CP, palpitation, SOB, cough, fever, abdominal pain, dizziness, headaches, nausea, vomiting, (+) loose BM.     MEDICATIONS  (STANDING):  aspirin 325 milliGRAM(s) Oral daily  atorvastatin 80 milliGRAM(s) Oral at bedtime  fluconAZOLE   Tablet 100 milliGRAM(s) Oral daily  influenza  Vaccine (HIGH DOSE) 0.7 milliLiter(s) IntraMuscular once  lidocaine   4% Patch 1 Patch Transdermal daily  lisinopril 20 milliGRAM(s) Oral daily  melatonin 3 milliGRAM(s) Oral at bedtime    MEDICATIONS  (PRN):  acetaminophen   Oral Liquid .. 650 milliGRAM(s) Oral every 6 hours PRN Temp greater or equal to 38.5C (101.3F), Mild Pain (1 - 3)  labetalol 100 milliGRAM(s) Oral every 6 hours PRN SBP>190    LAB:                         10.3   6.27  )-----------( 241      ( 11 Dec 2023 07:03 )             32.6     12-11    139  |  103  |  12  ----------------------------<  121<H>  3.7   |  28  |  0.73    Ca    8.6      11 Dec 2023 07:03    TPro  6.0  /  Alb  2.6<L>  /  TBili  0.4  /  DBili  x   /  AST  36  /  ALT  30  /  AlkPhos  74  12-11    LIVER FUNCTIONS - ( 11 Dec 2023 07:03 )  Alb: 2.6 g/dL / Pro: 6.0 g/dL / ALK PHOS: 74 U/L / ALT: 30 U/L / AST: 36 U/L / GGT: x              Physical Exam:   Vital Signs Last 24 Hrs  T(C): 36.7 (11 Dec 2023 07:50), Max: 36.7 (11 Dec 2023 07:50)  T(F): 98 (11 Dec 2023 07:50), Max: 98 (11 Dec 2023 07:50)  HR: 69 (11 Dec 2023 07:50) (68 - 77)  BP: 148/70 (11 Dec 2023 07:50) (136/57 - 170/70)  RR: 18 (11 Dec 2023 07:50) (16 - 18)  SpO2: 94% (11 Dec 2023 07:50) (94% - 99%)    Constitutional - NAD, Comfortable, confused   HEENT - NCAT, EOMI, AMANDA  Neck - Supple, No limited ROM  Chest - good chest expansion, good respiratory effort, CTAB   Cardio - warm and well perfused, RRR   Abdomen -  Soft, NT, ND, (+) BS  Extremities - No peripheral edema, No calf tenderness   Neurologic Exam:                    Cognitive -             Orientation: AAOx 2, knows she had a stroke, oriented to Self-      Speech - Fluent, + dysarthria, No aphasia      Cranial Nerves - (+) right facial asymmetry, Tongue midline, EOMI, Shoulder shrug intact     Motor -                      LEFT    UE - ShAB 5/5, EF 5/5, EE 5/5, WE 5/5,  WNL                    RIGHT UE - ShAB 4-/5, EF 4-/5, EE 3/5, WE 4/5,  4/5                    LEFT    LE - HF 5/5, KE 5/5, DF 5/5, PF 5/5                    RIGHT LE - HF 4/5, KE 4/5, DF 4+/5, PF 4/5       Skin on admission: venous stasis ulcer on anterior shin 4cm x 4cm. LLE Chief complaint - abnormality of gait SP CVA    HPI:  The patient is an 88 year old female with a history of HTN, hypothyroidism, pacemaker, breast cancer who is admitted with CVA and acute respiratory failure due to IV contrast anaphylaxis. Patient had R sided hemiparesis, expressive aphasia. CT imaging confirmed multiple embolic areas. Pacemaker interrogated showing Atrial runs but no sustained Atrial Fibrillation. Patient currently on course of ASA and statin. Hospital course complicated by severe anaphylactic due to iodine contrast allergy from CT performed on admission to ED. 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  Patient was evaluated by PM&R and therapy for functional deficits and gait/ ADL impairments and recommended acute rehabilitation. Patient was medically optimized for discharge to [facility] on 12/8    Allergies:   IV Contrast (Anaphylaxis; Urticaria)  penicillin (Rash)    Subjective:  - patient seen and evaluated at bedside, comfortable in her bed.  No overnight events   - slept well overnight, no new complaints   - No pain at this time  - Wound care for her L lower leg venous stasis ulcer.   - GI/, Last BM (12/10) + incontinence , voiding   - MBS scheduled participating in 3 hours of daily therapy and tolerating that.      ROS:  - Denies CP, palpitation, SOB, cough, fever, abdominal pain, dizziness, headaches, nausea, vomiting, (+) loose BM.     MEDICATIONS  (STANDING):  aspirin 325 milliGRAM(s) Oral daily  atorvastatin 80 milliGRAM(s) Oral at bedtime  fluconAZOLE   Tablet 100 milliGRAM(s) Oral daily  influenza  Vaccine (HIGH DOSE) 0.7 milliLiter(s) IntraMuscular once  lidocaine   4% Patch 1 Patch Transdermal daily  lisinopril 20 milliGRAM(s) Oral daily  melatonin 3 milliGRAM(s) Oral at bedtime    MEDICATIONS  (PRN):  acetaminophen   Oral Liquid .. 650 milliGRAM(s) Oral every 6 hours PRN Temp greater or equal to 38.5C (101.3F), Mild Pain (1 - 3)  labetalol 100 milliGRAM(s) Oral every 6 hours PRN SBP>190    LAB:                         10.3   6.27  )-----------( 241      ( 11 Dec 2023 07:03 )             32.6     12-11    139  |  103  |  12  ----------------------------<  121<H>  3.7   |  28  |  0.73    Ca    8.6      11 Dec 2023 07:03    TPro  6.0  /  Alb  2.6<L>  /  TBili  0.4  /  DBili  x   /  AST  36  /  ALT  30  /  AlkPhos  74  12-11    LIVER FUNCTIONS - ( 11 Dec 2023 07:03 )  Alb: 2.6 g/dL / Pro: 6.0 g/dL / ALK PHOS: 74 U/L / ALT: 30 U/L / AST: 36 U/L / GGT: x              Physical Exam:   Vital Signs Last 24 Hrs  T(C): 36.7 (11 Dec 2023 07:50), Max: 36.7 (11 Dec 2023 07:50)  T(F): 98 (11 Dec 2023 07:50), Max: 98 (11 Dec 2023 07:50)  HR: 69 (11 Dec 2023 07:50) (68 - 77)  BP: 148/70 (11 Dec 2023 07:50) (136/57 - 170/70)  RR: 18 (11 Dec 2023 07:50) (16 - 18)  SpO2: 94% (11 Dec 2023 07:50) (94% - 99%)    Constitutional - NAD, Comfortable, confused   HEENT - NCAT, EOMI, AMANDA  Neck - Supple, No limited ROM  Chest - good chest expansion, good respiratory effort, CTAB   Cardio - warm and well perfused, RRR   Abdomen -  Soft, NT, ND, (+) BS  Extremities - No peripheral edema, No calf tenderness   Neurologic Exam:                    Cognitive -             Orientation: AAOx 2, knows she had a stroke, oriented to Self-      Speech - Fluent, + dysarthria, No aphasia      Cranial Nerves - (+) right facial asymmetry, Tongue midline, EOMI, Shoulder shrug intact     Motor -                      LEFT    UE - ShAB 5/5, EF 5/5, EE 5/5, WE 5/5,  WNL                    RIGHT UE - ShAB 4-/5, EF 4-/5, EE 3/5, WE 4/5,  4/5                    LEFT    LE - HF 5/5, KE 5/5, DF 5/5, PF 5/5                    RIGHT LE - HF 4/5, KE 4/5, DF 4+/5, PF 4/5       Skin on admission: venous stasis ulcer on anterior shin 4cm x 4cm. LLE

## 2023-12-11 NOTE — PROGRESS NOTE ADULT - ASSESSMENT
The patient is an 88 year old female with a history of HTN, hypothyroidism, pacemaker, breast cancer who is admitted with CVA and acute respiratory failure due to IV contrast anaphylaxis.CT head (12/4) showed Large left MCA territory acute to early subacute infarction, thrombosis of multiple distal branches of the left MCA and no acute intracranial hemorrhage. Admitted for multidisciplinary rehab program    #L MCA CVA, Right dominant hemiparesis    -Pacemaker interrogated showing Atrial runs but no sustained Atrial Fibrillation   CT head (12/4) showed Large left MCA territory acute to early subacute infarction, thrombosis of multiple distal branches of the left MCA and no acute intracranial hemorrhage.  -ASA 325mg PO QD   -atorvastatin 80mg PO at bedtime   #Comprehensive Multidisciplinary Rehab Program:  - Gait, ADL, Functional impairments  - PT/OT/ SLP 3 hours a day 5 days a week, 1 hour each     #HTN  - Labetalol 100mg PO Q6 PRN for SBP>190  - Lisinopril 20mg PO QD     #Mood / Cognition:  - Neuropsych evaluation PRN  - Amantadine 50 mg po at 6 am and 12 pm.  - EKG     #Sleep:  - Maintain quiet hours and low stim environment   melatonin 3mg HSfor sleep    #Pain:  - Tylenol PRN  - lidocaine patch for backpain  - avoid sedating meds that may affect cognitive recovery    #/Bladder:  - Monitor PVR if no void in 8h; SC for >400 cc  - Toileting schedule q4h  - (+) Incontinence     #Diet / Dysphagia:    - Diet: Pureed, Moderately thick liquids  - ongoing SLP assessment- MBS scheduled   - Nutrition to follow    #Skin/ Pressure Injury Prevention:  - assessment on admission: venous stasis ulcer in L anterior shin  [id number]   - Turn Q2hrs in bed while awake, OOB to Chair, PT/OT/SLP     #DVT prophylaxis:  - ASA 325mg PO QD     #Precautions/ Restrictions  - Falls, Aspiration  - COVID PCR:     --------------------------------------------  Outpatient Follow up:    --------------------------------------------      MEDICAL PROGNOSIS: GOOD            REHAB POTENTIAL: GOOD             ESTIMATED DISPOSITION: HOME WITH HOME CARE            ELOS: 10-14 Days   EXPECTED THERAPY:     P.T. 1hr/day       O.T. 1hr/day      S.L.P. 1hr/day     P&O Unnecessary     EXP FREQUENCY: 5 days per 7 day period  The patient is an 88 year old female with a history of HTN, hypothyroidism, pacemaker, breast cancer who is admitted with CVA and acute respiratory failure due to IV contrast anaphylaxis.CT head (12/4) showed Large left MCA territory acute to early subacute infarction, thrombosis of multiple distal branches of the left MCA and no acute intracranial hemorrhage. Admitted for multidisciplinary rehab program    #L MCA CVA, Right dominant hemiparesis    -Pacemaker interrogated showing Atrial runs but no sustained Atrial Fibrillation   CT head (12/4) showed Large left MCA territory acute to early subacute infarction, thrombosis of multiple distal branches of the left MCA and no acute intracranial hemorrhage.  -ASA 325mg PO QD   -atorvastatin 80mg PO at bedtime   #Comprehensive Multidisciplinary Rehab Program:  - Gait, ADL, Functional impairments  - PT/OT/ SLP 3 hours a day 5 days a week, 1 hour each     #HTN  - Labetalol 100mg PO Q6 PRN for SBP>190  - Lisinopril 20mg PO QD     #Mood / Cognition:  - Neuropsych evaluation PRN  - Amantadine 50 mg po at 6 am and 12 pm.  - EKG     #Sleep:  - Maintain quiet hours and low stim environment   melatonin 3mg HSfor sleep    #Pain:  - Tylenol PRN  - lidocaine patch for backpain  - avoid sedating meds that may affect cognitive recovery    #/Bladder:  - Monitor PVR if no void in 8h; SC for >400 cc  - Toileting schedule q4h  - (+) Incontinence     #Diet / Dysphagia:    - Diet: Pureed, Moderately thick liquids  - ongoing SLP assessment- MBS scheduled   - Nutrition to follow    #Skin/ Pressure Injury Prevention:  - assessment on admission: venous stasis ulcer in L anterior shin  [id number]   - Turn Q2hrs in bed while awake, OOB to Chair, PT/OT/SLP     #DVT prophylaxis:  - ASA 325mg PO QD     #Precautions/ Restrictions  - Falls, Aspiration  - COVID PCR:     --------------------------------------------  Outpatient Follow up:    --------------------------------------------      MEDICAL PROGNOSIS: GOOD            REHAB POTENTIAL: GOOD             ESTIMATED DISPOSITION: HOME WITH HOME CARE            ELOS: 10-14 Days   EXPECTED THERAPY:     P.T. 1hr/day       O.T. 1hr/day      S.L.P. 1hr/day     P&O Unnecessary     EXP FREQUENCY: 5 days per 7 day period

## 2023-12-12 DIAGNOSIS — K22.4 DYSKINESIA OF ESOPHAGUS: ICD-10-CM

## 2023-12-12 PROCEDURE — 99233 SBSQ HOSP IP/OBS HIGH 50: CPT | Mod: GC

## 2023-12-12 PROCEDURE — 99223 1ST HOSP IP/OBS HIGH 75: CPT

## 2023-12-12 PROCEDURE — 99232 SBSQ HOSP IP/OBS MODERATE 35: CPT

## 2023-12-12 PROCEDURE — 74230 X-RAY XM SWLNG FUNCJ C+: CPT | Mod: 26

## 2023-12-12 RX ADMIN — Medication 650 MILLIGRAM(S): at 22:13

## 2023-12-12 RX ADMIN — LIDOCAINE 1 PATCH: 4 CREAM TOPICAL at 06:19

## 2023-12-12 RX ADMIN — ATORVASTATIN CALCIUM 80 MILLIGRAM(S): 80 TABLET, FILM COATED ORAL at 21:12

## 2023-12-12 RX ADMIN — Medication 3 MILLIGRAM(S): at 21:12

## 2023-12-12 RX ADMIN — Medication 650 MILLIGRAM(S): at 10:10

## 2023-12-12 RX ADMIN — Medication 650 MILLIGRAM(S): at 21:13

## 2023-12-12 RX ADMIN — FLUCONAZOLE 100 MILLIGRAM(S): 150 TABLET ORAL at 11:17

## 2023-12-12 RX ADMIN — LIDOCAINE 1 PATCH: 4 CREAM TOPICAL at 11:17

## 2023-12-12 RX ADMIN — Medication 1 APPLICATION(S): at 17:18

## 2023-12-12 RX ADMIN — Medication 650 MILLIGRAM(S): at 09:01

## 2023-12-12 RX ADMIN — LISINOPRIL 20 MILLIGRAM(S): 2.5 TABLET ORAL at 06:24

## 2023-12-12 RX ADMIN — Medication 1 APPLICATION(S): at 06:24

## 2023-12-12 RX ADMIN — Medication 325 MILLIGRAM(S): at 11:17

## 2023-12-12 RX ADMIN — Medication 50 MILLIGRAM(S): at 11:17

## 2023-12-12 RX ADMIN — Medication 50 MILLIGRAM(S): at 06:24

## 2023-12-12 RX ADMIN — LIDOCAINE 1 PATCH: 4 CREAM TOPICAL at 19:00

## 2023-12-12 NOTE — PROGRESS NOTE ADULT - SUBJECTIVE AND OBJECTIVE BOX
Patient is a 88y old  Female who presents with a chief complaint of Large left MCA ischemic stroke with right dominant hemiparesis (11 Dec 2023 12:32)      Patient seen and examined at bedside.    ALLERGIES:  IV Contrast (Anaphylaxis; Urticaria)  penicillin (Rash)    MEDICATIONS  (STANDING):  amantadine Syrup 50 milliGRAM(s) Oral <User Schedule>  aspirin 325 milliGRAM(s) Oral daily  atorvastatin 80 milliGRAM(s) Oral at bedtime  bacitracin   Ointment 1 Application(s) Topical two times a day  fluconAZOLE   Tablet 100 milliGRAM(s) Oral daily  influenza  Vaccine (HIGH DOSE) 0.7 milliLiter(s) IntraMuscular once  lidocaine   4% Patch 1 Patch Transdermal daily  lisinopril 20 milliGRAM(s) Oral daily  melatonin 3 milliGRAM(s) Oral at bedtime    MEDICATIONS  (PRN):  acetaminophen   Oral Liquid .. 650 milliGRAM(s) Oral every 6 hours PRN Temp greater or equal to 38.5C (101.3F), Mild Pain (1 - 3)  labetalol 100 milliGRAM(s) Oral every 6 hours PRN SBP>190    Vital Signs Last 24 Hrs  T(F): 97.7 (12 Dec 2023 07:51), Max: 97.8 (11 Dec 2023 20:13)  HR: 74 (12 Dec 2023 07:51) (68 - 79)  BP: 163/74 (12 Dec 2023 07:51) (155/69 - 168/84)  RR: 16 (12 Dec 2023 07:51) (16 - 17)  SpO2: 95% (12 Dec 2023 07:51) (95% - 96%)  I&O's Summary    BMI (kg/m2): 25.5 (12-08-23 @ 16:00)  PHYSICAL EXAM:  General: NAD, A/O x 2  ENT: MMM. + thrush   Neck: Supple, No JVD  Lungs: Clear to auscultation bilaterally  Cardio: RRR, S1/S2, No murmurs  Abdomen: Soft, Nontender, Nondistended; Bowel sounds present  Extremities: No calf tenderness, No pitting edema + L lower leg ulcer     LABS:                        10.3   6.27  )-----------( 241      ( 11 Dec 2023 07:03 )             32.6       12-11    139  |  103  |  12  ----------------------------<  121  3.7   |  28  |  0.73    Ca    8.6      11 Dec 2023 07:03    TPro  6.0  /  Alb  2.6  /  TBili  0.4  /  DBili  x   /  AST  36  /  ALT  30  /  AlkPhos  74  12-11     12-04 Chol 213 mg/dL LDL -- HDL 68 mg/dL Trig 108 mg/dL    Urinalysis Basic - ( 11 Dec 2023 07:03 )    Color: x / Appearance: x / SG: x / pH: x  Gluc: 121 mg/dL / Ketone: x  / Bili: x / Urobili: x   Blood: x / Protein: x / Nitrite: x   Leuk Esterase: x / RBC: x / WBC x   Sq Epi: x / Non Sq Epi: x / Bacteria: x

## 2023-12-12 NOTE — CONSULT NOTE ADULT - REASON FOR ADMISSION
Large left MCA ischemic stroke with right dominant hemiparesis
Large left MCA ischemic stroke with right dominant hemiparesis

## 2023-12-12 NOTE — CONSULT NOTE ADULT - ASSESSMENT
88 year old female with a history of HTN, hypothyroidism, PPM, breast cancer who is admitted with CVA and acute respiratory failure due to IV contrast anaphylaxis. 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     Currently, no complains at this time.    GI : Esophogeal Dysmotility    MBS 12/12  IMPRESSION: Poor esophageal motility. No ev aspiration.     88 year old female with a history of HTN, hypothyroidism, PPM, breast cancer who is admitted with CVA and acute respiratory failure due to IV contrast anaphylaxis. 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   GI consult : Esophageal Dysmotility noted on MBS.  MBS 12/12  IMPRESSION: Poor esophageal motility. No ev aspiration.

## 2023-12-12 NOTE — DIETITIAN INITIAL EVALUATION ADULT - PERTINENT MEDS FT
MEDICATIONS  (STANDING):  amantadine Syrup 50 milliGRAM(s) Oral <User Schedule>  aspirin 325 milliGRAM(s) Oral daily  atorvastatin 80 milliGRAM(s) Oral at bedtime  bacitracin   Ointment 1 Application(s) Topical two times a day  fluconAZOLE   Tablet 100 milliGRAM(s) Oral daily  influenza  Vaccine (HIGH DOSE) 0.7 milliLiter(s) IntraMuscular once  lidocaine   4% Patch 1 Patch Transdermal daily  lisinopril 20 milliGRAM(s) Oral daily  melatonin 3 milliGRAM(s) Oral at bedtime    MEDICATIONS  (PRN):  acetaminophen   Oral Liquid .. 650 milliGRAM(s) Oral every 6 hours PRN Temp greater or equal to 38.5C (101.3F), Mild Pain (1 - 3)  labetalol 100 milliGRAM(s) Oral every 6 hours PRN SBP>190

## 2023-12-12 NOTE — DIETITIAN INITIAL EVALUATION ADULT - OTHER INFO
The patient is an 88 year old female with a history of HTN, hypothyroidism, pacemaker, breast cancer who is admitted with CVA and acute respiratory failure due to IV contrast anaphylaxis. Patient had R sided hemiparesis, expressive aphasia. CT imaging confirmed multiple embolic areas. Pacemaker interrogated showing Atrial runs but no sustained Atrial Fibrillation. Patient currently on course of ASA and statin. Hospital course complicated by severe anaphylactic due to iodine contrast allergy from CT performed on admission to ED. 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      Patient was evaluated by PM&R and therapy for functional deficits and gait/ ADL impairments and recommended acute rehabilitation. Patient was medically optimized for discharge to [facility] on 12/8    Met with pt this AM at bedside. Pt was lethargic, however able to articulate her nutrition hx well. NKFA nor food intolerances reported. Pt reported good appetite PTA, however "so-so" appetite now. Pt denied N/V/D, however endorsed constipation. Encouraged adequate fluid + fiber intake and prunes/prune juice @ breakfast to assist BMs. Pt was accepting to suggestion, however denied prunes/prune juice. Last BM 12/10 per RN flowsheets. Pt reported UBW ~165 lbs. Offered ONS - pt refused. The patient is an 88 year old female with a history of HTN, hypothyroidism, pacemaker, breast cancer who is admitted with CVA and acute respiratory failure due to IV contrast anaphylaxis. Patient had R sided hemiparesis, expressive aphasia. CT imaging confirmed multiple embolic areas. Pacemaker interrogated showing Atrial runs but no sustained Atrial Fibrillation. Patient currently on course of ASA and statin. Hospital course complicated by severe anaphylactic due to iodine contrast allergy from CT performed on admission to ED. 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      Patient was evaluated by PM&R and therapy for functional deficits and gait/ ADL impairments and recommended acute rehabilitation. Patient was medically optimized for discharge to [facility] on 12/8    Met with pt this AM at bedside. Pt was lethargic, however able to articulate her nutrition hx well. NKFA nor food intolerances reported. Pt reported good appetite PTA, however "so-so" appetite now. Pt denied N/V/D, however endorsed constipation. Encouraged adequate fluid + fiber intake and prunes/prune juice @ breakfast to assist BMs. Pt was accepting to suggestion, however denied prunes/prune juice. Last BM 12/10 per RN flowsheets. Pt reported UBW ~165 lbs. Offered ONS - pt refused. The patient is an 88 year old female with a history of HTN, hypothyroidism, pacemaker, breast cancer who is admitted with CVA and acute respiratory failure due to IV contrast anaphylaxis. Patient had R sided hemiparesis, expressive aphasia. CT imaging confirmed multiple embolic areas. Pacemaker interrogated showing Atrial runs but no sustained Atrial Fibrillation. Patient currently on course of ASA and statin. Hospital course complicated by severe anaphylactic due to iodine contrast allergy from CT performed on admission to ED. 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      Patient was evaluated by PM&R and therapy for functional deficits and gait/ ADL impairments and recommended acute rehabilitation. Patient was medically optimized for discharge to [facility] on 12/8    Met with pt this AM at bedside. Pt was lethargic, however able to articulate her nutrition hx well. NKFA nor food intolerances reported. Pt reported good appetite PTA, however "so-so" appetite now. Pt denied N/V/D, however endorsed constipation. Encouraged adequate fluid + fiber intake and prunes/prune juice @ breakfast to assist BMs. Pt was accepting to suggestion, however denied prunes/prune juice. Last BM 12/10 per RN flowsheets. Pt reported UBW ~165 lbs. Offered ONS - pt refused.    *Note: Pt with hx of severe protein-kcal malnutrition (12/6) per previous RD note The patient is an 88 year old female with a history of HTN, hypothyroidism, pacemaker, breast cancer who is admitted with CVA and acute respiratory failure due to IV contrast anaphylaxis. Patient had R sided hemiparesis, expressive aphasia. CT imaging confirmed multiple embolic areas. Pacemaker interrogated showing Atrial runs but no sustained Atrial Fibrillation. Patient currently on course of ASA and statin. Hospital course complicated by severe anaphylactic due to iodine contrast allergy from CT performed on admission to ED. 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      Patient was evaluated by PM&R and therapy for functional deficits and gait/ ADL impairments and recommended acute rehabilitation. Patient was medically optimized for discharge to [facility] on 12/8    Met with pt this AM at bedside. Pt was lethargic, however able to articulate her nutrition hx well. NKFA nor food intolerances reported. Pt reported good appetite PTA, however "so-so" appetite now. Pt denied N/V/D, however endorsed constipation. Encouraged adequate fluid + fiber intake and prunes/prune juice @ breakfast to assist BMs. Pt was accepting to suggestion, however denied prunes/prune juice. Last BM 12/10 per RN flowsheets. Pt reported UBW ~165 lbs. Offered ONS - pt refused.    *Note: Pt with hx of severe protein-kcal malnutrition (12/6) per previous RD note

## 2023-12-12 NOTE — CONSULT NOTE ADULT - SUBJECTIVE AND OBJECTIVE BOX
INTERVAL HPI/OVERNIGHT EVENTS:  HPI:  The patient is an 88 year old female with a history of HTN, hypothyroidism, pacemaker, breast cancer who is admitted with CVA and acute respiratory failure due to IV contrast anaphylaxis. Patient had R sided hemiparesis, expressive aphasia. CT imaging confirmed multiple embolic areas. Pacemaker interrogated showing Atrial runs but no sustained Atrial Fibrillation. Patient currently on course of ASA and statin. Hospital course complicated by severe anaphylactic due to iodine contrast allergy from CT performed on admission to ED. 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  Patient admitted to [facility] on 12/8      Patient seen and examined at bedside. Denies abdominal pain, NDV, no hematemesis or hematochezia. Denies swallowing issues.    MEDICATIONS  (STANDING):  amantadine Syrup 50 milliGRAM(s) Oral <User Schedule>  aspirin 325 milliGRAM(s) Oral daily  atorvastatin 80 milliGRAM(s) Oral at bedtime  bacitracin   Ointment 1 Application(s) Topical two times a day  fluconAZOLE   Tablet 100 milliGRAM(s) Oral daily  influenza  Vaccine (HIGH DOSE) 0.7 milliLiter(s) IntraMuscular once  lidocaine   4% Patch 1 Patch Transdermal daily  lisinopril 20 milliGRAM(s) Oral daily  melatonin 3 milliGRAM(s) Oral at bedtime    MEDICATIONS  (PRN):  acetaminophen   Oral Liquid .. 650 milliGRAM(s) Oral every 6 hours PRN Temp greater or equal to 38.5C (101.3F), Mild Pain (1 - 3)  labetalol 100 milliGRAM(s) Oral every 6 hours PRN SBP>190      Allergies    IV Contrast (Anaphylaxis; Urticaria)  penicillin (Rash)    Intolerances        PAST MEDICAL & SURGICAL HISTORY:  Hyperlipidemia  no current medications      Breast cancer  2009 left - s/pineitan castellanos radical mastectomy      [place] (hard of hearing)  bilateral HA (Yvette)      Meniscus tear  left - 10/2015      Cyst of left ovary      SDH (subdural hematoma)  3/2011 - s/p fall - no surgical intervention      History of Left Mastectomy      Cataract extraction status of eye, right      Status post right cataract extraction      REVIEW OF SYSTEM- See HPI    PHYSICAL EXAM:   Vital Signs:  Vital Signs Last 24 Hrs  T(C): 36.5 (12 Dec 2023 07:51), Max: 36.6 (11 Dec 2023 20:13)  T(F): 97.7 (12 Dec 2023 07:51), Max: 97.8 (11 Dec 2023 20:13)  HR: 74 (12 Dec 2023 07:51) (68 - 79)  BP: 163/74 (12 Dec 2023 07:51) (155/69 - 168/84)  BP(mean): --  RR: 16 (12 Dec 2023 07:51) (16 - 17)  SpO2: 95% (12 Dec 2023 07:51) (95% - 96%)    Parameters below as of 12 Dec 2023 07:51  Patient On (Oxygen Delivery Method): room air      Daily     Daily I&O's Summary      GENERAL:  Appears stated age, well-groomed, well-nourished, no distress  HEENT:  Moist and clear sclera and conjunctivae  CHEST:  Full & symmetric excursion, no increased effort, breath sounds clear  HEART:  Regular rhythm, S1, S2  ABDOMEN:  Soft, non-tender, non-distended, normoactive bowel sounds,  no masses   EXTREMITIES:  no edema  SKIN:  No rash  NEURO:  Alert, with forgetfulness no tremor, no encephalopathy,       LABS:                        10.3   6.27  )-----------( 241      ( 11 Dec 2023 07:03 )             32.6     12-11    139  |  103  |  12  ----------------------------<  121<H>  3.7   |  28  |  0.73    Ca    8.6      11 Dec 2023 07:03    TPro  6.0  /  Alb  2.6<L>  /  TBili  0.4  /  DBili  x   /  AST  36  /  ALT  30  /  AlkPhos  74  12-11      Urinalysis Basic - ( 11 Dec 2023 07:03 )    Color: x / Appearance: x / SG: x / pH: x  Gluc: 121 mg/dL / Ketone: x  / Bili: x / Urobili: x   Blood: x / Protein: x / Nitrite: x   Leuk Esterase: x / RBC: x / WBC x   Sq Epi: x / Non Sq Epi: x / Bacteria: x     INTERVAL HPI/OVERNIGHT EVENTS:  HPI:  The patient is an 88 year old female with a history of HTN, hypothyroidism, pacemaker, breast cancer who is admitted with CVA and acute respiratory failure due to IV contrast anaphylaxis. Patient had R sided hemiparesis, expressive aphasia. CT imaging confirmed multiple embolic areas. Pacemaker interrogated showing Atrial runs but no sustained Atrial Fibrillation. Patient currently on course of ASA and statin. Hospital course complicated by severe anaphylactic due to iodine contrast allergy from CT performed on admission to ED. 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  Patient admitted to [facility] on 12/8      Patient seen and examined at bedside. Denies abdominal pain, NDV, no hematemesis or hematochezia. Denies swallowing issues.    MEDICATIONS  (STANDING):  amantadine Syrup 50 milliGRAM(s) Oral <User Schedule>  aspirin 325 milliGRAM(s) Oral daily  atorvastatin 80 milliGRAM(s) Oral at bedtime  bacitracin   Ointment 1 Application(s) Topical two times a day  fluconAZOLE   Tablet 100 milliGRAM(s) Oral daily  influenza  Vaccine (HIGH DOSE) 0.7 milliLiter(s) IntraMuscular once  lidocaine   4% Patch 1 Patch Transdermal daily  lisinopril 20 milliGRAM(s) Oral daily  melatonin 3 milliGRAM(s) Oral at bedtime    MEDICATIONS  (PRN):  acetaminophen   Oral Liquid .. 650 milliGRAM(s) Oral every 6 hours PRN Temp greater or equal to 38.5C (101.3F), Mild Pain (1 - 3)  labetalol 100 milliGRAM(s) Oral every 6 hours PRN SBP>190      Allergies    IV Contrast (Anaphylaxis; Urticaria)  penicillin (Rash)    Intolerances        PAST MEDICAL & SURGICAL HISTORY:  Hyperlipidemia  no current medications      Breast cancer  2009 left - s/pineitan castellanos radical mastectomy      [place] (hard of hearing)  bilateral HA (Yvette)      Meniscus tear  left - 10/2015      Cyst of left ovary      SDH (subdural hematoma)  3/2011 - s/p fall - no surgical intervention      History of Left Mastectomy      Cataract extraction status of eye, right      Status post right cataract extraction      REVIEW OF SYSTEM- See HPI    PHYSICAL EXAM:   Vital Signs:  Vital Signs Last 24 Hrs  T(C): 36.5 (12 Dec 2023 07:51), Max: 36.6 (11 Dec 2023 20:13)  T(F): 97.7 (12 Dec 2023 07:51), Max: 97.8 (11 Dec 2023 20:13)  HR: 74 (12 Dec 2023 07:51) (68 - 79)  BP: 163/74 (12 Dec 2023 07:51) (155/69 - 168/84)  BP(mean): --  RR: 16 (12 Dec 2023 07:51) (16 - 17)  SpO2: 95% (12 Dec 2023 07:51) (95% - 96%)    Parameters below as of 12 Dec 2023 07:51  Patient On (Oxygen Delivery Method): room air      Daily     Daily I&O's Summary      GENERAL:  Appears stated age, well-groomed, well-nourished, no distress  HEENT:  Moist and clear sclera and conjunctivae  CHEST:  Full & symmetric excursion, no increased effort, breath sounds clear  HEART:  Regular rhythm, S1, S2  ABDOMEN:  Soft, non-tender, non-distended, normoactive bowel sounds,  no masses   EXTREMITIES:  no edema  SKIN:  No rash  NEURO:  Alert, with forgetfulness no tremor, no encephalopathy,       LABS:                        10.3   6.27  )-----------( 241      ( 11 Dec 2023 07:03 )             32.6     12-11    139  |  103  |  12  ----------------------------<  121<H>  3.7   |  28  |  0.73    Ca    8.6      11 Dec 2023 07:03    TPro  6.0  /  Alb  2.6<L>  /  TBili  0.4  /  DBili  x   /  AST  36  /  ALT  30  /  AlkPhos  74  12-11      Urinalysis Basic - ( 11 Dec 2023 07:03 )    Color: x / Appearance: x / SG: x / pH: x  Gluc: 121 mg/dL / Ketone: x  / Bili: x / Urobili: x   Blood: x / Protein: x / Nitrite: x   Leuk Esterase: x / RBC: x / WBC x   Sq Epi: x / Non Sq Epi: x / Bacteria: x     GI Consult    The patient is an 88 year old female with a history of HTN, hypothyroidism, pacemaker, breast cancer who is admitted with CVA and acute respiratory failure due to IV contrast anaphylaxis. Patient had R sided hemiparesis, expressive aphasia. CT imaging confirmed multiple embolic areas. Pacemaker interrogated showing Atrial runs but no sustained Atrial Fibrillation. Patient currently on course of ASA and statin. Hospital course complicated by severe anaphylactic due to iodine contrast allergy from CT performed on admission to ED. 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  Patient admitted to [facility] on 12/8    GI consulted for abnormal motility noted on MBS.  Patient seen and examined at bedside. Poor historian. She denies abdominal pain, N/V/D. Denies hematemesis or hematochezia.  Unable to     MEDICATIONS  (STANDING):  amantadine Syrup 50 milliGRAM(s) Oral <User Schedule>  aspirin 325 milliGRAM(s) Oral daily  atorvastatin 80 milliGRAM(s) Oral at bedtime  bacitracin   Ointment 1 Application(s) Topical two times a day  fluconAZOLE   Tablet 100 milliGRAM(s) Oral daily  influenza  Vaccine (HIGH DOSE) 0.7 milliLiter(s) IntraMuscular once  lidocaine   4% Patch 1 Patch Transdermal daily  lisinopril 20 milliGRAM(s) Oral daily  melatonin 3 milliGRAM(s) Oral at bedtime    MEDICATIONS  (PRN):  acetaminophen   Oral Liquid .. 650 milliGRAM(s) Oral every 6 hours PRN Temp greater or equal to 38.5C (101.3F), Mild Pain (1 - 3)  labetalol 100 milliGRAM(s) Oral every 6 hours PRN SBP>190      Allergies    IV Contrast (Anaphylaxis; Urticaria)  penicillin (Rash)    Intolerances        PAST MEDICAL & SURGICAL HISTORY:  Hyperlipidemia  no current medications      Breast cancer  2009 left - s/ping castellanos radical mastectomy      [place] (hard of hearing)  bilateral HA (Yvette)      Meniscus tear  left - 10/2015      Cyst of left ovary      SDH (subdural hematoma)  3/2011 - s/p fall - no surgical intervention      History of Left Mastectomy      Cataract extraction status of eye, right      Status post right cataract extraction      REVIEW OF SYSTEM- See HPI    PHYSICAL EXAM:   Vital Signs:  Vital Signs Last 24 Hrs  T(C): 36.5 (12 Dec 2023 07:51), Max: 36.6 (11 Dec 2023 20:13)  T(F): 97.7 (12 Dec 2023 07:51), Max: 97.8 (11 Dec 2023 20:13)  HR: 74 (12 Dec 2023 07:51) (68 - 79)  BP: 163/74 (12 Dec 2023 07:51) (155/69 - 168/84)  BP(mean): --  RR: 16 (12 Dec 2023 07:51) (16 - 17)  SpO2: 95% (12 Dec 2023 07:51) (95% - 96%)    Parameters below as of 12 Dec 2023 07:51  Patient On (Oxygen Delivery Method): room air      Daily     Daily I&O's Summary      GENERAL:  NAD  HEENT:  Dry oral mucosa. EOMI. Anicteric sclerae  CHEST:  clear  HEART:  Regular rhythm, S1, S2  ABDOMEN:  Soft, non-tender, non-distended, normoactive bowel sounds,  no masses   EXTREMITIES:  no edema  SKIN:  No rash  NEURO:  Alert, with forgetfulness. No tremor, no encephalopathy,       LABS:                        10.3   6.27  )-----------( 241      ( 11 Dec 2023 07:03 )             32.6     12-11    139  |  103  |  12  ----------------------------<  121<H>  3.7   |  28  |  0.73    Ca    8.6      11 Dec 2023 07:03    TPro  6.0  /  Alb  2.6<L>  /  TBili  0.4  /  DBili  x   /  AST  36  /  ALT  30  /  AlkPhos  74  12-11      Urinalysis Basic - ( 11 Dec 2023 07:03 )    Color: x / Appearance: x / SG: x / pH: x  Gluc: 121 mg/dL / Ketone: x  / Bili: x / Urobili: x   Blood: x / Protein: x / Nitrite: x   Leuk Esterase: x / RBC: x / WBC x   Sq Epi: x / Non Sq Epi: x / Bacteria: x     GI Consult    The patient is an 88 year old female with a history of HTN, hypothyroidism, pacemaker, breast cancer who is admitted with CVA and acute respiratory failure due to IV contrast anaphylaxis. Patient had R sided hemiparesis, expressive aphasia. CT imaging confirmed multiple embolic areas. Pacemaker interrogated showing Atrial runs but no sustained Atrial Fibrillation. Patient currently on course of ASA and statin. Hospital course complicated by severe anaphylactic due to iodine contrast allergy from CT performed on admission to ED. 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  Patient admitted to [facility] on 12/8    GI consulted for abnormal motility noted on MBS.  Patient seen and examined at bedside. Poor historian. She denies abdominal pain, N/V/D. Denies hematemesis or hematochezia.  Unable to     MEDICATIONS  (STANDING):  amantadine Syrup 50 milliGRAM(s) Oral <User Schedule>  aspirin 325 milliGRAM(s) Oral daily  atorvastatin 80 milliGRAM(s) Oral at bedtime  bacitracin   Ointment 1 Application(s) Topical two times a day  fluconAZOLE   Tablet 100 milliGRAM(s) Oral daily  influenza  Vaccine (HIGH DOSE) 0.7 milliLiter(s) IntraMuscular once  lidocaine   4% Patch 1 Patch Transdermal daily  lisinopril 20 milliGRAM(s) Oral daily  melatonin 3 milliGRAM(s) Oral at bedtime    MEDICATIONS  (PRN):  acetaminophen   Oral Liquid .. 650 milliGRAM(s) Oral every 6 hours PRN Temp greater or equal to 38.5C (101.3F), Mild Pain (1 - 3)  labetalol 100 milliGRAM(s) Oral every 6 hours PRN SBP>190      Allergies    IV Contrast (Anaphylaxis; Urticaria)  penicillin (Rash)    Intolerances        PAST MEDICAL & SURGICAL HISTORY:  Hyperlipidemia  no current medications      Breast cancer  2009 left - s/ping castellanos radical mastectomy      [place] (hard of hearing)  bilateral HA (Yvette)      Meniscus tear  left - 10/2015      Cyst of left ovary      SDH (subdural hematoma)  3/2011 - s/p fall - no surgical intervention      History of Left Mastectomy      Cataract extraction status of eye, right      Status post right cataract extraction      REVIEW OF SYSTEM- See HPI    PHYSICAL EXAM:   Vital Signs:  Vital Signs Last 24 Hrs  T(C): 36.5 (12 Dec 2023 07:51), Max: 36.6 (11 Dec 2023 20:13)  T(F): 97.7 (12 Dec 2023 07:51), Max: 97.8 (11 Dec 2023 20:13)  HR: 74 (12 Dec 2023 07:51) (68 - 79)  BP: 163/74 (12 Dec 2023 07:51) (155/69 - 168/84)  BP(mean): --  RR: 16 (12 Dec 2023 07:51) (16 - 17)  SpO2: 95% (12 Dec 2023 07:51) (95% - 96%)    Parameters below as of 12 Dec 2023 07:51  Patient On (Oxygen Delivery Method): room air      Daily     Daily I&O's Summary      GENERAL:  NAD  HEENT:  Dry oral mucosa. EOMI. Anicteric sclerae  CHEST:  clear  HEART:  Regular rhythm, S1, S2  ABDOMEN:  Soft, non-tender, non-distended, normoactive bowel sounds,  no masses   EXTREMITIES:  no edema  SKIN:  No rash  NEURO:  Alert, with forgetfulness. No tremor, no encephalopathy,       LABS:                        10.3   6.27  )-----------( 241      ( 11 Dec 2023 07:03 )             32.6     12-11    139  |  103  |  12  ----------------------------<  121<H>  3.7   |  28  |  0.73    Ca    8.6      11 Dec 2023 07:03    TPro  6.0  /  Alb  2.6<L>  /  TBili  0.4  /  DBili  x   /  AST  36  /  ALT  30  /  AlkPhos  74  12-11      Urinalysis Basic - ( 11 Dec 2023 07:03 )    Color: x / Appearance: x / SG: x / pH: x  Gluc: 121 mg/dL / Ketone: x  / Bili: x / Urobili: x   Blood: x / Protein: x / Nitrite: x   Leuk Esterase: x / RBC: x / WBC x   Sq Epi: x / Non Sq Epi: x / Bacteria: x

## 2023-12-12 NOTE — DIETITIAN INITIAL EVALUATION ADULT - ADD RECOMMEND
1. Continue with regular diet (puree + moderately thick)   - Diet texture per SLP  - Encourage high-kcal/high-protein foods  - Honor food preferences  2. Add MVI for general nutrient coverage  3. Appreciate weekly weight trends  4. Continue with bowel regimen, prn

## 2023-12-12 NOTE — DIETITIAN INITIAL EVALUATION ADULT - NSICDXPASTMEDICALHX_GEN_ALL_CORE_FT
PAST MEDICAL HISTORY:  Breast cancer 2009 left - s/neida castellanos radical mastectomy    Cyst of left ovary     [place] (hard of hearing) bilateral HA (Yvette)    Hyperlipidemia no current medications    Meniscus tear left - 10/2015    SDH (subdural hematoma) 3/2011 - s/p fall - no surgical intervention     PAST MEDICAL HISTORY:  Breast cancer 2009 left - s/neida castellanos radical mastectomy    Cyst of left ovary     [place] (hard of hearing) bilateral HA (Yvette)    Hyperlipidemia no current medications    Meniscus tear left - 10/2015    SDH (subdural hematoma) 3/2011 - s/p fall - no surgical intervention

## 2023-12-12 NOTE — CONSULT NOTE ADULT - PROBLEM SELECTOR RECOMMENDATION 9
- Puree diet and thin liquids as per speech and swallow  - Monitor diet tolerance  - Elevate HOB while eating  - Monitor patient during meals  - No GI interventions - Puree diet and thin liquids as per speech and swallow  - Monitor diet tolerance  - Elevate HOB while eating  - Monitor patient during meals  - No GI interventions recommended at this time unless she develops ev dysphagia or aspiration.

## 2023-12-12 NOTE — PROGRESS NOTE ADULT - NSPROGADDITIONALINFOA_GEN_ALL_CORE
Spent 1 hour on evaluating, examining and with team meeting where we discussed patient's progress and discharge plan.  Excluding teaching time

## 2023-12-12 NOTE — CONSULT NOTE ADULT - NS ATTEND AMEND GEN_ALL_CORE FT
Patient seen and examined at the bedside. Agree with the assessment and plan of UNRULY Roberts.  No GI intervention at this time. Please reconsult if needed.

## 2023-12-12 NOTE — PROGRESS NOTE ADULT - SUBJECTIVE AND OBJECTIVE BOX
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 Patient had R sided hemiparesis, expressive aphasia. CT imaging confirmed multiple embolic areas. Pacemaker interrogated showing Atrial runs but no sustained Atrial Fibrillation. Patient currently on course of ASA and statin. Hospital course complicated by severe anaphylactic due to iodine contrast allergy from CT performed on admission to ED. 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      Patient was evaluated by PM&R and therapy for functional deficits and gait/ ADL impairments and recommended acute rehabilitation. Patient was medically optimized for discharge to [facility] on 12/8         (08 Dec 2023 14:05)      Allergies:  IV Contrast (Anaphylaxis; Urticaria)  penicillin (Rash)      Subjective:  - Patient was seen and examined at chair with no complaints. No acute events overnight. Patient slept well last night. Patient did have a slight headached. When asked what month it is said November and know she is in [facility]. Oriented to her situation that she had a stroke". Has some difficulty with saying what year it is. Plan for MBS today  - Pain is well controlled with current meds.    - BM up to date   - Tolerating and participating in 3 hours of daily therapy, progressing      ROS:  - Denies CP, palpitation, cough, fever, SOB, abdominal pain, N/V/D, weakness, joint pain/swelling or constipation.     MEDICATIONS  (STANDING):  amantadine Syrup 50 milliGRAM(s) Oral <User Schedule>  aspirin 325 milliGRAM(s) Oral daily  atorvastatin 80 milliGRAM(s) Oral at bedtime  bacitracin   Ointment 1 Application(s) Topical two times a day  fluconAZOLE   Tablet 100 milliGRAM(s) Oral daily  influenza  Vaccine (HIGH DOSE) 0.7 milliLiter(s) IntraMuscular once  lidocaine   4% Patch 1 Patch Transdermal daily  lisinopril 20 milliGRAM(s) Oral daily  melatonin 3 milliGRAM(s) Oral at bedtime    MEDICATIONS  (PRN):  acetaminophen   Oral Liquid .. 650 milliGRAM(s) Oral every 6 hours PRN Temp greater or equal to 38.5C (101.3F), Mild Pain (1 - 3)  labetalol 100 milliGRAM(s) Oral every 6 hours PRN SBP>190    LABS                10.3   6.27  )-----------( 241      ( 11 Dec 2023 07:03 )             32.6     12-11    139  |  103  |  12  ----------------------------<  121<H>  3.7   |  28  |  0.73    Ca    8.6      11 Dec 2023 07:03    TPro  6.0  /  Alb  2.6<L>  /  TBili  0.4  /  DBili  x   /  AST  36  /  ALT  30  /  AlkPhos  74  12-11      Urinalysis Basic - ( 11 Dec 2023 07:03 )    Color: x / Appearance: x / SG: x / pH: x  Gluc: 121 mg/dL / Ketone: x  / Bili: x / Urobili: x   Blood: x / Protein: x / Nitrite: x   Leuk Esterase: x / RBC: x / WBC x   Sq Epi: x / Non Sq Epi: x / Bacteria: x      CAPILLARY BLOOD GLUCOSE    Radiology:      CT Brain Stroke Protocol (12.04.23 @ 11:46)   IMPRESSION:  Large left MCA territory acute to early subacute infarction. 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  No acute intracranial hemorrhage.        Physical Exam:     Vital Signs Last 24 Hrs  T(C): 36.5 (12 Dec 2023 07:51), Max: 36.6 (11 Dec 2023 20:13)  T(F): 97.7 (12 Dec 2023 07:51), Max: 97.8 (11 Dec 2023 20:13)  HR: 74 (12 Dec 2023 07:51) (68 - 79)  BP: 163/74 (12 Dec 2023 07:51) (155/69 - 168/84)  BP(mean): --  RR: 16 (12 Dec 2023 07:51) (16 - 17)  SpO2: 95% (12 Dec 2023 07:51) (95% - 96%)    Parameters below as of 12 Dec 2023 07:51  Patient On (Oxygen Delivery Method): room air      Constitutional - NAD, Comfortable  HEENT - NCAT, EOMI, AMANDA  Neck - Supple, No limited ROM  Chest - good chest expansion, good respiratory effort, CTAB   Cardio - warm and well perfused, RRR   Abdomen -  Soft, NT, ND, (+) BS  Extremities - No peripheral edema, No calf tenderness   Neurologic Exam:                    Cognitive -             Orientation: AAOx 2, knows she had a stroke, oriented to Self, oriented to place, stated month is November, does not recall specific year.      Speech - Fluent, + dysarthria, No aphasia      Cranial Nerves - (+) right facial asymmetry, Tongue midline, EOMI, Shoulder shrug intact     Motor -                      LEFT    UE - ShAB 5/5, EF 5/5, EE 5/5, WE 5/5,  WNL                    RIGHT UE - ShAB 4-/5, EF 4-/5, EE 3/5, WE 4/5,  4/5                    LEFT    LE - HF 5/5, KE 5/5, DF 5/5, PF 5/5                    RIGHT LE - HF 4/5, KE 4/5, DF 4+/5, PF 4/5       Skin on admission: venous stasis ulcer on anterior shin 4cm x 4cm. 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 Patient had R sided hemiparesis, expressive aphasia. CT imaging confirmed multiple embolic areas. Pacemaker interrogated showing Atrial runs but no sustained Atrial Fibrillation. Patient currently on course of ASA and statin. Hospital course complicated by severe anaphylactic due to iodine contrast allergy from CT performed on admission to ED. 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      Patient was evaluated by PM&R and therapy for functional deficits and gait/ ADL impairments and recommended acute rehabilitation. Patient was medically optimized for discharge to [facility] on 12/8         (08 Dec 2023 14:05)      Allergies:  IV Contrast (Anaphylaxis; Urticaria)  penicillin (Rash)      Subjective:  - Patient was seen and examined at chair with no complaints. No acute events overnight. Patient slept well last night. Patient did have a slight headached. When asked what month it is said November and know she is in [facility]. Oriented to her situation that she had a stroke". Has some difficulty with saying what year it is. Plan for MBS today  - Pain is well controlled with current meds.    - BM up to date   - Tolerating and participating in 3 hours of daily therapy, progressing      ROS:  - Denies CP, palpitation, cough, fever, SOB, abdominal pain, N/V/D, weakness, joint pain/swelling or constipation.     MEDICATIONS  (STANDING):  amantadine Syrup 50 milliGRAM(s) Oral <User Schedule>  aspirin 325 milliGRAM(s) Oral daily  atorvastatin 80 milliGRAM(s) Oral at bedtime  bacitracin   Ointment 1 Application(s) Topical two times a day  fluconAZOLE   Tablet 100 milliGRAM(s) Oral daily  influenza  Vaccine (HIGH DOSE) 0.7 milliLiter(s) IntraMuscular once  lidocaine   4% Patch 1 Patch Transdermal daily  lisinopril 20 milliGRAM(s) Oral daily  melatonin 3 milliGRAM(s) Oral at bedtime    MEDICATIONS  (PRN):  acetaminophen   Oral Liquid .. 650 milliGRAM(s) Oral every 6 hours PRN Temp greater or equal to 38.5C (101.3F), Mild Pain (1 - 3)  labetalol 100 milliGRAM(s) Oral every 6 hours PRN SBP>190    LABS                10.3   6.27  )-----------( 241      ( 11 Dec 2023 07:03 )             32.6     12-11    139  |  103  |  12  ----------------------------<  121<H>  3.7   |  28  |  0.73    Ca    8.6      11 Dec 2023 07:03    TPro  6.0  /  Alb  2.6<L>  /  TBili  0.4  /  DBili  x   /  AST  36  /  ALT  30  /  AlkPhos  74  12-11      Urinalysis Basic - ( 11 Dec 2023 07:03 )    Color: x / Appearance: x / SG: x / pH: x  Gluc: 121 mg/dL / Ketone: x  / Bili: x / Urobili: x   Blood: x / Protein: x / Nitrite: x   Leuk Esterase: x / RBC: x / WBC x   Sq Epi: x / Non Sq Epi: x / Bacteria: x      CAPILLARY BLOOD GLUCOSE    Radiology:      CT Brain Stroke Protocol (12.04.23 @ 11:46)   IMPRESSION:  Large left MCA territory acute to early subacute infarction. 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  No acute intracranial hemorrhage.        Physical Exam:     Vital Signs Last 24 Hrs  T(C): 36.5 (12 Dec 2023 07:51), Max: 36.6 (11 Dec 2023 20:13)  T(F): 97.7 (12 Dec 2023 07:51), Max: 97.8 (11 Dec 2023 20:13)  HR: 74 (12 Dec 2023 07:51) (68 - 79)  BP: 163/74 (12 Dec 2023 07:51) (155/69 - 168/84)  BP(mean): --  RR: 16 (12 Dec 2023 07:51) (16 - 17)  SpO2: 95% (12 Dec 2023 07:51) (95% - 96%)    Parameters below as of 12 Dec 2023 07:51  Patient On (Oxygen Delivery Method): room air      Constitutional - NAD, Comfortable  HEENT - NCAT, EOMI, AMANDA  Neck - Supple, No limited ROM  Chest - good chest expansion, good respiratory effort, CTAB   Cardio - warm and well perfused, RRR   Abdomen -  Soft, NT, ND, (+) BS  Extremities - No peripheral edema, No calf tenderness   Neurologic Exam:                    Cognitive -             Orientation: AAOx 2, knows she had a stroke, oriented to Self, oriented to place, stated month is November, does not recall specific year.      Speech - Fluent, + dysarthria, No aphasia      Cranial Nerves - (+) right facial asymmetry, Tongue midline, EOMI, Shoulder shrug intact     Motor -                      LEFT    UE - ShAB 5/5, EF 5/5, EE 5/5, WE 5/5,  WNL                    RIGHT UE - ShAB 4-/5, EF 4-/5, EE 3/5, WE 4/5,  4/5                    LEFT    LE - HF 5/5, KE 5/5, DF 5/5, PF 5/5                    RIGHT LE - HF 4/5, KE 4/5, DF 4+/5, PF 4/5       Skin on admission: venous stasis ulcer on anterior shin 4cm x 4cm. 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 Patient had R sided hemiparesis, expressive aphasia. CT imaging confirmed multiple embolic areas. Pacemaker interrogated showing Atrial runs but no sustained Atrial Fibrillation. Patient currently on course of ASA and statin. Hospital course complicated by severe anaphylactic due to iodine contrast allergy from CT performed on admission to ED. CT head (12/4) showed Large left MCA territory acute to early subacute infarction, thrombosis of multiple distal branches of the left MCA and no acute intracranial hemorrhage  Patient was evaluated by PM&R and therapy for functional deficits and gait/ ADL impairments and recommended acute rehabilitation. Patient was medically optimized for discharge to [facility] on 12/8      Allergies:  IV Contrast (Anaphylaxis; Urticaria)  penicillin (Rash)      Subjective:  - Patient was seen and examined at chair with no complaints. No acute events overnight.   - Patient slept well last night. Patient did have a slight headache When asked what month it is said November and know she is in [facility] Oriented to her situation that she had a stroke". Has some difficulty with saying what year it is. Plan for MBS today  - Pain is well controlled with current meds.    - BM (12/12), voiding without issues    - Tolerating and participating in 3 hours of daily therapy, progressing   - Case discussed at IDT meeting this am for progress and discharge plan.     ROS:  - Denies CP, palpitation, cough, fever, SOB, abdominal pain, N/V/D, weakness, joint pain/swelling or constipation.     MEDICATIONS  (STANDING):  amantadine Syrup 50 milliGRAM(s) Oral <User Schedule>  aspirin 325 milliGRAM(s) Oral daily  atorvastatin 80 milliGRAM(s) Oral at bedtime  bacitracin   Ointment 1 Application(s) Topical two times a day  fluconAZOLE   Tablet 100 milliGRAM(s) Oral daily  influenza  Vaccine (HIGH DOSE) 0.7 milliLiter(s) IntraMuscular once  lidocaine   4% Patch 1 Patch Transdermal daily  lisinopril 20 milliGRAM(s) Oral daily  melatonin 3 milliGRAM(s) Oral at bedtime    MEDICATIONS  (PRN):  acetaminophen   Oral Liquid .. 650 milliGRAM(s) Oral every 6 hours PRN Temp greater or equal to 38.5C (101.3F), Mild Pain (1 - 3)  labetalol 100 milliGRAM(s) Oral every 6 hours PRN SBP>190    LABS                10.3   6.27  )-----------( 241      ( 11 Dec 2023 07:03 )             32.6     12-11    139  |  103  |  12  ----------------------------<  121<H>  3.7   |  28  |  0.73    Ca    8.6      11 Dec 2023 07:03    TPro  6.0  /  Alb  2.6<L>  /  TBili  0.4  /  DBili  x   /  AST  36  /  ALT  30  /  AlkPhos  74  12-11    Radiology:    CT Brain Stroke Protocol (12.04.23 @ 11:46)   IMPRESSION:  Large left MCA territory acute to early subacute infarction. 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  No acute intracranial hemorrhage.    Physical Exam:   Vital Signs Last 24 Hrs  T(C): 36.5 (12 Dec 2023 07:51), Max: 36.6 (11 Dec 2023 20:13)  T(F): 97.7 (12 Dec 2023 07:51), Max: 97.8 (11 Dec 2023 20:13)  HR: 74 (12 Dec 2023 07:51) (68 - 79)  BP: 163/74 (12 Dec 2023 07:51) (155/69 - 168/84)  RR: 16 (12 Dec 2023 07:51) (16 - 17)  SpO2: 95% (12 Dec 2023 07:51) (95% - 96%)    Constitutional - NAD, Comfortable  HEENT - NCAT, EOMI, AMANDA, (+) oral thrush   Neck - Supple, No limited ROM  Chest - good chest expansion, good respiratory effort, CTAB   Cardio - warm and well perfused, RRR   Abdomen -  Soft, NT, ND, (+) BS  Extremities - No peripheral edema, No calf tenderness   Neurologic Exam:                    Cognitive -             Orientation: AAOx 2, knows she had a stroke, oriented to Self, oriented to place, stated month is November, does not recall specific year. Easily distracted      Speech - Fluent, + dysarthria, No aphasia      Cranial Nerves - (+) right facial asymmetry, Tongue midline, EOMI, Shoulder shrug intact     Motor -                      LEFT    UE - ShAB 5/5, EF 5/5, EE 5/5, WE 5/5,  WNL                    RIGHT UE - ShAB 4-/5, EF 4-/5, EE 3/5, WE 4/5,  4/5                    LEFT    LE - HF 5/5, KE 5/5, DF 5/5, PF 5/5                    RIGHT LE - HF 4/5, KE 4/5, DF 4+/5, PF 4/5       Skin on admission: venous stasis ulcer on anterior shin 4cm x 4cm. 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 Patient had R sided hemiparesis, expressive aphasia. CT imaging confirmed multiple embolic areas. Pacemaker interrogated showing Atrial runs but no sustained Atrial Fibrillation. Patient currently on course of ASA and statin. Hospital course complicated by severe anaphylactic due to iodine contrast allergy from CT performed on admission to ED. CT head (12/4) showed Large left MCA territory acute to early subacute infarction, thrombosis of multiple distal branches of the left MCA and no acute intracranial hemorrhage  Patient was evaluated by PM&R and therapy for functional deficits and gait/ ADL impairments and recommended acute rehabilitation. Patient was medically optimized for discharge to [facility] on 12/8      Allergies:  IV Contrast (Anaphylaxis; Urticaria)  penicillin (Rash)      Subjective:  - Patient was seen and examined at chair with no complaints. No acute events overnight.   - Patient slept well last night. Patient did have a slight headache When asked what month it is said November and know she is in [facility] Oriented to her situation that she had a stroke". Has some difficulty with saying what year it is. Plan for MBS today  - Pain is well controlled with current meds.    - BM (12/12), voiding without issues    - Tolerating and participating in 3 hours of daily therapy, progressing   - Case discussed at IDT meeting this am for progress and discharge plan.     ROS:  - Denies CP, palpitation, cough, fever, SOB, abdominal pain, N/V/D, weakness, joint pain/swelling or constipation.     MEDICATIONS  (STANDING):  amantadine Syrup 50 milliGRAM(s) Oral <User Schedule>  aspirin 325 milliGRAM(s) Oral daily  atorvastatin 80 milliGRAM(s) Oral at bedtime  bacitracin   Ointment 1 Application(s) Topical two times a day  fluconAZOLE   Tablet 100 milliGRAM(s) Oral daily  influenza  Vaccine (HIGH DOSE) 0.7 milliLiter(s) IntraMuscular once  lidocaine   4% Patch 1 Patch Transdermal daily  lisinopril 20 milliGRAM(s) Oral daily  melatonin 3 milliGRAM(s) Oral at bedtime    MEDICATIONS  (PRN):  acetaminophen   Oral Liquid .. 650 milliGRAM(s) Oral every 6 hours PRN Temp greater or equal to 38.5C (101.3F), Mild Pain (1 - 3)  labetalol 100 milliGRAM(s) Oral every 6 hours PRN SBP>190    LABS                10.3   6.27  )-----------( 241      ( 11 Dec 2023 07:03 )             32.6     12-11    139  |  103  |  12  ----------------------------<  121<H>  3.7   |  28  |  0.73    Ca    8.6      11 Dec 2023 07:03    TPro  6.0  /  Alb  2.6<L>  /  TBili  0.4  /  DBili  x   /  AST  36  /  ALT  30  /  AlkPhos  74  12-11    Radiology:    CT Brain Stroke Protocol (12.04.23 @ 11:46)   IMPRESSION:  Large left MCA territory acute to early subacute infarction. 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  No acute intracranial hemorrhage.    Physical Exam:   Vital Signs Last 24 Hrs  T(C): 36.5 (12 Dec 2023 07:51), Max: 36.6 (11 Dec 2023 20:13)  T(F): 97.7 (12 Dec 2023 07:51), Max: 97.8 (11 Dec 2023 20:13)  HR: 74 (12 Dec 2023 07:51) (68 - 79)  BP: 163/74 (12 Dec 2023 07:51) (155/69 - 168/84)  RR: 16 (12 Dec 2023 07:51) (16 - 17)  SpO2: 95% (12 Dec 2023 07:51) (95% - 96%)    Constitutional - NAD, Comfortable  HEENT - NCAT, EOMI, AMANDA, (+) oral thrush   Neck - Supple, No limited ROM  Chest - good chest expansion, good respiratory effort, CTAB   Cardio - warm and well perfused, RRR   Abdomen -  Soft, NT, ND, (+) BS  Extremities - No peripheral edema, No calf tenderness   Neurologic Exam:                    Cognitive -             Orientation: AAOx 2, knows she had a stroke, oriented to Self, oriented to place, stated month is November, does not recall specific year. Easily distracted      Speech - Fluent, + dysarthria, No aphasia      Cranial Nerves - (+) right facial asymmetry, Tongue midline, EOMI, Shoulder shrug intact     Motor -                      LEFT    UE - ShAB 5/5, EF 5/5, EE 5/5, WE 5/5,  WNL                    RIGHT UE - ShAB 4-/5, EF 4-/5, EE 3/5, WE 4/5,  4/5                    LEFT    LE - HF 5/5, KE 5/5, DF 5/5, PF 5/5                    RIGHT LE - HF 4/5, KE 4/5, DF 4+/5, PF 4/5       Skin on admission: venous stasis ulcer on anterior shin 4cm x 4cm. 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 Patient had R sided hemiparesis, expressive aphasia. CT imaging confirmed multiple embolic areas. Pacemaker interrogated showing Atrial runs but no sustained Atrial Fibrillation. Patient currently on course of ASA and statin. Hospital course complicated by severe anaphylactic due to iodine contrast allergy from CT performed on admission to ED. CT head (12/4) showed Large left MCA territory acute to early subacute infarction, thrombosis of multiple distal branches of the left MCA and no acute intracranial hemorrhage  Patient was evaluated by PM&R and therapy for functional deficits and gait/ ADL impairments and recommended acute rehabilitation. Patient was medically optimized for discharge to [facility] on 12/8      Allergies:  IV Contrast (Anaphylaxis; Urticaria)  penicillin (Rash)      Subjective:  - Patient was seen and examined at chair with no complaints. No acute events overnight.   - Patient slept well last night. Patient did have a slight headache When asked what month it is said November and know she is in [facility] Oriented to her situation that she had a stroke". Has some difficulty with saying what year it is. Plan for MBS today  - Pain is well controlled with current meds.    - BM (12/12), voiding without issues    - Tolerating and participating in 3 hours of daily therapy, progressing   - Case discussed at IDT meeting this am for progress and discharge plan.     ROS:  - Denies CP, palpitation, cough, fever, SOB, abdominal pain, N/V/D, weakness, joint pain/swelling or constipation.     MEDICATIONS  (STANDING):  amantadine Syrup 50 milliGRAM(s) Oral <User Schedule>  aspirin 325 milliGRAM(s) Oral daily  atorvastatin 80 milliGRAM(s) Oral at bedtime  bacitracin   Ointment 1 Application(s) Topical two times a day  fluconAZOLE   Tablet 100 milliGRAM(s) Oral daily  influenza  Vaccine (HIGH DOSE) 0.7 milliLiter(s) IntraMuscular once  lidocaine   4% Patch 1 Patch Transdermal daily  lisinopril 20 milliGRAM(s) Oral daily  melatonin 3 milliGRAM(s) Oral at bedtime    MEDICATIONS  (PRN):  acetaminophen   Oral Liquid .. 650 milliGRAM(s) Oral every 6 hours PRN Temp greater or equal to 38.5C (101.3F), Mild Pain (1 - 3)  labetalol 100 milliGRAM(s) Oral every 6 hours PRN SBP>190    LABS                10.3   6.27  )-----------( 241      ( 11 Dec 2023 07:03 )             32.6     12-11    139  |  103  |  12  ----------------------------<  121<H>  3.7   |  28  |  0.73    Ca    8.6      11 Dec 2023 07:03    TPro  6.0  /  Alb  2.6<L>  /  TBili  0.4  /  DBili  x   /  AST  36  /  ALT  30  /  AlkPhos  74  12-11    Radiology:    CT Brain Stroke Protocol (12.04.23 @ 11:46)   IMPRESSION:  Large left MCA territory acute to early subacute infarction. 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  No acute intracranial hemorrhage.    Physical Exam:   Vital Signs Last 24 Hrs  T(C): 36.5 (12 Dec 2023 07:51), Max: 36.6 (11 Dec 2023 20:13)  T(F): 97.7 (12 Dec 2023 07:51), Max: 97.8 (11 Dec 2023 20:13)  HR: 74 (12 Dec 2023 07:51) (68 - 79)  BP: 163/74 (12 Dec 2023 07:51) (155/69 - 168/84)  RR: 16 (12 Dec 2023 07:51) (16 - 17)  SpO2: 95% (12 Dec 2023 07:51) (95% - 96%)    Constitutional - NAD, Comfortable  HEENT - NCAT, EOMI, AMANDA, (+) oral thrush   Neck - Supple, No limited ROM  Chest - good chest expansion, good respiratory effort, CTAB   Cardio - warm and well perfused, RRR   Abdomen -  Soft, NT, ND, (+) BS  Extremities - No peripheral edema, No calf tenderness   Neurologic Exam:                    Cognitive -             Orientation: AAOx 2, knows she had a stroke, oriented to Self, oriented to place, stated month is November, does not recall specific year. Easily distracted      Speech - Fluent, + dysarthria, No aphasia      Cranial Nerves - (+) right facial asymmetry, Tongue midline, EOMI, Shoulder shrug intact     Motor -                      LEFT    UE - ShAB 5/5, EF 5/5, EE 5/5, WE 5/5,  WNL                    RIGHT UE - ShAB 4-/5, EF 4-/5, EE 3/5, WE 4/5,  4/5                    LEFT    LE - HF 5/5, KE 5/5, DF 5/5, PF 5/5                    RIGHT LE - HF 4/5, KE 4/5, DF 4+/5, PF 4/5       Right side inattention, Apraxia, of RUE, impulsive  Psychiatry: Flat affect  Skin on admission: venous stasis ulcer on anterior shin 4cm x 4cm. LLE    IDT 12/12  TDD: 12/22 Home   Nursing: CVA AAOx2 impulsive, precautions falls   SLP: pureed with thin liquids, impulsivity, sev cog deficits, R sided inattention   SW: lives in private home, flight of stairs inside, 1 step inside, children live in [place] and cannot support, son helps with getting groceries and lives with patient   OT: min-A across the board, sup for eating, mod-A toileting, shower transfer not assessed,   R extremtiy apraxia, R side inattention, needs 24/7 sueprvision   PT; min-A BM transfers, 75 ft with RW min-A  Goals: 1. Ambulate to bathroom supervision, 1. 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 Patient had R sided hemiparesis, expressive aphasia. CT imaging confirmed multiple embolic areas. Pacemaker interrogated showing Atrial runs but no sustained Atrial Fibrillation. Patient currently on course of ASA and statin. Hospital course complicated by severe anaphylactic due to iodine contrast allergy from CT performed on admission to ED. CT head (12/4) showed Large left MCA territory acute to early subacute infarction, thrombosis of multiple distal branches of the left MCA and no acute intracranial hemorrhage  Patient was evaluated by PM&R and therapy for functional deficits and gait/ ADL impairments and recommended acute rehabilitation. Patient was medically optimized for discharge to [facility] on 12/8      Allergies:  IV Contrast (Anaphylaxis; Urticaria)  penicillin (Rash)      Subjective:  - Patient was seen and examined at chair with no complaints. No acute events overnight.   - Patient slept well last night. Patient did have a slight headache When asked what month it is said November and know she is in [facility] Oriented to her situation that she had a stroke". Has some difficulty with saying what year it is. Plan for MBS today  - Pain is well controlled with current meds.    - BM (12/12), voiding without issues    - Tolerating and participating in 3 hours of daily therapy, progressing   - Case discussed at IDT meeting this am for progress and discharge plan.     ROS:  - Denies CP, palpitation, cough, fever, SOB, abdominal pain, N/V/D, weakness, joint pain/swelling or constipation.     MEDICATIONS  (STANDING):  amantadine Syrup 50 milliGRAM(s) Oral <User Schedule>  aspirin 325 milliGRAM(s) Oral daily  atorvastatin 80 milliGRAM(s) Oral at bedtime  bacitracin   Ointment 1 Application(s) Topical two times a day  fluconAZOLE   Tablet 100 milliGRAM(s) Oral daily  influenza  Vaccine (HIGH DOSE) 0.7 milliLiter(s) IntraMuscular once  lidocaine   4% Patch 1 Patch Transdermal daily  lisinopril 20 milliGRAM(s) Oral daily  melatonin 3 milliGRAM(s) Oral at bedtime    MEDICATIONS  (PRN):  acetaminophen   Oral Liquid .. 650 milliGRAM(s) Oral every 6 hours PRN Temp greater or equal to 38.5C (101.3F), Mild Pain (1 - 3)  labetalol 100 milliGRAM(s) Oral every 6 hours PRN SBP>190    LABS                10.3   6.27  )-----------( 241      ( 11 Dec 2023 07:03 )             32.6     12-11    139  |  103  |  12  ----------------------------<  121<H>  3.7   |  28  |  0.73    Ca    8.6      11 Dec 2023 07:03    TPro  6.0  /  Alb  2.6<L>  /  TBili  0.4  /  DBili  x   /  AST  36  /  ALT  30  /  AlkPhos  74  12-11    Radiology:    CT Brain Stroke Protocol (12.04.23 @ 11:46)   IMPRESSION:  Large left MCA territory acute to early subacute infarction. 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  No acute intracranial hemorrhage.    Physical Exam:   Vital Signs Last 24 Hrs  T(C): 36.5 (12 Dec 2023 07:51), Max: 36.6 (11 Dec 2023 20:13)  T(F): 97.7 (12 Dec 2023 07:51), Max: 97.8 (11 Dec 2023 20:13)  HR: 74 (12 Dec 2023 07:51) (68 - 79)  BP: 163/74 (12 Dec 2023 07:51) (155/69 - 168/84)  RR: 16 (12 Dec 2023 07:51) (16 - 17)  SpO2: 95% (12 Dec 2023 07:51) (95% - 96%)    Constitutional - NAD, Comfortable  HEENT - NCAT, EOMI, AMANDA, (+) oral thrush   Neck - Supple, No limited ROM  Chest - good chest expansion, good respiratory effort, CTAB   Cardio - warm and well perfused, RRR   Abdomen -  Soft, NT, ND, (+) BS  Extremities - No peripheral edema, No calf tenderness   Neurologic Exam:                    Cognitive -             Orientation: AAOx 2, knows she had a stroke, oriented to Self, oriented to place, stated month is November, does not recall specific year. Easily distracted      Speech - Fluent, + dysarthria, No aphasia      Cranial Nerves - (+) right facial asymmetry, Tongue midline, EOMI, Shoulder shrug intact     Motor -                      LEFT    UE - ShAB 5/5, EF 5/5, EE 5/5, WE 5/5,  WNL                    RIGHT UE - ShAB 4-/5, EF 4-/5, EE 3/5, WE 4/5,  4/5                    LEFT    LE - HF 5/5, KE 5/5, DF 5/5, PF 5/5                    RIGHT LE - HF 4/5, KE 4/5, DF 4+/5, PF 4/5       Right side inattention, Apraxia, of RUE, impulsive  Psychiatry: Flat affect  Skin on admission: venous stasis ulcer on anterior shin 4cm x 4cm. LLE    IDT 12/12  TDD: 12/22 Home   Nursing: CVA AAOx2 impulsive, precautions falls   SLP: pureed with thin liquids, impulsivity, sev cog deficits, R sided inattention   SW: lives in private home, flight of stairs inside, 1 step inside, children live in [place] and cannot support, son helps with getting groceries and lives with patient   OT: min-A across the board, sup for eating, mod-A toileting, shower transfer not assessed,   R extremtiy apraxia, R side inattention, needs 24/7 sueprvision   PT; min-A BM transfers, 75 ft with RW min-A  Goals: 1. Ambulate to bathroom supervision, 1. attend to functional tasks for 3-5 minute intervals with verbal cueing

## 2023-12-12 NOTE — PROGRESS NOTE ADULT - ASSESSMENT
88 year old female with a history of HTN, hypothyroidism, pacemaker, breast cancer who is admitted with CVA and acute respiratory failure due to IV contrast anaphylaxis. 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 Admitted for multidisciplinary rehab program    #L MCA CVA, Right   -ASA 325mg daily  -lipitor 80mg at bedtime   -Pacemaker interrogated showing Atrial runs but no sustained Atrial Fibrillation   CT head (12/4) showed Large left MCA territory acute to early subacute infarction, thrombosis of multiple distal branches of the left MCA and no acute intracranial hemorrhage.    #Recent anaphylactic shock from Iodine contrast dye from CT  - resolved and extubated.      #Thrush  - cont with fluconazole for total 7 days and continue with oral care     #Leg lower leg non healing wound  - wound care consult    #HTN  - BP currently controlled  - Lisinopril 20mg PO QD     #Breast ca  - history of mastectomy       DVT ppx: if no CI, consider lovenox....

## 2023-12-12 NOTE — PROGRESS NOTE ADULT - ASSESSMENT
The patient is an 88 year old female with a history of HTN, hypothyroidism, pacemaker, breast cancer who is admitted with CVA and acute respiratory failure due to IV contrast anaphylaxis.CT head (12/4) showed Large left MCA territory acute to early subacute infarction, thrombosis of multiple distal branches of the left MCA and no acute intracranial hemorrhage. Admitted for multidisciplinary rehab program    #L MCA CVA, Right dominant hemiparesis    -Pacemaker interrogated showing Atrial runs but no sustained Atrial Fibrillation   CT head (12/4) showed Large left MCA territory acute to early subacute infarction, thrombosis of multiple distal branches of the left MCA and no acute intracranial hemorrhage.  -ASA 325mg PO QD   -atorvastatin 80mg PO at bedtime   #Comprehensive Multidisciplinary Rehab Program:  - Gait, ADL, Functional impairments  - PT/OT/ SLP 3 hours a day 5 days a week, 1 hour each     #HTN  - Labetalol 100mg PO Q6 PRN for SBP>190  - Lisinopril 20mg PO QD     #Mood / Cognition:  - Neuropsych evaluation PRN  - Amantadine 50 mg po at 6 am and 12 pm.  - EKG     #Sleep:  - Maintain quiet hours and low stim environment   melatonin 3mg HSfor sleep    #Pain:  - Tylenol PRN  - lidocaine patch for backpain  - avoid sedating meds that may affect cognitive recovery    #/Bladder:  - Monitor PVR if no void in 8h; SC for >400 cc  - Toileting schedule q4h  - (+) Incontinence     #Diet / Dysphagia:    - Diet: Pureed, Moderately thick liquids  - ongoing SLP assessment- MBS scheduled   - Nutrition to follow    #Skin/ Pressure Injury Prevention:  - assessment on admission: venous stasis ulcer in L anterior shin  [id number]   - Turn Q2hrs in bed while awake, OOB to Chair, PT/OT/SLP     #DVT prophylaxis:  - ASA 325mg PO QD     #Precautions/ Restrictions  - Falls, Aspiration  - COVID PCR:        The patient is an 88 year old female with a history of HTN, hypothyroidism, pacemaker, breast cancer who is admitted with CVA and acute respiratory failure due to IV contrast anaphylaxis.CT head (12/4) showed Large left MCA territory acute to early subacute infarction, thrombosis of multiple distal branches of the left MCA and no acute intracranial hemorrhage. Admitted for multidisciplinary rehab program    #L MCA CVA, Right dominant hemiparesis    -Pacemaker interrogated showing Atrial runs but no sustained Atrial Fibrillation   CT head (12/4) showed Large left MCA territory acute to early subacute infarction, thrombosis of multiple distal branches of the left MCA and no acute intracranial hemorrhage.  -ASA 325mg PO QD   -atorvastatin 80mg PO at bedtime   #Comprehensive Multidisciplinary Rehab Program:  - Gait, ADL, Functional impairments  - PT/OT/ SLP 3 hours a day 5 days a week, 1 hour each     #HTN  - Labetalol 100mg PO Q6 PRN for SBP>190  - Lisinopril 20mg PO QD     #Mood / Cognition:  - Neuropsych evaluation PRN  - Amantadine 50 mg po at 6 am and 12 pm.  - EKG     #Sleep:  - Maintain quiet hours and low stim environment   melatonin 3mg HSfor sleep    #Pain:  - Tylenol PRN  - lidocaine patch for backpain  - avoid sedating meds that may affect cognitive recovery    #/Bladder:  - Monitor PVR if no void in 8h; SC for >400 cc  - Toileting schedule q4h  - (+) Incontinence     #Diet / Dysphagia:    - Diet: Pureed, Moderately thick liquids  - ongoing SLP assessment- MBS scheduled   - Nutrition to follow    #Skin/ Pressure Injury Prevention:  - assessment on admission: venous stasis ulcer in L anterior shin  [id number]   - Turn Q2hrs in bed while awake, OOB to Chair, PT/OT/SLP     #DVT prophylaxis:  - ASA 325mg PO QD     #Precautions/ Restrictions  - Falls, Aspiration  - COVID PCR:        The patient is an 88 year old female with a history of HTN, hypothyroidism, pacemaker, breast cancer who is admitted with CVA and acute respiratory failure due to IV contrast anaphylaxis.CT head (12/4) showed Large left MCA territory acute to early subacute infarction, thrombosis of multiple distal branches of the left MCA and no acute intracranial hemorrhage. Admitted for multidisciplinary rehab program    #L MCA CVA, Right dominant hemiparesis    -Pacemaker interrogated showing Atrial runs but no sustained Atrial Fibrillation   CT head (12/4) showed Large left MCA territory acute to early subacute infarction, thrombosis of multiple distal branches of the left MCA and no acute intracranial hemorrhage.  -ASA 325mg PO QD   -atorvastatin 80mg PO at bedtime   #Comprehensive Multidisciplinary Rehab Program:  - Gait, ADL, Functional impairments  - PT/OT/ SLP 3 hours a day 5 days a week, 1 hour each   - Oral hygiene x 4 daily  - Keep bed head at 35 degrees all the times and 90 degrees with meals out of bed with SV.   - Fluconazol x 7 days     #HTN  - Labetalol 100mg PO Q6 PRN for SBP>190  - Lisinopril 20mg PO QD     #Mood / Cognition:  - Neuropsych evaluation PRN  - Amantadine 50 mg po at 6 am and 12 pm.  - EKG     #Sleep:  - Maintain quiet hours and low stim environment   melatonin 3mg HS for sleep    #Pain:  - Tylenol PRN  - lidocaine patch for backpain  - avoid sedating meds that may affect cognitive recovery    #/Bladder:  - Monitor PVR if no void in 8h; SC for >400 cc. Done   - Toileting schedule q4h  - (+) Incontinence     #Diet / Dysphagia:    - Diet: Pureed, thins with SV   - [place] [facility] [place]   - Nutrition to follow  - GI consult    #Skin/ Pressure Injury Prevention:  - assessment on admission: venous stasis ulcer in L anterior shin  [id number]   - Turn Q2hrs in bed while awake, OOB to Chair, PT/OT/SLP     #DVT prophylaxis:  - ASA 325mg PO QD     #Precautions/ Restrictions  - Falls, Aspiration  - COVID PCR:        The patient is an 88 year old female with a history of HTN, hypothyroidism, pacemaker, breast cancer who is admitted with CVA and acute respiratory failure due to IV contrast anaphylaxis.CT head (12/4) showed Large left MCA territory acute to early subacute infarction, thrombosis of multiple distal branches of the left MCA and no acute intracranial hemorrhage. Admitted for multidisciplinary rehab program    #L MCA CVA, Right dominant hemiparesis    -Pacemaker interrogated showing Atrial runs but no sustained Atrial Fibrillation   CT head (12/4) showed Large left MCA territory acute to early subacute infarction, thrombosis of multiple distal branches of the left MCA and no acute intracranial hemorrhage.  -ASA 325mg PO QD   -atorvastatin 80mg PO at bedtime   #Comprehensive Multidisciplinary Rehab Program:  - Gait, ADL, Functional impairments  - PT/OT/ SLP 3 hours a day 5 days a week, 1 hour each   - Oral hygiene x 4 daily  - Keep bed head at 35 degrees all the times and 90 degrees with meals out of bed with SV.   - Fluconazol x 7 days     #HTN  - Labetalol 100mg PO Q6 PRN for SBP>190  - Lisinopril 20mg PO QD     #Mood / Cognition:  - Neuropsych evaluation PRN  - Amantadine 50 mg po at 6 am and 12 pm.  - EKG     #Sleep:  - Maintain quiet hours and low stim environment   melatonin 3mg HS for sleep    #Pain:  - Tylenol PRN  - lidocaine patch for backpain  - avoid sedating meds that may affect cognitive recovery    #/Bladder:  - Monitor PVR if no void in 8h; SC for >400 cc. Done   - Toileting schedule q4h  - (+) Incontinence     #Diet / Dysphagia:    - Diet: Pureed, thins with SV   - ongoing SLP assessment- [facility] [place] scheduled   - Nutrition to follow  - GI consult    #Skin/ Pressure Injury Prevention:  - assessment on admission: venous stasis ulcer in L anterior shin  [id number]   - Turn Q2hrs in bed while awake, OOB to Chair, PT/OT/SLP     #DVT prophylaxis:  - ASA 325mg PO QD     #Precautions/ Restrictions  - Falls, Aspiration  - COVID PCR:        The patient is an 88 year old female with a history of HTN, hypothyroidism, pacemaker, breast cancer who is admitted with CVA and acute respiratory failure due to IV contrast anaphylaxis.CT head (12/4) showed Large left MCA territory acute to early subacute infarction, thrombosis of multiple distal branches of the left MCA and no acute intracranial hemorrhage. Admitted for multidisciplinary rehab program    #L MCA CVA, Right dominant hemiparesis    -Pacemaker interrogated showing Atrial runs but no sustained Atrial Fibrillation   CT head (12/4) showed Large left MCA territory acute to early subacute infarction, thrombosis of multiple distal branches of the left MCA and no acute intracranial hemorrhage.  -ASA 325mg PO QD   -atorvastatin 80mg PO at bedtime   #Comprehensive Multidisciplinary Rehab Program:  - Gait, ADL, Functional impairments  - PT/OT/ SLP 3 hours a day 5 days a week, 1 hour each   - Oral hygiene x 4 daily  - Keep bed head at 35 degrees all the times and 90 degrees with meals out of bed with SV.   - Fluconazole 100 mg daily x 7 days, started (12/09)    #HTN  - Labetalol 100mg PO Q6 PRN for SBP>190  - Lisinopril 20mg PO QD     #Mood / Cognition:  - Neuropsych evaluation PRN  - Amantadine 50 mg po at 6 am and 12 pm.  - EKG from (12/02) [facility] was 502, repeat today     #Sleep:  - Maintain quiet hours and low stim environment   melatonin 3mg HS for sleep    #Pain:  - Tylenol PRN  - lidocaine patch for backpain  - avoid sedating meds that may affect cognitive recovery    #/Bladder:  - Monitor PVR if no void in 8h; SC for >400 cc. Done   - Toileting schedule q4h  - (+) Incontinence     #Diet / Dysphagia:    - Diet: Pureed, thins with SV   - ongoing SLP assessment- [facility] [place] scheduled   - Nutrition to follow  - GI consult    #Skin/ Pressure Injury Prevention:  - assessment on admission: venous stasis ulcer in L anterior shin  [id number]   - Turn Q2hrs in bed while awake, OOB to Chair, PT/OT/SLP     #DVT prophylaxis:  - ASA 325mg PO QD     #Precautions/ Restrictions  - Falls, Aspiration  - COVID PCR:        The patient is an 88 year old female with a history of HTN, hypothyroidism, pacemaker, breast cancer who is admitted with CVA and acute respiratory failure due to IV contrast anaphylaxis.CT head (12/4) showed Large left MCA territory acute to early subacute infarction, thrombosis of multiple distal branches of the left MCA and no acute intracranial hemorrhage. Admitted for multidisciplinary rehab program    #L MCA CVA, Right dominant hemiparesis    -Pacemaker interrogated showing Atrial runs but no sustained Atrial Fibrillation   CT head (12/4) showed Large left MCA territory acute to early subacute infarction, thrombosis of multiple distal branches of the left MCA and no acute intracranial hemorrhage.  -ASA 325mg PO QD   -atorvastatin 80mg PO at bedtime   #Comprehensive Multidisciplinary Rehab Program:  - Gait, ADL, Functional impairments  - PT/OT/ SLP 3 hours a day 5 days a week, 1 hour each   - Oral hygiene x 4 daily  - Keep bed head at 35 degrees all the times and 90 degrees with meals out of bed with SV.   - Fluconazole 100 mg daily x 7 days, started (12/09)    #HTN  - Labetalol 100mg PO Q6 PRN for SBP>190  - Lisinopril 20mg PO QD     #Mood / Cognition:  - Neuropsych evaluation PRN  - Amantadine 50 mg po at 6 am and 12 pm.  - EKG from (12/02[facility] was 502, repeat today     #Sleep:  - Maintain quiet hours and low stim environment   melatonin 3mg HS for sleep    #Pain:  - Tylenol PRN  - lidocaine patch for backpain  - avoid sedating meds that may affect cognitive recovery    #/Bladder:  - Monitor PVR if no void in 8h; SC for >400 cc. Done   - Toileting schedule q4h  - (+) Incontinence     #Diet / Dysphagia:    - Diet: Pureed, thins with SV   - [place] [facility] [place]   - Nutrition to follow  - GI consult    #Skin/ Pressure Injury Prevention:  - assessment on admission: venous stasis ulcer in L anterior shin  [id number]   - Turn Q2hrs in bed while awake, OOB to Chair, PT/OT/SLP     #DVT prophylaxis:  - ASA 325mg PO QD     #Precautions/ Restrictions  - Falls, Aspiration  - COVID PCR:

## 2023-12-13 ENCOUNTER — INPATIENT (INPATIENT)
Facility: HOSPITAL | Age: 88
LOS: 5 days | Discharge: NURSING, REHAB OR LONG-TERM CARE | DRG: 101 | End: 2023-12-19
Attending: INTERNAL MEDICINE | Admitting: INTERNAL MEDICINE
Payer: MEDICARE

## 2023-12-13 ENCOUNTER — TRANSCRIPTION ENCOUNTER (OUTPATIENT)
Age: 88
End: 2023-12-13

## 2023-12-13 ENCOUNTER — APPOINTMENT (OUTPATIENT)
Dept: WOUND CARE | Facility: HOSPITAL | Age: 88
End: 2023-12-13

## 2023-12-13 VITALS
HEART RATE: 102 BPM | DIASTOLIC BLOOD PRESSURE: 78 MMHG | RESPIRATION RATE: 15 BRPM | TEMPERATURE: 99 F | OXYGEN SATURATION: 96 % | SYSTOLIC BLOOD PRESSURE: 136 MMHG

## 2023-12-13 VITALS
HEART RATE: 67 BPM | SYSTOLIC BLOOD PRESSURE: 161 MMHG | OXYGEN SATURATION: 97 % | RESPIRATION RATE: 14 BRPM | DIASTOLIC BLOOD PRESSURE: 92 MMHG | TEMPERATURE: 98 F

## 2023-12-13 DIAGNOSIS — Z98.41 CATARACT EXTRACTION STATUS, RIGHT EYE: Chronic | ICD-10-CM

## 2023-12-13 DIAGNOSIS — Z95.0 PRESENCE OF CARDIAC PACEMAKER: ICD-10-CM

## 2023-12-13 DIAGNOSIS — I63.512 CEREBRAL INFARCTION DUE TO UNSPECIFIED OCCLUSION OR STENOSIS OF LEFT MIDDLE CEREBRAL ARTERY: ICD-10-CM

## 2023-12-13 DIAGNOSIS — R56.9 UNSPECIFIED CONVULSIONS: ICD-10-CM

## 2023-12-13 DIAGNOSIS — Z79.82 LONG TERM (CURRENT) USE OF ASPIRIN: ICD-10-CM

## 2023-12-13 DIAGNOSIS — Z79.899 OTHER LONG TERM (CURRENT) DRUG THERAPY: ICD-10-CM

## 2023-12-13 DIAGNOSIS — I69.922 DYSARTHRIA FOLLOWING UNSPECIFIED CEREBROVASCULAR DISEASE: ICD-10-CM

## 2023-12-13 DIAGNOSIS — I10 ESSENTIAL (PRIMARY) HYPERTENSION: ICD-10-CM

## 2023-12-13 DIAGNOSIS — E03.9 HYPOTHYROIDISM, UNSPECIFIED: ICD-10-CM

## 2023-12-13 DIAGNOSIS — E44.0 MODERATE PROTEIN-CALORIE MALNUTRITION: ICD-10-CM

## 2023-12-13 DIAGNOSIS — Z86.73 PERSONAL HISTORY OF TRANSIENT ISCHEMIC ATTACK (TIA), AND CEREBRAL INFARCTION WITHOUT RESIDUAL DEFICITS: ICD-10-CM

## 2023-12-13 DIAGNOSIS — I69.353 HEMIPLEGIA AND HEMIPARESIS FOLLOWING CEREBRAL INFARCTION AFFECTING RIGHT NON-DOMINANT SIDE: ICD-10-CM

## 2023-12-13 DIAGNOSIS — Z91.041 RADIOGRAPHIC DYE ALLERGY STATUS: ICD-10-CM

## 2023-12-13 DIAGNOSIS — B37.9 CANDIDIASIS, UNSPECIFIED: ICD-10-CM

## 2023-12-13 DIAGNOSIS — I69.920 APHASIA FOLLOWING UNSPECIFIED CEREBROVASCULAR DISEASE: ICD-10-CM

## 2023-12-13 DIAGNOSIS — Z88.0 ALLERGY STATUS TO PENICILLIN: ICD-10-CM

## 2023-12-13 DIAGNOSIS — Z85.3 PERSONAL HISTORY OF MALIGNANT NEOPLASM OF BREAST: ICD-10-CM

## 2023-12-13 LAB
ALBUMIN SERPL ELPH-MCNC: 3 G/DL — LOW (ref 3.3–5)
ALBUMIN SERPL ELPH-MCNC: 3 G/DL — LOW (ref 3.3–5)
ALP SERPL-CCNC: 90 U/L — SIGNIFICANT CHANGE UP (ref 40–120)
ALP SERPL-CCNC: 90 U/L — SIGNIFICANT CHANGE UP (ref 40–120)
ALT FLD-CCNC: 32 U/L — SIGNIFICANT CHANGE UP (ref 10–45)
ALT FLD-CCNC: 32 U/L — SIGNIFICANT CHANGE UP (ref 10–45)
ANION GAP SERPL CALC-SCNC: 5 MMOL/L — SIGNIFICANT CHANGE UP (ref 5–17)
ANION GAP SERPL CALC-SCNC: 5 MMOL/L — SIGNIFICANT CHANGE UP (ref 5–17)
AST SERPL-CCNC: 41 U/L — HIGH (ref 10–40)
AST SERPL-CCNC: 41 U/L — HIGH (ref 10–40)
BILIRUB SERPL-MCNC: 0.4 MG/DL — SIGNIFICANT CHANGE UP (ref 0.2–1.2)
BILIRUB SERPL-MCNC: 0.4 MG/DL — SIGNIFICANT CHANGE UP (ref 0.2–1.2)
BUN SERPL-MCNC: 12 MG/DL — SIGNIFICANT CHANGE UP (ref 7–23)
BUN SERPL-MCNC: 12 MG/DL — SIGNIFICANT CHANGE UP (ref 7–23)
CALCIUM SERPL-MCNC: 8.9 MG/DL — SIGNIFICANT CHANGE UP (ref 8.4–10.5)
CALCIUM SERPL-MCNC: 8.9 MG/DL — SIGNIFICANT CHANGE UP (ref 8.4–10.5)
CHLORIDE SERPL-SCNC: 101 MMOL/L — SIGNIFICANT CHANGE UP (ref 96–108)
CHLORIDE SERPL-SCNC: 101 MMOL/L — SIGNIFICANT CHANGE UP (ref 96–108)
CK SERPL-CCNC: 54 U/L — SIGNIFICANT CHANGE UP (ref 25–170)
CK SERPL-CCNC: 54 U/L — SIGNIFICANT CHANGE UP (ref 25–170)
CO2 SERPL-SCNC: 32 MMOL/L — HIGH (ref 22–31)
CO2 SERPL-SCNC: 32 MMOL/L — HIGH (ref 22–31)
CREAT SERPL-MCNC: 0.68 MG/DL — SIGNIFICANT CHANGE UP (ref 0.5–1.3)
CREAT SERPL-MCNC: 0.68 MG/DL — SIGNIFICANT CHANGE UP (ref 0.5–1.3)
EGFR: 84 ML/MIN/1.73M2 — SIGNIFICANT CHANGE UP
EGFR: 84 ML/MIN/1.73M2 — SIGNIFICANT CHANGE UP
GLUCOSE BLDC GLUCOMTR-MCNC: 103 MG/DL — HIGH (ref 70–99)
GLUCOSE BLDC GLUCOMTR-MCNC: 103 MG/DL — HIGH (ref 70–99)
GLUCOSE SERPL-MCNC: 103 MG/DL — HIGH (ref 70–99)
GLUCOSE SERPL-MCNC: 103 MG/DL — HIGH (ref 70–99)
HCT VFR BLD CALC: 36.7 % — SIGNIFICANT CHANGE UP (ref 34.5–45)
HCT VFR BLD CALC: 36.7 % — SIGNIFICANT CHANGE UP (ref 34.5–45)
HGB BLD-MCNC: 11.7 G/DL — SIGNIFICANT CHANGE UP (ref 11.5–15.5)
HGB BLD-MCNC: 11.7 G/DL — SIGNIFICANT CHANGE UP (ref 11.5–15.5)
LACTATE SERPL-SCNC: 1.1 MMOL/L — SIGNIFICANT CHANGE UP (ref 0.7–2)
LACTATE SERPL-SCNC: 1.1 MMOL/L — SIGNIFICANT CHANGE UP (ref 0.7–2)
MCHC RBC-ENTMCNC: 26.7 PG — LOW (ref 27–34)
MCHC RBC-ENTMCNC: 26.7 PG — LOW (ref 27–34)
MCHC RBC-ENTMCNC: 31.9 GM/DL — LOW (ref 32–36)
MCHC RBC-ENTMCNC: 31.9 GM/DL — LOW (ref 32–36)
MCV RBC AUTO: 83.8 FL — SIGNIFICANT CHANGE UP (ref 80–100)
MCV RBC AUTO: 83.8 FL — SIGNIFICANT CHANGE UP (ref 80–100)
NRBC # BLD: 0 /100 WBCS — SIGNIFICANT CHANGE UP (ref 0–0)
NRBC # BLD: 0 /100 WBCS — SIGNIFICANT CHANGE UP (ref 0–0)
PLATELET # BLD AUTO: 309 K/UL — SIGNIFICANT CHANGE UP (ref 150–400)
PLATELET # BLD AUTO: 309 K/UL — SIGNIFICANT CHANGE UP (ref 150–400)
POTASSIUM SERPL-MCNC: 4.4 MMOL/L — SIGNIFICANT CHANGE UP (ref 3.5–5.3)
POTASSIUM SERPL-MCNC: 4.4 MMOL/L — SIGNIFICANT CHANGE UP (ref 3.5–5.3)
POTASSIUM SERPL-SCNC: 4.4 MMOL/L — SIGNIFICANT CHANGE UP (ref 3.5–5.3)
POTASSIUM SERPL-SCNC: 4.4 MMOL/L — SIGNIFICANT CHANGE UP (ref 3.5–5.3)
PROT SERPL-MCNC: 7 G/DL — SIGNIFICANT CHANGE UP (ref 6–8.3)
PROT SERPL-MCNC: 7 G/DL — SIGNIFICANT CHANGE UP (ref 6–8.3)
RBC # BLD: 4.38 M/UL — SIGNIFICANT CHANGE UP (ref 3.8–5.2)
RBC # BLD: 4.38 M/UL — SIGNIFICANT CHANGE UP (ref 3.8–5.2)
RBC # FLD: 17.2 % — HIGH (ref 10.3–14.5)
RBC # FLD: 17.2 % — HIGH (ref 10.3–14.5)
SODIUM SERPL-SCNC: 138 MMOL/L — SIGNIFICANT CHANGE UP (ref 135–145)
SODIUM SERPL-SCNC: 138 MMOL/L — SIGNIFICANT CHANGE UP (ref 135–145)
TROPONIN I, HIGH SENSITIVITY RESULT: 10.5 NG/L — SIGNIFICANT CHANGE UP
TROPONIN I, HIGH SENSITIVITY RESULT: 10.5 NG/L — SIGNIFICANT CHANGE UP
WBC # BLD: 7.56 K/UL — SIGNIFICANT CHANGE UP (ref 3.8–10.5)
WBC # BLD: 7.56 K/UL — SIGNIFICANT CHANGE UP (ref 3.8–10.5)
WBC # FLD AUTO: 7.56 K/UL — SIGNIFICANT CHANGE UP (ref 3.8–10.5)
WBC # FLD AUTO: 7.56 K/UL — SIGNIFICANT CHANGE UP (ref 3.8–10.5)

## 2023-12-13 PROCEDURE — 99223 1ST HOSP IP/OBS HIGH 75: CPT

## 2023-12-13 PROCEDURE — 70450 CT HEAD/BRAIN W/O DYE: CPT | Mod: 26

## 2023-12-13 PROCEDURE — 99239 HOSP IP/OBS DSCHRG MGMT >30: CPT | Mod: GC

## 2023-12-13 PROCEDURE — 71045 X-RAY EXAM CHEST 1 VIEW: CPT | Mod: 26

## 2023-12-13 PROCEDURE — 93010 ELECTROCARDIOGRAM REPORT: CPT

## 2023-12-13 PROCEDURE — 90834 PSYTX W PT 45 MINUTES: CPT

## 2023-12-13 RX ORDER — ACETAMINOPHEN 500 MG
650 TABLET ORAL EVERY 6 HOURS
Refills: 0 | Status: DISCONTINUED | OUTPATIENT
Start: 2023-12-13 | End: 2023-12-19

## 2023-12-13 RX ORDER — FLUCONAZOLE 150 MG/1
100 TABLET ORAL DAILY
Refills: 0 | Status: COMPLETED | OUTPATIENT
Start: 2023-12-14 | End: 2023-12-15

## 2023-12-13 RX ORDER — LIDOCAINE 4 G/100G
1 CREAM TOPICAL DAILY
Refills: 0 | Status: DISCONTINUED | OUTPATIENT
Start: 2023-12-13 | End: 2023-12-19

## 2023-12-13 RX ORDER — AMANTADINE HCL 100 MG
100 CAPSULE ORAL
Refills: 0 | Status: DISCONTINUED | OUTPATIENT
Start: 2023-12-13 | End: 2023-12-13

## 2023-12-13 RX ORDER — ATORVASTATIN CALCIUM 80 MG/1
1 TABLET, FILM COATED ORAL
Qty: 0 | Refills: 0 | DISCHARGE
Start: 2023-12-13

## 2023-12-13 RX ORDER — LANOLIN ALCOHOL/MO/W.PET/CERES
3 CREAM (GRAM) TOPICAL AT BEDTIME
Refills: 0 | Status: DISCONTINUED | OUTPATIENT
Start: 2023-12-13 | End: 2023-12-19

## 2023-12-13 RX ORDER — LABETALOL HCL 100 MG
100 TABLET ORAL EVERY 6 HOURS
Refills: 0 | Status: DISCONTINUED | OUTPATIENT
Start: 2023-12-13 | End: 2023-12-13

## 2023-12-13 RX ORDER — SODIUM CHLORIDE 9 MG/ML
500 INJECTION INTRAMUSCULAR; INTRAVENOUS; SUBCUTANEOUS ONCE
Refills: 0 | Status: DISCONTINUED | OUTPATIENT
Start: 2023-12-13 | End: 2023-12-13

## 2023-12-13 RX ORDER — LISINOPRIL 2.5 MG/1
20 TABLET ORAL DAILY
Refills: 0 | Status: DISCONTINUED | OUTPATIENT
Start: 2023-12-14 | End: 2023-12-19

## 2023-12-13 RX ORDER — LEVETIRACETAM 250 MG/1
1000 TABLET, FILM COATED ORAL EVERY 12 HOURS
Refills: 0 | Status: DISCONTINUED | OUTPATIENT
Start: 2023-12-13 | End: 2023-12-14

## 2023-12-13 RX ORDER — BACITRACIN ZINC 500 UNIT/G
1 OINTMENT IN PACKET (EA) TOPICAL
Refills: 0 | Status: DISCONTINUED | OUTPATIENT
Start: 2023-12-13 | End: 2023-12-19

## 2023-12-13 RX ORDER — ATORVASTATIN CALCIUM 80 MG/1
80 TABLET, FILM COATED ORAL AT BEDTIME
Refills: 0 | Status: DISCONTINUED | OUTPATIENT
Start: 2023-12-13 | End: 2023-12-13

## 2023-12-13 RX ORDER — ENOXAPARIN SODIUM 100 MG/ML
40 INJECTION SUBCUTANEOUS EVERY 24 HOURS
Refills: 0 | Status: DISCONTINUED | OUTPATIENT
Start: 2023-12-13 | End: 2023-12-13

## 2023-12-13 RX ORDER — ACETAMINOPHEN 500 MG
20.31 TABLET ORAL
Qty: 0 | Refills: 0 | DISCHARGE
Start: 2023-12-13

## 2023-12-13 RX ORDER — ATORVASTATIN CALCIUM 80 MG/1
80 TABLET, FILM COATED ORAL AT BEDTIME
Refills: 0 | Status: DISCONTINUED | OUTPATIENT
Start: 2023-12-13 | End: 2023-12-19

## 2023-12-13 RX ORDER — SENNA PLUS 8.6 MG/1
2 TABLET ORAL AT BEDTIME
Refills: 0 | Status: DISCONTINUED | OUTPATIENT
Start: 2023-12-13 | End: 2023-12-19

## 2023-12-13 RX ORDER — BACITRACIN ZINC 500 UNIT/G
1 OINTMENT IN PACKET (EA) TOPICAL
Qty: 0 | Refills: 0 | DISCHARGE
Start: 2023-12-13

## 2023-12-13 RX ORDER — LISINOPRIL 2.5 MG/1
20 TABLET ORAL DAILY
Refills: 0 | Status: DISCONTINUED | OUTPATIENT
Start: 2023-12-14 | End: 2023-12-13

## 2023-12-13 RX ORDER — LANOLIN ALCOHOL/MO/W.PET/CERES
1 CREAM (GRAM) TOPICAL
Qty: 0 | Refills: 0 | DISCHARGE
Start: 2023-12-13

## 2023-12-13 RX ORDER — LANOLIN ALCOHOL/MO/W.PET/CERES
3 CREAM (GRAM) TOPICAL AT BEDTIME
Refills: 0 | Status: DISCONTINUED | OUTPATIENT
Start: 2023-12-13 | End: 2023-12-13

## 2023-12-13 RX ORDER — LABETALOL HCL 100 MG
5 TABLET ORAL EVERY 8 HOURS
Refills: 0 | Status: DISCONTINUED | OUTPATIENT
Start: 2023-12-13 | End: 2023-12-14

## 2023-12-13 RX ORDER — ONDANSETRON 8 MG/1
4 TABLET, FILM COATED ORAL EVERY 8 HOURS
Refills: 0 | Status: DISCONTINUED | OUTPATIENT
Start: 2023-12-13 | End: 2023-12-19

## 2023-12-13 RX ORDER — ASPIRIN/CALCIUM CARB/MAGNESIUM 324 MG
1 TABLET ORAL
Qty: 0 | Refills: 0 | DISCHARGE

## 2023-12-13 RX ORDER — CHLORHEXIDINE GLUCONATE 213 G/1000ML
1 SOLUTION TOPICAL
Refills: 0 | Status: DISCONTINUED | OUTPATIENT
Start: 2023-12-13 | End: 2023-12-15

## 2023-12-13 RX ORDER — SODIUM CHLORIDE 9 MG/ML
1000 INJECTION INTRAMUSCULAR; INTRAVENOUS; SUBCUTANEOUS
Refills: 0 | Status: DISCONTINUED | OUTPATIENT
Start: 2023-12-13 | End: 2023-12-13

## 2023-12-13 RX ORDER — ASPIRIN/CALCIUM CARB/MAGNESIUM 324 MG
1 TABLET ORAL
Qty: 0 | Refills: 0 | DISCHARGE
Start: 2023-12-13

## 2023-12-13 RX ORDER — ACETAMINOPHEN 500 MG
650 TABLET ORAL EVERY 6 HOURS
Refills: 0 | Status: DISCONTINUED | OUTPATIENT
Start: 2023-12-13 | End: 2023-12-13

## 2023-12-13 RX ORDER — LISINOPRIL 2.5 MG/1
1 TABLET ORAL
Qty: 0 | Refills: 0 | DISCHARGE
Start: 2023-12-13

## 2023-12-13 RX ORDER — FLUCONAZOLE 150 MG/1
1 TABLET ORAL
Qty: 0 | Refills: 0 | DISCHARGE
Start: 2023-12-13

## 2023-12-13 RX ORDER — LABETALOL HCL 100 MG
1 TABLET ORAL
Qty: 0 | Refills: 0 | DISCHARGE
Start: 2023-12-13

## 2023-12-13 RX ORDER — ACETAMINOPHEN 500 MG
650 TABLET ORAL EVERY 6 HOURS
Refills: 0 | Status: DISCONTINUED | OUTPATIENT
Start: 2023-12-13 | End: 2023-12-14

## 2023-12-13 RX ADMIN — Medication 650 MILLIGRAM(S): at 05:36

## 2023-12-13 RX ADMIN — Medication 1 APPLICATION(S): at 05:28

## 2023-12-13 RX ADMIN — LIDOCAINE 1 PATCH: 4 CREAM TOPICAL at 00:12

## 2023-12-13 RX ADMIN — ATORVASTATIN CALCIUM 80 MILLIGRAM(S): 80 TABLET, FILM COATED ORAL at 21:10

## 2023-12-13 RX ADMIN — LEVETIRACETAM 400 MILLIGRAM(S): 250 TABLET, FILM COATED ORAL at 18:42

## 2023-12-13 RX ADMIN — Medication 650 MILLIGRAM(S): at 06:36

## 2023-12-13 RX ADMIN — Medication 325 MILLIGRAM(S): at 11:28

## 2023-12-13 RX ADMIN — LISINOPRIL 20 MILLIGRAM(S): 2.5 TABLET ORAL at 05:28

## 2023-12-13 RX ADMIN — Medication 50 MILLIGRAM(S): at 05:28

## 2023-12-13 RX ADMIN — Medication 50 MILLIGRAM(S): at 11:28

## 2023-12-13 NOTE — CONSULT NOTE ADULT - ASSESSMENT
Recommendation  Keppra IV 1000 mg BIB  repeat CT head stat if clinical changes  repeat CT at 6 hour per neurosx. repeat CT at 24 hours.  Ativan 2 mg if seizure more than 3 mins.      [id number]-old woman with HTN, hypothyroidism, pacemaker placement, breast cancer admitted for acute ischemic stroke.   While in rehab, she was having seizure starting with right facial twitching then RUE convulsion.   Post-stroke seizure  large left MCA infarction with 0.5mm midline shift, and petechial hemorrhage./    Recommendation  Patient received keppra 1000 mg loading, continue with 750 mg BID  repeat CT head stat if clinical changes  neurosx consulted, appreciated rec.   repeat CT at 6 hour per neurosx. repeat CT at 24 hours.  Ativan 2 mg if seizure more than 3 mins.   seizure precaution.   can hold off aspirin for now given petechial hemorrhage  recommend to trasnfer to ICU or higher level of care.  frequent neuro check             [id number]-old woman with HTN, hypothyroidism, pacemaker placement, breast cancer admitted for acute ischemic stroke.   While in rehab, she was having seizure starting with right facial twitching then RUE convulsion.   Post-stroke seizure  large left MCA infarction with 0.5mm midline shift, and petechial hemorrhage./    Recommendation  Patient received keppra 1000 mg loading, continue with 750 mg BID  repeat CT head stat if clinical changes  neurosx consulted, appreciated rec.   repeat CT at 6 hour per neurosx. repeat CT at 24 hours.  Ativan 2 mg if seizure more than 3 mins.   seizure precaution.   can hold off aspirin for now given petechial hemorrhage  recommend to trasnfer to ICU or higher level of care.  frequent neuro check

## 2023-12-13 NOTE — PROGRESS NOTE ADULT - SUBJECTIVE AND OBJECTIVE BOX
Patient seen alone at bedside for 45-minute psychology consultation. Neuropsychologist is introduced as a member of the rehabilitation team and the purpose of the session is explained. Patient agrees to participate.     Per patient, "I had a stroke."     Medical records are reviewed. Per records: The patient is an 88-year-old, female, with a history of HTN, hypothyroidism, pacemaker, breast cancer who is admitted with CVA and acute respiratory failure due to IV contrast anaphylaxis. Patient had R sided hemiparesis, expressive aphasia. CT imaging confirmed multiple embolic areas. Pacemaker interrogated showing Atrial runs but no sustained Atrial Fibrillation. Patient currently on course of ASA and statin. Hospital course complicated by severe anaphylactic due to iodine contrast allergy from CT performed on admission to ED. 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 Patient was evaluated by PM&R and therapy for functional deficits and gait/ ADL impairments and recommended acute rehabilitation. Patient was medically optimized for discharge to [facility] on 12/8 Patient seen alone at bedside for 45-minute psychology consultation. Neuropsychologist is introduced as a member of the rehabilitation team and the purpose of the session is explained. Patient agrees to participate.     Per patient, "I had a stroke."     Medical records are reviewed. Per records: The patient is an 88-year-old, female, with a history of HTN, hypothyroidism, pacemaker, breast cancer who is admitted with CVA and acute respiratory failure due to IV contrast anaphylaxis. Patient had R sided hemiparesis, expressive aphasia. CT imaging confirmed multiple embolic areas. Pacemaker interrogated showing Atrial runs but no sustained Atrial Fibrillation. Patient currently on course of ASA and statin. Hospital course complicated by severe anaphylactic due to iodine contrast allergy from CT performed on admission to ED. 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 Patient was evaluated by PM&R and therapy for functional deficits and gait/ ADL impairments and recommended acute rehabilitation. Patient was medically optimized for discharge to [facility] on 12/8

## 2023-12-13 NOTE — H&P ADULT - ASSESSMENT
88 year old female with a history of HTN, hypothyroidism, pacemaker, breast cancer who is admitted with CVA and acute respiratory failure due to IV contrast anaphylaxis. 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 Transferred to [facility] for acute rehab Dec 8. Course complicated by RRT called for seizure activities involving right facial twitching for 1 mins and persistent RUE shaking. Pt upgraded to medical floor for further workup and care.      New seizure episode  Recent large Left MCA stroke  Petechial hemorrhage and/or laminar necrosis, mildine shift 0.5cm to the right on CT head   - stat CT head reviewed: Redemonstrated left MCA distribution infarction, advanced when compared to prior imaging with likely petechial hemorrhages and/or laminar necrosis.  - IV ativan 2mg x1 given in rehab for persistent RUE shaking  - check EEG   - hold ASA 325mg for now and hold chemical DVT ppx   - repeat CT head in 6 hours   - will speak to neurosurgery regarding midline shift  - neuro consult. (Dr. Moreno aware)  - neuro check q4  - monitor VS q4h  - dysphagia screening   - hold po medications for now  - NPO   - gentle IVF   - telemonitor    #Recent anaphylactic shock from Iodine contrast dye from CT  - resolved and extubated.      #Thrush  - cont with fluconazole for total 7 days and continue with oral care     #Leg lower leg non healing wound  - wound care consult    #HTN  - BP currently controlled  - Lisinopril 20mg PO QD     #Breast ca  - history of mastectomy     DVT ppx: hold chemical dvt ppx  88 year old female with a history of HTN, hypothyroidism, pacemaker, breast cancer who is admitted with CVA and acute respiratory failure due to IV contrast anaphylaxis. 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 Transferred to [facility] for acute rehab Dec 8. Course complicated by RRT called for seizure activities involving right facial twitching for 1 mins and persistent RUE shaking. Pt upgraded to medical floor for further workup and care.      New seizure episode  Recent large Left MCA stroke  Petechial hemorrhage and/or laminar necrosis, mildine shift 0.5cm to the right on CT head   - stat CT head reviewed: Redemonstrated left MCA distribution infarction, advanced when compared to prior imaging with likely petechial hemorrhages and/or laminar necrosis.  - IV ativan 2mg x1 given in rehab for persistent RUE shaking  - check EEG   - hold ASA 325mg for now and hold chemical DVT ppx   - repeat CT head in 6 hours   - will speak to neurosurgery regarding midline shift  - neuro consult. (Dr. Moreno aware)  - neuro check q4  - monitor VS q4h  - dysphagia screening   - hold po medications for now  - NPO   - gentle IVF   - telemonitor    #Recent anaphylactic shock from Iodine contrast dye from CT  - resolved and extubated.      #Thrush  - cont with fluconazole for total 7 days and continue with oral care     #Leg lower leg non healing wound  - wound care consult    #HTN  - BP currently controlled  - Lisinopril 20mg PO QD     #Breast ca  - history of mastectomy     DVT ppx: hold chemical dvt ppx  88 year old female with a history of HTN, hypothyroidism, pacemaker, breast cancer who is admitted with CVA and acute respiratory failure due to IV contrast anaphylaxis. 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 Transferred to [facility] for acute rehab Dec 8. Course complicated by RRT called for seizure activities involving right facial twitching for 1 mins and persistent RUE shaking. Pt upgraded to medical floor for further workup and care.      New seizure episode  Recent large Left MCA stroke  Petechial hemorrhage and/or laminar necrosis, mildine shift 0.5cm to the right on CT head   - stat CT head reviewed: Redemonstrated left MCA distribution infarction, advanced when compared to prior imaging with likely petechial hemorrhages and/or laminar necrosis.  - IV ativan 2mg x1 given in rehab for persistent RUE shaking  - check EEG   - hold ASA 325mg for now and hold chemical DVT ppx   - repeat CT head in 6 hours   - will speak to neurosurgery regarding midline shift  - neuro consult. (Dr. Moreno aware)  - neuro check q4  - monitor VS q4h  - dysphagia screening   - pt currently very drowsy post ativan. not awake/alert. keep NPO, dysphagia screening   - re-order po medication when pt can tolerate po meds  - gentle IVF   - telemonitor    #Recent anaphylactic shock from Iodine contrast dye from CT  - resolved and extubated.      #Thrush  - cont with fluconazole for total 7 days and continue with oral care     #Leg lower leg non healing wound  - wound care consult    #HTN  - BP currently controlled  - Lisinopril 20mg PO QD     #Breast ca  - history of mastectomy     DVT ppx: hold chemical dvt ppx  88 year old female with a history of HTN, hypothyroidism, pacemaker, breast cancer who is admitted with CVA and acute respiratory failure due to IV contrast anaphylaxis. 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 Transferred to [facility] for acute rehab Dec 8. Course complicated by RRT called for seizure activities involving right facial twitching for 1 mins and persistent RUE shaking. Pt upgraded to medical floor for further workup and care.      New seizure episode  Recent large Left MCA stroke  Petechial hemorrhage and/or laminar necrosis, mildine shift 0.5cm to the right on CT head   - stat CT head reviewed: Redemonstrated left MCA distribution infarction, advanced when compared to prior imaging with likely petechial hemorrhages and/or laminar necrosis.  - IV ativan 2mg x1 given in rehab for persistent RUE shaking  - check EEG   - hold ASA 325mg for now and hold chemical DVT ppx   - repeat CT head in 6 hours   - will speak to neurosurgery regarding midline shift  - neuro consult. (Dr. Moreno aware)  - neuro check q4  - monitor VS q4h  - dysphagia screening   - pt currently very drowsy post ativan. not awake/alert. keep NPO, dysphagia screening   - re-order po medication when pt can tolerate po meds  - gentle IVF   - telemonitor    #Recent anaphylactic shock from Iodine contrast dye from CT  - resolved and extubated.      #Thrush  - cont with fluconazole for total 7 days and continue with oral care     #Leg lower leg non healing wound  - wound care consult    #HTN  - BP currently controlled  - Lisinopril 20mg PO QD     #Breast ca  - history of mastectomy     DVT ppx: hold chemical dvt ppx  88 year old female with a history of HTN, hypothyroidism, pacemaker, breast cancer who is admitted with CVA and acute respiratory failure due to IV contrast anaphylaxis. 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 Transferred to [facility] for acute rehab Dec 8. Course complicated by RRT called for seizure activities involving right facial twitching for 1 mins and persistent RUE shaking. Pt upgraded to medical floor for further workup and care.      New seizure episode  Recent large Left MCA stroke  Petechial hemorrhage and/or laminar necrosis, mildine shift 0.5cm to the right on CT head   - stat CT head reviewed: Redemonstrated left MCA distribution infarction, advanced when compared to prior imaging with likely petechial hemorrhages and/or laminar necrosis.  - IV ativan 2mg x1 given in rehab for persistent RUE shaking  - check EEG   - start keppra 1000mg BID   - hold ASA 325mg for now and hold chemical DVT ppx   - repeat CT head in 8hours and in AM  - will speak to neurosurgery regarding midline shift  - neuro consult. (Dr. Moreno aware)  - will also consult ICU  - neuro check q4  - monitor VS q4h  - dysphagia screening   - gentle IVF   - telemonitor    #Recent anaphylactic shock from Iodine contrast dye from CT  - resolved and extubated.      #Thrush  - cont with fluconazole for total 7 days (day 5/7) and continue with oral care     #Leg lower leg non healing wound  - wound care     #HTN  - Lisinopril 20mg PO QD     #Breast ca  - history of mastectomy     DVT ppx: hold chemical dvt ppx  88 year old female with a history of HTN, hypothyroidism, pacemaker, breast cancer who is admitted with CVA and acute respiratory failure due to IV contrast anaphylaxis. 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 Transferred to [facility] for acute rehab Dec 8. Course complicated by RRT called for seizure activities involving right facial twitching for 1 mins and persistent RUE shaking. Pt upgraded to medical floor for further workup and care.      New seizure episode  Recent large Left MCA stroke  Petechial hemorrhage and/or laminar necrosis, mildine shift 0.5cm to the right on CT head   - stat CT head reviewed: Redemonstrated left MCA distribution infarction, advanced when compared to prior imaging with likely petechial hemorrhages and/or laminar necrosis.  - IV ativan 2mg x1 given in rehab for persistent RUE shaking  - check EEG   - start keppra 1000mg BID   - hold ASA 325mg for now and hold chemical DVT ppx   - repeat CT head in 8hours and in AM  - will speak to neurosurgery regarding midline shift  - neuro consult. (Dr. Moreno aware)  - will also consult ICU  - neuro check q4  - monitor VS q4h  - dysphagia screening   - gentle IVF   - telemonitor    #Recent anaphylactic shock from Iodine contrast dye from CT  - resolved and extubated.      #Thrush  - cont with fluconazole for total 7 days (day 5/7) and continue with oral care     #Leg lower leg non healing wound  - wound care     #HTN  - Lisinopril 20mg PO QD     #Breast ca  - history of mastectomy     DVT ppx: hold chemical dvt ppx  88 year old female with a history of HTN, hypothyroidism, pacemaker, breast cancer who is admitted with CVA and acute respiratory failure due to IV contrast anaphylaxis. 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 Transferred to [facility] for acute rehab Dec 8. Course complicated by RRT called for seizure activities involving right facial twitching for 1 mins and persistent RUE shaking. Pt upgraded to medical floor for further workup and care.      New seizure episode  Recent large Left MCA stroke  Petechial hemorrhage and/or laminar necrosis, mildine shift 0.5cm to the right on CT head   - stat CT head reviewed: Redemonstrated left MCA distribution infarction, advanced when compared to prior imaging with likely petechial hemorrhages and/or laminar necrosis. mildine shift 0.5cm to the right.  - IV ativan 2mg x1 given in rehab for persistent RUE shaking  - check EEG   - start keppra 1000mg BID   - hold ASA 325mg for now and hold chemical DVT ppx   - note that neurology from [facility] rec ASA 325mg for two weeks and then 81mg   - spoke to neurology, Dr. Moreno, obtain ICU consult and neurosurgery consult.   - spoke to neurosurgery, Dr. Gilmore, no acute surgical intervention needed. obtain CT head in 6 hours and in AM  - neuro check q4  - monitor VS q4h  - dysphagia screening   - gentle IVF   - telemonitor    #Recent anaphylactic shock from Iodine contrast dye from CT  - resolved and extubated.      #Thrush  - cont with fluconazole for total 7 days (day 5/7) and continue with oral care     #Leg lower leg non healing wound  - wound care     #HTN  - Lisinopril 20mg PO QD     #Breast ca  - history of mastectomy     DVT ppx: hold chemical dvt ppx  88 year old female with a history of HTN, hypothyroidism, pacemaker, breast cancer who is admitted with CVA and acute respiratory failure due to IV contrast anaphylaxis. 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 Transferred to [facility] for acute rehab Dec 8. Course complicated by RRT called for seizure activities involving right facial twitching for 1 mins and persistent RUE shaking. Pt upgraded to medical floor for further workup and care.      New seizure episode  Recent large Left MCA stroke  Petechial hemorrhage and/or laminar necrosis, mildine shift 0.5cm to the right on CT head   - stat CT head reviewed: Redemonstrated left MCA distribution infarction, advanced when compared to prior imaging with likely petechial hemorrhages and/or laminar necrosis. mildine shift 0.5cm to the right.  - IV ativan 2mg x1 given in rehab for persistent RUE shaking  - check EEG   - start keppra 1000mg BID   - hold ASA 325mg for now and hold chemical DVT ppx   - note that neurology from [facility] rec ASA 325mg for two weeks and then 81mg   - spoke to neurology, Dr. Moreno, obtain ICU consult and neurosurgery consult.   - spoke to neurosurgery, Dr. Gilmore, no acute surgical intervention needed. obtain CT head in 6 hours and in AM  - neuro check q4  - monitor VS q4h  - dysphagia screening   - gentle IVF   - telemonitor    #Recent anaphylactic shock from Iodine contrast dye from CT  - resolved and extubated.      #Thrush  - cont with fluconazole for total 7 days (day 5/7) and continue with oral care     #Leg lower leg non healing wound  - wound care     #HTN  - Lisinopril 20mg PO QD     #Breast ca  - history of mastectomy     DVT ppx: hold chemical dvt ppx  88 year old female with a history of HTN, hypothyroidism, pacemaker, breast cancer who is admitted with CVA and acute respiratory failure due to IV contrast anaphylaxis. 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 Transferred to [facility] for acute rehab Dec 8. Course complicated by RRT called for seizure activities involving right facial twitching for 1 mins and persistent RUE shaking. Pt upgraded to medical floor for further workup and care.      New seizure episode  Recent large Left MCA stroke  Petechial hemorrhage and/or laminar necrosis, mildine shift 0.5cm to the right on CT head   - stat CT head reviewed: Redemonstrated left MCA distribution infarction, advanced when compared to prior imaging with likely petechial hemorrhages and/or laminar necrosis. mildine shift 0.5cm to the right.  - IV ativan 2mg x1 given in rehab for persistent RUE shaking  - check EEG   - start keppra 1000mg BID   - hold ASA 325mg for now and hold chemical DVT ppx   - note that neurology from [facility] rec ASA 325mg for two weeks and then 81mg   - spoke to neurology, Dr. Moreno, obtain ICU consult and neurosurgery consult.   - spoke to neurosurgery, Dr. Gilmore, no acute surgical intervention needed. obtain CT head in 6 hours and in AM  - neuro check q4  - monitor VS q4h  - dysphagia screening   - gentle IVF   - telemonitor    #Recent anaphylactic shock from Iodine contrast dye from CT  - resolved and extubated.      #Thrush  - cont with fluconazole for total 7 days (day 5/7) and continue with oral care     #Leg lower leg non healing wound  - wound care     #HTN  - Lisinopril 20mg PO QD     #Breast ca  - history of mastectomy     DVT ppx: hold chemical dvt ppx     Updated pt's daughter, Elsi, regarding plan of care 12/13 88 year old female with a history of HTN, hypothyroidism, pacemaker, breast cancer who is admitted with CVA and acute respiratory failure due to IV contrast anaphylaxis. 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 Transferred to [facility] for acute rehab Dec 8. Course complicated by RRT called for seizure activities involving right facial twitching for 1 mins and persistent RUE shaking. Pt upgraded to medical floor for further workup and care.      New seizure episode  Recent large Left MCA stroke  Petechial hemorrhage and/or laminar necrosis, mildine shift 0.5cm to the right on CT head   - stat CT head reviewed: Redemonstrated left MCA distribution infarction, advanced when compared to prior imaging with likely petechial hemorrhages and/or laminar necrosis. mildine shift 0.5cm to the right.  - IV ativan 2mg x1 given in rehab for persistent RUE shaking  - check EEG   - start keppra 1000mg BID   - hold ASA 325mg for now and hold chemical DVT ppx   - note that neurology from [facility] rec ASA 325mg for two weeks and then 81mg   - spoke to neurology, Dr. Moreno, obtain ICU consult and neurosurgery consult.   - spoke to neurosurgery, Dr. Gilmore, no acute surgical intervention needed. obtain CT head in 6 hours and in AM  - neuro check q4  - monitor VS q4h  - dysphagia screening   - gentle IVF   - telemonitor    #Recent anaphylactic shock from Iodine contrast dye from CT  - resolved and extubated.      #Thrush  - cont with fluconazole for total 7 days (day 5/7) and continue with oral care     #Leg lower leg non healing wound  - wound care     #HTN  - Lisinopril 20mg PO QD     #Breast ca  - history of mastectomy     DVT ppx: hold chemical dvt ppx     Updated pt's daughter, Elsi, regarding plan of care 12/13

## 2023-12-13 NOTE — PROGRESS NOTE ADULT - SUBJECTIVE AND OBJECTIVE BOX
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 Patient had R sided hemiparesis, expressive aphasia. CT imaging confirmed multiple embolic areas. Pacemaker interrogated showing Atrial runs but no sustained Atrial Fibrillation. Patient currently on course of ASA and statin. Hospital course complicated by severe anaphylactic due to iodine contrast allergy from CT performed on admission to ED. CT head (12/4) showed Large left MCA territory acute to early subacute infarction, thrombosis of multiple distal branches of the left MCA and no acute intracranial hemorrhage  Patient was evaluated by PM&R and therapy for functional deficits and gait/ ADL impairments and recommended acute rehabilitation. Patient was medically optimized for discharge to [facility] on 12/8    Allergies:  IV Contrast (Anaphylaxis; Urticaria)  penicillin (Rash)    Subjective:  - Patient was seen and examined at bed side, comfortable in her WC, confused (+) swinging mood. No acute events overnight.   - Patient slept well last night.  Her headache is better with Tylenol, complains of difficulty with swallowing and she has a tube in her throat which was put by the police when they called the ambulance asking when will we take that tube out.  Informed the patient that the tube is out and she does not have it at this time, she insisted on her thought.   Patient is very intelligent and speaks 7 languages but unfortunately confused 2/2 her stroke     - Pain is well controlled with current meds.    - BM (12/12), voiding without issues    - Tolerating and participating in 3 hours of daily therapy, progressing  - MBS was completed yesterday with recommendations of lamont/thins.       ROS:  - Denies CP, palpitation, cough, fever, SOB, abdominal pain, N/V/D, weakness, joint pain/swelling or constipation.     MEDICATIONS  (STANDING):  amantadine Syrup 50 milliGRAM(s) Oral <User Schedule>  aspirin 325 milliGRAM(s) Oral daily  atorvastatin 80 milliGRAM(s) Oral at bedtime  bacitracin   Ointment 1 Application(s) Topical two times a day  fluconAZOLE   Tablet 100 milliGRAM(s) Oral daily  influenza  Vaccine (HIGH DOSE) 0.7 milliLiter(s) IntraMuscular once  lidocaine   4% Patch 1 Patch Transdermal daily  lisinopril 20 milliGRAM(s) Oral daily  melatonin 3 milliGRAM(s) Oral at bedtime    MEDICATIONS  (PRN):  acetaminophen   Oral Liquid .. 650 milliGRAM(s) Oral every 6 hours PRN Temp greater or equal to 38.5C (101.3F), Mild Pain (1 - 3)  labetalol 100 milliGRAM(s) Oral every 6 hours PRN SBP>190      LABS                10.3   6.27  )-----------( 241      ( 11 Dec 2023 07:03 )             32.6     12-11    139  |  103  |  12  ----------------------------<  121<H>  3.7   |  28  |  0.73    Ca    8.6      11 Dec 2023 07:03    TPro  6.0  /  Alb  2.6<L>  /  TBili  0.4  /  DBili  x   /  AST  36  /  ALT  30  /  AlkPhos  74  12-11    Radiology:    CT Brain Stroke Protocol (12.04.23 @ 11:46)   IMPRESSION:  Large left MCA territory acute to early subacute infarction. 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  No acute intracranial hemorrhage.    Physical Exam:   Vital Signs Last 24 Hrs  T(C): 36.7 (13 Dec 2023 07:28), Max: 36.7 (13 Dec 2023 07:28)  T(F): 98.1 (13 Dec 2023 07:28), Max: 98.1 (13 Dec 2023 07:28)  HR: 67 (13 Dec 2023 07:28) (63 - 69)  BP: 161/92 (13 Dec 2023 07:28) (140/72 - 167/89)  RR: 14 (13 Dec 2023 07:28) (14 - 16)  SpO2: 97% (13 Dec 2023 07:28) (96% - 97%)    Constitutional - NAD, Comfortable, confused   HEENT - NCAT, EOMI, AMANDA, (+) oral thrush   Neck - Supple, No limited ROM  Chest - good chest expansion, good respiratory effort, CTAB   Cardio - warm and well perfused, RRR   Abdomen -  Soft, NT, ND, (+) BS  Extremities - No peripheral edema, No calf tenderness   Neurologic Exam:                    Cognitive -             Orientation: AAOx 2, knows she had a stroke, oriented to Self, oriented to place, stated month is November, year 1989. Easily distracted      Speech - Fluent, (+) dysarthria, No aphasia      Cranial Nerves - (+) right facial asymmetry, Tongue midline, EOMI, Shoulder shrug intact     Motor -                      LEFT    UE - ShAB 5/5, EF 5/5, EE 5/5, WE 5/5,  WNL                    RIGHT UE - ShAB 4-/5, EF 4-/5, EE 3/5, WE 4/5,  4/5                    LEFT    LE - HF 5/5, KE 5/5, DF 5/5, PF 5/5                    RIGHT LE - HF 4/5, KE 4/5, DF 4+/5, PF 4/5       Right side inattention, Apraxia, of RUE, impulsive  Psychiatry: Flat affect  Skin on admission: venous stasis ulcer on anterior shin 4cm x 4cm. LLE    IDT 12/12  TDD: 12/22 Home   Nursing: CVA AAOx2 impulsive, precautions falls   SLP: pureed with thin liquids, impulsivity, sev cog deficits, R sided inattention   SW: lives in private home, flight of stairs inside, 1 step inside, children live in [place] and cannot support, son helps with getting groceries and lives with patient   OT: min-A across the board, sup for eating, mod-A toileting, shower transfer not assessed,   R extremtiy apraxia, R side inattention, needs 24/7 sueprvision   PT; min-A BM transfers, 75 ft with RW min-A  Goals: 1. Ambulate to bathroom supervision, 1. 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 Patient had R sided hemiparesis, expressive aphasia. CT imaging confirmed multiple embolic areas. Pacemaker interrogated showing Atrial runs but no sustained Atrial Fibrillation. Patient currently on course of ASA and statin. Hospital course complicated by severe anaphylactic due to iodine contrast allergy from CT performed on admission to ED. CT head (12/4) showed Large left MCA territory acute to early subacute infarction, thrombosis of multiple distal branches of the left MCA and no acute intracranial hemorrhage  Patient was evaluated by PM&R and therapy for functional deficits and gait/ ADL impairments and recommended acute rehabilitation. Patient was medically optimized for discharge to [facility] on 12/8    Allergies:  IV Contrast (Anaphylaxis; Urticaria)  penicillin (Rash)    Subjective:  - Patient was seen and examined at bed side, comfortable in her WC, confused (+) swinging mood. No acute events overnight.   - Patient slept well last night.  Her headache is better with Tylenol, complains of difficulty with swallowing and she has a tube in her throat which was put by the police when they called the ambulance asking when will we take that tube out.  Informed the patient that the tube is out and she does not have it at this time, she insisted on her thought.   Patient is very intelligent and speaks 7 languages but unfortunately confused 2/2 her stroke     - Pain is well controlled with current meds.    - BM (12/12), voiding without issues    - Tolerating and participating in 3 hours of daily therapy, progressing  - MBS was completed yesterday with recommendations of lamont/thins.       ROS:  - Denies CP, palpitation, cough, fever, SOB, abdominal pain, N/V/D, weakness, joint pain/swelling or constipation.     MEDICATIONS  (STANDING):  amantadine Syrup 50 milliGRAM(s) Oral <User Schedule>  aspirin 325 milliGRAM(s) Oral daily  atorvastatin 80 milliGRAM(s) Oral at bedtime  bacitracin   Ointment 1 Application(s) Topical two times a day  fluconAZOLE   Tablet 100 milliGRAM(s) Oral daily  influenza  Vaccine (HIGH DOSE) 0.7 milliLiter(s) IntraMuscular once  lidocaine   4% Patch 1 Patch Transdermal daily  lisinopril 20 milliGRAM(s) Oral daily  melatonin 3 milliGRAM(s) Oral at bedtime    MEDICATIONS  (PRN):  acetaminophen   Oral Liquid .. 650 milliGRAM(s) Oral every 6 hours PRN Temp greater or equal to 38.5C (101.3F), Mild Pain (1 - 3)  labetalol 100 milliGRAM(s) Oral every 6 hours PRN SBP>190      LABS                10.3   6.27  )-----------( 241      ( 11 Dec 2023 07:03 )             32.6     12-11    139  |  103  |  12  ----------------------------<  121<H>  3.7   |  28  |  0.73    Ca    8.6      11 Dec 2023 07:03    TPro  6.0  /  Alb  2.6<L>  /  TBili  0.4  /  DBili  x   /  AST  36  /  ALT  30  /  AlkPhos  74  12-11    Radiology:    CT Brain Stroke Protocol (12.04.23 @ 11:46)   IMPRESSION:  Large left MCA territory acute to early subacute infarction. 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  No acute intracranial hemorrhage.    Physical Exam:   Vital Signs Last 24 Hrs  T(C): 36.7 (13 Dec 2023 07:28), Max: 36.7 (13 Dec 2023 07:28)  T(F): 98.1 (13 Dec 2023 07:28), Max: 98.1 (13 Dec 2023 07:28)  HR: 67 (13 Dec 2023 07:28) (63 - 69)  BP: 161/92 (13 Dec 2023 07:28) (140/72 - 167/89)  RR: 14 (13 Dec 2023 07:28) (14 - 16)  SpO2: 97% (13 Dec 2023 07:28) (96% - 97%)    Constitutional - NAD, Comfortable, confused   HEENT - NCAT, EOMI, AMANDA, (+) oral thrush   Neck - Supple, No limited ROM  Chest - good chest expansion, good respiratory effort, CTAB   Cardio - warm and well perfused, RRR   Abdomen -  Soft, NT, ND, (+) BS  Extremities - No peripheral edema, No calf tenderness   Neurologic Exam:                    Cognitive -             Orientation: AAOx 2, knows she had a stroke, oriented to Self, oriented to place, stated month is November, year 1989. Easily distracted      Speech - Fluent, (+) dysarthria, No aphasia      Cranial Nerves - (+) right facial asymmetry, Tongue midline, EOMI, Shoulder shrug intact     Motor -                      LEFT    UE - ShAB 5/5, EF 5/5, EE 5/5, WE 5/5,  WNL                    RIGHT UE - ShAB 4-/5, EF 4-/5, EE 3/5, WE 4/5,  4/5                    LEFT    LE - HF 5/5, KE 5/5, DF 5/5, PF 5/5                    RIGHT LE - HF 4/5, KE 4/5, DF 4+/5, PF 4/5       Right side inattention, Apraxia, of RUE, impulsive  Psychiatry: Flat affect  Skin on admission: venous stasis ulcer on anterior shin 4cm x 4cm. LLE    IDT 12/12  TDD: 12/22 Home   Nursing: CVA AAOx2 impulsive, precautions falls   SLP: pureed with thin liquids, impulsivity, sev cog deficits, R sided inattention   SW: lives in private home, flight of stairs inside, 1 step inside, children live in [place] and cannot support, son helps with getting groceries and lives with patient   OT: min-A across the board, sup for eating, mod-A toileting, shower transfer not assessed,   R extremtiy apraxia, R side inattention, needs 24/7 sueprvision   PT; min-A BM transfers, 75 ft with RW min-A  Goals: 1. Ambulate to bathroom supervision, 1. 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 Patient had R sided hemiparesis, expressive aphasia. CT imaging confirmed multiple embolic areas. Pacemaker interrogated showing Atrial runs but no sustained Atrial Fibrillation. Patient currently on course of ASA and statin. Hospital course complicated by severe anaphylactic due to iodine contrast allergy from CT performed on admission to ED. CT head (12/4) showed Large left MCA territory acute to early subacute infarction, thrombosis of multiple distal branches of the left MCA and no acute intracranial hemorrhage  Patient was evaluated by PM&R and therapy for functional deficits and gait/ ADL impairments and recommended acute rehabilitation. Patient was medically optimized for discharge to [facility] on 12/8    Allergies:  IV Contrast (Anaphylaxis; Urticaria)  penicillin (Rash)    Subjective:  - Patient was seen and examined at bed side, comfortable in her WC, confused (+) swinging mood. No acute events overnight. At 12 pm nursing staff called for rhythmic movement in her right face, eye and mouth stayed for 1 minute, no LOC, then after 3-5 minutes she had another persistent rhythmic movement in her right face, eye, mouth extending to her RUE. Rapid response team was called and patient will be transferred to Tele room 225.   - Patient slept well last night.  Her headache is better with Tylenol, complains of difficulty with swallowing and she has a tube in her throat which was put by the police when they called the ambulance asking when will we take that tube out.  Informed the patient that the tube is out and she does not have it at this time, she insisted on her thought.   Patient is very intelligent and speaks 7 languages but unfortunately confused 2/2 her stroke     - Pain is well controlled with current meds.    - BM (12/12), voiding without issues    - Tolerating and participating in 3 hours of daily therapy, progressing  - MBS was completed yesterday with recommendations of lamont/thins.       ROS:  - Denies CP, palpitation, cough, fever, SOB, abdominal pain, N/V/D, weakness, joint pain/swelling or constipation.     MEDICATIONS  (STANDING):  amantadine Syrup 50 milliGRAM(s) Oral <User Schedule>  aspirin 325 milliGRAM(s) Oral daily  atorvastatin 80 milliGRAM(s) Oral at bedtime  bacitracin   Ointment 1 Application(s) Topical two times a day  fluconAZOLE   Tablet 100 milliGRAM(s) Oral daily  influenza  Vaccine (HIGH DOSE) 0.7 milliLiter(s) IntraMuscular once  lidocaine   4% Patch 1 Patch Transdermal daily  lisinopril 20 milliGRAM(s) Oral daily  melatonin 3 milliGRAM(s) Oral at bedtime    MEDICATIONS  (PRN):  acetaminophen   Oral Liquid .. 650 milliGRAM(s) Oral every 6 hours PRN Temp greater or equal to 38.5C (101.3F), Mild Pain (1 - 3)  labetalol 100 milliGRAM(s) Oral every 6 hours PRN SBP>190      LABS                10.3   6.27  )-----------( 241      ( 11 Dec 2023 07:03 )             32.6     12-11    139  |  103  |  12  ----------------------------<  121<H>  3.7   |  28  |  0.73    Ca    8.6      11 Dec 2023 07:03    TPro  6.0  /  Alb  2.6<L>  /  TBili  0.4  /  DBili  x   /  AST  36  /  ALT  30  /  AlkPhos  74  12-11    Radiology:    CT Brain Stroke Protocol (12.04.23 @ 11:46)   IMPRESSION:  Large left MCA territory acute to early subacute infarction. 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  No acute intracranial hemorrhage.    Physical Exam:   Vital Signs Last 24 Hrs  T(C): 36.7 (13 Dec 2023 07:28), Max: 36.7 (13 Dec 2023 07:28)  T(F): 98.1 (13 Dec 2023 07:28), Max: 98.1 (13 Dec 2023 07:28)  HR: 67 (13 Dec 2023 07:28) (63 - 69)  BP: 161/92 (13 Dec 2023 07:28) (140/72 - 167/89)  RR: 14 (13 Dec 2023 07:28) (14 - 16)  SpO2: 97% (13 Dec 2023 07:28) (96% - 97%)    Constitutional - NAD, Comfortable, confused   HEENT - NCAT, EOMI, AMANDA, (+) oral thrush   Neck - Supple, No limited ROM  Chest - good chest expansion, good respiratory effort, CTAB   Cardio - warm and well perfused, RRR   Abdomen -  Soft, NT, ND, (+) BS  Extremities - No peripheral edema, No calf tenderness   Neurologic Exam:                    Cognitive -             Orientation: AAOx 2, knows she had a stroke, oriented to Self, oriented to place, stated month is November, year 1989. Easily distracted      Speech - Fluent, (+) dysarthria, No aphasia      Cranial Nerves - (+) right facial asymmetry, Tongue midline, EOMI, Shoulder shrug intact     Motor -                      LEFT    UE - ShAB 5/5, EF 5/5, EE 5/5, WE 5/5,  WNL                    RIGHT UE - ShAB 4-/5, EF 4-/5, EE 3/5, WE 4/5,  4/5                    LEFT    LE - HF 5/5, KE 5/5, DF 5/5, PF 5/5                    RIGHT LE - HF 4/5, KE 4/5, DF 4+/5, PF 4/5       Right side inattention, Apraxia, of RUE, impulsive  Psychiatry: Flat affect  Skin on admission: venous stasis ulcer on anterior shin 4cm x 4cm. LLE    IDT 12/12  TDD: 12/22 Home   Nursing: CVA AAOx2 impulsive, precautions falls   SLP: pureed with thin liquids, impulsivity, sev cog deficits, R sided inattention   SW: lives in private home, flight of stairs inside, 1 step inside, children live in [place] and cannot support, son helps with getting groceries and lives with patient   OT: min-A across the board, sup for eating, mod-A toileting, shower transfer not assessed,   R extremtiy apraxia, R side inattention, needs 24/7 sueprvision   PT; min-A BM transfers, 75 ft with RW min-A  Goals: 1. Ambulate to bathroom supervision, 1. 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 Patient had R sided hemiparesis, expressive aphasia. CT imaging confirmed multiple embolic areas. Pacemaker interrogated showing Atrial runs but no sustained Atrial Fibrillation. Patient currently on course of ASA and statin. Hospital course complicated by severe anaphylactic due to iodine contrast allergy from CT performed on admission to ED. CT head (12/4) showed Large left MCA territory acute to early subacute infarction, thrombosis of multiple distal branches of the left MCA and no acute intracranial hemorrhage  Patient was evaluated by PM&R and therapy for functional deficits and gait/ ADL impairments and recommended acute rehabilitation. Patient was medically optimized for discharge to [facility] on 12/8    Allergies:  IV Contrast (Anaphylaxis; Urticaria)  penicillin (Rash)    Subjective:  - Patient was seen and examined at bed side, comfortable in her WC, confused (+) swinging mood. No acute events overnight. At 12 pm nursing staff called for rhythmic movement in her right face, eye and mouth stayed for 1 minute, no LOC, then after 3-5 minutes she had another persistent rhythmic movement in her right face, eye, mouth extending to her RUE. Rapid response team was called and patient will be transferred to Tele room 225.   - Patient slept well last night.  Her headache is better with Tylenol, complains of difficulty with swallowing and she has a tube in her throat which was put by the police when they called the ambulance asking when will we take that tube out.  Informed the patient that the tube is out and she does not have it at this time, she insisted on her thought.   Patient is very intelligent and speaks 7 languages but unfortunately confused 2/2 her stroke     - Pain is well controlled with current meds.    - BM (12/12), voiding without issues    - Tolerating and participating in 3 hours of daily therapy, progressing  - MBS was completed yesterday with recommendations of lamont/thins.       ROS:  - Denies CP, palpitation, cough, fever, SOB, abdominal pain, N/V/D, weakness, joint pain/swelling or constipation.     MEDICATIONS  (STANDING):  amantadine Syrup 50 milliGRAM(s) Oral <User Schedule>  aspirin 325 milliGRAM(s) Oral daily  atorvastatin 80 milliGRAM(s) Oral at bedtime  bacitracin   Ointment 1 Application(s) Topical two times a day  fluconAZOLE   Tablet 100 milliGRAM(s) Oral daily  influenza  Vaccine (HIGH DOSE) 0.7 milliLiter(s) IntraMuscular once  lidocaine   4% Patch 1 Patch Transdermal daily  lisinopril 20 milliGRAM(s) Oral daily  melatonin 3 milliGRAM(s) Oral at bedtime    MEDICATIONS  (PRN):  acetaminophen   Oral Liquid .. 650 milliGRAM(s) Oral every 6 hours PRN Temp greater or equal to 38.5C (101.3F), Mild Pain (1 - 3)  labetalol 100 milliGRAM(s) Oral every 6 hours PRN SBP>190      LABS                10.3   6.27  )-----------( 241      ( 11 Dec 2023 07:03 )             32.6     12-11    139  |  103  |  12  ----------------------------<  121<H>  3.7   |  28  |  0.73    Ca    8.6      11 Dec 2023 07:03    TPro  6.0  /  Alb  2.6<L>  /  TBili  0.4  /  DBili  x   /  AST  36  /  ALT  30  /  AlkPhos  74  12-11    Radiology:    CT Brain Stroke Protocol (12.04.23 @ 11:46)   IMPRESSION:  Large left MCA territory acute to early subacute infarction. 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  No acute intracranial hemorrhage.    Physical Exam:   Vital Signs Last 24 Hrs  T(C): 36.7 (13 Dec 2023 07:28), Max: 36.7 (13 Dec 2023 07:28)  T(F): 98.1 (13 Dec 2023 07:28), Max: 98.1 (13 Dec 2023 07:28)  HR: 67 (13 Dec 2023 07:28) (63 - 69)  BP: 161/92 (13 Dec 2023 07:28) (140/72 - 167/89)  RR: 14 (13 Dec 2023 07:28) (14 - 16)  SpO2: 97% (13 Dec 2023 07:28) (96% - 97%)    Constitutional - NAD, Comfortable, confused   HEENT - NCAT, EOMI, AMANDA, (+) oral thrush   Neck - Supple, No limited ROM  Chest - good chest expansion, good respiratory effort, CTAB   Cardio - warm and well perfused, RRR   Abdomen -  Soft, NT, ND, (+) BS  Extremities - No peripheral edema, No calf tenderness   Neurologic Exam:                    Cognitive -             Orientation: AAOx 2, knows she had a stroke, oriented to Self, oriented to place, stated month is November, year 1989. Easily distracted      Speech - Fluent, (+) dysarthria, No aphasia      Cranial Nerves - (+) right facial asymmetry, Tongue midline, EOMI, Shoulder shrug intact     Motor -                      LEFT    UE - ShAB 5/5, EF 5/5, EE 5/5, WE 5/5,  WNL                    RIGHT UE - ShAB 4-/5, EF 4-/5, EE 3/5, WE 4/5,  4/5                    LEFT    LE - HF 5/5, KE 5/5, DF 5/5, PF 5/5                    RIGHT LE - HF 4/5, KE 4/5, DF 4+/5, PF 4/5       Right side inattention, Apraxia, of RUE, impulsive  Psychiatry: Flat affect  Skin on admission: venous stasis ulcer on anterior shin 4cm x 4cm. LLE    IDT 12/12  TDD: 12/22 Home   Nursing: CVA AAOx2 impulsive, precautions falls   SLP: pureed with thin liquids, impulsivity, sev cog deficits, R sided inattention   SW: lives in private home, flight of stairs inside, 1 step inside, children live in [place] and cannot support, son helps with getting groceries and lives with patient   OT: min-A across the board, sup for eating, mod-A toileting, shower transfer not assessed,   R extremtiy apraxia, R side inattention, needs 24/7 sueprvision   PT; min-A BM transfers, 75 ft with RW min-A  Goals: 1. Ambulate to bathroom supervision, 1. attend to functional tasks for 3-5 minute intervals with verbal cueing

## 2023-12-13 NOTE — PROGRESS NOTE ADULT - PROVIDER SPECIALTY LIST ADULT
Hospitalist
Physiatry
Physiatry
Neuropsychology
Physiatry
Hospitalist
Hospitalist
Physiatry
Physiatry

## 2023-12-13 NOTE — DISCHARGE NOTE PROVIDER - NSD[facility]_MEDRECSTATUS_GEN_ALL_CORE
Addended by: ALEJANDRA DARDEN on: 11/20/2019 03:04 PM     Modules accepted: Orders     Admission Reconciliation is Completed  Discharge Reconciliation is Completed

## 2023-12-13 NOTE — EEG REPORT - NS EEG TEXT BOX
REBEKAH GUIDO [id number]     Study Date: 12-13-23  Duration: 20 min  --------------------------------------------------------------------------------------------------  History:  CC/ HPI Patient is a 88y old  Female who presents with a chief complaint of seizure activities (13 Dec 2023 15:00)    MEDICATIONS  (STANDING):  bacitracin   Ointment 1 Application(s) Topical two times a day  levETIRAcetam  IVPB 1000 milliGRAM(s) IV Intermittent every 12 hours  lidocaine   4% Patch 1 Patch Transdermal daily  sodium chloride 0.9%. 1000 milliLiter(s) (50 mL/Hr) IV Continuous <Continuous>    --------------------------------------------------------------------------------------------------  Study Interpretation:    [[[Abbreviation Key:  PDR=alpha rhythm/posterior dominant rhythm. A-P=anterior posterior.  Amplitude: ‘very low’:<20; ‘low’:20-49; ‘medium’:; ‘high’:>150uV.  Persistence for periodic/rhythmic patterns (% of epoch) ‘rare’:<1%; ‘occasional’:1-10%; ‘frequent’:10-50%; ‘abundant’:50-90%; ‘continuous’:>90%.  Persistence for sporadic discharges: ‘rare’:<1/hr; ‘occasional’:1/min-1/hr; ‘frequent’:>1/min; ‘abundant’:>1/10 sec.  RPP=rhythmic and periodic patterns; GRDA=generalized rhythmic delta activity; FIRDA=frontal intermittent GRDA; LRDA=lateralized rhythmic delta activity; TIRDA=temporal intermittent rhythmic delta activity;  LPD=PLED=lateralized periodic discharges; GPD=generalized periodic discharges; BIPDs =bilateral independent periodic discharges; Mf=multifocal; SIRPDs=stimulus induced rhythmic, periodic, or ictal appearing discharges; BIRDs=brief potentially ictal rhythmic discharges >4 Hz, lasting .5-10s; PFA (paroxysmal bursts >13 Hz or =8 Hz <10s).  Modifiers: +F=with fast component; +S=with spike component; +R=with rhythmic component.  S-B=burst suppression pattern.  Max=maximal. N1-drowsy; N2-stage II sleep; N3-slow wave sleep. SSS/BETS=small sharp spikes/benign epileptiform transients of sleep. HV=hyperventilation; PS=photic stimulation]]]    Daily EEG Visual Analysis    FINDINGS:      Background:  Continuity: Continuous  Symmetry: Asymmetric  Posterior dominant rhythm (PDR): 8.5 Hz on the right, absent on the left, reactive to eye closure. Low-amplitude right frontal beta in wakefulness.  State Change: Present  Voltage: Normal  Anterior-Posterior Gradient: Present  Other background findings: Predominant background in the right hemisphere consists of diffuse beta activity with an intermittent 8.5-Hz PDR.  Breach: Absent    Background Slowing:  Generalized slowing: None  Focal slowing: Predominant background in the left hemisphere consists of polymorphic delta activity with intermittent alpha/beta/theta, with relative attenuation compared to the right.    State Changes:   Drowsiness is characterized by fragmentation, attenuation, and slowing of the background activity.  Stage 2 sleep is characterized by sleep spindles that are poorly formed to absent on the left.    Interictal Findings:  Occasional left frontotemporal (F7) sharp waves.    Electrographic and Electroclinical seizures:  None    Other Clinical Events:  None    Activation Procedures:   Hyperventilation is not performed.    Photic stimulation is performed and does not elicit any abnormalities.      Artifacts:  Intermittent myogenic and movement artifacts are present.    EKG:  Single-lead EKG shows regular rhythm, occasional PVCs.    EEG Classification / Summary:  Abnormal routine EEG in the drowsy and mostly asleep states with intermittent arousals.   -Occasional left frontotemporal sharp waves  -Continuous left hemispheric focal slowing and relative attenuation  -No electrographic seizures    Clinical Impression:  -Risk of left frontotemporal focal-onset seizures  -Left hemispheric focal cerebral dysfunction can be structural or functional in etiology.   -No seizures captured        -------------------------------------------------------------------------------------------------------  [facility] EEG Reading Room Ph#: [phone]  Epilepsy Answering Service after 5PM and before 8:30AM: Ph#: [phone]    Robyn Steve MD  Attending Physician, [facility] Epilepsy [place]   REBEKAH GUIDO [id number]     Study Date: 12-13-23  Duration: 20 min  --------------------------------------------------------------------------------------------------  History:  CC/ HPI Patient is a 88y old  Female who presents with a chief complaint of seizure activities (13 Dec 2023 15:00)    MEDICATIONS  (STANDING):  bacitracin   Ointment 1 Application(s) Topical two times a day  levETIRAcetam  IVPB 1000 milliGRAM(s) IV Intermittent every 12 hours  lidocaine   4% Patch 1 Patch Transdermal daily  sodium chloride 0.9%. 1000 milliLiter(s) (50 mL/Hr) IV Continuous <Continuous>    --------------------------------------------------------------------------------------------------  Study Interpretation:    [[[Abbreviation Key:  PDR=alpha rhythm/posterior dominant rhythm. A-P=anterior posterior.  Amplitude: ‘very low’:<20; ‘low’:20-49; ‘medium’:; ‘high’:>150uV.  Persistence for periodic/rhythmic patterns (% of epoch) ‘rare’:<1%; ‘occasional’:1-10%; ‘frequent’:10-50%; ‘abundant’:50-90%; ‘continuous’:>90%.  Persistence for sporadic discharges: ‘rare’:<1/hr; ‘occasional’:1/min-1/hr; ‘frequent’:>1/min; ‘abundant’:>1/10 sec.  RPP=rhythmic and periodic patterns; GRDA=generalized rhythmic delta activity; FIRDA=frontal intermittent GRDA; LRDA=lateralized rhythmic delta activity; TIRDA=temporal intermittent rhythmic delta activity;  LPD=PLED=lateralized periodic discharges; GPD=generalized periodic discharges; BIPDs =bilateral independent periodic discharges; Mf=multifocal; SIRPDs=stimulus induced rhythmic, periodic, or ictal appearing discharges; BIRDs=brief potentially ictal rhythmic discharges >4 Hz, lasting .5-10s; PFA (paroxysmal bursts >13 Hz or =8 Hz <10s).  Modifiers: +F=with fast component; +S=with spike component; +R=with rhythmic component.  S-B=burst suppression pattern.  Max=maximal. N1-drowsy; N2-stage II sleep; N3-slow wave sleep. SSS/BETS=small sharp spikes/benign epileptiform transients of sleep. HV=hyperventilation; PS=photic stimulation]]]    Daily EEG Visual Analysis    FINDINGS:      Background:  Continuity: Continuous  Symmetry: Asymmetric  Posterior dominant rhythm (PDR): 8.5 Hz on the right, absent on the left, reactive to eye closure. Low-amplitude right frontal beta in wakefulness.  State Change: Present  Voltage: Normal  Anterior-Posterior Gradient: Present  Other background findings: Predominant background in the right hemisphere consists of diffuse beta activity with an intermittent 8.5-Hz PDR.  Breach: Absent    Background Slowing:  Generalized slowing: None  Focal slowing: Predominant background in the left hemisphere consists of polymorphic delta activity with intermittent alpha/beta/theta, with relative attenuation compared to the right.    State Changes:   Drowsiness is characterized by fragmentation, attenuation, and slowing of the background activity.  Stage 2 sleep is characterized by sleep spindles that are poorly formed to absent on the left.    Interictal Findings:  Occasional left frontotemporal (F7) sharp waves.    Electrographic and Electroclinical seizures:  None    Other Clinical Events:  None    Activation Procedures:   Hyperventilation is not performed.    Photic stimulation is performed and does not elicit any abnormalities.      Artifacts:  Intermittent myogenic and movement artifacts are present.    EKG:  Single-lead EKG shows regular rhythm, occasional PVCs.    EEG Classification / Summary:  Abnormal routine EEG in the drowsy and mostly asleep states with intermittent arousals.   -Occasional left frontotemporal sharp waves  -Continuous left hemispheric focal slowing and relative attenuation  -No electrographic seizures    Clinical Impression:  -Risk of left frontotemporal focal-onset seizures  -Left hemispheric focal cerebral dysfunction can be structural or functional in etiology.   -No seizures captured        -------------------------------------------------------------------------------------------------------  [facility] EEG Reading Room Ph#: [phone]  Epilepsy Answering Service after 5PM and before 8:30AM: Ph#: [phone]    Robyn Steve MD  Attending Physician, [facility] Epilepsy [place]

## 2023-12-13 NOTE — CONSULT NOTE ADULT - SUBJECTIVE AND OBJECTIVE BOX
Patient is a 88y old  Female who presents with a chief complaint of seizure activities (13 Dec 2023 15:00)      Initial HPI on admission:  HPI:  88 year old female with a history of HTN, hypothyroidism, pacemaker, breast cancer who is admitted with CVA and acute respiratory failure due to IV contrast anaphylaxis. Patient had R sided hemiparesis, expressive aphasia. CT imaging confirmed multiple embolic areas. Pacemaker interrogated showing Atrial runs but no sustained Atrial Fibrillation. Patient currently on course of ASA and statin. Hospital course complicated by severe anaphylactic due to iodine contrast allergy from CT performed on admission to ED. 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      Pt was transferred to [facility] for acute rehab on 12/8. rehab course complicated by possible seizure episodes involving right facial twitching for 1 min and RUE shaking subsequently started and was persistent. Per rehab team, pt was awake and speaking but stopped responding to questions for a short period of time when facial twitching started. RRT was called for the seizure activities. Patient was hypertensive to 192/70 mmHg and HR 86bpm. O2 saturation is 95% on room air. repeat BP low 89/60s. IV ativan 2mg stat and NS 500cc bolus ordered. Pt to be upgraded to telemetry for further work up and care.   There is petechial hemorrhage and/or laminar necrosis, midline   shift 0.5cm to the right on CT head . Pat is awake but confuse       BRIEF HOSPITAL COURSE: ***    PAST MEDICAL & SURGICAL HISTORY:  Hyperlipidemia  no current medications      Breast cancer  2009 left - s/ping castellanos radical mastectomy      [place] (hard of hearing)  bilateral HA (Yvette)      Meniscus tear  left - 10/2015      Cyst of left ovary      SDH (subdural hematoma)  3/2011 - s/p fall - no surgical intervention      History of Left Mastectomy      Cataract extraction status of eye, right      Status post right cataract extraction        Allergies    penicillin (Rash)  IV Contrast (Anaphylaxis; Urticaria)    Intolerances      FAMILY HISTORY:  FH: leukemia (Father)    FH: breast cancer (Mother)      Social history reviewed: ***    Review of Systems: unable to obtain due to mental status   CONSTITUTIONAL: No fever, chills, or fatigue  EYES: No eye pain, visual disturbances, or discharge  ENMT:  No difficulty hearing, tinnitus, vertigo; No sinus or throat pain  NECK: No pain or stiffness  RESPIRATORY: No cough, wheezing, chills or hemoptysis; No shortness of breath  CARDIOVASCULAR: No chest pain, palpitations, dizziness, or leg swelling  GASTROINTESTINAL: No abdominal or epigastric pain. No nausea, vomiting, or hematemesis; No diarrhea or constipation. No melena or hematochezia.  GENITOURINARY: No dysuria, frequency, hematuria, or incontinence  NEUROLOGICAL: No headaches, memory loss, loss of strength, numbness, or tremors  SKIN: No itching, burning, rashes, or lesions   MUSCULOSKELETAL: No joint pain or swelling; No muscle, back, or extremity pain  PSYCHIATRIC: No depression, anxiety, mood swings, or difficulty sleeping      Medications:  acetaminophen     Tablet .. 650 milliGRAM(s) Oral every 6 hours PRN  acetaminophen  Suppository .. 650 milliGRAM(s) Rectal every 6 hours PRN  aluminum hydroxide/magnesium hydroxide/simethicone Suspension 30 milliLiter(s) Oral every 4 hours PRN  atorvastatin 80 milliGRAM(s) Oral at bedtime  bacitracin   Ointment 1 Application(s) Topical two times a day  chlorhexidine 2% Cloths 1 Application(s) Topical <User Schedule>  labetalol Injectable 5 milliGRAM(s) IV Push every 8 hours PRN  levETIRAcetam  IVPB 1000 milliGRAM(s) IV Intermittent every 12 hours  lidocaine   4% Patch 1 Patch Transdermal daily  melatonin 3 milliGRAM(s) Oral at bedtime PRN  multivitamin 1 Tablet(s) Oral daily  ondansetron Injectable 4 milliGRAM(s) IV Push every 8 hours PRN  senna 2 Tablet(s) Oral at bedtime PRN      vent settings      Vital Signs Last 24 Hrs  T(C): 37 (13 Dec 2023 14:27), Max: 37 (13 Dec 2023 14:27)  T(F): 98.6 (13 Dec 2023 14:27), Max: 98.6 (13 Dec 2023 14:27)  HR: 102 (13 Dec 2023 14:27) (63 - 102)  BP: 136/78 (13 Dec 2023 14:27) (136/78 - 167/89)  BP(mean): --  RR: 15 (13 Dec 2023 14:27) (14 - 16)  SpO2: 96% (13 Dec 2023 14:27) (96% - 97%)    Parameters below as of 13 Dec 2023 14:27  Patient On (Oxygen Delivery Method): room air          LABS:                        11.7   7.56  )-----------( 309      ( 13 Dec 2023 12:35 )             36.7     12-13    138  |  101  |  12  ----------------------------<  103<H>  4.4   |  32<H>  |  0.68    Ca    8.9      13 Dec 2023 12:35    TPro  7.0  /  Alb  3.0<L>  /  TBili  0.4  /  DBili  x   /  AST  41<H>  /  ALT  32  /  AlkPhos  90  12-13      CARDIAC MARKERS ( 13 Dec 2023 12:35 )  x     / x     / 54 U/L / x     / x          CAPILLARY BLOOD GLUCOSE      POCT Blood Glucose.: 103 mg/dL (13 Dec 2023 12:28)      Urinalysis Basic - ( 13 Dec 2023 12:35 )    Color: x / Appearance: x / SG: x / pH: x  Gluc: 103 mg/dL / Ketone: x  / Bili: x / Urobili: x   Blood: x / Protein: x / Nitrite: x   Leuk Esterase: x / RBC: x / WBC x   Sq Epi: x / Non Sq Epi: x / Bacteria: x      CULTURES:        Physical Examination:    General: No acute distress.      HEENT: Pupils equal, reactive to light.  Symmetric.    PULM: Clear to auscultation bilaterally, no significant sputum production    CVS: Regular rate and rhythm, no murmurs, rubs, or gallops    ABD: Soft, nondistended, nontender, normoactive bowel sounds, no masses    EXT: No edema, nontender    SKIN: Warm and well perfused, no rashes noted.    NEURO: Alert, . confuse     RADIOLOGY REVIEWED ***  < from: CT Head No Cont (12.13.23 @ 12:51) >    ACC: [id number] EXAM:  CT BRAIN   ORDERED BY: ABIMBOLA CRUZ     PROCEDURE DATE:  12/13/2023          INTERPRETATION:  EXAM: CT HEAD WITHOUT INTRAVENOUS CONTRAST    HISTORY: Left MCA infarction, new onset seizure    TECHNIQUE: Multiple axial images wereobtained from the skull base to the   vertex. Sagittal and coronal reformatted images were obtained from the   axial data set. The images were reviewed in brain and bone windows.    COMPARISON: CT of the head December 4, 2023    FINDINGS:    The previously seen left MCA distribution infarction is again   redemonstrated, advanced when compared to prior imaging. Increased   attenuation along the sulci/gyri of the affected region, likely petechial   hemorrhages and/or laminar necrosis. Mild effacement of the left lateral   ventricle. Small midline shift to the right of approximately 0.5 cm when   measured at the septum pellucidum.    Areas of decreased attenuation throughout the deep/periventricular white   matter, compatible with chronic small vessel disease. Chronic appearing   infarct in the right putamen. Parenchymal volume loss resulting in a ex   vacuo dilatation appearance of the bilateral ventricles. Redemonstrated   probable meningioma along the left greater wing of the sphenoid.    The cranial cervical junction is within normal limits. The sella is   largely CSF filled. No depressed calvarial fracture. The visualized   paranasal sinuses are well aerated. The visualized mastoid air cells are   well aerated. The visualized orbitsare status post cataract surgery.    IMPRESSION:    1.  Redemonstrated left MCA distribution infarction, advanced when   compared to prior imaging with likely petechial hemorrhages and/or   laminar necrosis.  2.  Midline shift to the right of approximately 0.5 cm.  3.  Chronic small vessel disease.    --- End of Report ---            SIXTO MUELLER MD; Attending Radiologist  This document has been electronically signed. Dec 13 2023  2:05PM    < end of copied text >          IMPRESSION:PAST MEDICAL & SURGICAL HISTORY:  Hyperlipidemia  no current medications      Breast cancer  2009 left - s/ping castellanos radical mastectomy      [place] (hard of hearing)  bilateral HA (Yvette)      Meniscus tear  left - 10/2015      Cyst of left ovary      SDH (subdural hematoma)  3/2011 - s/p fall - no surgical intervention      History of Left Mastectomy      Cataract extraction status of eye, right      Status post right cataract extraction         HEME:    DVT and GI Prophylaxis      IMP: 88 year old female with a history of HTN, hypothyroidism, pacemaker, breast cancer who is admitted with CVA and acute respiratory failure due to IV contrast anaphylaxis. Patient had R sided hemiparesis, expressive aphasia. CT imaging confirmed multiple embolic areas. Pacemaker interrogated showing Atrial runs but no sustained Atrial Fibrillation. Patient currently on course of ASA and statin. Hospital course complicated by severe anaphylactic due to iodine contrast allergy from CT performed on admission to ED. 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      Pt was transferred to [facility] for acute rehab on 12/8. rehab course complicated by possible seizure episodes involving right facial twitching for 1 min and RUE shaking subsequently started and was persistent. Per rehab team, pt was awake and speaking but stopped responding to questions for a short period of time when facial twitching started. RRT was called for the seizure activities. Patient was hypertensive to 192/70 mmHg and HR 86bpm. O2 saturation is 95% on room air. repeat BP low 89/60s. IV ativan 2mg stat and NS 500cc bolus ordered. Pt to be upgraded to telemetry for further work up and care.   There is petechial hemorrhage and/or laminar necrosis, midline   shift 0.5cm to the right on CT head . Pat is awake but confuse . Seizure ? new onset , nidus is probable prior stroke         ASSESSMENT     - Intracranial bleed with mid line shift   - AMS / Encephalopathy   - Breast ca   - HTN   - Seizure ?   - CVA       Plan     - Hold all sedation   - Repeat CT head  8 am   - Neuro / Neurosug recs as per Dr Almazan documentation   - Hold all anticoag and ASA  - Neurochecks q1h   - Monitor pulse oximetry   - Hemodynamic monitoring   - Diet   - Fall and seizure precaution   - PPI   - No antibx at this time     discussed with Dr Almazan Patient is a 88y old  Female who presents with a chief complaint of seizure activities (13 Dec 2023 15:00)      Initial HPI on admission:  HPI:  88 year old female with a history of HTN, hypothyroidism, pacemaker, breast cancer who is admitted with CVA and acute respiratory failure due to IV contrast anaphylaxis. Patient had R sided hemiparesis, expressive aphasia. CT imaging confirmed multiple embolic areas. Pacemaker interrogated showing Atrial runs but no sustained Atrial Fibrillation. Patient currently on course of ASA and statin. Hospital course complicated by severe anaphylactic due to iodine contrast allergy from CT performed on admission to ED. 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      Pt was transferred to [facility] for acute rehab on 12/8. rehab course complicated by possible seizure episodes involving right facial twitching for 1 min and RUE shaking subsequently started and was persistent. Per rehab team, pt was awake and speaking but stopped responding to questions for a short period of time when facial twitching started. RRT was called for the seizure activities. Patient was hypertensive to 192/70 mmHg and HR 86bpm. O2 saturation is 95% on room air. repeat BP low 89/60s. IV ativan 2mg stat and NS 500cc bolus ordered. Pt to be upgraded to telemetry for further work up and care.   There is petechial hemorrhage and/or laminar necrosis, midline   shift 0.5cm to the right on CT head . Pat is awake but confuse       BRIEF HOSPITAL COURSE: ***    PAST MEDICAL & SURGICAL HISTORY:  Hyperlipidemia  no current medications      Breast cancer  2009 left - s/ping castellanos radical mastectomy      [place] ([place] of hearing)  bilateral HA (Yvette)      Meniscus tear  left - 10/2015      Cyst of left ovary      SDH (subdural hematoma)  3/2011 - s/p fall - no surgical intervention      History of Left Mastectomy      Cataract extraction status of eye, right      Status post right cataract extraction        Allergies    penicillin (Rash)  IV Contrast (Anaphylaxis; Urticaria)    Intolerances      FAMILY HISTORY:  FH: leukemia (Father)    FH: breast cancer (Mother)      Social history reviewed: ***    Review of Systems: unable to obtain due to mental status   CONSTITUTIONAL: No fever, chills, or fatigue  EYES: No eye pain, visual disturbances, or discharge  ENMT:  No difficulty hearing, tinnitus, vertigo; No sinus or throat pain  NECK: No pain or stiffness  RESPIRATORY: No cough, wheezing, chills or hemoptysis; No shortness of breath  CARDIOVASCULAR: No chest pain, palpitations, dizziness, or leg swelling  GASTROINTESTINAL: No abdominal or epigastric pain. No nausea, vomiting, or hematemesis; No diarrhea or constipation. No melena or hematochezia.  GENITOURINARY: No dysuria, frequency, hematuria, or incontinence  NEUROLOGICAL: No headaches, memory loss, loss of strength, numbness, or tremors  SKIN: No itching, burning, rashes, or lesions   MUSCULOSKELETAL: No joint pain or swelling; No muscle, back, or extremity pain  PSYCHIATRIC: No depression, anxiety, mood swings, or difficulty sleeping      Medications:  acetaminophen     Tablet .. 650 milliGRAM(s) Oral every 6 hours PRN  acetaminophen  Suppository .. 650 milliGRAM(s) Rectal every 6 hours PRN  aluminum hydroxide/magnesium hydroxide/simethicone Suspension 30 milliLiter(s) Oral every 4 hours PRN  atorvastatin 80 milliGRAM(s) Oral at bedtime  bacitracin   Ointment 1 Application(s) Topical two times a day  chlorhexidine 2% Cloths 1 Application(s) Topical <User Schedule>  labetalol Injectable 5 milliGRAM(s) IV Push every 8 hours PRN  levETIRAcetam  IVPB 1000 milliGRAM(s) IV Intermittent every 12 hours  lidocaine   4% Patch 1 Patch Transdermal daily  melatonin 3 milliGRAM(s) Oral at bedtime PRN  multivitamin 1 Tablet(s) Oral daily  ondansetron Injectable 4 milliGRAM(s) IV Push every 8 hours PRN  senna 2 Tablet(s) Oral at bedtime PRN      vent settings      Vital Signs Last 24 Hrs  T(C): 37 (13 Dec 2023 14:27), Max: 37 (13 Dec 2023 14:27)  T(F): 98.6 (13 Dec 2023 14:27), Max: 98.6 (13 Dec 2023 14:27)  HR: 102 (13 Dec 2023 14:27) (63 - 102)  BP: 136/78 (13 Dec 2023 14:27) (136/78 - 167/89)  BP(mean): --  RR: 15 (13 Dec 2023 14:27) (14 - 16)  SpO2: 96% (13 Dec 2023 14:27) (96% - 97%)    Parameters below as of 13 Dec 2023 14:27  Patient On (Oxygen Delivery Method): room air          LABS:                        11.7   7.56  )-----------( 309      ( 13 Dec 2023 12:35 )             36.7     12-13    138  |  101  |  12  ----------------------------<  103<H>  4.4   |  32<H>  |  0.68    Ca    8.9      13 Dec 2023 12:35    TPro  7.0  /  Alb  3.0<L>  /  TBili  0.4  /  DBili  x   /  AST  41<H>  /  ALT  32  /  AlkPhos  90  12-13      CARDIAC MARKERS ( 13 Dec 2023 12:35 )  x     / x     / 54 U/L / x     / x          CAPILLARY BLOOD GLUCOSE      POCT Blood Glucose.: 103 mg/dL (13 Dec 2023 12:28)      Urinalysis Basic - ( 13 Dec 2023 12:35 )    Color: x / Appearance: x / SG: x / pH: x  Gluc: 103 mg/dL / Ketone: x  / Bili: x / Urobili: x   Blood: x / Protein: x / Nitrite: x   Leuk Esterase: x / RBC: x / WBC x   Sq Epi: x / Non Sq Epi: x / Bacteria: x      CULTURES:        Physical Examination:    General: No acute distress.      HEENT: Pupils equal, reactive to light.  Symmetric.    PULM: Clear to auscultation bilaterally, no significant sputum production    CVS: Regular rate and rhythm, no murmurs, rubs, or gallops    ABD: Soft, nondistended, nontender, normoactive bowel sounds, no masses    EXT: No edema, nontender    SKIN: Warm and well perfused, no rashes noted.    NEURO: Alert, . confuse     RADIOLOGY REVIEWED ***  < from: CT Head No Cont (12.13.23 @ 12:51) >    ACC: [id number] EXAM:  CT BRAIN   ORDERED BY: ABIMBOLA CRUZ     PROCEDURE DATE:  12/13/2023          INTERPRETATION:  EXAM: CT HEAD WITHOUT INTRAVENOUS CONTRAST    HISTORY: Left MCA infarction, new onset seizure    TECHNIQUE: Multiple axial images wereobtained from the skull base to the   vertex. Sagittal and coronal reformatted images were obtained from the   axial data set. The images were reviewed in brain and bone windows.    COMPARISON: CT of the head December 4, 2023    FINDINGS:    The previously seen left MCA distribution infarction is again   redemonstrated, advanced when compared to prior imaging. Increased   attenuation along the sulci/gyri of the affected region, likely petechial   hemorrhages and/or laminar necrosis. Mild effacement of the left lateral   ventricle. Small midline shift to the right of approximately 0.5 cm when   measured at the septum pellucidum.    Areas of decreased attenuation throughout the deep/periventricular white   matter, compatible with chronic small vessel disease. Chronic appearing   infarct in the right putamen. Parenchymal volume loss resulting in a ex   vacuo dilatation appearance of the bilateral ventricles. Redemonstrated   probable meningioma along the left greater wing of the sphenoid.    The cranial cervical junction is within normal limits. The sella is   largely CSF filled. No depressed calvarial fracture. The visualized   paranasal sinuses are well aerated. The visualized mastoid air cells are   well aerated. The visualized orbitsare status post cataract surgery.    IMPRESSION:    1.  Redemonstrated left MCA distribution infarction, advanced when   compared to prior imaging with likely petechial hemorrhages and/or   laminar necrosis.  2.  Midline shift to the right of approximately 0.5 cm.  3.  Chronic small vessel disease.    --- End of Report ---            SIXTO MUELLER MD; Attending Radiologist  This document has been electronically signed. Dec 13 2023  2:05PM    < end of copied text >          IMPRESSION:PAST MEDICAL & SURGICAL HISTORY:  Hyperlipidemia  no current medications      Breast cancer  2009 left - s/ping castellanos radical mastectomy      [place])  bilateral HA (Yvette)      Meniscus tear  left - 10/2015      Cyst of left ovary      SDH (subdural hematoma)  3/2011 - s/p fall - no surgical intervention      History of Left Mastectomy      Cataract extraction status of eye, right      Status post right cataract extraction         HEME:    DVT and GI Prophylaxis      IMP: 88 year old female with a history of HTN, hypothyroidism, pacemaker, breast cancer who is admitted with CVA and acute respiratory failure due to IV contrast anaphylaxis. Patient had R sided hemiparesis, expressive aphasia. CT imaging confirmed multiple embolic areas. Pacemaker interrogated showing Atrial runs but no sustained Atrial Fibrillation. Patient currently on course of ASA and statin. Hospital course complicated by severe anaphylactic due to iodine contrast allergy from CT performed on admission to ED. 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      Pt was transferred to [facility] for acute rehab on 12/8. rehab course complicated by possible seizure episodes involving right facial twitching for 1 min and RUE shaking subsequently started and was persistent. Per rehab team, pt was awake and speaking but stopped responding to questions for a short period of time when facial twitching started. RRT was called for the seizure activities. Patient was hypertensive to 192/70 mmHg and HR 86bpm. O2 saturation is 95% on room air. repeat BP low 89/60s. IV ativan 2mg stat and NS 500cc bolus ordered. Pt to be upgraded to telemetry for further work up and care.   There is petechial hemorrhage and/or laminar necrosis, midline   shift 0.5cm to the right on CT head . Pat is awake but confuse . Seizure ? new onset , nidus is probable prior stroke         ASSESSMENT     - Intracranial bleed with mid line shift   - AMS / Encephalopathy   - Breast ca   - HTN   - Seizure ?   - CVA       Plan     - Hold all sedation   - Repeat CT head  8 am   - Neuro / Neurosug recs as per Dr Almazan documentation   - Hold all anticoag and ASA  - Neurochecks q1h   - Monitor pulse oximetry   - Hemodynamic monitoring   - Diet   - Fall and seizure precaution   - PPI   - No antibx at this time     discussed with Dr Almazan

## 2023-12-13 NOTE — DISCHARGE NOTE PROVIDER - HOSPITAL COURSE
HPI:  The patient is an 88 year old female with a history of HTN, hypothyroidism, pacemaker, breast cancer who is admitted with CVA and acute respiratory failure due to IV contrast anaphylaxis. Patient had R sided hemiparesis, expressive aphasia. CT imaging confirmed multiple embolic areas. Pacemaker interrogated showing Atrial runs but no sustained Atrial Fibrillation. Patient currently on course of ASA and statin. Hospital course complicated by severe anaphylactic due to iodine contrast allergy from CT performed on admission to ED. 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      Patient was evaluated by PM&R and therapy for functional deficits and gait/ ADL impairments and recommended acute rehabilitation. Patient was medically optimized for discharge to [facility] on 12/8         (08 Dec 2023 14:05)      Patient was evaluated by PM&R and therapy for gait/ADL impairments and recommended acute rehabilitation. Patient was medically optimized for discharge to [facility] on 12/8. Admitted with gait instability, ADL, and functional impairments.     Patient had episode of a possible seizure episode involving the right side of the face for 1 min and persistent shakiness in the RUE. Patient was hypertensive to 192/70 mmHg and HR 86bpm. O2 saturation is 95% on room air. Patient transferred to telemetry for further workup. HPI:  The patient is an 88 year old female with a history of HTN, hypothyroidism, pacemaker, breast cancer who is admitted with CVA and acute respiratory failure due to IV contrast anaphylaxis. Patient had R sided hemiparesis, expressive aphasia. CT imaging confirmed multiple embolic areas. Pacemaker interrogated showing Atrial runs but no sustained Atrial Fibrillation. Patient currently on course of ASA and statin. Hospital course complicated by severe anaphylactic due to iodine contrast allergy from CT performed on admission to ED. 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      Patient was evaluated by PM&R and therapy for functional deficits and gait/ ADL impairments and recommended acute rehabilitation. Patient was medically optimized for discharge to [facility] on 12/8         (08 Dec 2023 14:05)      Patient was evaluated by PM&R and therapy for gait/ADL impairments and recommended acute rehabilitation. Patient was medically optimized for discharge to [facility] on 12/8. Admitted with gait instability, ADL, and functional impairments.     Patient had episode of a possible seizure episode involving the right side of the face for 1 min and persistent shakiness in the RUE. Patient was hypertensive to 192/70 mmHg and HR 86bpm. O2 saturation is 95% on room air. Patient transferred to telemetry for further workup. HPI:  The patient is an 88 year old female with a history of HTN, hypothyroidism, pacemaker, breast cancer who is admitted with CVA and acute respiratory failure due to IV contrast anaphylaxis. Patient had R sided hemiparesis, expressive aphasia. CT imaging confirmed multiple embolic areas. Pacemaker interrogated showing Atrial runs but no sustained Atrial Fibrillation. Patient currently on course of ASA and statin. Hospital course complicated by severe anaphylactic due to iodine contrast allergy from CT performed on admission to ED. 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      Patient was evaluated by PM&R and therapy for functional deficits and gait/ ADL impairments and recommended acute rehabilitation. Patient was medically optimized for discharge to [facility] on 12/8      Patient was evaluated by PM&R and therapy for gait/ADL impairments and recommended acute rehabilitation. Patient was medically optimized for discharge to [facility] on 12/8. Admitted with gait instability, ADL, and functional impairments.     Patient had episode of a possible seizure episode involving the right side of the face for 1 min and persistent shakiness in the RUE. Patient was hypertensive to 192/70 mmHg and HR 86bpm. O2 saturation is 95% on room air. Patient transferred to telemetry for further workup. HPI:  The patient is an 88 year old female with a history of HTN, hypothyroidism, pacemaker, breast cancer who is admitted with CVA and acute respiratory failure due to IV contrast anaphylaxis. Patient had R sided hemiparesis, expressive aphasia. CT imaging confirmed multiple embolic areas. Pacemaker interrogated showing Atrial runs but no sustained Atrial Fibrillation. Patient currently on course of ASA and statin. Hospital course complicated by severe anaphylactic due to iodine contrast allergy from CT performed on admission to ED. 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      Patient was evaluated by PM&R and therapy for functional deficits and gait/ ADL impairments and recommended acute rehabilitation. Patient was medically optimized for discharge to [facility] on 12/8      Patient was evaluated by PM&R and therapy for gait/ADL impairments and recommended acute rehabilitation. Patient was medically optimized for discharge to [facility] on 12/8. Admitted with gait instability, ADL, and functional impairments.     Patient had episode of a possible seizure episode involving the right side of the face for 1 min and persistent shakiness in the RUE. Patient was hypertensive to 192/70 mmHg and HR 86bpm. O2 saturation is 95% on room air. Patient transferred to telemetry for further workup.

## 2023-12-13 NOTE — H&P ADULT - NSHPPHYSICALEXAM_GEN_ALL_CORE
T(C): 37 (12-13-23 @ 14:27), Max: 37 (12-13-23 @ 14:27)  HR: 102 (12-13-23 @ 14:27) (63 - 102)  BP: 136/78 (12-13-23 @ 14:27) (136/78 - 167/89)  RR: 15 (12-13-23 @ 14:27) (14 - 16)  SpO2: 96% (12-13-23 @ 14:27) (96% - 97%)  Wt(kg): --Vital Signs Last 24 Hrs  T(C): 37 (13 Dec 2023 14:27), Max: 37 (13 Dec 2023 14:27)  T(F): 98.6 (13 Dec 2023 14:27), Max: 98.6 (13 Dec 2023 14:27)  HR: 102 (13 Dec 2023 14:27) (63 - 102)  BP: 136/78 (13 Dec 2023 14:27) (136/78 - 167/89)  BP(mean): --  RR: 15 (13 Dec 2023 14:27) (14 - 16)  SpO2: 96% (13 Dec 2023 14:27) (96% - 97%)    Parameters below as of 13 Dec 2023 14:27  Patient On (Oxygen Delivery Method): room air        PHYSICAL EXAM:  GENERAL: awake, lethargic  HEAD:  Atraumatic, Normocephalic  EYES: EOMI, PERRLA, conjunctiva and sclera clear  ENMT: No tonsillar erythema, exudates, or enlargement; Moist mucous membranes, Good dentition, No lesions  NECK: Supple, No JVD, Normal thyroid  NERVOUS SYSTEM:  pt awake. mumbling. not following commands. +observed to be moving b/l upper extremities  + RUE/ hand shaking +had right facial twitching earlier per rehab team  CHEST/LUNG: Clear to percussion bilaterally; No rales, rhonchi, wheezing, or rubs  HEART: Regular rate and rhythm; No murmurs, rubs, or gallops  ABDOMEN: Soft, Nontender, Nondistended; Bowel sounds present  EXTREMITIES: No clubbing, cyanosis, or edema T(C): 37 (12-13-23 @ 14:27), Max: 37 (12-13-23 @ 14:27)  HR: 102 (12-13-23 @ 14:27) (63 - 102)  BP: 136/78 (12-13-23 @ 14:27) (136/78 - 167/89)  RR: 15 (12-13-23 @ 14:27) (14 - 16)  SpO2: 96% (12-13-23 @ 14:27) (96% - 97%)  Wt(kg): --Vital Signs Last 24 Hrs  T(C): 37 (13 Dec 2023 14:27), Max: 37 (13 Dec 2023 14:27)  T(F): 98.6 (13 Dec 2023 14:27), Max: 98.6 (13 Dec 2023 14:27)  HR: 102 (13 Dec 2023 14:27) (63 - 102)  BP: 136/78 (13 Dec 2023 14:27) (136/78 - 167/89)  BP(mean): --  RR: 15 (13 Dec 2023 14:27) (14 - 16)  SpO2: 96% (13 Dec 2023 14:27) (96% - 97%)    Parameters below as of 13 Dec 2023 14:27  Patient On (Oxygen Delivery Method): room air        PHYSICAL EXAM during RRT.   GENERAL: awake, lethargic  HEAD:  Atraumatic, Normocephalic  EYES: EOMI, PERRLA, conjunctiva and sclera clear  ENMT: No tonsillar erythema, exudates, or enlargement; Moist mucous membranes, Good dentition, No lesions  NECK: Supple, No JVD, Normal thyroid  NERVOUS SYSTEM:  pt awake. mumbling. not following commands. +observed to be moving b/l upper extremities  + RUE/ hand shaking +had right facial twitching earlier per rehab team  CHEST/LUNG: Clear to percussion bilaterally; No rales, rhonchi, wheezing, or rubs  HEART: Regular rate and rhythm; No murmurs, rubs, or gallops  ABDOMEN: Soft, Nontender, Nondistended; Bowel sounds present  EXTREMITIES: No clubbing, cyanosis, or edema

## 2023-12-13 NOTE — PROGRESS NOTE ADULT - NSPROGADDITIONALINFOA_GEN_ALL_CORE
Spent 35 minutes on face-face visit, evaluate, examine and treat Spent 1 hour on face-face visit, evaluating, examining, RRT, preparing discharge papers.

## 2023-12-13 NOTE — CONSULT NOTE ADULT - TIME BILLING
chart reviewed, discussed with primary team for treatment plan. face to face with patient and cousin at bedside for history and exam, also discussed with both of them on diagnosis, treatment plan, and prognosis.

## 2023-12-13 NOTE — PROGRESS NOTE ADULT - ASSESSMENT
Patient is seated in wheelchair at bedside. Family/visitors are not present. Speech is dysarthric and comprehensible. Patient is alert and oriented to person, place, and situation. She incorrectly states the date as "Monday, November 13, 1989." She is confused and indicates her family is hiding from her. She also indicates there is a tube in her throat. She likes the hospital's coffee. Appears easily distracted and frequently changes topic.        Emotional functioning: Mood is anxious, affect congruent. She indicates a prior history of anxiety, with disrupted sleep due to worrying too much. She notes, "worrying gives me a headache." She denies feelings of depression per se, though acknowledges, "too much anxiety leads to depression." She indicates a desire to go home, as she misses her family and her dog. She denies ideation, plan, or intent to harm self/other.     Psychosocial history: Per patient, she was born and raised in [place]. She immigrated to the [place] in [place]. She acknowledges being  and having 4 adult children and many grandchildren.     Impression: Patient with adjustment difficulty, with anxiety features, associated with current health concerns. She is partially oriented and confused. She expresses a desire to return to her family and her dog.     Psychoeducation is provided regarding the rehabilitation process and anxiety. Support and encouragement are provided.     Plan: Individual psychotherapy session to address adjustment. Patient in agreement with plan. Will continue with assessment process. Neuropsychology remains available through discharge.    Patient is seated in wheelchair at bedside. Family/visitors are not present. Speech is dysarthric and comprehensible. Patient is alert and oriented to person, place, and situation. She incorrectly states the date as "Monday, November 13, 1989." She is confused and indicates her family is hiding from her. She also indicates there is a tube in her throat. She likes the hospital's coffee. Appears easily distracted and frequently changes topic.        Emotional functioning: Mood is anxious, affect congruent. She indicates a prior history of anxiety, with disrupted sleep due to worrying too much. She notes, "worrying gives me a headache." She denies feelings of depression per se, though acknowledges, "too much anxiety leads to depression." She indicates a desire to go home, as she misses her family and her dog. She denies ideation, plan, or intent to harm self/other.     Psychosocial history: Per patient, she was born and raised in [place]. She immigrated to the [place] in [place]. She acknowledges being  and having 4 adult children and many grandchildren.     Impression: Patient with adjustment difficulty, with anxiety features, associated with current health concerns. She is partially oriented and confused. She expresses a desire to return to her family and her dog.     Psychoeducation is provided regarding the rehabilitation process and anxiety. Support and encouragement are provided.     Plan: Individual psychotherapy session to address adjustment. Patient in agreement with plan. Will continue with assessment process. Neuropsychology remains available through discharge.

## 2023-12-13 NOTE — PROGRESS NOTE ADULT - REASON FOR ADMISSION
Large left MCA ischemic stroke with right dominant hemiparesis

## 2023-12-13 NOTE — DISCHARGE NOTE PROVIDER - NSDCFUSCHEDAPPT_GEN_ALL_CORE_FT
[facility]  [facility]  Scheduled Appointment: 12/13/2023     [facility]  [facility]  Scheduled Appointment: 12/13/2023

## 2023-12-13 NOTE — DISCHARGE NOTE PROVIDER - NSDCCPCAREPLAN_GEN_ALL_CORE_FT
PRINCIPAL DISCHARGE DIAGNOSIS  Diagnosis: CVA (cerebrovascular accident)  Assessment and Plan of Treatment: Patient had a Large stroke involving the middle cerebral artery. Please continue aspirin 325mg once a day, atorvastastin 80mg once a day at bedtime. please follow upw [facility] PCP for rest of management.      SECONDARY DISCHARGE DIAGNOSES  Diagnosis: HTN (hypertension)  Assessment and Plan of Treatment: Please continue labetalol 100mg every 6 hours if SBP>190mmHG and lisinopri 20mg once a day. Please follow up with PCP for rest of care and management.     PRINCIPAL DISCHARGE DIAGNOSIS  Diagnosis: CVA (cerebrovascular accident)  Assessment and Plan of Treatment: Patient had a Large stroke involving the middle cerebral artery. Please continue aspirin 325mg once a day, atorvastastin 80mg once a day at bedtime. please follow upw [facility] PCP for rest of management.      SECONDARY DISCHARGE DIAGNOSES  Diagnosis: HTN (hypertension)  Assessment and Plan of Treatment: Please continue labetalol 100mg every 6 hours if SBP>190mmHG and lisinopri 20mg once a day. Please follow up with PCP for rest of care and management.

## 2023-12-13 NOTE — H&P ADULT - NSHPLABSRESULTS_GEN_ALL_CORE
LABS:                        11.7   7.56  )-----------( 309      ( 13 Dec 2023 12:35 )             36.7     12-13    138  |  101  |  12  ----------------------------<  103<H>  4.4   |  32<H>  |  0.68    Ca    8.9      13 Dec 2023 12:35    TPro  7.0  /  Alb  3.0<L>  /  TBili  0.4  /  DBili  x   /  AST  41<H>  /  ALT  32  /  AlkPhos  90  12-13      Urinalysis Basic - ( 13 Dec 2023 12:35 )    Color: x / Appearance: x / SG: x / pH: x  Gluc: 103 mg/dL / Ketone: x  / Bili: x / Urobili: x   Blood: x / Protein: x / Nitrite: x   Leuk Esterase: x / RBC: x / WBC x   Sq Epi: x / Non Sq Epi: x / Bacteria: x       CAPILLARY BLOOD GLUCOSE      POCT Blood Glucose.: 103 mg/dL (13 Dec 2023 12:28)        Urinalysis Basic - ( 13 Dec 2023 12:35 )    Color: x / Appearance: x / SG: x / pH: x  Gluc: 103 mg/dL / Ketone: x  / Bili: x / Urobili: x   Blood: x / Protein: x / Nitrite: x   Leuk Esterase: x / RBC: x / WBC x   Sq Epi: x / Non Sq Epi: x / Bacteria: x        RADIOLOGY & ADDITIONAL TESTS:   < from: CT Head No Cont (12.13.23 @ 12:51) >    IMPRESSION:    1.  Redemonstrated left MCA distribution infarction, advanced when   compared to prior imaging with likely petechial hemorrhages and/or   laminar necrosis.  2.  Midline shift to the right of approximately 0.5 cm.  3.  Chronic small vessel disease.    --- End of Report ---      Consultant(s) Notes Reviewed:  [x ] YES  [ ] NO  Care Discussed with Consultants/Other Providers [ x] YES  [ ] NO  Imaging Personally Reviewed:  [ ] YES  [ ] NO

## 2023-12-13 NOTE — H&P ADULT - NSICDXPASTMEDICALHX_GEN_ALL_CORE_FT
PAST MEDICAL HISTORY:  Breast cancer 2009 left - s/neida castellanos radical mastectomy    Cyst of left ovary     [place] (hard of hearing) bilateral HA (Yvette)    Hyperlipidemia no current medications    Meniscus tear left - 10/2015    SDH (subdural hematoma) 3/2011 - s/p fall - no surgical intervention     PAST MEDICAL HISTORY:  Breast cancer 2009 left - s/neida castellanos radical mastectomy    Cyst of left ovary     [place] (hard of hearing) bilateral HA (Yvette)    Hyperlipidemia no current medications    Meniscus tear left - 10/2015    SDH (subdural hematoma) 3/2011 - s/p fall - no surgical intervention

## 2023-12-13 NOTE — DISCHARGE NOTE PROVIDER - PROVIDER TOKENS
PROVIDER:[TOKEN:[[id number]],FOLLOWUP:[1 month]] PROVIDER:[TOKEN:[[id number]],FOLLOWUP:[1 month]]

## 2023-12-13 NOTE — PROVIDER CONTACT NOTE (OTHER) - ACTION/TREATMENT ORDERED:
RRT. Called. Ativan 2mg given via IVP. CT ordered and completed. Pt. transferred to higher level of care for monitoring.

## 2023-12-13 NOTE — DISCHARGE NOTE PROVIDER - CARE PROVIDER_API CALL
Madyson Richard  Physical/Rehab Medicine  [address] [place] [address]  Phone: [phone]  Fax: [phone]  Follow Up Time: 1 month   Madyson Richard  Physical/Rehab Medicine  [address] [place] [address]  Phone: [phone]  Fax: [phone]  Follow Up Time: 1 month

## 2023-12-13 NOTE — PROGRESS NOTE ADULT - ASSESSMENT
The patient is an 88 year old female with a history of HTN, hypothyroidism, pacemaker, breast cancer who is admitted with CVA and acute respiratory failure due to IV contrast anaphylaxis.CT head (12/4) showed Large left MCA territory acute to early subacute infarction, thrombosis of multiple distal branches of the left MCA and no acute intracranial hemorrhage. Admitted for multidisciplinary rehab program    #L MCA CVA, Right dominant hemiparesis    -Pacemaker interrogated showing Atrial runs but no sustained Atrial Fibrillation   - CT head (12/4) showed Large left MCA territory acute to early subacute infarction, thrombosis of multiple distal branches of the left MCA and no acute intracranial hemorrhage.  -ASA 325mg PO QD   -atorvastatin 80mg PO at bedtime   #Comprehensive Multidisciplinary Rehab Program:  - Gait, ADL, Functional impairments  - PT/OT/ SLP 3 hours a day 5 days a week, 1 hour each   - Oral hygiene x 4 daily  - Keep bed head at 35 degrees all the times and 90 degrees with meals out of bed with SV.   - Fluconazole 100 mg daily x 7 days, started (12/09)    #HTN  - Labetalol 100mg PO Q6 PRN for SBP>190  - Lisinopril 20mg PO QD  - Monitor      #Mood / Cognition:  - Neuropsych evaluation completed and appreciated   - Amantadine 50 mg po at 6 am and 12 pm  --> increase to 100 mg po at 6 am and 12 pm (12/13)   - EKG from (12/02) [facility] was 502, was not done yesterday will order stat for now     #Sleep:  - Maintain quiet hours and low stim environment   - melatonin 3mg HS for sleep    #Pain:  - Tylenol PRN  - lidocaine patch for backpain  - avoid sedating meds that may affect cognitive recovery    #/Bladder:  - Monitor PVR if no void in 8h; SC for >400 cc. Done   - Toileting schedule q4h  - (+) Incontinence     #Diet / Dysphagia:    - Diet: Pureed, thins with SV   - ongoing SLP assessment- [facility] [place] [facility]   - Nutrition to follow  - GI consult    #Skin/ Pressure Injury Prevention:  - assessment on admission: venous stasis ulcer in L anterior shin  [id number]   - Turn Q2hrs in bed while awake, OOB to Chair, PT/OT/SLP     #DVT prophylaxis:  - ASA 325mg PO QD     #Precautions/ Restrictions  - Falls, Aspiration  - COVID PCR:        The patient is an 88 year old female with a history of HTN, hypothyroidism, pacemaker, breast cancer who is admitted with CVA and acute respiratory failure due to IV contrast anaphylaxis.CT head (12/4) showed Large left MCA territory acute to early subacute infarction, thrombosis of multiple distal branches of the left MCA and no acute intracranial hemorrhage. Admitted for multidisciplinary rehab program    #L MCA CVA, Right dominant hemiparesis    -Pacemaker interrogated showing Atrial runs but no sustained Atrial Fibrillation   - CT head (12/4) showed Large left MCA territory acute to early subacute infarction, thrombosis of multiple distal branches of the left MCA and no acute intracranial hemorrhage.  -ASA 325mg PO QD   -atorvastatin 80mg PO at bedtime   #Comprehensive Multidisciplinary Rehab Program:  - Gait, ADL, Functional impairments  - PT/OT/ SLP 3 hours a day 5 days a week, 1 hour each   - Oral hygiene x 4 daily  - Keep bed head at 35 degrees all the times and 90 degrees with meals out of bed with SV.   - Fluconazole 100 mg daily x 7 days, started (12/09)    #HTN  - Labetalol 100mg PO Q6 PRN for SBP>190  - Lisinopril 20mg PO QD  - Monitor      #Mood / Cognition:  - Neuropsych evaluation completed and appreciated   - Amantadine 50 mg po at 6 am and 12 pm  --> increase to 100 mg po at 6 am and 12 pm (12/13)   - EKG from (12/02) [facility] was 502, was not done yesterday will order stat for now     #Sleep:  - Maintain quiet hours and low stim environment   - melatonin 3mg HS for sleep    #Pain:  - Tylenol PRN  - lidocaine patch for backpain  - avoid sedating meds that may affect cognitive recovery    #/Bladder:  - Monitor PVR if no void in 8h; SC for >400 cc. Done   - Toileting schedule q4h  - (+) Incontinence     #Diet / Dysphagia:    - Diet: Pureed, thins with SV   - ongoing SLP assessment- [facility] [place] scheduled   - Nutrition to follow  - GI consult    #Skin/ Pressure Injury Prevention:  - assessment on admission: venous stasis ulcer in L anterior shin  [id number]   - Turn Q2hrs in bed while awake, OOB to Chair, PT/OT/SLP     #DVT prophylaxis:  - ASA 325mg PO QD     #Precautions/ Restrictions  - Falls, Aspiration  - COVID PCR:        The patient is an 88 year old female with a history of HTN, hypothyroidism, pacemaker, breast cancer who is admitted with CVA and acute respiratory failure due to IV contrast anaphylaxis.CT head (12/4) showed Large left MCA territory acute to early subacute infarction, thrombosis of multiple distal branches of the left MCA and no acute intracranial hemorrhage. Admitted for multidisciplinary rehab program    #L MCA CVA, Right dominant hemiparesis    -Pacemaker interrogated showing Atrial runs but no sustained Atrial Fibrillation   - CT head (12/4) showed Large left MCA territory acute to early subacute infarction, thrombosis of multiple distal branches of the left MCA and no acute intracranial hemorrhage.  -ASA 325mg PO QD   -atorvastatin 80mg PO at bedtime   #Comprehensive Multidisciplinary Rehab Program:  - Gait, ADL, Functional impairments  - PT/OT/ SLP 3 hours a day 5 days a week, 1 hour each   - Oral hygiene x 4 daily  - Keep bed head at 35 degrees all the times and 90 degrees with meals out of bed with SV.   - Fluconazole 100 mg daily x 7 days, started (12/09)    #Seizure  - IV axis was started, 2 mg po Ativan was given   - Head CT stat, CBC, CMP, UA, CXR, Lactate were ordered   - Neurology team was called to evaluate the patient   - Dr. Holt called and informed family.     #HTN  - Labetalol 100mg PO Q6 PRN for SBP>190  - Lisinopril 20mg PO QD  - Monitor      #Mood / Cognition:  - Neuropsych evaluation completed and appreciated   - Amantadine 50 mg po at 6 am and 12 pm  --> increase to 100 mg po at 6 am and 12 pm (12/13) --> D/Pravin (12/13) 12:30 pm   - EKG from (12/02) [facility] was 502, was not done yesterday will order stat for now     #Sleep:  - Maintain quiet hours and low stim environment   - melatonin 3mg HS for sleep    #Pain:  - Tylenol PRN  - lidocaine patch for backpain  - avoid sedating meds that may affect cognitive recovery    #/Bladder:  - Monitor PVR if no void in 8h; SC for >400 cc. Done   - Toileting schedule q4h  - (+) Incontinence     #Diet / Dysphagia:    - Diet: Pureed, thins with SV   - ongoing SLP assessment- [facility] [place] scheduled   - Nutrition to follow  - GI consult    #Skin/ Pressure Injury Prevention:  - assessment on admission: venous stasis ulcer in L anterior shin  [id number]   - Turn Q2hrs in bed while awake, OOB to Chair, PT/OT/SLP     #DVT prophylaxis:  - ASA 325mg PO QD     #Precautions/ Restrictions  - Falls, Aspiration  - COVID PCR:        The patient is an 88 year old female with a history of HTN, hypothyroidism, pacemaker, breast cancer who is admitted with CVA and acute respiratory failure due to IV contrast anaphylaxis.CT head (12/4) showed Large left MCA territory acute to early subacute infarction, thrombosis of multiple distal branches of the left MCA and no acute intracranial hemorrhage. Admitted for multidisciplinary rehab program    #L MCA CVA, Right dominant hemiparesis    -Pacemaker interrogated showing Atrial runs but no sustained Atrial Fibrillation   - CT head (12/4) showed Large left MCA territory acute to early subacute infarction, thrombosis of multiple distal branches of the left MCA and no acute intracranial hemorrhage.  -ASA 325mg PO QD   -atorvastatin 80mg PO at bedtime   #Comprehensive Multidisciplinary Rehab Program:  - Gait, ADL, Functional impairments  - PT/OT/ SLP 3 hours a day 5 days a week, 1 hour each   - Oral hygiene x 4 daily  - Keep bed head at 35 degrees all the times and 90 degrees with meals out of bed with SV.   - Fluconazole 100 mg daily x 7 days, started (12/09)    #Seizure  - IV axis was started, 2 mg po Ativan was given   - Head CT stat, CBC, CMP, UA, CXR, Lactate were ordered   - Neurology team was called to evaluate the patient   - Dr. Holt called and informed family.     #HTN  - Labetalol 100mg PO Q6 PRN for SBP>190  - Lisinopril 20mg PO QD  - Monitor      #Mood / Cognition:  - Neuropsych evaluation completed and appreciated   - Amantadine 50 mg po at 6 am and 12 pm  --> increase to 100 mg po at 6 am and 12 pm (12/13) --> D/Pravin (12/13) 12:30 pm   - EKG from (12/02) [facility] was 502, was not done yesterday will order stat for now     #Sleep:  - Maintain quiet hours and low stim environment   - melatonin 3mg HS for sleep    #Pain:  - Tylenol PRN  - lidocaine patch for backpain  - avoid sedating meds that may affect cognitive recovery    #/Bladder:  - Monitor PVR if no void in 8h; SC for >400 cc. Done   - Toileting schedule q4h  - (+) Incontinence     #Diet / Dysphagia:    - Diet: Pureed, thins with SV   - ongoing SLP assessment- [facility] [place]   - Nutrition to follow  - GI consult    #Skin/ Pressure Injury Prevention:  - assessment on admission: venous stasis ulcer in L anterior shin  [id number]   - Turn Q2hrs in bed while awake, OOB to Chair, PT/OT/SLP     #DVT prophylaxis:  - ASA 325mg PO QD     #Precautions/ Restrictions  - Falls, Aspiration  - COVID PCR:

## 2023-12-13 NOTE — CONSULT NOTE ADULT - SUBJECTIVE AND OBJECTIVE BOX
Neurology consult    REBEKAH [id number]    HPI:  88 year old female with a history of HTN, hypothyroidism, pacemaker, breast cancer who is admitted with CVA and acute respiratory failure due to IV contrast anaphylaxis. Patient had R sided hemiparesis, expressive aphasia. CT imaging confirmed multiple embolic areas. Pacemaker interrogated showing Atrial runs but no sustained Atrial Fibrillation. Patient currently on course of ASA and statin. Hospital course complicated by severe anaphylactic due to iodine contrast allergy from CT performed on admission to ED. 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      Pt was transferred to [facility] for acute rehab on 12/8. rehab course complicated by possible seizure episodes involving right facial twitching for 1 min and RUE shaking subsequently started and was persistent. Per rehab team, pt was awake and speaking but stopped responding to questions for a short period of time when facial twitching started. RRT was called for the seizure activities. Patient was hypertensive to 192/70 mmHg and HR 86bpm. O2 saturation is 95% on room air. repeat BP low 89/60s. IV ativan 2mg stat and NS 500cc bolus ordered. Pt to be upgraded to telemetry for further work up and care.    (13 Dec 2023 15:00)          MEDICATIONS    acetaminophen     Tablet .. 650 milliGRAM(s) Oral every 6 hours PRN  acetaminophen  Suppository .. 650 milliGRAM(s) Rectal every 6 hours PRN  aluminum hydroxide/magnesium hydroxide/simethicone Suspension 30 milliLiter(s) Oral every 4 hours PRN  atorvastatin 80 milliGRAM(s) Oral at bedtime  bacitracin   Ointment 1 Application(s) Topical two times a day  chlorhexidine 2% Cloths 1 Application(s) Topical <User Schedule>  labetalol Injectable 5 milliGRAM(s) IV Push every 8 hours PRN  levETIRAcetam  IVPB 1000 milliGRAM(s) IV Intermittent every 12 hours  lidocaine   4% Patch 1 Patch Transdermal daily  melatonin 3 milliGRAM(s) Oral at bedtime PRN  multivitamin 1 Tablet(s) Oral daily  ondansetron Injectable 4 milliGRAM(s) IV Push every 8 hours PRN  senna 2 Tablet(s) Oral at bedtime PRN         Family history: No history of dementia, strokes, or seizures   FAMILY HISTORY:  FH: leukemia (Father)    FH: breast cancer (Mother)      SOCIAL HISTORY -- No history of tobacco or alcohol use     Allergies    penicillin (Rash)  IV Contrast (Anaphylaxis; Urticaria)    Intolerances            Vital Signs Last 24 Hrs  T(C): 37 (13 Dec 2023 14:27), Max: 37 (13 Dec 2023 14:27)  T(F): 98.6 (13 Dec 2023 14:27), Max: 98.6 (13 Dec 2023 14:27)  HR: 102 (13 Dec 2023 14:27) (63 - 102)  BP: 136/78 (13 Dec 2023 14:27) (136/78 - 167/89)  BP(mean): --  RR: 15 (13 Dec 2023 14:27) (14 - 16)  SpO2: 96% (13 Dec 2023 14:27) (96% - 97%)    Parameters below as of 13 Dec 2023 14:27  Patient On (Oxygen Delivery Method): room air          REVIEW OF SYSTEMS: unable to obtain.     On Neurological Examination:    Mental Status - Patient is alert, awake, oriented X3. Confused Dementia Lethargic .     Follows commands well and able to answer questions appropriately. Mood and affect  normal  Follow simple commands  Follow complex commands  Does not follow commands    Speech -   Fluent    Dysarthria  Aphasia                              Cranial Nerves - Pupils 3 mm equal and reactive to light,   extraocular eye movements intact.     Motor Exam -   Right upper  Left upper  Right lower  Left lower     With stimuli positive movement of all 4 extremities    Muscle tone - is normal all over. No asymmetry is seen.      Sensory    Bilateral intact to light touch    Gait -  deferred         LABS:  CBC Full  -  ( 13 Dec 2023 12:35 )  WBC Count : 7.56 K/uL  RBC Count : 4.38 M/uL  Hemoglobin : 11.7 g/dL  Hematocrit : 36.7 %  Platelet Count - Automated : 309 K/uL  Mean Cell Volume : 83.8 fl  Mean Cell Hemoglobin : 26.7 pg  Mean Cell Hemoglobin Concentration : 31.9 gm/dL  Auto Neutrophil # : x  Auto Lymphocyte # : x  Auto Monocyte # : x  Auto Eosinophil # : x  Auto Basophil # : x  Auto Neutrophil % : x  Auto Lymphocyte % : x  Auto Monocyte % : x  Auto Eosinophil % : x  Auto Basophil % : x    12-13    138  |  101  |  12  ----------------------------<  103<H>  4.4   |  32<H>  |  0.68    Ca    8.9      13 Dec 2023 12:35    TPro  7.0  /  Alb  3.0<L>  /  TBili  0.4  /  DBili  x   /  AST  41<H>  /  ALT  32  /  AlkPhos  90  12-13    LIVER FUNCTIONS - ( 13 Dec 2023 12:35 )  Alb: 3.0 g/dL / Pro: 7.0 g/dL / ALK PHOS: 90 U/L / ALT: 32 U/L / AST: 41 U/L / GGT: x                 ACC: [id number] EXAM:  CT BRAIN   ORDERED BY: ABIMBOLA CRUZ     PROCEDURE DATE:  12/13/2023          INTERPRETATION:  EXAM: CT HEAD WITHOUT INTRAVENOUS CONTRAST    HISTORY: Left MCA infarction, new onset seizure    TECHNIQUE: Multiple axial images wereobtained from the skull base to the   vertex. Sagittal and coronal reformatted images were obtained from the   axial data set. The images were reviewed in brain and bone windows.    COMPARISON: CT of the head December 4, 2023    FINDINGS:    The previously seen left MCA distribution infarction is again   redemonstrated, advanced when compared to prior imaging. Increased   attenuation along the sulci/gyri of the affected region, likely petechial   hemorrhages and/or laminar necrosis. Mild effacement of the left lateral   ventricle. Small midline shift to the right of approximately 0.5 cm when   measured at the septum pellucidum.    Areas of decreased attenuation throughout the deep/periventricular white   matter, compatible with chronic small vessel disease. Chronic appearing   infarct in the right putamen. Parenchymal volume loss resulting in a ex   vacuo dilatation appearance of the bilateral ventricles. Redemonstrated   probable meningioma along the left greater wing of the sphenoid.    The cranial cervical junction is within normal limits. The sella is   largely CSF filled. No depressed calvarial fracture. The visualized   paranasal sinuses are well aerated. The visualized mastoid air cells are   well aerated. The visualized orbitsare status post cataract surgery.    IMPRESSION:    1.  Redemonstrated left MCA distribution infarction, advanced when   compared to prior imaging with likely petechial hemorrhages and/or   laminar necrosis.  2.  Midline shift to the right of approximately 0.5 cm.  3.  Chronic small vessel disease.    --- End of Report ---            SIXTO MUELLER MD; Attending Radiologist  This document has been electronically signed. Dec 13 2023  2:05PM      FINDINGS:      Background:  Continuity: Continuous  Symmetry: Asymmetric  Posterior dominant rhythm (PDR): 8.5 Hz on the right, absent on the left, reactive to eye closure. Low-amplitude right frontal beta in wakefulness.  State Change: Present  Voltage: Normal  Anterior-Posterior Gradient: Present  Other background findings: Predominant background in the right hemisphere consists of diffuse beta activity with an intermittent 8.5-Hz PDR.  Breach: Absent    Background Slowing:  Generalized slowing: None  Focal slowing: Predominant background in the left hemisphere consists of polymorphic delta activity with intermittent alpha/beta/theta, with relative attenuation compared to the right.    State Changes:   Drowsiness is characterized by fragmentation, attenuation, and slowing of the background activity.  Stage 2 sleep is characterized by sleep spindles that are poorly formed to absent on the left.    Interictal Findings:  Occasional left frontotemporal (F7) sharp waves.    Electrographic and Electroclinical seizures:  None    Other Clinical Events:  None    Activation Procedures:   Hyperventilation is not performed.    Photic stimulation is performed and does not elicit any abnormalities.      Artifacts:  Intermittent myogenic and movement artifacts are present.    EKG:  Single-lead EKG shows regular rhythm, occasional PVCs.    EEG Classification / Summary:  Abnormal routine EEG in the drowsy and mostly asleep states with intermittent arousals.   -Occasional left frontotemporal sharp waves  -Continuous left hemispheric focal slowing and relative attenuation  -No electrographic seizures    Clinical Impression:  -Risk of left frontotemporal focal-onset seizures  -Left hemispheric focal cerebral dysfunction can be structural or functional in etiology.   -No seizures captured        -------------------------------------------------------------------------------------------------------  [facility] EEG Reading Room Ph#: [phone]  Epilepsy Answering Service after 5PM and before 8:30AM: Ph#: [phone]    Robyn Steve MD  Attending Physician, [facility]         Neurology consult    REBEKAH [id number]    HPI:  88 year old female with a history of HTN, hypothyroidism, pacemaker, breast cancer who is admitted with CVA and acute respiratory failure due to IV contrast anaphylaxis. Patient had R sided hemiparesis, expressive aphasia. CT imaging confirmed multiple embolic areas. Pacemaker interrogated showing Atrial runs but no sustained Atrial Fibrillation. Patient currently on course of ASA and statin. Hospital course complicated by severe anaphylactic due to iodine contrast allergy from CT performed on admission to ED. 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      Pt was transferred to [facility] for acute rehab on 12/8. rehab course complicated by possible seizure episodes involving right facial twitching for 1 min and RUE shaking subsequently started and was persistent. Per rehab team, pt was awake and speaking but stopped responding to questions for a short period of time when facial twitching started. RRT was called for the seizure activities. Patient was hypertensive to 192/70 mmHg and HR 86bpm. O2 saturation is 95% on room air. repeat BP low 89/60s. IV ativan 2mg stat and NS 500cc bolus ordered. Pt to be upgraded to telemetry for further work up and care.    (13 Dec 2023 15:00)          MEDICATIONS    acetaminophen     Tablet .. 650 milliGRAM(s) Oral every 6 hours PRN  acetaminophen  Suppository .. 650 milliGRAM(s) Rectal every 6 hours PRN  aluminum hydroxide/magnesium hydroxide/simethicone Suspension 30 milliLiter(s) Oral every 4 hours PRN  atorvastatin 80 milliGRAM(s) Oral at bedtime  bacitracin   Ointment 1 Application(s) Topical two times a day  chlorhexidine 2% Cloths 1 Application(s) Topical <User Schedule>  labetalol Injectable 5 milliGRAM(s) IV Push every 8 hours PRN  levETIRAcetam  IVPB 1000 milliGRAM(s) IV Intermittent every 12 hours  lidocaine   4% Patch 1 Patch Transdermal daily  melatonin 3 milliGRAM(s) Oral at bedtime PRN  multivitamin 1 Tablet(s) Oral daily  ondansetron Injectable 4 milliGRAM(s) IV Push every 8 hours PRN  senna 2 Tablet(s) Oral at bedtime PRN         Family history: No history of dementia, strokes, or seizures   FAMILY HISTORY:  FH: leukemia (Father)    FH: breast cancer (Mother)      SOCIAL HISTORY -- No history of tobacco or alcohol use     Allergies    penicillin (Rash)  IV Contrast (Anaphylaxis; Urticaria)    Intolerances            Vital Signs Last 24 Hrs  T(C): 37 (13 Dec 2023 14:27), Max: 37 (13 Dec 2023 14:27)  T(F): 98.6 (13 Dec 2023 14:27), Max: 98.6 (13 Dec 2023 14:27)  HR: 102 (13 Dec 2023 14:27) (63 - 102)  BP: 136/78 (13 Dec 2023 14:27) (136/78 - 167/89)  BP(mean): --  RR: 15 (13 Dec 2023 14:27) (14 - 16)  SpO2: 96% (13 Dec 2023 14:27) (96% - 97%)    Parameters below as of 13 Dec 2023 14:27  Patient On (Oxygen Delivery Method): room air          REVIEW OF SYSTEMS: unable to obtain.     On Neurological Examination:    Mental Status - Patient is alert, awake, oriented X3. Confused Dementia Lethargic .     Follows commands well and able to answer questions appropriately. Mood and affect  normal  Follow simple commands  Follow complex commands  Does not follow commands    Speech -   Fluent    Dysarthria  Aphasia                              Cranial Nerves - Pupils 3 mm equal and reactive to light,   extraocular eye movements intact.     Motor Exam -   Right upper  Left upper  Right lower  Left lower     With stimuli positive movement of all 4 extremities    Muscle tone - is normal all over. No asymmetry is seen.      Sensory    Bilateral intact to light touch    Gait -  deferred         LABS:  CBC Full  -  ( 13 Dec 2023 12:35 )  WBC Count : 7.56 K/uL  RBC Count : 4.38 M/uL  Hemoglobin : 11.7 g/dL  Hematocrit : 36.7 %  Platelet Count - Automated : 309 K/uL  Mean Cell Volume : 83.8 fl  Mean Cell Hemoglobin : 26.7 pg  Mean Cell Hemoglobin Concentration : 31.9 gm/dL  Auto Neutrophil # : x  Auto Lymphocyte # : x  Auto Monocyte # : x  Auto Eosinophil # : x  Auto Basophil # : x  Auto Neutrophil % : x  Auto Lymphocyte % : x  Auto Monocyte % : x  Auto Eosinophil % : x  Auto Basophil % : x    12-13    138  |  101  |  12  ----------------------------<  103<H>  4.4   |  32<H>  |  0.68    Ca    8.9      13 Dec 2023 12:35    TPro  7.0  /  Alb  3.0<L>  /  TBili  0.4  /  DBili  x   /  AST  41<H>  /  ALT  32  /  AlkPhos  90  12-13    LIVER FUNCTIONS - ( 13 Dec 2023 12:35 )  Alb: 3.0 g/dL / Pro: 7.0 g/dL / ALK PHOS: 90 U/L / ALT: 32 U/L / AST: 41 U/L / GGT: x                 ACC: [id number] EXAM:  CT BRAIN   ORDERED BY: ABIMBOLA CRUZ     PROCEDURE DATE:  12/13/2023          INTERPRETATION:  EXAM: CT HEAD WITHOUT INTRAVENOUS CONTRAST    HISTORY: Left MCA infarction, new onset seizure    TECHNIQUE: Multiple axial images wereobtained from the skull base to the   vertex. Sagittal and coronal reformatted images were obtained from the   axial data set. The images were reviewed in brain and bone windows.    COMPARISON: CT of the head December 4, 2023    FINDINGS:    The previously seen left MCA distribution infarction is again   redemonstrated, advanced when compared to prior imaging. Increased   attenuation along the sulci/gyri of the affected region, likely petechial   hemorrhages and/or laminar necrosis. Mild effacement of the left lateral   ventricle. Small midline shift to the right of approximately 0.5 cm when   measured at the septum pellucidum.    Areas of decreased attenuation throughout the deep/periventricular white   matter, compatible with chronic small vessel disease. Chronic appearing   infarct in the right putamen. Parenchymal volume loss resulting in a ex   vacuo dilatation appearance of the bilateral ventricles. Redemonstrated   probable meningioma along the left greater wing of the sphenoid.    The cranial cervical junction is within normal limits. The sella is   largely CSF filled. No depressed calvarial fracture. The visualized   paranasal sinuses are well aerated. The visualized mastoid air cells are   well aerated. The visualized orbitsare status post cataract surgery.    IMPRESSION:    1.  Redemonstrated left MCA distribution infarction, advanced when   compared to prior imaging with likely petechial hemorrhages and/or   laminar necrosis.  2.  Midline shift to the right of approximately 0.5 cm.  3.  Chronic small vessel disease.    --- End of Report ---            SIXTO MUELLER MD; Attending Radiologist  This document has been electronically signed. Dec 13 2023  2:05PM      FINDINGS:      Background:  Continuity: Continuous  Symmetry: Asymmetric  Posterior dominant rhythm (PDR): 8.5 Hz on the right, absent on the left, reactive to eye closure. Low-amplitude right frontal beta in wakefulness.  State Change: Present  Voltage: Normal  Anterior-Posterior Gradient: Present  Other background findings: Predominant background in the right hemisphere consists of diffuse beta activity with an intermittent 8.5-Hz PDR.  Breach: Absent    Background Slowing:  Generalized slowing: None  Focal slowing: Predominant background in the left hemisphere consists of polymorphic delta activity with intermittent alpha/beta/theta, with relative attenuation compared to the right.    State Changes:   Drowsiness is characterized by fragmentation, attenuation, and slowing of the background activity.  Stage 2 sleep is characterized by sleep spindles that are poorly formed to absent on the left.    Interictal Findings:  Occasional left frontotemporal (F7) sharp waves.    Electrographic and Electroclinical seizures:  None    Other Clinical Events:  None    Activation Procedures:   Hyperventilation is not performed.    Photic stimulation is performed and does not elicit any abnormalities.      Artifacts:  Intermittent myogenic and movement artifacts are present.    EKG:  Single-lead EKG shows regular rhythm, occasional PVCs.    EEG Classification / Summary:  Abnormal routine EEG in the drowsy and mostly asleep states with intermittent arousals.   -Occasional left frontotemporal sharp waves  -Continuous left hemispheric focal slowing and relative attenuation  -No electrographic seizures    Clinical Impression:  -Risk of left frontotemporal focal-onset seizures  -Left hemispheric focal cerebral dysfunction can be structural or functional in etiology.   -No seizures captured        -------------------------------------------------------------------------------------------------------  [facility] EEG Reading Room Ph#: [phone]  Epilepsy Answering Service after 5PM and before 8:30AM: Ph#: [phone]    Robyn Steve MD  Attending Physician, [facility]         Neurology consult    REBEKAH [id number]    HPI:  88 year old female with a history of HTN, hypothyroidism, pacemaker, breast cancer who is admitted with CVA and acute respiratory failure due to IV contrast anaphylaxis. Patient had R sided hemiparesis, expressive aphasia. CT imaging confirmed multiple embolic areas. Pacemaker interrogated showing Atrial runs but no sustained Atrial Fibrillation. Patient currently on course of ASA and statin. Hospital course complicated by severe anaphylactic due to iodine contrast allergy from CT performed on admission to ED. 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      Pt was transferred to [facility] for acute rehab on 12/8. rehab course complicated by possible seizure episodes involving right facial twitching for 1 min and RUE shaking subsequently started and was persistent. Per rehab team, pt was awake and speaking but stopped responding to questions for a short period of time when facial twitching started. RRT was called for the seizure activities. Patient was hypertensive to 192/70 mmHg and HR 86bpm. O2 saturation is 95% on room air. repeat BP low 89/60s. IV ativan 2mg stat and NS 500cc bolus ordered. Pt to be upgraded to telemetry for further work up and care.    (13 Dec 2023 15:00)          MEDICATIONS    acetaminophen     Tablet .. 650 milliGRAM(s) Oral every 6 hours PRN  acetaminophen  Suppository .. 650 milliGRAM(s) Rectal every 6 hours PRN  aluminum hydroxide/magnesium hydroxide/simethicone Suspension 30 milliLiter(s) Oral every 4 hours PRN  atorvastatin 80 milliGRAM(s) Oral at bedtime  bacitracin   Ointment 1 Application(s) Topical two times a day  chlorhexidine 2% Cloths 1 Application(s) Topical <User Schedule>  labetalol Injectable 5 milliGRAM(s) IV Push every 8 hours PRN  levETIRAcetam  IVPB 1000 milliGRAM(s) IV Intermittent every 12 hours  lidocaine   4% Patch 1 Patch Transdermal daily  melatonin 3 milliGRAM(s) Oral at bedtime PRN  multivitamin 1 Tablet(s) Oral daily  ondansetron Injectable 4 milliGRAM(s) IV Push every 8 hours PRN  senna 2 Tablet(s) Oral at bedtime PRN         Family history: No history of dementia, strokes, or seizures   FAMILY HISTORY:  FH: leukemia (Father)    FH: breast cancer (Mother)      SOCIAL HISTORY -- No history of tobacco or alcohol use     Allergies    penicillin (Rash)  IV Contrast (Anaphylaxis; Urticaria)    Intolerances            Vital Signs Last 24 Hrs  T(C): 37 (13 Dec 2023 14:27), Max: 37 (13 Dec 2023 14:27)  T(F): 98.6 (13 Dec 2023 14:27), Max: 98.6 (13 Dec 2023 14:27)  HR: 102 (13 Dec 2023 14:27) (63 - 102)  BP: 136/78 (13 Dec 2023 14:27) (136/78 - 167/89)  BP(mean): --  RR: 15 (13 Dec 2023 14:27) (14 - 16)  SpO2: 96% (13 Dec 2023 14:27) (96% - 97%)    Parameters below as of 13 Dec 2023 14:27  Patient On (Oxygen Delivery Method): room air          REVIEW OF SYSTEMS: unable to obtain.     On Neurological Examination:    Mental Status - Patient is drowsy but answers all questions appropriately. speech is dysarthric, comprehensible.     Follows commands well and able to answer questions appropriately.  Cranial Nerves - extraocular eye movements intact. Face weak on the right side.     Motor Exam - left side 5/5  right side at least 3+/5 except hand  3/5.   Muscle tone - flaccid on the right upper extremity.     Babinski present right.   Gait -  deferred         LABS:  CBC Full  -  ( 13 Dec 2023 12:35 )  WBC Count : 7.56 K/uL  RBC Count : 4.38 M/uL  Hemoglobin : 11.7 g/dL  Hematocrit : 36.7 %  Platelet Count - Automated : 309 K/uL  Mean Cell Volume : 83.8 fl  Mean Cell Hemoglobin : 26.7 pg  Mean Cell Hemoglobin Concentration : 31.9 gm/dL  Auto Neutrophil # : x  Auto Lymphocyte # : x  Auto Monocyte # : x  Auto Eosinophil # : x  Auto Basophil # : x  Auto Neutrophil % : x  Auto Lymphocyte % : x  Auto Monocyte % : x  Auto Eosinophil % : x  Auto Basophil % : x    12-13    138  |  101  |  12  ----------------------------<  103<H>  4.4   |  32<H>  |  0.68    Ca    8.9      13 Dec 2023 12:35    TPro  7.0  /  Alb  3.0<L>  /  TBili  0.4  /  DBili  x   /  AST  41<H>  /  ALT  32  /  AlkPhos  90  12-13    LIVER FUNCTIONS - ( 13 Dec 2023 12:35 )  Alb: 3.0 g/dL / Pro: 7.0 g/dL / ALK PHOS: 90 U/L / ALT: 32 U/L / AST: 41 U/L / GGT: x                 ACC: [id number] EXAM:  CT BRAIN   ORDERED BY: ABIMBOLA CRUZ     PROCEDURE DATE:  12/13/2023          INTERPRETATION:  EXAM: CT HEAD WITHOUT INTRAVENOUS CONTRAST    HISTORY: Left MCA infarction, new onset seizure    TECHNIQUE: Multiple axial images wereobtained from the skull base to the   vertex. Sagittal and coronal reformatted images were obtained from the   axial data set. The images were reviewed in brain and bone windows.    COMPARISON: CT of the head December 4, 2023    FINDINGS:    The previously seen left MCA distribution infarction is again   redemonstrated, advanced when compared to prior imaging. Increased   attenuation along the sulci/gyri of the affected region, likely petechial   hemorrhages and/or laminar necrosis. Mild effacement of the left lateral   ventricle. Small midline shift to the right of approximately 0.5 cm when   measured at the septum pellucidum.    Areas of decreased attenuation throughout the deep/periventricular white   matter, compatible with chronic small vessel disease. Chronic appearing   infarct in the right putamen. Parenchymal volume loss resulting in a ex   vacuo dilatation appearance of the bilateral ventricles. Redemonstrated   probable meningioma along the left greater wing of the sphenoid.    The cranial cervical junction is within normal limits. The sella is   largely CSF filled. No depressed calvarial fracture. The visualized   paranasal sinuses are well aerated. The visualized mastoid air cells are   well aerated. The visualized orbitsare status post cataract surgery.    IMPRESSION:    1.  Redemonstrated left MCA distribution infarction, advanced when   compared to prior imaging with likely petechial hemorrhages and/or   laminar necrosis.  2.  Midline shift to the right of approximately 0.5 cm.  3.  Chronic small vessel disease.    --- End of Report ---            SIXTO MUELLER MD; Attending Radiologist  This document has been electronically signed. Dec 13 2023  2:05PM      FINDINGS:      Background:  Continuity: Continuous  Symmetry: Asymmetric  Posterior dominant rhythm (PDR): 8.5 Hz on the right, absent on the left, reactive to eye closure. Low-amplitude right frontal beta in wakefulness.  State Change: Present  Voltage: Normal  Anterior-Posterior Gradient: Present  Other background findings: Predominant background in the right hemisphere consists of diffuse beta activity with an intermittent 8.5-Hz PDR.  Breach: Absent    Background Slowing:  Generalized slowing: None  Focal slowing: Predominant background in the left hemisphere consists of polymorphic delta activity with intermittent alpha/beta/theta, with relative attenuation compared to the right.    State Changes:   Drowsiness is characterized by fragmentation, attenuation, and slowing of the background activity.  Stage 2 sleep is characterized by sleep spindles that are poorly formed to absent on the left.    Interictal Findings:  Occasional left frontotemporal (F7) sharp waves.    Electrographic and Electroclinical seizures:  None    Other Clinical Events:  None    Activation Procedures:   Hyperventilation is not performed.    Photic stimulation is performed and does not elicit any abnormalities.      Artifacts:  Intermittent myogenic and movement artifacts are present.    EKG:  Single-lead EKG shows regular rhythm, occasional PVCs.    EEG Classification / Summary:  Abnormal routine EEG in the drowsy and mostly asleep states with intermittent arousals.   -Occasional left frontotemporal sharp waves  -Continuous left hemispheric focal slowing and relative attenuation  -No electrographic seizures    Clinical Impression:  -Risk of left frontotemporal focal-onset seizures  -Left hemispheric focal cerebral dysfunction can be structural or functional in etiology.   -No seizures captured        -------------------------------------------------------------------------------------------------------  [facility] EEG Reading Room Ph#: [phone]  Epilepsy Answering Service after 5PM and before 8:30AM: Ph#: [phone]    Robyn Steve MD  Attending Physician, [facility]         Neurology consult    REBEKAH [id number]    HPI:  88 year old female with a history of HTN, hypothyroidism, pacemaker, breast cancer who is admitted with CVA and acute respiratory failure due to IV contrast anaphylaxis. Patient had R sided hemiparesis, expressive aphasia. CT imaging confirmed multiple embolic areas. Pacemaker interrogated showing Atrial runs but no sustained Atrial Fibrillation. Patient currently on course of ASA and statin. Hospital course complicated by severe anaphylactic due to iodine contrast allergy from CT performed on admission to ED. 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      Pt was transferred to [facility] for acute rehab on 12/8. rehab course complicated by possible seizure episodes involving right facial twitching for 1 min and RUE shaking subsequently started and was persistent. Per rehab team, pt was awake and speaking but stopped responding to questions for a short period of time when facial twitching started. RRT was called for the seizure activities. Patient was hypertensive to 192/70 mmHg and HR 86bpm. O2 saturation is 95% on room air. repeat BP low 89/60s. IV ativan 2mg stat and NS 500cc bolus ordered. Pt to be upgraded to telemetry for further work up and care.    (13 Dec 2023 15:00)          MEDICATIONS    acetaminophen     Tablet .. 650 milliGRAM(s) Oral every 6 hours PRN  acetaminophen  Suppository .. 650 milliGRAM(s) Rectal every 6 hours PRN  aluminum hydroxide/magnesium hydroxide/simethicone Suspension 30 milliLiter(s) Oral every 4 hours PRN  atorvastatin 80 milliGRAM(s) Oral at bedtime  bacitracin   Ointment 1 Application(s) Topical two times a day  chlorhexidine 2% Cloths 1 Application(s) Topical <User Schedule>  labetalol Injectable 5 milliGRAM(s) IV Push every 8 hours PRN  levETIRAcetam  IVPB 1000 milliGRAM(s) IV Intermittent every 12 hours  lidocaine   4% Patch 1 Patch Transdermal daily  melatonin 3 milliGRAM(s) Oral at bedtime PRN  multivitamin 1 Tablet(s) Oral daily  ondansetron Injectable 4 milliGRAM(s) IV Push every 8 hours PRN  senna 2 Tablet(s) Oral at bedtime PRN         Family history: No history of dementia, strokes, or seizures   FAMILY HISTORY:  FH: leukemia (Father)    FH: breast cancer (Mother)      SOCIAL HISTORY -- No history of tobacco or alcohol use     Allergies    penicillin (Rash)  IV Contrast (Anaphylaxis; Urticaria)    Intolerances            Vital Signs Last 24 Hrs  T(C): 37 (13 Dec 2023 14:27), Max: 37 (13 Dec 2023 14:27)  T(F): 98.6 (13 Dec 2023 14:27), Max: 98.6 (13 Dec 2023 14:27)  HR: 102 (13 Dec 2023 14:27) (63 - 102)  BP: 136/78 (13 Dec 2023 14:27) (136/78 - 167/89)  BP(mean): --  RR: 15 (13 Dec 2023 14:27) (14 - 16)  SpO2: 96% (13 Dec 2023 14:27) (96% - 97%)    Parameters below as of 13 Dec 2023 14:27  Patient On (Oxygen Delivery Method): room air          REVIEW OF SYSTEMS: unable to obtain.     On Neurological Examination:    Mental Status - Patient is drowsy but answers all questions appropriately. speech is dysarthric, comprehensible.     Follows commands well and able to answer questions appropriately.  Cranial Nerves - extraocular eye movements intact. Face weak on the right side.     Motor Exam - left side 5/5  right side at least 3+/5 except hand  3/5.   Muscle tone - flaccid on the right upper extremity.     Babinski present right.   Gait -  deferred         LABS:  CBC Full  -  ( 13 Dec 2023 12:35 )  WBC Count : 7.56 K/uL  RBC Count : 4.38 M/uL  Hemoglobin : 11.7 g/dL  Hematocrit : 36.7 %  Platelet Count - Automated : 309 K/uL  Mean Cell Volume : 83.8 fl  Mean Cell Hemoglobin : 26.7 pg  Mean Cell Hemoglobin Concentration : 31.9 gm/dL  Auto Neutrophil # : x  Auto Lymphocyte # : x  Auto Monocyte # : x  Auto Eosinophil # : x  Auto Basophil # : x  Auto Neutrophil % : x  Auto Lymphocyte % : x  Auto Monocyte % : x  Auto Eosinophil % : x  Auto Basophil % : x    12-13    138  |  101  |  12  ----------------------------<  103<H>  4.4   |  32<H>  |  0.68    Ca    8.9      13 Dec 2023 12:35    TPro  7.0  /  Alb  3.0<L>  /  TBili  0.4  /  DBili  x   /  AST  41<H>  /  ALT  32  /  AlkPhos  90  12-13    LIVER FUNCTIONS - ( 13 Dec 2023 12:35 )  Alb: 3.0 g/dL / Pro: 7.0 g/dL / ALK PHOS: 90 U/L / ALT: 32 U/L / AST: 41 U/L / GGT: x                 ACC: [id number] EXAM:  CT BRAIN   ORDERED BY: ABIMBOLA CRUZ     PROCEDURE DATE:  12/13/2023          INTERPRETATION:  EXAM: CT HEAD WITHOUT INTRAVENOUS CONTRAST    HISTORY: Left MCA infarction, new onset seizure    TECHNIQUE: Multiple axial images wereobtained from the skull base to the   vertex. Sagittal and coronal reformatted images were obtained from the   axial data set. The images were reviewed in brain and bone windows.    COMPARISON: CT of the head December 4, 2023    FINDINGS:    The previously seen left MCA distribution infarction is again   redemonstrated, advanced when compared to prior imaging. Increased   attenuation along the sulci/gyri of the affected region, likely petechial   hemorrhages and/or laminar necrosis. Mild effacement of the left lateral   ventricle. Small midline shift to the right of approximately 0.5 cm when   measured at the septum pellucidum.    Areas of decreased attenuation throughout the deep/periventricular white   matter, compatible with chronic small vessel disease. Chronic appearing   infarct in the right putamen. Parenchymal volume loss resulting in a ex   vacuo dilatation appearance of the bilateral ventricles. Redemonstrated   probable meningioma along the left greater wing of the sphenoid.    The cranial cervical junction is within normal limits. The sella is   largely CSF filled. No depressed calvarial fracture. The visualized   paranasal sinuses are well aerated. The visualized mastoid air cells are   well aerated. The visualized orbitsare status post cataract surgery.    IMPRESSION:    1.  Redemonstrated left MCA distribution infarction, advanced when   compared to prior imaging with likely petechial hemorrhages and/or   laminar necrosis.  2.  Midline shift to the right of approximately 0.5 cm.  3.  Chronic small vessel disease.    --- End of Report ---            SIXTO MUELLER MD; Attending Radiologist  This document has been electronically signed. Dec 13 2023  2:05PM      FINDINGS:      Background:  Continuity: Continuous  Symmetry: Asymmetric  Posterior dominant rhythm (PDR): 8.5 Hz on the right, absent on the left, reactive to eye closure. Low-amplitude right frontal beta in wakefulness.  State Change: Present  Voltage: Normal  Anterior-Posterior Gradient: Present  Other background findings: Predominant background in the right hemisphere consists of diffuse beta activity with an intermittent 8.5-Hz PDR.  Breach: Absent    Background Slowing:  Generalized slowing: None  Focal slowing: Predominant background in the left hemisphere consists of polymorphic delta activity with intermittent alpha/beta/theta, with relative attenuation compared to the right.    State Changes:   Drowsiness is characterized by fragmentation, attenuation, and slowing of the background activity.  Stage 2 sleep is characterized by sleep spindles that are poorly formed to absent on the left.    Interictal Findings:  Occasional left frontotemporal (F7) sharp waves.    Electrographic and Electroclinical seizures:  None    Other Clinical Events:  None    Activation Procedures:   Hyperventilation is not performed.    Photic stimulation is performed and does not elicit any abnormalities.      Artifacts:  Intermittent myogenic and movement artifacts are present.    EKG:  Single-lead EKG shows regular rhythm, occasional PVCs.    EEG Classification / Summary:  Abnormal routine EEG in the drowsy and mostly asleep states with intermittent arousals.   -Occasional left frontotemporal sharp waves  -Continuous left hemispheric focal slowing and relative attenuation  -No electrographic seizures    Clinical Impression:  -Risk of left frontotemporal focal-onset seizures  -Left hemispheric focal cerebral dysfunction can be structural or functional in etiology.   -No seizures captured        -------------------------------------------------------------------------------------------------------  [facility] EEG Reading Room Ph#: [phone]  Epilepsy Answering Service after 5PM and before 8:30AM: Ph#: [phone]    Robyn Steve MD  Attending Physician, [facility]

## 2023-12-13 NOTE — H&P ADULT - HISTORY OF PRESENT ILLNESS
88 year old female with a history of HTN, hypothyroidism, pacemaker, breast cancer who is admitted with CVA and acute respiratory failure due to IV contrast anaphylaxis. Patient had R sided hemiparesis, expressive aphasia. CT imaging confirmed multiple embolic areas. Pacemaker interrogated showing Atrial runs but no sustained Atrial Fibrillation. Patient currently on course of ASA and statin. Hospital course complicated by severe anaphylactic due to iodine contrast allergy from CT performed on admission to ED. 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      Pt was transferred to [facility] for acute rehab on 12/8. rehab course complicated by possible seizure episodes involving right facial twitching for 1 min and RUE shaking subsequently started and was persistent. Per rehab, pt was awake but stopped responding to questions for a short period of time when facial twitching first started. RRT was called for the seizure activities. Patient was hypertensive to 192/70 mmHg and HR 86bpm. O2 saturation is 95% on room air. repeat BP low 89/60s. IV ativan 2mg stat and NS 500cc bolus ordered. Pt to be upgraded to telemetry for further work up and care.    88 year old female with a history of HTN, hypothyroidism, pacemaker, breast cancer who is admitted with CVA and acute respiratory failure due to IV contrast anaphylaxis. Patient had R sided hemiparesis, expressive aphasia. CT imaging confirmed multiple embolic areas. Pacemaker interrogated showing Atrial runs but no sustained Atrial Fibrillation. Patient currently on course of ASA and statin. Hospital course complicated by severe anaphylactic due to iodine contrast allergy from CT performed on admission to ED. 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      Pt was transferred to [facility] for acute rehab on 12/8. rehab course complicated by possible seizure episodes involving right facial twitching for 1 min and RUE shaking subsequently started and was persistent. Per rehab, pt was awake but stopped responding to questions for a short period of time when facial twitching first started. RRT was called for the seizure activities. Patient was hypertensive to 192/70 mmHg and HR 86bpm. O2 saturation is 95% on room air. repeat BP low 89/60s. IV ativan 2mg stat and NS 500cc bolus ordered. Pt to be upgraded to telemetry for further work up and care.    88 year old female with a history of HTN, hypothyroidism, pacemaker, breast cancer who is admitted with CVA and acute respiratory failure due to IV contrast anaphylaxis. Patient had R sided hemiparesis, expressive aphasia. CT imaging confirmed multiple embolic areas. Pacemaker interrogated showing Atrial runs but no sustained Atrial Fibrillation. Patient currently on course of ASA and statin. Hospital course complicated by severe anaphylactic due to iodine contrast allergy from CT performed on admission to ED. 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      Pt was transferred to [facility] for acute rehab on 12/8. rehab course complicated by possible seizure episodes involving right facial twitching for 1 min and RUE shaking subsequently started and was persistent. Per rehab team, pt was awake and speaking but stopped responding to questions for a short period of time when facial twitching started. RRT was called for the seizure activities. Patient was hypertensive to 192/70 mmHg and HR 86bpm. O2 saturation is 95% on room air. repeat BP low 89/60s. IV ativan 2mg stat and NS 500cc bolus ordered. Pt to be upgraded to telemetry for further work up and care.    88 year old female with a history of HTN, hypothyroidism, pacemaker, breast cancer who is admitted with CVA and acute respiratory failure due to IV contrast anaphylaxis. Patient had R sided hemiparesis, expressive aphasia. CT imaging confirmed multiple embolic areas. Pacemaker interrogated showing Atrial runs but no sustained Atrial Fibrillation. Patient currently on course of ASA and statin. Hospital course complicated by severe anaphylactic due to iodine contrast allergy from CT performed on admission to ED. 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      Pt was transferred to [facility] for acute rehab on 12/8. rehab course complicated by possible seizure episodes involving right facial twitching for 1 min and RUE shaking subsequently started and was persistent. Per rehab team, pt was awake and speaking but stopped responding to questions for a short period of time when facial twitching started. RRT was called for the seizure activities. Patient was hypertensive to 192/70 mmHg and HR 86bpm. O2 saturation is 95% on room air. repeat BP low 89/60s. IV ativan 2mg stat and NS 500cc bolus ordered. Pt to be upgraded to telemetry for further work up and care.

## 2023-12-13 NOTE — PROGRESS NOTE ADULT - NUTRITIONAL ASSESSMENT
This patient has been assessed with a concern for Malnutrition and has been determined to have a diagnosis/diagnoses of Moderate protein-calorie malnutrition.    This patient is being managed with:   Diet Pureed-  Entered: Dec 12 2023 10:58AM

## 2023-12-13 NOTE — DISCHARGE NOTE PROVIDER - DETAILS OF MALNUTRITION DIAGNOSIS/DIAGNOSES
This patient has been assessed with a concern for Malnutrition and was treated during this hospitalization for the following Nutrition diagnosis/diagnoses:     -  12/12/2023: Moderate protein-calorie malnutrition

## 2023-12-13 NOTE — DISCHARGE NOTE PROVIDER - NSDCMRMEDTOKEN_GEN_ALL_CORE_FT
acetaminophen 160 mg/5 mL oral suspension: 20.31 milliliter(s) orally every 6 hours as needed for Temp greater or equal to 38.5C (101.3F), Mild Pain (1 - 3)  aspirin 325 mg oral tablet: 1 tab(s) orally once a day  atorvastatin 80 mg oral tablet: 1 tab(s) orally once a day (at bedtime)  bacitracin 500 units/g topical ointment: 1 Apply topically to affected area 2 times a day  fluconazole 100 mg oral tablet: 1 tab(s) orally once a day  labetalol 100 mg oral tablet: 1 tab(s) orally every 6 hours As needed SBP&gt;190  lidocaine 4% topical film: Apply topically to affected area once a day as needed for  moderate pain  lisinopril 20 mg oral tablet: 1 tab(s) orally once a day  LORazepam 1 mg/mL-NaCl 0.9% intravenous solution: 2 milliliter(s) intravenous once  melatonin 3 mg oral tablet: 1 tab(s) orally once a day (at bedtime)  Multiple Vitamins oral tablet: 1 tab(s) orally once a day  Multiple Vitamins oral tablet, chewable: 1 tab(s) orally once a day   acetaminophen 160 mg/5 mL oral suspension: 20.31 milliliter(s) orally every 6 hours as needed for Temp greater or equal to 38.5C (101.3F), Mild Pain (1 - 3)  aspirin 325 mg oral tablet: 1 tab(s) orally once a day  atorvastatin 80 mg oral tablet: 1 tab(s) orally once a day (at bedtime)  bacitracin 500 units/g topical ointment: 1 Apply topically to affected area 2 times a day  fluconazole 100 mg oral tablet: 1 tab(s) orally once a day  labetalol 100 mg oral tablet: 1 tab(s) orally every 6 hours As needed SBP&gt;190  lidocaine 4% topical film: Apply topically to affected area once a day as needed for  moderate pain  lisinopril 20 mg oral tablet: 1 tab(s) orally once a day  LORazepam 1 mg/mL-NaCl 0.9% intravenous solution: 2 milliliter(s) intravenous once  melatonin 3 mg oral tablet: 1 tab(s) orally once a day (at bedtime)  Multiple Vitamins oral tablet, chewable: 1 tab(s) orally once a day

## 2023-12-13 NOTE — PROVIDER CONTACT NOTE (OTHER) - SITUATION
Patient eating lunch when RN noticed patients right eye twitching and mouth opening and closing continously.

## 2023-12-14 LAB
ANION GAP SERPL CALC-SCNC: 9 MMOL/L — SIGNIFICANT CHANGE UP (ref 5–17)
ANION GAP SERPL CALC-SCNC: 9 MMOL/L — SIGNIFICANT CHANGE UP (ref 5–17)
BUN SERPL-MCNC: 9 MG/DL — SIGNIFICANT CHANGE UP (ref 7–23)
BUN SERPL-MCNC: 9 MG/DL — SIGNIFICANT CHANGE UP (ref 7–23)
CALCIUM SERPL-MCNC: 9.2 MG/DL — SIGNIFICANT CHANGE UP (ref 8.4–10.5)
CALCIUM SERPL-MCNC: 9.2 MG/DL — SIGNIFICANT CHANGE UP (ref 8.4–10.5)
CHLORIDE SERPL-SCNC: 101 MMOL/L — SIGNIFICANT CHANGE UP (ref 96–108)
CHLORIDE SERPL-SCNC: 101 MMOL/L — SIGNIFICANT CHANGE UP (ref 96–108)
CO2 SERPL-SCNC: 29 MMOL/L — SIGNIFICANT CHANGE UP (ref 22–31)
CO2 SERPL-SCNC: 29 MMOL/L — SIGNIFICANT CHANGE UP (ref 22–31)
CREAT SERPL-MCNC: 0.61 MG/DL — SIGNIFICANT CHANGE UP (ref 0.5–1.3)
CREAT SERPL-MCNC: 0.61 MG/DL — SIGNIFICANT CHANGE UP (ref 0.5–1.3)
EGFR: 87 ML/MIN/1.73M2 — SIGNIFICANT CHANGE UP
EGFR: 87 ML/MIN/1.73M2 — SIGNIFICANT CHANGE UP
GLUCOSE SERPL-MCNC: 86 MG/DL — SIGNIFICANT CHANGE UP (ref 70–99)
GLUCOSE SERPL-MCNC: 86 MG/DL — SIGNIFICANT CHANGE UP (ref 70–99)
HCT VFR BLD CALC: 39.7 % — SIGNIFICANT CHANGE UP (ref 34.5–45)
HCT VFR BLD CALC: 39.7 % — SIGNIFICANT CHANGE UP (ref 34.5–45)
HGB BLD-MCNC: 12.7 G/DL — SIGNIFICANT CHANGE UP (ref 11.5–15.5)
HGB BLD-MCNC: 12.7 G/DL — SIGNIFICANT CHANGE UP (ref 11.5–15.5)
MAGNESIUM SERPL-MCNC: 1.7 MG/DL — SIGNIFICANT CHANGE UP (ref 1.6–2.6)
MAGNESIUM SERPL-MCNC: 1.7 MG/DL — SIGNIFICANT CHANGE UP (ref 1.6–2.6)
MCHC RBC-ENTMCNC: 26.5 PG — LOW (ref 27–34)
MCHC RBC-ENTMCNC: 26.5 PG — LOW (ref 27–34)
MCHC RBC-ENTMCNC: 32 GM/DL — SIGNIFICANT CHANGE UP (ref 32–36)
MCHC RBC-ENTMCNC: 32 GM/DL — SIGNIFICANT CHANGE UP (ref 32–36)
MCV RBC AUTO: 82.7 FL — SIGNIFICANT CHANGE UP (ref 80–100)
MCV RBC AUTO: 82.7 FL — SIGNIFICANT CHANGE UP (ref 80–100)
NRBC # BLD: 0 /100 WBCS — SIGNIFICANT CHANGE UP (ref 0–0)
NRBC # BLD: 0 /100 WBCS — SIGNIFICANT CHANGE UP (ref 0–0)
PHOSPHATE SERPL-MCNC: 3.7 MG/DL — SIGNIFICANT CHANGE UP (ref 2.5–4.5)
PHOSPHATE SERPL-MCNC: 3.7 MG/DL — SIGNIFICANT CHANGE UP (ref 2.5–4.5)
PLATELET # BLD AUTO: 290 K/UL — SIGNIFICANT CHANGE UP (ref 150–400)
PLATELET # BLD AUTO: 290 K/UL — SIGNIFICANT CHANGE UP (ref 150–400)
POTASSIUM SERPL-MCNC: 4 MMOL/L — SIGNIFICANT CHANGE UP (ref 3.5–5.3)
POTASSIUM SERPL-MCNC: 4 MMOL/L — SIGNIFICANT CHANGE UP (ref 3.5–5.3)
POTASSIUM SERPL-SCNC: 4 MMOL/L — SIGNIFICANT CHANGE UP (ref 3.5–5.3)
POTASSIUM SERPL-SCNC: 4 MMOL/L — SIGNIFICANT CHANGE UP (ref 3.5–5.3)
RBC # BLD: 4.8 M/UL — SIGNIFICANT CHANGE UP (ref 3.8–5.2)
RBC # BLD: 4.8 M/UL — SIGNIFICANT CHANGE UP (ref 3.8–5.2)
RBC # FLD: 17 % — HIGH (ref 10.3–14.5)
RBC # FLD: 17 % — HIGH (ref 10.3–14.5)
SODIUM SERPL-SCNC: 139 MMOL/L — SIGNIFICANT CHANGE UP (ref 135–145)
SODIUM SERPL-SCNC: 139 MMOL/L — SIGNIFICANT CHANGE UP (ref 135–145)
WBC # BLD: 6.93 K/UL — SIGNIFICANT CHANGE UP (ref 3.8–10.5)
WBC # BLD: 6.93 K/UL — SIGNIFICANT CHANGE UP (ref 3.8–10.5)
WBC # FLD AUTO: 6.93 K/UL — SIGNIFICANT CHANGE UP (ref 3.8–10.5)
WBC # FLD AUTO: 6.93 K/UL — SIGNIFICANT CHANGE UP (ref 3.8–10.5)

## 2023-12-14 PROCEDURE — 99233 SBSQ HOSP IP/OBS HIGH 50: CPT

## 2023-12-14 PROCEDURE — 99223 1ST HOSP IP/OBS HIGH 75: CPT

## 2023-12-14 PROCEDURE — 99497 ADVNCD CARE PLAN 30 MIN: CPT | Mod: 25

## 2023-12-14 PROCEDURE — 70450 CT HEAD/BRAIN W/O DYE: CPT | Mod: 26

## 2023-12-14 RX ORDER — LABETALOL HCL 100 MG
5 TABLET ORAL EVERY 4 HOURS
Refills: 0 | Status: DISCONTINUED | OUTPATIENT
Start: 2023-12-14 | End: 2023-12-19

## 2023-12-14 RX ORDER — LEVETIRACETAM 250 MG/1
500 TABLET, FILM COATED ORAL EVERY 12 HOURS
Refills: 0 | Status: DISCONTINUED | OUTPATIENT
Start: 2023-12-14 | End: 2023-12-19

## 2023-12-14 RX ORDER — INFLUENZA VIRUS VACCINE 15; 15; 15; 15 UG/.5ML; UG/.5ML; UG/.5ML; UG/.5ML
0.7 SUSPENSION INTRAMUSCULAR ONCE
Refills: 0 | Status: DISCONTINUED | OUTPATIENT
Start: 2023-12-14 | End: 2023-12-19

## 2023-12-14 RX ORDER — HYDRALAZINE HCL 50 MG
10 TABLET ORAL ONCE
Refills: 0 | Status: COMPLETED | OUTPATIENT
Start: 2023-12-14 | End: 2023-12-14

## 2023-12-14 RX ORDER — LEVETIRACETAM 250 MG/1
500 TABLET, FILM COATED ORAL EVERY 12 HOURS
Refills: 0 | Status: DISCONTINUED | OUTPATIENT
Start: 2023-12-14 | End: 2023-12-14

## 2023-12-14 RX ADMIN — CHLORHEXIDINE GLUCONATE 1 APPLICATION(S): 213 SOLUTION TOPICAL at 05:06

## 2023-12-14 RX ADMIN — Medication 650 MILLIGRAM(S): at 16:20

## 2023-12-14 RX ADMIN — ONDANSETRON 4 MILLIGRAM(S): 8 TABLET, FILM COATED ORAL at 09:21

## 2023-12-14 RX ADMIN — ATORVASTATIN CALCIUM 80 MILLIGRAM(S): 80 TABLET, FILM COATED ORAL at 21:16

## 2023-12-14 RX ADMIN — LISINOPRIL 20 MILLIGRAM(S): 2.5 TABLET ORAL at 05:07

## 2023-12-14 RX ADMIN — Medication 5 MILLIGRAM(S): at 02:04

## 2023-12-14 RX ADMIN — Medication 1 APPLICATION(S): at 17:09

## 2023-12-14 RX ADMIN — Medication 650 MILLIGRAM(S): at 15:26

## 2023-12-14 RX ADMIN — Medication 5 MILLIGRAM(S): at 07:53

## 2023-12-14 RX ADMIN — Medication 1 APPLICATION(S): at 05:07

## 2023-12-14 RX ADMIN — LEVETIRACETAM 400 MILLIGRAM(S): 250 TABLET, FILM COATED ORAL at 05:06

## 2023-12-14 RX ADMIN — Medication 10 MILLIGRAM(S): at 08:28

## 2023-12-14 RX ADMIN — LEVETIRACETAM 500 MILLIGRAM(S): 250 TABLET, FILM COATED ORAL at 17:09

## 2023-12-14 NOTE — SWALLOW BEDSIDE ASSESSMENT ADULT - ADDITIONAL RECOMMENDATIONS
Diagnosis continued:     Recommending puree diet texture with thin liquids via cup and 1:1 supervision during meals while limiting environmental distractions and conversation during eating. No straw recommended at this time. Provide oral care post PO intake.

## 2023-12-14 NOTE — SWALLOW BEDSIDE ASSESSMENT ADULT - SWALLOW EVAL: RECOMMENDED FEEDING/EATING TECHNIQUES
alternate food with liquid/check mouth frequently for oral residue/pocketing/maintain upright posture during/after eating for 30 mins/no straws/oral hygiene/position upright (90 degrees)

## 2023-12-14 NOTE — DIETITIAN INITIAL EVALUATION ADULT - NSICDXPASTMEDICALHX_GEN_ALL_CORE_FT
PAST MEDICAL HISTORY:  Breast cancer 2009 left - s/neida castellanos radical mastectomy    Cyst of left ovary     [place] hearing) bilateral HA (Yvette)    Hyperlipidemia no current medications    Meniscus tear left - 10/2015    SDH (subdural hematoma) 3/2011 - s/p fall - no surgical intervention     PAST MEDICAL HISTORY:  Breast cancer 2009 left - s/neida castellanos radical mastectomy    Cyst of left ovary     [place] bilateral HA (Yvette)    Hyperlipidemia no current medications    Meniscus tear left - 10/2015    SDH (subdural hematoma) 3/2011 - s/p fall - no surgical intervention

## 2023-12-14 NOTE — CONSULT NOTE ADULT - SUBJECTIVE AND OBJECTIVE BOX
HPI: 88 year old female with a history of HTN, hypothyroidism, pacemaker, breast cancer who is admitted with CVA and acute respiratory failure due to IV contrast anaphylaxis. Patient had R sided hemiparesis, expressive aphasia. CT imaging confirmed multiple embolic areas. Pacemaker interrogated showing Atrial runs but no sustained Atrial Fibrillation. Patient currently on course of ASA and statin. Hospital course complicated by severe anaphylactic due to iodine contrast allergy from CT performed on admission to ED. 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      Pt was transferred to [facility] for acute rehab on 12/8. rehab course complicated by possible seizure episodes involving right facial twitching for 1 min and RUE shaking subsequently started and was persistent. Per rehab team, pt was awake and speaking but stopped responding to questions for a short period of time when facial twitching started. RRT was called for the seizure activities. Patient was hypertensive to 192/70 mmHg and HR 86bpm. O2 saturation is 95% on room air. repeat BP low 89/60s. IV ativan 2mg stat and NS 500cc bolus ordered. Pt to be upgraded to telemetry for further work up and care.   Now in ccu          PAST MEDICAL & SURGICAL HISTORY:  Hyperlipidemia  no current medications      Breast cancer  2009 left - s/ping castellanos radical mastectomy      [place] (hard of hearing)  bilateral HA (Yvette)      Meniscus tear  left - 10/2015      Cyst of left ovary      SDH (subdural hematoma)  3/2011 - s/p fall - no surgical intervention      History of Left Mastectomy      Cataract extraction status of eye, right      Status post right cataract extraction          SOCIAL HISTORY:    Admitted from:  [facility] [place]   Substance abuse history:              Tobacco hx:                  Alcohol hx:              Home Opioid hx:  Taoist:                                    Preferred Language:    Surrogates:    Primary POC   daughter Elsi ( lives in [place]) [phone]   and sister      FAMILY HISTORY:  FH: leukemia (Father)    FH: breast cancer (Mother)      Baseline ADLs (prior to admission):    Allergies    penicillin (Rash)  IV Contrast (Anaphylaxis; Urticaria)    Intolerances            Review of Systems: [ Unable to obtain due to poor mentation]    MEDICATIONS  (STANDING):  atorvastatin 80 milliGRAM(s) Oral at bedtime  bacitracin   Ointment 1 Application(s) Topical two times a day  chlorhexidine 2% Cloths 1 Application(s) Topical <User Schedule>  fluconAZOLE   Tablet 100 milliGRAM(s) Oral daily  levETIRAcetam  IVPB 1000 milliGRAM(s) IV Intermittent every 12 hours  lidocaine   4% Patch 1 Patch Transdermal daily  lisinopril 20 milliGRAM(s) Oral daily  multivitamin 1 Tablet(s) Oral daily    MEDICATIONS  (PRN):  acetaminophen     Tablet .. 650 milliGRAM(s) Oral every 6 hours PRN Temp greater or equal to 38C (100.4F), Mild Pain (1 - 3)  acetaminophen  Suppository .. 650 milliGRAM(s) Rectal every 6 hours PRN Temp greater or equal to 38C (100.4F)  aluminum hydroxide/magnesium hydroxide/simethicone Suspension 30 milliLiter(s) Oral every 4 hours PRN Dyspepsia  labetalol Injectable 5 milliGRAM(s) IV Push every 4 hours PRN if SBP >180  melatonin 3 milliGRAM(s) Oral at bedtime PRN Insomnia  ondansetron Injectable 4 milliGRAM(s) IV Push every 8 hours PRN Nausea and/or Vomiting  senna 2 Tablet(s) Oral at bedtime PRN Constipation      PHYSICAL EXAM:    Vital Signs Last 24 Hrs  T(C): 36.7 (14 Dec 2023 07:00), Max: 37 (13 Dec 2023 14:27)  T(F): 98 (14 Dec 2023 07:00), Max: 98.6 (13 Dec 2023 14:27)  HR: 76 (14 Dec 2023 10:00) (66 - 102)  BP: 145/74 (14 Dec 2023 10:00) (136/78 - 218/134)  BP(mean): 96 (14 Dec 2023 10:00) (87 - 165)  RR: 18 (14 Dec 2023 10:00) (14 - 24)  SpO2: 97% (14 Dec 2023 10:00) (92% - 100%)    Parameters below as of 14 Dec 2023 07:40  Patient On (Oxygen Delivery Method): room air        General: lethargic , but arousable, opens eyes when asked, tries to speak, mumbled   Karnofsky Performance Score/Palliative Performance Status Version2:  20   %  PPSV: 20%  HEENT: n/c, a/t w/ dry mouth    Lungs: few coarse bs, dim to bases, resp non labored,  comfortable   CV: normal rate   GI: normal , abd soft, n/t    : normal  incontinent prima fit   Musculoskeletal:  weakness  R weakest , no edema   Skin: pale, w/d   Neuro: deficits pt lethargic, but arousable, opens eyes when asked, speech garbled, mumbled      Oral intake ability: unable/only mouth care - TBD   Diet: [NPO]  S/s eval     LABS:                        12.7   6.93  )-----------( 290      ( 14 Dec 2023 06:00 )             39.7     12-14    139  |  101  |  9   ----------------------------<  86  4.0   |  29  |  0.61    Ca    9.2      14 Dec 2023 06:00  Phos  3.7     12-14  Mg     1.7     12-14    TPro  7.0  /  Alb  3.0<L>  /  TBili  0.4  /  DBili  x   /  AST  41<H>  /  ALT  32  /  AlkPhos  90  12-13    Urinalysis Basic - ( 14 Dec 2023 06:00 )    Color: x / Appearance: x / SG: x / pH: x  Gluc: 86 mg/dL / Ketone: x  / Bili: x / Urobili: x   Blood: x / Protein: x / Nitrite: x   Leuk Esterase: x / RBC: x / WBC x   Sq Epi: x / Non Sq Epi: x / Bacteria: x        RADIOLOGY & ADDITIONAL STUDIES: < from: CT Head No Cont (12.13.23 @ 12:51) >  IMPRESSION:    1.  Redemonstrated left MCA distribution infarction, advanced when   compared to prior imaging with likely petechial hemorrhages and/or   laminar necrosis.  2.  Midline shift to the right of approximately 0.5 cm.  3.  Chronic small vessel disease.          ADVANCE DIRECTIVES:  no full code   Advanced Care Planning discussion total time spent:   HPI: 88 year old female with a history of HTN, hypothyroidism, pacemaker, breast cancer who is admitted with CVA and acute respiratory failure due to IV contrast anaphylaxis. Patient had R sided hemiparesis, expressive aphasia. CT imaging confirmed multiple embolic areas. Pacemaker interrogated showing Atrial runs but no sustained Atrial Fibrillation. Patient currently on course of ASA and statin. Hospital course complicated by severe anaphylactic due to iodine contrast allergy from CT performed on admission to ED. 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      Pt was transferred to [facility] for acute rehab on 12/8. rehab course complicated by possible seizure episodes involving right facial twitching for 1 min and RUE shaking subsequently started and was persistent. Per rehab team, pt was awake and speaking but stopped responding to questions for a short period of time when facial twitching started. RRT was called for the seizure activities. Patient was hypertensive to 192/70 mmHg and HR 86bpm. O2 saturation is 95% on room air. repeat BP low 89/60s. IV ativan 2mg stat and NS 500cc bolus ordered. Pt to be upgraded to telemetry for further work up and care.   Now in ccu          PAST MEDICAL & SURGICAL HISTORY:  Hyperlipidemia  no current medications      Breast cancer  2009 left - s/ping castellanos radical mastectomy      [place] (hard of hearing)  bilateral HA (Yvette)      Meniscus tear  left - 10/2015      Cyst of left ovary      SDH (subdural hematoma)  3/2011 - s/p fall - no surgical intervention      History of Left Mastectomy      Cataract extraction status of eye, right      Status post right cataract extraction          SOCIAL HISTORY:    Admitted from:  [facility] [place]   Substance abuse history:              Tobacco hx:                  Alcohol hx:              Home Opioid hx:  Jainism:                                    Preferred Language:    Surrogates:    Primary POC   daughter Elsi ( lives in [place]) [phone]   and sister      FAMILY HISTORY:  FH: leukemia (Father)    FH: breast cancer (Mother)      Baseline ADLs (prior to admission):    Allergies    penicillin (Rash)  IV Contrast (Anaphylaxis; Urticaria)    Intolerances            Review of Systems: [ Unable to obtain due to poor mentation]    MEDICATIONS  (STANDING):  atorvastatin 80 milliGRAM(s) Oral at bedtime  bacitracin   Ointment 1 Application(s) Topical two times a day  chlorhexidine 2% Cloths 1 Application(s) Topical <User Schedule>  fluconAZOLE   Tablet 100 milliGRAM(s) Oral daily  levETIRAcetam  IVPB 1000 milliGRAM(s) IV Intermittent every 12 hours  lidocaine   4% Patch 1 Patch Transdermal daily  lisinopril 20 milliGRAM(s) Oral daily  multivitamin 1 Tablet(s) Oral daily    MEDICATIONS  (PRN):  acetaminophen     Tablet .. 650 milliGRAM(s) Oral every 6 hours PRN Temp greater or equal to 38C (100.4F), Mild Pain (1 - 3)  acetaminophen  Suppository .. 650 milliGRAM(s) Rectal every 6 hours PRN Temp greater or equal to 38C (100.4F)  aluminum hydroxide/magnesium hydroxide/simethicone Suspension 30 milliLiter(s) Oral every 4 hours PRN Dyspepsia  labetalol Injectable 5 milliGRAM(s) IV Push every 4 hours PRN if SBP >180  melatonin 3 milliGRAM(s) Oral at bedtime PRN Insomnia  ondansetron Injectable 4 milliGRAM(s) IV Push every 8 hours PRN Nausea and/or Vomiting  senna 2 Tablet(s) Oral at bedtime PRN Constipation      PHYSICAL EXAM:    Vital Signs Last 24 Hrs  T(C): 36.7 (14 Dec 2023 07:00), Max: 37 (13 Dec 2023 14:27)  T(F): 98 (14 Dec 2023 07:00), Max: 98.6 (13 Dec 2023 14:27)  HR: 76 (14 Dec 2023 10:00) (66 - 102)  BP: 145/74 (14 Dec 2023 10:00) (136/78 - 218/134)  BP(mean): 96 (14 Dec 2023 10:00) (87 - 165)  RR: 18 (14 Dec 2023 10:00) (14 - 24)  SpO2: 97% (14 Dec 2023 10:00) (92% - 100%)    Parameters below as of 14 Dec 2023 07:40  Patient On (Oxygen Delivery Method): room air        General: lethargic , but arousable, opens eyes when asked, tries to speak, mumbled   Karnofsky Performance Score/Palliative Performance Status Version2:  20   %  PPSV: 20%  HEENT: n/c, a/t w/ dry mouth    Lungs: few coarse bs, dim to bases, resp non labored,  comfortable   CV: normal rate   GI: normal , abd soft, n/t    : normal  incontinent prima fit   Musculoskeletal:  weakness  R weakest , no edema   Skin: pale, w/d   Neuro: deficits pt lethargic, but arousable, opens eyes when asked, speech garbled, mumbled      Oral intake ability: unable/only mouth care - TBD   Diet: [NPO]  S/s eval     LABS:                        12.7   6.93  )-----------( 290      ( 14 Dec 2023 06:00 )             39.7     12-14    139  |  101  |  9   ----------------------------<  86  4.0   |  29  |  0.61    Ca    9.2      14 Dec 2023 06:00  Phos  3.7     12-14  Mg     1.7     12-14    TPro  7.0  /  Alb  3.0<L>  /  TBili  0.4  /  DBili  x   /  AST  41<H>  /  ALT  32  /  AlkPhos  90  12-13    Urinalysis Basic - ( 14 Dec 2023 06:00 )    Color: x / Appearance: x / SG: x / pH: x  Gluc: 86 mg/dL / Ketone: x  / Bili: x / Urobili: x   Blood: x / Protein: x / Nitrite: x   Leuk Esterase: x / RBC: x / WBC x   Sq Epi: x / Non Sq Epi: x / Bacteria: x        RADIOLOGY & ADDITIONAL STUDIES: < from: CT Head No Cont (12.13.23 @ 12:51) >  IMPRESSION:    1.  Redemonstrated left MCA distribution infarction, advanced when   compared to prior imaging with likely petechial hemorrhages and/or   laminar necrosis.  2.  Midline shift to the right of approximately 0.5 cm.  3.  Chronic small vessel disease.          ADVANCE DIRECTIVES:  no full code   Advanced Care Planning discussion total time spent:

## 2023-12-14 NOTE — PROGRESS NOTE ADULT - ASSESSMENT
88y Female admitted with AMS 2/2 to prior CVA, required Q1H neurochecks, noted small bleed/shift on CT head, however f/u CT's showed stable findings          PLAN:    -will space out neurochecks to eveyr 4 hours  -maintain blood pressure control  -noted thrush and was started on fluconazole  -AM labs          TIME SPENT:  35 minutes of  time spent providing medical care for patient's acute illness/conditions that impairs at least one vital organ system and/or poses a high risk of imminent or life threatening deterioration in the patient's condition. It includes time spent evaluating and treating the patient's acute illness as well as time spent reviewing labs, radiology, discussing goals of care with patient and/or patient's family, and discussing the case with a multidisciplinary team, including the eICU, in an effort to prevent further life threatening deterioration or end organ damage. This time is independent of any procedures performed.    DATE OF ENTRY OF THIS NOTE IS EQUAL TO THE DATE OF SERVICES RENDERED

## 2023-12-14 NOTE — PROGRESS NOTE ADULT - ASSESSMENT
Physical Examination:  GENERAL:               Lethargic,  No acute distress.    HEENT:                  No JVD, dryMM  PULM:                     Bilateral air entry, Clear to auscultation bilaterally, no significant sputum production, No Rales, No Rhonchi, No Wheezing  CVS:                         S1, S2,  +Murmur  ABD:                        Soft, nondistended, nontender, normoactive bowel sounds,   EXT:                         No edema, nontender,   NEURO:                  mildy interactive, slight 4/5 right sided weakness compared to left, follows commands  PSYC:                      Calm, + Insight.        Assessment  Recent CVA with Irregular appearing subacute to chronic infarct left middle cerebral artery territory may be due to spared gray matter, hemorrhage or laminar necrosis without change since 12/13/2023. Mass effect and mild midline shift to the right.  AMS possible underlying seuzre    Underlying high chol, h/o breast cancer 2009 left - s/neida castellanos radical mastectomy, htn hypothyroidism, s/p ppm     Plan  appreciate neuro input  make kepra 500mg bid  diet as per speech  Per the chart, Neurosurgery was consulted and no surgical intervention warranted, recommended repeat CT head which today, 12/14/23, was stable.   Ativan 2 mg if seizure more than 2 mins.   seizure precaution.   Repeat CT head in 24 hours, if stable, can resume Aspirin 81mg daily    PT, OT     PMD:				                   Notified(Date):  Family Updated: 		                                 Date:       Sedation & Analgesia:	  Diet/Nutrition:		 Diet, Pureed (12-14-23 @ 16:03) [Active]  GI PPx:			oral diet  DVT Ppx:		venodynes    Activity:		    Head of Bed:               35-45 Deg  Glycemic Control:          atorvastatin 80 milliGRAM(s) Oral at bedtime  multivitamin 1 Tablet(s) Oral daily      Lines:  CENTRAL LINE: 	[ ] YES [ ] NO	                    LOCATION:   	                       DATE INSERTED:   	                    REMOVE:  [ ] YES [ ] NO    A-LINE:  	                [ ] YES [ ] NO                      LOCATION:   	                       DATE INSERTED: 		            REMOVE:  [ ] YES [ ] NO    NUÑEZ: 		        [ ] YES [ ] NO  		                                       DATE INSERTED:		            REMOVE:  [ ] YES [ ] NO      Restraints were deemed necessary to prevent removal of life-sustaining devices [  ] YES   [   x ]  NO    Disposition: monitor in icu for another 24 hrs    Goals of Care: full code as per record, palliative care consult stated - Family hopeful for some recovery from the stroke, as pts baseline was very good, functional and pt was working.

## 2023-12-14 NOTE — DIETITIAN INITIAL EVALUATION ADULT - OTHER INFO
88 year old female with a history of HTN, hypothyroidism, pacemaker, breast cancer who is admitted with CVA and acute respiratory failure due to IV contrast anaphylaxis. 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 Transferred to [facility] for acute rehab Dec 8. Course complicated by RRT called for seizure activities involving right facial twitching for 1 mins and persistent RUE shaking. Pt upgraded to medical floor for further workup and care.    Pt now on minced & moist diet, although pending formal swallow evaluation. As per RN, PO diet on hold until then. UBW 165lbs. Previously noted with malnutrition. Pt previously declines oral nutrition supplements. Continues on MVI.  88 year old female with a history of HTN, hypothyroidism, pacemaker, breast cancer who is admitted with CVA and acute respiratory failure due to IV contrast anaphylaxis. 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 Transferred to [facility] for acute rehab Dec 8. Course complicated by RRT called for seizure activities involving right facial twitching for 1 mins and persistent RUE shaking. Pt upgraded to medical floor for further workup and care.    Pt now on minced & moist diet, although pending formal swallow evaluation. As per RN, PO diet on hold until then. UBW 165lbs. Previously noted with malnutrition. Pt previously declines oral nutrition supplements. Continues on MVI.

## 2023-12-14 NOTE — PROGRESS NOTE ADULT - ASSESSMENT
[id number]old woman with HTN, hypothyroidism, pacemaker placement, breast cancer admitted for acute ischemic stroke.   While in rehab, she was having seizure starting with right facial twitching then RUE convulsion.   Post-stroke seizure, patient received Keppra 1000mg IV loading dose and is now on 1000mg BID.  large left MCA infarction with 0.5mm midline shift, and petechial hemorrhage. Per the chart, Neurosurgery was consulted and no surgical intervention warranted, recommended repeat CT head which today, 12/14/23, was stable.     Recommendation  Would continue patient on 500mg BID of Keppra considering this is first seizure in the setting of large stroke, may be contributing to continued lethargy at 1000mg IV BID  repeat CT head stat if clinical changes  Repeat EEG 12/15/23  Ativan 2 mg if seizure more than 2 mins.   seizure precaution.   Repeat CT head in 24 hours, if stable, can resume Aspirin 81mg daily  PT/OT/SLP evals    Remainder of care per primary team             [id number]old woman with HTN, hypothyroidism, pacemaker placement, breast cancer admitted for acute ischemic stroke.   While in rehab, she was having seizure starting with right facial twitching then RUE convulsion.   Post-stroke seizure, patient received Keppra 1000mg IV loading dose and is now on 1000mg BID.  large left MCA infarction with 0.5mm midline shift, and petechial hemorrhage. Per the chart, Neurosurgery was consulted and no surgical intervention warranted, recommended repeat CT head which today, 12/14/23, was stable.     Recommendation  Would continue patient on 500mg BID of Keppra considering this is first seizure in the setting of large stroke, may be contributing to continued lethargy at 1000mg IV BID  repeat CT head stat if clinical changes  Repeat EEG 12/15/23  Ativan 2 mg if seizure more than 2 mins.   seizure precaution.   Repeat CT head in 24 hours, if stable, can resume Aspirin 81mg daily  PT/OT/SLP evals    Remainder of care per primary team

## 2023-12-14 NOTE — PROGRESS NOTE ADULT - SUBJECTIVE AND OBJECTIVE BOX
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        patient seen and examined  lethargic, verbal unable to provide history  seen by speech and diet changed to puree        Allergies :penicillin (Rash)  IV Contrast (Anaphylaxis; Urticaria)      PAST MEDICAL & SURGICAL HISTORY:  Hyperlipidemia  no current medications    Breast cancer  2009 left - s/ping castellanos radical mastectomy    [place] (hard of hearing)  bilateral HA (Yvette)    Meniscus tear  left - 10/2015    Cyst of left ovary    SDH (subdural hematoma)  3/2011 - s/p fall - no surgical intervention    History of Left Mastectomy    Cataract extraction status of eye, right    Status post right cataract extraction        Medications:  MEDICATIONS  (STANDING):  atorvastatin 80 milliGRAM(s) Oral at bedtime  bacitracin   Ointment 1 Application(s) Topical two times a day  chlorhexidine 2% Cloths 1 Application(s) Topical <User Schedule>  fluconAZOLE   Tablet 100 milliGRAM(s) Oral daily  influenza  Vaccine (HIGH DOSE) 0.7 milliLiter(s) IntraMuscular once  levETIRAcetam 500 milliGRAM(s) Oral every 12 hours  lidocaine   4% Patch 1 Patch Transdermal daily  lisinopril 20 milliGRAM(s) Oral daily  multivitamin 1 Tablet(s) Oral daily    MEDICATIONS  (PRN):  acetaminophen     Tablet .. 650 milliGRAM(s) Oral every 6 hours PRN Temp greater or equal to 38C (100.4F), Mild Pain (1 - 3)  aluminum hydroxide/magnesium hydroxide/simethicone Suspension 30 milliLiter(s) Oral every 4 hours PRN Dyspepsia  labetalol Injectable 5 milliGRAM(s) IV Push every 4 hours PRN if SBP >180  melatonin 3 milliGRAM(s) Oral at bedtime PRN Insomnia  ondansetron Injectable 4 milliGRAM(s) IV Push every 8 hours PRN Nausea and/or Vomiting  senna 2 Tablet(s) Oral at bedtime PRN Constipation      Antibiotics History  fluconAZOLE   Tablet 100 milliGRAM(s) Oral daily, 12-14-23 @ 00:00, Stop order after: 2 Days      Heme Medications       GI Medications  aluminum hydroxide/magnesium hydroxide/simethicone Suspension 30 milliLiter(s) Oral every 4 hours, 12-13-23 @ 16:38, Routine PRN  senna 2 Tablet(s) Oral at bedtime, 12-13-23 @ 17:56, Routine PRN      COVID  01-23-21 @ 14:47  COVID -   NotDetec         WBC Trend  12-14-23 @ 06:00   -  6.93  12-13-23 @ 12:35   -  7.56    H/H Trend  12-14-23 @ 06:00   -   12.7/ 39.7  12-13-23 @ 12:35   -   11.7/ 36.7  12-11-23 @ 07:03   -   10.3<L>/ 32.6<L>  12-09-23 @ 08:50   -   11.1<L>/ 35.2  12-07-23 @ 06:00   -   11.4<L>/ 35.6  12-06-23 @ 05:50   -   10.2<L>/ 32.3<L>    Stool Occult Blood    Platelet Trend  12-14-23 @ 06:00   -  290  12-13-23 @ 12:35   -  309    Trend Sodium  12-14-23 @ 06:00   -  139  12-13-23 @ 12:35   -  138    Trend Potassium  12-14-23 @ 06:00   -  4.0  12-13-23 @ 12:35   -  4.4    Trend Bun/Cr  12-14-23 @ 06:00  BUN/CR -  9 / 0.61  12-13-23 @ 12:35  BUN/CR -  12 / 0.68    Lactic Acid Trend  12-13-23 @ 13:30   -   1.1  12-03-23 @ 06:11   -   1.2  12-02-23 @ 22:28   -   1.8    ABG Trend  12-04-23 @ 06:03   - 7.43/43<H>/112<H>/99.4<H>  12-02-23 @ 23:37   - 7.34<L>/50<H>/282<H>/99.9<H>  12-02-23 @ 20:34   - 7.01<LL>/101<HH>/79<L>/90.7<L>    Trend AST/ALT/ALK Phos/Bili  12-13-23 @ 12:35   41<H>/32/90/0.4  12-11-23 @ 07:03   36/30/74/0.4  12-09-23 @ 08:50   35/31/75/0.4  12-02-23 @ 19:26   26/25/105/0.2         Albumin Trend  12-13-23 @ 12:35   -   3.0<L>  12-11-23 @ 07:03   -   2.6<L>  12-09-23 @ 08:50   -   2.6<L>  12-02-23 @ 19:26   -   3.5      PTT - PT - INR Trend  12-02-23 @ 19:26   -   29.6 - 10.4 - 0.93    Glucose Trend  12-14-23 @ 06:00   -  86 -- --  12-13-23 @ 12:35   -  103<H> -- --  12-13-23 @ 12:28   -  -- -- 103<H>    A1C with Estimated Average Glucose Result: 5.4 % [4.0 - 5.6] (12-04-23 @ 05:30)      LABS:                        12.7   6.93  )-----------( 290      ( 14 Dec 2023 06:00 )             39.7     12-14    139  |  101  |  9   ----------------------------<  86  4.0   |  29  |  0.61    Ca    9.2      14 Dec 2023 06:00  Phos  3.7     12-14  Mg     1.7     12-14    TPro  7.0  /  Alb  3.0<L>  /  TBili  0.4  /  DBili  x   /  AST  41<H>  /  ALT  32  /  AlkPhos  90  12-13      CARDIAC MARKERS ( 13 Dec 2023 12:35 )  x     / x     / 54 U/L / x     / x            RADIOLOGY  CXR:      CT:  < from: CT Head No Cont (12.14.23 @ 09:34) >  IMPRESSION: No change since 12/13/2023. Irregular appearing subacute to   chronic infarct left middle cerebral artery territory may be due to   spared gray matter, hemorrhage or laminar necrosis without change since   12/13/2023. Mass effect and mild midline shift to the right.    --- End of Report ---    < end of copied text >    EEG:  EEG Classification / Summary:  Abnormal routine EEG in the drowsy and mostly asleep states with intermittent arousals.   -Occasional left frontotemporal sharp waves  -Continuous left hemispheric focal slowing and relative attenuation  -No electrographic seizures    Clinical Impression:  -Risk of left frontotemporal focal-onset seizures  -Left hemispheric focal cerebral dysfunction can be structural or functional in etiology.   -No seizures captured      VITALS:  T(C): 36.7 (12-14-23 @ 15:00), Max: 36.7 (12-14-23 @ 07:00)  T(F): 98 (12-14-23 @ 15:00), Max: 98.1 (12-14-23 @ 11:00)  HR: 77 (12-14-23 @ 19:00) (66 - 98)  BP: 116/50 (12-14-23 @ 19:00) (101/48 - 218/134)  BP(mean): 67 (12-14-23 @ 19:00) (65 - 165)  ABP: --  ABP(mean): --  RR: 15 (12-14-23 @ 19:00) (14 - 24)  SpO2: 94% (12-14-23 @ 19:00) (92% - 100%)  CVP(mm Hg): --  CVP(cm H2O): --    Ins and Outs     12-13-23 @ 07:01  -  12-14-23 @ 07:00  --------------------------------------------------------  IN: 0 mL / OUT: 1300 mL / NET: -1300 mL    12-14-23 @ 07:01  -  12-14-23 @ 19:36  --------------------------------------------------------  IN: 100 mL / OUT: 100 mL / NET: 0 mL        Height (cm): 162.6 (12-13-23 @ 19:26)  Weight (kg): 65.3 (12-13-23 @ 19:26)  BMI (kg/m2): 24.7 (12-13-23 @ 19:26)        I&O's Detail    13 Dec 2023 07:01  -  14 Dec 2023 07:00  --------------------------------------------------------  IN:  Total IN: 0 mL    OUT:    Voided (mL): 1300 mL  Total OUT: 1300 mL    Total NET: -1300 mL      14 Dec 2023 07:01  -  14 Dec 2023 19:36  --------------------------------------------------------  IN:    Oral Fluid: 100 mL  Total IN: 100 mL    OUT:    Voided (mL): 100 mL  Total OUT: 100 mL    Total NET: 0 mL                  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        patient seen and examined  lethargic, verbal unable to provide history  seen by speech and diet changed to puree        Allergies :penicillin (Rash)  IV Contrast (Anaphylaxis; Urticaria)      PAST MEDICAL & SURGICAL HISTORY:  Hyperlipidemia  no current medications    Breast cancer  2009 left - s/ping castellanos radical mastectomy    [place] (hard of hearing)  bilateral HA ([place]vette)    Meniscus tear  left - 10/2015    Cyst of left ovary    SDH (subdural hematoma)  3/2011 - s/p fall - no surgical intervention    History of Left Mastectomy    Cataract extraction status of eye, right    Status post right cataract extraction        Medications:  MEDICATIONS  (STANDING):  atorvastatin 80 milliGRAM(s) Oral at bedtime  bacitracin   Ointment 1 Application(s) Topical two times a day  chlorhexidine 2% Cloths 1 Application(s) Topical <User Schedule>  fluconAZOLE   Tablet 100 milliGRAM(s) Oral daily  influenza  Vaccine (HIGH DOSE) 0.7 milliLiter(s) IntraMuscular once  levETIRAcetam 500 milliGRAM(s) Oral every 12 hours  lidocaine   4% Patch 1 Patch Transdermal daily  lisinopril 20 milliGRAM(s) Oral daily  multivitamin 1 Tablet(s) Oral daily    MEDICATIONS  (PRN):  acetaminophen     Tablet .. 650 milliGRAM(s) Oral every 6 hours PRN Temp greater or equal to 38C (100.4F), Mild Pain (1 - 3)  aluminum hydroxide/magnesium hydroxide/simethicone Suspension 30 milliLiter(s) Oral every 4 hours PRN Dyspepsia  labetalol Injectable 5 milliGRAM(s) IV Push every 4 hours PRN if SBP >180  melatonin 3 milliGRAM(s) Oral at bedtime PRN Insomnia  ondansetron Injectable 4 milliGRAM(s) IV Push every 8 hours PRN Nausea and/or Vomiting  senna 2 Tablet(s) Oral at bedtime PRN Constipation      Antibiotics History  fluconAZOLE   Tablet 100 milliGRAM(s) Oral daily, 12-14-23 @ 00:00, Stop order after: 2 Days      Heme Medications       GI Medications  aluminum hydroxide/magnesium hydroxide/simethicone Suspension 30 milliLiter(s) Oral every 4 hours, 12-13-23 @ 16:38, Routine PRN  senna 2 Tablet(s) Oral at bedtime, 12-13-23 @ 17:56, Routine PRN      COVID  01-23-21 @ 14:47  COVID -   NotDetec         WBC Trend  12-14-23 @ 06:00   -  6.93  12-13-23 @ 12:35   -  7.56    H/H Trend  12-14-23 @ 06:00   -   12.7/ 39.7  12-13-23 @ 12:35   -   11.7/ 36.7  12-11-23 @ 07:03   -   10.3<L>/ 32.6<L>  12-09-23 @ 08:50   -   11.1<L>/ 35.2  12-07-23 @ 06:00   -   11.4<L>/ 35.6  12-06-23 @ 05:50   -   10.2<L>/ 32.3<L>    Stool Occult Blood    Platelet Trend  12-14-23 @ 06:00   -  290  12-13-23 @ 12:35   -  309    Trend Sodium  12-14-23 @ 06:00   -  139  12-13-23 @ 12:35   -  138    Trend Potassium  12-14-23 @ 06:00   -  4.0  12-13-23 @ 12:35   -  4.4    Trend Bun/Cr  12-14-23 @ 06:00  BUN/CR -  9 / 0.61  12-13-23 @ 12:35  BUN/CR -  12 / 0.68    Lactic Acid Trend  12-13-23 @ 13:30   -   1.1  12-03-23 @ 06:11   -   1.2  12-02-23 @ 22:28   -   1.8    ABG Trend  12-04-23 @ 06:03   - 7.43/43<H>/112<H>/99.4<H>  12-02-23 @ 23:37   - 7.34<L>/50<H>/282<H>/99.9<H>  12-02-23 @ 20:34   - 7.01<LL>/101<HH>/79<L>/90.7<L>    Trend AST/ALT/ALK Phos/Bili  12-13-23 @ 12:35   41<H>/32/90/0.4  12-11-23 @ 07:03   36/30/74/0.4  12-09-23 @ 08:50   35/31/75/0.4  12-02-23 @ 19:26   26/25/105/0.2         Albumin Trend  12-13-23 @ 12:35   -   3.0<L>  12-11-23 @ 07:03   -   2.6<L>  12-09-23 @ 08:50   -   2.6<L>  12-02-23 @ 19:26   -   3.5      PTT - PT - INR Trend  12-02-23 @ 19:26   -   29.6 - 10.4 - 0.93    Glucose Trend  12-14-23 @ 06:00   -  86 -- --  12-13-23 @ 12:35   -  103<H> -- --  12-13-23 @ 12:28   -  -- -- 103<H>    A1C with Estimated Average Glucose Result: 5.4 % [4.0 - 5.6] (12-04-23 @ 05:30)      LABS:                        12.7   6.93  )-----------( 290      ( 14 Dec 2023 06:00 )             39.7     12-14    139  |  101  |  9   ----------------------------<  86  4.0   |  29  |  0.61    Ca    9.2      14 Dec 2023 06:00  Phos  3.7     12-14  Mg     1.7     12-14    TPro  7.0  /  Alb  3.0<L>  /  TBili  0.4  /  DBili  x   /  AST  41<H>  /  ALT  32  /  AlkPhos  90  12-13      CARDIAC MARKERS ( 13 Dec 2023 12:35 )  x     / x     / 54 U/L / x     / x            RADIOLOGY  CXR:      CT:  < from: CT Head No Cont (12.14.23 @ 09:34) >  IMPRESSION: No change since 12/13/2023. Irregular appearing subacute to   chronic infarct left middle cerebral artery territory may be due to   spared gray matter, hemorrhage or laminar necrosis without change since   12/13/2023. Mass effect and mild midline shift to the right.    --- End of Report ---    < end of copied text >    EEG:  EEG Classification / Summary:  Abnormal routine EEG in the drowsy and mostly asleep states with intermittent arousals.   -Occasional left frontotemporal sharp waves  -Continuous left hemispheric focal slowing and relative attenuation  -No electrographic seizures    Clinical Impression:  -Risk of left frontotemporal focal-onset seizures  -Left hemispheric focal cerebral dysfunction can be structural or functional in etiology.   -No seizures captured      VITALS:  T(C): 36.7 (12-14-23 @ 15:00), Max: 36.7 (12-14-23 @ 07:00)  T(F): 98 (12-14-23 @ 15:00), Max: 98.1 (12-14-23 @ 11:00)  HR: 77 (12-14-23 @ 19:00) (66 - 98)  BP: 116/50 (12-14-23 @ 19:00) (101/48 - 218/134)  BP(mean): 67 (12-14-23 @ 19:00) (65 - 165)  ABP: --  ABP(mean): --  RR: 15 (12-14-23 @ 19:00) (14 - 24)  SpO2: 94% (12-14-23 @ 19:00) (92% - 100%)  CVP(mm Hg): --  CVP(cm H2O): --    Ins and Outs     12-13-23 @ 07:01  -  12-14-23 @ 07:00  --------------------------------------------------------  IN: 0 mL / OUT: 1300 mL / NET: -1300 mL    12-14-23 @ 07:01  -  12-14-23 @ 19:36  --------------------------------------------------------  IN: 100 mL / OUT: 100 mL / NET: 0 mL        Height (cm): 162.6 (12-13-23 @ 19:26)  Weight (kg): 65.3 (12-13-23 @ 19:26)  BMI (kg/m2): 24.7 (12-13-23 @ 19:26)        I&O's Detail    13 Dec 2023 07:01  -  14 Dec 2023 07:00  --------------------------------------------------------  IN:  Total IN: 0 mL    OUT:    Voided (mL): 1300 mL  Total OUT: 1300 mL    Total NET: -1300 mL      14 Dec 2023 07:01  -  14 Dec 2023 19:36  --------------------------------------------------------  IN:    Oral Fluid: 100 mL  Total IN: 100 mL    OUT:    Voided (mL): 100 mL  Total OUT: 100 mL    Total NET: 0 mL

## 2023-12-14 NOTE — PHYSICAL THERAPY INITIAL EVALUATION ADULT - ACTIVE RANGE OF MOTION EXAMINATION, REHAB EVAL
limited by cognition; right shoulder and ebow WFL, grasp through 50% of range/Left UE Active ROM was WFL (within functional limits)/bilateral  lower extremity Active ROM was WFL (within functional limits)/deficits as listed below

## 2023-12-14 NOTE — SWALLOW BEDSIDE ASSESSMENT ADULT - SLP GENERAL OBSERVATIONS
Upon entering room, pt asleep in bed; however, easily arousable provided verbal cues. Pt oriented to name and required cues to orient to month, year and location.
Statement Selected

## 2023-12-14 NOTE — SWALLOW BEDSIDE ASSESSMENT ADULT - SWALLOW EVAL: DIAGNOSIS
Pt presenting with clinical signs of oropharyngeal dysphagia. Oral care completed by clinician utilizing toothette. Pt accepted thin liquids via teaspoon, cup and straw, puree and minced and moist. Adequate oral grading to cup and straw, and oral stripping from spoon. Given thin liquids via teaspoon and single and consecutive cup sips, pt noted with ~2 swallows per bolus; however, no clinical overt s/sx of penetration/aspiration. Pt noted with immediate cough during consecutive straw sips of thin liquids. Pt unable to take single straw sip due to cognitive status. Given puree via teaspoon, pt with oral holding suspect due to distractions, reduced mastication and suspect bolus manipulation/control. Oral residue present on right side of oral cavity following swallow, liquid wash utilized to clear residue. Pt with increased mastication given minced and moist; with increased amounts of oral residue present following swallow.

## 2023-12-14 NOTE — PROGRESS NOTE ADULT - SUBJECTIVE AND OBJECTIVE BOX
F/U Note:    88y Female admitted with AMS 2/2 to prior CVA, required Q1H neurochecks, noted small bleed/shift on CT head, however f/u CT's showed stable findings        Vital Signs Last 24 Hrs  T(C): 36.7 (14 Dec 2023 15:00), Max: 36.7 (14 Dec 2023 07:00)  T(F): 98 (14 Dec 2023 15:00), Max: 98.1 (14 Dec 2023 11:00)  HR: 77 (14 Dec 2023 19:00) (66 - 98)  BP: 116/50 (14 Dec 2023 19:00) (101/48 - 218/134)  BP(mean): 67 (14 Dec 2023 19:00) (65 - 165)  RR: 15 (14 Dec 2023 19:00) (14 - 24)  SpO2: 94% (14 Dec 2023 19:00) (92% - 100%)    Parameters below as of 14 Dec 2023 19:00  Patient On (Oxygen Delivery Method): room air                                12.7   6.93  )-----------( 290      ( 14 Dec 2023 06:00 )             39.7         12-14    139  |  101  |  9   ----------------------------<  86  4.0   |  29  |  0.61    Ca    9.2      14 Dec 2023 06:00  Phos  3.7     12-14  Mg     1.7     12-14    TPro  7.0  /  Alb  3.0<L>  /  TBili  0.4  /  DBili  x   /  AST  41<H>  /  ALT  32  /  AlkPhos  90  12-13        ROS:  -unable to obtain        NEURO: awake and alert, tiffanie-plegia  CV: (+) S1/S2, rrr, no mrg  RESP: CTA b/l  GI: soft, non tender

## 2023-12-14 NOTE — PATIENT PROFILE ADULT - FUNCTIONAL ASSESSMENT - BASIC MOBILITY 6.
2-calculated by average/Not able to assess (calculate score using [facility] averaging method) 2-calculated by average/Not able to assess (calculate score using [facility] averaging method)

## 2023-12-14 NOTE — SWALLOW BEDSIDE ASSESSMENT ADULT - COMMENTS
Oral residue in right lateral sulcus and on lingual surface present post swallow WBC: 6.93  Chest X-Ray: No radiographic evidence of active chest disease.  Head CT: No change since 12/13/2023. Irregular appearing subacute to   chronic infarct left middle cerebral artery territory may be due to   spared gray matter, hemorrhage or laminar necrosis without change since   12/13/2023. Mass effect and mild midline shift to the right.

## 2023-12-14 NOTE — PATIENT PROFILE ADULT - STATED REASON FOR ADMISSION
Admitted to [place] [facility], for facial droop and right sided weakness while eating the dinner in restaurant.  Admitted to  Rehab 12/8, then to ICU for slight midline shift, and neuro checks q 1hr Admitted to [place] [facility], for facial droop and right sided weakness while eating the dinner in restaurant.  Admitted to  Rehab 12/8, then to ICU for slight midline shift, and neuro checks q 1hr

## 2023-12-14 NOTE — PROGRESS NOTE ADULT - SUBJECTIVE AND OBJECTIVE BOX
Neurology consult  Progress Note    REBEKAH GUIDO  88y Female    HPI:  88 year old female with a history of HTN, hypothyroidism, pacemaker, breast cancer who is admitted with CVA and acute respiratory failure due to IV contrast anaphylaxis. Patient had R sided hemiparesis, expressive aphasia. CT imaging confirmed multiple embolic areas. Pacemaker interrogated showing Atrial runs but no sustained Atrial Fibrillation. Patient currently on course of ASA and statin. Hospital course complicated by severe anaphylaxis due to iodine contrast allergy from CT performed on admission to ED. 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    Pt was transferred to [facility] for acute rehab on 12/8. rehab course complicated by possible seizure episodes involving right facial twitching for 1 min and RUE shaking subsequently started and was persistent. Per rehab team, pt was awake and speaking but stopped responding to questions for a short period of time when facial twitching started. RRT was called for the seizure activities. Patient was hypertensive to 192/70 mmHg and HR 86bpm. O2 saturation is 95% on room air. repeat BP low 89/60s. IV ativan 2mg stat and NS 500cc bolus ordered. Pt to be upgraded to telemetry for further work up and care.    (13 Dec 2023 15:00)    Today, patient is awake but lethargic, repeating that she wants something to eat. She states that she has a headache.         On Neurological Examination:    Mental Status - Patient is drowsy but is able to state her name, knows she is in a hospital, preoccupied by the fact that she is hungry and wants some tea to drink  Follows commands consistently  Cranial Nerves - extraocular eye movements intact. R facial droop and nasolabial fold flattening with moderate dysarthria    Motor Exam - left side 5/5  Right lower extremity 4/5 and right upper extremity 4-/5, with 4/5  today  Plantar responses are mute bilaterally  Finger to nose intact  Sensation to light touch intact  Gait -  deferred    CT head without contrast 12/13/23:   IMPRESSION:    1.  Redemonstrated left MCA distribution infarction, advanced when   compared to prior imaging with likely petechial hemorrhages and/or   laminar necrosis.  2.  Midline shift to the right of approximately 0.5 cm.  3.  Chronic small vessel disease.    Routine EEG:    EEG Classification / Summary:  Abnormal routine EEG in the drowsy and mostly asleep states with intermittent arousals.   -Occasional left frontotemporal sharp waves  -Continuous left hemispheric focal slowing and relative attenuation  -No electrographic seizures    Clinical Impression:  -Risk of left frontotemporal focal-onset seizures  -Left hemispheric focal cerebral dysfunction can be structural or functional in etiology.   -No seizures captured           Neurology consult  Progress Note    REBEKAH GUIDO  88y Female    HPI:  88 year old female with a history of HTN, hypothyroidism, pacemaker, breast cancer who is admitted with CVA and acute respiratory failure due to IV contrast anaphylaxis. Patient had R sided hemiparesis, expressive aphasia. CT imaging confirmed multiple embolic areas. Pacemaker interrogated showing Atrial runs but no sustained Atrial Fibrillation. Patient currently on course of ASA and statin. Hospital course complicated by severe anaphylaxis due to iodine contrast allergy from CT performed on admission to ED. 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    Pt was transferred to [facility] for acute rehab on 12/8. rehab course complicated by possible seizure episodes involving right facial twitching for 1 min and RUE shaking subsequently started and was persistent. Per rehab team, pt was awake and speaking but stopped responding to questions for a short period of time when facial twitching started. RRT was called for the seizure activities. Patient was hypertensive to 192/70 mmHg and HR 86bpm. O2 saturation is 95% on room air. repeat BP low 89/60s. IV ativan 2mg stat and NS 500cc bolus ordered. Pt to be upgraded to telemetry for further work up and care.    (13 Dec 2023 15:00)    Today, patient is awake but lethargic, repeating that she wants something to eat. She states that she has a headache.         On Neurological Examination:    Mental Status - Patient is drowsy but is able to state her name, knows she is in a hospital, preoccupied by the fact that she is hungry and wants some tea to drink  Follows commands consistently  Cranial Nerves - extraocular eye movements intact. R facial droop and nasolabial fold flattening with moderate dysarthria    Motor Exam - left side 5/5  Right lower extremity 4/5 and right upper extremity 4-/5, with 4/5  today  Plantar responses are mute bilaterally  Finger to nose intact  Sensation to light touch intact  Gait -  deferred    CT head without contrast 12/13/23:   IMPRESSION:    1.  Redemonstrated left MCA distribution infarction, advanced when   compared to prior imaging with likely petechial hemorrhages and/or   laminar necrosis.  2.  Midline shift to the right of approximately 0.5 cm.  3.  Chronic small vessel disease.    Routine EEG:    EEG Classification / Summary:  Abnormal routine EEG in the drowsy and mostly asleep states with intermittent arousals.   -Occasional left frontotemporal sharp waves  -Continuous left hemispheric focal slowing and relative attenuation  -No electrographic seizures    Clinical Impression:  -Risk of left frontotemporal focal-onset seizures  -Left hemispheric focal cerebral dysfunction can be structural or functional in etiology.   -No seizures captured

## 2023-12-14 NOTE — PHYSICAL THERAPY INITIAL EVALUATION ADULT - GENERAL OBSERVATIONS, REHAB EVAL
Orders received, chart reviewed, status discussed with RN and  . Pt received semi-hughes's in bed, lines intact, VSS as per monitor in NAD. Pt left as received in NAD, CBWR. BP;144/70, HR:81 O2;98% on RA

## 2023-12-14 NOTE — PHYSICAL THERAPY INITIAL EVALUATION ADULT - PERTINENT HX OF CURRENT PROBLEM, REHAB EVAL
88 year old female with a history of HTN, hypothyroidism, pacemaker, breast cancer who is admitted with CVA and acute respiratory failure due to IV contrast anaphylaxis. Patient had R sided hemiparesis, expressive aphasia. CT imaging confirmed multiple embolic areas. Pacemaker interrogated showing Atrial runs but no sustained Atrial Fibrillation. Patient currently on course of ASA and statin. Hospital course complicated by severe anaphylactic due to iodine contrast allergy from CT performed on admission to ED. 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      Pt was transferred to [facility] for acute rehab on 12/8. rehab course complicated by possible seizure episodes involving right facial twitching for 1 min and RUE shaking subsequently started and was persistent. Per rehab team, pt was awake and speaking but stopped responding to questions for a short period of time when facial twitching started. RRT was called for the seizure activities. Patient was hypertensive to 192/70 mmHg and HR 86bpm. O2 saturation is 95% on room air. repeat BP low 89/60s. IV ativan 2mg stat and NS 500cc bolus ordered. Pt to be upgraded to telemetry for further work up and care. There is petechial hemorrhage and/or laminar necrosis, midline   shift 0.5cm to the right on CT head . Pat is awake but confused. Referred to PT for functional evaluation. 88 year old female with a history of HTN, hypothyroidism, pacemaker, breast cancer who is admitted with CVA and acute respiratory failure due to IV contrast anaphylaxis. Patient had R sided hemiparesis, expressive aphasia. CT imaging confirmed multiple embolic areas. Pacemaker interrogated showing Atrial runs but no sustained Atrial Fibrillation. Patient currently on course of ASA and statin. Hospital course complicated by severe anaphylactic due to iodine contrast allergy from CT performed on admission to ED. 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      Pt was transferred to [facility] for acute rehab on 12/8. rehab course complicated by possible seizure episodes involving right facial twitching for 1 min and RUE shaking subsequently started and was persistent. Per rehab team, pt was awake and speaking but stopped responding to questions for a short period of time when facial twitching started. RRT was called for the seizure activities. Patient was hypertensive to 192/70 mmHg and HR 86bpm. O2 saturation is 95% on room air. repeat BP low 89/60s. IV ativan 2mg stat and NS 500cc bolus ordered. Pt to be upgraded to telemetry for further work up and care. There is petechial hemorrhage and/or laminar necrosis, midline   shift 0.5cm to the right on CT head . Pat is awake but confused. Referred to PT for functional evaluation.

## 2023-12-14 NOTE — PHYSICAL THERAPY INITIAL EVALUATION ADULT - ADDITIONAL COMMENTS
Pt unable to provide social history due to cognition. As per previous documentation, pt lives in  with significant other, stairs to enter and stairs within the home. Pt previously independent with ambulation. Pt admitted from Acute Rehab.

## 2023-12-14 NOTE — DIETITIAN INITIAL EVALUATION ADULT - PERTINENT MEDS FT
MEDICATIONS  (STANDING):  atorvastatin 80 milliGRAM(s) Oral at bedtime  bacitracin   Ointment 1 Application(s) Topical two times a day  chlorhexidine 2% Cloths 1 Application(s) Topical <User Schedule>  fluconAZOLE   Tablet 100 milliGRAM(s) Oral daily  levETIRAcetam  IVPB 1000 milliGRAM(s) IV Intermittent every 12 hours  lidocaine   4% Patch 1 Patch Transdermal daily  lisinopril 20 milliGRAM(s) Oral daily  multivitamin 1 Tablet(s) Oral daily    MEDICATIONS  (PRN):  acetaminophen     Tablet .. 650 milliGRAM(s) Oral every 6 hours PRN Temp greater or equal to 38C (100.4F), Mild Pain (1 - 3)  acetaminophen  Suppository .. 650 milliGRAM(s) Rectal every 6 hours PRN Temp greater or equal to 38C (100.4F)  aluminum hydroxide/magnesium hydroxide/simethicone Suspension 30 milliLiter(s) Oral every 4 hours PRN Dyspepsia  labetalol Injectable 5 milliGRAM(s) IV Push every 4 hours PRN if SBP >180  melatonin 3 milliGRAM(s) Oral at bedtime PRN Insomnia  ondansetron Injectable 4 milliGRAM(s) IV Push every 8 hours PRN Nausea and/or Vomiting  senna 2 Tablet(s) Oral at bedtime PRN Constipation

## 2023-12-14 NOTE — DIETITIAN NUTRITION RISK NOTIFICATION - PHYSICAL ASSESSMENT TEMPLES
Detail Level: Simple
IRRITANT DERMATITIS SECONDARY TO MASTISOL AND STERISTIRPS PLACED AT LAST SUTURE REMOVAL APPT. TOLD PATIENT TO CLEAN BID WITH SOAPY WATER. TREAT WITH DOXY BID FOR 7 DAYS AND TOPICAL MUPIROCIN OINT.
PLAN FOR MOHS SURGERY IN 2 WEEKS- ALLOW LAST MOHS SITE TO HEAL BETTER. PATIENT LIVES IN [facility]
moderate

## 2023-12-14 NOTE — CONSULT NOTE ADULT - CONVERSATION DETAILS
Spoke w/ pts daughter Elsi today , she lives in [place] but very involved and aware of pts current situation, she and her sister are pts surrogates, but she is first contact . She is working in [place] today and unable to be here .  I explained role of palliative care , and reason for my call today .  Daughter reviewed with me what happened up until pt came here to our acute rehab,. 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 Daughter says pt had some s/s of a stroke, but no signs of a stroke on first scan,  then had IV contrast, and unfortunately had a bad reaction to the IV contrast , then pt had a large stroke .  She came here for acute rehab.  Presently daughter aware pt is in ccu and may have had some seizures.  I discussed importance of advanced directives, daughter not aware of any being in place, she and her sister  act together as surrogates, and spoke last night .  I asked about certain interventions if needed , cpr/intubation. Daughter says given her good baseline they, she and her sister ,  would like everything done at this time, she understands, that if things do not go the way we hope that this may have to  be discussed again.

## 2023-12-14 NOTE — PATIENT PROFILE ADULT - FALL HARM RISK - HARM RISK INTERVENTIONS
Assistance with ambulation/Assistance OOB with selected safe patient handling equipment/Communicate Risk of Fall with Harm to all staff/Discuss with provider need for PT consult/Monitor gait and stability/Provide patient with walking aids - walker, cane, crutches/Reinforce activity limits and safety measures with patient and family/Tailored Fall Risk Interventions/Use of alarms - bed, chair and/or voice tab/Visual Cue: Yellow wristband and red socks/Bed in lowest position, wheels locked, appropriate side rails in place/Call bell, personal items and telephone in reach/Instruct patient to call for assistance before getting out of bed or chair/Non-slip footwear when patient is out of bed/[place] to call system/Physically safe environment - no spills, clutter or unnecessary equipment/Purposeful Proactive Rounding/Room/bathroom lighting operational, light cord in reach Assistance with ambulation/Assistance OOB with selected safe patient handling equipment/Communicate Risk of Fall with Harm to all staff/Discuss with provider need for PT consult/Monitor gait and stability/Provide patient with walking aids - walker, cane, crutches/Reinforce activity limits and safety measures with patient and family/Tailored Fall Risk Interventions/Use of alarms - bed, chair and/or voice tab/Visual Cue: Yellow wristband and red socks/Bed in lowest position, wheels locked, appropriate side rails in place/Call bell, personal items and telephone in reach/Instruct patient to call for assistance before getting out of bed or chair/Non-slip footwear when patient is out of bed/[place] to call system/Physically safe environment - no spills, clutter or unnecessary equipment/Purposeful Proactive Rounding/Room/bathroom lighting operational, light cord in reach

## 2023-12-14 NOTE — CONSULT NOTE ADULT - ASSESSMENT
A/P 88 year old female with a history of HTN, hypothyroidism, pacemaker, breast cancer who is admitted with CVA and acute respiratory failure due to IV contrast anaphylaxis. Patient had R sided hemiparesis, expressive aphasia. CT imaging confirmed multiple embolic areas. Pacemaker interrogated showing Atrial runs but no sustained Atrial Fibrillation. Patient currently on course of ASA and statin. Hospital course complicated by severe anaphylactic due to iodine contrast allergy from CT performed on admission to ED. 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 Pt was transferred to [facility] for acute rehab on 12/8.     rehab course complicated by possible seizure activity .  Per chart review,  pt was awake and speaking but stopped responding to questions for a short period of time when facial twitching started. RRT was called for the seizure activities. Patient was hypertensive to 192/70 mmHg and HR 86bpm. O2 saturation is 95% on room air.  Repeat BP low 89/60s. IV ativan 2mg stat and NS 500cc bolus ordered.  Pt was  upgraded to telemetry for further work up and care, and is now in ccu .          Assessment/Plans:      Intracranial bleed with mid line shift    AMS / Encephalopathy    Breast ca    HTN    Poss seizure activity     CVA       Plan      Hold all sedation    Repeat CT head  8 am today - results pending    Neuro / Neurosug consulted     Hold all anticoag and ASA   Neurochecks q1h         Palliative :    asked for [facility], case d/w ccu team this AM, I reviewed chart . I saw pt bedside this AM, able to arouse, but pt lethargic, did open eyes, did attempt to speak.   Called and spoke w/ daughter Elsi , she reviewed with me pt events up until Acute rehab.  She is aware pt now in ccu and now may have had seizure activity .      See [facility] note above, pt to remain Full Code at this time.   Family hopeful for some recovery from the stroke, as pts baseline was very good, functional and pt was working.             A/P 88 year old female with a history of HTN, hypothyroidism, pacemaker, breast cancer who is admitted with CVA and acute respiratory failure due to IV contrast anaphylaxis. Patient had R sided hemiparesis, expressive aphasia. CT imaging confirmed multiple embolic areas. Pacemaker interrogated showing Atrial runs but no sustained Atrial Fibrillation. Patient currently on course of ASA and statin. Hospital course complicated by severe anaphylactic due to iodine contrast allergy from CT performed on admission to ED. 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 Pt was transferred to [facility] for acute rehab on 12/8.     rehab course complicated by possible seizure activity .  Per chart review,  pt was awake and speaking but stopped responding to questions for a short period of time when facial twitching started. RRT was called for the seizure activities. Patient was hypertensive to 192/70 mmHg and HR 86bpm. O2 saturation is 95% on room air.  Repeat BP low 89/60s. IV ativan 2mg stat and NS 500cc bolus ordered.  Pt was  upgraded to telemetry for further work up and care, and is now in ccu .          Assessment/Plans:      Intracranial bleed with mid line shift    AMS / Encephalopathy    Breast ca    HTN    Poss seizure activity     CVA       Plan      Hold all sedation    Repeat CT head  8 am today - results pending    Neuro / Neurosug consulted     Hold all anticoag and ASA   Neurochecks q1h         Palliative :    asked for [facility], case d/w ccu team this AM, I reviewed chart . I saw pt bedside this AM, able to arouse, but pt lethargic, did open eyes, did attempt to speak.   Called and spoke w/ daughter Elsi , she reviewed with me pt events up until Acute rehab.  She is aware pt now in ccu and now may have had seizure activity .      See [facility] note above, pt to remain Full Code at this time.   Family hopeful for some recovery from the stroke, as pts baseline was very good, functional and pt was working.

## 2023-12-14 NOTE — DIETITIAN INITIAL EVALUATION ADULT - PERTINENT LABORATORY DATA
12-14    139  |  101  |  9   ----------------------------<  86  4.0   |  29  |  0.61    Ca    9.2      14 Dec 2023 06:00  Phos  3.7     12-14  Mg     1.7     12-14    TPro  7.0  /  Alb  3.0<L>  /  TBili  0.4  /  DBili  x   /  AST  41<H>  /  ALT  32  /  AlkPhos  90  12-13  A1C with Estimated Average Glucose Result: 5.4 % (12-04-23 @ 05:30)

## 2023-12-14 NOTE — SWALLOW BEDSIDE ASSESSMENT ADULT - ORAL PREPARATORY PHASE
Oral holding/Anterior loss of bolus/Decreased mastication ability Decreased mastication ability Within functional limits

## 2023-12-15 LAB
ALBUMIN SERPL ELPH-MCNC: 2.9 G/DL — LOW (ref 3.3–5)
ALBUMIN SERPL ELPH-MCNC: 2.9 G/DL — LOW (ref 3.3–5)
ALP SERPL-CCNC: 88 U/L — SIGNIFICANT CHANGE UP (ref 40–120)
ALP SERPL-CCNC: 88 U/L — SIGNIFICANT CHANGE UP (ref 40–120)
ALT FLD-CCNC: 24 U/L — SIGNIFICANT CHANGE UP (ref 10–45)
ALT FLD-CCNC: 24 U/L — SIGNIFICANT CHANGE UP (ref 10–45)
ANION GAP SERPL CALC-SCNC: 10 MMOL/L — SIGNIFICANT CHANGE UP (ref 5–17)
ANION GAP SERPL CALC-SCNC: 10 MMOL/L — SIGNIFICANT CHANGE UP (ref 5–17)
AST SERPL-CCNC: 40 U/L — SIGNIFICANT CHANGE UP (ref 10–40)
AST SERPL-CCNC: 40 U/L — SIGNIFICANT CHANGE UP (ref 10–40)
BILIRUB SERPL-MCNC: 0.5 MG/DL — SIGNIFICANT CHANGE UP (ref 0.2–1.2)
BILIRUB SERPL-MCNC: 0.5 MG/DL — SIGNIFICANT CHANGE UP (ref 0.2–1.2)
BUN SERPL-MCNC: 20 MG/DL — SIGNIFICANT CHANGE UP (ref 7–23)
BUN SERPL-MCNC: 20 MG/DL — SIGNIFICANT CHANGE UP (ref 7–23)
CALCIUM SERPL-MCNC: 8.9 MG/DL — SIGNIFICANT CHANGE UP (ref 8.4–10.5)
CALCIUM SERPL-MCNC: 8.9 MG/DL — SIGNIFICANT CHANGE UP (ref 8.4–10.5)
CHLORIDE SERPL-SCNC: 100 MMOL/L — SIGNIFICANT CHANGE UP (ref 96–108)
CHLORIDE SERPL-SCNC: 100 MMOL/L — SIGNIFICANT CHANGE UP (ref 96–108)
CO2 SERPL-SCNC: 28 MMOL/L — SIGNIFICANT CHANGE UP (ref 22–31)
CO2 SERPL-SCNC: 28 MMOL/L — SIGNIFICANT CHANGE UP (ref 22–31)
CREAT SERPL-MCNC: 0.86 MG/DL — SIGNIFICANT CHANGE UP (ref 0.5–1.3)
CREAT SERPL-MCNC: 0.86 MG/DL — SIGNIFICANT CHANGE UP (ref 0.5–1.3)
EGFR: 65 ML/MIN/1.73M2 — SIGNIFICANT CHANGE UP
EGFR: 65 ML/MIN/1.73M2 — SIGNIFICANT CHANGE UP
GLUCOSE SERPL-MCNC: 94 MG/DL — SIGNIFICANT CHANGE UP (ref 70–99)
GLUCOSE SERPL-MCNC: 94 MG/DL — SIGNIFICANT CHANGE UP (ref 70–99)
HCT VFR BLD CALC: 37.9 % — SIGNIFICANT CHANGE UP (ref 34.5–45)
HCT VFR BLD CALC: 37.9 % — SIGNIFICANT CHANGE UP (ref 34.5–45)
HGB BLD-MCNC: 12.4 G/DL — SIGNIFICANT CHANGE UP (ref 11.5–15.5)
HGB BLD-MCNC: 12.4 G/DL — SIGNIFICANT CHANGE UP (ref 11.5–15.5)
MAGNESIUM SERPL-MCNC: 1.7 MG/DL — SIGNIFICANT CHANGE UP (ref 1.6–2.6)
MAGNESIUM SERPL-MCNC: 1.7 MG/DL — SIGNIFICANT CHANGE UP (ref 1.6–2.6)
MCHC RBC-ENTMCNC: 26.5 PG — LOW (ref 27–34)
MCHC RBC-ENTMCNC: 26.5 PG — LOW (ref 27–34)
MCHC RBC-ENTMCNC: 32.7 GM/DL — SIGNIFICANT CHANGE UP (ref 32–36)
MCHC RBC-ENTMCNC: 32.7 GM/DL — SIGNIFICANT CHANGE UP (ref 32–36)
MCV RBC AUTO: 81 FL — SIGNIFICANT CHANGE UP (ref 80–100)
MCV RBC AUTO: 81 FL — SIGNIFICANT CHANGE UP (ref 80–100)
NRBC # BLD: 0 /100 WBCS — SIGNIFICANT CHANGE UP (ref 0–0)
NRBC # BLD: 0 /100 WBCS — SIGNIFICANT CHANGE UP (ref 0–0)
PHOSPHATE SERPL-MCNC: 4.1 MG/DL — SIGNIFICANT CHANGE UP (ref 2.5–4.5)
PHOSPHATE SERPL-MCNC: 4.1 MG/DL — SIGNIFICANT CHANGE UP (ref 2.5–4.5)
PLATELET # BLD AUTO: 338 K/UL — SIGNIFICANT CHANGE UP (ref 150–400)
PLATELET # BLD AUTO: 338 K/UL — SIGNIFICANT CHANGE UP (ref 150–400)
POTASSIUM SERPL-MCNC: 3.9 MMOL/L — SIGNIFICANT CHANGE UP (ref 3.5–5.3)
POTASSIUM SERPL-MCNC: 3.9 MMOL/L — SIGNIFICANT CHANGE UP (ref 3.5–5.3)
POTASSIUM SERPL-SCNC: 3.9 MMOL/L — SIGNIFICANT CHANGE UP (ref 3.5–5.3)
POTASSIUM SERPL-SCNC: 3.9 MMOL/L — SIGNIFICANT CHANGE UP (ref 3.5–5.3)
PROT SERPL-MCNC: 6.6 G/DL — SIGNIFICANT CHANGE UP (ref 6–8.3)
PROT SERPL-MCNC: 6.6 G/DL — SIGNIFICANT CHANGE UP (ref 6–8.3)
RBC # BLD: 4.68 M/UL — SIGNIFICANT CHANGE UP (ref 3.8–5.2)
RBC # BLD: 4.68 M/UL — SIGNIFICANT CHANGE UP (ref 3.8–5.2)
RBC # FLD: 17.1 % — HIGH (ref 10.3–14.5)
RBC # FLD: 17.1 % — HIGH (ref 10.3–14.5)
SODIUM SERPL-SCNC: 138 MMOL/L — SIGNIFICANT CHANGE UP (ref 135–145)
SODIUM SERPL-SCNC: 138 MMOL/L — SIGNIFICANT CHANGE UP (ref 135–145)
WBC # BLD: 8.43 K/UL — SIGNIFICANT CHANGE UP (ref 3.8–10.5)
WBC # BLD: 8.43 K/UL — SIGNIFICANT CHANGE UP (ref 3.8–10.5)
WBC # FLD AUTO: 8.43 K/UL — SIGNIFICANT CHANGE UP (ref 3.8–10.5)
WBC # FLD AUTO: 8.43 K/UL — SIGNIFICANT CHANGE UP (ref 3.8–10.5)

## 2023-12-15 PROCEDURE — 70450 CT HEAD/BRAIN W/O DYE: CPT | Mod: 26

## 2023-12-15 RX ADMIN — LIDOCAINE 1 PATCH: 4 CREAM TOPICAL at 19:24

## 2023-12-15 RX ADMIN — CHLORHEXIDINE GLUCONATE 1 APPLICATION(S): 213 SOLUTION TOPICAL at 05:02

## 2023-12-15 RX ADMIN — Medication 650 MILLIGRAM(S): at 05:31

## 2023-12-15 RX ADMIN — Medication 650 MILLIGRAM(S): at 19:24

## 2023-12-15 RX ADMIN — Medication 650 MILLIGRAM(S): at 05:01

## 2023-12-15 RX ADMIN — FLUCONAZOLE 100 MILLIGRAM(S): 150 TABLET ORAL at 14:17

## 2023-12-15 RX ADMIN — ATORVASTATIN CALCIUM 80 MILLIGRAM(S): 80 TABLET, FILM COATED ORAL at 22:55

## 2023-12-15 RX ADMIN — Medication 1 TABLET(S): at 14:16

## 2023-12-15 RX ADMIN — LISINOPRIL 20 MILLIGRAM(S): 2.5 TABLET ORAL at 05:02

## 2023-12-15 RX ADMIN — LEVETIRACETAM 500 MILLIGRAM(S): 250 TABLET, FILM COATED ORAL at 05:01

## 2023-12-15 RX ADMIN — Medication 3 MILLIGRAM(S): at 23:28

## 2023-12-15 RX ADMIN — Medication 1 APPLICATION(S): at 05:01

## 2023-12-15 RX ADMIN — LEVETIRACETAM 500 MILLIGRAM(S): 250 TABLET, FILM COATED ORAL at 18:43

## 2023-12-15 RX ADMIN — LIDOCAINE 1 PATCH: 4 CREAM TOPICAL at 14:21

## 2023-12-15 RX ADMIN — Medication 1 APPLICATION(S): at 18:44

## 2023-12-15 RX ADMIN — Medication 650 MILLIGRAM(S): at 18:06

## 2023-12-15 NOTE — OCCUPATIONAL THERAPY INITIAL EVALUATION ADULT - HOME MANAGEMENT SKILLS, PREVIOUS LEVEL OF FUNCTION, OT EVAL
There are not orders I was expecting for her. It may be endocrinology orders, please check with Debbi Dave    independent

## 2023-12-15 NOTE — OCCUPATIONAL THERAPY INITIAL EVALUATION ADULT - PERTINENT HX OF CURRENT PROBLEM, REHAB EVAL
Recent CVA with Irregular appearing subacute to chronic infarct left middle cerebral artery territory may be due to spared gray matter, hemorrhage or laminar necrosis without change since 12/13/2023. Mass effect and mild midline shift to the right.  AMS possible underlying seuzre

## 2023-12-15 NOTE — OCCUPATIONAL THERAPY INITIAL EVALUATION ADULT - RANGE OF MOTION EXAMINATION, UPPER EXTREMITY
Pt with difficulty with formal testing and following commands. Appearing right shoulder to forearm grossly WFL however, no volitional movement noted through wrist and digits on this date-may be impacted by arousal and right inattention/Left UE Active ROM was WFL (within functional limits)

## 2023-12-15 NOTE — OCCUPATIONAL THERAPY INITIAL EVALUATION ADULT - NSACTIVITYREC_GEN_A_OT
Recommending nursing utilize bed pan at this time as pt unsafe to sit on commode with low arousal. OT recommending OSIEL at this time as pt with low arousal, decreased cognition, and will most likely require 24/7 assist upon dc.

## 2023-12-15 NOTE — OCCUPATIONAL THERAPY INITIAL EVALUATION ADULT - LEVEL OF INDEPENDENCE, REHAB EVAL
to right minimum assist with [place] assist for left UE on bedrail, to left moderate/maximum assist to right minimum assist with [place] assist for left UE on bedrail, to left moderate/maximum assist

## 2023-12-15 NOTE — OCCUPATIONAL THERAPY INITIAL EVALUATION ADULT - GROSSLY INTACT, SENSORY
unable to follow formal testing on this date. Pt with head turn and eye opening to light/deep touch right LE and UE. Proprioception and kinesthesia to be further assessed/Left UE/Left LE/Grossly Intact

## 2023-12-15 NOTE — PROGRESS NOTE ADULT - ASSESSMENT
Physical Examination:  GENERAL:               Alert, verbal,  No acute distress.    HEENT:                  No JVD, dryMM  PULM:                     Bilateral air entry, Clear to auscultation bilaterally, no significant sputum production, No Rales, No Rhonchi, No Wheezing  CVS:                         S1, S2,  +Murmur  ABD:                        Soft, nondistended, nontender, normoactive bowel sounds,   EXT:                         No edema, nontender,   NEURO:                  interactive, slight 4/5 right sided weakness compared to left, follows commands  PSYC:                      Calm, limited Insight.        Assessment  New Onset seizure  Recent CVA with Irregular appearing subacute to chronic infarct left middle cerebral artery territory may be due to spared gray matter, hemorrhage or laminar necrosis without change since 12/13/2023. Mass effect and mild midline shift to the right.  AMS possible underlying seuzre    Underlying high chol, h/o breast cancer 2009 left - s/neida castellanos radical mastectomy, htn hypothyroidism, s/p ppm     Plan  appreciate neuro input   Continue  kepra 500mg bid oral  f/u Keppra level   diet as per speech  F/u Repeat CT head and EEG today  if ct head unchanged can add asa     Ativan 2 mg if seizure more than 2 mins.   seizure precaution.   Neuro f/u   PT, OT   goal to return to rehab     PMD:				                   Notified(Date):  Family Updated: 	Elsi [phone]                                 Date: 12/15/23      Sedation & Analgesia:	  Diet/Nutrition:		 Diet, Pureed (12-14-23 @ 16:03) [Active]  GI PPx:			oral diet  DVT Ppx:		venodynes    Activity:		    Head of Bed:               35-45 Deg  Glycemic Control:          atorvastatin 80 milliGRAM(s) Oral at bedtime  multivitamin 1 Tablet(s) Oral daily      Lines:  CENTRAL LINE: 	[ ] YES [ ] NO	                    LOCATION:   	                       DATE INSERTED:   	                    REMOVE:  [ ] YES [ ] NO    A-LINE:  	                [ ] YES [ ] NO                      LOCATION:   	                       DATE INSERTED: 		            REMOVE:  [ ] YES [ ] NO    NUÑEZ: 		        [ ] YES [ ] NO  		                                       DATE INSERTED:		            REMOVE:  [ ] YES [ ] NO      Restraints were deemed necessary to prevent removal of life-sustaining devices [  ] YES   [   x ]  NO    Disposition: likely transfer to medical floor.     Goals of Care: Full code as per record, palliative care consult stated - Family hopeful for some recovery from the stroke, as pts baseline was very good, functional and pt was working.  Physical Examination:  GENERAL:               Alert, verbal,  No acute distress.    HEENT:                  No JVD, dryMM  PULM:                     Bilateral air entry, Clear to auscultation bilaterally, no significant sputum production, No Rales, No Rhonchi, No Wheezing  CVS:                         S1, S2,  +Murmur  ABD:                        Soft, nondistended, nontender, normoactive bowel sounds,   EXT:                         No edema, nontender,   NEURO:                  interactive, slight 4/5 right sided weakness compared to left, follows commands  PSYC:                      Calm, limited Insight.        Assessment  New Onset seizure  Recent CVA with Irregular appearing subacute to chronic infarct left middle cerebral artery territory may be due to spared gray matter, hemorrhage or laminar necrosis without change since 12/13/2023. Mass effect and mild midline shift to the right.  AMS possible underlying seuzre    Underlying high chol, h/o breast cancer 2009 left - s/neida castellanos radical mastectomy, htn hypothyroidism, s/p ppm     Plan  appreciate neuro input   Continue  kepra 500mg bid oral  f/u Keppra level   diet as per speech  F/u Repeat CT head and EEG today  if ct head unchanged can add asa     Ativan 2 mg if seizure more than 2 mins.   seizure precaution.   Neuro f/u   PT, OT   goal to return to rehab     PMD:				                   Notified(Date):  Family Updated: 	Elsi [phone]                                 Date: 12/15/23      Sedation & Analgesia:	  Diet/Nutrition:		 Diet, Pureed (12-14-23 @ 16:03) [Active]  GI PPx:			oral diet  DVT Ppx:		venodynes    Activity:		    Head of Bed:               35-45 Deg  Glycemic Control:          atorvastatin 80 milliGRAM(s) Oral at bedtime  multivitamin 1 Tablet(s) Oral daily      Lines:  CENTRAL LINE: 	[ ] YES [ ] NO	                    LOCATION:   	                       DATE INSERTED:   	                    REMOVE:  [ ] YES [ ] NO    A-LINE:  	                [ ] YES [ ] NO                      LOCATION:   	                       DATE INSERTED: 		            REMOVE:  [ ] YES [ ] NO    NUÑEZ: 		        [ ] YES [ ] NO  		                                       DATE INSERTED:		            REMOVE:  [ ] YES [ ] NO      Restraints were deemed necessary to prevent removal of life-sustaining devices [  ] YES   [   x ]  NO    Disposition: likely transfer to medical floor.     Goals of Care: Full code as per record, palliative care consult stated - Family hopeful for some recovery from the stroke, as pts baseline was very good, functional and pt was working.

## 2023-12-15 NOTE — OCCUPATIONAL THERAPY INITIAL EVALUATION ADULT - VISUAL ASSESSMENT: TRACKING
unable to maintain arousal and eye opening to fully assessed however, difficulty tracking to right field during observation 2/2 right inattention

## 2023-12-15 NOTE — OCCUPATIONAL THERAPY INITIAL EVALUATION ADULT - MD ORDER
MD order received. Chart reviewed and RN cleared for OT on this date. IE complete-refer below for findings

## 2023-12-15 NOTE — PROGRESS NOTE ADULT - SUBJECTIVE AND OBJECTIVE BOX
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      patient seen and examined  more alert, responsive  comfortable        Allergies :penicillin (Rash)  IV Contrast (Anaphylaxis; Urticaria)      PAST MEDICAL & SURGICAL HISTORY:  Hyperlipidemia  no current medications    Breast cancer  2009 left - s/ping castellanos radical mastectomy    [place] (hard of hearing)  bilateral HA ([place]vette)    Meniscus tear  left - 10/2015    Cyst of left ovary    SDH (subdural hematoma)  3/2011 - s/p fall - no surgical intervention    History of Left Mastectomy    Cataract extraction status of eye, right    Status post right cataract extraction        Medications:  MEDICATIONS  (STANDING):  atorvastatin 80 milliGRAM(s) Oral at bedtime  bacitracin   Ointment 1 Application(s) Topical two times a day  chlorhexidine 2% Cloths 1 Application(s) Topical <User Schedule>  fluconAZOLE   Tablet 100 milliGRAM(s) Oral daily  influenza  Vaccine (HIGH DOSE) 0.7 milliLiter(s) IntraMuscular once  levETIRAcetam 500 milliGRAM(s) Oral every 12 hours  lidocaine   4% Patch 1 Patch Transdermal daily  lisinopril 20 milliGRAM(s) Oral daily  multivitamin 1 Tablet(s) Oral daily    MEDICATIONS  (PRN):  acetaminophen     Tablet .. 650 milliGRAM(s) Oral every 6 hours PRN Temp greater or equal to 38C (100.4F), Mild Pain (1 - 3)  aluminum hydroxide/magnesium hydroxide/simethicone Suspension 30 milliLiter(s) Oral every 4 hours PRN Dyspepsia  labetalol Injectable 5 milliGRAM(s) IV Push every 4 hours PRN if SBP >180  melatonin 3 milliGRAM(s) Oral at bedtime PRN Insomnia  ondansetron Injectable 4 milliGRAM(s) IV Push every 8 hours PRN Nausea and/or Vomiting  senna 2 Tablet(s) Oral at bedtime PRN Constipation      Antibiotics History  fluconAZOLE   Tablet 100 milliGRAM(s) Oral daily, 12-14-23 @ 00:00, Stop order after: 2 Days      Heme Medications       GI Medications  aluminum hydroxide/magnesium hydroxide/simethicone Suspension 30 milliLiter(s) Oral every 4 hours, 12-13-23 @ 16:38, Routine PRN  senna 2 Tablet(s) Oral at bedtime, 12-13-23 @ 17:56, Routine PRN      COVID  01-23-21 @ 14:47  COVID -   NotDetec       Trend Cardiac Enzymes  12-13-23 @ 12:35  [id number]-CPKMM/Trop I - 54 - --  - --  -  --  /  10.5    Trend BNP  12-02-23 @ 22:28   -  1066<H>    Procalcitonin Trend    WBC Trend  12-15-23 @ 04:45   -  8.43  12-14-23 @ 06:00   -  6.93  12-13-23 @ 12:35   -  7.56    H/H Trend  12-15-23 @ 04:45   -   12.4/ 37.9  12-14-23 @ 06:00   -   12.7/ 39.7  12-13-23 @ 12:35   -   11.7/ 36.7  12-11-23 @ 07:03   -   10.3<L>/ 32.6<L>  12-09-23 @ 08:50   -   11.1<L>/ 35.2  12-07-23 @ 06:00   -   11.4<L>/ 35.6    Stool Occult Blood    Platelet Trend  12-15-23 @ 04:45   -  338  12-14-23 @ 06:00   -  290  12-13-23 @ 12:35   -  309    Trend Sodium  12-15-23 @ 04:45   -  138  12-14-23 @ 06:00   -  139  12-13-23 @ 12:35   -  138    Trend Potassium  12-15-23 @ 04:45   -  3.9  12-14-23 @ 06:00   -  4.0  12-13-23 @ 12:35   -  4.4    Trend Bun/Cr  12-15-23 @ 04:45  BUN/CR -  20 / 0.86  12-14-23 @ 06:00  BUN/CR -  9 / 0.61  12-13-23 @ 12:35  BUN/CR -  12 / 0.68    Lactic Acid Trend  12-13-23 @ 13:30   -   1.1    ABG Trend  12-04-23 @ 06:03   - 7.43/43<H>/112<H>/99.4<H>  12-02-23 @ 23:37   - 7.34<L>/50<H>/282<H>/99.9<H>  12-02-23 @ 20:34   - 7.01<LL>/101<HH>/79<L>/90.7<L>    Trend AST/ALT/ALK Phos/Bili  12-15-23 @ 04:45   40/24/88/0.5  12-13-23 @ 12:35   41<H>/32/90/0.4  12-11-23 @ 07:03   36/30/74/0.4  12-09-23 @ 08:50   35/31/75/0.4  12-02-23 @ 19:26   26/25/105/0.2         Albumin Trend  12-15-23 @ 04:45   -   2.9<L>  12-13-23 @ 12:35   -   3.0<L>  12-11-23 @ 07:03   -   2.6<L>  12-09-23 @ 08:50   -   2.6<L>  12-02-23 @ 19:26   -   3.5      PTT - PT - INR Trend  12-02-23 @ 19:26   -   29.6 - 10.4 - 0.93    Glucose Trend  12-15-23 @ 04:45   -  94 -- --  12-14-23 @ 06:00   -  86 -- --  12-13-23 @ 12:35   -  103<H> -- --  12-13-23 @ 12:28   -  -- -- 103<H>    A1C with Estimated Average Glucose Result: 5.4 % [4.0 - 5.6] (12-04-23 @ 05:30)      LABS:                        12.4   8.43  )-----------( 338      ( 15 Dec 2023 04:45 )             37.9     12-15    138  |  100  |  20  ----------------------------<  94  3.9   |  28  |  0.86    Ca    8.9      15 Dec 2023 04:45  Phos  4.1     12-15  Mg     1.7     12-15    TPro  6.6  /  Alb  2.9<L>  /  TBili  0.5  /  DBili  x   /  AST  40  /  ALT  24  /  AlkPhos  88  12-15          VITALS:  T(C): 36.9 (12-15-23 @ 05:00), Max: 37.1 (12-14-23 @ 21:00)  T(F): 98.4 (12-15-23 @ 05:00), Max: 98.7 (12-14-23 @ 21:00)  HR: 86 (12-15-23 @ 07:17) (68 - 96)  BP: 137/54 (12-15-23 @ 07:17) (101/48 - 180/70)  BP(mean): 87 (12-15-23 @ 07:00) (65 - 101)  ABP: --  ABP(mean): --  RR: 13 (12-15-23 @ 07:00) (12 - 18)  SpO2: 96% (12-15-23 @ 07:17) (92% - 100%)  CVP(mm Hg): --  CVP(cm H2O): --    Ins and Outs     12-14-23 @ 07:01  -  12-15-23 @ 07:00  --------------------------------------------------------  IN: 100 mL / OUT: 550 mL / NET: -450 mL        Height (cm): 162.6 (12-13-23 @ 19:26)  Weight (kg): 65.3 (12-13-23 @ 19:26)  BMI (kg/m2): 24.7 (12-13-23 @ 19:26)        I&O's Detail    14 Dec 2023 07:01  -  15 Dec 2023 07:00  --------------------------------------------------------  IN:    Oral Fluid: 100 mL  Total IN: 100 mL    OUT:    Voided (mL): 550 mL  Total OUT: 550 mL    Total NET: -450 mL      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      patient seen and examined  more alert, responsive  comfortable        Allergies :penicillin (Rash)  IV Contrast (Anaphylaxis; Urticaria)      PAST MEDICAL & SURGICAL HISTORY:  Hyperlipidemia  no current medications    Breast cancer  2009 left - s/ping castellanos radical mastectomy    [place] (hard of hearing)  bilateral HA ([place]vette)    Meniscus tear  left - 10/2015    Cyst of left ovary    SDH (subdural hematoma)  3/2011 - s/p fall - no surgical intervention    History of Left Mastectomy    Cataract extraction status of eye, right    Status post right cataract extraction        Medications:  MEDICATIONS  (STANDING):  atorvastatin 80 milliGRAM(s) Oral at bedtime  bacitracin   Ointment 1 Application(s) Topical two times a day  chlorhexidine 2% Cloths 1 Application(s) Topical <User Schedule>  fluconAZOLE   Tablet 100 milliGRAM(s) Oral daily  influenza  Vaccine (HIGH DOSE) 0.7 milliLiter(s) IntraMuscular once  levETIRAcetam 500 milliGRAM(s) Oral every 12 hours  lidocaine   4% Patch 1 Patch Transdermal daily  lisinopril 20 milliGRAM(s) Oral daily  multivitamin 1 Tablet(s) Oral daily    MEDICATIONS  (PRN):  acetaminophen     Tablet .. 650 milliGRAM(s) Oral every 6 hours PRN Temp greater or equal to 38C (100.4F), Mild Pain (1 - 3)  aluminum hydroxide/magnesium hydroxide/simethicone Suspension 30 milliLiter(s) Oral every 4 hours PRN Dyspepsia  labetalol Injectable 5 milliGRAM(s) IV Push every 4 hours PRN if SBP >180  melatonin 3 milliGRAM(s) Oral at bedtime PRN Insomnia  ondansetron Injectable 4 milliGRAM(s) IV Push every 8 hours PRN Nausea and/or Vomiting  senna 2 Tablet(s) Oral at bedtime PRN Constipation      Antibiotics History  fluconAZOLE   Tablet 100 milliGRAM(s) Oral daily, 12-14-23 @ 00:00, Stop order after: 2 Days      Heme Medications       GI Medications  aluminum hydroxide/magnesium hydroxide/simethicone Suspension 30 milliLiter(s) Oral every 4 hours, 12-13-23 @ 16:38, Routine PRN  senna 2 Tablet(s) Oral at bedtime, 12-13-23 @ 17:56, Routine PRN      COVID  01-23-21 @ 14:47  COVID -   NotDetec       Trend Cardiac Enzymes  12-13-23 @ 12:35  [id number]-CPKMM/Trop I - 54 - --  - --  -  --  /  10.5    Trend BNP  12-02-23 @ 22:28   -  1066<H>    Procalcitonin Trend    WBC Trend  12-15-23 @ 04:45   -  8.43  12-14-23 @ 06:00   -  6.93  12-13-23 @ 12:35   -  7.56    H/H Trend  12-15-23 @ 04:45   -   12.4/ 37.9  12-14-23 @ 06:00   -   12.7/ 39.7  12-13-23 @ 12:35   -   11.7/ 36.7  12-11-23 @ 07:03   -   10.3<L>/ 32.6<L>  12-09-23 @ 08:50   -   11.1<L>/ 35.2  12-07-23 @ 06:00   -   11.4<L>/ 35.6    Stool Occult Blood    Platelet Trend  12-15-23 @ 04:45   -  338  12-14-23 @ 06:00   -  290  12-13-23 @ 12:35   -  309    Trend Sodium  12-15-23 @ 04:45   -  138  12-14-23 @ 06:00   -  139  12-13-23 @ 12:35   -  138    Trend Potassium  12-15-23 @ 04:45   -  3.9  12-14-23 @ 06:00   -  4.0  12-13-23 @ 12:35   -  4.4    Trend Bun/Cr  12-15-23 @ 04:45  BUN/CR -  20 / 0.86  12-14-23 @ 06:00  BUN/CR -  9 / 0.61  12-13-23 @ 12:35  BUN/CR -  12 / 0.68    Lactic Acid Trend  12-13-23 @ 13:30   -   1.1    ABG Trend  12-04-23 @ 06:03   - 7.43/43<H>/112<H>/99.4<H>  12-02-23 @ 23:37   - 7.34<L>/50<H>/282<H>/99.9<H>  12-02-23 @ 20:34   - 7.01<LL>/101<HH>/79<L>/90.7<L>    Trend AST/ALT/ALK Phos/Bili  12-15-23 @ 04:45   40/24/88/0.5  12-13-23 @ 12:35   41<H>/32/90/0.4  12-11-23 @ 07:03   36/30/74/0.4  12-09-23 @ 08:50   35/31/75/0.4  12-02-23 @ 19:26   26/25/105/0.2         Albumin Trend  12-15-23 @ 04:45   -   2.9<L>  12-13-23 @ 12:35   -   3.0<L>  12-11-23 @ 07:03   -   2.6<L>  12-09-23 @ 08:50   -   2.6<L>  12-02-23 @ 19:26   -   3.5      PTT - PT - INR Trend  12-02-23 @ 19:26   -   29.6 - 10.4 - 0.93    Glucose Trend  12-15-23 @ 04:45   -  94 -- --  12-14-23 @ 06:00   -  86 -- --  12-13-23 @ 12:35   -  103<H> -- --  12-13-23 @ 12:28   -  -- -- 103<H>    A1C with Estimated Average Glucose Result: 5.4 % [4.0 - 5.6] (12-04-23 @ 05:30)      LABS:                        12.4   8.43  )-----------( 338      ( 15 Dec 2023 04:45 )             37.9     12-15    138  |  100  |  20  ----------------------------<  94  3.9   |  28  |  0.86    Ca    8.9      15 Dec 2023 04:45  Phos  4.1     12-15  Mg     1.7     12-15    TPro  6.6  /  Alb  2.9<L>  /  TBili  0.5  /  DBili  x   /  AST  40  /  ALT  24  /  AlkPhos  88  12-15          VITALS:  T(C): 36.9 (12-15-23 @ 05:00), Max: 37.1 (12-14-23 @ 21:00)  T(F): 98.4 (12-15-23 @ 05:00), Max: 98.7 (12-14-23 @ 21:00)  HR: 86 (12-15-23 @ 07:17) (68 - 96)  BP: 137/54 (12-15-23 @ 07:17) (101/48 - 180/70)  BP(mean): 87 (12-15-23 @ 07:00) (65 - 101)  ABP: --  ABP(mean): --  RR: 13 (12-15-23 @ 07:00) (12 - 18)  SpO2: 96% (12-15-23 @ 07:17) (92% - 100%)  CVP(mm Hg): --  CVP(cm H2O): --    Ins and Outs     12-14-23 @ 07:01  -  12-15-23 @ 07:00  --------------------------------------------------------  IN: 100 mL / OUT: 550 mL / NET: -450 mL        Height (cm): 162.6 (12-13-23 @ 19:26)  Weight (kg): 65.3 (12-13-23 @ 19:26)  BMI (kg/m2): 24.7 (12-13-23 @ 19:26)        I&O's Detail    14 Dec 2023 07:01  -  15 Dec 2023 07:00  --------------------------------------------------------  IN:    Oral Fluid: 100 mL  Total IN: 100 mL    OUT:    Voided (mL): 550 mL  Total OUT: 550 mL    Total NET: -450 mL

## 2023-12-15 NOTE — OCCUPATIONAL THERAPY INITIAL EVALUATION ADULT - MANUAL MUSCLE TESTING RESULTS, REHAB EVAL
unable to formally test, left side atleast 3+/5 all joints and planes, right UE shoulder to forearm at least 3/5..wrist and digits with no trace at this time however, may be due to inattention and arousal-to be further assessed

## 2023-12-15 NOTE — EEG REPORT - NS EEG TEXT BOX
REBEKAH GUIDO [id number]     Study Date: 12-15-23  Duration: 21 min  --------------------------------------------------------------------------------------------------  History:  CC/ HPI Patient is a 88y old  Female who presents with a chief complaint of seizure activities (13 Dec 2023 15:00)    MEDICATIONS  (STANDING):  bacitracin   Ointment 1 Application(s) Topical two times a day  levETIRAcetam  IVPB 1000 milliGRAM(s) IV Intermittent every 12 hours  lidocaine   4% Patch 1 Patch Transdermal daily  sodium chloride 0.9%. 1000 milliLiter(s) (50 mL/Hr) IV Continuous <Continuous>    --------------------------------------------------------------------------------------------------  Study Interpretation:    [[[Abbreviation Key:  PDR=alpha rhythm/posterior dominant rhythm. A-P=anterior posterior.  Amplitude: ‘very low’:<20; ‘low’:20-49; ‘medium’:; ‘high’:>150uV.  Persistence for periodic/rhythmic patterns (% of epoch) ‘rare’:<1%; ‘occasional’:1-10%; ‘frequent’:10-50%; ‘abundant’:50-90%; ‘continuous’:>90%.  Persistence for sporadic discharges: ‘rare’:<1/hr; ‘occasional’:1/min-1/hr; ‘frequent’:>1/min; ‘abundant’:>1/10 sec.  RPP=rhythmic and periodic patterns; GRDA=generalized rhythmic delta activity; FIRDA=frontal intermittent GRDA; LRDA=lateralized rhythmic delta activity; TIRDA=temporal intermittent rhythmic delta activity;  LPD=PLED=lateralized periodic discharges; GPD=generalized periodic discharges; BIPDs =bilateral independent periodic discharges; Mf=multifocal; SIRPDs=stimulus induced rhythmic, periodic, or ictal appearing discharges; BIRDs=brief potentially ictal rhythmic discharges >4 Hz, lasting .5-10s; PFA (paroxysmal bursts >13 Hz or =8 Hz <10s).  Modifiers: +F=with fast component; +S=with spike component; +R=with rhythmic component.  S-B=burst suppression pattern.  Max=maximal. N1-drowsy; N2-stage II sleep; N3-slow wave sleep. SSS/BETS=small sharp spikes/benign epileptiform transients of sleep. HV=hyperventilation; PS=photic stimulation]]]    Daily EEG Visual Analysis    FINDINGS:      Background:  Continuity: Continuous  Symmetry: Asymmetric  Posterior dominant rhythm (PDR): 9-10 Hz on the right, absent on the left, reactive to eye closure. Low-amplitude right frontal beta in wakefulness.  State Change: Present  Voltage: Normal  Anterior-Posterior Gradient: Present  Other background findings: None  Breach: Absent    Background Slowing:  Generalized slowing: None  Focal slowing: Predominant background in the left hemisphere consists of polymorphic delta activity with intermittent alpha/beta/theta, with relative attenuation compared to the right.    State Changes:   Drowsiness is characterized by fragmentation, attenuation, and slowing of the background activity.  Stage 2 sleep is characterized by sleep spindles that are poorly formed to absent on the left.    Interictal Findings:  Frequent left frontotemporal (Fp1/F7) sharp waves.    Electrographic and Electroclinical seizures:  None    Other Clinical Events:  None    Activation Procedures:   Hyperventilation is not performed.    Photic stimulation is performed and does not elicit any abnormalities.      Artifacts:  Intermittent myogenic and movement artifacts are present.    EKG:  Single-lead EKG shows regular rhythm, occasional PVCs.    EEG Classification / Summary:  Abnormal routine EEG in the drowsy and mostly asleep states with intermittent arousals.   -Frequent left frontotemporal sharp waves  -Continuous left hemispheric focal slowing and relative attenuation  -No electrographic seizures    Clinical Impression:  -Risk of left frontotemporal focal-onset seizures  -Left hemispheric focal cerebral dysfunction can be structural or functional in etiology.   -No seizures captured  This is a preliminary report pending attending review and attestation.    Bassem Lino MD  Fellow, [facility]    -------------------------------------------------------------------------------------------------------  [facility] EEG Reading Room Ph#: [phone]  Epilepsy Answering Service after 5PM and before 8:30AM: Ph#: [phone]     REBEKAH GUIDO [id number]     Study Date: 12-15-23  Duration: 21 min  --------------------------------------------------------------------------------------------------  History:  CC/ HPI Patient is a 88y old  Female who presents with a chief complaint of seizure activities (13 Dec 2023 15:00)    MEDICATIONS  (STANDING):  bacitracin   Ointment 1 Application(s) Topical two times a day  levETIRAcetam  IVPB 1000 milliGRAM(s) IV Intermittent every 12 hours  lidocaine   4% Patch 1 Patch Transdermal daily  sodium chloride 0.9%. 1000 milliLiter(s) (50 mL/Hr) IV Continuous <Continuous>    --------------------------------------------------------------------------------------------------  Study Interpretation:    [[[Abbreviation Key:  PDR=alpha rhythm/posterior dominant rhythm. A-P=anterior posterior.  Amplitude: ‘very low’:<20; ‘low’:20-49; ‘medium’:; ‘high’:>150uV.  Persistence for periodic/rhythmic patterns (% of epoch) ‘rare’:<1%; ‘occasional’:1-10%; ‘frequent’:10-50%; ‘abundant’:50-90%; ‘continuous’:>90%.  Persistence for sporadic discharges: ‘rare’:<1/hr; ‘occasional’:1/min-1/hr; ‘frequent’:>1/min; ‘abundant’:>1/10 sec.  RPP=rhythmic and periodic patterns; GRDA=generalized rhythmic delta activity; FIRDA=frontal intermittent GRDA; LRDA=lateralized rhythmic delta activity; TIRDA=temporal intermittent rhythmic delta activity;  LPD=PLED=lateralized periodic discharges; GPD=generalized periodic discharges; BIPDs =bilateral independent periodic discharges; Mf=multifocal; SIRPDs=stimulus induced rhythmic, periodic, or ictal appearing discharges; BIRDs=brief potentially ictal rhythmic discharges >4 Hz, lasting .5-10s; PFA (paroxysmal bursts >13 Hz or =8 Hz <10s).  Modifiers: +F=with fast component; +S=with spike component; +R=with rhythmic component.  S-B=burst suppression pattern.  Max=maximal. N1-drowsy; N2-stage II sleep; N3-slow wave sleep. SSS/BETS=small sharp spikes/benign epileptiform transients of sleep. HV=hyperventilation; PS=photic stimulation]]]    Daily EEG Visual Analysis    FINDINGS:      Background:  Continuity: Continuous  Symmetry: Asymmetric  Posterior dominant rhythm (PDR): 9-10 Hz on the right, absent on the left, reactive to eye closure. Low-amplitude right frontal beta in wakefulness.  State Change: Present  Voltage: Normal  Anterior-Posterior Gradient: Present  Other background findings: None  Breach: Absent    Background Slowing:  Generalized slowing: None  Focal slowing: Predominant background in the left hemisphere consists of polymorphic delta activity with intermittent alpha/beta/theta, with relative attenuation compared to the right.    State Changes:   Drowsiness is characterized by fragmentation, attenuation, and slowing of the background activity.  Stage 2 sleep is characterized by sleep spindles that are poorly formed to absent on the left.    Interictal Findings:  Frequent left frontotemporal (Fp1/F7) sharp waves.    Electrographic and Electroclinical seizures:  None    Other Clinical Events:  None    Activation Procedures:   Hyperventilation is not performed.    Photic stimulation is performed and does not elicit any abnormalities.      Artifacts:  Intermittent myogenic and movement artifacts are present.    EKG:  Single-lead EKG shows regular rhythm, occasional PVCs.    EEG Classification / Summary:  Abnormal routine EEG in the drowsy and mostly asleep states with intermittent arousals.   -Frequent left frontotemporal sharp waves  -Continuous left hemispheric focal slowing and relative attenuation  -No electrographic seizures    Clinical Impression:  -Risk of left frontotemporal focal-onset seizures  -Left hemispheric focal cerebral dysfunction can be structural or functional in etiology.   -No seizures captured  This is a preliminary report pending attending review and attestation.    Bassem Lino MD  Fellow, [facility]    -------------------------------------------------------------------------------------------------------  [facility] EEG Reading Room Ph#: [phone]  Epilepsy Answering Service after 5PM and before 8:30AM: Ph#: [phone]     REBEKAH GUIDO [id number]     Study Date: 12-15-23  Duration: 21 min  --------------------------------------------------------------------------------------------------  History:  CC/ HPI Patient is a 88y old  Female who presents with a chief complaint of seizure activities (13 Dec 2023 15:00)    MEDICATIONS  (STANDING):  bacitracin   Ointment 1 Application(s) Topical two times a day  levETIRAcetam  IVPB 1000 milliGRAM(s) IV Intermittent every 12 hours  lidocaine   4% Patch 1 Patch Transdermal daily  sodium chloride 0.9%. 1000 milliLiter(s) (50 mL/Hr) IV Continuous <Continuous>    --------------------------------------------------------------------------------------------------  Study Interpretation:    [[[Abbreviation Key:  PDR=alpha rhythm/posterior dominant rhythm. A-P=anterior posterior.  Amplitude: ‘very low’:<20; ‘low’:20-49; ‘medium’:; ‘high’:>150uV.  Persistence for periodic/rhythmic patterns (% of epoch) ‘rare’:<1%; ‘occasional’:1-10%; ‘frequent’:10-50%; ‘abundant’:50-90%; ‘continuous’:>90%.  Persistence for sporadic discharges: ‘rare’:<1/hr; ‘occasional’:1/min-1/hr; ‘frequent’:>1/min; ‘abundant’:>1/10 sec.  RPP=rhythmic and periodic patterns; GRDA=generalized rhythmic delta activity; FIRDA=frontal intermittent GRDA; LRDA=lateralized rhythmic delta activity; TIRDA=temporal intermittent rhythmic delta activity;  LPD=PLED=lateralized periodic discharges; GPD=generalized periodic discharges; BIPDs =bilateral independent periodic discharges; Mf=multifocal; SIRPDs=stimulus induced rhythmic, periodic, or ictal appearing discharges; BIRDs=brief potentially ictal rhythmic discharges >4 Hz, lasting .5-10s; PFA (paroxysmal bursts >13 Hz or =8 Hz <10s).  Modifiers: +F=with fast component; +S=with spike component; +R=with rhythmic component.  S-B=burst suppression pattern.  Max=maximal. N1-drowsy; N2-stage II sleep; N3-slow wave sleep. SSS/BETS=small sharp spikes/benign epileptiform transients of sleep. HV=hyperventilation; PS=photic stimulation]]]    Daily EEG Visual Analysis    FINDINGS:      Background:  Continuity: Continuous  Symmetry: Asymmetric  Posterior dominant rhythm (PDR): 9-10 Hz on the right, absent on the left, reactive to eye closure. Low-amplitude right frontal beta in wakefulness.  State Change: Present  Voltage: Normal  Anterior-Posterior Gradient: Present  Other background findings: None  Breach: Absent    Background Slowing:  Generalized slowing: None  Focal slowing: Predominant background in the left hemisphere consists of polymorphic delta activity with intermittent alpha/beta/theta, with relative attenuation compared to the right.    State Changes:   Drowsiness is characterized by fragmentation, attenuation, and slowing of the background activity.  Stage 2 sleep is characterized by sleep spindles that are poorly formed to absent on the left.    Interictal Findings:  Frequent left frontotemporal (Fp1/F7) sharp waves.    Electrographic and Electroclinical seizures:  None    Other Clinical Events:  None    Activation Procedures:   Hyperventilation is not performed.    Photic stimulation is performed and does not elicit any abnormalities.      Artifacts:  Intermittent myogenic and movement artifacts are present.    EKG:  Single-lead EKG shows regular rhythm, occasional PVCs.    EEG Classification / Summary:  Abnormal routine EEG in the drowsy and mostly asleep states with intermittent arousals.   -Frequent left frontotemporal sharp waves  -Continuous left hemispheric focal slowing and relative attenuation  -No electrographic seizures    Clinical Impression:  -Risk of left frontotemporal focal-onset seizures  -Left hemispheric focal cerebral dysfunction can be structural or functional in etiology.   -No seizures captured    Bassem Lino MD  Fellow, [facility]    Alban Valle MD  Neurology Attending Physician      -------------------------------------------------------------------------------------------------------  [facility] EEG Reading Room Ph#: [phone]  Epilepsy Answering Service after 5PM and before 8:30AM: Ph#: [phone]     REBEKAH GUIDO [id number]     Study Date: 12-15-23  Duration: 21 min  --------------------------------------------------------------------------------------------------  History:  CC/ HPI Patient is a 88y old  Female who presents with a chief complaint of seizure activities (13 Dec 2023 15:00)    MEDICATIONS  (STANDING):  bacitracin   Ointment 1 Application(s) Topical two times a day  levETIRAcetam  IVPB 1000 milliGRAM(s) IV Intermittent every 12 hours  lidocaine   4% Patch 1 Patch Transdermal daily  sodium chloride 0.9%. 1000 milliLiter(s) (50 mL/Hr) IV Continuous <Continuous>    --------------------------------------------------------------------------------------------------  Study Interpretation:    [[[Abbreviation Key:  PDR=alpha rhythm/posterior dominant rhythm. A-P=anterior posterior.  Amplitude: ‘very low’:<20; ‘low’:20-49; ‘medium’:; ‘high’:>150uV.  Persistence for periodic/rhythmic patterns (% of epoch) ‘rare’:<1%; ‘occasional’:1-10%; ‘frequent’:10-50%; ‘abundant’:50-90%; ‘continuous’:>90%.  Persistence for sporadic discharges: ‘rare’:<1/hr; ‘occasional’:1/min-1/hr; ‘frequent’:>1/min; ‘abundant’:>1/10 sec.  RPP=rhythmic and periodic patterns; GRDA=generalized rhythmic delta activity; FIRDA=frontal intermittent GRDA; LRDA=lateralized rhythmic delta activity; TIRDA=temporal intermittent rhythmic delta activity;  LPD=PLED=lateralized periodic discharges; GPD=generalized periodic discharges; BIPDs =bilateral independent periodic discharges; Mf=multifocal; SIRPDs=stimulus induced rhythmic, periodic, or ictal appearing discharges; BIRDs=brief potentially ictal rhythmic discharges >4 Hz, lasting .5-10s; PFA (paroxysmal bursts >13 Hz or =8 Hz <10s).  Modifiers: +F=with fast component; +S=with spike component; +R=with rhythmic component.  S-B=burst suppression pattern.  Max=maximal. N1-drowsy; N2-stage II sleep; N3-slow wave sleep. SSS/BETS=small sharp spikes/benign epileptiform transients of sleep. HV=hyperventilation; PS=photic stimulation]]]    Daily EEG Visual Analysis    FINDINGS:      Background:  Continuity: Continuous  Symmetry: Asymmetric  Posterior dominant rhythm (PDR): 9-10 Hz on the right, absent on the left, reactive to eye closure. Low-amplitude right frontal beta in wakefulness.  State Change: Present  Voltage: Normal  Anterior-Posterior Gradient: Present  Other background findings: None  Breach: Absent    Background Slowing:  Generalized slowing: None  Focal slowing: Predominant background in the left hemisphere consists of polymorphic delta activity with intermittent alpha/beta/theta, with relative attenuation compared to the right.    State Changes:   Drowsiness is characterized by fragmentation, attenuation, and slowing of the background activity.  Stage 2 sleep is characterized by sleep spindles that are poorly formed to absent on the left.    Interictal Findings:  Frequent left frontotemporal (Fp1/F7) sharp waves.    Electrographic and Electroclinical seizures:  None    Other Clinical Events:  None    Activation Procedures:   Hyperventilation is not performed.    Photic stimulation is performed and does not elicit any abnormalities.      Artifacts:  Intermittent myogenic and movement artifacts are present.    EKG:  Single-lead EKG shows regular rhythm, occasional PVCs.    EEG Classification / Summary:  Abnormal routine EEG in the drowsy and mostly asleep states with intermittent arousals.   -Frequent left frontotemporal sharp waves  -Continuous left hemispheric focal slowing and relative attenuation  -No electrographic seizures    Clinical Impression:  -Risk of left frontotemporal focal-onset seizures  -Left hemispheric focal cerebral dysfunction can be structural or functional in etiology.   -No seizures captured    Bassem Lino MD  Fellow, [facility]    Alban Valle MD  Neurology Attending Physician      -------------------------------------------------------------------------------------------------------  [facility] EEG Reading Room Ph#: [phone]  Epilepsy Answering Service after 5PM and before 8:30AM: Ph#: [phone]

## 2023-12-15 NOTE — OCCUPATIONAL THERAPY INITIAL EVALUATION ADULT - ADDITIONAL COMMENTS
Pt poor historian and confusion with conflicting reports not consistent with chart. Per chart, pt lives in  with spouse with 1 ZEE. Pt owns no DME at this time and was not using any prior to initial admission. Prior to initial admission for CVA, pt was independent with B/IADLs however, since CVA was in acute rehab requiring assistance for all ADLs/transfers. Pt with no definitive dc plan from acute rehab per rehab team.

## 2023-12-15 NOTE — OCCUPATIONAL THERAPY INITIAL EVALUATION ADULT - PLANNED THERAPY INTERVENTIONS, OT EVAL
ADL retraining/balance training/bed mobility training/cognitive, visual perceptual/fine motor coordination training/joint mobilization/massage/motor coordination training/neuromuscular re-education/orthotic fitting/training/ROM/strengthening/stretching/transfer training

## 2023-12-16 PROCEDURE — 99233 SBSQ HOSP IP/OBS HIGH 50: CPT

## 2023-12-16 RX ORDER — ASPIRIN/CALCIUM CARB/MAGNESIUM 324 MG
81 TABLET ORAL DAILY
Refills: 0 | Status: DISCONTINUED | OUTPATIENT
Start: 2023-12-16 | End: 2023-12-19

## 2023-12-16 RX ADMIN — LEVETIRACETAM 500 MILLIGRAM(S): 250 TABLET, FILM COATED ORAL at 17:46

## 2023-12-16 RX ADMIN — LIDOCAINE 1 PATCH: 4 CREAM TOPICAL at 02:57

## 2023-12-16 RX ADMIN — LIDOCAINE 1 PATCH: 4 CREAM TOPICAL at 23:16

## 2023-12-16 RX ADMIN — Medication 650 MILLIGRAM(S): at 12:26

## 2023-12-16 RX ADMIN — Medication 1 APPLICATION(S): at 06:07

## 2023-12-16 RX ADMIN — LIDOCAINE 1 PATCH: 4 CREAM TOPICAL at 19:54

## 2023-12-16 RX ADMIN — LEVETIRACETAM 500 MILLIGRAM(S): 250 TABLET, FILM COATED ORAL at 06:03

## 2023-12-16 RX ADMIN — Medication 81 MILLIGRAM(S): at 12:25

## 2023-12-16 RX ADMIN — Medication 650 MILLIGRAM(S): at 13:26

## 2023-12-16 RX ADMIN — LIDOCAINE 1 PATCH: 4 CREAM TOPICAL at 11:45

## 2023-12-16 RX ADMIN — Medication 1 APPLICATION(S): at 17:47

## 2023-12-16 RX ADMIN — LISINOPRIL 20 MILLIGRAM(S): 2.5 TABLET ORAL at 06:08

## 2023-12-16 RX ADMIN — ATORVASTATIN CALCIUM 80 MILLIGRAM(S): 80 TABLET, FILM COATED ORAL at 22:14

## 2023-12-16 RX ADMIN — Medication 1 TABLET(S): at 11:44

## 2023-12-16 RX ADMIN — Medication 3 MILLIGRAM(S): at 22:14

## 2023-12-16 NOTE — PROGRESS NOTE ADULT - SUBJECTIVE AND OBJECTIVE BOX
Subjective/Interval events:  no acute events overnight  patient  very fixated on primafit and feels like urine is leaking everywhere    Vital Signs Last 24 Hrs  T(C): 36.6 (16 Dec 2023 05:00), Max: 36.7 (15 Dec 2023 12:00)  T(F): 97.8 (16 Dec 2023 05:00), Max: 98.1 (15 Dec 2023 12:00)  HR: 70 (16 Dec 2023 05:00) (63 - 88)  BP: 144/71 (16 Dec 2023 05:00) (109/54 - 144/71)  BP(mean): 88 (15 Dec 2023 16:00) (71 - 88)  RR: 18 (16 Dec 2023 05:00) (14 - 18)  SpO2: 95% (16 Dec 2023 05:00) (90% - 98%)    Parameters below as of 16 Dec 2023 05:00  Patient On (Oxygen Delivery Method): room air        PHYSICAL EXAM:  GENERAL: pleasant, appropriate, no acute distress. Participating appropriately in interview  HEENT - NC/AT, EOMI, PERLLA, oropharynx clear without exudate or erythema, moist mucus membranes  NECK: Soft, supple, No JVD, no lymphadenopathy  CHEST/LUNG: Clear to auscultation bilaterally; No rales, rhonchi, wheezing. Normal work of breathing, not tachypneic  HEART: Regular rate and rhythm; No murmurs, rubs, or gallops, normal s1/s2. Warm and well-perfused  ABDOMEN: Soft, Nontender, Nondistended. Normoactive bowel sounds.  EXTREMITIES:  2+ Peripheral Pulses, No clubbing, cyanosis, or edema  NEURO:  awake, alert, oriented to person, only. +facial drooping, motor weakness of the RUE 3/5 and RLE 3/5  compared to left 5/5 of the LUE and LLE     LABS:  12-15    138  |  100  |  20  ----------------------------<  94  3.9   |  28  |  0.86    Ca    8.9      15 Dec 2023 04:45  Phos  4.1     12-15  Mg     1.7     12-15    TPro  6.6  /  Alb  2.9  /  TBili  0.5  /  DBili  x   /  AST  40  /  ALT  24  /  AlkPhos  88  12-15                          12.4   8.43  )-----------( 338      ( 15 Dec 2023 04:45 )             37.9           RADIOLOGY & ADDITIONAL TESTS:    MEDICATIONS:  MEDICATIONS  (STANDING):  atorvastatin 80 milliGRAM(s) Oral at bedtime  bacitracin   Ointment 1 Application(s) Topical two times a day  influenza  Vaccine (HIGH DOSE) 0.7 milliLiter(s) IntraMuscular once  levETIRAcetam 500 milliGRAM(s) Oral every 12 hours  lidocaine   4% Patch 1 Patch Transdermal daily  lisinopril 20 milliGRAM(s) Oral daily  multivitamin 1 Tablet(s) Oral daily    MEDICATIONS  (PRN):  acetaminophen     Tablet .. 650 milliGRAM(s) Oral every 6 hours PRN Temp greater or equal to 38C (100.4F), Mild Pain (1 - 3)  aluminum hydroxide/magnesium hydroxide/simethicone Suspension 30 milliLiter(s) Oral every 4 hours PRN Dyspepsia  labetalol Injectable 5 milliGRAM(s) IV Push every 4 hours PRN if SBP >180  melatonin 3 milliGRAM(s) Oral at bedtime PRN Insomnia  ondansetron Injectable 4 milliGRAM(s) IV Push every 8 hours PRN Nausea and/or Vomiting  senna 2 Tablet(s) Oral at bedtime PRN Constipation

## 2023-12-16 NOTE — PROGRESS NOTE ADULT - SUBJECTIVE AND OBJECTIVE BOX
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    patient alert, confused  dtr bedside     stating this is not patient baseline, despite being more awake patient confused          Allergies :penicillin (Rash)  IV Contrast (Anaphylaxis; Urticaria)      PAST MEDICAL & SURGICAL HISTORY:  Hyperlipidemia  no current medications    Breast cancer  2009 left - s/ping castellanos radical mastectomy    [place] (hard of hearing)  bilateral HA (Yvette)    Meniscus tear  left - 10/2015    Cyst of left ovary    SDH (subdural hematoma)  3/2011 - s/p fall - no surgical intervention    History of Left Mastectomy    Cataract extraction status of eye, right    Status post right cataract extraction        Medications:  MEDICATIONS  (STANDING):  aspirin  chewable 81 milliGRAM(s) Oral daily  atorvastatin 80 milliGRAM(s) Oral at bedtime  bacitracin   Ointment 1 Application(s) Topical two times a day  influenza  Vaccine (HIGH DOSE) 0.7 milliLiter(s) IntraMuscular once  levETIRAcetam 500 milliGRAM(s) Oral every 12 hours  lidocaine   4% Patch 1 Patch Transdermal daily  lisinopril 20 milliGRAM(s) Oral daily  multivitamin 1 Tablet(s) Oral daily    MEDICATIONS  (PRN):  acetaminophen     Tablet .. 650 milliGRAM(s) Oral every 6 hours PRN Temp greater or equal to 38C (100.4F), Mild Pain (1 - 3)  aluminum hydroxide/magnesium hydroxide/simethicone Suspension 30 milliLiter(s) Oral every 4 hours PRN Dyspepsia  labetalol Injectable 5 milliGRAM(s) IV Push every 4 hours PRN if SBP >180  melatonin 3 milliGRAM(s) Oral at bedtime PRN Insomnia  ondansetron Injectable 4 milliGRAM(s) IV Push every 8 hours PRN Nausea and/or Vomiting  senna 2 Tablet(s) Oral at bedtime PRN Constipation      Antibiotics History  fluconAZOLE   Tablet 100 milliGRAM(s) Oral daily, 12-14-23 @ 00:00, Stop order after: 2 Days      Heme Medications   aspirin  chewable 81 milliGRAM(s) Oral daily, 12-16-23 @ 11:03      GI Medications  aluminum hydroxide/magnesium hydroxide/simethicone Suspension 30 milliLiter(s) Oral every 4 hours, 12-13-23 @ 16:38, Routine PRN  senna 2 Tablet(s) Oral at bedtime, 12-13-23 @ 17:56, Routine PRN      COVID  01-23-21 @ 14:47  COVID -   NotDetec       Trend BNP  12-02-23 @ 22:28   -  1066<H>    Procalcitonin Trend    WBC Trend  12-15-23 @ 04:45   -  8.43  12-14-23 @ 06:00   -  6.93    H/H Trend  12-15-23 @ 04:45   -   12.4/ 37.9  12-14-23 @ 06:00   -   12.7/ 39.7  12-13-23 @ 12:35   -   11.7/ 36.7  12-11-23 @ 07:03   -   10.3<L>/ 32.6<L>  12-09-23 @ 08:50   -   11.1<L>/ 35.2  12-07-23 @ 06:00   -   11.4<L>/ 35.6    Stool Occult Blood    Platelet Trend  12-15-23 @ 04:45   -  338  12-14-23 @ 06:00   -  290    Trend Sodium  12-15-23 @ 04:45   -  138  12-14-23 @ 06:00   -  139    Trend Potassium  12-15-23 @ 04:45   -  3.9  12-14-23 @ 06:00   -  4.0    Trend Bun/Cr  12-15-23 @ 04:45  BUN/CR -  20 / 0.86  12-14-23 @ 06:00  BUN/CR -  9 / 0.61    Lactic Acid Trend  12-13-23 @ 13:30   -   1.1    ABG Trend  12-04-23 @ 06:03   - 7.43/43<H>/112<H>/99.4<H>  12-02-23 @ 23:37   - 7.34<L>/50<H>/282<H>/99.9<H>  12-02-23 @ 20:34   - 7.01<LL>/101<HH>/79<L>/90.7<L>    Trend AST/ALT/ALK Phos/Bili  12-15-23 @ 04:45   40/24/88/0.5  12-13-23 @ 12:35   41<H>/32/90/0.4  12-11-23 @ 07:03   36/30/74/0.4  12-09-23 @ 08:50   35/31/75/0.4  12-02-23 @ 19:26   26/25/105/0.2         Albumin Trend  12-15-23 @ 04:45   -   2.9<L>  12-13-23 @ 12:35   -   3.0<L>  12-11-23 @ 07:03   -   2.6<L>  12-09-23 @ 08:50   -   2.6<L>  12-02-23 @ 19:26   -   3.5      PTT - PT - INR Trend  12-02-23 @ 19:26   -   29.6 - 10.4 - 0.93    Glucose Trend  12-15-23 @ 04:45   -  94 -- --  12-14-23 @ 06:00   -  86 -- --    A1C with Estimated Average Glucose Result: 5.4 % [4.0 - 5.6] (12-04-23 @ 05:30)      LABS:                        12.4   8.43  )-----------( 338      ( 15 Dec 2023 04:45 )             37.9     12-15    138  |  100  |  20  ----------------------------<  94  3.9   |  28  |  0.86    Ca    8.9      15 Dec 2023 04:45  Phos  4.1     12-15  Mg     1.7     12-15    TPro  6.6  /  Alb  2.9<L>  /  TBili  0.5  /  DBili  x   /  AST  40  /  ALT  24  /  AlkPhos  88  12-15         VITALS:  T(C): 36.7 (12-16-23 @ 12:44), Max: 36.7 (12-16-23 @ 12:44)  T(F): 98.1 (12-16-23 @ 12:44), Max: 98.1 (12-16-23 @ 12:44)  HR: 103 (12-16-23 @ 12:44) (70 - 103)  BP: 110/74 (12-16-23 @ 12:44) (110/74 - 144/71)  BP(mean): --  ABP: --  ABP(mean): --  RR: 18 (12-16-23 @ 12:44) (17 - 18)  SpO2: 93% (12-16-23 @ 12:44) (93% - 95%)  CVP(mm Hg): --  CVP(cm H2O): --    Ins and Outs     12-15-23 @ 07:01  -  12-16-23 @ 07:00  --------------------------------------------------------  IN: 0 mL / OUT: 700 mL / NET: -700 mL    12-16-23 @ 07:01  -  12-16-23 @ 16:48  --------------------------------------------------------  IN: 240 mL / OUT: 0 mL / NET: 240 mL        Height (cm): 162.6 (12-13-23 @ 19:26)  Weight (kg): 65.3 (12-13-23 @ 19:26)  BMI (kg/m2): 24.7 (12-13-23 @ 19:26)        I&O's Detail    15 Dec 2023 07:01  -  16 Dec 2023 07:00  --------------------------------------------------------  IN:  Total IN: 0 mL    OUT:    Voided (mL): 700 mL  Total OUT: 700 mL    Total NET: -700 mL      16 Dec 2023 07:01  -  16 Dec 2023 16:48  --------------------------------------------------------  IN:    Oral Fluid: 240 mL  Total IN: 240 mL    OUT:  Total OUT: 0 mL    Total NET: 240 mL      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    patient alert, confused  dtr bedside     stating this is not patient baseline, despite being more awake patient confused          Allergies :penicillin (Rash)  IV Contrast (Anaphylaxis; Urticaria)      PAST MEDICAL & SURGICAL HISTORY:  Hyperlipidemia  no current medications    Breast cancer  2009 left - s/ping castellanos radical mastectomy    [place] (hard of hearing)  bilateral HA (Yvette)    Meniscus tear  left - 10/2015    Cyst of left ovary    SDH (subdural hematoma)  3/2011 - s/p fall - no surgical intervention    History of Left Mastectomy    Cataract extraction status of eye, right    Status post right cataract extraction        Medications:  MEDICATIONS  (STANDING):  aspirin  chewable 81 milliGRAM(s) Oral daily  atorvastatin 80 milliGRAM(s) Oral at bedtime  bacitracin   Ointment 1 Application(s) Topical two times a day  influenza  Vaccine (HIGH DOSE) 0.7 milliLiter(s) IntraMuscular once  levETIRAcetam 500 milliGRAM(s) Oral every 12 hours  lidocaine   4% Patch 1 Patch Transdermal daily  lisinopril 20 milliGRAM(s) Oral daily  multivitamin 1 Tablet(s) Oral daily    MEDICATIONS  (PRN):  acetaminophen     Tablet .. 650 milliGRAM(s) Oral every 6 hours PRN Temp greater or equal to 38C (100.4F), Mild Pain (1 - 3)  aluminum hydroxide/magnesium hydroxide/simethicone Suspension 30 milliLiter(s) Oral every 4 hours PRN Dyspepsia  labetalol Injectable 5 milliGRAM(s) IV Push every 4 hours PRN if SBP >180  melatonin 3 milliGRAM(s) Oral at bedtime PRN Insomnia  ondansetron Injectable 4 milliGRAM(s) IV Push every 8 hours PRN Nausea and/or Vomiting  senna 2 Tablet(s) Oral at bedtime PRN Constipation      Antibiotics History  fluconAZOLE   Tablet 100 milliGRAM(s) Oral daily, 12-14-23 @ 00:00, Stop order after: 2 Days      Heme Medications   aspirin  chewable 81 milliGRAM(s) Oral daily, 12-16-23 @ 11:03      GI Medications  aluminum hydroxide/magnesium hydroxide/simethicone Suspension 30 milliLiter(s) Oral every 4 hours, 12-13-23 @ 16:38, Routine PRN  senna 2 Tablet(s) Oral at bedtime, 12-13-23 @ 17:56, Routine PRN      COVID  01-23-21 @ 14:47  COVID -   NotDetec       Trend BNP  12-02-23 @ 22:28   -  1066<H>    Procalcitonin Trend    WBC Trend  12-15-23 @ 04:45   -  8.43  12-14-23 @ 06:00   -  6.93    H/H Trend  12-15-23 @ 04:45   -   12.4/ 37.9  12-14-23 @ 06:00   -   12.7/ 39.7  12-13-23 @ 12:35   -   11.7/ 36.7  12-11-23 @ 07:03   -   10.3<L>/ 32.6<L>  12-09-23 @ 08:50   -   11.1<L>/ 35.2  12-07-23 @ 06:00   -   11.4<L>/ 35.6    Stool Occult Blood    Platelet Trend  12-15-23 @ 04:45   -  338  12-14-23 @ 06:00   -  290    Trend Sodium  12-15-23 @ 04:45   -  138  12-14-23 @ 06:00   -  139    Trend Potassium  12-15-23 @ 04:45   -  3.9  12-14-23 @ 06:00   -  4.0    Trend Bun/Cr  12-15-23 @ 04:45  BUN/CR -  20 / 0.86  12-14-23 @ 06:00  BUN/CR -  9 / 0.61    Lactic Acid Trend  12-13-23 @ 13:30   -   1.1    ABG Trend  12-04-23 @ 06:03   - 7.43/43<H>/112<H>/99.4<H>  12-02-23 @ 23:37   - 7.34<L>/50<H>/282<H>/99.9<H>  12-02-23 @ 20:34   - 7.01<LL>/101<HH>/79<L>/90.7<L>    Trend AST/ALT/ALK Phos/Bili  12-15-23 @ 04:45   40/24/88/0.5  12-13-23 @ 12:35   41<H>/32/90/0.4  12-11-23 @ 07:03   36/30/74/0.4  12-09-23 @ 08:50   35/31/75/0.4  12-02-23 @ 19:26   26/25/105/0.2         Albumin Trend  12-15-23 @ 04:45   -   2.9<L>  12-13-23 @ 12:35   -   3.0<L>  12-11-23 @ 07:03   -   2.6<L>  12-09-23 @ 08:50   -   2.6<L>  12-02-23 @ 19:26   -   3.5      PTT - PT - INR Trend  12-02-23 @ 19:26   -   29.6 - 10.4 - 0.93    Glucose Trend  12-15-23 @ 04:45   -  94 -- --  12-14-23 @ 06:00   -  86 -- --    A1C with Estimated Average Glucose Result: 5.4 % [4.0 - 5.6] (12-04-23 @ 05:30)      LABS:                        12.4   8.43  )-----------( 338      ( 15 Dec 2023 04:45 )             37.9     12-15    138  |  100  |  20  ----------------------------<  94  3.9   |  28  |  0.86    Ca    8.9      15 Dec 2023 04:45  Phos  4.1     12-15  Mg     1.7     12-15    TPro  6.6  /  Alb  2.9<L>  /  TBili  0.5  /  DBili  x   /  AST  40  /  ALT  24  /  AlkPhos  88  12-15         VITALS:  T(C): 36.7 (12-16-23 @ 12:44), Max: 36.7 (12-16-23 @ 12:44)  T(F): 98.1 (12-16-23 @ 12:44), Max: 98.1 (12-16-23 @ 12:44)  HR: 103 (12-16-23 @ 12:44) (70 - 103)  BP: 110/74 (12-16-23 @ 12:44) (110/74 - 144/71)  BP(mean): --  ABP: --  ABP(mean): --  RR: 18 (12-16-23 @ 12:44) (17 - 18)  SpO2: 93% (12-16-23 @ 12:44) (93% - 95%)  CVP(mm Hg): --  CVP(cm H2O): --    Ins and Outs     12-15-23 @ 07:01  -  12-16-23 @ 07:00  --------------------------------------------------------  IN: 0 mL / OUT: 700 mL / NET: -700 mL    12-16-23 @ 07:01  -  12-16-23 @ 16:48  --------------------------------------------------------  IN: 240 mL / OUT: 0 mL / NET: 240 mL        Height (cm): 162.6 (12-13-23 @ 19:26)  Weight (kg): 65.3 (12-13-23 @ 19:26)  BMI (kg/m2): 24.7 (12-13-23 @ 19:26)        I&O's Detail    15 Dec 2023 07:01  -  16 Dec 2023 07:00  --------------------------------------------------------  IN:  Total IN: 0 mL    OUT:    Voided (mL): 700 mL  Total OUT: 700 mL    Total NET: -700 mL      16 Dec 2023 07:01  -  16 Dec 2023 16:48  --------------------------------------------------------  IN:    Oral Fluid: 240 mL  Total IN: 240 mL    OUT:  Total OUT: 0 mL    Total NET: 240 mL

## 2023-12-16 NOTE — PROGRESS NOTE ADULT - ASSESSMENT
88 y.o F pmh essential HTN, hypothyroidism, PPM, breast cancer, recent admission for acute CVA and acute respiratory failure due to IV contrast anaphylaxis, transferred to [facility] on 12/8 for acute rehab and course was complicated by seizure like episodes and RUE shaking. Admitted to [facility] for further seizure workup, neurology following.    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  - CT 12/15 reviewed with L frontotemporal and L basal ganglia subacute infarct with associated mild high attenuation, hemorrhagic transformation cannot be ruled out. Mass efect and mild midline shift to Right  - discussed above with neurology, serial CTs appear stable, will resume aspirin today. continue statin  - continue PT/OT  - c/w keppra   - EEG reviewed with no seizures captured, risk of L frontotemporal focal onset seizures   - ativan 2mg IV PRN seizures    #essential HTN  - BP stable, continue lisinopril 20mg po daily    #hypothyroid  - med rec reviewed, not on synthroid    #oral thrush  - completed 5 days of fluconazole    #stable breast cancer  #stable LLE wound -- routine wound care.     SCDs -- switch to lovneox tomorrow if H/H stable on ASA  full code  dysphagia diet Puree diet texture with thin liquids, 88 y.o F pmh essential HTN, hypothyroidism, PPM, breast cancer, recent admission for acute CVA and acute respiratory failure due to IV contrast anaphylaxis, transferred to [facility] on 12/8 for acute rehab and course was complicated by seizure like episodes and RUE shaking. Admitted to [facility] for further seizure workup, neurology following.    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  - CT 12/15 reviewed with L frontotemporal and L basal ganglia subacute infarct with associated mild high attenuation, hemorrhagic transformation cannot be ruled out. Mass efect and mild midline shift to Right  - discussed above with neurology, serial CTs appear stable, will resume aspirin today. continue statin  - continue PT/OT  - c/w keppra   - EEG reviewed with no seizures captured, risk of L frontotemporal focal onset seizures   - ativan 2mg IV PRN seizures    #essential HTN  - BP stable, continue lisinopril 20mg po daily    #hypothyroid  - med rec reviewed, not on synthroid    #oral thrush  - completed 5 days of fluconazole    #stable breast cancer  #stable LLE wound -- routine wound care.     SCDs -- switch to lovneox tomorrow if H/H stable on ASA  full code  dysphagia diet Puree diet texture with thin liquids,

## 2023-12-16 NOTE — PROGRESS NOTE ADULT - ASSESSMENT
Physical Examination:  GENERAL:               Alert, verbal,  No acute distress.    HEENT:                  No JVD, dryMM  PULM:                     Bilateral air entry, Clear to auscultation bilaterally, no significant sputum production, No Rales, No Rhonchi, No Wheezing  CVS:                         S1, S2,  +Murmur  ABD:                        Soft, nondistended, nontender, normoactive bowel sounds,    NEURO:                  oriented x 1 interactive, slight 4/5 right sided weakness compared to left, follows commands  PSYC:                      Calm, limited Insight.        Assessment  New Onset seizure  Recent CVA with Irregular appearing subacute to chronic infarct left middle cerebral artery territory may be due to spared gray matter, hemorrhage or laminar necrosis without change since 12/13/2023. Mass effect and mild midline shift to the right.  AMS possible underlying seuzre    Underlying high chol, h/o breast cancer 2009 left - s/neida castellanos radical mastectomy, htn hypothyroidism, s/p ppm     Plan  Continue  kepra 500mg bid oral  f/u Keppra level  - currently pending  diet as per speech     Ativan 2 mg if seizure more than 2 mins.   seizure precaution.   Neuro f/u   PT, OT   goal to return to rehab     PMD:				                   Notified(Date):  Family Updated: 	Elsi [phone]                                 Date: 12/16/23    continue care on medical floor reconsult icu as needed    Physical Examination:  GENERAL:               Alert, verbal,  No acute distress.    HEENT:                  No JVD, dryMM  PULM:                     Bilateral air entry, Clear to auscultation bilaterally, no significant sputum production, No Rales, No Rhonchi, No Wheezing  CVS:                         S1, S2,  +Murmur  ABD:                        Soft, nondistended, nontender, normoactive bowel sounds,    NEURO:                  oriented x 1 interactive, slight 4/5 right sided weakness compared to left, follows commands  PSYC:                      Calm, limited Insight.        Assessment  New Onset seizure  Recent CVA with Irregular appearing subacute to chronic infarct left middle cerebral artery territory may be due to spared gray matter, hemorrhage or laminar necrosis without change since 12/13/2023. Mass effect and mild midline shift to the right.  AMS possible underlying seuzre    Underlying high chol, h/o breast cancer 2009 left - s/neida castellanos radical mastectomy, htn hypothyroidism, s/p ppm     Plan  Continue  kepra 500mg bid oral  f/u Keppra level  - currently pending  diet as per speech     Ativan 2 mg if seizure more than 2 mins.   seizure precaution.   Neuro f/u   PT, OT   goal to return to rehab     PMD:				                   Notified(Date):  Family Updated: 	Elsi [phone]                                 Date: 12/16/23    continue care on medical floor reconsult icu as needed

## 2023-12-17 PROCEDURE — 99232 SBSQ HOSP IP/OBS MODERATE 35: CPT

## 2023-12-17 RX ADMIN — Medication 650 MILLIGRAM(S): at 03:27

## 2023-12-17 RX ADMIN — Medication 650 MILLIGRAM(S): at 17:06

## 2023-12-17 RX ADMIN — LISINOPRIL 20 MILLIGRAM(S): 2.5 TABLET ORAL at 06:40

## 2023-12-17 RX ADMIN — Medication 81 MILLIGRAM(S): at 12:39

## 2023-12-17 RX ADMIN — LIDOCAINE 1 PATCH: 4 CREAM TOPICAL at 12:39

## 2023-12-17 RX ADMIN — Medication 1 APPLICATION(S): at 06:41

## 2023-12-17 RX ADMIN — ATORVASTATIN CALCIUM 80 MILLIGRAM(S): 80 TABLET, FILM COATED ORAL at 21:45

## 2023-12-17 RX ADMIN — LEVETIRACETAM 500 MILLIGRAM(S): 250 TABLET, FILM COATED ORAL at 18:00

## 2023-12-17 RX ADMIN — Medication 650 MILLIGRAM(S): at 04:30

## 2023-12-17 RX ADMIN — Medication 1 TABLET(S): at 12:39

## 2023-12-17 RX ADMIN — Medication 1 APPLICATION(S): at 18:00

## 2023-12-17 RX ADMIN — LEVETIRACETAM 500 MILLIGRAM(S): 250 TABLET, FILM COATED ORAL at 06:41

## 2023-12-17 RX ADMIN — LIDOCAINE 1 PATCH: 4 CREAM TOPICAL at 19:51

## 2023-12-17 NOTE — PROGRESS NOTE ADULT - SUBJECTIVE AND OBJECTIVE BOX
no acute events overnight  asking to call over nurse to use the bathroom.      PHYSICAL EXAM:    Vital Signs Last 24 Hrs  T(F): 97.3 (17 Dec 2023 12:34), Max: 98.3 (17 Dec 2023 05:00)  HR: 63 (17 Dec 2023 12:34) (63 - 64)  BP: 117/70 (17 Dec 2023 12:34) (117/70 - 151/77)  RR: 16 (17 Dec 2023 12:34) (16 - 16)  SpO2: 96% (17 Dec 2023 12:34) (96% - 96%)  I&O's Summary    16 Dec 2023 07:01  -  17 Dec 2023 07:00  --------------------------------------------------------  IN: 240 mL / OUT: 1300 mL / NET: -1060 mL        GENERAL: NAD  HEENT: NCAT  CHEST/LUNG: No increased WOB, Clear to percussion bilaterally; No rales, rhonchi, wheezing  HEART: Regular rate and rhythm; No murmurs  ABDOMEN: Soft, Nontender, Nondistended; Bowel sounds present  MUSCULOSKELETAL/EXTREMITIES:  2+ Peripheral Pulses, No LE edema  PSYCH: Appropriate affect, Alert & Oriented to person only    LABS:                        12.4   8.43  )-----------( 338      ( 15 Dec 2023 04:45 )             37.9       12-15    138  |  100  |  20  ----------------------------<  94  3.9   |  28  |  0.86    Ca    8.9      15 Dec 2023 04:45  Phos  4.1     12-15  Mg     1.7     12-15    TPro  6.6  /  Alb  2.9  /  TBili  0.5  /  DBili  x   /  AST  40  /  ALT  24  /  AlkPhos  88  12-15                12-04 Chol 213 mg/dL LDL -- HDL 68 mg/dL Trig 108 mg/dL                  Urinalysis Basic - ( 15 Dec 2023 04:45 )    Color: x / Appearance: x / SG: x / pH: x  Gluc: 94 mg/dL / Ketone: x  / Bili: x / Urobili: x   Blood: x / Protein: x / Nitrite: x   Leuk Esterase: x / RBC: x / WBC x   Sq Epi: x / Non Sq Epi: x / Bacteria: x            MEDICATIONS  (STANDING):  aspirin  chewable 81 milliGRAM(s) Oral daily  atorvastatin 80 milliGRAM(s) Oral at bedtime  bacitracin   Ointment 1 Application(s) Topical two times a day  influenza  Vaccine (HIGH DOSE) 0.7 milliLiter(s) IntraMuscular once  levETIRAcetam 500 milliGRAM(s) Oral every 12 hours  lidocaine   4% Patch 1 Patch Transdermal daily  lisinopril 20 milliGRAM(s) Oral daily  multivitamin 1 Tablet(s) Oral daily    MEDICATIONS  (PRN):  acetaminophen     Tablet .. 650 milliGRAM(s) Oral every 6 hours PRN Temp greater or equal to 38C (100.4F), Mild Pain (1 - 3)  aluminum hydroxide/magnesium hydroxide/simethicone Suspension 30 milliLiter(s) Oral every 4 hours PRN Dyspepsia  labetalol Injectable 5 milliGRAM(s) IV Push every 4 hours PRN if SBP >180  melatonin 3 milliGRAM(s) Oral at bedtime PRN Insomnia  ondansetron Injectable 4 milliGRAM(s) IV Push every 8 hours PRN Nausea and/or Vomiting  senna 2 Tablet(s) Oral at bedtime PRN Constipation      Care Discussed with Consultants/Other Providers: Yes

## 2023-12-17 NOTE — PROGRESS NOTE ADULT - ASSESSMENT
88 y.o F pmh essential HTN, hypothyroidism, PPM, breast cancer, recent admission for acute CVA and acute respiratory failure due to IV contrast anaphylaxis, transferred to [facility] on 12/8 for acute rehab and course was complicated by seizure like episodes and RUE shaking. Admitted to [facility] for further seizure workup, neurology following.    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 Mass efect and mild midline shift to Right  - discussed above with neurology, serial CTs appear stable, resumed aspirin 12/16. continue statin  - discussed with daughter who expressed concerns over altered mental status from keppra. Discussed with neurology, plan to continue keppra 500mg po BID for seizure prophylaxis.  - please obtain speech and swallow follow up on 12/18 to advance diet consistency.  - continue PT/OT  - c/w keppra   - EEG reviewed with no seizures captured, risk of L frontotemporal focal onset seizures   - ativan 2mg IV PRN seizures    #essential HTN  - BP stable, continue lisinopril 20mg po daily    #hypothyroid  - med rec reviewed, not on synthroid    #oral thrush  - completed 5 days of fluconazole    #stable breast cancer  #stable LLE wound -- routine wound care.       full code  dysphagia diet Puree diet texture with thin liquids,    Dispo to  [facility] after PM&R evaluation on 12/18. Discussed with  over the weekend. Discussed plan with daughter Elsi on 12/17. 88 y.o F pmh essential HTN, hypothyroidism, PPM, breast cancer, recent admission for acute CVA and acute respiratory failure due to IV contrast anaphylaxis, transferred to [facility] on 12/8 for acute rehab and course was complicated by seizure like episodes and RUE shaking. Admitted to [facility] for further seizure workup, neurology following.    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 Mass efect and mild midline shift to Right  - discussed above with neurology, serial CTs appear stable, resumed aspirin 12/16. continue statin  - discussed with daughter who expressed concerns over altered mental status from keppra. Discussed with neurology, plan to continue keppra 500mg po BID for seizure prophylaxis.  - please obtain speech and swallow follow up on 12/18 to advance diet consistency.  - continue PT/OT  - c/w keppra   - EEG reviewed with no seizures captured, risk of L frontotemporal focal onset seizures   - ativan 2mg IV PRN seizures    #essential HTN  - BP stable, continue lisinopril 20mg po daily    #hypothyroid  - med rec reviewed, not on synthroid    #oral thrush  - completed 5 days of fluconazole    #stable breast cancer  #stable LLE wound -- routine wound care.       full code  dysphagia diet Puree diet texture with thin liquids,    Dispo to  [facility] after PM&R evaluation on 12/18. Discussed with  over the weekend. Discussed plan with daughter Elsi on 12/17.

## 2023-12-18 ENCOUNTER — TRANSCRIPTION ENCOUNTER (OUTPATIENT)
Age: 88
End: 2023-12-18

## 2023-12-18 LAB
ANION GAP SERPL CALC-SCNC: 8 MMOL/L — SIGNIFICANT CHANGE UP (ref 5–17)
ANION GAP SERPL CALC-SCNC: 8 MMOL/L — SIGNIFICANT CHANGE UP (ref 5–17)
BUN SERPL-MCNC: 22 MG/DL — SIGNIFICANT CHANGE UP (ref 7–23)
BUN SERPL-MCNC: 22 MG/DL — SIGNIFICANT CHANGE UP (ref 7–23)
CALCIUM SERPL-MCNC: 8.8 MG/DL — SIGNIFICANT CHANGE UP (ref 8.4–10.5)
CALCIUM SERPL-MCNC: 8.8 MG/DL — SIGNIFICANT CHANGE UP (ref 8.4–10.5)
CHLORIDE SERPL-SCNC: 102 MMOL/L — SIGNIFICANT CHANGE UP (ref 96–108)
CHLORIDE SERPL-SCNC: 102 MMOL/L — SIGNIFICANT CHANGE UP (ref 96–108)
CO2 SERPL-SCNC: 28 MMOL/L — SIGNIFICANT CHANGE UP (ref 22–31)
CO2 SERPL-SCNC: 28 MMOL/L — SIGNIFICANT CHANGE UP (ref 22–31)
CREAT SERPL-MCNC: 0.68 MG/DL — SIGNIFICANT CHANGE UP (ref 0.5–1.3)
CREAT SERPL-MCNC: 0.68 MG/DL — SIGNIFICANT CHANGE UP (ref 0.5–1.3)
EGFR: 84 ML/MIN/1.73M2 — SIGNIFICANT CHANGE UP
EGFR: 84 ML/MIN/1.73M2 — SIGNIFICANT CHANGE UP
GLUCOSE SERPL-MCNC: 100 MG/DL — HIGH (ref 70–99)
GLUCOSE SERPL-MCNC: 100 MG/DL — HIGH (ref 70–99)
HCT VFR BLD CALC: 34.7 % — SIGNIFICANT CHANGE UP (ref 34.5–45)
HCT VFR BLD CALC: 34.7 % — SIGNIFICANT CHANGE UP (ref 34.5–45)
HGB BLD-MCNC: 11.5 G/DL — SIGNIFICANT CHANGE UP (ref 11.5–15.5)
HGB BLD-MCNC: 11.5 G/DL — SIGNIFICANT CHANGE UP (ref 11.5–15.5)
LEVETIRACETAM SERPL-MCNC: 17.1 UG/ML — SIGNIFICANT CHANGE UP (ref 10–40)
LEVETIRACETAM SERPL-MCNC: 17.1 UG/ML — SIGNIFICANT CHANGE UP (ref 10–40)
MCHC RBC-ENTMCNC: 26.7 PG — LOW (ref 27–34)
MCHC RBC-ENTMCNC: 26.7 PG — LOW (ref 27–34)
MCHC RBC-ENTMCNC: 33.1 GM/DL — SIGNIFICANT CHANGE UP (ref 32–36)
MCHC RBC-ENTMCNC: 33.1 GM/DL — SIGNIFICANT CHANGE UP (ref 32–36)
MCV RBC AUTO: 80.7 FL — SIGNIFICANT CHANGE UP (ref 80–100)
MCV RBC AUTO: 80.7 FL — SIGNIFICANT CHANGE UP (ref 80–100)
NRBC # BLD: 0 /100 WBCS — SIGNIFICANT CHANGE UP (ref 0–0)
NRBC # BLD: 0 /100 WBCS — SIGNIFICANT CHANGE UP (ref 0–0)
PLATELET # BLD AUTO: 284 K/UL — SIGNIFICANT CHANGE UP (ref 150–400)
PLATELET # BLD AUTO: 284 K/UL — SIGNIFICANT CHANGE UP (ref 150–400)
POTASSIUM SERPL-MCNC: 4.1 MMOL/L — SIGNIFICANT CHANGE UP (ref 3.5–5.3)
POTASSIUM SERPL-MCNC: 4.1 MMOL/L — SIGNIFICANT CHANGE UP (ref 3.5–5.3)
POTASSIUM SERPL-SCNC: 4.1 MMOL/L — SIGNIFICANT CHANGE UP (ref 3.5–5.3)
POTASSIUM SERPL-SCNC: 4.1 MMOL/L — SIGNIFICANT CHANGE UP (ref 3.5–5.3)
RBC # BLD: 4.3 M/UL — SIGNIFICANT CHANGE UP (ref 3.8–5.2)
RBC # BLD: 4.3 M/UL — SIGNIFICANT CHANGE UP (ref 3.8–5.2)
RBC # FLD: 17 % — HIGH (ref 10.3–14.5)
RBC # FLD: 17 % — HIGH (ref 10.3–14.5)
SODIUM SERPL-SCNC: 138 MMOL/L — SIGNIFICANT CHANGE UP (ref 135–145)
SODIUM SERPL-SCNC: 138 MMOL/L — SIGNIFICANT CHANGE UP (ref 135–145)
WBC # BLD: 6.43 K/UL — SIGNIFICANT CHANGE UP (ref 3.8–10.5)
WBC # BLD: 6.43 K/UL — SIGNIFICANT CHANGE UP (ref 3.8–10.5)
WBC # FLD AUTO: 6.43 K/UL — SIGNIFICANT CHANGE UP (ref 3.8–10.5)
WBC # FLD AUTO: 6.43 K/UL — SIGNIFICANT CHANGE UP (ref 3.8–10.5)

## 2023-12-18 PROCEDURE — 99232 SBSQ HOSP IP/OBS MODERATE 35: CPT

## 2023-12-18 RX ORDER — ACETAMINOPHEN 500 MG
2 TABLET ORAL
Qty: 0 | Refills: 0 | DISCHARGE
Start: 2023-12-18

## 2023-12-18 RX ORDER — LISINOPRIL 2.5 MG/1
1 TABLET ORAL
Qty: 0 | Refills: 0 | DISCHARGE
Start: 2023-12-18

## 2023-12-18 RX ORDER — ATORVASTATIN CALCIUM 80 MG/1
1 TABLET, FILM COATED ORAL
Qty: 0 | Refills: 0 | DISCHARGE
Start: 2023-12-18

## 2023-12-18 RX ORDER — LABETALOL HCL 100 MG
1 TABLET ORAL
Qty: 0 | Refills: 0 | DISCHARGE
Start: 2023-12-18

## 2023-12-18 RX ORDER — LEVETIRACETAM 250 MG/1
1 TABLET, FILM COATED ORAL
Qty: 0 | Refills: 0 | DISCHARGE
Start: 2023-12-18

## 2023-12-18 RX ORDER — ASPIRIN/CALCIUM CARB/MAGNESIUM 324 MG
1 TABLET ORAL
Qty: 0 | Refills: 0 | DISCHARGE
Start: 2023-12-18

## 2023-12-18 RX ORDER — BACITRACIN ZINC 500 UNIT/G
1 OINTMENT IN PACKET (EA) TOPICAL
Qty: 0 | Refills: 0 | DISCHARGE
Start: 2023-12-18

## 2023-12-18 RX ADMIN — LISINOPRIL 20 MILLIGRAM(S): 2.5 TABLET ORAL at 05:37

## 2023-12-18 RX ADMIN — Medication 650 MILLIGRAM(S): at 15:32

## 2023-12-18 RX ADMIN — Medication 81 MILLIGRAM(S): at 11:21

## 2023-12-18 RX ADMIN — Medication 650 MILLIGRAM(S): at 16:02

## 2023-12-18 RX ADMIN — LEVETIRACETAM 500 MILLIGRAM(S): 250 TABLET, FILM COATED ORAL at 05:37

## 2023-12-18 RX ADMIN — Medication 1 TABLET(S): at 11:21

## 2023-12-18 RX ADMIN — LIDOCAINE 1 PATCH: 4 CREAM TOPICAL at 11:21

## 2023-12-18 RX ADMIN — LEVETIRACETAM 500 MILLIGRAM(S): 250 TABLET, FILM COATED ORAL at 17:53

## 2023-12-18 RX ADMIN — ATORVASTATIN CALCIUM 80 MILLIGRAM(S): 80 TABLET, FILM COATED ORAL at 21:52

## 2023-12-18 RX ADMIN — Medication 3 MILLIGRAM(S): at 21:52

## 2023-12-18 RX ADMIN — Medication 1 APPLICATION(S): at 17:53

## 2023-12-18 NOTE — DISCHARGE NOTE PROVIDER - HOSPITAL COURSE
88 year old female with a history of HTN, hypothyroidism, pacemaker, breast cancer who is admitted with CVA and acute respiratory failure due to IV contrast anaphylaxis. Patient had R sided hemiparesis, expressive aphasia. CT imaging confirmed multiple embolic areas. Pacemaker interrogated showing Atrial runs but no sustained Atrial Fibrillation. Patient currently on course of ASA and statin. Hospital course complicated by severe anaphylactic due to iodine contrast allergy from CT performed on admission to ED. 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    Pt was transferred to [facility] for acute rehab on 12/8. rehab course complicated by possible seizure episodes involving right facial twitching for 1 min and RUE shaking subsequently started and was persistent. Per rehab team, pt was awake and speaking but stopped responding to questions for a short period of time when facial twitching started. RRT was called for the seizure activities.     She was admitted to MICU 12/13 for further management.   CTH 12/13 showed Redemonstrated left MCA distribution infarction, advanced when compared to prior imaging with likely petechial hemorrhages and/or laminar necrosis. mildine shift 0.5cm to the right. Aspirin and chemical DVT ppx were held. Neurosurgery (Dr. Gilmore) were consulted per neurology recommendation, no acute surgical intervention needed.   Repeat CTH 12/14 showed No change since 12/13/2023. EEG 12/13 showed No seizures captured but Risk of left frontotemporal focal-onset seizures  She was seen by neurology for post-stroke seizure, recommended to continue keppra given first seizure in setting of large stroke.   A repeat EEG 12/15 showed no seizure but again Risk of left frontotemporal focal-onset seizures.  She was optimized and downgraded to general medicine floor on 12/15.  Aspirin 81mg PO QD restarted after repeat CTH 12/15 showed Stable exam.  Neurology follow up requested on 12/18 as family feels that keppra is too sedating. Spoke with Dr. Lockett, will follow up 12/19.  Speech and swallow follow up to see if diet can be advanced.    Patient is medically optimized with no further seizure activity. She has been accepted by Dr. Richard for [facility].  She is stable for discharge to acute rehab pending bed availability.   88 year old female with a history of HTN, hypothyroidism, pacemaker, breast cancer who is admitted with CVA and acute respiratory failure due to IV contrast anaphylaxis. Patient had R sided hemiparesis, expressive aphasia. CT imaging confirmed multiple embolic areas. Pacemaker interrogated showing Atrial runs but no sustained Atrial Fibrillation. Patient currently on course of ASA and statin. Hospital course complicated by severe anaphylactic due to iodine contrast allergy from CT performed on admission to ED. 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    Pt was transferred to [facility] for acute rehab on 12/8. rehab course complicated by possible seizure episodes involving right facial twitching for 1 min and RUE shaking subsequently started and was persistent. Per rehab team, pt was awake and speaking but stopped responding to questions for a short period of time when facial twitching started. RRT was called for the seizure activities.     She was admitted to MICU 12/13 for further management.   CTH 12/13 showed Redemonstrated left MCA distribution infarction, advanced when compared to prior imaging with likely petechial hemorrhages and/or laminar necrosis. mildine shift 0.5cm to the right. Aspirin and chemical DVT ppx were held. Neurosurgery (Dr. Gilmore) were consulted per neurology recommendation, no acute surgical intervention needed.   Repeat CTH 12/14 showed No change since 12/13/2023. EEG 12/13 showed No seizures captured but Risk of left frontotemporal focal-onset seizures  She was seen by neurology for post-stroke seizure, recommended to continue keppra given first seizure in setting of large stroke.   A repeat EEG 12/15 showed no seizure but again Risk of left frontotemporal focal-onset seizures.  She was optimized and downgraded to general medicine floor on 12/15.  Aspirin 81mg PO QD restarted after repeat CTH 12/15 showed Stable exam.  Neurology follow up requested on 12/18 as family feels that keppra is too sedating. Spoke with Dr. Lockett, will follow up 12/19.  Speech and swallow follow up to see if diet can be advanced.    Patient is medically optimized with no further seizure activity. She has been accepted by Dr. Richard for [facility].  She is stable for discharge to acute rehab pending bed availability.   88 year old female with a history of HTN, hypothyroidism, pacemaker, breast cancer who is admitted with CVA and acute respiratory failure due to IV contrast anaphylaxis. Patient had R sided hemiparesis, expressive aphasia. CT imaging confirmed multiple embolic areas. Pacemaker interrogated showing Atrial runs but no sustained Atrial Fibrillation. Patient currently on course of ASA and statin. Hospital course complicated by severe anaphylactic due to iodine contrast allergy from CT performed on admission to ED. 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    Pt was transferred to [facility] for acute rehab on 12/8. rehab course complicated by possible seizure episodes involving right facial twitching for 1 min and RUE shaking subsequently started and was persistent. Per rehab team, pt was awake and speaking but stopped responding to questions for a short period of time when facial twitching started. RRT was called for the seizure activities.     She was admitted to MICU 12/13 for further management.   CTH 12/13 showed Redemonstrated left MCA distribution infarction, advanced when compared to prior imaging with likely petechial hemorrhages and/or laminar necrosis. mildine shift 0.5cm to the right. Aspirin and chemical DVT ppx were held. Neurosurgery (Dr. Gilmore) were consulted per neurology recommendation, no acute surgical intervention needed.   Repeat CTH 12/14 showed No change since 12/13/2023. EEG 12/13 showed No seizures captured but Risk of left frontotemporal focal-onset seizures  She was seen by neurology for post-stroke seizure, recommended to continue keppra given first seizure in setting of large stroke.   A repeat EEG 12/15 showed no seizure but again Risk of left frontotemporal focal-onset seizures.  She was optimized and downgraded to general medicine floor on 12/15.  Aspirin 81mg PO QD restarted after repeat CTH 12/15 showed Stable exam.  Neurology follow up requested on 12/18 as family feels that keppra is too sedating.  Per re-eval by Neurology, Dr. Lockett, rec is for repeat CT of head, which did not show any acute changes.  Speech and swallow follow up to see if diet can be advanced.      Patient is medically optimized with no further seizure activity. She has been accepted by Dr. Richard for [facility].  She is stable for discharge to acute rehab pending bed availability.   88 year old female with a history of HTN, hypothyroidism, pacemaker, breast cancer who is admitted with CVA and acute respiratory failure due to IV contrast anaphylaxis. Patient had R sided hemiparesis, expressive aphasia. CT imaging confirmed multiple embolic areas. Pacemaker interrogated showing Atrial runs but no sustained Atrial Fibrillation. Patient currently on course of ASA and statin. Hospital course complicated by severe anaphylactic due to iodine contrast allergy from CT performed on admission to ED. 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    Pt was transferred to [facility] for acute rehab on 12/8. rehab course complicated by possible seizure episodes involving right facial twitching for 1 min and RUE shaking subsequently started and was persistent. Per rehab team, pt was awake and speaking but stopped responding to questions for a short period of time when facial twitching started. RRT was called for the seizure activities.     She was admitted to MICU 12/13 for further management.   CTH 12/13 showed Redemonstrated left MCA distribution infarction, advanced when compared to prior imaging with likely petechial hemorrhages and/or laminar necrosis. mildine shift 0.5cm to the right. Aspirin and chemical DVT ppx were held. Neurosurgery (Dr. Gilmore) were consulted per neurology recommendation, no acute surgical intervention needed.   Repeat CTH 12/14 showed No change since 12/13/2023. EEG 12/13 showed No seizures captured but Risk of left frontotemporal focal-onset seizures  She was seen by neurology for post-stroke seizure, recommended to continue keppra given first seizure in setting of large stroke.   A repeat EEG 12/15 showed no seizure but again Risk of left frontotemporal focal-onset seizures.  She was optimized and downgraded to general medicine floor on 12/15.  Aspirin 81mg PO QD restarted after repeat CTH 12/15 showed Stable exam.  Neurology follow up requested on 12/18 as family feels that keppra is too sedating.  Per re-eval by Neurology, Dr. Lockett, rec is for repeat CT of head, which did not show any acute changes.  Speech and swallow follow up to see if diet can be advanced.      Patient is medically optimized with no further seizure activity. She has been accepted by Dr. Richard for [facility].  She is stable for discharge to acute rehab pending bed availability.

## 2023-12-18 NOTE — DISCHARGE NOTE PROVIDER - CARE PROVIDER_API CALL
Natalia Mclaughlin)  [facility] Neuro Movement Disorder  [address] [place] [address]  Phone: [phone]  Fax: [phone]  Follow Up Time:    Natalia Mclaughlin)  [facility]  [address] [place] [address]  Phone: [phone]  Fax: [phone]  Follow Up Time:

## 2023-12-18 NOTE — DISCHARGE NOTE PROVIDER - NSDCMRMEDTOKEN_GEN_ALL_CORE_FT
acetaminophen 325 mg oral tablet: 2 tab(s) orally every 6 hours As needed Temp greater or equal to 38C (100.4F), Mild Pain (1 - 3)  aspirin 81 mg oral tablet, chewable: 1 tab(s) orally once a day  atorvastatin 80 mg oral tablet: 1 tab(s) orally once a day (at bedtime)  bacitracin 500 units/g topical ointment: 1 Apply topically to affected area 2 times a day  labetalol 5 mg/mL intravenous solution: 1 milliliter(s) intravenous every 4 hours As needed if SBP &gt;180  levETIRAcetam 500 mg oral tablet: 1 tab(s) orally every 12 hours  lisinopril 20 mg oral tablet: 1 tab(s) orally once a day Hold for SBP &lt;110  Multiple Vitamins oral tablet: 1 tab(s) orally once a day   acetaminophen 325 mg oral tablet: 2 tab(s) orally every 6 hours As needed Temp greater or equal to 38C (100.4F), Mild Pain (1 - 3)  aspirin 81 mg oral tablet, chewable: 1 tab(s) orally once a day  atorvastatin 80 mg oral tablet: 1 tab(s) orally once a day (at bedtime)  bacitracin 500 units/g topical ointment: 1 Apply topically to affected area 2 times a day  levETIRAcetam 500 mg oral tablet: 1 tab(s) orally every 12 hours  lisinopril 20 mg oral tablet: 1 tab(s) orally once a day Hold for SBP &lt;110  Multiple Vitamins oral tablet: 1 tab(s) orally once a day

## 2023-12-18 NOTE — CHART NOTE - NSCHARTNOTEFT_GEN_A_CORE
Patient was seen today, she is medically stable and accepted to inpatient rehabilitation, [address] [place] [address] under my care. Patient was seen today, she is medically stable and accepted to inpatient rehabilitation, [address] [place] [address] under my care.

## 2023-12-18 NOTE — DISCHARGE NOTE PROVIDER - NSDCCPCAREPLAN_GEN_ALL_CORE_FT
PRINCIPAL DISCHARGE DIAGNOSIS  Diagnosis: Seizure  Assessment and Plan of Treatment:   -cont Keppra  -Neurology follow up  -PT/OT program in Acute Rehab      SECONDARY DISCHARGE DIAGNOSES  Diagnosis: Stroke  Assessment and Plan of Treatment:   -cont ASA and high intensity Statin  -Neurology follow up  -speech therapy  -PT/OT

## 2023-12-18 NOTE — PROGRESS NOTE ADULT - ASSESSMENT
88 year old female with past medical history of essential HTN, HLD, hypothyroidism, s/p PPM, breast cancer s/p left mastectomy (2009), recent admission for acute CVA and acute respiratory failure due to IV contrast anaphylaxis, transferred to [facility] on 12/8 for acute rehab and course was complicated by seizure like episodes and RUE shaking. Admitted to [facility] for further seizure workup, neurology following.    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 Mass efect and mild midline shift to Right  - Above was previously discussed with neurology, serial CTs appear stable, resumed aspirin 12/16. continue statin  - speech and swallow follow up to see if her diet can be advanced   - continue PT/OT  - EEG reviewed with no seizures captured, risk of L frontotemporal focal onset seizures   - Continue keppra 500mg PO BID  - Seizure precaution  - Keppra level pending   - Ativan 2mg IV PRN seizures    #essential HTN  - BP stable, continue lisinopril 20mg po daily    #hypothyroid  - med rec reviewed, not on synthroid  - check TSH with next blood draw    #oral thrush (resolved)  - completed 5 days of fluconazole    #stable breast cancer  #stable LLE wound -- routine wound care.     Full Code  dysphagia diet Puree diet texture with thin liquids     88 year old female with past medical history of essential HTN, HLD, hypothyroidism, s/p PPM, breast cancer s/p left mastectomy (2009), recent admission for acute CVA and acute respiratory failure due to IV contrast anaphylaxis, transferred to [facility] on 12/8 for acute rehab and course was complicated by seizure like episodes and RUE shaking. Admitted to [facility] for further seizure workup, neurology following.    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 Mass efect and mild midline shift to Right  - Above was previously discussed with neurology, serial CTs appear stable, resumed aspirin 12/16. continue statin  - speech and swallow follow up to see if her diet can be advanced   - continue PT/OT  - EEG reviewed with no seizures captured, risk of L frontotemporal focal onset seizures   - Continue keppra 500mg PO BID  - Seizure precaution  - Keppra level pending   - Ativan 2mg IV PRN seizures    #essential HTN  - BP stable, continue lisinopril 20mg po daily    #hypothyroid  - med rec reviewed, not on synthroid  - check TSH with next blood draw    #oral thrush (resolved)  - completed 5 days of fluconazole    #stable breast cancer  #stable LLE wound -- routine wound care.     Full Code  dysphagia diet Puree diet texture with thin liquids     88 year old female with past medical history of essential HTN, HLD, hypothyroidism, s/p PPM, breast cancer s/p left mastectomy (2009), recent admission for acute CVA and acute respiratory failure due to IV contrast anaphylaxis, transferred to [facility] on 12/8 for acute rehab and course was complicated by seizure like episodes and RUE shaking. Admitted to [facility] for further seizure workup, neurology following.    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 Mass effect and mild midline shift to Right  - Above was previously discussed with neurology, serial CT's appear stable, resumed aspirin 12/16. continue statin  - Speech and swallow follow up to see if her diet can be advanced   - Continue PT/OT  - EEG reviewed with no seizures captured, risk of L frontotemporal focal onset seizures   - Continue Keppra 500mg PO BID  - Seizure precaution  - Keppra level pending   - Discussed with daughter who expressed concerns about sedation from keppra. Asked neurology to follow up.  - Ativan 2mg IV PRN seizures    #Essential HTN  - BP stable, continue lisinopril 20mg po daily    #Hypothyroid  - med rec reviewed, not on synthroid  - check TSH with next blood draw    #Oral thrush (resolved)  - completed 5 days of fluconazole    #stable breast cancer  #stable LLE wound -- routine wound care.     Full Code  dysphagia diet Puree diet texture with thin liquids    Dispo to  [facility] after PM&R evaluation.   Discussed plan with daughter Elsi on 12/18.   88 year old female with past medical history of essential HTN, HLD, hypothyroidism, s/p PPM, breast cancer s/p left mastectomy (2009), recent admission for acute CVA and acute respiratory failure due to IV contrast anaphylaxis, transferred to [facility] on 12/8 for acute rehab and course was complicated by seizure like episodes and RUE shaking. Admitted to [facility] for further seizure workup, neurology following.    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 Mass effect and mild midline shift to Right  - Above was previously discussed with neurology, serial CT's appear stable, resumed aspirin 12/16. continue statin  - Speech and swallow follow up to see if her diet can be advanced   - Continue PT/OT  - EEG reviewed with no seizures captured, risk of L frontotemporal focal onset seizures   - Continue Keppra 500mg PO BID  - Seizure precaution  - Keppra level pending   - Discussed with daughter who expressed concerns about sedation from keppra. Asked neurology to follow up.  - Ativan 2mg IV PRN seizures    #Essential HTN  - BP stable, continue lisinopril 20mg po daily    #Hypothyroid  - med rec reviewed, not on synthroid  - check TSH with next blood draw    #Oral thrush (resolved)  - completed 5 days of fluconazole    #stable breast cancer  #stable LLE wound -- routine wound care.     Full Code  dysphagia diet Puree diet texture with thin liquids    Dispo to  [facility] after PM&R evaluation.   Discussed plan with daughter Elsi on 12/18.

## 2023-12-18 NOTE — PROGRESS NOTE ADULT - SUBJECTIVE AND OBJECTIVE BOX
Interval History  Patient seen and examined at bedside. No acute events overnight reported by RN.  Patient is AAO x 2, she denies any acute complaints.     ALLERGIES:  penicillin (Rash)  IV Contrast (Anaphylaxis; Urticaria)    MEDICATIONS  (STANDING):  aspirin  chewable 81 milliGRAM(s) Oral daily  atorvastatin 80 milliGRAM(s) Oral at bedtime  bacitracin   Ointment 1 Application(s) Topical two times a day  influenza  Vaccine (HIGH DOSE) 0.7 milliLiter(s) IntraMuscular once  levETIRAcetam 500 milliGRAM(s) Oral every 12 hours  lidocaine   4% Patch 1 Patch Transdermal daily  lisinopril 20 milliGRAM(s) Oral daily  multivitamin 1 Tablet(s) Oral daily    MEDICATIONS  (PRN):  acetaminophen     Tablet .. 650 milliGRAM(s) Oral every 6 hours PRN Temp greater or equal to 38C (100.4F), Mild Pain (1 - 3)  aluminum hydroxide/magnesium hydroxide/simethicone Suspension 30 milliLiter(s) Oral every 4 hours PRN Dyspepsia  labetalol Injectable 5 milliGRAM(s) IV Push every 4 hours PRN if SBP >180  melatonin 3 milliGRAM(s) Oral at bedtime PRN Insomnia  ondansetron Injectable 4 milliGRAM(s) IV Push every 8 hours PRN Nausea and/or Vomiting  senna 2 Tablet(s) Oral at bedtime PRN Constipation    Vital Signs Last 24 Hrs  T(F): 97.5 (18 Dec 2023 05:28), Max: 98.6 (17 Dec 2023 19:49)  HR: 69 (18 Dec 2023 05:28) (63 - 75)  BP: 148/82 (18 Dec 2023 05:28) (103/65 - 148/82)  RR: 16 (18 Dec 2023 05:28) (16 - 17)  SpO2: 94% (18 Dec 2023 05:28) (93% - 96%)  I&O's Summary    17 Dec 2023 07:01  -  18 Dec 2023 07:00  --------------------------------------------------------  IN: 0 mL / OUT: 600 mL / NET: -600 mL    18 Dec 2023 07:01  -  18 Dec 2023 12:08  --------------------------------------------------------  IN: 120 mL / OUT: 0 mL / NET: 120 mL    BMI (kg/m2): 24.7 (12-13-23 @ 19:26)    PHYSICAL EXAM:  GENERAL: NAD, frail  HEENT: NCAT  CHEST/LUNG: Clear to percussion bilaterally; no wheeze  HEART: Regular rate and rhythm  ABDOMEN: Soft, Nontender, Nondistended; Bowel sounds present  MUSCULOSKELETAL/EXTREMITIES:  2+ Peripheral Pulses, No LE edema, LLE dressing in place, c/d/i  PSYCH: Appropriate affect  NEURO: Alert & Oriented to person and place, mild right facial asymmetry/hemiparesis, mild dysarthria    LABS:                        11.5   6.43  )-----------( 284      ( 18 Dec 2023 08:12 )             34.7       12-18    138  |  102  |  22  ----------------------------<  100  4.1   |  28  |  0.68    Ca    8.8      18 Dec 2023 08:12    12-04 Chol 213 mg/dL LDL -- HDL 68 mg/dL Trig 108 mg/dL    Urinalysis Basic - ( 18 Dec 2023 08:12 )    Color: x / Appearance: x / SG: x / pH: x  Gluc: 100 mg/dL / Ketone: x  / Bili: x / Urobili: x   Blood: x / Protein: x / Nitrite: x   Leuk Esterase: x / RBC: x / WBC x   Sq Epi: x / Non Sq Epi: x / Bacteria: x    Consultant(s) Notes Reviewed: yes  Care Discused with Consultants/Other Providers: yes  Imaging Personally Reviewed:     CT 12/15  Abnormal low attenuation is again seen involving the left temporal   frontal parietal region involving the left basal ganglia and left coronal   radiata region are again seen unchanged. This likely compatible with a   subacute infarct. There is some associated areas of mild high attenuation   seen which could be compatible with spared parenchyma though the   possibility of underlying component of hemorrhagic transformation cannot   be entirely excluded. This finding can be further evaluated with MRI the   brain if there are no contraindications. Mass effect on the left lateral   ventricle is again seen. Left-to-right shift (4.9 mm) is seen.    Old lacunar infarct involving the right basal ganglia region is again   seen and unchanged.    Evaluation of the osseous structures appropriate window appears   unremarkable    The visualized paranasal sinuses mastoid and middle ear regions appear   clear.    Impression: Stable exam.

## 2023-12-19 ENCOUNTER — INPATIENT (INPATIENT)
Facility: HOSPITAL | Age: 88
LOS: 8 days | Discharge: NURSING, REHAB OR LONG-TERM CARE | DRG: 56 | End: 2023-12-28
Attending: PHYSICAL MEDICINE & REHABILITATION | Admitting: PHYSICAL MEDICINE & REHABILITATION
Payer: MEDICARE

## 2023-12-19 ENCOUNTER — TRANSCRIPTION ENCOUNTER (OUTPATIENT)
Age: 88
End: 2023-12-19

## 2023-12-19 VITALS
OXYGEN SATURATION: 99 % | RESPIRATION RATE: 16 BRPM | TEMPERATURE: 98 F | HEIGHT: 63 IN | DIASTOLIC BLOOD PRESSURE: 73 MMHG | WEIGHT: 137.79 LBS | SYSTOLIC BLOOD PRESSURE: 107 MMHG | HEART RATE: 66 BPM

## 2023-12-19 VITALS — HEART RATE: 103 BPM | SYSTOLIC BLOOD PRESSURE: 133 MMHG | OXYGEN SATURATION: 99 % | DIASTOLIC BLOOD PRESSURE: 80 MMHG

## 2023-12-19 DIAGNOSIS — E78.5 HYPERLIPIDEMIA, UNSPECIFIED: ICD-10-CM

## 2023-12-19 DIAGNOSIS — R26.9 UNSPECIFIED ABNORMALITIES OF GAIT AND MOBILITY: ICD-10-CM

## 2023-12-19 DIAGNOSIS — R15.9 FULL INCONTINENCE OF FECES: ICD-10-CM

## 2023-12-19 DIAGNOSIS — E43 UNSPECIFIED SEVERE PROTEIN-CALORIE MALNUTRITION: ICD-10-CM

## 2023-12-19 DIAGNOSIS — Z79.82 LONG TERM (CURRENT) USE OF ASPIRIN: ICD-10-CM

## 2023-12-19 DIAGNOSIS — Z90.12 ACQUIRED ABSENCE OF LEFT BREAST AND NIPPLE: ICD-10-CM

## 2023-12-19 DIAGNOSIS — Z20.822 CONTACT WITH AND (SUSPECTED) EXPOSURE TO COVID-19: ICD-10-CM

## 2023-12-19 DIAGNOSIS — E03.9 HYPOTHYROIDISM, UNSPECIFIED: ICD-10-CM

## 2023-12-19 DIAGNOSIS — I63.9 CEREBRAL INFARCTION, UNSPECIFIED: ICD-10-CM

## 2023-12-19 DIAGNOSIS — H91.90 UNSPECIFIED HEARING LOSS, UNSPECIFIED EAR: ICD-10-CM

## 2023-12-19 DIAGNOSIS — Z95.0 PRESENCE OF CARDIAC PACEMAKER: ICD-10-CM

## 2023-12-19 DIAGNOSIS — R32 UNSPECIFIED URINARY INCONTINENCE: ICD-10-CM

## 2023-12-19 DIAGNOSIS — I69.351 HEMIPLEGIA AND HEMIPARESIS FOLLOWING CEREBRAL INFARCTION AFFECTING RIGHT DOMINANT SIDE: ICD-10-CM

## 2023-12-19 DIAGNOSIS — Z85.3 PERSONAL HISTORY OF MALIGNANT NEOPLASM OF BREAST: ICD-10-CM

## 2023-12-19 DIAGNOSIS — I69.322 DYSARTHRIA FOLLOWING CEREBRAL INFARCTION: ICD-10-CM

## 2023-12-19 DIAGNOSIS — Z98.41 CATARACT EXTRACTION STATUS, RIGHT EYE: Chronic | ICD-10-CM

## 2023-12-19 DIAGNOSIS — I69.310 ATTENTION AND CONCENTRATION DEFICIT FOLLOWING CEREBRAL INFARCTION: ICD-10-CM

## 2023-12-19 DIAGNOSIS — I69.320 APHASIA FOLLOWING CEREBRAL INFARCTION: ICD-10-CM

## 2023-12-19 DIAGNOSIS — I69.392 FACIAL WEAKNESS FOLLOWING CEREBRAL INFARCTION: ICD-10-CM

## 2023-12-19 DIAGNOSIS — I87.312 CHRONIC VENOUS HYPERTENSION (IDIOPATHIC) WITH ULCER OF LEFT LOWER EXTREMITY: ICD-10-CM

## 2023-12-19 DIAGNOSIS — G40.909 EPILEPSY, UNSPECIFIED, NOT INTRACTABLE, WITHOUT STATUS EPILEPTICUS: ICD-10-CM

## 2023-12-19 DIAGNOSIS — I10 ESSENTIAL (PRIMARY) HYPERTENSION: ICD-10-CM

## 2023-12-19 DIAGNOSIS — Z51.89 ENCOUNTER FOR OTHER SPECIFIED AFTERCARE: ICD-10-CM

## 2023-12-19 LAB
TSH SERPL-MCNC: 0.03 UIU/ML — LOW (ref 0.36–3.74)
TSH SERPL-MCNC: 0.03 UIU/ML — LOW (ref 0.36–3.74)

## 2023-12-19 PROCEDURE — 99233 SBSQ HOSP IP/OBS HIGH 50: CPT

## 2023-12-19 PROCEDURE — 70450 CT HEAD/BRAIN W/O DYE: CPT | Mod: 26

## 2023-12-19 PROCEDURE — 99239 HOSP IP/OBS DSCHRG MGMT >30: CPT

## 2023-12-19 RX ORDER — ONDANSETRON 8 MG/1
4 TABLET, FILM COATED ORAL EVERY 8 HOURS
Refills: 0 | Status: DISCONTINUED | OUTPATIENT
Start: 2023-12-19 | End: 2023-12-22

## 2023-12-19 RX ORDER — BACITRACIN ZINC 500 UNIT/G
1 OINTMENT IN PACKET (EA) TOPICAL
Refills: 0 | Status: DISCONTINUED | OUTPATIENT
Start: 2023-12-19 | End: 2023-12-28

## 2023-12-19 RX ORDER — SENNA PLUS 8.6 MG/1
2 TABLET ORAL AT BEDTIME
Refills: 0 | Status: DISCONTINUED | OUTPATIENT
Start: 2023-12-19 | End: 2023-12-28

## 2023-12-19 RX ORDER — LISINOPRIL 2.5 MG/1
20 TABLET ORAL DAILY
Refills: 0 | Status: DISCONTINUED | OUTPATIENT
Start: 2023-12-19 | End: 2023-12-21

## 2023-12-19 RX ORDER — LEVETIRACETAM 250 MG/1
500 TABLET, FILM COATED ORAL EVERY 12 HOURS
Refills: 0 | Status: DISCONTINUED | OUTPATIENT
Start: 2023-12-19 | End: 2023-12-28

## 2023-12-19 RX ORDER — ASPIRIN/CALCIUM CARB/MAGNESIUM 324 MG
81 TABLET ORAL DAILY
Refills: 0 | Status: DISCONTINUED | OUTPATIENT
Start: 2023-12-19 | End: 2023-12-28

## 2023-12-19 RX ORDER — ACETAMINOPHEN 500 MG
650 TABLET ORAL EVERY 6 HOURS
Refills: 0 | Status: DISCONTINUED | OUTPATIENT
Start: 2023-12-19 | End: 2023-12-28

## 2023-12-19 RX ORDER — LIDOCAINE 4 G/100G
1 CREAM TOPICAL DAILY
Refills: 0 | Status: DISCONTINUED | OUTPATIENT
Start: 2023-12-19 | End: 2023-12-28

## 2023-12-19 RX ORDER — ATORVASTATIN CALCIUM 80 MG/1
80 TABLET, FILM COATED ORAL AT BEDTIME
Refills: 0 | Status: DISCONTINUED | OUTPATIENT
Start: 2023-12-19 | End: 2023-12-28

## 2023-12-19 RX ORDER — LANOLIN ALCOHOL/MO/W.PET/CERES
3 CREAM (GRAM) TOPICAL AT BEDTIME
Refills: 0 | Status: DISCONTINUED | OUTPATIENT
Start: 2023-12-19 | End: 2023-12-28

## 2023-12-19 RX ADMIN — LISINOPRIL 20 MILLIGRAM(S): 2.5 TABLET ORAL at 05:39

## 2023-12-19 RX ADMIN — Medication 81 MILLIGRAM(S): at 11:24

## 2023-12-19 RX ADMIN — Medication 650 MILLIGRAM(S): at 11:28

## 2023-12-19 RX ADMIN — Medication 1 APPLICATION(S): at 17:00

## 2023-12-19 RX ADMIN — LIDOCAINE 1 PATCH: 4 CREAM TOPICAL at 11:24

## 2023-12-19 RX ADMIN — LEVETIRACETAM 500 MILLIGRAM(S): 250 TABLET, FILM COATED ORAL at 05:39

## 2023-12-19 RX ADMIN — ATORVASTATIN CALCIUM 80 MILLIGRAM(S): 80 TABLET, FILM COATED ORAL at 21:44

## 2023-12-19 RX ADMIN — Medication 1 TABLET(S): at 11:24

## 2023-12-19 RX ADMIN — LEVETIRACETAM 500 MILLIGRAM(S): 250 TABLET, FILM COATED ORAL at 17:01

## 2023-12-19 RX ADMIN — Medication 650 MILLIGRAM(S): at 06:10

## 2023-12-19 RX ADMIN — Medication 650 MILLIGRAM(S): at 05:39

## 2023-12-19 RX ADMIN — ONDANSETRON 4 MILLIGRAM(S): 8 TABLET, FILM COATED ORAL at 09:19

## 2023-12-19 RX ADMIN — Medication 1 APPLICATION(S): at 05:40

## 2023-12-19 NOTE — H&P ADULT - NSHPPHYSICALEXAM_GEN_ALL_CORE
PHYSICAL EXAM  VITALS  T(C): 36.6 (12-19-23 @ 05:00), Max: 36.6 (12-18-23 @ 21:40)  HR: 103 (12-19-23 @ 12:11) (75 - 103)  BP: 133/80 (12-19-23 @ 12:11) (129/72 - 138/71)  RR: 16 (12-19-23 @ 05:00) (16 - 16)  SpO2: 99% (12-19-23 @ 12:11) (94% - 99%)    Gen - NAD, Comfortable  HEENT - NCAT, EOMI, MMM  Neck - Supple, No limited ROM  Pulm - CTAB, No wheeze, No rhonchi, No crackles  Cardiovascular - RRR, S1S2  Chest - good chest expansion, good respiratory effort  Abdomen - Soft, NT/ND, +BS  Extremities - No Cyanosis, no clubbing, no edema, no calf tenderness  Neuro-     Cognitive - awake, alert, oriented to person, knows she is in a hospital ( thinks it is [facility])      Communication - Fluent, Comprehensible, delayed processing     Attention: Impaired     Memory: unable to recall       Cranial Nerves - right facial weakness     Motor -                     LEFT    UE - 5/5                    RIGHT UE - 4/5                    LEFT     5/5                    RIGHT LE - 4-/5        Sensory - Intact  to LT     Reflexes - DTR Intact     Tone - normal  Psychiatric - Mood stable, Affect WNL  Skin: Left shin venous stasis PHYSICAL EXAM  VITALS  T(C): 36.6 (12-19-23 @ 05:00), Max: 36.6 (12-18-23 @ 21:40)  HR: 103 (12-19-23 @ 12:11) (75 - 103)  BP: 133/80 (12-19-23 @ 12:11) (129/72 - 138/71)  RR: 16 (12-19-23 @ 05:00) (16 - 16)  SpO2: 99% (12-19-23 @ 12:11) (94% - 99%)    Gen - NAD, Comfortable  HEENT - NCAT, EOMI, MMM  Neck - Supple, No limited ROM  Pulm - CTAB, No wheeze, No rhonchi, No crackles  Cardiovascular - RRR, S1S2  Chest - good chest expansion, good respiratory effort  Abdomen - Soft, NT/ND, +BS  Extremities - No Cyanosis, no clubbing, no edema, no calf tenderness  Neuro-     Cognitive - awake, alert, oriented to person, knows she is in a hospital ( thinks it is [facility])      Communication - Fluent, Comprehensible, delayed processing     Attention: Impaired     Memory: unable to recall       Cranial Nerves - right facial weakness     Motor -                     LEFT    UE - 5/5                    RIGHT UE - 4/5                    LEFT     5/5                    RIGHT LE - 4-/5        Sensory - Intact  to LT     Reflexes - DTR Intact     Tone - normal  Psychiatric - Mood stable, Affect WNL  Skin: Left shin venous stasis PHYSICAL EXAM  VITALS  T(C): 36.6 (12-19-23 @ 05:00), Max: 36.6 (12-18-23 @ 21:40)  HR: 103 (12-19-23 @ 12:11) (75 - 103)  BP: 133/80 (12-19-23 @ 12:11) (129/72 - 138/71)  RR: 16 (12-19-23 @ 05:00) (16 - 16)  SpO2: 99% (12-19-23 @ 12:11) (94% - 99%)    Gen - NAD, Comfortable  HEENT - NCAT, EOMI, MMM  Neck - Supple, No limited ROM  Pulm - CTAB, No wheeze, No rhonchi, No crackles  Cardiovascular - RRR, S1S2  Chest - good chest expansion, good respiratory effort  Abdomen - Soft, NT, ND, (+) BS  Extremities - No Cyanosis, no clubbing, no edema, no calf tenderness  Neuro-     Cognitive - awake, alert, oriented to person, knows she is in a hospital ( thinks it is [facility]), follows command       Communication - Fluent, Comprehensible, delayed processing     Attention: Impaired, takes time to answer or perform a task      Memory: unable to recall       Cranial Nerves - right facial weakness     Motor -                     LEFT    UE - 5/5                    RIGHT UE - 4-/5                    LEFT     5/5                    RIGHT LE - 4-/5        Sensory - Intact  to LT     Reflexes - DTR Intact     Tone - normal  Psychiatric - Mood stable, Affect WNL  Skin: Left shin venous stasis wound PHYSICAL EXAM  VITALS  T(C): 36.6 (12-19-23 @ 05:00), Max: 36.6 (12-18-23 @ 21:40)  HR: 103 (12-19-23 @ 12:11) (75 - 103)  BP: 133/80 (12-19-23 @ 12:11) (129/72 - 138/71)  RR: 16 (12-19-23 @ 05:00) (16 - 16)  SpO2: 99% (12-19-23 @ 12:11) (94% - 99%)    Gen - NAD, Comfortable  HEENT - NCAT, EOMI, MMM  Neck - Supple, No limited ROM  Pulm - CTAB, No wheeze, No rhonchi, No crackles  Cardiovascular - RRR, S1S2  Chest - good chest expansion, good respiratory effort  Abdomen - Soft, NT, ND, (+) BS  Extremities - No Cyanosis, no clubbing, no edema, no calf tenderness  Neuro-     Cognitive - awake, alert, oriented to person, knows she is in a hospital ( thinks it is [facility]), follows command       Communication - Fluent, Comprehensible, delayed processing     Attention: Impaired, takes time to answer or perform a task      Memory: unable to recall       Cranial Nerves - right facial weakness     Motor -                     LEFT    UE - 5/5                    RIGHT UE - 4-/5                    LEFT     5/5                    RIGHT LE - 4-/5        Sensory - Intact  to LT     Reflexes - DTR Intact     Tone - normal  Psychiatric - Mood stable, Affect WNL  Skin: Left shin venous stasis wound

## 2023-12-19 NOTE — PROGRESS NOTE ADULT - SUBJECTIVE AND OBJECTIVE BOX
Patient is a 88y old  Female who presents with a chief complaint of seizure activities (18 Dec 2023 16:40)      Patient seen and examined at bedside. No overnight events reported.  She offers no complaints.    ALLERGIES:  penicillin (Rash)  IV Contrast (Anaphylaxis; Urticaria)    MEDICATIONS  (STANDING):  aspirin  chewable 81 milliGRAM(s) Oral daily  atorvastatin 80 milliGRAM(s) Oral at bedtime  bacitracin   Ointment 1 Application(s) Topical two times a day  influenza  Vaccine (HIGH DOSE) 0.7 milliLiter(s) IntraMuscular once  levETIRAcetam 500 milliGRAM(s) Oral every 12 hours  lidocaine   4% Patch 1 Patch Transdermal daily  lisinopril 20 milliGRAM(s) Oral daily  multivitamin 1 Tablet(s) Oral daily    MEDICATIONS  (PRN):  acetaminophen     Tablet .. 650 milliGRAM(s) Oral every 6 hours PRN Temp greater or equal to 38C (100.4F), Mild Pain (1 - 3)  aluminum hydroxide/magnesium hydroxide/simethicone Suspension 30 milliLiter(s) Oral every 4 hours PRN Dyspepsia  labetalol Injectable 5 milliGRAM(s) IV Push every 4 hours PRN if SBP >180  melatonin 3 milliGRAM(s) Oral at bedtime PRN Insomnia  ondansetron Injectable 4 milliGRAM(s) IV Push every 8 hours PRN Nausea and/or Vomiting  senna 2 Tablet(s) Oral at bedtime PRN Constipation    Vital Signs Last 24 Hrs  T(F): 97.9 (19 Dec 2023 05:00), Max: 98.7 (18 Dec 2023 13:40)  HR: 82 (19 Dec 2023 05:00) (70 - 82)  BP: 138/71 (19 Dec 2023 05:00) (129/72 - 138/71)  RR: 16 (19 Dec 2023 05:00) (16 - 16)  SpO2: 98% (19 Dec 2023 05:00) (94% - 98%)  I&O's Summary    18 Dec 2023 07:01  -  19 Dec 2023 07:00  --------------------------------------------------------  IN: 360 mL / OUT: 1000 mL / NET: -640 mL      PHYSICAL EXAM:  General: NAD, Alert but sleepy  ENT: No gross hearing impairment, Moist mucous membranes, no thrush  Neck: Supple, No JVD  Lungs: Clear to auscultation bilaterally, good air entry, non-labored breathing  Cardio: RRR, S1/S2, No murmur  Abdomen: Soft, Nontender, Nondistended; Bowel sounds present  Extremities: No calf tenderness, No cyanosis, No pitting edema  Neuro: +aphasia, can follow commands    LABS:                        11.5   6.43  )-----------( 284      ( 18 Dec 2023 08:12 )             34.7     12-18    138  |  102  |  22  ----------------------------<  100  4.1   |  28  |  0.68    Ca    8.8      18 Dec 2023 08:12                      12-04 Chol 213 mg/dL LDL -- HDL 68 mg/dL Trig 108 mg/dL                  Urinalysis Basic - ( 18 Dec 2023 08:12 )    Color: x / Appearance: x / SG: x / pH: x  Gluc: 100 mg/dL / Ketone: x  / Bili: x / Urobili: x   Blood: x / Protein: x / Nitrite: x   Leuk Esterase: x / RBC: x / WBC x   Sq Epi: x / Non Sq Epi: x / Bacteria: x          RADIOLOGY & ADDITIONAL TESTS:    Care Discussed with Consultants/Other Providers:    Patient is a 88y old  Female who presents with a chief complaint of seizure activities (18 Dec 2023 16:40)      Patient seen and examined at bedside. No overnight events reported.  She offers no complaints.    ALLERGIES:  penicillin (Rash)  IV Contrast (Anaphylaxis; Urticaria)    MEDICATIONS  (STANDING):  aspirin  chewable 81 milliGRAM(s) Oral daily  atorvastatin 80 milliGRAM(s) Oral at bedtime  bacitracin   Ointment 1 Application(s) Topical two times a day  influenza  Vaccine (HIGH DOSE) 0.7 milliLiter(s) IntraMuscular once  levETIRAcetam 500 milliGRAM(s) Oral every 12 hours  lidocaine   4% Patch 1 Patch Transdermal daily  lisinopril 20 milliGRAM(s) Oral daily  multivitamin 1 Tablet(s) Oral daily    MEDICATIONS  (PRN):  acetaminophen     Tablet .. 650 milliGRAM(s) Oral every 6 hours PRN Temp greater or equal to 38C (100.4F), Mild Pain (1 - 3)  aluminum hydroxide/magnesium hydroxide/simethicone Suspension 30 milliLiter(s) Oral every 4 hours PRN Dyspepsia  labetalol Injectable 5 milliGRAM(s) IV Push every 4 hours PRN if SBP >180  melatonin 3 milliGRAM(s) Oral at bedtime PRN Insomnia  ondansetron Injectable 4 milliGRAM(s) IV Push every 8 hours PRN Nausea and/or Vomiting  senna 2 Tablet(s) Oral at bedtime PRN Constipation    Vital Signs Last 24 Hrs  T(F): 97.9 (19 Dec 2023 05:00), Max: 98.7 (18 Dec 2023 13:40)  HR: 82 (19 Dec 2023 05:00) (70 - 82)  BP: 138/71 (19 Dec 2023 05:00) (129/72 - 138/71)  RR: 16 (19 Dec 2023 05:00) (16 - 16)  SpO2: 98% (19 Dec 2023 05:00) (94% - 98%)  I&O's Summary    18 Dec 2023 07:01  -  19 Dec 2023 07:00  --------------------------------------------------------  IN: 360 mL / OUT: 1000 mL / NET: -640 mL      PHYSICAL EXAM:  General: NAD, Alert but sleepy  ENT: No gross hearing impairment, Moist mucous membranes, no thrush  Neck: Supple, No JVD  Lungs: Clear to auscultation bilaterally, good air entry, non-labored breathing  Cardio: RRR, S1/S2, No murmur  Abdomen: Soft, Nontender, Nondistended; Bowel sounds present  Extremities: No calf tenderness, No cyanosis, No pitting edema  Neuro: +aphasia, can follow commands    LABS:                        11.5   6.43  )-----------( 284      ( 18 Dec 2023 08:12 )             34.7     12-18    138  |  102  |  22  ----------------------------<  100  4.1   |  28  |  0.68    Ca    8.8      18 Dec 2023 08:12    12-04 Chol 213 mg/dL LDL -- HDL 68 mg/dL Trig 108 mg/dL    rinalysis Basic - ( 18 Dec 2023 08:12 )    Color: x / Appearance: x / SG: x / pH: x  Gluc: 100 mg/dL / Ketone: x  / Bili: x / Urobili: x   Blood: x / Protein: x / Nitrite: x   Leuk Esterase: x / RBC: x / WBC x   Sq Epi: x / Non Sq Epi: x / Bacteria: x

## 2023-12-19 NOTE — DISCHARGE NOTE NURSING/CASE MANAGEMENT/SOCIAL WORK - NSDCPEFALRISK_GEN_ALL_CORE
For information on Fall & Injury Prevention, visit: [email or web address] OR  [email or web address] OR  https://www.cdc.gov/steadi/patient.html For information on Fall & Injury Prevention, visit: [email or web address] OR  [email or web address] OR  https://www.cdc.gov/steadi/patient.html

## 2023-12-19 NOTE — H&P ADULT - NSHPADDITIONALINFOADULT_GEN_ALL_CORE
Saw and evaluated the patient independently, took history, examine, start an assessment and management plan. Spent 12 minutes excluding teaching time

## 2023-12-19 NOTE — H&P ADULT - NS ATTEND AMEND GEN_ALL_CORE FT
I have personally seen and examined the patient  myself. Medical records reviewed. I have made amendments to the documentation where necessary and adjusted the history, physical examination, and plan as documented by the NP.

## 2023-12-19 NOTE — H&P ADULT - ASSESSMENT
The patient is an 88 year old female with a history of HTN, hypothyroidism, pacemaker, breast cancer who is admitted with CVA and acute respiratory failure due to IV contrast anaphylaxis.CT head (12/4) showed Large left MCA territory acute to early subacute infarction, thrombosis of multiple distal branches of the left MCA and no acute intracranial hemorrhage. Hosptial course complicated by possible seizure episodes involving right facial twitching for 1 min and RUE shaking subsequently started and was persistent. CTH 12/13 showed redemonstrated left MCA distribution infarction, advanced when compared to prior imaging with likely petechial hemorrhages and/or laminar necrosis and mildine shift 0.5cm to the right. EEG 12/13 showed no seizures captured but at risk of left frontotemporal focal-onset seizures, Admitted for multidisciplinary rehab program    #L MCA CVA, Right dominant hemiparesis    -Pacemaker interrogated showing Atrial runs but no sustained Atrial Fibrillation   CT head (12/4) showed Large left MCA territory acute to early subacute infarction, thrombosis of multiple distal branches of the left MCA and no acute intracranial hemorrhage.  -ASA81 mg PO QD  -atorvastatin 80mg PO at bedtime   #Comprehensive Multidisciplinary Rehab Program:  - Gait, ADL, Functional impairments  - PT/OT/ SLP 3 hours a day 5 days a week, 1 hour each     #Seizures   EEG 12/15 showed no seizure but risk of left frontotemporal focal-onset seizures  CT head 12/15 stable   -c/w keppra 500mg PO BID     #HTN  - Lisinopril 20mg PO QD     #Mood / Cognition:  - Neuropsych evaluation PRN    #Sleep:  - Maintain quiet hours and low stim environment    #Pain:  - Tylenol PRN  - lidocaine patch for backpain  - avoid sedating meds that may affect cognitive recovery    #/Bladder:  - Monitor PVR if no void in 8h; SC for >400 cc  - Toileting schedule q4h  - (+) Incontinence     #Diet / Dysphagia:    - Diet: Pureed, Moderately thick liquids  - ongoing SLP assessment  - Nutrition to follow    #Skin/ Pressure Injury Prevention:  - assessment on admission   - Incisions: venous stasis ulcer in L anterior shin  [id number]   - Turn Q2hrs in bed while awake, OOB to Chair, PT/OT/SLP     #DVT prophylaxis:  - ASA 81mg PO QD     #Precautions/ Restrictions  - Falls, Aspiration  - COVID PCR:     --------------------------------------------  Outpatient Follow up:    --------------------------------------------      MEDICAL PROGNOSIS: GOOD            REHAB POTENTIAL: GOOD             ESTIMATED DISPOSITION: HOME WITH HOME CARE            ELOS: 10-14 Days   EXPECTED THERAPY:     P.T. 1hr/day       O.T. 1hr/day      S.L.P. 1hr/day     P&O Unnecessary     EXP FREQUENCY: 5 days per 7 day period     PRESCREEN COMPARISON:   I have reviewed the prescreen information and I have found no relevant changes between the preadmission screening and my post admission evaluation     RATIONALE FOR INPATIENT ADMISSION - Patient demonstrates the following: (check all that apply)  [X] Medically appropriate for rehabilitation admission  [X] Has attainable rehab goals with an appropriate initial discharge plan  [X] Has rehabilitation potential (expected to make a significant improvement within a reasonable period of time)   [X] Requires close medical management by a rehab physician, rehab nursing care, Hospitalist and comprehensive interdisciplinary team (including PT, OT, & or SLP, Prosthetics and Orthotics)   The patient is an 88 year old female with a history of HTN, hypothyroidism, pacemaker, breast cancer who is admitted with CVA and acute respiratory failure due to IV contrast anaphylaxis.CT head (12/4) showed Large left MCA territory acute to early subacute infarction, thrombosis of multiple distal branches of the left MCA and no acute intracranial hemorrhage. Hosptial course complicated by possible seizure episodes involving right facial twitching for 1 min and RUE shaking subsequently started and was persistent. CTH 12/13 showed redemonstrated left MCA distribution infarction, advanced when compared to prior imaging with likely petechial hemorrhages and/or laminar necrosis and mildine shift 0.5cm to the right. EEG 12/13 showed no seizures captured but at risk of left frontotemporal focal-onset seizures, Admitted for multidisciplinary rehab program    #L MCA CVA, Right dominant hemiparesis    -Pacemaker interrogated showing Atrial runs but no sustained Atrial Fibrillation   CT head (12/4) showed Large left MCA territory acute to early subacute infarction, thrombosis of multiple distal branches of the left MCA and no acute intracranial hemorrhage.  -ASA81 mg PO QD  -atorvastatin 80mg PO at bedtime   #Comprehensive Multidisciplinary Rehab Program:  - Gait, ADL, Functional impairments  - PT/OT/ SLP 3 hours a day 5 days a week, 1 hour each     #Seizures   EEG 12/15 showed no seizure but risk of left frontotemporal focal-onset seizures  CT head 12/15 stable   -c/w keppra 500mg PO BID     #HTN  - Lisinopril 20mg PO QD     #Mood / Cognition:  - Neuropsych evaluation PRN    #Sleep:  - Maintain quiet hours and low stim environment    #Pain:  - Tylenol PRN  - lidocaine patch for backpain  - avoid sedating meds that may affect cognitive recovery    #/Bladder:  - Monitor PVR if no void in 8h; SC for >400 cc  - Toileting schedule q4h  - (+) Incontinence     #Diet / Dysphagia:    - Diet: Pureed, Moderately thick liquids  - ongoing SLP assessment  - Nutrition to follow    #Skin/ Pressure Injury Prevention:  - assessment on admission   - Incisions: venous stasis ulcer in L anterior shin  [id number]   - Turn Q2hrs in bed while awake, OOB to Chair, PT/OT/SLP     #DVT prophylaxis:  - ASA 81mg PO QD     #Precautions/ Restrictions  - Falls, Aspiration  - COVID PCR:     --------------------------------------------  Outpatient Follow up:    --------------------------------------------      MEDICAL PROGNOSIS: GOOD            REHAB POTENTIAL: GOOD             ESTIMATED DISPOSITION: HOME WITH HOME CARE            ELOS: 10-14 Days   EXPECTED THERAPY:     P.T. 1hr/day       O.T. 1hr/day      S.L.P. 1hr/day     P&O Unnecessary     EXP FREQUENCY: 5 days per 7 day period     PRESCREEN COMPARISON:   I have reviewed the prescreen information and I have found no relevant changes between the preadmission screening and my post admission evaluation     RATIONALE FOR INPATIENT ADMISSION - Patient demonstrates the following: (check all that apply)  [X] Medically appropriate for rehabilitation admission  [X] Has attainable rehab goals with an appropriate initial discharge plan  [X] Has rehabilitation potential (expected to make a significant improvement within a reasonable period of time)   [X] Requires close medical management by a rehab physician, rehab nursing care, Hospitalist and comprehensive interdisciplinary team (including PT, OT, & or SLP, Prosthetics and Orthotics)   The patient is an 88 year old female with a history of HTN, hypothyroidism, pacemaker, breast cancer who is admitted with CVA and acute respiratory failure due to IV contrast anaphylaxis.CT head (12/4) showed Large left MCA territory acute to early subacute infarction, thrombosis of multiple distal branches of the left MCA and no acute intracranial hemorrhage. Hosptial course complicated by possible seizure episodes involving right facial twitching for 1 min and RUE shaking subsequently started and was persistent. CTH 12/13 showed redemonstrated left MCA distribution infarction, advanced when compared to prior imaging with likely petechial hemorrhages and/or laminar necrosis and mildine shift 0.5cm to the right. EEG 12/13 showed no seizures captured but at risk of left frontotemporal focal-onset seizures, Admitted for multidisciplinary rehab program    #L MCA CVA, Right dominant hemiparesis    -Pacemaker interrogated showing Atrial runs but no sustained Atrial Fibrillation   -CT head (12/4) showed Large left MCA territory acute to early subacute infarction, thrombosis of multiple distal branches of the left MCA and no acute intracranial hemorrhage.  -ASA81 mg PO QD  -atorvastatin 80mg PO at bedtime   #Comprehensive Multidisciplinary Rehab Program:  - Gait, ADL, Functional impairments  - PT/OT/ SLP 3 hours a day 5 days a week, 1 hour each   - TSH level 0.033  - Hospitalist consult     #Seizures   -EEG 12/15 showed no seizure but risk of left frontotemporal focal-onset seizures  -CT head 12/15 stable   -c/w keppra 500mg PO BID   - Keppra level (12/15) 17.1     #HTN  - Lisinopril 20mg PO QD     #Mood / Cognition:  - Neuropsychology evaluation PRN  - EKG for QTC     #Sleep:  - Maintain quiet hours and low stim environment    #Pain:  - Tylenol PRN  - lidocaine patch for backpain  - avoid sedating meds that may affect cognitive recovery    #/Bladder:  - Monitor PVR if no void in 8h; SC for >400 cc  - Toileting schedule q4h  - (+) Incontinence     #Diet / Dysphagia:    - Diet: Pureed, Moderately thick liquids  - ongoing SLP assessment  - Nutrition to follow    #Skin/ Pressure Injury Prevention:  - assessment on admission   - Incisions: venous stasis ulcer in L anterior shin  [id number]   - Turn Q2hrs in bed while awake, OOB to Chair, PT/OT/SLP     #DVT prophylaxis:  - ASA 81mg PO QD     #Precautions/ Restrictions  - Falls, Aspiration  - COVID PCR:     --------------------------------------------  Outpatient Follow up:    --------------------------------------------      MEDICAL PROGNOSIS: GOOD            REHAB POTENTIAL: GOOD             ESTIMATED DISPOSITION: HOME WITH HOME CARE            ELOS: 10-14 Days   EXPECTED THERAPY:     P.T. 1hr/day       O.T. 1hr/day      S.L.P. 1hr/day     P&O Unnecessary     EXP FREQUENCY: 5 days per 7 day period     PRESCREEN COMPARISON:   I have reviewed the prescreen information and I have found no relevant changes between the preadmission screening and my post admission evaluation     RATIONALE FOR INPATIENT ADMISSION - Patient demonstrates the following: (check all that apply)  [X] Medically appropriate for rehabilitation admission  [X] Has attainable rehab goals with an appropriate initial discharge plan  [X] Has rehabilitation potential (expected to make a significant improvement within a reasonable period of time)   [X] Requires close medical management by a rehab physician, rehab nursing care, Hospitalist and comprehensive interdisciplinary team (including PT, OT, & or SLP, Prosthetics and Orthotics)   The patient is an 88 year old female with a history of HTN, hypothyroidism, pacemaker, breast cancer who is admitted with CVA and acute respiratory failure due to IV contrast anaphylaxis.CT head (12/4) showed Large left MCA territory acute to early subacute infarction, thrombosis of multiple distal branches of the left MCA and no acute intracranial hemorrhage. Hosptial course complicated by possible seizure episodes involving right facial twitching for 1 min and RUE shaking subsequently started and was persistent. CTH 12/13 showed redemonstrated left MCA distribution infarction, advanced when compared to prior imaging with likely petechial hemorrhages and/or laminar necrosis and mildine shift 0.5cm to the right. EEG 12/13 showed no seizures captured but at risk of left frontotemporal focal-onset seizures, Admitted for multidisciplinary rehab program    #L MCA CVA, Right dominant hemiparesis    -Pacemaker interrogated showing Atrial runs but no sustained Atrial Fibrillation   -CT head (12/4) showed Large left MCA territory acute to early subacute infarction, thrombosis of multiple distal branches of the left MCA and no acute intracranial hemorrhage.  -ASA81 mg PO QD  -atorvastatin 80mg PO at bedtime   #Comprehensive Multidisciplinary Rehab Program:  - Gait, ADL, Functional impairments  - PT/OT/ SLP 3 hours a day 5 days a week, 1 hour each   - TSH level 0.033  - Hospitalist consult     #Seizures   -EEG 12/15 showed no seizure but risk of left frontotemporal focal-onset seizures  -CT head 12/15 stable   -c/w keppra 500mg PO BID   - Keppra level (12/15) 17.1     #HTN  - Lisinopril 20mg PO QD     #Mood / Cognition:  - Neuropsychology evaluation PRN  - EKG for QTC     #Sleep:  - Maintain quiet hours and low stim environment    #Pain:  - Tylenol PRN  - lidocaine patch for backpain  - avoid sedating meds that may affect cognitive recovery    #/Bladder:  - Monitor PVR if no void in 8h; SC for >400 cc  - Toileting schedule q4h  - (+) Incontinence     #Diet / Dysphagia:    - Diet: Pureed, Moderately thick liquids  - ongoing SLP assessment  - Nutrition to follow    #Skin/ Pressure Injury Prevention:  - assessment on admission   - Incisions: venous stasis ulcer in L anterior shin  [id number]   - Turn Q2hrs in bed while awake, OOB to Chair, PT/OT/SLP     #DVT prophylaxis:  - ASA 81mg PO QD     #Precautions/ Restrictions  - Falls, Aspiration  - COVID PCR:     --------------------------------------------  Outpatient Follow up:    --------------------------------------------      MEDICAL PROGNOSIS: GOOD            REHAB POTENTIAL: GOOD             ESTIMATED DISPOSITION: HOME WITH HOME CARE            ELOS: 10-14 Days   EXPECTED THERAPY:     P.T. 1hr/day       O.T. 1hr/day      S.L.P. 1hr/day     P&O Unnecessary     EXP FREQUENCY: 5 days per 7 day period     PRESCREEN COMPARISON:   I have reviewed the prescreen information and I have found no relevant changes between the preadmission screening and my post admission evaluation     RATIONALE FOR INPATIENT ADMISSION - Patient demonstrates the following: (check all that apply)  [X] Medically appropriate for rehabilitation admission  [X] Has attainable rehab goals with an appropriate initial discharge plan  [X] Has rehabilitation potential (expected to make a significant improvement within a reasonable period of time)   [X] Requires close medical management by a rehab physician, rehab nursing care, Hospitalist and comprehensive interdisciplinary team (including PT, OT, & or SLP, Prosthetics and Orthotics)

## 2023-12-19 NOTE — PROGRESS NOTE ADULT - NUTRITIONAL ASSESSMENT
This patient has been assessed with a concern for Malnutrition and has been determined to have a diagnosis/diagnoses of Moderate protein-calorie malnutrition.    This patient is being managed with:   Diet Pureed-  Entered: Dec 14 2023  4:03PM  

## 2023-12-19 NOTE — PROGRESS NOTE ADULT - SUBJECTIVE AND OBJECTIVE BOX
s; Asked by Dr. Brooks to see patient again because of Keppra potentially causing sedation.  Previously seen by  and her exam is the same. Dr. Mclaughlin had recommended going to keppra 500mg bid from 1000mg bid. She doesn't seem sedated when Dr. Mclaughlin saw her or me. She responds appropriately but keeps her eyes closed.     Vital Signs Last 24 Hrs  T(C): 36.6 (19 Dec 2023 05:00), Max: 37.1 (18 Dec 2023 13:40)  T(F): 97.9 (19 Dec 2023 05:00), Max: 98.7 (18 Dec 2023 13:40)  HR: 82 (19 Dec 2023 05:00) (70 - 82)  BP: 138/71 (19 Dec 2023 05:00) (129/72 - 138/71)  BP(mean): --  RR: 16 (19 Dec 2023 05:00) (16 - 16)  SpO2: 98% (19 Dec 2023 05:00) (94% - 98%)    Parameters below as of 19 Dec 2023 05:00  Patient On (Oxygen Delivery Method): room air    Patient didn't open her eyes but is able to state her name, knows she is in a hospital, preoccupied by the fact that she is hungry and wants some water to drink  Follows commands consistently  EOMI no gross facial droop noted.   Motor Exam - left side 5/5  Right lower extremity 4/5 and right upper extremity 4-/5, with 4/5  today    88-year-old woman with HTN, hypothyroidism, pacemaker placement, breast cancer admitted for acute ischemic stroke.   While in rehab, she was having seizure starting with right facial twitching then RUE convulsion.   Post-stroke seizure, patient received Keppra 1000mg IV loading dose and was on 1000mg BID.  large left MCA infarction with 0.5mm midline shift, and petechial hemorrhage. Per the chart, Neurosurgery was consulted and no surgical intervention warranted, recommended repeat CT head 12/14/23, was stable.  Dr. Mclaughlin was consulted 12/14 for similar concern of sedation. It seems she was cooperative during exam. Keppra was lowered to 500mg bid. Her exam is the same today despite the lowering of the medication. Will not recommend changing the keppra at this time. Will recommend repeating head ct wo contrast for any changes in the left mca infarct.   Ativan 2 mg if seizure more than 2 mins.   seizure precaution. texted JAGRUTI Humphreys.

## 2023-12-19 NOTE — H&P ADULT - NSHPLABSRESULTS_GEN_ALL_CORE
11.5   6.43  )-----------( 284      ( 18 Dec 2023 08:12 )             34.7     12-18    138  |  102  |  22  ----------------------------<  100<H>  4.1   |  28  |  0.68    Ca    8.8      18 Dec 2023 08:12        Urinalysis Basic - ( 18 Dec 2023 08:12 )    Color: x / Appearance: x / SG: x / pH: x  Gluc: 100 mg/dL / Ketone: x  / Bili: x / Urobili: x   Blood: x / Protein: x / Nitrite: x   Leuk Esterase: x / RBC: x / WBC x   Sq Epi: x / Non Sq Epi: x / Bacteria: x      CAPILLARY BLOOD GLUCOSE    CT Head No Cont (12.14.23 @ 09:34)    IMPRESSION: No change since 12/13/2023. Irregular appearing subacute to   chronic infarct left middle cerebral artery territory may be due to   spared gray matter, hemorrhage or laminar necrosis without change since   12/13/2023. Mass effect and mild midline shift to the right.      CT Head No Cont (12.15.23 @ 10:30)     Impression: Stable exam.       CT Head No Cont (12.19.23 @ 14:04)    IMPRESSION: Large subacute left middle cerebral artery infarct with mass   effect and mild midline shift improved since 12/15/2023. Irregularity of   infarct density likely related to spare gray versus petechial hemorrhage.

## 2023-12-19 NOTE — PROGRESS NOTE ADULT - REASON FOR ADMISSION
seizure activities

## 2023-12-19 NOTE — DISCHARGE NOTE NURSING/CASE MANAGEMENT/SOCIAL WORK - PATIENT PORTAL LINK FT
You can access the FollowMyHealth Patient Portal offered by [facility] by registering at the following website: [email or web address]. By joining [employer]’s FollowMyHealth portal, you will also be able to view your health information using other applications (apps) compatible with our system. You can access the FollowMyHealth Patient Portal offered by [facility] by registering at the following website: [email or web address]. By joining [facility]’s FollowMyHealth portal, you will also be able to view your health information using other applications (apps) compatible with our system.

## 2023-12-19 NOTE — H&P ADULT - NSHPSOCIALHISTORY_GEN_ALL_CORE
No  SOCIAL HISTORY  Smoking - None  EtOH - None  Marital Status -       FUNCTIONAL HISTORY  Previous Functional Status:   Pt unable to provide social history secondary to confusion. As per Care Coordination Assessment note, pt lives in home with her spouse.    Current Functional Status:  BM: [id number] assist   Transfers: modA with RW  Gait / ambulation: 50ft modA w/ RW  ADL's: UBD max-A, LBD dependent x1 person assist, toileting, dependent, grooming max-A x1 person assist, eating mod-A x1 person assist  Speech: Pureed Diet with Moderately Thick Liquids No  SOCIAL HISTORY  Smoking - None  EtOH - None  Marital Status -       FUNCTIONAL HISTORY  Previous Functional Status:   Pt unable to provide social history secondary to confusion. As per Care Coordination Assessment note, pt lives in home with her spouse.    Current Functional Status:  BM: [id number] assist   Transfers: modA with RW  Gait / ambulation: 50ft modA w/ RW  ADL's: UBD max-A, LBD dependent x1 person assist, toileting, dependent, grooming max-A x1 person assist, eating mod-A x1 person assist  Speech: Pureed Diet with Moderately Thick Liquids SOCIAL HISTORY  Smoking - None  EtOH - None  Marital Status -       FUNCTIONAL HISTORY  Previous Functional Status:   Pt unable to provide social history secondary to confusion. As per Care Coordination Assessment note, pt lives in home with her spouse.    Current Functional Status:  BM: [id number] assist   Transfers: modA with RW  Gait / ambulation: 50ft modA w/ RW  ADL's: UBD max-A, LBD dependent x1 person assist, toileting, dependent, grooming max-A x1 person assist, eating mod-A x1 person assist  Speech: Pureed Diet with Moderately Thick Liquids SOCIAL HISTORY  Smoking - None  EtOH - None  Marital Status -       FUNCTIONAL HISTORY  Previous Functional Status:   Pt unable to provide social history secondary to confusion. As per Care Coordination Assessment note, pt lives in home with her spouse.    Current Functional Status:  BM: [id number] assist   Transfers: modA with RW  Gait / ambulation: 50ft modA w/ RW  ADL's: UBD max-A, LBD dependent x1 person assist, toileting, dependent, grooming max-A x1 person assist, eating mod-A x1 person assist  Speech: Pureed Diet with Moderately Thick Liquids

## 2023-12-19 NOTE — PROGRESS NOTE ADULT - ASSESSMENT
88 year old female with past medical history of essential HTN, HLD, hypothyroidism, s/p PPM, breast cancer s/p left mastectomy (2009), recent admission for acute CVA and acute respiratory failure due to IV contrast anaphylaxis, transferred to [facility] on 12/8 for acute rehab and course was complicated by seizure like episodes and RUE shaking. Admitted to [facility] for further seizure workup, neurology following.    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 Mass effect and mild midline shift to Right  - Above was previously discussed with neurology, serial CT's appear stable, resumed aspirin 12/16 and cont High Intensity Statin  - Speech pathologist following, currently on Puree diet  - Continue PT/OT  - EEG reviewed with no seizures captured, risk of L frontotemporal focal onset seizures   - Continue Keppra 500mg PO BID, serum level in therapeutic range  - Seizure precautions  - Ativan 2mg IV PRN breakthrough seizures    #Essential HTN  - BP stable, continue lisinopril 20mg po daily    #Hypothyroid  - med rec reviewed, not on synthroid  - TSH level pending    #Oral thrush (resolved)  - completed 5 days of fluconazole    #stable breast cancer  #stable LLE wound -- routine wound care.     GOC:  Full Code    Dispo: D/C  to [facility] when bed available, she was accepted.  Discussed plan with daughter Elsi  88 year old female with past medical history of essential HTN, HLD, hypothyroidism, s/p PPM, breast cancer s/p left mastectomy (2009), recent admission for acute CVA and acute respiratory failure due to IV contrast anaphylaxis, transferred to [facility] on 12/8 for acute rehab and course was complicated by seizure like episodes and RUE shaking. Admitted to [facility] for further seizure workup, neurology following.    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 Mass effect and mild midline shift to Right  - Above was previously discussed with neurology, serial CT's appear stable, resumed aspirin 12/16 and cont High Intensity Statin  - Speech pathologist following, currently on Puree diet  - Continue PT/OT  - EEG reviewed with no seizures captured, risk of L frontotemporal focal onset seizures   - Continue Keppra 500mg PO BID, serum level in therapeutic range  - Seizure precautions  - Ativan 2mg IV PRN breakthrough seizures    #Essential HTN  - BP stable, continue lisinopril 20mg po daily    #Hypothyroid  - med rec reviewed, not on synthroid  - TSH level pending    #Oral thrush (resolved)  - completed 5 days of fluconazole    #stable breast cancer  #stable LLE wound -- routine wound care.     GOC:  Full Code    Dispo: D/C  to [facility] when bed available, she was accepted.  Discussed plan with daughter Elsi  88 year old female with past medical history of essential HTN, HLD, hypothyroidism, s/p PPM, breast cancer s/p left mastectomy (2009), recent admission for acute CVA and acute respiratory failure due to IV contrast anaphylaxis, transferred to [facility] on 12/8 for acute rehab and course was complicated by seizure like episodes and RUE shaking. Admitted to [facility] for further seizure workup, neurology following.    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 Mass effect and mild midline shift to Right  - Above was previously discussed with neurology, serial CT's appear stable, resumed aspirin 12/16 and cont High Intensity Statin  - Speech pathologist following, currently on Puree diet  - Continue PT/OT  - EEG reviewed with no seizures captured, risk of L frontotemporal focal onset seizures   - Continue Keppra 500mg PO BID, serum level in therapeutic range  - Seizure precautions  - Ativan 2mg IV PRN breakthrough seizures    #Essential HTN  - BP stable, continue lisinopril 20mg po daily    #Hypothyroid  - med rec reviewed, not on synthroid  - TSH level pending    #Oral thrush (resolved)  - completed 5 days of fluconazole    #stable breast cancer  #stable LLE wound -- routine wound care.     GOC:  Full Code    Dispo: D/C  to [facility] when bed available, needs PT/OT re-eval today, but she was accepted.  Discussed plan with daughter Elsi  88 year old female with past medical history of essential HTN, HLD, hypothyroidism, s/p PPM, breast cancer s/p left mastectomy (2009), recent admission for acute CVA and acute respiratory failure due to IV contrast anaphylaxis, transferred to [facility] on 12/8 for acute rehab and course was complicated by seizure like episodes and RUE shaking. Admitted to [facility] for further seizure workup, neurology following.    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 Mass effect and mild midline shift to Right  - Above was previously discussed with neurology, serial CT's appear stable, resumed aspirin 12/16 and cont High Intensity Statin  - Speech pathologist following, currently on Puree diet  - Continue PT/OT  - EEG reviewed with no seizures captured, risk of L frontotemporal focal onset seizures   - Continue Keppra 500mg PO BID, serum level in therapeutic range  - Seizure precautions  - Ativan 2mg IV PRN breakthrough seizures    #Essential HTN  - BP stable, continue lisinopril 20mg po daily    #Hypothyroid  - med rec reviewed, not on synthroid  - TSH level pending    #Oral thrush (resolved)  - completed 5 days of fluconazole    #stable breast cancer  #stable LLE wound -- routine wound care.     GOC:  Full Code    Dispo: D/C  to [facility] when bed available, needs PT/OT re-eval today, but she was accepted.  Discussed plan with daughter Elsi  88 year old female with past medical history of essential HTN, HLD, hypothyroidism, s/p PPM, breast cancer s/p left mastectomy (2009), recent admission for acute CVA and acute respiratory failure due to IV contrast anaphylaxis, transferred to [facility] on 12/8 for acute rehab and course was complicated by seizure like episodes and RUE shaking. Admitted to [facility] for further seizure workup, neurology following.    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 Mass effect and mild midline shift to Right  - Above was previously discussed with neurology, serial CT's appear stable, resumed aspirin 12/16 and cont High Intensity Statin  - Speech pathologist following, currently on Puree diet  - Continue PT/OT  - EEG reviewed with no seizures captured, risk of L frontotemporal focal onset seizures   - Continue Keppra 500mg PO BID, serum level in therapeutic range  - Seizure precautions  - Ativan 2mg IV PRN breakthrough seizures    #Essential HTN  - BP stable, continue lisinopril 20mg po daily    #Hypothyroid  - med rec reviewed, not on synthroid  - TSH level pending    #Oral thrush (resolved)  - completed 5 days of fluconazole    #stable breast cancer  #stable LLE wound -- routine wound care.     GOC:  Full Code    Dispo: D/C  to [facility] when bed available, needs PT/OT re-eval today, but she was accepted.  Left telephone message with daughter Elsi on plan. 88 year old female with past medical history of essential HTN, HLD, hypothyroidism, s/p PPM, breast cancer s/p left mastectomy (2009), recent admission for acute CVA and acute respiratory failure due to IV contrast anaphylaxis, transferred to [facility] on 12/8 for acute rehab and course was complicated by seizure like episodes and RUE shaking. Admitted to [facility] for further seizure workup, neurology following.    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 Mass effect and mild midline shift to Right  - Above was previously discussed with neurology, serial CT's appear stable, resumed aspirin 12/16 and cont High Intensity Statin  - Speech pathologist following, currently on Puree diet  - Continue PT/OT  - EEG reviewed with no seizures captured, risk of L frontotemporal focal onset seizures   - Continue Keppra 500mg PO BID, serum level in therapeutic range  - Seizure precautions  - Ativan 2mg IV PRN breakthrough seizures    #Essential HTN  - BP stable, continue lisinopril 20mg po daily    #Hypothyroid  - med rec reviewed, not on synthroid  - TSH level pending    #Oral thrush (resolved)  - completed 5 days of fluconazole    #stable breast cancer  #stable LLE wound -- routine wound care.     GOC:  Full Code    Dispo: D/C  to [facility] when bed available, needs PT/OT re-eval today, but she was accepted.  Left telephone message with daughter Elsi on plan.

## 2023-12-19 NOTE — H&P ADULT - NSHPREVIEWOFSYSTEMS_GEN_ALL_CORE
REVIEW OF SYSTEMS  Constitutional: No fever, No Chills, No fatigue  HEENT: No eye pain, No visual disturbances, No difficulty hearing  Pulm: + cough, No shortness of breath  Cardio: No chest pain, No palpitations  GI:  No abdominal pain, No nausea, No vomiting, No diarrhea  : No dysuria, No frequency, No hematuria  Neuro: No headaches, No memory loss, + loss of strength, + numbness, No tremors  MSK: + joint pain, No joint swelling, No muscle pain, No Neck pain,  No back pain REVIEW OF SYSTEMS  Constitutional: No fever, No Chills, No fatigue  HEENT: No eye pain, No visual disturbances, No difficulty hearing  Pulm: (+) cough, No shortness of breath  Cardio: No chest pain, No palpitations  GI:  No abdominal pain, No nausea, No vomiting, No diarrhea  : No dysuria, No frequency, No hematuria  Neuro: No headaches, No memory loss, (+) Loss of strength, (+)  numbness, No tremors  MSK: (+)  joint pain, No joint swelling, No muscle pain, No Neck pain,  No back pain  Psych: No depression or anxiety

## 2023-12-19 NOTE — PATIENT PROFILE ADULT - FUNCTIONAL ASSESSMENT - BASIC MOBILITY 6.
2-calculated by average/Not able to assess (calculate score using [facility] averaging method) 2-calculated by average/Not able to assess (calculate score using [facility] averaging method)

## 2023-12-19 NOTE — PROGRESS NOTE ADULT - PROVIDER SPECIALTY LIST ADULT
Critical Care
Hospitalist
Hospitalist
Critical Care
Hospitalist
Neurology
Neurology
Critical Care
Hospitalist
Critical Care

## 2023-12-19 NOTE — PATIENT PROFILE ADULT - FALL HARM RISK - HARM RISK INTERVENTIONS
Assistance with ambulation/Assistance OOB with selected safe patient handling equipment/Communicate Risk of Fall with Harm to all staff/Discuss with provider need for PT consult/Monitor gait and stability/Reinforce activity limits and safety measures with patient and family/Tailored Fall Risk Interventions/Visual Cue: Yellow wristband and red socks/Bed in lowest position, wheels locked, appropriate side rails in place/Call bell, personal items and telephone in reach/Instruct patient to call for assistance before getting out of bed or chair/Non-slip footwear when patient is out of bed/[place] to call system/Physically safe environment - no spills, clutter or unnecessary equipment/Purposeful Proactive Rounding/Room/bathroom lighting operational, light cord in reach Assistance with ambulation/Assistance OOB with selected safe patient handling equipment/Communicate Risk of Fall with Harm to all staff/Discuss with provider need for PT consult/Monitor gait and stability/Reinforce activity limits and safety measures with patient and family/Tailored Fall Risk Interventions/Visual Cue: Yellow wristband and red socks/Bed in lowest position, wheels locked, appropriate side rails in place/Call bell, personal items and telephone in reach/Instruct patient to call for assistance before getting out of bed or chair/Non-slip footwear when patient is out of bed/[place] to call system/Physically safe environment - no spills, clutter or unnecessary equipment/Purposeful Proactive Rounding/Room/bathroom lighting operational, light cord in reach

## 2023-12-19 NOTE — H&P ADULT - HISTORY OF PRESENT ILLNESS
88 year old female with a history of HTN, hypothyroidism, pacemaker, breast cancer who is admitted with CVA and acute respiratory failure due to IV contrast anaphylaxis. Patient had R sided hemiparesis, expressive aphasia. CT imaging confirmed multiple embolic areas. Pacemaker interrogated showing Atrial runs but no sustained Atrial Fibrillation. Patient currently on course of ASA and statin. Hospital course complicated by severe anaphylactic due to iodine contrast allergy from CT performed on admission to ED. 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    Pt was transferred to [facility] for acute rehab on 12/8. rehab course complicated by possible seizure episodes involving right facial twitching for 1 min and RUE shaking subsequently started and was persistent. Per rehab team, pt was awake and speaking but stopped responding to questions for a short period of time when facial twitching started. RRT was called for the seizure activities.     She was admitted to MICU 12/13 for further management. CTH 12/13 showed Redemonstrated left MCA distribution infarction, advanced when compared to prior imaging with likely petechial hemorrhages and/or laminar necrosis. mildine shift 0.5cm to the right. Aspirin and chemical DVT ppx were held. Neurosurgery (Dr. Gilmore) were consulted per neurology recommendation, no acute surgical intervention needed. Repeat CTH 12/14 showed No change since 12/13/2023. EEG 12/13 showed No seizures captured but Risk of left frontotemporal focal-onset seizures She was seen by neurology for post-stroke seizure, recommended to continue keppra given first seizure in setting of large stroke. A repeat EEG 12/15 showed no seizure but again Risk of left frontotemporal focal-onset seizures. Aspirin 81mg PO QD restarted after repeat CTH 12/15 showed Stable exam.      Patient was evaluated by PM&R and therapy for functional deficits and gait/ ADL impairments and recommended acute rehabilitation. Patient was medically optimized for discharge to [facility] on 12/19.      88 year old female with a history of HTN, hypothyroidism, pacemaker, breast cancer who is admitted with CVA and acute respiratory failure due to IV contrast anaphylaxis. Patient had R sided hemiparesis, expressive aphasia. CT imaging confirmed multiple embolic areas. Pacemaker interrogated showing Atrial runs but no sustained Atrial Fibrillation. Patient currently on course of ASA and statin. Hospital course complicated by severe anaphylactic due to iodine contrast allergy from CT performed on admission to ED. 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    Pt was transferred to [facility] for acute rehab on 12/8. rehab course complicated by possible seizure episodes involving right facial twitching for 1 min and RUE shaking subsequently started and was persistent. Per rehab team, pt was awake and speaking but stopped responding to questions for a short period of time when facial twitching started. RRT was called for the seizure activities.     She was admitted to MICU 12/13 for further management. CTH 12/13 showed Redemonstrated left MCA distribution infarction, advanced when compared to prior imaging with likely petechial hemorrhages and/or laminar necrosis. mildine shift 0.5cm to the right. Aspirin and chemical DVT ppx were held. Neurosurgery (Dr. Gilmore) were consulted per neurology recommendation, no acute surgical intervention needed. Repeat CTH 12/14 showed No change since 12/13/2023. EEG 12/13 showed No seizures captured but Risk of left frontotemporal focal-onset seizures She was seen by neurology for post-stroke seizure, recommended to continue keppra given first seizure in setting of large stroke. A repeat EEG 12/15 showed no seizure but again Risk of left frontotemporal focal-onset seizures. Aspirin 81mg PO QD restarted after repeat CTH 12/15 showed Stable exam.      Patient was evaluated by PM&R and therapy for functional deficits and gait/ ADL impairments and recommended acute rehabilitation. Patient was medically optimized for discharge to [facility] on 12/19.

## 2023-12-19 NOTE — PATIENT PROFILE ADULT - ARRIVAL FROM
Problem: Infant Inpatient Plan of Care  Goal: Plan of Care Review  Outcome: Ongoing, Progressing  Goal: Patient-Specific Goal (Individualization)  Outcome: Ongoing, Progressing  Goal: Absence of Hospital-Acquired Illness or Injury  Outcome: Ongoing, Progressing  Goal: Optimal Comfort and Wellbeing  Outcome: Ongoing, Progressing  Goal: Readiness for Transition of Care  Outcome: Ongoing, Progressing  Goal: Rounds/Family Conference  Outcome: Ongoing, Progressing    In open crib. VSS  Breastfeeding on demand with lactation assistance.   No emesis noted.  Voiding and stooling without difficulty  + carrie, T/D Bili @ 1500 noted, below phototherapy threshold.  Plan of care reviewed with mother. All questions answered.         
VSS, Breastfeeding on demand well, mom too sleepy this pm, requested baby to be formula fed x 1.  Voiding and stooling. Bonding well with mom. Mom stated an understanding to POC.  Bilirubin WDL.   
Hospitals/Psychiatric Facilities

## 2023-12-19 NOTE — H&P ADULT - NSICDXPASTMEDICALHX_GEN_ALL_CORE_FT
PAST MEDICAL HISTORY:  Breast cancer 2009 left - s/neida castellanos radical mastectomy    Cyst of left ovary     [place] (hard of hearing) bilateral HA (Yvette)    Hyperlipidemia no current medications    Meniscus tear left - 10/2015    SDH (subdural hematoma) 3/2011 - s/p fall - no surgical intervention     PAST MEDICAL HISTORY:  Breast cancer 2009 left - s/neida castellanos radical mastectomy    Cyst of left ovary     [place] (hard of hearing) bilateral HA (Yvette)    Hyperlipidemia no current medications    Meniscus tear left - 10/2015    SDH (subdural hematoma) 3/2011 - s/p fall - no surgical intervention

## 2023-12-20 LAB
ALBUMIN SERPL ELPH-MCNC: 2.6 G/DL — LOW (ref 3.3–5)
ALBUMIN SERPL ELPH-MCNC: 2.6 G/DL — LOW (ref 3.3–5)
ALP SERPL-CCNC: 89 U/L — SIGNIFICANT CHANGE UP (ref 40–120)
ALP SERPL-CCNC: 89 U/L — SIGNIFICANT CHANGE UP (ref 40–120)
ALT FLD-CCNC: 21 U/L — SIGNIFICANT CHANGE UP (ref 10–45)
ALT FLD-CCNC: 21 U/L — SIGNIFICANT CHANGE UP (ref 10–45)
ANION GAP SERPL CALC-SCNC: 8 MMOL/L — SIGNIFICANT CHANGE UP (ref 5–17)
ANION GAP SERPL CALC-SCNC: 8 MMOL/L — SIGNIFICANT CHANGE UP (ref 5–17)
AST SERPL-CCNC: 33 U/L — SIGNIFICANT CHANGE UP (ref 10–40)
AST SERPL-CCNC: 33 U/L — SIGNIFICANT CHANGE UP (ref 10–40)
BASOPHILS # BLD AUTO: 0.06 K/UL — SIGNIFICANT CHANGE UP (ref 0–0.2)
BASOPHILS # BLD AUTO: 0.06 K/UL — SIGNIFICANT CHANGE UP (ref 0–0.2)
BASOPHILS NFR BLD AUTO: 0.8 % — SIGNIFICANT CHANGE UP (ref 0–2)
BASOPHILS NFR BLD AUTO: 0.8 % — SIGNIFICANT CHANGE UP (ref 0–2)
BILIRUB SERPL-MCNC: 0.4 MG/DL — SIGNIFICANT CHANGE UP (ref 0.2–1.2)
BILIRUB SERPL-MCNC: 0.4 MG/DL — SIGNIFICANT CHANGE UP (ref 0.2–1.2)
BUN SERPL-MCNC: 24 MG/DL — HIGH (ref 7–23)
BUN SERPL-MCNC: 24 MG/DL — HIGH (ref 7–23)
CALCIUM SERPL-MCNC: 9 MG/DL — SIGNIFICANT CHANGE UP (ref 8.4–10.5)
CALCIUM SERPL-MCNC: 9 MG/DL — SIGNIFICANT CHANGE UP (ref 8.4–10.5)
CHLORIDE SERPL-SCNC: 101 MMOL/L — SIGNIFICANT CHANGE UP (ref 96–108)
CHLORIDE SERPL-SCNC: 101 MMOL/L — SIGNIFICANT CHANGE UP (ref 96–108)
CO2 SERPL-SCNC: 28 MMOL/L — SIGNIFICANT CHANGE UP (ref 22–31)
CO2 SERPL-SCNC: 28 MMOL/L — SIGNIFICANT CHANGE UP (ref 22–31)
CREAT SERPL-MCNC: 0.69 MG/DL — SIGNIFICANT CHANGE UP (ref 0.5–1.3)
CREAT SERPL-MCNC: 0.69 MG/DL — SIGNIFICANT CHANGE UP (ref 0.5–1.3)
EGFR: 84 ML/MIN/1.73M2 — SIGNIFICANT CHANGE UP
EGFR: 84 ML/MIN/1.73M2 — SIGNIFICANT CHANGE UP
EOSINOPHIL # BLD AUTO: 0.14 K/UL — SIGNIFICANT CHANGE UP (ref 0–0.5)
EOSINOPHIL # BLD AUTO: 0.14 K/UL — SIGNIFICANT CHANGE UP (ref 0–0.5)
EOSINOPHIL NFR BLD AUTO: 2 % — SIGNIFICANT CHANGE UP (ref 0–6)
EOSINOPHIL NFR BLD AUTO: 2 % — SIGNIFICANT CHANGE UP (ref 0–6)
GLUCOSE SERPL-MCNC: 109 MG/DL — HIGH (ref 70–99)
GLUCOSE SERPL-MCNC: 109 MG/DL — HIGH (ref 70–99)
HCT VFR BLD CALC: 36 % — SIGNIFICANT CHANGE UP (ref 34.5–45)
HCT VFR BLD CALC: 36 % — SIGNIFICANT CHANGE UP (ref 34.5–45)
HGB BLD-MCNC: 11.4 G/DL — LOW (ref 11.5–15.5)
HGB BLD-MCNC: 11.4 G/DL — LOW (ref 11.5–15.5)
IMM GRANULOCYTES NFR BLD AUTO: 0.3 % — SIGNIFICANT CHANGE UP (ref 0–0.9)
IMM GRANULOCYTES NFR BLD AUTO: 0.3 % — SIGNIFICANT CHANGE UP (ref 0–0.9)
LYMPHOCYTES # BLD AUTO: 1.07 K/UL — SIGNIFICANT CHANGE UP (ref 1–3.3)
LYMPHOCYTES # BLD AUTO: 1.07 K/UL — SIGNIFICANT CHANGE UP (ref 1–3.3)
LYMPHOCYTES # BLD AUTO: 14.9 % — SIGNIFICANT CHANGE UP (ref 13–44)
LYMPHOCYTES # BLD AUTO: 14.9 % — SIGNIFICANT CHANGE UP (ref 13–44)
MCHC RBC-ENTMCNC: 26.3 PG — LOW (ref 27–34)
MCHC RBC-ENTMCNC: 26.3 PG — LOW (ref 27–34)
MCHC RBC-ENTMCNC: 31.7 GM/DL — LOW (ref 32–36)
MCHC RBC-ENTMCNC: 31.7 GM/DL — LOW (ref 32–36)
MCV RBC AUTO: 82.9 FL — SIGNIFICANT CHANGE UP (ref 80–100)
MCV RBC AUTO: 82.9 FL — SIGNIFICANT CHANGE UP (ref 80–100)
MONOCYTES # BLD AUTO: 0.64 K/UL — SIGNIFICANT CHANGE UP (ref 0–0.9)
MONOCYTES # BLD AUTO: 0.64 K/UL — SIGNIFICANT CHANGE UP (ref 0–0.9)
MONOCYTES NFR BLD AUTO: 8.9 % — SIGNIFICANT CHANGE UP (ref 2–14)
MONOCYTES NFR BLD AUTO: 8.9 % — SIGNIFICANT CHANGE UP (ref 2–14)
NEUTROPHILS # BLD AUTO: 5.23 K/UL — SIGNIFICANT CHANGE UP (ref 1.8–7.4)
NEUTROPHILS # BLD AUTO: 5.23 K/UL — SIGNIFICANT CHANGE UP (ref 1.8–7.4)
NEUTROPHILS NFR BLD AUTO: 73.1 % — SIGNIFICANT CHANGE UP (ref 43–77)
NEUTROPHILS NFR BLD AUTO: 73.1 % — SIGNIFICANT CHANGE UP (ref 43–77)
NRBC # BLD: 0 /100 WBCS — SIGNIFICANT CHANGE UP (ref 0–0)
NRBC # BLD: 0 /100 WBCS — SIGNIFICANT CHANGE UP (ref 0–0)
PLATELET # BLD AUTO: 284 K/UL — SIGNIFICANT CHANGE UP (ref 150–400)
PLATELET # BLD AUTO: 284 K/UL — SIGNIFICANT CHANGE UP (ref 150–400)
POTASSIUM SERPL-MCNC: 4.1 MMOL/L — SIGNIFICANT CHANGE UP (ref 3.5–5.3)
POTASSIUM SERPL-MCNC: 4.1 MMOL/L — SIGNIFICANT CHANGE UP (ref 3.5–5.3)
POTASSIUM SERPL-SCNC: 4.1 MMOL/L — SIGNIFICANT CHANGE UP (ref 3.5–5.3)
POTASSIUM SERPL-SCNC: 4.1 MMOL/L — SIGNIFICANT CHANGE UP (ref 3.5–5.3)
PROT SERPL-MCNC: 6.6 G/DL — SIGNIFICANT CHANGE UP (ref 6–8.3)
PROT SERPL-MCNC: 6.6 G/DL — SIGNIFICANT CHANGE UP (ref 6–8.3)
RBC # BLD: 4.34 M/UL — SIGNIFICANT CHANGE UP (ref 3.8–5.2)
RBC # BLD: 4.34 M/UL — SIGNIFICANT CHANGE UP (ref 3.8–5.2)
RBC # FLD: 16.9 % — HIGH (ref 10.3–14.5)
RBC # FLD: 16.9 % — HIGH (ref 10.3–14.5)
SARS-COV-2 RNA SPEC QL NAA+PROBE: SIGNIFICANT CHANGE UP
SARS-COV-2 RNA SPEC QL NAA+PROBE: SIGNIFICANT CHANGE UP
SODIUM SERPL-SCNC: 137 MMOL/L — SIGNIFICANT CHANGE UP (ref 135–145)
SODIUM SERPL-SCNC: 137 MMOL/L — SIGNIFICANT CHANGE UP (ref 135–145)
T3 SERPL-MCNC: 100 NG/DL — SIGNIFICANT CHANGE UP (ref 80–200)
T3 SERPL-MCNC: 100 NG/DL — SIGNIFICANT CHANGE UP (ref 80–200)
T4 AB SER-ACNC: 9.6 UG/DL — SIGNIFICANT CHANGE UP (ref 4.6–12)
T4 AB SER-ACNC: 9.6 UG/DL — SIGNIFICANT CHANGE UP (ref 4.6–12)
TSH SERPL-MCNC: 0.03 UIU/ML — LOW (ref 0.36–3.74)
TSH SERPL-MCNC: 0.03 UIU/ML — LOW (ref 0.36–3.74)
WBC # BLD: 7.16 K/UL — SIGNIFICANT CHANGE UP (ref 3.8–10.5)
WBC # BLD: 7.16 K/UL — SIGNIFICANT CHANGE UP (ref 3.8–10.5)
WBC # FLD AUTO: 7.16 K/UL — SIGNIFICANT CHANGE UP (ref 3.8–10.5)
WBC # FLD AUTO: 7.16 K/UL — SIGNIFICANT CHANGE UP (ref 3.8–10.5)

## 2023-12-20 PROCEDURE — 83735 ASSAY OF MAGNESIUM: CPT

## 2023-12-20 PROCEDURE — 97535 SELF CARE MNGMENT TRAINING: CPT

## 2023-12-20 PROCEDURE — 84100 ASSAY OF PHOSPHORUS: CPT

## 2023-12-20 PROCEDURE — 92526 ORAL FUNCTION THERAPY: CPT

## 2023-12-20 PROCEDURE — 97166 OT EVAL MOD COMPLEX 45 MIN: CPT

## 2023-12-20 PROCEDURE — 97116 GAIT TRAINING THERAPY: CPT

## 2023-12-20 PROCEDURE — 70450 CT HEAD/BRAIN W/O DYE: CPT

## 2023-12-20 PROCEDURE — 99223 1ST HOSP IP/OBS HIGH 75: CPT

## 2023-12-20 PROCEDURE — 95812 EEG 41-60 MINUTES: CPT

## 2023-12-20 PROCEDURE — 84443 ASSAY THYROID STIM HORMONE: CPT

## 2023-12-20 PROCEDURE — 92610 EVALUATE SWALLOWING FUNCTION: CPT

## 2023-12-20 PROCEDURE — 80053 COMPREHEN METABOLIC PANEL: CPT

## 2023-12-20 PROCEDURE — 80048 BASIC METABOLIC PNL TOTAL CA: CPT

## 2023-12-20 PROCEDURE — 36415 COLL VENOUS BLD VENIPUNCTURE: CPT

## 2023-12-20 PROCEDURE — 80177 DRUG SCRN QUAN LEVETIRACETAM: CPT

## 2023-12-20 PROCEDURE — 85027 COMPLETE CBC AUTOMATED: CPT

## 2023-12-20 PROCEDURE — 99222 1ST HOSP IP/OBS MODERATE 55: CPT | Mod: GC

## 2023-12-20 PROCEDURE — 97110 THERAPEUTIC EXERCISES: CPT

## 2023-12-20 RX ADMIN — Medication 1 APPLICATION(S): at 05:28

## 2023-12-20 RX ADMIN — LEVETIRACETAM 500 MILLIGRAM(S): 250 TABLET, FILM COATED ORAL at 05:29

## 2023-12-20 RX ADMIN — Medication 1 TABLET(S): at 11:31

## 2023-12-20 RX ADMIN — LISINOPRIL 20 MILLIGRAM(S): 2.5 TABLET ORAL at 05:29

## 2023-12-20 RX ADMIN — ATORVASTATIN CALCIUM 80 MILLIGRAM(S): 80 TABLET, FILM COATED ORAL at 21:32

## 2023-12-20 RX ADMIN — LEVETIRACETAM 500 MILLIGRAM(S): 250 TABLET, FILM COATED ORAL at 18:17

## 2023-12-20 RX ADMIN — Medication 1 APPLICATION(S): at 18:37

## 2023-12-20 RX ADMIN — Medication 81 MILLIGRAM(S): at 11:31

## 2023-12-20 NOTE — CONSULT NOTE ADULT - SUBJECTIVE AND OBJECTIVE BOX
88 year old female with a history of HTN, hypothyroidism, pacemaker, breast cancer who is admitted with CVA and acute respiratory failure due to IV contrast anaphylaxis. Patient had R sided hemiparesis, expressive aphasia. CT imaging confirmed multiple embolic areas. Pacemaker interrogated showing Atrial runs but no sustained Atrial Fibrillation. Patient currently on course of ASA and statin. Hospital course complicated by severe anaphylactic due to iodine contrast allergy from CT performed on admission to ED. 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    Pt was transferred to [facility] for acute rehab on 12/8. rehab course complicated by possible seizure episodes involving right facial twitching for 1 min and RUE shaking subsequently started and was persistent. Per rehab team, pt was awake and speaking but stopped responding to questions for a short period of time when facial twitching started. RRT was called for the seizure activities.     She was admitted to MICU 12/13 for further management. CTH 12/13 showed Redemonstrated left MCA distribution infarction, advanced when compared to prior imaging with likely petechial hemorrhages and/or laminar necrosis. mildine shift 0.5cm to the right. Aspirin and chemical DVT ppx were held. Neurosurgery (Dr. Gilmore) were consulted per neurology recommendation, no acute surgical intervention needed. Repeat CTH 12/14 showed No change since 12/13/2023. EEG 12/13 showed No seizures captured but Risk of left frontotemporal focal-onset seizures She was seen by neurology for post-stroke seizure, recommended to continue keppra given first seizure in setting of large stroke. A repeat EEG 12/15 showed no seizure but again Risk of left frontotemporal focal-onset seizures. Aspirin 81mg PO QD restarted after repeat CTH 12/15 showed Stable exam.      Patient was evaluated by PM&R and therapy for functional deficits and gait/ ADL impairments and recommended acute rehabilitation. Patient was medically optimized for discharge to [facility] on 12/19.     seen at the bedside, appears tired c/o headache, no n/v, no cp, sob, or dysuria.  no overnight issues       Review of Systems: per hpi    Allergies and Intolerances:        Allergies:  	penicillin: Drug, Rash  	IV Contrast: Drug, Anaphylaxis, Urticaria    Home Medications:   * Patient Currently Takes Medications as of 18-Dec-2023 16:55 documented in Structured Notes  · 	Multiple Vitamins oral tablet: 1 tab(s) orally once a day  · 	bacitracin 500 units/g topical ointment: 1 Apply topically to affected area 2 times a day  · 	labetalol 5 mg/mL intravenous solution: 1 milliliter(s) intravenous every 4 hours As needed if SBP >180  · 	aspirin 81 mg oral tablet, chewable: 1 tab(s) orally once a day  · 	atorvastatin 80 mg oral tablet: 1 tab(s) orally once a day (at bedtime)  · 	levETIRAcetam 500 mg oral tablet: 1 tab(s) orally every 12 hours  · 	lisinopril 20 mg oral tablet: 1 tab(s) orally once a day Hold for SBP <110  · 	acetaminophen 325 mg oral tablet: 2 tab(s) orally every 6 hours As needed Temp greater or equal to 38C (100.4F), Mild Pain (1 - 3)      PAST MEDICAL HISTORY:  Breast cancer 2009 left - s/neida castellanos radical mastectomy    Cyst of left ovary     [place] (hard of hearing) bilateral HA (Yvette)    Hyperlipidemia no current medications    Meniscus tear left - 10/2015    SDH (subdural hematoma) 3/2011 - s/p fall - no surgical intervention.     PAST SURGICAL HISTORY:  Cataract extraction status of eye, right     History of Left Mastectomy     Status post right cataract extraction.     FAMILY HISTORY:  Father  Still living? No  FH: leukemia, Age at diagnosis: Age Unknown    Mother  Still living? No  FH: breast cancer, Age at diagnosis: Age Unknown.    SOCIAL HISTORY  Smoking - None  EtOH - None  Marital Status -       Physical Exam:     Vital Signs Last 24 Hrs  T(C): 36.9 (20 Dec 2023 07:37), Max: 36.9 (20 Dec 2023 07:37)  T(F): 98.4 (20 Dec 2023 07:37), Max: 98.4 (20 Dec 2023 07:37)  HR: 66 (20 Dec 2023 07:37) (66 - 103)  BP: 145/79 (20 Dec 2023 07:37) (107/73 - 145/79)  BP(mean): --  RR: 15 (20 Dec 2023 07:37) (15 - 16)  SpO2: 94% (20 Dec 2023 07:37) (94% - 99%)    Parameters below as of 20 Dec 2023 07:37  Patient On (Oxygen Delivery Method): room air    GENERAL- NAD  EAR/NOSE/MOUTH/THROAT - no pharyngeal exudates, no oral leisions,  MMM  EYES- ASYA, conjunctiva and Sclera clear  NECK- supple  RESPIRATORY-  clear to auscultation bilaterally, non laboured breathing  CARDIOVASCULAR - SIS2, RRR  GI - soft NT BS present  EXTREMITIES- no pedal edema  NEUROLOGY- lethargic, right sided weakness, follows some verbal directions, answers some questions  PSYCHIATRY- AAO X 1                  11.4                 137  | 28   | 24           7.16  >-----------< 284     ------------------------< 109                   36.0                 4.1  | 101  | 0.69                                         Ca 9.0   Mg x     Ph x        Labs reviewed:     CXR personally reviewed: < from: Xray Chest 1 View- PORTABLE-Urgent (Xray Chest 1 View- PORTABLE-Urgent .) (12.13.23 @ 13:21) >  IMPRESSION:   No radiographic evidence of active chest disease.    < end of copied text >      ECG reviewed and interpreted: < from: 12 Lead ECG (12.13.23 @ 16:31) >    Ventricular Rate 66 BPM    Atrial Rate 66 BPM    P-R Interval 176 ms    QRS Duration 146 ms    Q-T Interval 458 ms    QTC Calculation(Bazett) 480 ms    P Axis 41 degrees    R Axis -71 degrees    T Axis -9 degrees    Diagnosis Line Normal sinus rhythm  Right bundle branch block  Left anterior fascicular block  much baseline artifact  Please repeat    < end of copied text >    < from: US Transthoracic Echocardiogram w/Doppler Complete (12.05.23 @ 10:20) >    IMPRESSION:  1. Normal left ventricular size and systolic function with estimated   ejection fraction 70%. Mild diastolic dysfunction (stage I).  2. Mild to moderate mitral regurgitation.  3. Mild tricuspid regurgitation.  4. Severe pulmonary hypertension.    < end of copied text >    CT Head No Cont (12.14.23 @ 09:34)    IMPRESSION: No change since 12/13/2023. Irregular appearing subacute to   chronic infarct left middle cerebral artery territory may be due to   spared gray matter, hemorrhage or laminar necrosis without change since   12/13/2023. Mass effect and mild midline shift to the right.      CT Head No Cont (12.15.23 @ 10:30)     Impression: Stable exam.       CT Head No Cont (12.19.23 @ 14:04)    IMPRESSION: Large subacute left middle cerebral artery infarct with mass   effect and mild midline shift improved since 12/15/2023. Irregularity of   infarct density likely related to spare gray versus petechial hemorrhage.    MEDICATIONS  (STANDING):  aspirin  chewable 81 milliGRAM(s) Oral daily  atorvastatin 80 milliGRAM(s) Oral at bedtime  bacitracin   Ointment 1 Application(s) Topical two times a day  levETIRAcetam 500 milliGRAM(s) Oral every 12 hours  lidocaine   4% Patch 1 Patch Transdermal daily  lisinopril 20 milliGRAM(s) Oral daily  multivitamin 1 Tablet(s) Oral daily    MEDICATIONS  (PRN):  acetaminophen     Tablet .. 650 milliGRAM(s) Oral every 6 hours PRN Temp greater or equal to 38C (100.4F), Mild Pain (1 - 3)  aluminum hydroxide/magnesium hydroxide/simethicone Suspension 30 milliLiter(s) Oral every 4 hours PRN Dyspepsia  melatonin 3 milliGRAM(s) Oral at bedtime PRN Insomnia  ondansetron   Disintegrating Tablet 4 milliGRAM(s) Oral every 8 hours PRN Nausea and/or Vomiting  senna 2 Tablet(s) Oral at bedtime PRN Constipation     88 year old female with a history of HTN, hypothyroidism, pacemaker, breast cancer who is admitted with CVA and acute respiratory failure due to IV contrast anaphylaxis. Patient had R sided hemiparesis, expressive aphasia. CT imaging confirmed multiple embolic areas. Pacemaker interrogated showing Atrial runs but no sustained Atrial Fibrillation. Patient currently on course of ASA and statin. Hospital course complicated by severe anaphylactic due to iodine contrast allergy from CT performed on admission to ED. 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    Pt was transferred to [facility] for acute rehab on 12/8. rehab course complicated by possible seizure episodes involving right facial twitching for 1 min and RUE shaking subsequently started and was persistent. Per rehab team, pt was awake and speaking but stopped responding to questions for a short period of time when facial twitching started. RRT was called for the seizure activities.     She was admitted to MICU 12/13 for further management. CTH 12/13 showed Redemonstrated left MCA distribution infarction, advanced when compared to prior imaging with likely petechial hemorrhages and/or laminar necrosis. mildine shift 0.5cm to the right. Aspirin and chemical DVT ppx were held. Neurosurgery (Dr. Gilmore) were consulted per neurology recommendation, no acute surgical intervention needed. Repeat CTH 12/14 showed No change since 12/13/2023. EEG 12/13 showed No seizures captured but Risk of left frontotemporal focal-onset seizures She was seen by neurology for post-stroke seizure, recommended to continue keppra given first seizure in setting of large stroke. A repeat EEG 12/15 showed no seizure but again Risk of left frontotemporal focal-onset seizures. Aspirin 81mg PO QD restarted after repeat CTH 12/15 showed Stable exam.      Patient was evaluated by PM&R and therapy for functional deficits and gait/ ADL impairments and recommended acute rehabilitation. Patient was medically optimized for discharge to [facility] on 12/19.     seen at the bedside, appears tired c/o headache, no n/v, no cp, sob, or dysuria.  no overnight issues       Review of Systems: per hpi    Allergies and Intolerances:        Allergies:  	penicillin: Drug, Rash  	IV Contrast: Drug, Anaphylaxis, Urticaria    Home Medications:   * Patient Currently Takes Medications as of 18-Dec-2023 16:55 documented in Structured Notes  · 	Multiple Vitamins oral tablet: 1 tab(s) orally once a day  · 	bacitracin 500 units/g topical ointment: 1 Apply topically to affected area 2 times a day  · 	labetalol 5 mg/mL intravenous solution: 1 milliliter(s) intravenous every 4 hours As needed if SBP >180  · 	aspirin 81 mg oral tablet, chewable: 1 tab(s) orally once a day  · 	atorvastatin 80 mg oral tablet: 1 tab(s) orally once a day (at bedtime)  · 	levETIRAcetam 500 mg oral tablet: 1 tab(s) orally every 12 hours  · 	lisinopril 20 mg oral tablet: 1 tab(s) orally once a day Hold for SBP <110  · 	acetaminophen 325 mg oral tablet: 2 tab(s) orally every 6 hours As needed Temp greater or equal to 38C (100.4F), Mild Pain (1 - 3)      PAST MEDICAL HISTORY:  Breast cancer 2009 left - s/neida castellanos radical mastectomy    Cyst of left ovary     [place] (hard of hearing) bilateral HA (Yvette)    Hyperlipidemia no current medications    Meniscus tear left - 10/2015    SDH (subdural hematoma) 3/2011 - s/p fall - no surgical intervention.     PAST SURGICAL HISTORY:  Cataract extraction status of eye, right     History of Left Mastectomy     Status post right cataract extraction.     FAMILY HISTORY:  Father  Still living? No  FH: leukemia, Age at diagnosis: Age Unknown    Mother  Still living? No  FH: breast cancer, Age at diagnosis: Age Unknown.    SOCIAL HISTORY  Smoking - None  EtOH - None  Marital Status -       Physical Exam:     Vital Signs Last 24 Hrs  T(C): 36.9 (20 Dec 2023 07:37), Max: 36.9 (20 Dec 2023 07:37)  T(F): 98.4 (20 Dec 2023 07:37), Max: 98.4 (20 Dec 2023 07:37)  HR: 66 (20 Dec 2023 07:37) (66 - 103)  BP: 145/79 (20 Dec 2023 07:37) (107/73 - 145/79)  BP(mean): --  RR: 15 (20 Dec 2023 07:37) (15 - 16)  SpO2: 94% (20 Dec 2023 07:37) (94% - 99%)    Parameters below as of 20 Dec 2023 07:37  Patient On (Oxygen Delivery Method): room air    GENERAL- NAD  EAR/NOSE/MOUTH/THROAT - no pharyngeal exudates, no oral leisions,  MMM  EYES- ASYA, conjunctiva and Sclera clear  NECK- supple  RESPIRATORY-  clear to auscultation bilaterally, non laboured breathing  CARDIOVASCULAR - SIS2, RRR  GI - soft NT BS present  EXTREMITIES- no pedal edema  NEUROLOGY- lethargic, right sided weakness, follows some verbal directions, answers some questions  PSYCHIATRY- AAO X 1                  11.4                 137  | 28   | 24           7.16  >-----------< 284     ------------------------< 109                   36.0                 4.1  | 101  | 0.69                                         Ca 9.0   Mg x     Ph x        Labs reviewed:     CXR personally reviewed: < from: Xray Chest 1 View- PORTABLE-Urgent (Xray Chest 1 View- PORTABLE-Urgent .) (12.13.23 @ 13:21) >  IMPRESSION:   No radiographic evidence of active chest disease.    < end of copied text >      ECG reviewed and interpreted: < from: 12 Lead ECG (12.13.23 @ 16:31) >    Ventricular Rate 66 BPM    Atrial Rate 66 BPM    P-R Interval 176 ms    QRS Duration 146 ms    Q-T Interval 458 ms    QTC Calculation(Bazett) 480 ms    P Axis 41 degrees    R Axis -71 degrees    T Axis -9 degrees    Diagnosis Line Normal sinus rhythm  Right bundle branch block  Left anterior fascicular block  much baseline artifact  Please repeat    < end of copied text >    < from: US Transthoracic Echocardiogram w/Doppler Complete (12.05.23 @ 10:20) >    IMPRESSION:  1. Normal left ventricular size and systolic function with estimated   ejection fraction 70%. Mild diastolic dysfunction (stage I).  2. Mild to moderate mitral regurgitation.  3. Mild tricuspid regurgitation.  4. Severe pulmonary hypertension.    < end of copied text >    CT Head No Cont (12.14.23 @ 09:34)    IMPRESSION: No change since 12/13/2023. Irregular appearing subacute to   chronic infarct left middle cerebral artery territory may be due to   spared gray matter, hemorrhage or laminar necrosis without change since   12/13/2023. Mass effect and mild midline shift to the right.      CT Head No Cont (12.15.23 @ 10:30)     Impression: Stable exam.       CT Head No Cont (12.19.23 @ 14:04)    IMPRESSION: Large subacute left middle cerebral artery infarct with mass   effect and mild midline shift improved since 12/15/2023. Irregularity of   infarct density likely related to spare gray versus petechial hemorrhage.    MEDICATIONS  (STANDING):  aspirin  chewable 81 milliGRAM(s) Oral daily  atorvastatin 80 milliGRAM(s) Oral at bedtime  bacitracin   Ointment 1 Application(s) Topical two times a day  levETIRAcetam 500 milliGRAM(s) Oral every 12 hours  lidocaine   4% Patch 1 Patch Transdermal daily  lisinopril 20 milliGRAM(s) Oral daily  multivitamin 1 Tablet(s) Oral daily    MEDICATIONS  (PRN):  acetaminophen     Tablet .. 650 milliGRAM(s) Oral every 6 hours PRN Temp greater or equal to 38C (100.4F), Mild Pain (1 - 3)  aluminum hydroxide/magnesium hydroxide/simethicone Suspension 30 milliLiter(s) Oral every 4 hours PRN Dyspepsia  melatonin 3 milliGRAM(s) Oral at bedtime PRN Insomnia  ondansetron   Disintegrating Tablet 4 milliGRAM(s) Oral every 8 hours PRN Nausea and/or Vomiting  senna 2 Tablet(s) Oral at bedtime PRN Constipation     88 year old female with a history of HTN, hypothyroidism, pacemaker, breast cancer who is admitted with CVA and acute respiratory failure due to IV contrast anaphylaxis. Patient had R sided hemiparesis, expressive aphasia. CT imaging confirmed multiple embolic areas. Pacemaker interrogated showing Atrial runs but no sustained Atrial Fibrillation. Patient currently on course of ASA and statin. Hospital course complicated by severe anaphylactic due to iodine contrast allergy from CT performed on admission to ED. 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    Pt was transferred to [facility] for acute rehab on 12/8. rehab course complicated by possible seizure episodes involving right facial twitching for 1 min and RUE shaking subsequently started and was persistent. Per rehab team, pt was awake and speaking but stopped responding to questions for a short period of time when facial twitching started. RRT was called for the seizure activities.     She was admitted to MICU 12/13 for further management. CTH 12/13 showed Redemonstrated left MCA distribution infarction, advanced when compared to prior imaging with likely petechial hemorrhages and/or laminar necrosis. mildine shift 0.5cm to the right. Aspirin and chemical DVT ppx were held. Neurosurgery (Dr. Gilmore) were consulted per neurology recommendation, no acute surgical intervention needed. Repeat CTH 12/14 showed No change since 12/13/2023. EEG 12/13 showed No seizures captured but Risk of left frontotemporal focal-onset seizures She was seen by neurology for post-stroke seizure, recommended to continue keppra given first seizure in setting of large stroke. A repeat EEG 12/15 showed no seizure but again Risk of left frontotemporal focal-onset seizures. Aspirin 81mg PO QD restarted after repeat CTH 12/15 showed Stable exam.      Patient was evaluated by PM&R and therapy for functional deficits and gait/ ADL impairments and recommended acute rehabilitation. Patient was medically optimized for discharge to [facility] on 12/19.     seen at the bedside, appears tired c/o headache, no n/v, no cp, sob, or dysuria.  no overnight issues       Review of Systems: per hpi    Allergies and Intolerances:        Allergies:  	penicillin: Drug, Rash  	IV Contrast: Drug, Anaphylaxis, Urticaria    Home Medications:   * Patient Currently Takes Medications as of 18-Dec-2023 16:55 documented in Structured Notes  · 	Multiple Vitamins oral tablet: 1 tab(s) orally once a day  · 	bacitracin 500 units/g topical ointment: 1 Apply topically to affected area 2 times a day  · 	labetalol 5 mg/mL intravenous solution: 1 milliliter(s) intravenous every 4 hours As needed if SBP >180  · 	aspirin 81 mg oral tablet, chewable: 1 tab(s) orally once a day  · 	atorvastatin 80 mg oral tablet: 1 tab(s) orally once a day (at bedtime)  · 	levETIRAcetam 500 mg oral tablet: 1 tab(s) orally every 12 hours  · 	lisinopril 20 mg oral tablet: 1 tab(s) orally once a day Hold for SBP <110  · 	acetaminophen 325 mg oral tablet: 2 tab(s) orally every 6 hours As needed Temp greater or equal to 38C (100.4F), Mild Pain (1 - 3)      PAST MEDICAL HISTORY:  Breast cancer 2009 left - s/neida castellanos radical mastectomy    Cyst of left ovary     [place] (hard of hearing) bilateral HA (Yvette)    Hyperlipidemia no current medications    Meniscus tear left - 10/2015    SDH (subdural hematoma) 3/2011 - s/p fall - no surgical intervention.     PAST SURGICAL HISTORY:  Cataract extraction status of eye, right     History of Left Mastectomy     Status post right cataract extraction.     FAMILY HISTORY:  Father  Still living? No  FH: leukemia, Age at diagnosis: Age Unknown    Mother  Still living? No  FH: breast cancer, Age at diagnosis: Age Unknown.    SOCIAL HISTORY  Smoking - None  EtOH - None  Marital Status -       Physical Exam:     Vital Signs Last 24 Hrs  T(C): 36.9 (20 Dec 2023 07:37), Max: 36.9 (20 Dec 2023 07:37)  T(F): 98.4 (20 Dec 2023 07:37), Max: 98.4 (20 Dec 2023 07:37)  HR: 66 (20 Dec 2023 07:37) (66 - 103)  BP: 145/79 (20 Dec 2023 07:37) (107/73 - 145/79)  BP(mean): --  RR: 15 (20 Dec 2023 07:37) (15 - 16)  SpO2: 94% (20 Dec 2023 07:37) (94% - 99%)    Parameters below as of 20 Dec 2023 07:37  Patient On (Oxygen Delivery Method): room air    GENERAL- NAD  EAR/NOSE/MOUTH/THROAT - MMM  EYES- ASYA, conjunctiva and Sclera clear  NECK- supple  RESPIRATORY-  clear to auscultation bilaterally, non laboured breathing  CARDIOVASCULAR - SIS2, RRR  GI - soft NT BS present  EXTREMITIES- no pedal edema  NEUROLOGY- lethargic, right sided weakness, follows some verbal directions, answers some questions  PSYCHIATRY- AAO X 1                  11.4                 137  | 28   | 24           7.16  >-----------< 284     ------------------------< 109                   36.0                 4.1  | 101  | 0.69                                         Ca 9.0   Mg x     Ph x        Labs reviewed:     CXR personally reviewed: < from: Xray Chest 1 View- PORTABLE-Urgent (Xray Chest 1 View- PORTABLE-Urgent .) (12.13.23 @ 13:21) >  IMPRESSION:   No radiographic evidence of active chest disease.    < end of copied text >      ECG reviewed and interpreted: < from: 12 Lead ECG (12.13.23 @ 16:31) >    Ventricular Rate 66 BPM    Atrial Rate 66 BPM    P-R Interval 176 ms    QRS Duration 146 ms    Q-T Interval 458 ms    QTC Calculation(Bazett) 480 ms    P Axis 41 degrees    R Axis -71 degrees    T Axis -9 degrees    Diagnosis Line Normal sinus rhythm  Right bundle branch block  Left anterior fascicular block  much baseline artifact  Please repeat    < end of copied text >    < from: US Transthoracic Echocardiogram w/Doppler Complete (12.05.23 @ 10:20) >    IMPRESSION:  1. Normal left ventricular size and systolic function with estimated   ejection fraction 70%. Mild diastolic dysfunction (stage I).  2. Mild to moderate mitral regurgitation.  3. Mild tricuspid regurgitation.  4. Severe pulmonary hypertension.    < end of copied text >    CT Head No Cont (12.14.23 @ 09:34)    IMPRESSION: No change since 12/13/2023. Irregular appearing subacute to   chronic infarct left middle cerebral artery territory may be due to   spared gray matter, hemorrhage or laminar necrosis without change since   12/13/2023. Mass effect and mild midline shift to the right.      CT Head No Cont (12.15.23 @ 10:30)     Impression: Stable exam.       CT Head No Cont (12.19.23 @ 14:04)    IMPRESSION: Large subacute left middle cerebral artery infarct with mass   effect and mild midline shift improved since 12/15/2023. Irregularity of   infarct density likely related to spare gray versus petechial hemorrhage.    MEDICATIONS  (STANDING):  aspirin  chewable 81 milliGRAM(s) Oral daily  atorvastatin 80 milliGRAM(s) Oral at bedtime  bacitracin   Ointment 1 Application(s) Topical two times a day  levETIRAcetam 500 milliGRAM(s) Oral every 12 hours  lidocaine   4% Patch 1 Patch Transdermal daily  lisinopril 20 milliGRAM(s) Oral daily  multivitamin 1 Tablet(s) Oral daily    MEDICATIONS  (PRN):  acetaminophen     Tablet .. 650 milliGRAM(s) Oral every 6 hours PRN Temp greater or equal to 38C (100.4F), Mild Pain (1 - 3)  aluminum hydroxide/magnesium hydroxide/simethicone Suspension 30 milliLiter(s) Oral every 4 hours PRN Dyspepsia  melatonin 3 milliGRAM(s) Oral at bedtime PRN Insomnia  ondansetron   Disintegrating Tablet 4 milliGRAM(s) Oral every 8 hours PRN Nausea and/or Vomiting  senna 2 Tablet(s) Oral at bedtime PRN Constipation     88 year old female with a history of HTN, hypothyroidism, pacemaker, breast cancer who is admitted with CVA and acute respiratory failure due to IV contrast anaphylaxis. Patient had R sided hemiparesis, expressive aphasia. CT imaging confirmed multiple embolic areas. Pacemaker interrogated showing Atrial runs but no sustained Atrial Fibrillation. Patient currently on course of ASA and statin. Hospital course complicated by severe anaphylactic due to iodine contrast allergy from CT performed on admission to ED. 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    Pt was transferred to [facility] for acute rehab on 12/8. rehab course complicated by possible seizure episodes involving right facial twitching for 1 min and RUE shaking subsequently started and was persistent. Per rehab team, pt was awake and speaking but stopped responding to questions for a short period of time when facial twitching started. RRT was called for the seizure activities.     She was admitted to MICU 12/13 for further management. CTH 12/13 showed Redemonstrated left MCA distribution infarction, advanced when compared to prior imaging with likely petechial hemorrhages and/or laminar necrosis. mildine shift 0.5cm to the right. Aspirin and chemical DVT ppx were held. Neurosurgery (Dr. Gilmore) were consulted per neurology recommendation, no acute surgical intervention needed. Repeat CTH 12/14 showed No change since 12/13/2023. EEG 12/13 showed No seizures captured but Risk of left frontotemporal focal-onset seizures She was seen by neurology for post-stroke seizure, recommended to continue keppra given first seizure in setting of large stroke. A repeat EEG 12/15 showed no seizure but again Risk of left frontotemporal focal-onset seizures. Aspirin 81mg PO QD restarted after repeat CTH 12/15 showed Stable exam.      Patient was evaluated by PM&R and therapy for functional deficits and gait/ ADL impairments and recommended acute rehabilitation. Patient was medically optimized for discharge to [facility] on 12/19.     seen at the bedside, appears tired c/o headache, no n/v, no cp, sob, or dysuria.  no overnight issues       Review of Systems: per hpi    Allergies and Intolerances:        Allergies:  	penicillin: Drug, Rash  	IV Contrast: Drug, Anaphylaxis, Urticaria    Home Medications:   * Patient Currently Takes Medications as of 18-Dec-2023 16:55 documented in Structured Notes  · 	Multiple Vitamins oral tablet: 1 tab(s) orally once a day  · 	bacitracin 500 units/g topical ointment: 1 Apply topically to affected area 2 times a day  · 	labetalol 5 mg/mL intravenous solution: 1 milliliter(s) intravenous every 4 hours As needed if SBP >180  · 	aspirin 81 mg oral tablet, chewable: 1 tab(s) orally once a day  · 	atorvastatin 80 mg oral tablet: 1 tab(s) orally once a day (at bedtime)  · 	levETIRAcetam 500 mg oral tablet: 1 tab(s) orally every 12 hours  · 	lisinopril 20 mg oral tablet: 1 tab(s) orally once a day Hold for SBP <110  · 	acetaminophen 325 mg oral tablet: 2 tab(s) orally every 6 hours As needed Temp greater or equal to 38C (100.4F), Mild Pain (1 - 3)      PAST MEDICAL HISTORY:  Breast cancer 2009 left - s/neida castellanos radical mastectomy    Cyst of left ovary     [place] (hard of hearing) bilateral HA (Yvette)    Hyperlipidemia no current medications    Meniscus tear left - 10/2015    SDH (subdural hematoma) 3/2011 - s/p fall - no surgical intervention.     PAST SURGICAL HISTORY:  Cataract extraction status of eye, right     History of Left Mastectomy     Status post right cataract extraction.     FAMILY HISTORY:  Father  Still living? No  FH: leukemia, Age at diagnosis: Age Unknown    Mother  Still living? No  FH: breast cancer, Age at diagnosis: Age Unknown.    SOCIAL HISTORY  Smoking - None  EtOH - None  Marital Status -       Physical Exam:     Vital Signs Last 24 Hrs  T(C): 36.9 (20 Dec 2023 07:37), Max: 36.9 (20 Dec 2023 07:37)  T(F): 98.4 (20 Dec 2023 07:37), Max: 98.4 (20 Dec 2023 07:37)  HR: 66 (20 Dec 2023 07:37) (66 - 103)  BP: 145/79 (20 Dec 2023 07:37) (107/73 - 145/79)  BP(mean): --  RR: 15 (20 Dec 2023 07:37) (15 - 16)  SpO2: 94% (20 Dec 2023 07:37) (94% - 99%)    Parameters below as of 20 Dec 2023 07:37  Patient On (Oxygen Delivery Method): room air    GENERAL- NAD  EAR/NOSE/MOUTH/THROAT - MMM  EYES- ASYA, conjunctiva and Sclera clear  NECK- supple  RESPIRATORY-  clear to auscultation bilaterally, non laboured breathing  CARDIOVASCULAR - SIS2, RRR  GI - soft NT BS present  EXTREMITIES- no pedal edema  NEUROLOGY- lethargic, right sided weakness, follows some verbal directions, answers some questions  PSYCHIATRY- AAO X 1                  11.4                 137  | 28   | 24           7.16  >-----------< 284     ------------------------< 109                   36.0                 4.1  | 101  | 0.69                                         Ca 9.0   Mg x     Ph x        Labs reviewed:     CXR personally reviewed: < from: Xray Chest 1 View- PORTABLE-Urgent (Xray Chest 1 View- PORTABLE-Urgent .) (12.13.23 @ 13:21) >  IMPRESSION:   No radiographic evidence of active chest disease.    < end of copied text >      ECG reviewed and interpreted: < from: 12 Lead ECG (12.13.23 @ 16:31) >    Ventricular Rate 66 BPM    Atrial Rate 66 BPM    P-R Interval 176 ms    QRS Duration 146 ms    Q-T Interval 458 ms    QTC Calculation(Bazett) 480 ms    P Axis 41 degrees    R Axis -71 degrees    T Axis -9 degrees    Diagnosis Line Normal sinus rhythm  Right bundle branch block  Left anterior fascicular block  much baseline artifact  Please repeat    < end of copied text >    < from: US Transthoracic Echocardiogram w/Doppler Complete (12.05.23 @ 10:20) >    IMPRESSION:  1. Normal left ventricular size and systolic function with estimated   ejection fraction 70%. Mild diastolic dysfunction (stage I).  2. Mild to moderate mitral regurgitation.  3. Mild tricuspid regurgitation.  4. Severe pulmonary hypertension.    < end of copied text >    CT Head No Cont (12.14.23 @ 09:34)    IMPRESSION: No change since 12/13/2023. Irregular appearing subacute to   chronic infarct left middle cerebral artery territory may be due to   spared gray matter, hemorrhage or laminar necrosis without change since   12/13/2023. Mass effect and mild midline shift to the right.      CT Head No Cont (12.15.23 @ 10:30)     Impression: Stable exam.       CT Head No Cont (12.19.23 @ 14:04)    IMPRESSION: Large subacute left middle cerebral artery infarct with mass   effect and mild midline shift improved since 12/15/2023. Irregularity of   infarct density likely related to spare gray versus petechial hemorrhage.    MEDICATIONS  (STANDING):  aspirin  chewable 81 milliGRAM(s) Oral daily  atorvastatin 80 milliGRAM(s) Oral at bedtime  bacitracin   Ointment 1 Application(s) Topical two times a day  levETIRAcetam 500 milliGRAM(s) Oral every 12 hours  lidocaine   4% Patch 1 Patch Transdermal daily  lisinopril 20 milliGRAM(s) Oral daily  multivitamin 1 Tablet(s) Oral daily    MEDICATIONS  (PRN):  acetaminophen     Tablet .. 650 milliGRAM(s) Oral every 6 hours PRN Temp greater or equal to 38C (100.4F), Mild Pain (1 - 3)  aluminum hydroxide/magnesium hydroxide/simethicone Suspension 30 milliLiter(s) Oral every 4 hours PRN Dyspepsia  melatonin 3 milliGRAM(s) Oral at bedtime PRN Insomnia  ondansetron   Disintegrating Tablet 4 milliGRAM(s) Oral every 8 hours PRN Nausea and/or Vomiting  senna 2 Tablet(s) Oral at bedtime PRN Constipation

## 2023-12-20 NOTE — DIETITIAN INITIAL EVALUATION ADULT - NSICDXPASTMEDICALHX_GEN_ALL_CORE_FT
PAST MEDICAL HISTORY:  Breast cancer 2009 left - s/neida castellanos radical mastectomy    Cyst of left ovary     [place] (hard of hearing) bilateral HA (Yvette)    Hyperlipidemia no current medications    Meniscus tear left - 10/2015    SDH (subdural hematoma) 3/2011 - s/p fall - no surgical intervention     PAST MEDICAL HISTORY:  Breast cancer 2009 left - s/neida castellanos radical mastectomy    Cyst of left ovary     [place] (hard of hearing) bilateral HA (Yvette)    Hyperlipidemia no current medications    Meniscus tear left - 10/2015    SDH (subdural hematoma) 3/2011 - s/p fall - no surgical intervention

## 2023-12-20 NOTE — CONSULT NOTE ADULT - ASSESSMENT
The patient is an 88 year old female with a history of HTN, hypothyroidism, pacemaker, breast cancer who is admitted with CVA and acute respiratory failure due to IV contrast anaphylaxis. 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 Hosptial course complicated by possible seizure episodes involving right facial twitching for 1 min and RUE shaking subsequently started and was persistent. CTH 12/13 showed re demonstrated left MCA distribution infarction, advanced when compared to prior imaging with likely petechial hemorrhages and/or laminar necrosis and midline shift 0.5cm to the right. EEG 12/13 showed no seizures captured but at risk of left frontotemporal focal-onset seizures, Admitted for multidisciplinary rehab program- pt/ot/dvt ppx    #L MCA CVA, Right dominant hemiparesis    -Pacemaker interrogated showing Atrial runs but no sustained Atrial Fibrillation   - CT head (12/4) showed Large left MCA territory acute to early subacute infarction, thrombosis of multiple distal branches of the left MCA and no acute intracranial hemorrhage.  -ASA  -atorvastatin     #Seizures   EEG 12/15 showed no seizure but risk of left frontotemporal focal-onset seizures  CT head 12/15 stable   -c/w Keppra     #HTN  - Lisinopril     #DVT prophylaxis:  - ASA 81mg PO QD     will follow  d/w rehab team   time spent in e/m visit- 80 min  The patient is an 88 year old female with a history of HTN, hypothyroidism, pacemaker, breast cancer who is admitted with CVA and acute respiratory failure due to IV contrast anaphylaxis. 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 Hosptial course complicated by possible seizure episodes involving right facial twitching for 1 min and RUE shaking subsequently started and was persistent. CTH 12/13 showed re demonstrated left MCA distribution infarction, advanced when compared to prior imaging with likely petechial hemorrhages and/or laminar necrosis and midline shift 0.5cm to the right. EEG 12/13 showed no seizures captured but at risk of left frontotemporal focal-onset seizures, Admitted for multidisciplinary rehab program- pt/ot/dvt ppx    #L MCA CVA, Right dominant hemiparesis    -Pacemaker interrogated showing Atrial runs but no sustained Atrial Fibrillation   - CT head (12/4) showed Large left MCA territory acute to early subacute infarction, thrombosis of multiple distal branches of the left MCA and no acute intracranial hemorrhage.  -ASA  -atorvastatin     #Seizures   EEG 12/15 showed no seizure but risk of left frontotemporal focal-onset seizures  CT head 12/15 stable   -c/w Keppra     # abnormal low TSH-  - endocrine consult  - monitor    #HTN  - Lisinopril     #DVT prophylaxis:  - ASA 81mg PO QD     will follow  d/w rehab team   time spent in e/m visit- 80 min

## 2023-12-20 NOTE — DIETITIAN INITIAL EVALUATION ADULT - OTHER INFO
88 year old female with a history of HTN, hypothyroidism, pacemaker, breast cancer who is admitted with CVA and acute respiratory failure due to IV contrast anaphylaxis. Patient had R sided hemiparesis, expressive aphasia. CT imaging confirmed multiple embolic areas. Pacemaker interrogated showing Atrial runs but no sustained Atrial Fibrillation. Patient currently on course of ASA and statin. Hospital course complicated by severe anaphylactic due to iodine contrast allergy from CT performed on admission to ED. 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    Pt was transferred to [facility] for acute rehab on 12/8. rehab course complicated by possible seizure episodes involving right facial twitching for 1 min and RUE shaking subsequently started and was persistent. Per rehab team, pt was awake and speaking but stopped responding to questions for a short period of time when facial twitching started. RRT was called for the seizure activities.     She was admitted to MICU 12/13 for further management. CTH 12/13 showed Redemonstrated left MCA distribution infarction, advanced when compared to prior imaging with likely petechial hemorrhages and/or laminar necrosis. mildine shift 0.5cm to the right. Aspirin and chemical DVT ppx were held. Neurosurgery (Dr. Gilmore) were consulted per neurology recommendation, no acute surgical intervention needed. Repeat CTH 12/14 showed No change since 12/13/2023. EEG 12/13 showed No seizures captured but Risk of left frontotemporal focal-onset seizures She was seen by neurology for post-stroke seizure, recommended to continue keppra given first seizure in setting of large stroke. A repeat EEG 12/15 showed no seizure but again Risk of left frontotemporal focal-onset seizures. Aspirin 81mg PO QD restarted after repeat CTH 12/15 showed Stable exam.      Patient was evaluated by PM&R and therapy for functional deficits and gait/ ADL impairments and recommended acute rehabilitation. Patient was medically optimized for discharge to [facility] on 12/19.     Met with pt this AM at bedside. Pt was unwilling to participate in nutrition interview - information collected from medical chart and previous RD notes. NKFA nor food intolerances reported. Last BM 12/19 per RN flowsheets.     Previous RD note (12/14):  - Pt coded for acute moderate malnutrition related to inadequate protein-energy intake s/p CVA as evidenced by moderate muscle wasting/fat loss.   - Weight: 143.9 lbs (currently 137.7 lbs - 4.3% weight loss over 1 week; meets criteria for malnutrition) 88 year old female with a history of HTN, hypothyroidism, pacemaker, breast cancer who is admitted with CVA and acute respiratory failure due to IV contrast anaphylaxis. Patient had R sided hemiparesis, expressive aphasia. CT imaging confirmed multiple embolic areas. Pacemaker interrogated showing Atrial runs but no sustained Atrial Fibrillation. Patient currently on course of ASA and statin. Hospital course complicated by severe anaphylactic due to iodine contrast allergy from CT performed on admission to ED. 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    Pt was transferred to [facility] for acute rehab on 12/8. rehab course complicated by possible seizure episodes involving right facial twitching for 1 min and RUE shaking subsequently started and was persistent. Per rehab team, pt was awake and speaking but stopped responding to questions for a short period of time when facial twitching started. RRT was called for the seizure activities.     She was admitted to MICU 12/13 for further management. CTH 12/13 showed Redemonstrated left MCA distribution infarction, advanced when compared to prior imaging with likely petechial hemorrhages and/or laminar necrosis. mildine shift 0.5cm to the right. Aspirin and chemical DVT ppx were held. Neurosurgery (Dr. Gilmore) were consulted per neurology recommendation, no acute surgical intervention needed. Repeat CTH 12/14 showed No change since 12/13/2023. EEG 12/13 showed No seizures captured but Risk of left frontotemporal focal-onset seizures She was seen by neurology for post-stroke seizure, recommended to continue keppra given first seizure in setting of large stroke. A repeat EEG 12/15 showed no seizure but again Risk of left frontotemporal focal-onset seizures. Aspirin 81mg PO QD restarted after repeat CTH 12/15 showed Stable exam.      Patient was evaluated by PM&R and therapy for functional deficits and gait/ ADL impairments and recommended acute rehabilitation. Patient was medically optimized for discharge to [facility] on 12/19.     Met with pt this AM at bedside. Pt was unwilling to participate in nutrition interview - information collected from medical chart and previous RD notes. NKFA nor food intolerances reported. Last BM 12/19 per RN flowsheets.     Previous RD note (12/14):  - Pt coded for acute moderate malnutrition related to inadequate protein-energy intake s/p CVA as evidenced by moderate muscle wasting/fat loss.   - Weight: 143.9 lbs (currently 137.7 lbs - 4.3% weight loss over 1 week; meets criteria for malnutrition)

## 2023-12-20 NOTE — DIETITIAN INITIAL EVALUATION ADULT - ADD RECOMMEND
1. Continue with regular, pureed diet  - Encourage high kcal/high protein foods  - Honor food preferences  - Diet texture per SLP  2. Ensure plus HP qd  3. Continue with MVI for general nutrient coverage  4. Continue with current bowel regimen, prn  5. Appreciate weekly weight trends

## 2023-12-20 NOTE — DIETITIAN INITIAL EVALUATION ADULT - PERTINENT MEDS FT
MEDICATIONS  (STANDING):  aspirin  chewable 81 milliGRAM(s) Oral daily  atorvastatin 80 milliGRAM(s) Oral at bedtime  bacitracin   Ointment 1 Application(s) Topical two times a day  levETIRAcetam 500 milliGRAM(s) Oral every 12 hours  lidocaine   4% Patch 1 Patch Transdermal daily  lisinopril 20 milliGRAM(s) Oral daily  multivitamin 1 Tablet(s) Oral daily    MEDICATIONS  (PRN):  acetaminophen     Tablet .. 650 milliGRAM(s) Oral every 6 hours PRN Temp greater or equal to 38C (100.4F), Mild Pain (1 - 3)  aluminum hydroxide/magnesium hydroxide/simethicone Suspension 30 milliLiter(s) Oral every 4 hours PRN Dyspepsia  melatonin 3 milliGRAM(s) Oral at bedtime PRN Insomnia  ondansetron   Disintegrating Tablet 4 milliGRAM(s) Oral every 8 hours PRN Nausea and/or Vomiting  senna 2 Tablet(s) Oral at bedtime PRN Constipation

## 2023-12-20 NOTE — DIETITIAN INITIAL EVALUATION ADULT - PERTINENT LABORATORY DATA
12-20    137  |  101  |  24<H>  ----------------------------<  109<H>  4.1   |  28  |  0.69    Ca    9.0      20 Dec 2023 06:14    TPro  6.6  /  Alb  2.6<L>  /  TBili  0.4  /  DBili  x   /  AST  33  /  ALT  21  /  AlkPhos  89  12-20  A1C with Estimated Average Glucose Result: 5.4 % (12-04-23 @ 05:30)

## 2023-12-20 NOTE — CHART NOTE - NSCHARTNOTEFT_GEN_A_CORE
[facility] Interdisciplinary Plan of Care    REHABILITATION DIAGNOSIS:  Left MCA ischemic stroke with petechial hemorrhage     COMORBIDITIES/COMPLICATING CONDITIONS IMPACTING REHABILITATION:  HEALTH ISSUES - PROBLEM Dx:    Hyperlipidemia  Breast cancer  2009 left - s/ping castellanos radical mastectomy  [place] (hard of hearing)  bilateral HA (Yvette)  Meniscus tear  left - 10/2015  Cyst of left ovary  SDH (subdural hematoma)  3/2011 - s/p fall - no surgical intervention  History of Left Mastectomy  Cataract extraction status of eye, right  Status post right cataract extraction  Right dominant hemiparesis  Cognitive impairment  Dysarthria  Dysphagia  Decreased ADL  Function decline  Walking difficulties  Gait and mobility impairment       Based upon consideration of the patient's impairments, functional status, complicating conditions and any other contributing factors and after information garnered from the assessments of all therapy disciplines involved in treating the patient and other pertinent clinicians:    INTERDISCIPLINARY REHABILITATION INTERVENTIONS:    [ X  ] Transfer Training  [ X  ] Bed Mobility  [ X  ] Therapeutic Exercise  [ X ] Balance/Coordination Exercises  [ X ] Locomotion retraining  [ X  ] Stairs  [  X ] Functional Transfer Training  [ X  ] Bowel/Bladder program  [ X  ] Pain Management  [ X  ] Skin/Wound Care  [ X  ] Visual/Perceptual Training  [ X  ] Therapeutic Recreation Activities  [  X ] Neuromuscular Re-education  [ X  ] Activities of Daily Living  [ X  ] Speech Exercise  [X   ] Swallowing Exercises  [   ] Vital Stim  [   ] Dietary Supplements  [   ] Calorie Count  [ X  ] Cognitive Exercises  [  X ] Congnitive/Linguistic Treatment  [  ] Behavior Program  [  x ] Neuropsych Therapy  [ X  ] Patient/Family Counseling  [ X ] Family Training  [ X  ] Community Re-entry  [   ] Orthotic Evaluation  [   ] Prosthetic Eval/Training    MEDICAL PROGNOSIS: good    REHAB POTENTIAL:  Good    EXPECTED DAILY THERAPY:  3 hours/day           ESTIMATED LOS:  [  ] 5-7 Days  [  ] 7-10 Days  [ x ] 10- 14 Days  [  ] 14- 18 Days  [  ] 18- 21 Days    ESTIMATED DISPOSITION:  [  ] Home   [  ] Home with Outpatient Therapies  [ x ] Home with Home Therapies  [  ] Assisted Living  [  ] Nursing Home  [  ] Long Term Acute Care    INTERDISCIPLINARY FUNCTIONAL OUTCOMES/GOALS:         Gait/Mobility: 6       Transfers: 6       ADLs: 6       Functional Transfers: 6       Medication Management: 5       Communication: 7       Cognitive: 5       Dysphagia: 6       Bladder: 7       Bowel: 7     Functional Independent Measures:   7 = Independent  6 = Modified Independent  5 = Supervision  4 = Minimal Assist/ Contact Guard  3 = Moderate Assistance  2 = Maximum Assistance  1 = Total Assistance  0 = Unable to assess [facility] Interdisciplinary Plan of Care    REHABILITATION DIAGNOSIS:  Left MCA ischemic stroke with petechial hemorrhage     COMORBIDITIES/COMPLICATING CONDITIONS IMPACTING REHABILITATION:  HEALTH ISSUES - PROBLEM Dx:    Hyperlipidemia  Breast cancer  2009 left - s/ping castellanos radical mastectomy  [place] (hard of hearing)  bilateral HA (Yvette)  Meniscus tear  left - 10/2015  Cyst of left ovary  SDH (subdural hematoma)  3/2011 - s/p fall - no surgical intervention  History of Left Mastectomy  Cataract extraction status of eye, right  Status post right cataract extraction  Right dominant hemiparesis  Cognitive impairment  Dysarthria  Dysphagia  Decreased ADL  Function decline  Walking difficulties  Gait and mobility impairment       Based upon consideration of the patient's impairments, functional status, complicating conditions and any other contributing factors and after information garnered from the assessments of all therapy disciplines involved in treating the patient and other pertinent clinicians:    INTERDISCIPLINARY REHABILITATION INTERVENTIONS:    [ X  ] Transfer Training  [ X  ] Bed Mobility  [ X  ] Therapeutic Exercise  [ X ] Balance/Coordination Exercises  [ X ] Locomotion retraining  [ X  ] Stairs  [  X ] Functional Transfer Training  [ X  ] Bowel/Bladder program  [ X  ] Pain Management  [ X  ] Skin/Wound Care  [ X  ] Visual/Perceptual Training  [ X  ] Therapeutic Recreation Activities  [  X ] Neuromuscular Re-education  [ X  ] Activities of Daily Living  [ X  ] Speech Exercise  [X   ] Swallowing Exercises  [   ] Vital Stim  [   ] Dietary Supplements  [   ] Calorie Count  [ X  ] Cognitive Exercises  [  X ] Congnitive/Linguistic Treatment  [  ] Behavior Program  [  x ] Neuropsych Therapy  [ X  ] Patient/Family Counseling  [ X ] Family Training  [ X  ] Community Re-entry  [   ] Orthotic Evaluation  [   ] Prosthetic Eval/Training    MEDICAL PROGNOSIS: good    REHAB POTENTIAL:  Good    EXPECTED DAILY THERAPY:  3 hours/day           ESTIMATED LOS:  [  ] 5-7 Days  [  ] 7-10 Days  [ x ] 10- 14 Days  [  ] 14- 18 Days  [  ] 18- 21 Days    ESTIMATED DISPOSITION:  [  ] Home   [  ] Home with Outpatient Therapies  [ x ] Home with Home Therapies  [  ] Assisted Living  [  ] Nursing Home  [  ] Long Term Acute Care    INTERDISCIPLINARY FUNCTIONAL OUTCOMES/GOALS:         Gait/Mobility: 6       Transfers: 6       ADLs: 6       Functional Transfers: 6       Medication Management: 5       Communication: 7       Cognitive: 5       Dysphagia: 6       Bladder: 7       Bowel: 7     Functional Independent Measures:   7 = Independent  6 = Modified Independent  5 = Supervision  4 = Minimal Assist/ Contact Guard  3 = Moderate Assistance  2 = Maximum Assistance  1 = Total Assistance  0 = Unable to assess

## 2023-12-21 LAB
ALBUMIN SERPL ELPH-MCNC: 2.7 G/DL — LOW (ref 3.3–5)
ALBUMIN SERPL ELPH-MCNC: 2.7 G/DL — LOW (ref 3.3–5)
ALP SERPL-CCNC: 90 U/L — SIGNIFICANT CHANGE UP (ref 40–120)
ALP SERPL-CCNC: 90 U/L — SIGNIFICANT CHANGE UP (ref 40–120)
ALT FLD-CCNC: 22 U/L — SIGNIFICANT CHANGE UP (ref 10–45)
ALT FLD-CCNC: 22 U/L — SIGNIFICANT CHANGE UP (ref 10–45)
ANION GAP SERPL CALC-SCNC: 8 MMOL/L — SIGNIFICANT CHANGE UP (ref 5–17)
ANION GAP SERPL CALC-SCNC: 8 MMOL/L — SIGNIFICANT CHANGE UP (ref 5–17)
AST SERPL-CCNC: 35 U/L — SIGNIFICANT CHANGE UP (ref 10–40)
AST SERPL-CCNC: 35 U/L — SIGNIFICANT CHANGE UP (ref 10–40)
BASOPHILS # BLD AUTO: 0.05 K/UL — SIGNIFICANT CHANGE UP (ref 0–0.2)
BASOPHILS # BLD AUTO: 0.05 K/UL — SIGNIFICANT CHANGE UP (ref 0–0.2)
BASOPHILS NFR BLD AUTO: 0.8 % — SIGNIFICANT CHANGE UP (ref 0–2)
BASOPHILS NFR BLD AUTO: 0.8 % — SIGNIFICANT CHANGE UP (ref 0–2)
BILIRUB SERPL-MCNC: 0.4 MG/DL — SIGNIFICANT CHANGE UP (ref 0.2–1.2)
BILIRUB SERPL-MCNC: 0.4 MG/DL — SIGNIFICANT CHANGE UP (ref 0.2–1.2)
BUN SERPL-MCNC: 21 MG/DL — SIGNIFICANT CHANGE UP (ref 7–23)
BUN SERPL-MCNC: 21 MG/DL — SIGNIFICANT CHANGE UP (ref 7–23)
CALCIUM SERPL-MCNC: 9.2 MG/DL — SIGNIFICANT CHANGE UP (ref 8.4–10.5)
CALCIUM SERPL-MCNC: 9.2 MG/DL — SIGNIFICANT CHANGE UP (ref 8.4–10.5)
CHLORIDE SERPL-SCNC: 103 MMOL/L — SIGNIFICANT CHANGE UP (ref 96–108)
CHLORIDE SERPL-SCNC: 103 MMOL/L — SIGNIFICANT CHANGE UP (ref 96–108)
CO2 SERPL-SCNC: 30 MMOL/L — SIGNIFICANT CHANGE UP (ref 22–31)
CO2 SERPL-SCNC: 30 MMOL/L — SIGNIFICANT CHANGE UP (ref 22–31)
CREAT SERPL-MCNC: 0.67 MG/DL — SIGNIFICANT CHANGE UP (ref 0.5–1.3)
CREAT SERPL-MCNC: 0.67 MG/DL — SIGNIFICANT CHANGE UP (ref 0.5–1.3)
EGFR: 84 ML/MIN/1.73M2 — SIGNIFICANT CHANGE UP
EGFR: 84 ML/MIN/1.73M2 — SIGNIFICANT CHANGE UP
EOSINOPHIL # BLD AUTO: 0.15 K/UL — SIGNIFICANT CHANGE UP (ref 0–0.5)
EOSINOPHIL # BLD AUTO: 0.15 K/UL — SIGNIFICANT CHANGE UP (ref 0–0.5)
EOSINOPHIL NFR BLD AUTO: 2.3 % — SIGNIFICANT CHANGE UP (ref 0–6)
EOSINOPHIL NFR BLD AUTO: 2.3 % — SIGNIFICANT CHANGE UP (ref 0–6)
GLUCOSE SERPL-MCNC: 98 MG/DL — SIGNIFICANT CHANGE UP (ref 70–99)
GLUCOSE SERPL-MCNC: 98 MG/DL — SIGNIFICANT CHANGE UP (ref 70–99)
HCT VFR BLD CALC: 35.4 % — SIGNIFICANT CHANGE UP (ref 34.5–45)
HCT VFR BLD CALC: 35.4 % — SIGNIFICANT CHANGE UP (ref 34.5–45)
HGB BLD-MCNC: 11.4 G/DL — LOW (ref 11.5–15.5)
HGB BLD-MCNC: 11.4 G/DL — LOW (ref 11.5–15.5)
IMM GRANULOCYTES NFR BLD AUTO: 0.3 % — SIGNIFICANT CHANGE UP (ref 0–0.9)
IMM GRANULOCYTES NFR BLD AUTO: 0.3 % — SIGNIFICANT CHANGE UP (ref 0–0.9)
LYMPHOCYTES # BLD AUTO: 0.99 K/UL — LOW (ref 1–3.3)
LYMPHOCYTES # BLD AUTO: 0.99 K/UL — LOW (ref 1–3.3)
LYMPHOCYTES # BLD AUTO: 15.4 % — SIGNIFICANT CHANGE UP (ref 13–44)
LYMPHOCYTES # BLD AUTO: 15.4 % — SIGNIFICANT CHANGE UP (ref 13–44)
MCHC RBC-ENTMCNC: 26.8 PG — LOW (ref 27–34)
MCHC RBC-ENTMCNC: 26.8 PG — LOW (ref 27–34)
MCHC RBC-ENTMCNC: 32.2 GM/DL — SIGNIFICANT CHANGE UP (ref 32–36)
MCHC RBC-ENTMCNC: 32.2 GM/DL — SIGNIFICANT CHANGE UP (ref 32–36)
MCV RBC AUTO: 83.1 FL — SIGNIFICANT CHANGE UP (ref 80–100)
MCV RBC AUTO: 83.1 FL — SIGNIFICANT CHANGE UP (ref 80–100)
MONOCYTES # BLD AUTO: 0.65 K/UL — SIGNIFICANT CHANGE UP (ref 0–0.9)
MONOCYTES # BLD AUTO: 0.65 K/UL — SIGNIFICANT CHANGE UP (ref 0–0.9)
MONOCYTES NFR BLD AUTO: 10.1 % — SIGNIFICANT CHANGE UP (ref 2–14)
MONOCYTES NFR BLD AUTO: 10.1 % — SIGNIFICANT CHANGE UP (ref 2–14)
NEUTROPHILS # BLD AUTO: 4.56 K/UL — SIGNIFICANT CHANGE UP (ref 1.8–7.4)
NEUTROPHILS # BLD AUTO: 4.56 K/UL — SIGNIFICANT CHANGE UP (ref 1.8–7.4)
NEUTROPHILS NFR BLD AUTO: 71.1 % — SIGNIFICANT CHANGE UP (ref 43–77)
NEUTROPHILS NFR BLD AUTO: 71.1 % — SIGNIFICANT CHANGE UP (ref 43–77)
NRBC # BLD: 0 /100 WBCS — SIGNIFICANT CHANGE UP (ref 0–0)
NRBC # BLD: 0 /100 WBCS — SIGNIFICANT CHANGE UP (ref 0–0)
PLATELET # BLD AUTO: 278 K/UL — SIGNIFICANT CHANGE UP (ref 150–400)
PLATELET # BLD AUTO: 278 K/UL — SIGNIFICANT CHANGE UP (ref 150–400)
POTASSIUM SERPL-MCNC: 4.1 MMOL/L — SIGNIFICANT CHANGE UP (ref 3.5–5.3)
POTASSIUM SERPL-MCNC: 4.1 MMOL/L — SIGNIFICANT CHANGE UP (ref 3.5–5.3)
POTASSIUM SERPL-SCNC: 4.1 MMOL/L — SIGNIFICANT CHANGE UP (ref 3.5–5.3)
POTASSIUM SERPL-SCNC: 4.1 MMOL/L — SIGNIFICANT CHANGE UP (ref 3.5–5.3)
PROT SERPL-MCNC: 6.6 G/DL — SIGNIFICANT CHANGE UP (ref 6–8.3)
PROT SERPL-MCNC: 6.6 G/DL — SIGNIFICANT CHANGE UP (ref 6–8.3)
RBC # BLD: 4.26 M/UL — SIGNIFICANT CHANGE UP (ref 3.8–5.2)
RBC # BLD: 4.26 M/UL — SIGNIFICANT CHANGE UP (ref 3.8–5.2)
RBC # FLD: 16.9 % — HIGH (ref 10.3–14.5)
RBC # FLD: 16.9 % — HIGH (ref 10.3–14.5)
SODIUM SERPL-SCNC: 141 MMOL/L — SIGNIFICANT CHANGE UP (ref 135–145)
SODIUM SERPL-SCNC: 141 MMOL/L — SIGNIFICANT CHANGE UP (ref 135–145)
WBC # BLD: 6.42 K/UL — SIGNIFICANT CHANGE UP (ref 3.8–10.5)
WBC # BLD: 6.42 K/UL — SIGNIFICANT CHANGE UP (ref 3.8–10.5)
WBC # FLD AUTO: 6.42 K/UL — SIGNIFICANT CHANGE UP (ref 3.8–10.5)
WBC # FLD AUTO: 6.42 K/UL — SIGNIFICANT CHANGE UP (ref 3.8–10.5)

## 2023-12-21 PROCEDURE — 99222 1ST HOSP IP/OBS MODERATE 55: CPT

## 2023-12-21 PROCEDURE — 99233 SBSQ HOSP IP/OBS HIGH 50: CPT | Mod: GC

## 2023-12-21 PROCEDURE — 93010 ELECTROCARDIOGRAM REPORT: CPT

## 2023-12-21 PROCEDURE — 99232 SBSQ HOSP IP/OBS MODERATE 35: CPT

## 2023-12-21 RX ORDER — LISINOPRIL 2.5 MG/1
40 TABLET ORAL DAILY
Refills: 0 | Status: DISCONTINUED | OUTPATIENT
Start: 2023-12-22 | End: 2023-12-28

## 2023-12-21 RX ADMIN — LEVETIRACETAM 500 MILLIGRAM(S): 250 TABLET, FILM COATED ORAL at 17:56

## 2023-12-21 RX ADMIN — Medication 1 APPLICATION(S): at 06:15

## 2023-12-21 RX ADMIN — Medication 650 MILLIGRAM(S): at 16:41

## 2023-12-21 RX ADMIN — Medication 81 MILLIGRAM(S): at 11:44

## 2023-12-21 RX ADMIN — Medication 1 TABLET(S): at 11:43

## 2023-12-21 RX ADMIN — Medication 650 MILLIGRAM(S): at 17:49

## 2023-12-21 RX ADMIN — Medication 1 APPLICATION(S): at 17:49

## 2023-12-21 RX ADMIN — LISINOPRIL 20 MILLIGRAM(S): 2.5 TABLET ORAL at 06:09

## 2023-12-21 RX ADMIN — ATORVASTATIN CALCIUM 80 MILLIGRAM(S): 80 TABLET, FILM COATED ORAL at 22:23

## 2023-12-21 RX ADMIN — LEVETIRACETAM 500 MILLIGRAM(S): 250 TABLET, FILM COATED ORAL at 06:09

## 2023-12-21 NOTE — PROGRESS NOTE ADULT - ASSESSMENT
88 year old female with past medical history of essential HTN, HLD, hypothyroidism, s/p PPM, breast cancer s/p left mastectomy (2009), recent admission for acute CVA and acute respiratory failure due to IV contrast anaphylaxis, transferred to [facility] on 12/8 for acute rehab and course was complicated by seizure like episodes and RUE shaking. Admitted to [facility] for further seizure workup, neurology following.    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 Mass effect and mild midline shift to Right  - Above was previously discussed with neurology, serial CT's appear stable, resumed aspirin 12/16 and cont High Intensity Statin  - Speech pathologist following, currently on Puree diet  - Continue PT/OT  - EEG reviewed with no seizures captured, risk of L frontotemporal focal onset seizures   - Continue Keppra 500mg PO BID, serum level in therapeutic range  - Seizure precautions  - Ativan 2mg IV PRN breakthrough seizures    #Essential HTN  - BP stable, continue lisinopril 20mg po daily    #Hypothyroid  - not on synthroid  - TSH level low with normal FT4 and FT3. ? subclinical hyperthyroid  - endo cx pending     #Oral thrush (resolved)  - completed 5 days of fluconazole    #stable breast cancer  #stable LLE wound -- routine wound care.     # Moderate protein-calorie malnutrition.  - nutrition noted    GOC:  Full Code 88 year old female with past medical history of essential HTN, HLD, hypothyroidism, s/p PPM, breast cancer s/p left mastectomy (2009), recent admission for acute CVA and acute respiratory failure due to IV contrast anaphylaxis, transferred to [facility] on 12/8 for acute rehab and course was complicated by seizure like episodes and RUE shaking. Admitted to [facility] for further seizure workup, neurology following.    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 Mass effect and mild midline shift to Right  - Above was previously discussed with neurology, serial CT's appear stable, resumed aspirin 12/16 and cont High Intensity Statin  - Speech pathologist following, currently on Puree diet  - Continue PT/OT  - EEG reviewed with no seizures captured, risk of L frontotemporal focal onset seizures   - Continue Keppra 500mg PO BID, serum level in therapeutic range  - Seizure precautions  - Ativan 2mg IV PRN breakthrough seizures    #Essential HTN  - BP stable, continue lisinopril 20mg po daily    #Hypothyroid  - not on synthroid  - TSH level low with normal FT4 and FT3. ? subclinical hyperthyroid  - endo cx pending     #Oral thrush (resolved)  - completed 5 days of fluconazole    #stable breast cancer  #stable LLE wound -- routine wound care.     # Moderate protein-calorie malnutrition.  - nutrition noted    GOC:  Full Code

## 2023-12-21 NOTE — PROGRESS NOTE ADULT - SUBJECTIVE AND OBJECTIVE BOX
CC:  An 88y old female was admitted with Left MCA ischemic stroke, right nondominant hemiparesis, new onset seizure     HPI:  88 year old female with a history of HTN, hypothyroidism, pacemaker, breast cancer who is admitted with CVA and acute respiratory failure due to IV contrast anaphylaxis. Patient had R sided hemiparesis, expressive aphasia. CT imaging confirmed multiple embolic areas. Pacemaker interrogated showing Atrial runs but no sustained Atrial Fibrillation. Patient currently on course of ASA and statin. Hospital course complicated by severe anaphylactic due to iodine contrast allergy from CT performed on admission to ED. 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    Pt was transferred to [facility] for acute rehab on 12/8. rehab course complicated by possible seizure episodes involving right facial twitching for 1 min and RUE shaking subsequently started and was persistent. Per rehab team, pt was awake and speaking but stopped responding to questions for a short period of time when facial twitching started. RRT was called for the seizure activities.     She was admitted to MICU 12/13 for further management. CTH 12/13 showed Redemonstrated left MCA distribution infarction, advanced when compared to prior imaging with likely petechial hemorrhages and/or laminar necrosis. mildine shift 0.5cm to the right. Aspirin and chemical DVT ppx were held. Neurosurgery (Dr. Gilmore) were consulted per neurology recommendation, no acute surgical intervention needed. Repeat CTH 12/14 showed No change since 12/13/2023. EEG 12/13 showed No seizures captured but Risk of left frontotemporal focal-onset seizures She was seen by neurology for post-stroke seizure, recommended to continue keppra given first seizure in setting of large stroke. A repeat EEG 12/15 showed no seizure but again Risk of left frontotemporal focal-onset seizures. Aspirin 81mg PO QD restarted after repeat CTH 12/15 showed Stable exam.  Patient was evaluated by PM&R and therapy for functional deficits and gait/ ADL impairments and recommended acute rehabilitation. Patient was medically optimized for discharge to [facility] on 12/19.     Allergies:  penicillin (Rash)  IV Contrast (Anaphylaxis; Urticaria)    Subjective:  - Patient was seen and examined at bed side, comfortable in bed, no events over night.  - Slept well last night and has no new complaints.  -Denies pain at this time  - Last BM (12/21), voiding without issues   - Tolerating and participating in 3 hours of daily therapy.  - Case was discussed at IDT meeting this am for progress and discharge plan.    ROS:  Denies CP, palpitation, SOB, fever, cough, abdominal pain, N/V/D/C, dysuria, headache, joint pain    MEDICATIONS  (STANDING):  aspirin  chewable 81 milliGRAM(s) Oral daily  atorvastatin 80 milliGRAM(s) Oral at bedtime  bacitracin   Ointment 1 Application(s) Topical two times a day  levETIRAcetam 500 milliGRAM(s) Oral every 12 hours  lidocaine   4% Patch 1 Patch Transdermal daily  lisinopril 20 milliGRAM(s) Oral daily  multivitamin 1 Tablet(s) Oral daily    MEDICATIONS  (PRN):  acetaminophen     Tablet .. 650 milliGRAM(s) Oral every 6 hours PRN Temp greater or equal to 38C (100.4F), Mild Pain (1 - 3)  aluminum hydroxide/magnesium hydroxide/simethicone Suspension 30 milliLiter(s) Oral every 4 hours PRN Dyspepsia  melatonin 3 milliGRAM(s) Oral at bedtime PRN Insomnia  ondansetron   Disintegrating Tablet 4 milliGRAM(s) Oral every 8 hours PRN Nausea and/or Vomiting  senna 2 Tablet(s) Oral at bedtime PRN Constipation    LAB:                        11.4   6.42  )-----------( 278      ( 21 Dec 2023 05:46 )             35.4     12-21    141  |  103  |  21  ----------------------------<  98  4.1   |  30  |  0.67    Ca    9.2      21 Dec 2023 05:46    TPro  6.6  /  Alb  2.7<L>  /  TBili  0.4  /  DBili  x   /  AST  35  /  ALT  22  /  AlkPhos  90  12-21    LIVER FUNCTIONS - ( 21 Dec 2023 05:46 )  Alb: 2.7 g/dL / Pro: 6.6 g/dL / ALK PHOS: 90 U/L / ALT: 22 U/L / AST: 35 U/L / GGT: x             CT Head No Cont (12.14.23 @ 09:34)  IMPRESSION: No change since 12/13/2023. Irregular appearing subacute to   chronic infarct left middle cerebral artery territory may be due to   spared gray matter, hemorrhage or laminar necrosis without change since   12/13/2023. Mass effect and mild midline shift to the right.      CT Head No Cont (12.15.23 @ 10:30)   Impression: Stable exam.      CT Head No Cont (12.19.23 @ 14:04)  IMPRESSION: Large subacute left middle cerebral artery infarct with mass   effect and mild midline shift improved since 12/15/2023. Irregularity of   infarct density likely related to spare gray versus petechial hemorrhage.      PHYSICAL EXAM:   Vital Signs Last 24 Hrs  T(C): 36.5 (21 Dec 2023 07:59), Max: 36.8 (20 Dec 2023 20:13)  T(F): 97.7 (21 Dec 2023 07:59), Max: 98.3 (20 Dec 2023 20:13)  HR: 72 (21 Dec 2023 07:59) (72 - 87)  BP: 166/81 (21 Dec 2023 07:59) (132/78 - 166/81)  RR: 15 (21 Dec 2023 07:59) (15 - 16)  SpO2: 96% (21 Dec 2023 07:59) (95% - 96%)    Gen - NAD, Comfortable  HEENT - NCAT, EOMI, AMANDA  Neck - Supple, No limited ROM  Pulm - CTAB, No crackles  Cardiovascular - RRR, S1S2  Abdomen - Soft, NT, ND, (+) BS  Extremities - No Cyanosis, no clubbing, no edema, no calf tenderness  Neuro-     Cognitive - awake, alert, oriented  x 4, follows command       Communication - Expressive and receptive impairment, delayed processing     Attention: Impaired, takes time to answer or perform a task      Memory: unable to recall       Cranial Nerves - right facial weakness     Motor -                     LEFT    UE - 5/5                    RIGHT UE - 4-/5                    LEFT     5/5                    RIGHT LE - 4-/5       Psychiatric - Mood stable, Affect WNL  Skin: Left shin venous stasis wound      IDT: 12/21  TDD: OSIEL 12/30  RN: incontinent x2  SW: private home 1 ZEE, 1 flight inside, lives w/ SO, limited support from SO + children  SLP: pureed/thins, mod-sev language, sev cog, poor command following, poor attention, poor memory/reasoning  OT: max-totalA all transfers and ADLs with fluctuation 2/2 cognition, goals for CG transfers and supervision bADLs  PT: modA bed mobility, min-modA transfers, modA 60' RW w/ WC follow, cognition is biggest barrier, no stairs yet, goals CS bed mobility/transfers + CG ambulation/stairs w/ 24/7 assist in 2wks  Goals: 1. sequence steps for dressing, 2. CG for toileting           CC:  An 88y old female was admitted with Left MCA ischemic stroke, right nondominant hemiparesis, new onset seizure     HPI:  88 year old female with a history of HTN, hypothyroidism, pacemaker, breast cancer who is admitted with CVA and acute respiratory failure due to IV contrast anaphylaxis. Patient had R sided hemiparesis, expressive aphasia. CT imaging confirmed multiple embolic areas. Pacemaker interrogated showing Atrial runs but no sustained Atrial Fibrillation. Patient currently on course of ASA and statin. Hospital course complicated by severe anaphylactic due to iodine contrast allergy from CT performed on admission to ED. 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    Pt was transferred to [facility] for acute rehab on 12/8. rehab course complicated by possible seizure episodes involving right facial twitching for 1 min and RUE shaking subsequently started and was persistent. Per rehab team, pt was awake and speaking but stopped responding to questions for a short period of time when facial twitching started. RRT was called for the seizure activities.     She was admitted to MICU 12/13 for further management. CTH 12/13 showed Redemonstrated left MCA distribution infarction, advanced when compared to prior imaging with likely petechial hemorrhages and/or laminar necrosis. mildine shift 0.5cm to the right. Aspirin and chemical DVT ppx were held. Neurosurgery (Dr. Gilmore) were consulted per neurology recommendation, no acute surgical intervention needed. Repeat CTH 12/14 showed No change since 12/13/2023. EEG 12/13 showed No seizures captured but Risk of left frontotemporal focal-onset seizures She was seen by neurology for post-stroke seizure, recommended to continue keppra given first seizure in setting of large stroke. A repeat EEG 12/15 showed no seizure but again Risk of left frontotemporal focal-onset seizures. Aspirin 81mg PO QD restarted after repeat CTH 12/15 showed Stable exam.  Patient was evaluated by PM&R and therapy for functional deficits and gait/ ADL impairments and recommended acute rehabilitation. Patient was medically optimized for discharge to [facility] on 12/19.     Allergies:  penicillin (Rash)  IV Contrast (Anaphylaxis; Urticaria)    Subjective:  - Patient was seen and examined at bed side, comfortable in bed, no events over night.  - Slept well last night and has no new complaints.  -Denies pain at this time  - Last BM (12/21), voiding without issues   - Tolerating and participating in 3 hours of daily therapy.  - Case was discussed at IDT meeting this am for progress and discharge plan.    ROS:  Denies CP, palpitation, SOB, fever, cough, abdominal pain, N/V/D/C, dysuria, headache, joint pain    MEDICATIONS  (STANDING):  aspirin  chewable 81 milliGRAM(s) Oral daily  atorvastatin 80 milliGRAM(s) Oral at bedtime  bacitracin   Ointment 1 Application(s) Topical two times a day  levETIRAcetam 500 milliGRAM(s) Oral every 12 hours  lidocaine   4% Patch 1 Patch Transdermal daily  lisinopril 20 milliGRAM(s) Oral daily  multivitamin 1 Tablet(s) Oral daily    MEDICATIONS  (PRN):  acetaminophen     Tablet .. 650 milliGRAM(s) Oral every 6 hours PRN Temp greater or equal to 38C (100.4F), Mild Pain (1 - 3)  aluminum hydroxide/magnesium hydroxide/simethicone Suspension 30 milliLiter(s) Oral every 4 hours PRN Dyspepsia  melatonin 3 milliGRAM(s) Oral at bedtime PRN Insomnia  ondansetron   Disintegrating Tablet 4 milliGRAM(s) Oral every 8 hours PRN Nausea and/or Vomiting  senna 2 Tablet(s) Oral at bedtime PRN Constipation    LAB:                        11.4   6.42  )-----------( 278      ( 21 Dec 2023 05:46 )             35.4     12-21    141  |  103  |  21  ----------------------------<  98  4.1   |  30  |  0.67    Ca    9.2      21 Dec 2023 05:46    TPro  6.6  /  Alb  2.7<L>  /  TBili  0.4  /  DBili  x   /  AST  35  /  ALT  22  /  AlkPhos  90  12-21    LIVER FUNCTIONS - ( 21 Dec 2023 05:46 )  Alb: 2.7 g/dL / Pro: 6.6 g/dL / ALK PHOS: 90 U/L / ALT: 22 U/L / AST: 35 U/L / GGT: x             CT Head No Cont (12.14.23 @ 09:34)  IMPRESSION: No change since 12/13/2023. Irregular appearing subacute to   chronic infarct left middle cerebral artery territory may be due to   spared gray matter, hemorrhage or laminar necrosis without change since   12/13/2023. Mass effect and mild midline shift to the right.      CT Head No Cont (12.15.23 @ 10:30)   Impression: Stable exam.      CT Head No Cont (12.19.23 @ 14:04)  IMPRESSION: Large subacute left middle cerebral artery infarct with mass   effect and mild midline shift improved since 12/15/2023. Irregularity of   infarct density likely related to spare gray versus petechial hemorrhage.      PHYSICAL EXAM:   Vital Signs Last 24 Hrs  T(C): 36.5 (21 Dec 2023 07:59), Max: 36.8 (20 Dec 2023 20:13)  T(F): 97.7 (21 Dec 2023 07:59), Max: 98.3 (20 Dec 2023 20:13)  HR: 72 (21 Dec 2023 07:59) (72 - 87)  BP: 166/81 (21 Dec 2023 07:59) (132/78 - 166/81)  RR: 15 (21 Dec 2023 07:59) (15 - 16)  SpO2: 96% (21 Dec 2023 07:59) (95% - 96%)    Gen - NAD, Comfortable  HEENT - NCAT, EOMI, AMANDA  Neck - Supple, No limited ROM  Pulm - CTAB, No crackles  Cardiovascular - RRR, S1S2  Abdomen - Soft, NT, ND, (+) BS  Extremities - No Cyanosis, no clubbing, no edema, no calf tenderness  Neuro-     Cognitive - awake, alert, oriented  x 4, follows command       Communication - Expressive and receptive impairment, delayed processing     Attention: Impaired, takes time to answer or perform a task      Memory: unable to recall       Cranial Nerves - right facial weakness     Motor -                     LEFT    UE - 5/5                    RIGHT UE - 4-/5                    LEFT     5/5                    RIGHT LE - 4-/5       Psychiatric - Mood stable, Affect WNL  Skin: Left shin venous stasis wound      IDT: 12/21  TDD: OSIEL 12/30  RN: incontinent x2  SW: private home 1 ZEE, 1 flight inside, lives w/ SO, limited support from SO + children  SLP: pureed/thins, mod-sev language, sev cog, poor command following, poor attention, poor memory/reasoning  OT: max-totalA all transfers and ADLs with fluctuation 2/2 cognition, goals for CG transfers and supervision bADLs  PT: modA bed mobility, min-modA transfers, modA 60' RW w/ WC follow, cognition is biggest barrier, no stairs yet, goals CS bed mobility/transfers + CG ambulation/stairs w/ 24/7 assist in 2wks  Goals: 1. sequence steps for dressing, 2. CG for toileting

## 2023-12-21 NOTE — CONSULT NOTE ADULT - ASSESSMENT
Diagnoses:  1. Multiple thyroid nodules  2. Suppressed TSH  3. Empty Sella    Assessment/Plan:  1. Multiple thyroid nodules/Suppressed TSH - the patient will need a thyroid US as an outpatient. It is unclear at this time what thyroid medication the patient used in the past but review of her outpatient chart from 2022 revealed she was not on any thyroid medications in 2022 and she was euthyroid in May 2022. Differential diagnosis of her suppressed TSH includes subclinical hyperthyroidism versus sick euthyroid syndrome/non-thyroidal illness. I recommend rechecking her TSH in 6 weeks.    2. Empty sella - patient will need evaluation of the hypothalamic pituitary axes and MRI of the pituitary gland as an outpatient.    3. On discharge, patient is to follow up in the Endocrine clinic in 1 to 2 weeks    4. Thank you very much for this consult.

## 2023-12-21 NOTE — CONSULT NOTE ADULT - SUBJECTIVE AND OBJECTIVE BOX
HPI:  Pascale Young is an 88 year old female who was [facility]. In early Dec 2023, the patient was admitted to another hospital with a stroke and her hospital course was complicated by respiratory failure due to IV contrast.  Patient had a CT scan done on 12/02/2023 and she was found to have an empty sella and multiple thyroid nodules.  The patient is a poor historian so the history was taken from the review of the chart.  The H and P from admission to [facility] lists the patient has hypothyroidism but she was not on thyroid hormone replacement. In addition, her outpatient chart does not document a diagnosis of hypothyroidism. It is unclear at this time if the patient has hypothyroidism. I spoke to the patient's daughter on 12/22/2023 at 9:20 am and the daughter says the patient used an unknown thyroid medication in the past but she is unsure of the name and what she used it for. Per the daughter, the patient was no longer on the thyroid medication on admission in Dec 2023.   On 12/20/2023, her TSH was suppressed at 0.03 (0.358 to 3.74) with a normal Total T3 and normal Total T4. In May 2022, her TSH was normal at 0.39.      Review of systems - no headache, all other systems were reviewed and were negative    PAST MEDICAL & SURGICAL HISTORY:  Hyperlipidemia  no current medications      Breast cancer  2009 left - s/ping castellanos radical mastectomy      [place] (hard of hearing)  bilateral HA (Yvette)      Meniscus tear  left - 10/2015      Cyst of left ovary      SDH (subdural hematoma)  3/2011 - s/p fall - no surgical intervention      History of Left Mastectomy      Cataract extraction status of eye, right      Status post right cataract extraction          Home Medications:  acetaminophen 325 mg oral tablet: 2 tab(s) orally every 6 hours As needed Temp greater or equal to 38C (100.4F), Mild Pain (1 - 3) (18 Dec 2023 16:55)  aspirin 81 mg oral tablet, chewable: 1 tab(s) orally once a day (18 Dec 2023 16:55)  atorvastatin 80 mg oral tablet: 1 tab(s) orally once a day (at bedtime) (18 Dec 2023 16:55)  bacitracin 500 units/g topical ointment: 1 Apply topically to affected area 2 times a day (18 Dec 2023 16:55)  levETIRAcetam 500 mg oral tablet: 1 tab(s) orally every 12 hours (18 Dec 2023 16:55)  lisinopril 20 mg oral tablet: 1 tab(s) orally once a day Hold for SBP &lt;110 (18 Dec 2023 16:55)  Multiple Vitamins oral tablet: 1 tab(s) orally once a day (18 Dec 2023 16:55)      MEDICATIONS  (STANDING):  aspirin  chewable 81 milliGRAM(s) Oral daily  atorvastatin 80 milliGRAM(s) Oral at bedtime  bacitracin   Ointment 1 Application(s) Topical two times a day  levETIRAcetam 500 milliGRAM(s) Oral every 12 hours  lidocaine   4% Patch 1 Patch Transdermal daily  lisinopril 40 milliGRAM(s) Oral daily  multivitamin 1 Tablet(s) Oral daily    MEDICATIONS  (PRN):  acetaminophen     Tablet .. 650 milliGRAM(s) Oral every 6 hours PRN Temp greater or equal to 38C (100.4F), Mild Pain (1 - 3)  aluminum hydroxide/magnesium hydroxide/simethicone Suspension 30 milliLiter(s) Oral every 4 hours PRN Dyspepsia  melatonin 3 milliGRAM(s) Oral at bedtime PRN Insomnia  ondansetron   Disintegrating Tablet 4 milliGRAM(s) Oral every 8 hours PRN Nausea and/or Vomiting  senna 2 Tablet(s) Oral at bedtime PRN Constipation      Allergies    penicillin (Rash)  IV Contrast (Anaphylaxis; Urticaria)    FAMILY HISTORY:  FH: leukemia (Father)    FH: breast cancer (Mother)        Social History:  SOCIAL HISTORY  Smoking - None  EtOH - None  Marital Status -       FUNCTIONAL HISTORY  Previous Functional Status:   Pt unable to provide social history secondary to confusion. As per Care Coordination Assessment note, pt lives in home with her spouse.    Current Functional Status:  BM: [id number] assist   Transfers: modA with RW  Gait / ambulation: 50ft modA w/ RW  ADL's: UBD max-A, LBD dependent x1 person assist, toileting, dependent, grooming max-A x1 person assist, eating mod-A x1 person assist  Speech: Pureed Diet with Moderately Thick Liquids (19 Dec 2023 15:06)      EXAMINATION:  T(C): 36.4 (12-22-23 @ 08:51), Max: 36.8 (12-21-23 @ 22:29)  HR: 95 (12-22-23 @ 08:51) (76 - 95)  BP: 119/74 (12-22-23 @ 08:51) (119/74 - 168/98)  RR: 16 (12-22-23 @ 08:51) (15 - 16)  SpO2: 98% (12-22-23 @ 08:51) (95% - 98%)    CONSTITUTIONAL: Well groomed, no apparent distress  EYES: Normal in Appearance bilaterally  RESP: No respiratory distress, no use of accessory muscle  SKIN: No rashes or ulcers noted  NEURO: Patient did not answer questions appropriately  PSYCH: Poor insight/judgment,       Labs/Radiology:    12/20/2023, her TSH was suppressed at 0.03 (0.358 to 3.74) with a normal Total T3 and normal Total T4. In May 2022, her TSH was normal at 0.39.    12/02/2023    NONCONTRAST HEAD CT SCAN:  An empty sella is incidentally noted.  CT ANGIOGRAPHY NECK:  Heterogeneous thyroid with multiple nodules measuring up to at least   1.5 cm.  A nonemergent outpatient thyroid ultrasound may be obtained as   clinically warranted.     HPI:  Pascale Young is an 88 year old female who [facility]. In early Dec 2023, the patient was admitted to another hospital with a stroke and her hospital course was complicated by respiratory failure due to IV contrast.  Patient had a CT scan done on 12/02/2023 and she was found to have an empty sella and multiple thyroid nodules.  The patient is a poor historian so the history was taken from the review of the chart.  The H and P from admission to [facility] lists the patient has hypothyroidism but she was not on thyroid hormone replacement. In addition, her outpatient chart does not document a diagnosis of hypothyroidism. It is unclear at this time if the patient has hypothyroidism. I spoke to the patient's daughter on 12/22/2023 at 9:20 am and the daughter says the patient used an unknown thyroid medication in the past but she is unsure of the name and what she used it for. Per the daughter, the patient was no longer on the thyroid medication on admission in Dec 2023.   On 12/20/2023, her TSH was suppressed at 0.03 (0.358 to 3.74) with a normal Total T3 and normal Total T4. In May 2022, her TSH was normal at 0.39.      Review of systems - no headache, all other systems were reviewed and were negative    PAST MEDICAL & SURGICAL HISTORY:  Hyperlipidemia  no current medications      Breast cancer  2009 left - s/ping castellanos radical mastectomy      [place] (hard of hearing)  bilateral HA (Yvette)      Meniscus tear  left - 10/2015      Cyst of left ovary      SDH (subdural hematoma)  3/2011 - s/p fall - no surgical intervention      History of Left Mastectomy      Cataract extraction status of eye, right      Status post right cataract extraction          Home Medications:  acetaminophen 325 mg oral tablet: 2 tab(s) orally every 6 hours As needed Temp greater or equal to 38C (100.4F), Mild Pain (1 - 3) (18 Dec 2023 16:55)  aspirin 81 mg oral tablet, chewable: 1 tab(s) orally once a day (18 Dec 2023 16:55)  atorvastatin 80 mg oral tablet: 1 tab(s) orally once a day (at bedtime) (18 Dec 2023 16:55)  bacitracin 500 units/g topical ointment: 1 Apply topically to affected area 2 times a day (18 Dec 2023 16:55)  levETIRAcetam 500 mg oral tablet: 1 tab(s) orally every 12 hours (18 Dec 2023 16:55)  lisinopril 20 mg oral tablet: 1 tab(s) orally once a day Hold for SBP &lt;110 (18 Dec 2023 16:55)  Multiple Vitamins oral tablet: 1 tab(s) orally once a day (18 Dec 2023 16:55)      MEDICATIONS  (STANDING):  aspirin  chewable 81 milliGRAM(s) Oral daily  atorvastatin 80 milliGRAM(s) Oral at bedtime  bacitracin   Ointment 1 Application(s) Topical two times a day  levETIRAcetam 500 milliGRAM(s) Oral every 12 hours  lidocaine   4% Patch 1 Patch Transdermal daily  lisinopril 40 milliGRAM(s) Oral daily  multivitamin 1 Tablet(s) Oral daily    MEDICATIONS  (PRN):  acetaminophen     Tablet .. 650 milliGRAM(s) Oral every 6 hours PRN Temp greater or equal to 38C (100.4F), Mild Pain (1 - 3)  aluminum hydroxide/magnesium hydroxide/simethicone Suspension 30 milliLiter(s) Oral every 4 hours PRN Dyspepsia  melatonin 3 milliGRAM(s) Oral at bedtime PRN Insomnia  ondansetron   Disintegrating Tablet 4 milliGRAM(s) Oral every 8 hours PRN Nausea and/or Vomiting  senna 2 Tablet(s) Oral at bedtime PRN Constipation      Allergies    penicillin (Rash)  IV Contrast (Anaphylaxis; Urticaria)    FAMILY HISTORY:  FH: leukemia (Father)    FH: breast cancer (Mother)        Social History:  SOCIAL HISTORY  Smoking - None  EtOH - None  Marital Status -       FUNCTIONAL HISTORY  Previous Functional Status:   Pt unable to provide social history secondary to confusion. As per Care Coordination Assessment note, pt lives in home with her spouse.    Current Functional Status:  BM: max-[id number] assist   Transfers: modA with RW  Gait / ambulation: 50ft modA w/ RW  ADL's: UBD max-A, LBD dependent x1 person assist, toileting, dependent, grooming max-A x1 person assist, eating mod-A x1 person assist  Speech: Pureed Diet with Moderately Thick Liquids (19 Dec 2023 15:06)      EXAMINATION:  T(C): 36.4 (12-22-23 @ 08:51), Max: 36.8 (12-21-23 @ 22:29)  HR: 95 (12-22-23 @ 08:51) (76 - 95)  BP: 119/74 (12-22-23 @ 08:51) (119/74 - 168/98)  RR: 16 (12-22-23 @ 08:51) (15 - 16)  SpO2: 98% (12-22-23 @ 08:51) (95% - 98%)    CONSTITUTIONAL: Well groomed, no apparent distress  EYES: Normal in Appearance bilaterally  RESP: No respiratory distress, no use of accessory muscle  SKIN: No rashes or ulcers noted  NEURO: Patient did not answer questions appropriately  PSYCH: Poor insight/judgment,       Labs/Radiology:    12/20/2023, her TSH was suppressed at 0.03 (0.358 to 3.74) with a normal Total T3 and normal Total T4. In May 2022, her TSH was normal at 0.39.    12/02/2023    NONCONTRAST HEAD CT SCAN:  An empty sella is incidentally noted.  CT ANGIOGRAPHY NECK:  Heterogeneous thyroid with multiple nodules measuring up to at least   1.5 cm.  A nonemergent outpatient thyroid ultrasound may be obtained as   clinically warranted.

## 2023-12-21 NOTE — PROGRESS NOTE ADULT - SUBJECTIVE AND OBJECTIVE BOX
Medicine Progress Note    Patient is a 88y old  Female who presents with a chief complaint of CVA (20 Dec 2023 11:33)      SUBJECTIVE / OVERNIGHT EVENTS:  no complaints    ADDITIONAL REVIEW OF SYSTEMS  denied fever/chills/CP/SOB/cough/palpitation/dizziness/abdominal pian/nausea/vomiting/diarrhoea/constipation/dysuria/leg or calf pain/headaches.all other ROS neg    MEDICATIONS  (STANDING):  aspirin  chewable 81 milliGRAM(s) Oral daily  atorvastatin 80 milliGRAM(s) Oral at bedtime  bacitracin   Ointment 1 Application(s) Topical two times a day  levETIRAcetam 500 milliGRAM(s) Oral every 12 hours  lidocaine   4% Patch 1 Patch Transdermal daily  lisinopril 20 milliGRAM(s) Oral daily  multivitamin 1 Tablet(s) Oral daily    MEDICATIONS  (PRN):  acetaminophen     Tablet .. 650 milliGRAM(s) Oral every 6 hours PRN Temp greater or equal to 38C (100.4F), Mild Pain (1 - 3)  aluminum hydroxide/magnesium hydroxide/simethicone Suspension 30 milliLiter(s) Oral every 4 hours PRN Dyspepsia  melatonin 3 milliGRAM(s) Oral at bedtime PRN Insomnia  ondansetron   Disintegrating Tablet 4 milliGRAM(s) Oral every 8 hours PRN Nausea and/or Vomiting  senna 2 Tablet(s) Oral at bedtime PRN Constipation    CAPILLARY BLOOD GLUCOSE        I&O's Summary      PHYSICAL EXAM:  Vital Signs Last 24 Hrs  T(C): 36.5 (21 Dec 2023 07:59), Max: 36.8 (20 Dec 2023 20:13)  T(F): 97.7 (21 Dec 2023 07:59), Max: 98.3 (20 Dec 2023 20:13)  HR: 72 (21 Dec 2023 07:59) (72 - 87)  BP: 166/81 (21 Dec 2023 07:59) (132/78 - 166/81)  BP(mean): --  RR: 15 (21 Dec 2023 07:59) (15 - 16)  SpO2: 96% (21 Dec 2023 07:59) (95% - 96%)    Parameters below as of 21 Dec 2023 07:59  Patient On (Oxygen Delivery Method): room air    GENERAL: Not in distress. Alert    HEENT: clear conjuctiva, MMM. no pallor or icterus  CARDIOVASCULAR: RRR S1, S2. No murmur/rubs/gallop  LUNGS: BLAE+, no rales, no wheezing, no rhonchi.    ABDOMEN: ND. Soft,  NT, no guarding / rebound / rigidity. BS normoactive  BACK: No spine tenderness.  EXTREMITIES: no edema. no leg or calf TP.  SKIN: warm and dry  PSYCHIATRIC: Calm.  No agitation.  CNS: AAO. moves limbs, follows commands. right sided weakness    LABS:                        11.4   6.42  )-----------( 278      ( 21 Dec 2023 05:46 )             35.4     12-21    141  |  103  |  21  ----------------------------<  98  4.1   |  30  |  0.67    Ca    9.2      21 Dec 2023 05:46    TPro  6.6  /  Alb  2.7<L>  /  TBili  0.4  /  DBili  x   /  AST  35  /  ALT  22  /  AlkPhos  90  12-21    Thyroid Stimulating Hormone, Serum (12.20.23 @ 14:45)    Thyroid Stimulating Hormone, Serum: 0.030 uIU/mL    Triiodothyronine, Total (T3 Total) (12.20.23 @ 14:45)    Triiodothyronine, Total (T3 Total): 100 ng/dL    T4, Serum (12.20.23 @ 14:45)    T4, Serum: 9.6 ug/dL        Urinalysis Basic - ( 21 Dec 2023 05:46 )    Color: x / Appearance: x / SG: x / pH: x  Gluc: 98 mg/dL / Ketone: x  / Bili: x / Urobili: x   Blood: x / Protein: x / Nitrite: x   Leuk Esterase: x / RBC: x / WBC x   Sq Epi: x / Non Sq Epi: x / Bacteria: x        COVID-19 PCR: NotDetec (20 Dec 2023 08:00)      RADIOLOGY & ADDITIONAL TESTS:  Imaging from Last 24 Hours:    Electrocardiogram/QTc Interval:    COORDINATION OF CARE:  Care Discussed with Consultants/Other Providers:

## 2023-12-22 LAB
RAPID RVP RESULT: SIGNIFICANT CHANGE UP
RAPID RVP RESULT: SIGNIFICANT CHANGE UP
SARS-COV-2 RNA SPEC QL NAA+PROBE: SIGNIFICANT CHANGE UP
SARS-COV-2 RNA SPEC QL NAA+PROBE: SIGNIFICANT CHANGE UP

## 2023-12-22 PROCEDURE — 99232 SBSQ HOSP IP/OBS MODERATE 35: CPT | Mod: GC

## 2023-12-22 RX ORDER — SODIUM CHLORIDE 0.65 %
1 AEROSOL, SPRAY (ML) NASAL THREE TIMES A DAY
Refills: 0 | Status: DISCONTINUED | OUTPATIENT
Start: 2023-12-22 | End: 2023-12-28

## 2023-12-22 RX ORDER — NYSTATIN 500MM UNIT
500000 POWDER (EA) MISCELLANEOUS
Refills: 0 | Status: DISCONTINUED | OUTPATIENT
Start: 2023-12-22 | End: 2023-12-28

## 2023-12-22 RX ADMIN — LISINOPRIL 40 MILLIGRAM(S): 2.5 TABLET ORAL at 05:46

## 2023-12-22 RX ADMIN — ATORVASTATIN CALCIUM 80 MILLIGRAM(S): 80 TABLET, FILM COATED ORAL at 21:22

## 2023-12-22 RX ADMIN — LEVETIRACETAM 500 MILLIGRAM(S): 250 TABLET, FILM COATED ORAL at 05:46

## 2023-12-22 RX ADMIN — Medication 81 MILLIGRAM(S): at 12:45

## 2023-12-22 RX ADMIN — LEVETIRACETAM 500 MILLIGRAM(S): 250 TABLET, FILM COATED ORAL at 18:23

## 2023-12-22 RX ADMIN — Medication 3 MILLIGRAM(S): at 21:22

## 2023-12-22 RX ADMIN — Medication 500000 UNIT(S): at 18:23

## 2023-12-22 RX ADMIN — Medication 1 APPLICATION(S): at 18:22

## 2023-12-22 RX ADMIN — Medication 1 TABLET(S): at 12:45

## 2023-12-22 RX ADMIN — Medication 1 APPLICATION(S): at 05:46

## 2023-12-22 RX ADMIN — Medication 1 DROP(S): at 18:23

## 2023-12-22 RX ADMIN — Medication 1 DROP(S): at 21:25

## 2023-12-22 NOTE — PROGRESS NOTE ADULT - ASSESSMENT
Assessment	  The patient is an 88 year old female with a history of HTN, hypothyroidism, pacemaker, breast cancer who is admitted with CVA and acute respiratory failure due to IV contrast anaphylaxis.CT head (12/4) showed Large left MCA territory acute to early subacute infarction, thrombosis of multiple distal branches of the left MCA and no acute intracranial hemorrhage. Hosptial course complicated by possible seizure episodes involving right facial twitching for 1 min and RUE shaking subsequently started and was persistent. CTH 12/13 showed redemonstrated left MCA distribution infarction, advanced when compared to prior imaging with likely petechial hemorrhages and/or laminar necrosis and mildine shift 0.5cm to the right. EEG 12/13 showed no seizures captured but at risk of left frontotemporal focal-onset seizures, Admitted for multidisciplinary rehab program    #L MCA CVA, Right dominant hemiparesis    -Pacemaker interrogated showing Atrial runs but no sustained Atrial Fibrillation   -CT head (12/4) showed Large left MCA territory acute to early subacute infarction, thrombosis of multiple distal branches of the left MCA and no acute intracranial hemorrhage.  -ASA81 mg PO QD  -atorvastatin 80mg PO at bedtime   #Comprehensive Multidisciplinary Rehab Program:  - Gait, ADL, Functional impairments  - PT/OT/ SLP 3 hours a day 5 days a week, 1 hour each   - TSH level 0.033 with normal T4, T3  - Hospitalist consult completed and appreciated   - Endocrinology consult completed with recommendation of:  1. Multiple thyroid nodules/Suppressed TSH - the patient will need a thyroid US as an outpatient. It is unclear at this time what thyroid medication the patient used in the past but review of her outpatient chart from 2022 revealed she was not on any thyroid medications in 2022 and she was euthyroid in May 2022. Differential diagnosis of her suppressed TSH includes subclinical hyperthyroidism versus sick euthyroid syndrome/non-thyroidal illness. I recommend rechecking her TSH in 6 weeks.  2. Empty sella - patient will need evaluation of the hypothalamic pituitary axes and MRI of the pituitary gland as an outpatient.    #Seizures   -EEG 12/15 showed no seizure but risk of left frontotemporal focal-onset seizures  -CT head 12/15 stable   -c/w keppra 500mg PO BID   - Keppra level (12/15) 17.1     #HTN  - Lisinopril 20mg PO QD --> increased to 40 mg po daily (12/22)   - (12/21) 132/78 - 166/81, (12/22) 119/74 - 168/98    #Mood / Cognition:  - Neuropsychology evaluation PRN  - EKG for  (12/22)      #Sleep:  - Maintain quiet hours and low stim environment    #Pain:  - Tylenol PRN  - lidocaine patch for back  - avoid sedating meds that may affect cognitive recovery    #/Bladder:  - Monitor PVR if no void in 8h; SC for >400 cc.    - Toileting schedule q4h  - (+) Incontinence     #Diet / Dysphagia:    - Diet: Pureed, Moderately thick liquids  - SLP assessment and treatment appreciated   - Nutrition to follow    #Skin/ Pressure Injury Prevention:  -  Venous stasis ulcer in L anterior shin  [id number]   - Turn Q2hrs in bed while awake, OOB to Chair, PT/OT/SLP     #DVT prophylaxis:  - ASA 81mg PO QD     #Precautions/ Restrictions  - Falls, Aspiration  - COVID PCR:    Assessment	  The patient is an 88 year old female with a history of HTN, hypothyroidism, pacemaker, breast cancer who is admitted with CVA and acute respiratory failure due to IV contrast anaphylaxis.CT head (12/4) showed Large left MCA territory acute to early subacute infarction, thrombosis of multiple distal branches of the left MCA and no acute intracranial hemorrhage. Hosptial course complicated by possible seizure episodes involving right facial twitching for 1 min and RUE shaking subsequently started and was persistent. CTH 12/13 showed redemonstrated left MCA distribution infarction, advanced when compared to prior imaging with likely petechial hemorrhages and/or laminar necrosis and mildine shift 0.5cm to the right. EEG 12/13 showed no seizures captured but at risk of left frontotemporal focal-onset seizures, Admitted for multidisciplinary rehab program    #L MCA CVA, Right dominant hemiparesis    -Pacemaker interrogated showing Atrial runs but no sustained Atrial Fibrillation   -CT head (12/4) showed Large left MCA territory acute to early subacute infarction, thrombosis of multiple distal branches of the left MCA and no acute intracranial hemorrhage.  -ASA81 mg PO QD  -atorvastatin 80mg PO at bedtime   #Comprehensive Multidisciplinary Rehab Program:  - Gait, ADL, Functional impairments  - PT/OT/ SLP 3 hours a day 5 days a week, 1 hour each   - TSH level 0.033 with normal T4, T3  - Hospitalist consult completed and appreciated   - Endocrinology consult completed with recommendation of:  1. Multiple thyroid nodules/Suppressed TSH - the patient will need a thyroid US as an outpatient. It is unclear at this time what thyroid medication the patient used in the past but review of her outpatient chart from 2022 revealed she was not on any thyroid medications in 2022 and she was euthyroid in May 2022. Differential diagnosis of her suppressed TSH includes subclinical hyperthyroidism versus sick euthyroid syndrome/non-thyroidal illness. I recommend rechecking her TSH in 6 weeks.  2. Empty sella - patient will need evaluation of the hypothalamic pituitary axes and MRI of the pituitary gland as an outpatient.    #Seizures   -EEG 12/15 showed no seizure but risk of left frontotemporal focal-onset seizures  -CT head 12/15 stable   -c/w keppra 500mg PO BID   - Keppra level (12/15) 17.1     #HTN  - Lisinopril 20mg PO QD --> increased to 40 mg po daily (12/22)   - (12/21) 132/78 - 166/81, (12/22) 119/74 - 168/98    #Mood / Cognition:  - Neuropsychology evaluation PRN  - EKG for  (12/22)      #Sleep:  - Maintain quiet hours and low stim environment    #Pain:  - Tylenol PRN  - lidocaine patch for back  - avoid sedating meds that may affect cognitive recovery    #/Bladder:  - Monitor PVR if no void in 8h; SC for >400 cc.    - Toileting schedule q4h  - (+) Incontinence     #Diet / Dysphagia:    - Diet: Pureed, Moderately thick liquids  - SLP assessment and treatment appreciated   - Nutrition to follow    #Skin/ Pressure Injury Prevention:  -  Venous stasis ulcer in L anterior shin  [id number]   - Turn Q2hrs in bed while awake, OOB to Chair, PT/OT/SLP     #DVT prophylaxis:  - ASA 81mg PO QD     #Precautions/ Restrictions  - Falls, Aspiration  - COVID PCR:

## 2023-12-22 NOTE — CHART NOTE - NSCHARTNOTEFT_GEN_A_CORE
Pt exposed to COVID-19 due to roommate testing positive today, pt swabbed, pending results. Pt asymptomatic. Pt daughter Elsi Young: [phone] called. Advised pt daughter also get tested as a precaution. Pt exposed to COVID-19 due to roommate testing positive today, pt swabbed, pending results. Pt asymptomatic. Pt daughter Elsi Young: [phone] called. Advised pt daughter also get tested as a precaution.

## 2023-12-22 NOTE — PROGRESS NOTE ADULT - SUBJECTIVE AND OBJECTIVE BOX
CC:  An 88y old female was admitted with Left MCA ischemic stroke, right nondominant hemiparesis, new onset seizure     HPI:  88 year old female with a history of HTN, hypothyroidism, pacemaker, breast cancer who is admitted with CVA and acute respiratory failure due to IV contrast anaphylaxis. Patient had R sided hemiparesis, expressive aphasia. CT imaging confirmed multiple embolic areas. Pacemaker interrogated showing Atrial runs but no sustained Atrial Fibrillation. Patient currently on course of ASA and statin. Hospital course complicated by severe anaphylactic due to iodine contrast allergy from CT performed on admission to ED. 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    Pt was transferred to [facility] for acute rehab on 12/8. rehab course complicated by possible seizure episodes involving right facial twitching for 1 min and RUE shaking subsequently started and was persistent. Per rehab team, pt was awake and speaking but stopped responding to questions for a short period of time when facial twitching started. RRT was called for the seizure activities.     She was admitted to MICU 12/13 for further management. CTH 12/13 showed Redemonstrated left MCA distribution infarction, advanced when compared to prior imaging with likely petechial hemorrhages and/or laminar necrosis. mildine shift 0.5cm to the right. Aspirin and chemical DVT ppx were held. Neurosurgery (Dr. Gilmore) were consulted per neurology recommendation, no acute surgical intervention needed. Repeat CTH 12/14 showed No change since 12/13/2023. EEG 12/13 showed No seizures captured but Risk of left frontotemporal focal-onset seizures She was seen by neurology for post-stroke seizure, recommended to continue keppra given first seizure in setting of large stroke. A repeat EEG 12/15 showed no seizure but again Risk of left frontotemporal focal-onset seizures. Aspirin 81mg PO QD restarted after repeat CTH 12/15 showed Stable exam.  Patient was evaluated by PM&R and therapy for functional deficits and gait/ ADL impairments and recommended acute rehabilitation. Patient was medically optimized for discharge to [facility] on 12/19.     Allergies:  penicillin (Rash)  IV Contrast (Anaphylaxis; Urticaria)    Subjective:  - Patient was seen and examined at bed side, comfortable in her WC, no events over night.  - Slept well last night and has no new complaints.  - Denies pain at this time  - Last BM (12/21), voiding without issues, (+) B/B incontinence    - Tolerating and participating in 3 hours of daily therapy.  - On isolation for COVID 19 exposure     ROS:  Denies CP, palpitation, SOB, fever, cough, abdominal pain, N/V/D/C, dysuria, headache, joint pain    MEDICATIONS  (STANDING):  aspirin  chewable 81 milliGRAM(s) Oral daily  atorvastatin 80 milliGRAM(s) Oral at bedtime  bacitracin   Ointment 1 Application(s) Topical two times a day  levETIRAcetam 500 milliGRAM(s) Oral every 12 hours  lidocaine   4% Patch 1 Patch Transdermal daily  lisinopril 40 milliGRAM(s) Oral daily  multivitamin 1 Tablet(s) Oral daily    MEDICATIONS  (PRN):  acetaminophen     Tablet .. 650 milliGRAM(s) Oral every 6 hours PRN Temp greater or equal to 38C (100.4F), Mild Pain (1 - 3)  aluminum hydroxide/magnesium hydroxide/simethicone Suspension 30 milliLiter(s) Oral every 4 hours PRN Dyspepsia  melatonin 3 milliGRAM(s) Oral at bedtime PRN Insomnia  ondansetron   Disintegrating Tablet 4 milliGRAM(s) Oral every 8 hours PRN Nausea and/or Vomiting  senna 2 Tablet(s) Oral at bedtime PRN Constipation    LAB:                        11.4   6.42  )-----------( 278      ( 21 Dec 2023 05:46 )             35.4     12-21    141  |  103  |  21  ----------------------------<  98  4.1   |  30  |  0.67    Ca    9.2      21 Dec 2023 05:46    TPro  6.6  /  Alb  2.7<L>  /  TBili  0.4  /  DBili  x   /  AST  35  /  ALT  22  /  AlkPhos  90  12-21    LIVER FUNCTIONS - ( 21 Dec 2023 05:46 )  Alb: 2.7 g/dL / Pro: 6.6 g/dL / ALK PHOS: 90 U/L / ALT: 22 U/L / AST: 35 U/L / GGT: x             CT Head No Cont (12.14.23 @ 09:34)  IMPRESSION: No change since 12/13/2023. Irregular appearing subacute to   chronic infarct left middle cerebral artery territory may be due to   spared gray matter, hemorrhage or laminar necrosis without change since   12/13/2023. Mass effect and mild midline shift to the right.      CT Head No Cont (12.15.23 @ 10:30)   Impression: Stable exam.      CT Head No Cont (12.19.23 @ 14:04)  IMPRESSION: Large subacute left middle cerebral artery infarct with mass   effect and mild midline shift improved since 12/15/2023. Irregularity of   infarct density likely related to spare gray versus petechial hemorrhage.      PHYSICAL EXAM:   Vital Signs Last 24 Hrs  T(C): 36.4 (22 Dec 2023 08:51), Max: 36.8 (21 Dec 2023 22:29)  T(F): 97.6 (22 Dec 2023 08:51), Max: 98.2 (21 Dec 2023 22:29)  HR: 95 (22 Dec 2023 08:51) (76 - 95)  BP: 119/74 (22 Dec 2023 08:51) (119/74 - 168/98)  RR: 16 (22 Dec 2023 08:51) (15 - 16)  SpO2: 98% (22 Dec 2023 08:51) (95% - 98%)    Gen - NAD, Comfortable, smiling   HEENT - NCAT, EOMI, AMANDA  Neck - Supple, No limited ROM  Pulm - CTAB, No crackles  Cardiovascular - RRR, S1S2  Abdomen - Soft, NT, ND, (+) BS  Extremities - No Cyanosis, no clubbing, no edema, no calf tenderness  Neuro-     Cognitive - awake, alert, oriented  x 4, follows command       Communication - Expressive and receptive impairment, delayed processing     Attention: Impaired, takes time to answer or perform a task      Memory: unable to recall       Cranial Nerves - right facial weakness     Motor -                     LEFT    UE - 5/5                    RIGHT UE - 4-/5                    LEFT     5/5                    RIGHT LE - 4-/5       Psychiatric - Mood stable, Affect WNL  Skin: Left shin venous stasis wound      IDT: 12/21  TDD: OSIEL 12/30  RN: incontinent x2  SW: private home 1 ZEE, 1 flight inside, lives w/ SO, limited support from SO + children  SLP: pureed/thins, mod-sev language, sev cog, poor command following, poor attention, poor memory/reasoning  OT: max-totalA all transfers and ADLs with fluctuation 2/2 cognition, goals for CG transfers and supervision bADLs  PT: modA bed mobility, min-modA transfers, modA 60' RW w/ WC follow, cognition is biggest barrier, no stairs yet, goals CS bed mobility/transfers + CG ambulation/stairs w/ 24/7 assist in 2wks  Goals: 1. sequence steps for dressing, 2. CG for toileting           CC:  An 88y old female was admitted with Left MCA ischemic stroke, right nondominant hemiparesis, new onset seizure     HPI:  88 year old female with a history of HTN, hypothyroidism, pacemaker, breast cancer who is admitted with CVA and acute respiratory failure due to IV contrast anaphylaxis. Patient had R sided hemiparesis, expressive aphasia. CT imaging confirmed multiple embolic areas. Pacemaker interrogated showing Atrial runs but no sustained Atrial Fibrillation. Patient currently on course of ASA and statin. Hospital course complicated by severe anaphylactic due to iodine contrast allergy from CT performed on admission to ED. 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    Pt was transferred to [facility] for acute rehab on 12/8. rehab course complicated by possible seizure episodes involving right facial twitching for 1 min and RUE shaking subsequently started and was persistent. Per rehab team, pt was awake and speaking but stopped responding to questions for a short period of time when facial twitching started. RRT was called for the seizure activities.     She was admitted to MICU 12/13 for further management. CTH 12/13 showed Redemonstrated left MCA distribution infarction, advanced when compared to prior imaging with likely petechial hemorrhages and/or laminar necrosis. mildine shift 0.5cm to the right. Aspirin and chemical DVT ppx were held. Neurosurgery (Dr. Gilmore) were consulted per neurology recommendation, no acute surgical intervention needed. Repeat CTH 12/14 showed No change since 12/13/2023. EEG 12/13 showed No seizures captured but Risk of left frontotemporal focal-onset seizures She was seen by neurology for post-stroke seizure, recommended to continue keppra given first seizure in setting of large stroke. A repeat EEG 12/15 showed no seizure but again Risk of left frontotemporal focal-onset seizures. Aspirin 81mg PO QD restarted after repeat CTH 12/15 showed Stable exam.  Patient was evaluated by PM&R and therapy for functional deficits and gait/ ADL impairments and recommended acute rehabilitation. Patient was medically optimized for discharge to [facility] on 12/19.     Allergies:  penicillin (Rash)  IV Contrast (Anaphylaxis; Urticaria)    Subjective:  - Patient was seen and examined at bed side, comfortable in her WC, no events over night.  - Slept well last night and has no new complaints.  - Denies pain at this time  - Last BM (12/21), voiding without issues, (+) B/B incontinence    - Tolerating and participating in 3 hours of daily therapy.  - On isolation for COVID 19 exposure     ROS:  Denies CP, palpitation, SOB, fever, cough, abdominal pain, N/V/D/C, dysuria, headache, joint pain    MEDICATIONS  (STANDING):  aspirin  chewable 81 milliGRAM(s) Oral daily  atorvastatin 80 milliGRAM(s) Oral at bedtime  bacitracin   Ointment 1 Application(s) Topical two times a day  levETIRAcetam 500 milliGRAM(s) Oral every 12 hours  lidocaine   4% Patch 1 Patch Transdermal daily  lisinopril 40 milliGRAM(s) Oral daily  multivitamin 1 Tablet(s) Oral daily    MEDICATIONS  (PRN):  acetaminophen     Tablet .. 650 milliGRAM(s) Oral every 6 hours PRN Temp greater or equal to 38C (100.4F), Mild Pain (1 - 3)  aluminum hydroxide/magnesium hydroxide/simethicone Suspension 30 milliLiter(s) Oral every 4 hours PRN Dyspepsia  melatonin 3 milliGRAM(s) Oral at bedtime PRN Insomnia  ondansetron   Disintegrating Tablet 4 milliGRAM(s) Oral every 8 hours PRN Nausea and/or Vomiting  senna 2 Tablet(s) Oral at bedtime PRN Constipation    LAB:                        11.4   6.42  )-----------( 278      ( 21 Dec 2023 05:46 )             35.4     12-21    141  |  103  |  21  ----------------------------<  98  4.1   |  30  |  0.67    Ca    9.2      21 Dec 2023 05:46    TPro  6.6  /  Alb  2.7<L>  /  TBili  0.4  /  DBili  x   /  AST  35  /  ALT  22  /  AlkPhos  90  12-21    LIVER FUNCTIONS - ( 21 Dec 2023 05:46 )  Alb: 2.7 g/dL / Pro: 6.6 g/dL / ALK PHOS: 90 U/L / ALT: 22 U/L / AST: 35 U/L / GGT: x             CT Head No Cont (12.14.23 @ 09:34)  IMPRESSION: No change since 12/13/2023. Irregular appearing subacute to   chronic infarct left middle cerebral artery territory may be due to   spared gray matter, hemorrhage or laminar necrosis without change since   12/13/2023. Mass effect and mild midline shift to the right.      CT Head No Cont (12.15.23 @ 10:30)   Impression: Stable exam.      CT Head No Cont (12.19.23 @ 14:04)  IMPRESSION: Large subacute left middle cerebral artery infarct with mass   effect and mild midline shift improved since 12/15/2023. Irregularity of   infarct density likely related to spare gray versus petechial hemorrhage.      PHYSICAL EXAM:   Vital Signs Last 24 Hrs  T(C): 36.4 (22 Dec 2023 08:51), Max: 36.8 (21 Dec 2023 22:29)  T(F): 97.6 (22 Dec 2023 08:51), Max: 98.2 (21 Dec 2023 22:29)  HR: 95 (22 Dec 2023 08:51) (76 - 95)  BP: 119/74 (22 Dec 2023 08:51) (119/74 - 168/98)  RR: 16 (22 Dec 2023 08:51) (15 - 16)  SpO2: 98% (22 Dec 2023 08:51) (95% - 98%)    Gen - NAD, Comfortable, smiling   HEENT - NCAT, EOMI, AMANDA  Neck - Supple, No limited ROM  Pulm - CTAB, No crackles  Cardiovascular - RRR, S1S2  Abdomen - Soft, NT, ND, (+) BS  Extremities - No Cyanosis, no clubbing, no edema, no calf tenderness  Neuro-     Cognitive - awake, alert, oriented  x 4, follows command       Communication - Expressive and receptive impairment, delayed processing     Attention: Impaired, takes time to answer or perform a task      Memory: unable to recall       Cranial Nerves - right facial weakness     Motor -                     LEFT    UE - 5/5                    RIGHT UE - 4-/5                    LEFT     5/5                    RIGHT LE - 4-/5       Psychiatric - Mood stable, Affect WNL  Skin: Left shin venous stasis wound      IDT: 12/21  TDD: OSIEL 12/30  RN: incontinent x2  SW: private home 1 ZEE, 1 flight inside, lives w/ SO, limited support from SO + children  SLP: pureed/thins, mod-sev language, sev cog, poor command following, poor attention, poor memory/reasoning  OT: max-totalA all transfers and ADLs with fluctuation 2/2 cognition, goals for CG transfers and supervision bADLs  PT: modA bed mobility, min-modA transfers, modA 60' RW w/ WC follow, cognition is biggest barrier, no stairs yet, goals CS bed mobility/transfers + CG ambulation/stairs w/ 24/7 assist in 2wks  Goals: 1. sequence steps for dressing, 2. CG for toileting

## 2023-12-23 PROCEDURE — 99232 SBSQ HOSP IP/OBS MODERATE 35: CPT | Mod: GC

## 2023-12-23 RX ORDER — MODAFINIL 200 MG/1
100 TABLET ORAL DAILY
Refills: 0 | Status: DISCONTINUED | OUTPATIENT
Start: 2023-12-23 | End: 2023-12-28

## 2023-12-23 RX ADMIN — Medication 500000 UNIT(S): at 17:50

## 2023-12-23 RX ADMIN — Medication 1 DROP(S): at 02:28

## 2023-12-23 RX ADMIN — Medication 500000 UNIT(S): at 05:55

## 2023-12-23 RX ADMIN — Medication 1 DROP(S): at 05:58

## 2023-12-23 RX ADMIN — Medication 1 DROP(S): at 14:08

## 2023-12-23 RX ADMIN — Medication 1 DROP(S): at 11:04

## 2023-12-23 RX ADMIN — Medication 500000 UNIT(S): at 23:41

## 2023-12-23 RX ADMIN — Medication 1 DROP(S): at 17:49

## 2023-12-23 RX ADMIN — Medication 1 TABLET(S): at 11:12

## 2023-12-23 RX ADMIN — LEVETIRACETAM 500 MILLIGRAM(S): 250 TABLET, FILM COATED ORAL at 05:58

## 2023-12-23 RX ADMIN — Medication 81 MILLIGRAM(S): at 11:14

## 2023-12-23 RX ADMIN — Medication 500000 UNIT(S): at 00:17

## 2023-12-23 RX ADMIN — Medication 500000 UNIT(S): at 11:14

## 2023-12-23 RX ADMIN — LISINOPRIL 40 MILLIGRAM(S): 2.5 TABLET ORAL at 06:01

## 2023-12-23 RX ADMIN — ATORVASTATIN CALCIUM 80 MILLIGRAM(S): 80 TABLET, FILM COATED ORAL at 22:03

## 2023-12-23 RX ADMIN — LEVETIRACETAM 500 MILLIGRAM(S): 250 TABLET, FILM COATED ORAL at 17:50

## 2023-12-23 RX ADMIN — Medication 1 APPLICATION(S): at 17:50

## 2023-12-23 RX ADMIN — Medication 1 DROP(S): at 21:59

## 2023-12-23 RX ADMIN — Medication 1 APPLICATION(S): at 05:29

## 2023-12-23 NOTE — PROGRESS NOTE ADULT - ASSESSMENT
Assessment	  The patient is an 88 year old female with a history of HTN, hypothyroidism, pacemaker, breast cancer who is admitted with CVA and acute respiratory failure due to IV contrast anaphylaxis.CT head (12/4) showed Large left MCA territory acute to early subacute infarction, thrombosis of multiple distal branches of the left MCA and no acute intracranial hemorrhage. Hosptial course complicated by possible seizure episodes involving right facial twitching for 1 min and RUE shaking subsequently started and was persistent. CTH 12/13 showed redemonstrated left MCA distribution infarction, advanced when compared to prior imaging with likely petechial hemorrhages and/or laminar necrosis and mildine shift 0.5cm to the right. EEG 12/13 showed no seizures captured but at risk of left frontotemporal focal-onset seizures, Admitted for multidisciplinary rehab program    #L MCA CVA, Right dominant hemiparesis    -Pacemaker interrogated showing Atrial runs but no sustained Atrial Fibrillation   -CT head (12/4) showed Large left MCA territory acute to early subacute infarction, thrombosis of multiple distal branches of the left MCA and no acute intracranial hemorrhage.  -ASA81 mg PO QD  -atorvastatin 80mg PO at bedtime   #Comprehensive Multidisciplinary Rehab Program:  - Gait, ADL, Functional impairments  - PT/OT/ SLP 3 hours a day 5 days a week, 1 hour each   - TSH level 0.033 with normal T4, T3  - Modafinil 100 mg po daily  - Hospitalist consult completed and appreciated   - Endocrinology consult completed with recommendation of:  1. Multiple thyroid nodules/Suppressed TSH - the patient will need a thyroid US as an outpatient. It is unclear at this time what thyroid medication the patient used in the past but review of her outpatient chart from 2022 revealed she was not on any thyroid medications in 2022 and she was euthyroid in May 2022. Differential diagnosis of her suppressed TSH includes subclinical hyperthyroidism versus sick euthyroid syndrome/non-thyroidal illness. I recommend rechecking her TSH in 6 weeks.  2. Empty sella - patient will need evaluation of the hypothalamic pituitary axes and MRI of the pituitary gland as an outpatient.    #Seizures   -EEG 12/15 showed no seizure but risk of left frontotemporal focal-onset seizures  -CT head 12/15 stable   -c/w keppra 500mg PO BID   - Keppra level (12/15) 17.1     #HTN/ improving   - Lisinopril 20mg PO QD --> increased to 40 mg po daily (12/22)   - (12/21) 132/78 - 166/81, (12/22) 119/74 - 168/98, (12/23) 136/70 - 138/82    #Mood / Cognition:  - Neuropsychology evaluation PRN  - EKG for  (12/22)  - Will hold on starting SSRI      #Sleep:  - Maintain quiet hours and low stim environment    #Pain:  - Tylenol PRN  - lidocaine patch for back  - avoid sedating meds that may affect cognitive recovery    #/Bladder:  - Monitor PVR if no void in 8h; SC for >400 cc.    - Toileting schedule q4h  - (+) Incontinence     #Diet / Dysphagia:    - Diet: Pureed, Moderately thick liquids--> upgraded to minced and moist with 1:1 SV and assistance, out of bed for meals.    - SLP assessment and treatment appreciated   - Nutrition to follow    #Skin/ Pressure Injury Prevention:  -  Venous stasis ulcer in L anterior shin  [id number]   - Turn Q2hrs in bed while awake, OOB to Chair, PT/OT/SLP     #DVT prophylaxis:  - ASA 81mg PO QD     #Precautions/ Restrictions  - Falls, Aspiration  - COVID PCR:    Assessment	  The patient is an 88 year old female with a history of HTN, hypothyroidism, pacemaker, breast cancer who is admitted with CVA and acute respiratory failure due to IV contrast anaphylaxis.CT head (12/4) showed Large left MCA territory acute to early subacute infarction, thrombosis of multiple distal branches of the left MCA and no acute intracranial hemorrhage. Hosptial course complicated by possible seizure episodes involving right facial twitching for 1 min and RUE shaking subsequently started and was persistent. CTH 12/13 showed redemonstrated left MCA distribution infarction, advanced when compared to prior imaging with likely petechial hemorrhages and/or laminar necrosis and mildine shift 0.5cm to the right. EEG 12/13 showed no seizures captured but at risk of left frontotemporal focal-onset seizures, Admitted for multidisciplinary rehab program    #L MCA CVA, Right dominant hemiparesis    -Pacemaker interrogated showing Atrial runs but no sustained Atrial Fibrillation   -CT head (12/4) showed Large left MCA territory acute to early subacute infarction, thrombosis of multiple distal branches of the left MCA and no acute intracranial hemorrhage.  -ASA81 mg PO QD  -atorvastatin 80mg PO at bedtime   #Comprehensive Multidisciplinary Rehab Program:  - Gait, ADL, Functional impairments  - PT/OT/ SLP 3 hours a day 5 days a week, 1 hour each   - TSH level 0.033 with normal T4, T3  - Modafinil 100 mg po daily  - Hospitalist consult completed and appreciated   - Endocrinology consult completed with recommendation of:  1. Multiple thyroid nodules/Suppressed TSH - the patient will need a thyroid US as an outpatient. It is unclear at this time what thyroid medication the patient used in the past but review of her outpatient chart from 2022 revealed she was not on any thyroid medications in 2022 and she was euthyroid in May 2022. Differential diagnosis of her suppressed TSH includes subclinical hyperthyroidism versus sick euthyroid syndrome/non-thyroidal illness. I recommend rechecking her TSH in 6 weeks.  2. Empty sella - patient will need evaluation of the hypothalamic pituitary axes and MRI of the pituitary gland as an outpatient.    #Seizures   -EEG 12/15 showed no seizure but risk of left frontotemporal focal-onset seizures  -CT head 12/15 stable   -c/w keppra 500mg PO BID   - Keppra level (12/15) 17.1     #HTN/ improving   - Lisinopril 20mg PO QD --> increased to 40 mg po daily (12/22)   - (12/21) 132/78 - 166/81, (12/22) 119/74 - 168/98, (12/23) 136/70 - 138/82    #Mood / Cognition:  - Neuropsychology evaluation PRN  - EKG for  (12/22)  - Will hold on starting SSRI      #Sleep:  - Maintain quiet hours and low stim environment    #Pain:  - Tylenol PRN  - lidocaine patch for back  - avoid sedating meds that may affect cognitive recovery    #/Bladder:  - Monitor PVR if no void in 8h; SC for >400 cc.    - Toileting schedule q4h  - (+) Incontinence     #Diet / Dysphagia:    - Diet: Pureed, Moderately thick liquids--> upgraded to minced and moist with 1:1 SV and assistance, out of bed for meals.    - SLP assessment and treatment appreciated   - Nutrition to follow    #Skin/ Pressure Injury Prevention:  -  Venous stasis ulcer in L anterior shin  [id number]   - Turn Q2hrs in bed while awake, OOB to Chair, PT/OT/SLP     #DVT prophylaxis:  - ASA 81mg PO QD     #Precautions/ Restrictions  - Falls, Aspiration  - COVID PCR:

## 2023-12-23 NOTE — PROGRESS NOTE ADULT - SUBJECTIVE AND OBJECTIVE BOX
CC:  An 88y old female was admitted with Left MCA ischemic stroke, right nondominant hemiparesis, new onset seizure     HPI:  88 year old female with a history of HTN, hypothyroidism, pacemaker, breast cancer who is admitted with CVA and acute respiratory failure due to IV contrast anaphylaxis. Patient had R sided hemiparesis, expressive aphasia. CT imaging confirmed multiple embolic areas. Pacemaker interrogated showing Atrial runs but no sustained Atrial Fibrillation. Patient currently on course of ASA and statin. Hospital course complicated by severe anaphylactic due to iodine contrast allergy from CT performed on admission to ED. 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    Pt was transferred to [facility] for acute rehab on 12/8. rehab course complicated by possible seizure episodes involving right facial twitching for 1 min and RUE shaking subsequently started and was persistent. Per rehab team, pt was awake and speaking but stopped responding to questions for a short period of time when facial twitching started. RRT was called for the seizure activities.     She was admitted to MICU 12/13 for further management. CTH 12/13 showed Redemonstrated left MCA distribution infarction, advanced when compared to prior imaging with likely petechial hemorrhages and/or laminar necrosis. mildine shift 0.5cm to the right. Aspirin and chemical DVT ppx were held. Neurosurgery (Dr. Gilmore) were consulted per neurology recommendation, no acute surgical intervention needed. Repeat CTH 12/14 showed No change since 12/13/2023. EEG 12/13 showed No seizures captured but Risk of left frontotemporal focal-onset seizures She was seen by neurology for post-stroke seizure, recommended to continue keppra given first seizure in setting of large stroke. A repeat EEG 12/15 showed no seizure but again Risk of left frontotemporal focal-onset seizures. Aspirin 81mg PO QD restarted after repeat CTH 12/15 showed Stable exam.  Patient was evaluated by PM&R and therapy for functional deficits and gait/ ADL impairments and recommended acute rehabilitation. Patient was medically optimized for discharge to [facility] on 12/19.     Allergies:  penicillin (Rash)  IV Contrast (Anaphylaxis; Urticaria)    Subjective:  - Patient was seen and examined at bed side. No events over night.  - Slept well last night and has no new complaints.  - Denies pain at this time  - Last BM (12/21), voiding without issues, (+) B/B incontinence    - Tolerating and participating in 3 hours of daily therapy.  - On isolation for COVID 19 exposure     ROS:  Denies CP, palpitation, SOB, fever, cough, abdominal pain, N/V/D/C, dysuria, headache, joint pain    MEDICATIONS  (STANDING):  artificial tears (preservative free) Ophthalmic Solution 1 Drop(s) Both EYES every 4 hours  aspirin  chewable 81 milliGRAM(s) Oral daily  atorvastatin 80 milliGRAM(s) Oral at bedtime  bacitracin   Ointment 1 Application(s) Topical two times a day  levETIRAcetam 500 milliGRAM(s) Oral every 12 hours  lidocaine   4% Patch 1 Patch Transdermal daily  lisinopril 40 milliGRAM(s) Oral daily  modafinil 100 milliGRAM(s) Oral daily  multivitamin 1 Tablet(s) Oral daily  nystatin    Suspension 767494 Unit(s) Oral four times a day    MEDICATIONS  (PRN):  acetaminophen     Tablet .. 650 milliGRAM(s) Oral every 6 hours PRN Temp greater or equal to 38C (100.4F), Mild Pain (1 - 3)  aluminum hydroxide/magnesium hydroxide/simethicone Suspension 30 milliLiter(s) Oral every 4 hours PRN Dyspepsia  melatonin 3 milliGRAM(s) Oral at bedtime PRN Insomnia  senna 2 Tablet(s) Oral at bedtime PRN Constipation  sodium chloride 0.65% Nasal 1 Spray(s) Both Nostrils three times a day PRN Nasal Congestion      LAB:                        11.4   6.42  )-----------( 278      ( 21 Dec 2023 05:46 )             35.4     12-21    141  |  103  |  21  ----------------------------<  98  4.1   |  30  |  0.67    Ca    9.2      21 Dec 2023 05:46    TPro  6.6  /  Alb  2.7<L>  /  TBili  0.4  /  DBili  x   /  AST  35  /  ALT  22  /  AlkPhos  90  12-21    LIVER FUNCTIONS - ( 21 Dec 2023 05:46 )  Alb: 2.7 g/dL / Pro: 6.6 g/dL / ALK PHOS: 90 U/L / ALT: 22 U/L / AST: 35 U/L / GGT: x             CT Head No Cont (12.14.23 @ 09:34)  IMPRESSION: No change since 12/13/2023. Irregular appearing subacute to   chronic infarct left middle cerebral artery territory may be due to   spared gray matter, hemorrhage or laminar necrosis without change since   12/13/2023. Mass effect and mild midline shift to the right.      CT Head No Cont (12.15.23 @ 10:30)   Impression: Stable exam.      CT Head No Cont (12.19.23 @ 14:04)  IMPRESSION: Large subacute left middle cerebral artery infarct with mass   effect and mild midline shift improved since 12/15/2023. Irregularity of   infarct density likely related to spare gray versus petechial hemorrhage.      PHYSICAL EXAM:   Vital Signs Last 24 Hrs  T(C): 36.6 (23 Dec 2023 09:02), Max: 36.9 (23 Dec 2023 05:48)  T(F): 97.8 (23 Dec 2023 09:02), Max: 98.5 (23 Dec 2023 05:48)  HR: 73 (23 Dec 2023 09:02) (73 - 84)  BP: 137/79 (23 Dec 2023 09:02) (136/70 - 138/82)  RR: 16 (23 Dec 2023 09:02) (15 - 16)  SpO2: 96% (23 Dec 2023 09:02) (96% - 97%)    Gen - NAD, Comfortable   HEENT - NCAT, EOMI, AMANDA  Neck - Supple, No limited ROM  Pulm - CTAB, No crackles  Cardiovascular - RRR, S1S2  Abdomen - Soft, NT, ND, (+) BS  Extremities - No Cyanosis, no clubbing, no edema, no calf tenderness  Neuro-     Cognitive - awake, alert, oriented  x 4, follows command       Communication - Expressive and receptive impairment, delayed processing     Attention: Impaired, takes time to answer or perform a task      Memory: unable to recall       Cranial Nerves - right facial weakness     Motor -                     LEFT    UE - 5/5                    RIGHT UE - 4-/5                    LEFT     5/5                    RIGHT LE - 4-/5       Psychiatric - Mood stable, Affect WNL  Skin: Left shin venous stasis wound      IDT: 12/21  TDD: OSIEL 12/30  RN: incontinent x2  SW: private home 1 ZEE, 1 flight inside, lives w/ SO, limited support from SO + children  SLP: pureed/thins, mod-sev language, sev cog, poor command following, poor attention, poor memory/reasoning  OT: max-totalA all transfers and ADLs with fluctuation 2/2 cognition, goals for CG transfers and supervision bADLs  PT: modA bed mobility, min-modA transfers, modA 60' RW w/ WC follow, cognition is biggest barrier, no stairs yet, goals CS bed mobility/transfers + CG ambulation/stairs w/ 24/7 assist in 2wks  Goals: 1. sequence steps for dressing, 2. CG for toileting           CC:  An 88y old female was admitted with Left MCA ischemic stroke, right nondominant hemiparesis, new onset seizure     HPI:  88 year old female with a history of HTN, hypothyroidism, pacemaker, breast cancer who is admitted with CVA and acute respiratory failure due to IV contrast anaphylaxis. Patient had R sided hemiparesis, expressive aphasia. CT imaging confirmed multiple embolic areas. Pacemaker interrogated showing Atrial runs but no sustained Atrial Fibrillation. Patient currently on course of ASA and statin. Hospital course complicated by severe anaphylactic due to iodine contrast allergy from CT performed on admission to ED. 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    Pt was transferred to [facility] for acute rehab on 12/8. rehab course complicated by possible seizure episodes involving right facial twitching for 1 min and RUE shaking subsequently started and was persistent. Per rehab team, pt was awake and speaking but stopped responding to questions for a short period of time when facial twitching started. RRT was called for the seizure activities.     She was admitted to MICU 12/13 for further management. CTH 12/13 showed Redemonstrated left MCA distribution infarction, advanced when compared to prior imaging with likely petechial hemorrhages and/or laminar necrosis. mildine shift 0.5cm to the right. Aspirin and chemical DVT ppx were held. Neurosurgery (Dr. Gilmore) were consulted per neurology recommendation, no acute surgical intervention needed. Repeat CTH 12/14 showed No change since 12/13/2023. EEG 12/13 showed No seizures captured but Risk of left frontotemporal focal-onset seizures She was seen by neurology for post-stroke seizure, recommended to continue keppra given first seizure in setting of large stroke. A repeat EEG 12/15 showed no seizure but again Risk of left frontotemporal focal-onset seizures. Aspirin 81mg PO QD restarted after repeat CTH 12/15 showed Stable exam.  Patient was evaluated by PM&R and therapy for functional deficits and gait/ ADL impairments and recommended acute rehabilitation. Patient was medically optimized for discharge to [facility] on 12/19.     Allergies:  penicillin (Rash)  IV Contrast (Anaphylaxis; Urticaria)    Subjective:  - Patient was seen and examined at bed side. No events over night.  - Slept well last night and has no new complaints.  - Denies pain at this time  - Last BM (12/21), voiding without issues, (+) B/B incontinence    - Tolerating and participating in 3 hours of daily therapy.  - On isolation for COVID 19 exposure     ROS:  Denies CP, palpitation, SOB, fever, cough, abdominal pain, N/V/D/C, dysuria, headache, joint pain    MEDICATIONS  (STANDING):  artificial tears (preservative free) Ophthalmic Solution 1 Drop(s) Both EYES every 4 hours  aspirin  chewable 81 milliGRAM(s) Oral daily  atorvastatin 80 milliGRAM(s) Oral at bedtime  bacitracin   Ointment 1 Application(s) Topical two times a day  levETIRAcetam 500 milliGRAM(s) Oral every 12 hours  lidocaine   4% Patch 1 Patch Transdermal daily  lisinopril 40 milliGRAM(s) Oral daily  modafinil 100 milliGRAM(s) Oral daily  multivitamin 1 Tablet(s) Oral daily  nystatin    Suspension [id number] Unit(s) Oral four times a day    MEDICATIONS  (PRN):  acetaminophen     Tablet .. 650 milliGRAM(s) Oral every 6 hours PRN Temp greater or equal to 38C (100.4F), Mild Pain (1 - 3)  aluminum hydroxide/magnesium hydroxide/simethicone Suspension 30 milliLiter(s) Oral every 4 hours PRN Dyspepsia  melatonin 3 milliGRAM(s) Oral at bedtime PRN Insomnia  senna 2 Tablet(s) Oral at bedtime PRN Constipation  sodium chloride 0.65% Nasal 1 Spray(s) Both Nostrils three times a day PRN Nasal Congestion      LAB:                        11.4   6.42  )-----------( 278      ( 21 Dec 2023 05:46 )             35.4     12-21    141  |  103  |  21  ----------------------------<  98  4.1   |  30  |  0.67    Ca    9.2      21 Dec 2023 05:46    TPro  6.6  /  Alb  2.7<L>  /  TBili  0.4  /  DBili  x   /  AST  35  /  ALT  22  /  AlkPhos  90  12-21    LIVER FUNCTIONS - ( 21 Dec 2023 05:46 )  Alb: 2.7 g/dL / Pro: 6.6 g/dL / ALK PHOS: 90 U/L / ALT: 22 U/L / AST: 35 U/L / GGT: x             CT Head No Cont (12.14.23 @ 09:34)  IMPRESSION: No change since 12/13/2023. Irregular appearing subacute to   chronic infarct left middle cerebral artery territory may be due to   spared gray matter, hemorrhage or laminar necrosis without change since   12/13/2023. Mass effect and mild midline shift to the right.      CT Head No Cont (12.15.23 @ 10:30)   Impression: Stable exam.      CT Head No Cont (12.19.23 @ 14:04)  IMPRESSION: Large subacute left middle cerebral artery infarct with mass   effect and mild midline shift improved since 12/15/2023. Irregularity of   infarct density likely related to spare gray versus petechial hemorrhage.      PHYSICAL EXAM:   Vital Signs Last 24 Hrs  T(C): 36.6 (23 Dec 2023 09:02), Max: 36.9 (23 Dec 2023 05:48)  T(F): 97.8 (23 Dec 2023 09:02), Max: 98.5 (23 Dec 2023 05:48)  HR: 73 (23 Dec 2023 09:02) (73 - 84)  BP: 137/79 (23 Dec 2023 09:02) (136/70 - 138/82)  RR: 16 (23 Dec 2023 09:02) (15 - 16)  SpO2: 96% (23 Dec 2023 09:02) (96% - 97%)    Gen - NAD, Comfortable   HEENT - NCAT, EOMI, AMANDA  Neck - Supple, No limited ROM  Pulm - CTAB, No crackles  Cardiovascular - RRR, S1S2  Abdomen - Soft, NT, ND, (+) BS  Extremities - No Cyanosis, no clubbing, no edema, no calf tenderness  Neuro-     Cognitive - awake, alert, oriented  x 4, follows command       Communication - Expressive and receptive impairment, delayed processing     Attention: Impaired, takes time to answer or perform a task      Memory: unable to recall       Cranial Nerves - right facial weakness     Motor -                     LEFT    UE - 5/5                    RIGHT UE - 4-/5                    LEFT     5/5                    RIGHT LE - 4-/5       Psychiatric - Mood stable, Affect WNL  Skin: Left shin venous stasis wound      IDT: 12/21  TDD: OSIEL 12/30  RN: incontinent x2  SW: private home 1 ZEE, 1 flight inside, lives w/ SO, limited support from SO + children  SLP: pureed/thins, mod-sev language, sev cog, poor command following, poor attention, poor memory/reasoning  OT: max-totalA all transfers and ADLs with fluctuation 2/2 cognition, goals for CG transfers and supervision bADLs  PT: modA bed mobility, min-modA transfers, modA 60' RW w/ WC follow, cognition is biggest barrier, no stairs yet, goals CS bed mobility/transfers + CG ambulation/stairs w/ 24/7 assist in 2wks  Goals: 1. sequence steps for dressing, 2. CG for toileting

## 2023-12-24 PROCEDURE — 99232 SBSQ HOSP IP/OBS MODERATE 35: CPT | Mod: GC

## 2023-12-24 RX ADMIN — Medication 1 APPLICATION(S): at 18:08

## 2023-12-24 RX ADMIN — Medication 81 MILLIGRAM(S): at 13:05

## 2023-12-24 RX ADMIN — Medication 1 DROP(S): at 06:38

## 2023-12-24 RX ADMIN — Medication 1 DROP(S): at 13:07

## 2023-12-24 RX ADMIN — Medication 500000 UNIT(S): at 18:08

## 2023-12-24 RX ADMIN — Medication 500000 UNIT(S): at 06:38

## 2023-12-24 RX ADMIN — Medication 1 DROP(S): at 18:08

## 2023-12-24 RX ADMIN — Medication 1 DROP(S): at 11:01

## 2023-12-24 RX ADMIN — LEVETIRACETAM 500 MILLIGRAM(S): 250 TABLET, FILM COATED ORAL at 18:09

## 2023-12-24 RX ADMIN — Medication 1 DROP(S): at 21:40

## 2023-12-24 RX ADMIN — ATORVASTATIN CALCIUM 80 MILLIGRAM(S): 80 TABLET, FILM COATED ORAL at 21:40

## 2023-12-24 RX ADMIN — LISINOPRIL 40 MILLIGRAM(S): 2.5 TABLET ORAL at 06:38

## 2023-12-24 RX ADMIN — LEVETIRACETAM 500 MILLIGRAM(S): 250 TABLET, FILM COATED ORAL at 06:37

## 2023-12-24 RX ADMIN — Medication 1 TABLET(S): at 13:06

## 2023-12-24 RX ADMIN — Medication 500000 UNIT(S): at 13:06

## 2023-12-24 RX ADMIN — Medication 1 DROP(S): at 01:57

## 2023-12-24 RX ADMIN — Medication 1 APPLICATION(S): at 06:39

## 2023-12-24 RX ADMIN — MODAFINIL 100 MILLIGRAM(S): 200 TABLET ORAL at 13:06

## 2023-12-24 NOTE — PROGRESS NOTE ADULT - ASSESSMENT
Assessment	  The patient is an 88 year old female with a history of HTN, hypothyroidism, pacemaker, breast cancer who is admitted with CVA and acute respiratory failure due to IV contrast anaphylaxis.CT head (12/4) showed Large left MCA territory acute to early subacute infarction, thrombosis of multiple distal branches of the left MCA and no acute intracranial hemorrhage. Hosptial course complicated by possible seizure episodes involving right facial twitching for 1 min and RUE shaking subsequently started and was persistent. CTH 12/13 showed redemonstrated left MCA distribution infarction, advanced when compared to prior imaging with likely petechial hemorrhages and/or laminar necrosis and mildine shift 0.5cm to the right. EEG 12/13 showed no seizures captured but at risk of left frontotemporal focal-onset seizures, Admitted for multidisciplinary rehab program    #L MCA CVA, Right dominant hemiparesis    -Pacemaker interrogated showing Atrial runs but no sustained Atrial Fibrillation   -CT head (12/4) showed Large left MCA territory acute to early subacute infarction, thrombosis of multiple distal branches of the left MCA and no acute intracranial hemorrhage.  -ASA81 mg PO QD  -atorvastatin 80mg PO at bedtime   #Comprehensive Multidisciplinary Rehab Program:  - Gait, ADL, Functional impairments  - PT/OT/ SLP 3 hours a day 5 days a week, 1 hour each   - TSH level 0.033 with normal T4, T3  - Modafinil 100 mg po daily, tolerating well   - Hospitalist consult completed and appreciated   - Endocrinology consult completed with recommendation of:  1. Multiple thyroid nodules/Suppressed TSH - the patient will need a thyroid US as an outpatient. It is unclear at this time what thyroid medication the patient used in the past but review of her outpatient chart from 2022 revealed she was not on any thyroid medications in 2022 and she was euthyroid in May 2022. Differential diagnosis of her suppressed TSH includes subclinical hyperthyroidism versus sick euthyroid syndrome/non-thyroidal illness. I recommend rechecking her TSH in 6 weeks.  2. Empty sella - patient will need evaluation of the hypothalamic pituitary axes and MRI of the pituitary gland as an outpatient.    #Seizures   -EEG 12/15 showed no seizure but risk of left frontotemporal focal-onset seizures  -CT head 12/15 stable   -c/w keppra 500mg PO BID   - Keppra level (12/15) 17.1     #HTN/ improving   - Lisinopril 20mg PO QD --> increased to 40 mg po daily (12/22)   - (12/21) 132/78 - 166/81, (12/22) 119/74 - 168/98, (12/23) 136/70 - 138/82    #Mood / Cognition:  - Neuropsychology evaluation PRN  - EKG for  (12/22)  - Will hold on starting SSRI      #Sleep:  - Maintain quiet hours and low stim environment    #Pain:  - Tylenol PRN  - lidocaine patch for back  - avoid sedating meds that may affect cognitive recovery    #/Bladder:  - Monitor PVR if no void in 8h; SC for >400 cc. Done    - Toileting schedule q4h  - (+) Incontinence     #Diet / Dysphagia:    - Diet: Pureed, Moderately thick liquids--> upgraded to minced and moist with 1:1 SV and assistance, out of bed for meals. (12/23)    - SLP assessment and treatment appreciated   - Nutrition to follow    #Skin/ Pressure Injury Prevention:  -  Venous stasis ulcer in L anterior shin  [id number]   - Turn Q2hrs in bed while awake, OOB to Chair, PT/OT/SLP     #DVT prophylaxis:  - ASA 81mg PO QD     #Precautions/ Restrictions  - Falls, Aspiration  - COVID PCR:    Assessment	  The patient is an 88 year old female with a history of HTN, hypothyroidism, pacemaker, breast cancer who is admitted with CVA and acute respiratory failure due to IV contrast anaphylaxis.CT head (12/4) showed Large left MCA territory acute to early subacute infarction, thrombosis of multiple distal branches of the left MCA and no acute intracranial hemorrhage. Hosptial course complicated by possible seizure episodes involving right facial twitching for 1 min and RUE shaking subsequently started and was persistent. CTH 12/13 showed redemonstrated left MCA distribution infarction, advanced when compared to prior imaging with likely petechial hemorrhages and/or laminar necrosis and mildine shift 0.5cm to the right. EEG 12/13 showed no seizures captured but at risk of left frontotemporal focal-onset seizures, Admitted for multidisciplinary rehab program    #L MCA CVA, Right dominant hemiparesis    -Pacemaker interrogated showing Atrial runs but no sustained Atrial Fibrillation   -CT head (12/4) showed Large left MCA territory acute to early subacute infarction, thrombosis of multiple distal branches of the left MCA and no acute intracranial hemorrhage.  -ASA81 mg PO QD  -atorvastatin 80mg PO at bedtime   #Comprehensive Multidisciplinary Rehab Program:  - Gait, ADL, Functional impairments  - PT/OT/ SLP 3 hours a day 5 days a week, 1 hour each   - TSH level 0.033 with normal T4, T3  - Modafinil 100 mg po daily, tolerating well   - Hospitalist consult completed and appreciated   - Endocrinology consult completed with recommendation of:  1. Multiple thyroid nodules/Suppressed TSH - the patient will need a thyroid US as an outpatient. It is unclear at this time what thyroid medication the patient used in the past but review of her outpatient chart from 2022 revealed she was not on any thyroid medications in 2022 and she was euthyroid in May 2022. Differential diagnosis of her suppressed TSH includes subclinical hyperthyroidism versus sick euthyroid syndrome/non-thyroidal illness. I recommend rechecking her TSH in 6 weeks.  2. Empty sella - patient will need evaluation of the hypothalamic pituitary axes and MRI of the pituitary gland as an outpatient.    #Seizures   -EEG 12/15 showed no seizure but risk of left frontotemporal focal-onset seizures  -CT head 12/15 stable   -c/w keppra 500mg PO BID   - Keppra level (12/15) 17.1     #HTN/ improving   - Lisinopril 20mg PO QD --> increased to 40 mg po daily (12/22)   - (12/21) 132/78 - 166/81, (12/22) 119/74 - 168/98, (12/23) 136/70 - 138/82    #Mood / Cognition:  - Neuropsychology evaluation PRN  - EKG for  (12/22)  - Will hold on starting SSRI      #Sleep:  - Maintain quiet hours and low stim environment    #Pain:  - Tylenol PRN  - lidocaine patch for back  - avoid sedating meds that may affect cognitive recovery    #/Bladder:  - Monitor PVR if no void in 8h; SC for >400 cc. Done    - Toileting schedule q4h  - (+) Incontinence     #Diet / Dysphagia:    - Diet: Pureed, Moderately thick liquids--> upgraded to minced and moist with 1:1 SV and assistance, out of bed for meals. (12/23)    - SLP assessment and treatment appreciated   - Nutrition to follow    #Skin/ Pressure Injury Prevention:  -  Venous stasis ulcer in L anterior shin  [id number]   - Turn Q2hrs in bed while awake, OOB to Chair, PT/OT/SLP     #DVT prophylaxis:  - ASA 81mg PO QD     #Precautions/ Restrictions  - Falls, Aspiration  - COVID PCR:

## 2023-12-24 NOTE — PROGRESS NOTE ADULT - SUBJECTIVE AND OBJECTIVE BOX
CC:  An 88y old female was admitted with Left MCA ischemic stroke, right nondominant hemiparesis, new onset seizure     HPI:  88 year old female with a history of HTN, hypothyroidism, pacemaker, breast cancer who is admitted with CVA and acute respiratory failure due to IV contrast anaphylaxis. Patient had R sided hemiparesis, expressive aphasia. CT imaging confirmed multiple embolic areas. Pacemaker interrogated showing Atrial runs but no sustained Atrial Fibrillation. Patient currently on course of ASA and statin. Hospital course complicated by severe anaphylactic due to iodine contrast allergy from CT performed on admission to ED. 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    Pt was transferred to [facility] for acute rehab on 12/8. rehab course complicated by possible seizure episodes involving right facial twitching for 1 min and RUE shaking subsequently started and was persistent. Per rehab team, pt was awake and speaking but stopped responding to questions for a short period of time when facial twitching started. RRT was called for the seizure activities.     She was admitted to MICU 12/13 for further management. CTH 12/13 showed Redemonstrated left MCA distribution infarction, advanced when compared to prior imaging with likely petechial hemorrhages and/or laminar necrosis. mildine shift 0.5cm to the right. Aspirin and chemical DVT ppx were held. Neurosurgery (Dr. Gilmore) were consulted per neurology recommendation, no acute surgical intervention needed. Repeat CTH 12/14 showed No change since 12/13/2023. EEG 12/13 showed No seizures captured but Risk of left frontotemporal focal-onset seizures She was seen by neurology for post-stroke seizure, recommended to continue keppra given first seizure in setting of large stroke. A repeat EEG 12/15 showed no seizure but again Risk of left frontotemporal focal-onset seizures. Aspirin 81mg PO QD restarted after repeat CTH 12/15 showed Stable exam.  Patient was evaluated by PM&R and therapy for functional deficits and gait/ ADL impairments and recommended acute rehabilitation. Patient was medically optimized for discharge to [facility] on 12/19.     Allergies:  penicillin (Rash)  IV Contrast (Anaphylaxis; Urticaria)    Subjective:  - Patient was seen and examined at bed side. In her WC comfortable.  No events over night.  - Slept well last night and has no new complaints.  - Denies pain at this time  - Last BM (12/24), voiding without issues, (+) B/B incontinence    - Tolerating and participating in 3 hours of daily therapy.  - On isolation for COVID 19 exposure     ROS:  Denies CP, palpitation, SOB, fever, cough, abdominal pain, N/V/D/C, dysuria, headache, joint pain    MEDICATIONS  (STANDING):  artificial tears (preservative free) Ophthalmic Solution 1 Drop(s) Both EYES every 4 hours  aspirin  chewable 81 milliGRAM(s) Oral daily  atorvastatin 80 milliGRAM(s) Oral at bedtime  bacitracin   Ointment 1 Application(s) Topical two times a day  levETIRAcetam 500 milliGRAM(s) Oral every 12 hours  lidocaine   4% Patch 1 Patch Transdermal daily  lisinopril 40 milliGRAM(s) Oral daily  modafinil 100 milliGRAM(s) Oral daily  multivitamin 1 Tablet(s) Oral daily  nystatin    Suspension 747976 Unit(s) Oral four times a day    MEDICATIONS  (PRN):  acetaminophen     Tablet .. 650 milliGRAM(s) Oral every 6 hours PRN Temp greater or equal to 38C (100.4F), Mild Pain (1 - 3)  aluminum hydroxide/magnesium hydroxide/simethicone Suspension 30 milliLiter(s) Oral every 4 hours PRN Dyspepsia  melatonin 3 milliGRAM(s) Oral at bedtime PRN Insomnia  senna 2 Tablet(s) Oral at bedtime PRN Constipation  sodium chloride 0.65% Nasal 1 Spray(s) Both Nostrils three times a day PRN Nasal Congestion    LAB:                        11.4   6.42  )-----------( 278      ( 21 Dec 2023 05:46 )             35.4     12-21    141  |  103  |  21  ----------------------------<  98  4.1   |  30  |  0.67    Ca    9.2      21 Dec 2023 05:46    TPro  6.6  /  Alb  2.7<L>  /  TBili  0.4  /  DBili  x   /  AST  35  /  ALT  22  /  AlkPhos  90  12-21    LIVER FUNCTIONS - ( 21 Dec 2023 05:46 )  Alb: 2.7 g/dL / Pro: 6.6 g/dL / ALK PHOS: 90 U/L / ALT: 22 U/L / AST: 35 U/L / GGT: x             CT Head No Cont (12.14.23 @ 09:34)  IMPRESSION: No change since 12/13/2023. Irregular appearing subacute to   chronic infarct left middle cerebral artery territory may be due to   spared gray matter, hemorrhage or laminar necrosis without change since   12/13/2023. Mass effect and mild midline shift to the right.      CT Head No Cont (12.15.23 @ 10:30)   Impression: Stable exam.      CT Head No Cont (12.19.23 @ 14:04)  IMPRESSION: Large subacute left middle cerebral artery infarct with mass   effect and mild midline shift improved since 12/15/2023. Irregularity of   infarct density likely related to spare gray versus petechial hemorrhage.      PHYSICAL EXAM:   Vital Signs Last 24 Hrs  T(C): 36.6 (24 Dec 2023 08:00), Max: 36.6 (24 Dec 2023 08:00)  T(F): 97.9 (24 Dec 2023 08:00), Max: 97.9 (24 Dec 2023 08:00)  HR: 75 (24 Dec 2023 08:00) (75 - 81)  BP: 130/73 (24 Dec 2023 08:00) (130/73 - 157/81)  RR: 16 (24 Dec 2023 08:00) (16 - 16)  SpO2: 94% (24 Dec 2023 08:00) (94% - 95%)    Gen - NAD, Comfortable   HEENT - NCAT, EOMI, AMANDA  Neck - Supple, No limited ROM  Pulm - CTAB, No crackles  Cardiovascular - RRR, S1S2  Abdomen - Soft, NT, ND, (+) BS  Extremities - No Cyanosis, no clubbing, no edema, no calf tenderness  Neuro-     Cognitive - awake, alert, oriented  x 4, follows command       Communication - Expressive and receptive impairment, delayed processing     Attention: Impaired, takes time to answer or perform a task      Memory: unable to recall       Cranial Nerves - right facial weakness     Motor -                     LEFT    UE - 5/5                    RIGHT UE - 4-/5                    LEFT     5/5                    RIGHT LE - 4-/5       Psychiatric - Mood stable, Affect WNL  Skin: Left shin venous stasis wound      IDT: 12/21  TDD: OSIEL 12/30  RN: incontinent x2  SW: private home 1 ZEE, 1 flight inside, lives w/ SO, limited support from SO + children  SLP: pureed/thins, mod-sev language, sev cog, poor command following, poor attention, poor memory/reasoning  OT: max-totalA all transfers and ADLs with fluctuation 2/2 cognition, goals for CG transfers and supervision bADLs  PT: modA bed mobility, min-modA transfers, modA 60' RW w/ WC follow, cognition is biggest barrier, no stairs yet, goals CS bed mobility/transfers + CG ambulation/stairs w/ 24/7 assist in 2wks  Goals: 1. sequence steps for dressing, 2. CG for toileting           CC:  An 88y old female was admitted with Left MCA ischemic stroke, right nondominant hemiparesis, new onset seizure     HPI:  88 year old female with a history of HTN, hypothyroidism, pacemaker, breast cancer who is admitted with CVA and acute respiratory failure due to IV contrast anaphylaxis. Patient had R sided hemiparesis, expressive aphasia. CT imaging confirmed multiple embolic areas. Pacemaker interrogated showing Atrial runs but no sustained Atrial Fibrillation. Patient currently on course of ASA and statin. Hospital course complicated by severe anaphylactic due to iodine contrast allergy from CT performed on admission to ED. 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    Pt was transferred to [facility] for acute rehab on 12/8. rehab course complicated by possible seizure episodes involving right facial twitching for 1 min and RUE shaking subsequently started and was persistent. Per rehab team, pt was awake and speaking but stopped responding to questions for a short period of time when facial twitching started. RRT was called for the seizure activities.     She was admitted to MICU 12/13 for further management. CTH 12/13 showed Redemonstrated left MCA distribution infarction, advanced when compared to prior imaging with likely petechial hemorrhages and/or laminar necrosis. mildine shift 0.5cm to the right. Aspirin and chemical DVT ppx were held. Neurosurgery (Dr. Gilmore) were consulted per neurology recommendation, no acute surgical intervention needed. Repeat CTH 12/14 showed No change since 12/13/2023. EEG 12/13 showed No seizures captured but Risk of left frontotemporal focal-onset seizures She was seen by neurology for post-stroke seizure, recommended to continue keppra given first seizure in setting of large stroke. A repeat EEG 12/15 showed no seizure but again Risk of left frontotemporal focal-onset seizures. Aspirin 81mg PO QD restarted after repeat CTH 12/15 showed Stable exam.  Patient was evaluated by PM&R and therapy for functional deficits and gait/ ADL impairments and recommended acute rehabilitation. Patient was medically optimized for discharge to [facility] on 12/19.     Allergies:  penicillin (Rash)  IV Contrast (Anaphylaxis; Urticaria)    Subjective:  - Patient was seen and examined at bed side. In her WC comfortable.  No events over night.  - Slept well last night and has no new complaints.  - Denies pain at this time  - Last BM (12/24), voiding without issues, (+) B/B incontinence    - Tolerating and participating in 3 hours of daily therapy.  - On isolation for COVID 19 exposure     ROS:  Denies CP, palpitation, SOB, fever, cough, abdominal pain, N/V/D/C, dysuria, headache, joint pain    MEDICATIONS  (STANDING):  artificial tears (preservative free) Ophthalmic Solution 1 Drop(s) Both EYES every 4 hours  aspirin  chewable 81 milliGRAM(s) Oral daily  atorvastatin 80 milliGRAM(s) Oral at bedtime  bacitracin   Ointment 1 Application(s) Topical two times a day  levETIRAcetam 500 milliGRAM(s) Oral every 12 hours  lidocaine   4% Patch 1 Patch Transdermal daily  lisinopril 40 milliGRAM(s) Oral daily  modafinil 100 milliGRAM(s) Oral daily  multivitamin 1 Tablet(s) Oral daily  nystatin    Suspension [id number] Unit(s) Oral four times a day    MEDICATIONS  (PRN):  acetaminophen     Tablet .. 650 milliGRAM(s) Oral every 6 hours PRN Temp greater or equal to 38C (100.4F), Mild Pain (1 - 3)  aluminum hydroxide/magnesium hydroxide/simethicone Suspension 30 milliLiter(s) Oral every 4 hours PRN Dyspepsia  melatonin 3 milliGRAM(s) Oral at bedtime PRN Insomnia  senna 2 Tablet(s) Oral at bedtime PRN Constipation  sodium chloride 0.65% Nasal 1 Spray(s) Both Nostrils three times a day PRN Nasal Congestion    LAB:                        11.4   6.42  )-----------( 278      ( 21 Dec 2023 05:46 )             35.4     12-21    141  |  103  |  21  ----------------------------<  98  4.1   |  30  |  0.67    Ca    9.2      21 Dec 2023 05:46    TPro  6.6  /  Alb  2.7<L>  /  TBili  0.4  /  DBili  x   /  AST  35  /  ALT  22  /  AlkPhos  90  12-21    LIVER FUNCTIONS - ( 21 Dec 2023 05:46 )  Alb: 2.7 g/dL / Pro: 6.6 g/dL / ALK PHOS: 90 U/L / ALT: 22 U/L / AST: 35 U/L / GGT: x             CT Head No Cont (12.14.23 @ 09:34)  IMPRESSION: No change since 12/13/2023. Irregular appearing subacute to   chronic infarct left middle cerebral artery territory may be due to   spared gray matter, hemorrhage or laminar necrosis without change since   12/13/2023. Mass effect and mild midline shift to the right.      CT Head No Cont (12.15.23 @ 10:30)   Impression: Stable exam.      CT Head No Cont (12.19.23 @ 14:04)  IMPRESSION: Large subacute left middle cerebral artery infarct with mass   effect and mild midline shift improved since 12/15/2023. Irregularity of   infarct density likely related to spare gray versus petechial hemorrhage.      PHYSICAL EXAM:   Vital Signs Last 24 Hrs  T(C): 36.6 (24 Dec 2023 08:00), Max: 36.6 (24 Dec 2023 08:00)  T(F): 97.9 (24 Dec 2023 08:00), Max: 97.9 (24 Dec 2023 08:00)  HR: 75 (24 Dec 2023 08:00) (75 - 81)  BP: 130/73 (24 Dec 2023 08:00) (130/73 - 157/81)  RR: 16 (24 Dec 2023 08:00) (16 - 16)  SpO2: 94% (24 Dec 2023 08:00) (94% - 95%)    Gen - NAD, Comfortable   HEENT - NCAT, EOMI, AMANDA  Neck - Supple, No limited ROM  Pulm - CTAB, No crackles  Cardiovascular - RRR, S1S2  Abdomen - Soft, NT, ND, (+) BS  Extremities - No Cyanosis, no clubbing, no edema, no calf tenderness  Neuro-     Cognitive - awake, alert, oriented  x 4, follows command       Communication - Expressive and receptive impairment, delayed processing     Attention: Impaired, takes time to answer or perform a task      Memory: unable to recall       Cranial Nerves - right facial weakness     Motor -                     LEFT    UE - 5/5                    RIGHT UE - 4-/5                    LEFT     5/5                    RIGHT LE - 4-/5       Psychiatric - Mood stable, Affect WNL  Skin: Left shin venous stasis wound      IDT: 12/21  TDD: OSIEL 12/30  RN: incontinent x2  SW: private home 1 ZEE, 1 flight inside, lives w/ SO, limited support from SO + children  SLP: pureed/thins, mod-sev language, sev cog, poor command following, poor attention, poor memory/reasoning  OT: max-totalA all transfers and ADLs with fluctuation 2/2 cognition, goals for CG transfers and supervision bADLs  PT: modA bed mobility, min-modA transfers, modA 60' RW w/ WC follow, cognition is biggest barrier, no stairs yet, goals CS bed mobility/transfers + CG ambulation/stairs w/ 24/7 assist in 2wks  Goals: 1. sequence steps for dressing, 2. CG for toileting

## 2023-12-25 LAB
ALBUMIN SERPL ELPH-MCNC: 2.7 G/DL — LOW (ref 3.3–5)
ALBUMIN SERPL ELPH-MCNC: 2.7 G/DL — LOW (ref 3.3–5)
ALP SERPL-CCNC: 87 U/L — SIGNIFICANT CHANGE UP (ref 40–120)
ALP SERPL-CCNC: 87 U/L — SIGNIFICANT CHANGE UP (ref 40–120)
ALT FLD-CCNC: 24 U/L — SIGNIFICANT CHANGE UP (ref 10–45)
ALT FLD-CCNC: 24 U/L — SIGNIFICANT CHANGE UP (ref 10–45)
ANION GAP SERPL CALC-SCNC: 7 MMOL/L — SIGNIFICANT CHANGE UP (ref 5–17)
ANION GAP SERPL CALC-SCNC: 7 MMOL/L — SIGNIFICANT CHANGE UP (ref 5–17)
AST SERPL-CCNC: 31 U/L — SIGNIFICANT CHANGE UP (ref 10–40)
AST SERPL-CCNC: 31 U/L — SIGNIFICANT CHANGE UP (ref 10–40)
BASOPHILS # BLD AUTO: 0.05 K/UL — SIGNIFICANT CHANGE UP (ref 0–0.2)
BASOPHILS # BLD AUTO: 0.05 K/UL — SIGNIFICANT CHANGE UP (ref 0–0.2)
BASOPHILS NFR BLD AUTO: 1 % — SIGNIFICANT CHANGE UP (ref 0–2)
BASOPHILS NFR BLD AUTO: 1 % — SIGNIFICANT CHANGE UP (ref 0–2)
BILIRUB SERPL-MCNC: 0.4 MG/DL — SIGNIFICANT CHANGE UP (ref 0.2–1.2)
BILIRUB SERPL-MCNC: 0.4 MG/DL — SIGNIFICANT CHANGE UP (ref 0.2–1.2)
BUN SERPL-MCNC: 28 MG/DL — HIGH (ref 7–23)
BUN SERPL-MCNC: 28 MG/DL — HIGH (ref 7–23)
CALCIUM SERPL-MCNC: 9.3 MG/DL — SIGNIFICANT CHANGE UP (ref 8.4–10.5)
CALCIUM SERPL-MCNC: 9.3 MG/DL — SIGNIFICANT CHANGE UP (ref 8.4–10.5)
CHLORIDE SERPL-SCNC: 103 MMOL/L — SIGNIFICANT CHANGE UP (ref 96–108)
CHLORIDE SERPL-SCNC: 103 MMOL/L — SIGNIFICANT CHANGE UP (ref 96–108)
CO2 SERPL-SCNC: 31 MMOL/L — SIGNIFICANT CHANGE UP (ref 22–31)
CO2 SERPL-SCNC: 31 MMOL/L — SIGNIFICANT CHANGE UP (ref 22–31)
CREAT SERPL-MCNC: 0.89 MG/DL — SIGNIFICANT CHANGE UP (ref 0.5–1.3)
CREAT SERPL-MCNC: 0.89 MG/DL — SIGNIFICANT CHANGE UP (ref 0.5–1.3)
EGFR: 63 ML/MIN/1.73M2 — SIGNIFICANT CHANGE UP
EGFR: 63 ML/MIN/1.73M2 — SIGNIFICANT CHANGE UP
EOSINOPHIL # BLD AUTO: 0.22 K/UL — SIGNIFICANT CHANGE UP (ref 0–0.5)
EOSINOPHIL # BLD AUTO: 0.22 K/UL — SIGNIFICANT CHANGE UP (ref 0–0.5)
EOSINOPHIL NFR BLD AUTO: 4.6 % — SIGNIFICANT CHANGE UP (ref 0–6)
EOSINOPHIL NFR BLD AUTO: 4.6 % — SIGNIFICANT CHANGE UP (ref 0–6)
GLUCOSE SERPL-MCNC: 90 MG/DL — SIGNIFICANT CHANGE UP (ref 70–99)
GLUCOSE SERPL-MCNC: 90 MG/DL — SIGNIFICANT CHANGE UP (ref 70–99)
HCT VFR BLD CALC: 34.1 % — LOW (ref 34.5–45)
HCT VFR BLD CALC: 34.1 % — LOW (ref 34.5–45)
HGB BLD-MCNC: 11 G/DL — LOW (ref 11.5–15.5)
HGB BLD-MCNC: 11 G/DL — LOW (ref 11.5–15.5)
IMM GRANULOCYTES NFR BLD AUTO: 0.2 % — SIGNIFICANT CHANGE UP (ref 0–0.9)
IMM GRANULOCYTES NFR BLD AUTO: 0.2 % — SIGNIFICANT CHANGE UP (ref 0–0.9)
LYMPHOCYTES # BLD AUTO: 1.25 K/UL — SIGNIFICANT CHANGE UP (ref 1–3.3)
LYMPHOCYTES # BLD AUTO: 1.25 K/UL — SIGNIFICANT CHANGE UP (ref 1–3.3)
LYMPHOCYTES # BLD AUTO: 26.2 % — SIGNIFICANT CHANGE UP (ref 13–44)
LYMPHOCYTES # BLD AUTO: 26.2 % — SIGNIFICANT CHANGE UP (ref 13–44)
MCHC RBC-ENTMCNC: 26.8 PG — LOW (ref 27–34)
MCHC RBC-ENTMCNC: 26.8 PG — LOW (ref 27–34)
MCHC RBC-ENTMCNC: 32.3 GM/DL — SIGNIFICANT CHANGE UP (ref 32–36)
MCHC RBC-ENTMCNC: 32.3 GM/DL — SIGNIFICANT CHANGE UP (ref 32–36)
MCV RBC AUTO: 83.2 FL — SIGNIFICANT CHANGE UP (ref 80–100)
MCV RBC AUTO: 83.2 FL — SIGNIFICANT CHANGE UP (ref 80–100)
MONOCYTES # BLD AUTO: 0.5 K/UL — SIGNIFICANT CHANGE UP (ref 0–0.9)
MONOCYTES # BLD AUTO: 0.5 K/UL — SIGNIFICANT CHANGE UP (ref 0–0.9)
MONOCYTES NFR BLD AUTO: 10.5 % — SIGNIFICANT CHANGE UP (ref 2–14)
MONOCYTES NFR BLD AUTO: 10.5 % — SIGNIFICANT CHANGE UP (ref 2–14)
NEUTROPHILS # BLD AUTO: 2.74 K/UL — SIGNIFICANT CHANGE UP (ref 1.8–7.4)
NEUTROPHILS # BLD AUTO: 2.74 K/UL — SIGNIFICANT CHANGE UP (ref 1.8–7.4)
NEUTROPHILS NFR BLD AUTO: 57.5 % — SIGNIFICANT CHANGE UP (ref 43–77)
NEUTROPHILS NFR BLD AUTO: 57.5 % — SIGNIFICANT CHANGE UP (ref 43–77)
NRBC # BLD: 0 /100 WBCS — SIGNIFICANT CHANGE UP (ref 0–0)
NRBC # BLD: 0 /100 WBCS — SIGNIFICANT CHANGE UP (ref 0–0)
PLATELET # BLD AUTO: 240 K/UL — SIGNIFICANT CHANGE UP (ref 150–400)
PLATELET # BLD AUTO: 240 K/UL — SIGNIFICANT CHANGE UP (ref 150–400)
POTASSIUM SERPL-MCNC: 3.9 MMOL/L — SIGNIFICANT CHANGE UP (ref 3.5–5.3)
POTASSIUM SERPL-MCNC: 3.9 MMOL/L — SIGNIFICANT CHANGE UP (ref 3.5–5.3)
POTASSIUM SERPL-SCNC: 3.9 MMOL/L — SIGNIFICANT CHANGE UP (ref 3.5–5.3)
POTASSIUM SERPL-SCNC: 3.9 MMOL/L — SIGNIFICANT CHANGE UP (ref 3.5–5.3)
PROT SERPL-MCNC: 6.5 G/DL — SIGNIFICANT CHANGE UP (ref 6–8.3)
PROT SERPL-MCNC: 6.5 G/DL — SIGNIFICANT CHANGE UP (ref 6–8.3)
RBC # BLD: 4.1 M/UL — SIGNIFICANT CHANGE UP (ref 3.8–5.2)
RBC # BLD: 4.1 M/UL — SIGNIFICANT CHANGE UP (ref 3.8–5.2)
RBC # FLD: 16.5 % — HIGH (ref 10.3–14.5)
RBC # FLD: 16.5 % — HIGH (ref 10.3–14.5)
SODIUM SERPL-SCNC: 141 MMOL/L — SIGNIFICANT CHANGE UP (ref 135–145)
SODIUM SERPL-SCNC: 141 MMOL/L — SIGNIFICANT CHANGE UP (ref 135–145)
WBC # BLD: 4.77 K/UL — SIGNIFICANT CHANGE UP (ref 3.8–10.5)
WBC # BLD: 4.77 K/UL — SIGNIFICANT CHANGE UP (ref 3.8–10.5)
WBC # FLD AUTO: 4.77 K/UL — SIGNIFICANT CHANGE UP (ref 3.8–10.5)
WBC # FLD AUTO: 4.77 K/UL — SIGNIFICANT CHANGE UP (ref 3.8–10.5)

## 2023-12-25 PROCEDURE — 99232 SBSQ HOSP IP/OBS MODERATE 35: CPT | Mod: GC

## 2023-12-25 RX ADMIN — LISINOPRIL 40 MILLIGRAM(S): 2.5 TABLET ORAL at 06:29

## 2023-12-25 RX ADMIN — Medication 1 DROP(S): at 17:19

## 2023-12-25 RX ADMIN — Medication 1 DROP(S): at 10:12

## 2023-12-25 RX ADMIN — Medication 1 DROP(S): at 21:57

## 2023-12-25 RX ADMIN — ATORVASTATIN CALCIUM 80 MILLIGRAM(S): 80 TABLET, FILM COATED ORAL at 21:57

## 2023-12-25 RX ADMIN — Medication 1 DROP(S): at 06:31

## 2023-12-25 RX ADMIN — Medication 1 TABLET(S): at 11:34

## 2023-12-25 RX ADMIN — Medication 81 MILLIGRAM(S): at 11:34

## 2023-12-25 RX ADMIN — Medication 500000 UNIT(S): at 11:33

## 2023-12-25 RX ADMIN — Medication 500000 UNIT(S): at 17:19

## 2023-12-25 RX ADMIN — Medication 1 DROP(S): at 13:48

## 2023-12-25 RX ADMIN — MODAFINIL 100 MILLIGRAM(S): 200 TABLET ORAL at 11:33

## 2023-12-25 RX ADMIN — LEVETIRACETAM 500 MILLIGRAM(S): 250 TABLET, FILM COATED ORAL at 17:19

## 2023-12-25 RX ADMIN — Medication 500000 UNIT(S): at 06:30

## 2023-12-25 RX ADMIN — Medication 1 APPLICATION(S): at 06:31

## 2023-12-25 RX ADMIN — LEVETIRACETAM 500 MILLIGRAM(S): 250 TABLET, FILM COATED ORAL at 06:29

## 2023-12-25 RX ADMIN — Medication 1 APPLICATION(S): at 17:19

## 2023-12-25 RX ADMIN — Medication 3 MILLIGRAM(S): at 00:10

## 2023-12-25 RX ADMIN — Medication 500000 UNIT(S): at 00:10

## 2023-12-25 NOTE — PROGRESS NOTE ADULT - SUBJECTIVE AND OBJECTIVE BOX
CC:  An 88y old female was admitted with Left MCA ischemic stroke, right nondominant hemiparesis, new onset seizure     HPI:  88 year old female with a history of HTN, hypothyroidism, pacemaker, breast cancer who is admitted with CVA and acute respiratory failure due to IV contrast anaphylaxis. Patient had R sided hemiparesis, expressive aphasia. CT imaging confirmed multiple embolic areas. Pacemaker interrogated showing Atrial runs but no sustained Atrial Fibrillation. Patient currently on course of ASA and statin. Hospital course complicated by severe anaphylactic due to iodine contrast allergy from CT performed on admission to ED. 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    Pt was transferred to [facility] for acute rehab on 12/8. rehab course complicated by possible seizure episodes involving right facial twitching for 1 min and RUE shaking subsequently started and was persistent. Per rehab team, pt was awake and speaking but stopped responding to questions for a short period of time when facial twitching started. RRT was called for the seizure activities.     She was admitted to MICU 12/13 for further management. CTH 12/13 showed Redemonstrated left MCA distribution infarction, advanced when compared to prior imaging with likely petechial hemorrhages and/or laminar necrosis. mildine shift 0.5cm to the right. Aspirin and chemical DVT ppx were held. Neurosurgery (Dr. Gilmore) were consulted per neurology recommendation, no acute surgical intervention needed. Repeat CTH 12/14 showed No change since 12/13/2023. EEG 12/13 showed No seizures captured but Risk of left frontotemporal focal-onset seizures She was seen by neurology for post-stroke seizure, recommended to continue keppra given first seizure in setting of large stroke. A repeat EEG 12/15 showed no seizure but again Risk of left frontotemporal focal-onset seizures. Aspirin 81mg PO QD restarted after repeat CTH 12/15 showed Stable exam.  Patient was evaluated by PM&R and therapy for functional deficits and gait/ ADL impairments and recommended acute rehabilitation. Patient was medically optimized for discharge to [facility] on 12/19.     Allergies:  penicillin (Rash)  IV Contrast (Anaphylaxis; Urticaria)    Subjective:  - Patient was seen and examined at bed side. In her WC comfortable.  No events over night.  - Slept well last night and has no new complaints.  - Pain is well controlled with current meds.   - Last BM (12/24), voiding without issues, (+) B/B incontinence    - Tolerating and participating in 3 hours of daily therapy.  - On isolation for COVID 19 exposure     ROS:  Denies CP, palpitation, SOB, fever, cough, abdominal pain, N/V/D/C, dysuria, headache, joint pain    MEDICATIONS  (STANDING):  artificial tears (preservative free) Ophthalmic Solution 1 Drop(s) Both EYES every 4 hours  aspirin  chewable 81 milliGRAM(s) Oral daily  atorvastatin 80 milliGRAM(s) Oral at bedtime  bacitracin   Ointment 1 Application(s) Topical two times a day  levETIRAcetam 500 milliGRAM(s) Oral every 12 hours  lidocaine   4% Patch 1 Patch Transdermal daily  lisinopril 40 milliGRAM(s) Oral daily  modafinil 100 milliGRAM(s) Oral daily  multivitamin 1 Tablet(s) Oral daily  nystatin    Suspension [id number] Unit(s) Oral four times a day    MEDICATIONS  (PRN):  acetaminophen     Tablet .. 650 milliGRAM(s) Oral every 6 hours PRN Temp greater or equal to 38C (100.4F), Mild Pain (1 - 3)  aluminum hydroxide/magnesium hydroxide/simethicone Suspension 30 milliLiter(s) Oral every 4 hours PRN Dyspepsia  melatonin 3 milliGRAM(s) Oral at bedtime PRN Insomnia  senna 2 Tablet(s) Oral at bedtime PRN Constipation  sodium chloride 0.65% Nasal 1 Spray(s) Both Nostrils three times a day PRN Nasal Congestion    LAB:                        11.4   6.42  )-----------( 278      ( 21 Dec 2023 05:46 )             35.4     12-21    141  |  103  |  21  ----------------------------<  98  4.1   |  30  |  0.67    Ca    9.2      21 Dec 2023 05:46    TPro  6.6  /  Alb  2.7<L>  /  TBili  0.4  /  DBili  x   /  AST  35  /  ALT  22  /  AlkPhos  90  12-21    LIVER FUNCTIONS - ( 21 Dec 2023 05:46 )  Alb: 2.7 g/dL / Pro: 6.6 g/dL / ALK PHOS: 90 U/L / ALT: 22 U/L / AST: 35 U/L / GGT: x           CT Head No Cont (12.14.23 @ 09:34)  IMPRESSION: No change since 12/13/2023. Irregular appearing subacute to   chronic infarct left middle cerebral artery territory may be due to   spared gray matter, hemorrhage or laminar necrosis without change since   12/13/2023. Mass effect and mild midline shift to the right.    CT Head No Cont (12.15.23 @ 10:30)   Impression: Stable exam.    CT Head No Cont (12.19.23 @ 14:04)  IMPRESSION: Large subacute left middle cerebral artery infarct with mass   effect and mild midline shift improved since 12/15/2023. Irregularity of   infarct density likely related to spare gray versus petechial hemorrhage.      PHYSICAL EXAM:   Vital Signs Last 24 Hrs  T(C): 36.4 (25 Dec 2023 07:39), Max: 36.8 (24 Dec 2023 20:18)  T(F): 97.5 (25 Dec 2023 07:39), Max: 98.3 (24 Dec 2023 20:18)  HR: 70 (25 Dec 2023 07:39) (70 - 81)  BP: 153/80 (25 Dec 2023 07:39) (109/67 - 154/84)  RR: 16 (25 Dec 2023 07:39) (16 - 16)  SpO2: 93% (25 Dec 2023 07:39) (92% - 93%)    Gen - NAD, Comfortable   HEENT - NCAT, EOMI, AMANDA  Neck - Supple, No limited ROM  Pulm - CTAB, No crackles  Cardiovascular - RRR, S1S2  Abdomen - Soft, NT, ND, (+) BS  Extremities - No Cyanosis, no clubbing, no edema, no calf tenderness  Neuro-     Cognitive - awake, alert, oriented  x 4, follows command       Communication - Expressive and receptive impairment, delayed processing     Attention: Impaired, takes time to answer or perform a task      Memory: unable to recall       Cranial Nerves - right facial weakness     Motor -                     LEFT    UE - 5/5                    RIGHT UE - 4-/5                    LEFT     5/5                    RIGHT LE - 4-/5       Psychiatric - Mood stable, Affect WNL  Skin: Left shin venous stasis wound      IDT: 12/21  TDD: OSIEL 12/30  RN: incontinent x2  SW: private home 1 ZEE, 1 flight inside, lives w/ SO, limited support from SO + children  SLP: pureed/thins, mod-sev language, sev cog, poor command following, poor attention, poor memory/reasoning  OT: max-totalA all transfers and ADLs with fluctuation 2/2 cognition, goals for CG transfers and supervision bADLs  PT: modA bed mobility, min-modA transfers, modA 60' RW w/ WC follow, cognition is biggest barrier, no stairs yet, goals CS bed mobility/transfers + CG ambulation/stairs w/ 24/7 assist in 2wks  Goals: 1. sequence steps for dressing, 2. CG for toileting           CC:  An 88y old female was admitted with Left MCA ischemic stroke, right nondominant hemiparesis, new onset seizure     HPI:  88 year old female with a history of HTN, hypothyroidism, pacemaker, breast cancer who is admitted with CVA and acute respiratory failure due to IV contrast anaphylaxis. Patient had R sided hemiparesis, expressive aphasia. CT imaging confirmed multiple embolic areas. Pacemaker interrogated showing Atrial runs but no sustained Atrial Fibrillation. Patient currently on course of ASA and statin. Hospital course complicated by severe anaphylactic due to iodine contrast allergy from CT performed on admission to ED. 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    Pt was transferred to [facility] for acute rehab on 12/8. rehab course complicated by possible seizure episodes involving right facial twitching for 1 min and RUE shaking subsequently started and was persistent. Per rehab team, pt was awake and speaking but stopped responding to questions for a short period of time when facial twitching started. RRT was called for the seizure activities.     She was admitted to MICU 12/13 for further management. CTH 12/13 showed Redemonstrated left MCA distribution infarction, advanced when compared to prior imaging with likely petechial hemorrhages and/or laminar necrosis. mildine shift 0.5cm to the right. Aspirin and chemical DVT ppx were held. Neurosurgery (Dr. Gilmore) were consulted per neurology recommendation, no acute surgical intervention needed. Repeat CTH 12/14 showed No change since 12/13/2023. EEG 12/13 showed No seizures captured but Risk of left frontotemporal focal-onset seizures She was seen by neurology for post-stroke seizure, recommended to continue keppra given first seizure in setting of large stroke. A repeat EEG 12/15 showed no seizure but again Risk of left frontotemporal focal-onset seizures. Aspirin 81mg PO QD restarted after repeat CTH 12/15 showed Stable exam.  Patient was evaluated by PM&R and therapy for functional deficits and gait/ ADL impairments and recommended acute rehabilitation. Patient was medically optimized for discharge to [facility] on 12/19.     Allergies:  penicillin (Rash)  IV Contrast (Anaphylaxis; Urticaria)    Subjective:  - Patient was seen and examined at bed side. In her WC comfortable.  No events over night.  - Slept well last night and has no new complaints.  - Pain is well controlled with current meds.   - Last BM (12/24), voiding without issues, (+) B/B incontinence    - Tolerating and participating in 3 hours of daily therapy.  - On isolation for COVID 19 exposure     ROS:  Denies CP, palpitation, SOB, fever, cough, abdominal pain, N/V/D/C, dysuria, headache, joint pain    MEDICATIONS  (STANDING):  artificial tears (preservative free) Ophthalmic Solution 1 Drop(s) Both EYES every 4 hours  aspirin  chewable 81 milliGRAM(s) Oral daily  atorvastatin 80 milliGRAM(s) Oral at bedtime  bacitracin   Ointment 1 Application(s) Topical two times a day  levETIRAcetam 500 milliGRAM(s) Oral every 12 hours  lidocaine   4% Patch 1 Patch Transdermal daily  lisinopril 40 milliGRAM(s) Oral daily  modafinil 100 milliGRAM(s) Oral daily  multivitamin 1 Tablet(s) Oral daily  nystatin    Suspension [id number] Unit(s) Oral four times a day    MEDICATIONS  (PRN):  acetaminophen     Tablet .. 650 milliGRAM(s) Oral every 6 hours PRN Temp greater or equal to 38C (100.4F), Mild Pain (1 - 3)  aluminum hydroxide/magnesium hydroxide/simethicone Suspension 30 milliLiter(s) Oral every 4 hours PRN Dyspepsia  melatonin 3 milliGRAM(s) Oral at bedtime PRN Insomnia  senna 2 Tablet(s) Oral at bedtime PRN Constipation  sodium chloride 0.65% Nasal 1 Spray(s) Both Nostrils three times a day PRN Nasal Congestion    LAB:                        11.4   6.42  )-----------( 278      ( 21 Dec 2023 05:46 )             35.4     12-21    141  |  103  |  21  ----------------------------<  98  4.1   |  30  |  0.67    Ca    9.2      21 Dec 2023 05:46    TPro  6.6  /  Alb  2.7<L>  /  TBili  0.4  /  DBili  x   /  AST  35  /  ALT  22  /  AlkPhos  90  12-21    LIVER FUNCTIONS - ( 21 Dec 2023 05:46 )  Alb: 2.7 g/dL / Pro: 6.6 g/dL / ALK PHOS: 90 U/L / ALT: 22 U/L / AST: 35 U/L / GGT: x           CT Head No Cont (12.14.23 @ 09:34)  IMPRESSION: No change since 12/13/2023. Irregular appearing subacute to   chronic infarct left middle cerebral artery territory may be due to   spared gray matter, hemorrhage or laminar necrosis without change since   12/13/2023. Mass effect and mild midline shift to the right.    CT Head No Cont (12.15.23 @ 10:30)   Impression: Stable exam.    CT Head No Cont (12.19.23 @ 14:04)  IMPRESSION: Large subacute left middle cerebral artery infarct with mass   effect and mild midline shift improved since 12/15/2023. Irregularity of   infarct density likely related to spare gray versus petechial hemorrhage.      PHYSICAL EXAM:   Vital Signs Last 24 Hrs  T(C): 36.4 (25 Dec 2023 07:39), Max: 36.8 (24 Dec 2023 20:18)  T(F): 97.5 (25 Dec 2023 07:39), Max: 98.3 (24 Dec 2023 20:18)  HR: 70 (25 Dec 2023 07:39) (70 - 81)  BP: 153/80 (25 Dec 2023 07:39) (109/67 - 154/84)  RR: 16 (25 Dec 2023 07:39) (16 - 16)  SpO2: 93% (25 Dec 2023 07:39) (92% - 93%)    Gen - NAD, Comfortable   HEENT - NCAT, EOMI, AMANDA  Neck - Supple, No limited ROM  Pulm - CTAB, No crackles  Cardiovascular - RRR, S1S2  Abdomen - Soft, NT, ND, (+) BS  Extremities - No Cyanosis, no clubbing, no edema, no calf tenderness  Neuro-     Cognitive - awake, alert, oriented  x 4, follows command       Communication - Expressive and receptive impairment, delayed processing     Attention: Impaired, takes time to answer or perform a task      Memory: unable to recall       Cranial Nerves - right facial weakness     Motor -                     LEFT    UE - 5/5                    RIGHT UE - 4-/5                    LEFT     5/5                    RIGHT LE - 4-/5       Psychiatric - Mood stable, Affect WNL  Skin: Left shin venous stasis wound      IDT: 12/21  TDD: OSIEL 12/30  RN: incontinent x2  SW: private home 1 ZEE, 1 flight inside, lives w/ SO, limited support from SO + children  SLP: pureed/thins, mod-sev language, sev cog, poor command following, poor attention, poor memory/reasoning  OT: max-totalA all transfers and ADLs with fluctuation 2/2 cognition, goals for CG transfers and supervision bADLs  PT: modA bed mobility, min-modA transfers, modA 60' RW w/ WC follow, cognition is biggest barrier, no stairs yet, goals CS bed mobility/transfers + CG ambulation/stairs w/ 24/7 assist in 2wks  Goals: 1. sequence steps for dressing, 2. CG for toileting

## 2023-12-25 NOTE — PROGRESS NOTE ADULT - ASSESSMENT
Assessment	  The patient is an 88 year old female with a history of HTN, hypothyroidism, pacemaker, breast cancer who is admitted with CVA and acute respiratory failure due to IV contrast anaphylaxis.CT head (12/4) showed Large left MCA territory acute to early subacute infarction, thrombosis of multiple distal branches of the left MCA and no acute intracranial hemorrhage. Hosptial course complicated by possible seizure episodes involving right facial twitching for 1 min and RUE shaking subsequently started and was persistent. CTH 12/13 showed redemonstrated left MCA distribution infarction, advanced when compared to prior imaging with likely petechial hemorrhages and/or laminar necrosis and mildine shift 0.5cm to the right. EEG 12/13 showed no seizures captured but at risk of left frontotemporal focal-onset seizures, Admitted for multidisciplinary rehab program    #L MCA CVA, Right dominant hemiparesis    -Pacemaker interrogated showing Atrial runs but no sustained Atrial Fibrillation   -CT head (12/4) showed Large left MCA territory acute to early subacute infarction, thrombosis of multiple distal branches of the left MCA and no acute intracranial hemorrhage.  -ASA 81 mg PO QD  -atorvastatin 80mg PO at bedtime   #Comprehensive Multidisciplinary Rehab Program:  - Gait, ADL, Functional impairments  - PT/OT/ SLP 3 hours a day 5 days a week, 1 hour each   - TSH level 0.033 with normal T4, T3  - Modafinil 100 mg po daily, tolerating well   - Hospitalist consult completed and appreciated   - Endocrinology consult completed with recommendation of:  1. Multiple thyroid nodules/Suppressed TSH - the patient will need a thyroid US as an outpatient. It is unclear at this time what thyroid medication the patient used in the past but review of her outpatient chart from 2022 revealed she was not on any thyroid medications in 2022 and she was euthyroid in May 2022. Differential diagnosis of her suppressed TSH includes subclinical hyperthyroidism versus sick euthyroid syndrome/non-thyroidal illness. I recommend rechecking her TSH in 6 weeks.  2. Empty sella - patient will need evaluation of the hypothalamic pituitary axes and MRI of the pituitary gland as an outpatient.    #Seizures   -EEG 12/15 showed no seizure but risk of left frontotemporal focal-onset seizures  -CT head 12/15 stable   -c/w keppra 500mg PO BID   - Keppra level (12/15) 17.1     #HTN/ improving   - Lisinopril 20mg PO QD --> increased to 40 mg po daily (12/22)   - (12/21) 132/78 - 166/81, (12/22) 119/74 - 168/98, (12/23) 136/70 - 138/82    #Mood / Cognition:  - Neuropsychology evaluation PRN  - EKG for  (12/22)  - Will hold on starting SSRI      #Sleep:  - Maintain quiet hours and low stim environment    #Pain:  - Tylenol PRN  - lidocaine patch for back  - avoid sedating meds that may affect cognitive recovery    #/Bladder:  - Monitor PVR if no void in 8h; SC for >400 cc. Done    - Toileting schedule q4h  - (+) Incontinence     #Diet / Dysphagia:    - Diet: Pureed, Moderately thick liquids--> upgraded to minced and moist with 1:1 SV and assistance, out of bed for meals. (12/23)    - SLP assessment and treatment appreciated   - Nutrition to follow    #Skin/ Pressure Injury Prevention:  -  Venous stasis ulcer in L anterior shin  [id number]   - Turn Q2hrs in bed while awake, OOB to Chair, PT/OT/SLP     #DVT prophylaxis:  - ASA 81mg PO QD     #Precautions/ Restrictions  - Falls, Aspiration  - COVID PCR:    Assessment	  The patient is an 88 year old female with a history of HTN, hypothyroidism, pacemaker, breast cancer who is admitted with CVA and acute respiratory failure due to IV contrast anaphylaxis.CT head (12/4) showed Large left MCA territory acute to early subacute infarction, thrombosis of multiple distal branches of the left MCA and no acute intracranial hemorrhage. Hosptial course complicated by possible seizure episodes involving right facial twitching for 1 min and RUE shaking subsequently started and was persistent. CTH 12/13 showed redemonstrated left MCA distribution infarction, advanced when compared to prior imaging with likely petechial hemorrhages and/or laminar necrosis and mildine shift 0.5cm to the right. EEG 12/13 showed no seizures captured but at risk of left frontotemporal focal-onset seizures, Admitted for multidisciplinary rehab program    #L MCA CVA, Right dominant hemiparesis    -Pacemaker interrogated showing Atrial runs but no sustained Atrial Fibrillation   -CT head (12/4) showed Large left MCA territory acute to early subacute infarction, thrombosis of multiple distal branches of the left MCA and no acute intracranial hemorrhage.  -ASA 81 mg PO QD  -atorvastatin 80mg PO at bedtime   #Comprehensive Multidisciplinary Rehab Program:  - Gait, ADL, Functional impairments  - PT/OT/ SLP 3 hours a day 5 days a week, 1 hour each   - TSH level 0.033 with normal T4, T3  - Modafinil 100 mg po daily, tolerating well   - Hospitalist consult completed and appreciated   - Endocrinology consult completed with recommendation of:  1. Multiple thyroid nodules/Suppressed TSH - the patient will need a thyroid US as an outpatient. It is unclear at this time what thyroid medication the patient used in the past but review of her outpatient chart from 2022 revealed she was not on any thyroid medications in 2022 and she was euthyroid in May 2022. Differential diagnosis of her suppressed TSH includes subclinical hyperthyroidism versus sick euthyroid syndrome/non-thyroidal illness. I recommend rechecking her TSH in 6 weeks.  2. Empty sella - patient will need evaluation of the hypothalamic pituitary axes and MRI of the pituitary gland as an outpatient.    #Seizures   -EEG 12/15 showed no seizure but risk of left frontotemporal focal-onset seizures  -CT head 12/15 stable   -c/w keppra 500mg PO BID   - Keppra level (12/15) 17.1     #HTN/ improving   - Lisinopril 20mg PO QD --> increased to 40 mg po daily (12/22)   - (12/21) 132/78 - 166/81, (12/22) 119/74 - 168/98, (12/23) 136/70 - 138/82    #Mood / Cognition:  - Neuropsychology evaluation PRN  - EKG for  (12/22)  - Will hold on starting SSRI      #Sleep:  - Maintain quiet hours and low stim environment    #Pain:  - Tylenol PRN  - lidocaine patch for back  - avoid sedating meds that may affect cognitive recovery    #/Bladder:  - Monitor PVR if no void in 8h; SC for >400 cc. Done    - Toileting schedule q4h  - (+) Incontinence     #Diet / Dysphagia:    - Diet: Pureed, Moderately thick liquids--> upgraded to minced and moist with 1:1 SV and assistance, out of bed for meals. (12/23)    - SLP assessment and treatment appreciated   - Nutrition to follow    #Skin/ Pressure Injury Prevention:  -  Venous stasis ulcer in L anterior shin  [id number]   - Turn Q2hrs in bed while awake, OOB to Chair, PT/OT/SLP     #DVT prophylaxis:  - ASA 81mg PO QD     #Precautions/ Restrictions  - Falls, Aspiration  - COVID PCR:

## 2023-12-26 PROCEDURE — 99232 SBSQ HOSP IP/OBS MODERATE 35: CPT | Mod: GC

## 2023-12-26 RX ADMIN — LEVETIRACETAM 500 MILLIGRAM(S): 250 TABLET, FILM COATED ORAL at 05:42

## 2023-12-26 RX ADMIN — ATORVASTATIN CALCIUM 80 MILLIGRAM(S): 80 TABLET, FILM COATED ORAL at 21:36

## 2023-12-26 RX ADMIN — LISINOPRIL 40 MILLIGRAM(S): 2.5 TABLET ORAL at 05:42

## 2023-12-26 RX ADMIN — Medication 1 DROP(S): at 14:30

## 2023-12-26 RX ADMIN — Medication 1 APPLICATION(S): at 05:42

## 2023-12-26 RX ADMIN — Medication 1 DROP(S): at 01:36

## 2023-12-26 RX ADMIN — Medication 1 APPLICATION(S): at 18:13

## 2023-12-26 RX ADMIN — Medication 3 MILLIGRAM(S): at 00:01

## 2023-12-26 RX ADMIN — Medication 81 MILLIGRAM(S): at 14:31

## 2023-12-26 RX ADMIN — Medication 1 TABLET(S): at 14:31

## 2023-12-26 RX ADMIN — LEVETIRACETAM 500 MILLIGRAM(S): 250 TABLET, FILM COATED ORAL at 18:08

## 2023-12-26 RX ADMIN — Medication 1 DROP(S): at 21:38

## 2023-12-26 RX ADMIN — Medication 1 DROP(S): at 09:42

## 2023-12-26 RX ADMIN — Medication 500000 UNIT(S): at 05:41

## 2023-12-26 RX ADMIN — Medication 1 DROP(S): at 05:43

## 2023-12-26 RX ADMIN — MODAFINIL 100 MILLIGRAM(S): 200 TABLET ORAL at 14:31

## 2023-12-26 RX ADMIN — Medication 500000 UNIT(S): at 18:11

## 2023-12-26 RX ADMIN — Medication 1 DROP(S): at 18:07

## 2023-12-26 RX ADMIN — Medication 500000 UNIT(S): at 14:36

## 2023-12-26 RX ADMIN — Medication 500000 UNIT(S): at 00:00

## 2023-12-26 NOTE — CHART NOTE - NSCHARTNOTEFT_GEN_A_CORE
NUTRITION FOLLOW UP    SOURCE: Patient [X]   Family [ ]    Medical Record [X]    DIET: Diet, Minced and Moist (12-23-23 @ 14:28) [Active]  ALLERGIES: NKFA    IMPRESSION:  Met with pt this AM at bedside; pt unable to participate in interview due to cognitive status - appreciate debrief from RN and PCA. Per RN flowsheets, pt POs vary, need encouragement per RN. Pt without N/V/D/C, last BM 12/26. RD will continue to monitor.    PATIENT REPORT: [X] other: no GI distress    PO INTAKE: varies per RN flowsheets    CURRENT WEIGHT: 133.6 lbs (12/24)  WEIGHT HX:    PERTINENT MEDS:   Pertinent Medications: MEDICATIONS  (STANDING):  artificial tears (preservative free) Ophthalmic Solution 1 Drop(s) Both EYES every 4 hours  aspirin  chewable 81 milliGRAM(s) Oral daily  atorvastatin 80 milliGRAM(s) Oral at bedtime  bacitracin   Ointment 1 Application(s) Topical two times a day  levETIRAcetam 500 milliGRAM(s) Oral every 12 hours  lidocaine   4% Patch 1 Patch Transdermal daily  lisinopril 40 milliGRAM(s) Oral daily  modafinil 100 milliGRAM(s) Oral daily  multivitamin 1 Tablet(s) Oral daily  nystatin    Suspension 067939 Unit(s) Oral four times a day    MEDICATIONS  (PRN):  acetaminophen     Tablet .. 650 milliGRAM(s) Oral every 6 hours PRN Temp greater or equal to 38C (100.4F), Mild Pain (1 - 3)  aluminum hydroxide/magnesium hydroxide/simethicone Suspension 30 milliLiter(s) Oral every 4 hours PRN Dyspepsia  melatonin 3 milliGRAM(s) Oral at bedtime PRN Insomnia  senna 2 Tablet(s) Oral at bedtime PRN Constipation  sodium chloride 0.65% Nasal 1 Spray(s) Both Nostrils three times a day PRN Nasal Congestion    PERTINENT LABS:  12-25 Na141 mmol/L Glu 90 mg/dL K+ 3.9 mmol/L Cr  0.89 mg/dL BUN 28 mg/dL<H> 12-25 Alb 2.7 g/dL<L> 12-04 Chol 213 mg/dL<H> LDL --    HDL 68 mg/dL Trig 108 mg/dL    SKIN:     EDEMA:    ESTIMATED NEEDS:   [X] no change since previous assessment     PREVIOUS NUTRITION DIAGNOSIS:      NUTRITION DIAGNOSIS is :  [  ] Ongoing  [  ] Resolved -     NEW NUTRITION DIAGNOSIS:     MONITORING AND EVALUATION:   Current diet order is appropriate and is well tolerated, will continue to monitor:  - Food and nutrient intake/POs  - Nutrition related lab value, specifically XXX  - Weight/weight trends  - GI functions  - SUpplement acceptance    NUTRITION RECOMMENDATIONS:   1. Continue with current nutrition regimen  2. Continue with oral nutrition supplements, as tolerated  3. Appreciate updated weight and weekly weight trends  - On a standing scale, if able,  or zero'd bed scale  4. Continue to monitor and replete electrolytes, PRN  5. Consider micronutrients to assist with wound healing  - MVI + M  - Ascorbic acid 500 mg 2x/daily  - Folic acid 1 mg/daily  - Zinc sulfate 220 mg/day for 14 days total NUTRITION FOLLOW UP    SOURCE: Patient [X]   Family [ ]    Medical Record [X]    DIET: Diet, Minced and Moist (12-23-23 @ 14:28) [Active]  ALLERGIES: NKFA    IMPRESSION:  Met with pt this AM at bedside; pt unable to participate in interview due to cognitive status - appreciate debrief from RN and PCA. Per RN flowsheets, pt POs vary, need encouragement per RN. Pt without N/V/D/C, last BM 12/26. RD will continue to monitor.    PATIENT REPORT: [X] other: no GI distress    PO INTAKE: varies per RN flowsheets    CURRENT WEIGHT: 133.6 lbs (12/24)  WEIGHT HX:    PERTINENT MEDS:   Pertinent Medications: MEDICATIONS  (STANDING):  artificial tears (preservative free) Ophthalmic Solution 1 Drop(s) Both EYES every 4 hours  aspirin  chewable 81 milliGRAM(s) Oral daily  atorvastatin 80 milliGRAM(s) Oral at bedtime  bacitracin   Ointment 1 Application(s) Topical two times a day  levETIRAcetam 500 milliGRAM(s) Oral every 12 hours  lidocaine   4% Patch 1 Patch Transdermal daily  lisinopril 40 milliGRAM(s) Oral daily  modafinil 100 milliGRAM(s) Oral daily  multivitamin 1 Tablet(s) Oral daily  nystatin    Suspension 576379 Unit(s) Oral four times a day    MEDICATIONS  (PRN):  acetaminophen     Tablet .. 650 milliGRAM(s) Oral every 6 hours PRN Temp greater or equal to 38C (100.4F), Mild Pain (1 - 3)  aluminum hydroxide/magnesium hydroxide/simethicone Suspension 30 milliLiter(s) Oral every 4 hours PRN Dyspepsia  melatonin 3 milliGRAM(s) Oral at bedtime PRN Insomnia  senna 2 Tablet(s) Oral at bedtime PRN Constipation  sodium chloride 0.65% Nasal 1 Spray(s) Both Nostrils three times a day PRN Nasal Congestion    PERTINENT LABS:  12-25 Na141 mmol/L Glu 90 mg/dL K+ 3.9 mmol/L Cr  0.89 mg/dL BUN 28 mg/dL<H> 12-25 Alb 2.7 g/dL<L> 12-04 Chol 213 mg/dL<H> LDL --    HDL 68 mg/dL Trig 108 mg/dL    SKIN:     EDEMA:    ESTIMATED NEEDS:   [X] no change since previous assessment     PREVIOUS NUTRITION DIAGNOSIS:      NUTRITION DIAGNOSIS is :  [  ] Ongoing  [  ] Resolved -     NEW NUTRITION DIAGNOSIS:     MONITORING AND EVALUATION:   Current diet order is appropriate and is well tolerated, will continue to monitor:  - Food and nutrient intake/POs  - Nutrition related lab value, specifically XXX  - Weight/weight trends  - GI functions  - SUpplement acceptance    NUTRITION RECOMMENDATIONS:   1. Continue with current nutrition regimen  2. Continue with oral nutrition supplements, as tolerated  3. Appreciate updated weight and weekly weight trends  - On a standing scale, if able,  or zero'd bed scale  4. Continue to monitor and replete electrolytes, PRN  5. Consider micronutrients to assist with wound healing  - MVI + M  - Ascorbic acid 500 mg 2x/daily  - Folic acid 1 mg/daily  - Zinc sulfate 220 mg/day for 14 days total NUTRITION FOLLOW UP    SOURCE: Patient [X]   Family [ ]    Medical Record [X]    DIET: Diet, Minced and Moist (12-23-23 @ 14:28) [Active]  ALLERGIES: NKFA    IMPRESSION:  Met with pt this AM at bedside; pt unable to participate in interview due to cognitive status - appreciate debrief from RN and PCA. Per RN flowsheets, pt POs vary, pt needs encouragement per RN. Pt without N/V/D/C, last BM 12/26. RD will continue to monitor.    PATIENT REPORT: [X] other: no GI distress    PO INTAKE: varies per RN flowsheets    CURRENT WEIGHT: 133.6 lbs (12/24)  WEIGHT HX: 137.7 lbs (12/19) - dosing weight    PERTINENT MEDS:   Pertinent Medications: MEDICATIONS  (STANDING):  artificial tears (preservative free) Ophthalmic Solution 1 Drop(s) Both EYES every 4 hours  aspirin  chewable 81 milliGRAM(s) Oral daily  atorvastatin 80 milliGRAM(s) Oral at bedtime  bacitracin   Ointment 1 Application(s) Topical two times a day  levETIRAcetam 500 milliGRAM(s) Oral every 12 hours  lidocaine   4% Patch 1 Patch Transdermal daily  lisinopril 40 milliGRAM(s) Oral daily  modafinil 100 milliGRAM(s) Oral daily  multivitamin 1 Tablet(s) Oral daily  nystatin    Suspension 191071 Unit(s) Oral four times a day    MEDICATIONS  (PRN):  acetaminophen     Tablet .. 650 milliGRAM(s) Oral every 6 hours PRN Temp greater or equal to 38C (100.4F), Mild Pain (1 - 3)  aluminum hydroxide/magnesium hydroxide/simethicone Suspension 30 milliLiter(s) Oral every 4 hours PRN Dyspepsia  melatonin 3 milliGRAM(s) Oral at bedtime PRN Insomnia  senna 2 Tablet(s) Oral at bedtime PRN Constipation  sodium chloride 0.65% Nasal 1 Spray(s) Both Nostrils three times a day PRN Nasal Congestion    PERTINENT LABS:  12-25 Na141 mmol/L Glu 90 mg/dL K+ 3.9 mmol/L Cr  0.89 mg/dL BUN 28 mg/dL<H> 12-25 Alb 2.7 g/dL<L> 12-04 Chol 213 mg/dL<H> LDL --    HDL 68 mg/dL Trig 108 mg/dL    SKIN: no pressure injury per RN flowsheets    EDEMA: N/A    ESTIMATED NEEDS:   [X] no change since previous assessment     PREVIOUS NUTRITION DIAGNOSIS: Severe protein-kcal malnutrition    NUTRITION DIAGNOSIS is:  [X] Ongoing - addressed with Ensure plus HP + MVI    NEW NUTRITION DIAGNOSIS:     MONITORING AND EVALUATION:   Current diet order is appropriate and is well tolerated, will continue to monitor:  - Food and nutrient intake/POs  - Nutrition related lab values  - Weight/weight trends  - GI functions  - Supplement acceptance    NUTRITION RECOMMENDATIONS:   1. Continue with regular, minced & moist diet  - Continue to encourage high kcal/high protein foods  - Appreciate continued PO intake % documentation  - Honor food preferences  - Diet texture per SLP  2. Re-add Ensure plus HP qd  3. Continue with MVI for general nutrient coverage  4. Continue with current bowel regimen, prn  5. Appreciate weekly weight trends    Kaylah Carrington RDN also available via TEAMS NUTRITION FOLLOW UP    SOURCE: Patient [X]   Family [ ]    Medical Record [X]    DIET: Diet, Minced and Moist (12-23-23 @ 14:28) [Active]  ALLERGIES: NKFA    IMPRESSION:  Met with pt this AM at bedside; pt unable to participate in interview due to cognitive status - appreciate debrief from RN and PCA. Per RN flowsheets, pt POs vary, pt needs encouragement per RN. Pt without N/V/D/C, last BM 12/26. RD will continue to monitor.    PATIENT REPORT: [X] other: no GI distress    PO INTAKE: varies per RN flowsheets    CURRENT WEIGHT: 133.6 lbs (12/24)  WEIGHT HX: 137.7 lbs (12/19) - dosing weight    PERTINENT MEDS:   Pertinent Medications: MEDICATIONS  (STANDING):  artificial tears (preservative free) Ophthalmic Solution 1 Drop(s) Both EYES every 4 hours  aspirin  chewable 81 milliGRAM(s) Oral daily  atorvastatin 80 milliGRAM(s) Oral at bedtime  bacitracin   Ointment 1 Application(s) Topical two times a day  levETIRAcetam 500 milliGRAM(s) Oral every 12 hours  lidocaine   4% Patch 1 Patch Transdermal daily  lisinopril 40 milliGRAM(s) Oral daily  modafinil 100 milliGRAM(s) Oral daily  multivitamin 1 Tablet(s) Oral daily  nystatin    Suspension 207907 Unit(s) Oral four times a day    MEDICATIONS  (PRN):  acetaminophen     Tablet .. 650 milliGRAM(s) Oral every 6 hours PRN Temp greater or equal to 38C (100.4F), Mild Pain (1 - 3)  aluminum hydroxide/magnesium hydroxide/simethicone Suspension 30 milliLiter(s) Oral every 4 hours PRN Dyspepsia  melatonin 3 milliGRAM(s) Oral at bedtime PRN Insomnia  senna 2 Tablet(s) Oral at bedtime PRN Constipation  sodium chloride 0.65% Nasal 1 Spray(s) Both Nostrils three times a day PRN Nasal Congestion    PERTINENT LABS:  12-25 Na141 mmol/L Glu 90 mg/dL K+ 3.9 mmol/L Cr  0.89 mg/dL BUN 28 mg/dL<H> 12-25 Alb 2.7 g/dL<L> 12-04 Chol 213 mg/dL<H> LDL --    HDL 68 mg/dL Trig 108 mg/dL    SKIN: no pressure injury per RN flowsheets    EDEMA: N/A    ESTIMATED NEEDS:   [X] no change since previous assessment     PREVIOUS NUTRITION DIAGNOSIS: Severe protein-kcal malnutrition    NUTRITION DIAGNOSIS is:  [X] Ongoing - addressed with Ensure plus HP + MVI    NEW NUTRITION DIAGNOSIS:     MONITORING AND EVALUATION:   Current diet order is appropriate and is well tolerated, will continue to monitor:  - Food and nutrient intake/POs  - Nutrition related lab values  - Weight/weight trends  - GI functions  - Supplement acceptance    NUTRITION RECOMMENDATIONS:   1. Continue with regular, minced & moist diet  - Continue to encourage high kcal/high protein foods  - Appreciate continued PO intake % documentation  - Honor food preferences  - Diet texture per SLP  2. Re-add Ensure plus HP qd  3. Continue with MVI for general nutrient coverage  4. Continue with current bowel regimen, prn  5. Appreciate weekly weight trends    Kaylah Carrington RDN also available via TEAMS NUTRITION FOLLOW UP    SOURCE: Patient [ ]   Family [ ]  Medical Record [X] Medical Team [X]    DIET: Diet, Minced and Moist (12-23-23 @ 14:28) [Active]  ALLERGIES: NKFA    IMPRESSION:  Met with pt this AM at bedside; pt unable to participate in interview due to cognitive status - appreciate debrief from RN and PCA. Per RN flowsheets, pt POs vary, pt needs encouragement per RN. Pt without N/V/D/C, last BM 12/26. RD will continue to monitor.    PATIENT REPORT: [X] other: no GI distress    PO INTAKE: varies per RN flowsheets    CURRENT WEIGHT: 133.6 lbs (12/24)  WEIGHT HX: 137.7 lbs (12/19) - dosing weight    PERTINENT MEDS:   Pertinent Medications: MEDICATIONS  (STANDING):  artificial tears (preservative free) Ophthalmic Solution 1 Drop(s) Both EYES every 4 hours  aspirin  chewable 81 milliGRAM(s) Oral daily  atorvastatin 80 milliGRAM(s) Oral at bedtime  bacitracin   Ointment 1 Application(s) Topical two times a day  levETIRAcetam 500 milliGRAM(s) Oral every 12 hours  lidocaine   4% Patch 1 Patch Transdermal daily  lisinopril 40 milliGRAM(s) Oral daily  modafinil 100 milliGRAM(s) Oral daily  multivitamin 1 Tablet(s) Oral daily  nystatin    Suspension [id number] Unit(s) Oral four times a day    MEDICATIONS  (PRN):  acetaminophen     Tablet .. 650 milliGRAM(s) Oral every 6 hours PRN Temp greater or equal to 38C (100.4F), Mild Pain (1 - 3)  aluminum hydroxide/magnesium hydroxide/simethicone Suspension 30 milliLiter(s) Oral every 4 hours PRN Dyspepsia  melatonin 3 milliGRAM(s) Oral at bedtime PRN Insomnia  senna 2 Tablet(s) Oral at bedtime PRN Constipation  sodium chloride 0.65% Nasal 1 Spray(s) Both Nostrils three times a day PRN Nasal Congestion    PERTINENT LABS:  12-25 Na141 mmol/L Glu 90 mg/dL K+ 3.9 mmol/L Cr  0.89 mg/dL BUN 28 mg/dL<H> 12-25 Alb 2.7 g/dL<L> 12-04 Chol 213 mg/dL<H> LDL --    HDL 68 mg/dL Trig 108 mg/dL    SKIN: no pressure injury per RN flowsheets    EDEMA: N/A    ESTIMATED NEEDS:   [X] no change since previous assessment     PREVIOUS NUTRITION DIAGNOSIS: Severe protein-kcal malnutrition    NUTRITION DIAGNOSIS is:  [X] Ongoing - addressed with Ensure plus HP + MVI    NEW NUTRITION DIAGNOSIS:     MONITORING AND EVALUATION:   Current diet order is appropriate and is well tolerated, will continue to monitor:  - Food and nutrient intake/POs  - Nutrition related lab values  - Weight/weight trends  - GI functions  - Supplement acceptance    NUTRITION RECOMMENDATIONS:   1. Continue with regular, minced & moist diet  - Continue to encourage high kcal/high protein foods  - Appreciate continued PO intake % documentation  - Honor food preferences  - Diet texture per SLP  2. Re-add Ensure plus HP qd  3. Continue with MVI for general nutrient coverage  4. Continue with current bowel regimen, prn  5. Appreciate weekly weight trends    Kaylah Carrington RDN also available via TEAMS NUTRITION FOLLOW UP    SOURCE: Patient [ ]   Family [ ]  Medical Record [X] Medical Team [X]    DIET: Diet, Minced and Moist (12-23-23 @ 14:28) [Active]  ALLERGIES: NKFA    IMPRESSION:  Met with pt this AM at bedside; pt unable to participate in interview due to cognitive status - appreciate debrief from RN and PCA. Per RN flowsheets, pt POs vary, pt needs encouragement per RN. Pt without N/V/D/C, last BM 12/26. RD will continue to monitor.    PATIENT REPORT: [X] other: no GI distress    PO INTAKE: varies per RN flowsheets    CURRENT WEIGHT: 133.6 lbs (12/24)  WEIGHT HX: 137.7 lbs (12/19) - dosing weight    PERTINENT MEDS:   Pertinent Medications: MEDICATIONS  (STANDING):  artificial tears (preservative free) Ophthalmic Solution 1 Drop(s) Both EYES every 4 hours  aspirin  chewable 81 milliGRAM(s) Oral daily  atorvastatin 80 milliGRAM(s) Oral at bedtime  bacitracin   Ointment 1 Application(s) Topical two times a day  levETIRAcetam 500 milliGRAM(s) Oral every 12 hours  lidocaine   4% Patch 1 Patch Transdermal daily  lisinopril 40 milliGRAM(s) Oral daily  modafinil 100 milliGRAM(s) Oral daily  multivitamin 1 Tablet(s) Oral daily  nystatin    Suspension 617267 Unit(s) Oral four times a day    MEDICATIONS  (PRN):  acetaminophen     Tablet .. 650 milliGRAM(s) Oral every 6 hours PRN Temp greater or equal to 38C (100.4F), Mild Pain (1 - 3)  aluminum hydroxide/magnesium hydroxide/simethicone Suspension 30 milliLiter(s) Oral every 4 hours PRN Dyspepsia  melatonin 3 milliGRAM(s) Oral at bedtime PRN Insomnia  senna 2 Tablet(s) Oral at bedtime PRN Constipation  sodium chloride 0.65% Nasal 1 Spray(s) Both Nostrils three times a day PRN Nasal Congestion    PERTINENT LABS:  12-25 Na141 mmol/L Glu 90 mg/dL K+ 3.9 mmol/L Cr  0.89 mg/dL BUN 28 mg/dL<H> 12-25 Alb 2.7 g/dL<L> 12-04 Chol 213 mg/dL<H> LDL --    HDL 68 mg/dL Trig 108 mg/dL    SKIN: no pressure injury per RN flowsheets    EDEMA: N/A    ESTIMATED NEEDS:   [X] no change since previous assessment     PREVIOUS NUTRITION DIAGNOSIS: Severe protein-kcal malnutrition    NUTRITION DIAGNOSIS is:  [X] Ongoing - addressed with Ensure plus HP + MVI    NEW NUTRITION DIAGNOSIS:     MONITORING AND EVALUATION:   Current diet order is appropriate and is well tolerated, will continue to monitor:  - Food and nutrient intake/POs  - Nutrition related lab values  - Weight/weight trends  - GI functions  - Supplement acceptance    NUTRITION RECOMMENDATIONS:   1. Continue with regular, minced & moist diet  - Continue to encourage high kcal/high protein foods  - Appreciate continued PO intake % documentation  - Honor food preferences  - Diet texture per SLP  2. Re-add Ensure plus HP qd  3. Continue with MVI for general nutrient coverage  4. Continue with current bowel regimen, prn  5. Appreciate weekly weight trends    Kaylah Carrington RDN also available via TEAMS

## 2023-12-26 NOTE — PROGRESS NOTE ADULT - ASSESSMENT
The patient is an 88 year old female with a history of HTN, hypothyroidism, pacemaker, breast cancer who is admitted with CVA and acute respiratory failure due to IV contrast anaphylaxis.CT head (12/4) showed Large left MCA territory acute to early subacute infarction, thrombosis of multiple distal branches of the left MCA and no acute intracranial hemorrhage. Hosptial course complicated by possible seizure episodes involving right facial twitching for 1 min and RUE shaking subsequently started and was persistent. CTH 12/13 showed redemonstrated left MCA distribution infarction, advanced when compared to prior imaging with likely petechial hemorrhages and/or laminar necrosis and mildine shift 0.5cm to the right. EEG 12/13 showed no seizures captured but at risk of left frontotemporal focal-onset seizures, Admitted for multidisciplinary rehab program    #L MCA CVA, Right dominant hemiparesis    -Pacemaker interrogated showing Atrial runs but no sustained Atrial Fibrillation   -CT head (12/4) showed Large left MCA territory acute to early subacute infarction, thrombosis of multiple distal branches of the left MCA and no acute intracranial hemorrhage.  -ASA 81 mg PO QD  -atorvastatin 80mg PO at bedtime   #Comprehensive Multidisciplinary Rehab Program:  - Gait, ADL, Functional impairments  - PT/OT/ SLP 3 hours a day 5 days a week, 1 hour each   - TSH level 0.033 with normal T4, T3  - Modafinil 100 mg po daily, tolerating well   - Hospitalist consult completed and appreciated   - Endocrinology consult completed with recommendation of:  1. Multiple thyroid nodules/Suppressed TSH - the patient will need a thyroid US as an outpatient. It is unclear at this time what thyroid medication the patient used in the past but review of her outpatient chart from 2022 revealed she was not on any thyroid medications in 2022 and she was euthyroid in May 2022. Differential diagnosis of her suppressed TSH includes subclinical hyperthyroidism versus sick euthyroid syndrome/non-thyroidal illness. I recommend rechecking her TSH in 6 weeks.  2. Empty sella - patient will need evaluation of the hypothalamic pituitary axes and MRI of the pituitary gland as an outpatient.    #Seizures   -EEG 12/15 showed no seizure but risk of left frontotemporal focal-onset seizures  -CT head 12/15 stable   -c/w keppra 500mg PO BID   - Keppra level (12/15) 17.1     #HTN/ improving   - Lisinopril 20mg PO QD --> increased to 40 mg po daily (12/22)   - (12/21) 132/78 - 166/81, (12/22) 119/74 - 168/98, (12/23) 136/70 - 138/82 (12/26) 123/70 - 162/99    #Mood / Cognition:  - Neuropsychology evaluation PRN  - EKG for  (12/22)  - Will hold on starting SSRI      #Sleep:  - Maintain quiet hours and low stim environment    #Pain:  - Tylenol PRN  - lidocaine patch for back  - avoid sedating meds that may affect cognitive recovery    #/Bladder:  - Monitor PVR if no void in 8h; SC for >400 cc. Done    - Toileting schedule q4h  - (+) Incontinence     #Diet / Dysphagia:    - Diet: Pureed, Moderately thick liquids--> upgraded to minced and moist with 1:1 SV and assistance, out of bed for meals. (12/23)    - SLP assessment and treatment appreciated   - Nutrition to follow    #Skin/ Pressure Injury Prevention:  -  Venous stasis ulcer in L anterior shin  [id number]   - Turn Q2hrs in bed while awake, OOB to Chair, PT/OT/SLP     #DVT prophylaxis:  - ASA 81mg PO QD     #Precautions/ Restrictions  - Falls, Aspiration  - COVID PCR:  The patient is an 88 year old female with a history of HTN, hypothyroidism, pacemaker, breast cancer who is admitted with CVA and acute respiratory failure due to IV contrast anaphylaxis.CT head (12/4) showed Large left MCA territory acute to early subacute infarction, thrombosis of multiple distal branches of the left MCA and no acute intracranial hemorrhage. Hosptial course complicated by possible seizure episodes involving right facial twitching for 1 min and RUE shaking subsequently started and was persistent. CTH 12/13 showed redemonstrated left MCA distribution infarction, advanced when compared to prior imaging with likely petechial hemorrhages and/or laminar necrosis and mildine shift 0.5cm to the right. EEG 12/13 showed no seizures captured but at risk of left frontotemporal focal-onset seizures, Admitted for multidisciplinary rehab program    #L MCA CVA, Right dominant hemiparesis    -Pacemaker interrogated showing Atrial runs but no sustained Atrial Fibrillation   -CT head (12/4) showed Large left MCA territory acute to early subacute infarction, thrombosis of multiple distal branches of the left MCA and no acute intracranial hemorrhage.  -ASA 81 mg PO QD  -atorvastatin 80mg PO at bedtime   #Comprehensive Multidisciplinary Rehab Program:  - Gait, ADL, Functional impairments  - PT/OT/ SLP 3 hours a day 5 days a week, 1 hour each   - TSH level 0.033 with normal T4, T3  - Modafinil 100 mg po daily, tolerating well   - Hospitalist consult completed and appreciated   - Endocrinology consult completed with recommendation of:  1. Multiple thyroid nodules/Suppressed TSH - the patient will need a thyroid US as an outpatient. It is unclear at this time what thyroid medication the patient used in the past but review of her outpatient chart from 2022 revealed she was not on any thyroid medications in 2022 and she was euthyroid in May 2022. Differential diagnosis of her suppressed TSH includes subclinical hyperthyroidism versus sick euthyroid syndrome/non-thyroidal illness. I recommend rechecking her TSH in 6 weeks.  2. Empty sella - patient will need evaluation of the hypothalamic pituitary axes and MRI of the pituitary gland as an outpatient.    #Seizures   -EEG 12/15 showed no seizure but risk of left frontotemporal focal-onset seizures  -CT head 12/15 stable   -c/w keppra 500mg PO BID   - Keppra level (12/15) 17.1     #HTN/ improving   - Lisinopril 20mg PO QD --> increased to 40 mg po daily (12/22)   - (12/21) 132/78 - 166/81, (12/22) 119/74 - 168/98, (12/23) 136/70 - 138/82 (12/26) 123/70 - 162/99    #Mood / Cognition:  - Neuropsychology evaluation PRN  - EKG for  (12/22)  - Will hold on starting SSRI      #Sleep:  - Maintain quiet hours and low stim environment    #Pain:  - Tylenol PRN  - lidocaine patch for back  - avoid sedating meds that may affect cognitive recovery    #/Bladder:  - Monitor PVR if no void in 8h; SC for >400 cc. Done    - Toileting schedule q4h  - (+) Incontinence     #Diet / Dysphagia:    - Diet: Pureed, Moderately thick liquids--> upgraded to minced and moist with 1:1 SV and assistance, out of bed for meals. (12/23)    - SLP assessment and treatment appreciated   - Nutrition to follow    #Skin/ Pressure Injury Prevention:  -  Venous stasis ulcer in L anterior shin  [id number]   - Turn Q2hrs in bed while awake, OOB to Chair, PT/OT/SLP     #DVT prophylaxis:  - ASA 81mg PO QD     #Precautions/ Restrictions  - Falls, Aspiration  - COVID PCR:

## 2023-12-26 NOTE — PROGRESS NOTE ADULT - SUBJECTIVE AND OBJECTIVE BOX
CC:  Patient is a 88y old  Female who presents with a chief complaint of Left MCA ischemic stroke, right nondominant hemiparesis (25 Dec 2023 13:48)    HPI:   88 year old female with a history of HTN, hypothyroidism, pacemaker, breast cancer who is admitted with CVA and acute respiratory failure due to IV contrast anaphylaxis. Patient had R sided hemiparesis, expressive aphasia. CT imaging confirmed multiple embolic areas. Pacemaker interrogated showing Atrial runs but no sustained Atrial Fibrillation. Patient currently on course of ASA and statin. Hospital course complicated by severe anaphylactic due to iodine contrast allergy from CT performed on admission to ED. 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    Pt was transferred to [facility] for acute rehab on 12/8. rehab course complicated by possible seizure episodes involving right facial twitching for 1 min and RUE shaking subsequently started and was persistent. Per rehab team, pt was awake and speaking but stopped responding to questions for a short period of time when facial twitching started. RRT was called for the seizure activities.     She was admitted to MICU 12/13 for further management. CTH 12/13 showed Redemonstrated left MCA distribution infarction, advanced when compared to prior imaging with likely petechial hemorrhages and/or laminar necrosis. mildine shift 0.5cm to the right. Aspirin and chemical DVT ppx were held. Neurosurgery (Dr. Gilmore) were consulted per neurology recommendation, no acute surgical intervention needed. Repeat CTH 12/14 showed No change since 12/13/2023. EEG 12/13 showed No seizures captured but Risk of left frontotemporal focal-onset seizures She was seen by neurology for post-stroke seizure, recommended to continue keppra given first seizure in setting of large stroke. A repeat EEG 12/15 showed no seizure but again Risk of left frontotemporal focal-onset seizures. Aspirin 81mg PO QD restarted after repeat CTH 12/15 showed Stable exam.      Patient was evaluated by PM&R and therapy for functional deficits and gait/ ADL impairments and recommended acute rehabilitation. Patient was medically optimized for discharge to [facility] on 12/19.      (19 Dec 2023 15:06)      Allergies:  penicillin (Rash)  IV Contrast (Anaphylaxis; Urticaria)      Subjective:  - Patient was seen and examined at chair with no complaints except for some lethargy. No acute events overnight. Patient had some trouble sleeping last night.   - Pain is well controlled with current meds.    - Last BM today in AM  - Tolerating and participating in 3 hours of daily therapy, progressing      ROS:  - Denies CP, palpitation, cough, fever, SOB, abdominal pain, N/V/D, headache, weakness, joint pain/swelling or constipation.     MEDICATIONS  (STANDING):  artificial tears (preservative free) Ophthalmic Solution 1 Drop(s) Both EYES every 4 hours  aspirin  chewable 81 milliGRAM(s) Oral daily  atorvastatin 80 milliGRAM(s) Oral at bedtime  bacitracin   Ointment 1 Application(s) Topical two times a day  levETIRAcetam 500 milliGRAM(s) Oral every 12 hours  lidocaine   4% Patch 1 Patch Transdermal daily  lisinopril 40 milliGRAM(s) Oral daily  modafinil 100 milliGRAM(s) Oral daily  multivitamin 1 Tablet(s) Oral daily  nystatin    Suspension 612845 Unit(s) Oral four times a day    MEDICATIONS  (PRN):  acetaminophen     Tablet .. 650 milliGRAM(s) Oral every 6 hours PRN Temp greater or equal to 38C (100.4F), Mild Pain (1 - 3)  aluminum hydroxide/magnesium hydroxide/simethicone Suspension 30 milliLiter(s) Oral every 4 hours PRN Dyspepsia  melatonin 3 milliGRAM(s) Oral at bedtime PRN Insomnia  senna 2 Tablet(s) Oral at bedtime PRN Constipation  sodium chloride 0.65% Nasal 1 Spray(s) Both Nostrils three times a day PRN Nasal Congestion    LABS                        11.0   4.77  )-----------( 240      ( 25 Dec 2023 06:23 )             34.1     12-25    141  |  103  |  28<H>  ----------------------------<  90  3.9   |  31  |  0.89    Ca    9.3      25 Dec 2023 06:23    TPro  6.5  /  Alb  2.7<L>  /  TBili  0.4  /  DBili  x   /  AST  31  /  ALT  24  /  AlkPhos  87  12-25      Urinalysis Basic - ( 25 Dec 2023 06:23 )    Color: x / Appearance: x / SG: x / pH: x  Gluc: 90 mg/dL / Ketone: x  / Bili: x / Urobili: x   Blood: x / Protein: x / Nitrite: x   Leuk Esterase: x / RBC: x / WBC x   Sq Epi: x / Non Sq Epi: x / Bacteria: x      CAPILLARY BLOOD GLUCOSE      Radiology:      CT Head No Cont (12.14.23 @ 09:34)  IMPRESSION: No change since 12/13/2023. Irregular appearing subacute to   chronic infarct left middle cerebral artery territory may be due to   spared gray matter, hemorrhage or laminar necrosis without change since   12/13/2023. Mass effect and mild midline shift to the right.    CT Head No Cont (12.15.23 @ 10:30)   Impression: Stable exam.    CT Head No Cont (12.19.23 @ 14:04)  IMPRESSION: Large subacute left middle cerebral artery infarct with mass   effect and mild midline shift improved since 12/15/2023. Irregularity of   infarct density likely related to spare gray versus petechial hemorrhage.    Physical Exam:     Vital Signs Last 24 Hrs  T(C): 36.6 (26 Dec 2023 07:45), Max: 36.7 (25 Dec 2023 21:53)  T(F): 97.8 (26 Dec 2023 07:45), Max: 98 (25 Dec 2023 21:53)  HR: 90 (26 Dec 2023 07:45) (80 - 99)  BP: 162/99 (26 Dec 2023 07:45) (123/70 - 162/99)  BP(mean): --  RR: 16 (26 Dec 2023 07:45) (16 - 16)  SpO2: 96% (25 Dec 2023 21:53) (96% - 96%)    Parameters below as of 26 Dec 2023 07:45  Patient On (Oxygen Delivery Method): room air    Gen - NAD, Comfortable   HEENT - NCAT, EOMI, AMANDA  Neck - Supple, No limited ROM  Pulm - CTAB, No crackles  Cardiovascular - RRR, S1S2  Abdomen - Soft, NT, ND, (+) BS  Extremities - No Cyanosis, no clubbing, no edema, no calf tenderness  Neuro-     Cognitive - awake, alert, oriented  x 4, follows command       Communication - Expressive and receptive impairment, delayed processing     Attention: Impaired, takes time to answer or perform a task      Memory: unable to recall       Cranial Nerves - right facial weakness     Motor -                     LEFT    UE - 5/5                    RIGHT UE - 4-/5                    LEFT     5/5                    RIGHT LE - 4-/5       Psychiatric - Mood stable, Affect WNL  Skin: Left shin venous stasis wound    IDT: 12/21  TDD: OSIEL 12/30  RN: incontinent x2  SW: private home 1 ZEE, 1 flight inside, lives w/ SO, limited support from SO + children  SLP: pureed/thins, mod-sev language, sev cog, poor command following, poor attention, poor memory/reasoning  OT: max-totalA all transfers and ADLs with fluctuation 2/2 cognition, goals for CG transfers and supervision bADLs  PT: modA bed mobility, min-modA transfers, modA 60' RW w/ WC follow, cognition is biggest barrier, no stairs yet, goals CS bed mobility/transfers + CG ambulation/stairs w/ 24/7 assist in 2wks  Goals: 1. sequence steps for dressing, 2. CG for toileting       CC:  Patient is a 88y old  Female who presents with a chief complaint of Left MCA ischemic stroke, right nondominant hemiparesis (25 Dec 2023 13:48)    HPI:   88 year old female with a history of HTN, hypothyroidism, pacemaker, breast cancer who is admitted with CVA and acute respiratory failure due to IV contrast anaphylaxis. Patient had R sided hemiparesis, expressive aphasia. CT imaging confirmed multiple embolic areas. Pacemaker interrogated showing Atrial runs but no sustained Atrial Fibrillation. Patient currently on course of ASA and statin. Hospital course complicated by severe anaphylactic due to iodine contrast allergy from CT performed on admission to ED. 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    Pt was transferred to [facility] for acute rehab on 12/8. rehab course complicated by possible seizure episodes involving right facial twitching for 1 min and RUE shaking subsequently started and was persistent. Per rehab team, pt was awake and speaking but stopped responding to questions for a short period of time when facial twitching started. RRT was called for the seizure activities.     She was admitted to MICU 12/13 for further management. CTH 12/13 showed Redemonstrated left MCA distribution infarction, advanced when compared to prior imaging with likely petechial hemorrhages and/or laminar necrosis. mildine shift 0.5cm to the right. Aspirin and chemical DVT ppx were held. Neurosurgery (Dr. Gilmore) were consulted per neurology recommendation, no acute surgical intervention needed. Repeat CTH 12/14 showed No change since 12/13/2023. EEG 12/13 showed No seizures captured but Risk of left frontotemporal focal-onset seizures She was seen by neurology for post-stroke seizure, recommended to continue keppra given first seizure in setting of large stroke. A repeat EEG 12/15 showed no seizure but again Risk of left frontotemporal focal-onset seizures. Aspirin 81mg PO QD restarted after repeat CTH 12/15 showed Stable exam.      Patient was evaluated by PM&R and therapy for functional deficits and gait/ ADL impairments and recommended acute rehabilitation. Patient was medically optimized for discharge to [facility] on 12/19.      (19 Dec 2023 15:06)      Allergies:  penicillin (Rash)  IV Contrast (Anaphylaxis; Urticaria)      Subjective:  - Patient was seen and examined at chair with no complaints except for some lethargy. No acute events overnight. Patient had some trouble sleeping last night.   - Pain is well controlled with current meds.    - Last BM today in AM  - Tolerating and participating in 3 hours of daily therapy, progressing      ROS:  - Denies CP, palpitation, cough, fever, SOB, abdominal pain, N/V/D, headache, weakness, joint pain/swelling or constipation.     MEDICATIONS  (STANDING):  artificial tears (preservative free) Ophthalmic Solution 1 Drop(s) Both EYES every 4 hours  aspirin  chewable 81 milliGRAM(s) Oral daily  atorvastatin 80 milliGRAM(s) Oral at bedtime  bacitracin   Ointment 1 Application(s) Topical two times a day  levETIRAcetam 500 milliGRAM(s) Oral every 12 hours  lidocaine   4% Patch 1 Patch Transdermal daily  lisinopril 40 milliGRAM(s) Oral daily  modafinil 100 milliGRAM(s) Oral daily  multivitamin 1 Tablet(s) Oral daily  nystatin    Suspension 095416 Unit(s) Oral four times a day    MEDICATIONS  (PRN):  acetaminophen     Tablet .. 650 milliGRAM(s) Oral every 6 hours PRN Temp greater or equal to 38C (100.4F), Mild Pain (1 - 3)  aluminum hydroxide/magnesium hydroxide/simethicone Suspension 30 milliLiter(s) Oral every 4 hours PRN Dyspepsia  melatonin 3 milliGRAM(s) Oral at bedtime PRN Insomnia  senna 2 Tablet(s) Oral at bedtime PRN Constipation  sodium chloride 0.65% Nasal 1 Spray(s) Both Nostrils three times a day PRN Nasal Congestion    LABS                        11.0   4.77  )-----------( 240      ( 25 Dec 2023 06:23 )             34.1     12-25    141  |  103  |  28<H>  ----------------------------<  90  3.9   |  31  |  0.89    Ca    9.3      25 Dec 2023 06:23    TPro  6.5  /  Alb  2.7<L>  /  TBili  0.4  /  DBili  x   /  AST  31  /  ALT  24  /  AlkPhos  87  12-25      Urinalysis Basic - ( 25 Dec 2023 06:23 )    Color: x / Appearance: x / SG: x / pH: x  Gluc: 90 mg/dL / Ketone: x  / Bili: x / Urobili: x   Blood: x / Protein: x / Nitrite: x   Leuk Esterase: x / RBC: x / WBC x   Sq Epi: x / Non Sq Epi: x / Bacteria: x      CAPILLARY BLOOD GLUCOSE      Radiology:      CT Head No Cont (12.14.23 @ 09:34)  IMPRESSION: No change since 12/13/2023. Irregular appearing subacute to   chronic infarct left middle cerebral artery territory may be due to   spared gray matter, hemorrhage or laminar necrosis without change since   12/13/2023. Mass effect and mild midline shift to the right.    CT Head No Cont (12.15.23 @ 10:30)   Impression: Stable exam.    CT Head No Cont (12.19.23 @ 14:04)  IMPRESSION: Large subacute left middle cerebral artery infarct with mass   effect and mild midline shift improved since 12/15/2023. Irregularity of   infarct density likely related to spare gray versus petechial hemorrhage.    Physical Exam:     Vital Signs Last 24 Hrs  T(C): 36.6 (26 Dec 2023 07:45), Max: 36.7 (25 Dec 2023 21:53)  T(F): 97.8 (26 Dec 2023 07:45), Max: 98 (25 Dec 2023 21:53)  HR: 90 (26 Dec 2023 07:45) (80 - 99)  BP: 162/99 (26 Dec 2023 07:45) (123/70 - 162/99)  BP(mean): --  RR: 16 (26 Dec 2023 07:45) (16 - 16)  SpO2: 96% (25 Dec 2023 21:53) (96% - 96%)    Parameters below as of 26 Dec 2023 07:45  Patient On (Oxygen Delivery Method): room air    Gen - NAD, Comfortable   HEENT - NCAT, EOMI, AMANDA  Neck - Supple, No limited ROM  Pulm - CTAB, No crackles  Cardiovascular - RRR, S1S2  Abdomen - Soft, NT, ND, (+) BS  Extremities - No Cyanosis, no clubbing, no edema, no calf tenderness  Neuro-     Cognitive - awake, alert, oriented  x 4, follows command       Communication - Expressive and receptive impairment, delayed processing     Attention: Impaired, takes time to answer or perform a task      Memory: unable to recall       Cranial Nerves - right facial weakness     Motor -                     LEFT    UE - 5/5                    RIGHT UE - 4-/5                    LEFT     5/5                    RIGHT LE - 4-/5       Psychiatric - Mood stable, Affect WNL  Skin: Left shin venous stasis wound    IDT: 12/21  TDD: OSIEL 12/30  RN: incontinent x2  SW: private home 1 ZEE, 1 flight inside, lives w/ SO, limited support from SO + children  SLP: pureed/thins, mod-sev language, sev cog, poor command following, poor attention, poor memory/reasoning  OT: max-totalA all transfers and ADLs with fluctuation 2/2 cognition, goals for CG transfers and supervision bADLs  PT: modA bed mobility, min-modA transfers, modA 60' RW w/ WC follow, cognition is biggest barrier, no stairs yet, goals CS bed mobility/transfers + CG ambulation/stairs w/ 24/7 assist in 2wks  Goals: 1. sequence steps for dressing, 2. CG for toileting       CC:  Patient is a 88y old  Female who presents with a chief complaint of Left MCA ischemic stroke, right nondominant hemiparesis (25 Dec 2023 13:48)    HPI:   88 year old female with a history of HTN, hypothyroidism, pacemaker, breast cancer who is admitted with CVA and acute respiratory failure due to IV contrast anaphylaxis. Patient had R sided hemiparesis, expressive aphasia. CT imaging confirmed multiple embolic areas. Pacemaker interrogated showing Atrial runs but no sustained Atrial Fibrillation. Patient currently on course of ASA and statin. Hospital course complicated by severe anaphylactic due to iodine contrast allergy from CT performed on admission to ED. 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    Pt was transferred to [facility] for acute rehab on 12/8. rehab course complicated by possible seizure episodes involving right facial twitching for 1 min and RUE shaking subsequently started and was persistent. Per rehab team, pt was awake and speaking but stopped responding to questions for a short period of time when facial twitching started. RRT was called for the seizure activities.     She was admitted to MICU 12/13 for further management. CTH 12/13 showed Redemonstrated left MCA distribution infarction, advanced when compared to prior imaging with likely petechial hemorrhages and/or laminar necrosis. mildine shift 0.5cm to the right. Aspirin and chemical DVT ppx were held. Neurosurgery (Dr. Gilmore) were consulted per neurology recommendation, no acute surgical intervention needed. Repeat CTH 12/14 showed No change since 12/13/2023. EEG 12/13 showed No seizures captured but Risk of left frontotemporal focal-onset seizures She was seen by neurology for post-stroke seizure, recommended to continue keppra given first seizure in setting of large stroke. A repeat EEG 12/15 showed no seizure but again Risk of left frontotemporal focal-onset seizures. Aspirin 81mg PO QD restarted after repeat CTH 12/15 showed Stable exam.    Patient was evaluated by PM&R and therapy for functional deficits and gait/ ADL impairments and recommended acute rehabilitation. Patient was medically optimized for discharge to [facility] on 12/19.     Allergies:  penicillin (Rash)  IV Contrast (Anaphylaxis; Urticaria)    Subjective:  - Patient was seen and examined at chair with no complaints except for some lethargy. No acute events overnight.   - Patient had some trouble sleeping last night.   - Pain is well controlled with current meds.    - Last BM (12/26), voiding without issues   - Tolerating and participating in 3 hours of daily therapy, progressing      ROS:  - Denies CP, palpitation, cough, fever, SOB, abdominal pain, N/V/D, headache, weakness, joint pain/swelling or constipation.     MEDICATIONS  (STANDING):  artificial tears (preservative free) Ophthalmic Solution 1 Drop(s) Both EYES every 4 hours  aspirin  chewable 81 milliGRAM(s) Oral daily  atorvastatin 80 milliGRAM(s) Oral at bedtime  bacitracin   Ointment 1 Application(s) Topical two times a day  levETIRAcetam 500 milliGRAM(s) Oral every 12 hours  lidocaine   4% Patch 1 Patch Transdermal daily  lisinopril 40 milliGRAM(s) Oral daily  modafinil 100 milliGRAM(s) Oral daily  multivitamin 1 Tablet(s) Oral daily  nystatin    Suspension [id number] Unit(s) Oral four times a day    MEDICATIONS  (PRN):  acetaminophen     Tablet .. 650 milliGRAM(s) Oral every 6 hours PRN Temp greater or equal to 38C (100.4F), Mild Pain (1 - 3)  aluminum hydroxide/magnesium hydroxide/simethicone Suspension 30 milliLiter(s) Oral every 4 hours PRN Dyspepsia  melatonin 3 milliGRAM(s) Oral at bedtime PRN Insomnia  senna 2 Tablet(s) Oral at bedtime PRN Constipation  sodium chloride 0.65% Nasal 1 Spray(s) Both Nostrils three times a day PRN Nasal Congestion    LABS                        11.0   4.77  )-----------( 240      ( 25 Dec 2023 06:23 )             34.1     12-25    141  |  103  |  28<H>  ----------------------------<  90  3.9   |  31  |  0.89    Ca    9.3      25 Dec 2023 06:23    TPro  6.5  /  Alb  2.7<L>  /  TBili  0.4  /  DBili  x   /  AST  31  /  ALT  24  /  AlkPhos  87  12-25    Radiology:    CT Head No Cont (12.14.23 @ 09:34)  IMPRESSION: No change since 12/13/2023. Irregular appearing subacute to   chronic infarct left middle cerebral artery territory may be due to   spared gray matter, hemorrhage or laminar necrosis without change since   12/13/2023. Mass effect and mild midline shift to the right.    CT Head No Cont (12.15.23 @ 10:30)   Impression: Stable exam.    CT Head No Cont (12.19.23 @ 14:04)  IMPRESSION: Large subacute left middle cerebral artery infarct with mass   effect and mild midline shift improved since 12/15/2023. Irregularity of   infarct density likely related to spare gray versus petechial hemorrhage.    Physical Exam:   Vital Signs Last 24 Hrs  T(C): 36.6 (26 Dec 2023 07:45), Max: 36.7 (25 Dec 2023 21:53)  T(F): 97.8 (26 Dec 2023 07:45), Max: 98 (25 Dec 2023 21:53)  HR: 90 (26 Dec 2023 07:45) (80 - 99)  BP: 162/99 (26 Dec 2023 07:45) (123/70 - 162/99)  RR: 16 (26 Dec 2023 07:45) (16 - 16)  SpO2: 96% (25 Dec 2023 21:53) (96% - 96%)    Gen - NAD, Comfortable   HEENT - NCAT, EOMI, AMANDA  Neck - Supple, No limited ROM  Pulm - CTAB, No crackles  Cardiovascular - RRR, S1S2  Abdomen - Soft, NT, ND, (+) BS  Extremities - No Cyanosis, no clubbing, no edema, no calf tenderness  Neuro-     Cognitive - awake, alert, oriented  x 4, follows command       Communication - Expressive and receptive impairment, delayed processing     Attention: Impaired, takes time to answer or perform a task      Memory: unable to recall       Cranial Nerves - right facial weakness     Motor -                     LEFT    UE - 5/5                    RIGHT UE - 4-/5                    LEFT     5/5                    RIGHT LE - 4-/5       Psychiatric - Mood stable, Affect WNL  Skin: Left shin venous stasis wound    IDT: 12/21  TDD: OSIEL 12/30  RN: incontinent x2  SW: private home 1 ZEE, 1 flight inside, lives w/ SO, limited support from SO + children  SLP: pureed/thins, mod-sev language, sev cog, poor command following, poor attention, poor memory/reasoning  OT: max-totalA all transfers and ADLs with fluctuation 2/2 cognition, goals for CG transfers and supervision bADLs  PT: modA bed mobility, min-modA transfers, modA 60' RW w/ WC follow, cognition is biggest barrier, no stairs yet, goals CS bed mobility/transfers + CG ambulation/stairs w/ 24/7 assist in 2wks  Goals: 1. sequence steps for dressing, 2. CG for toileting       CC:  Patient is a 88y old  Female who presents with a chief complaint of Left MCA ischemic stroke, right nondominant hemiparesis (25 Dec 2023 13:48)    HPI:   88 year old female with a history of HTN, hypothyroidism, pacemaker, breast cancer who is admitted with CVA and acute respiratory failure due to IV contrast anaphylaxis. Patient had R sided hemiparesis, expressive aphasia. CT imaging confirmed multiple embolic areas. Pacemaker interrogated showing Atrial runs but no sustained Atrial Fibrillation. Patient currently on course of ASA and statin. Hospital course complicated by severe anaphylactic due to iodine contrast allergy from CT performed on admission to ED. 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    Pt was transferred to [facility] for acute rehab on 12/8. rehab course complicated by possible seizure episodes involving right facial twitching for 1 min and RUE shaking subsequently started and was persistent. Per rehab team, pt was awake and speaking but stopped responding to questions for a short period of time when facial twitching started. RRT was called for the seizure activities.     She was admitted to MICU 12/13 for further management. CTH 12/13 showed Redemonstrated left MCA distribution infarction, advanced when compared to prior imaging with likely petechial hemorrhages and/or laminar necrosis. mildine shift 0.5cm to the right. Aspirin and chemical DVT ppx were held. Neurosurgery (Dr. Gilmore) were consulted per neurology recommendation, no acute surgical intervention needed. Repeat CTH 12/14 showed No change since 12/13/2023. EEG 12/13 showed No seizures captured but Risk of left frontotemporal focal-onset seizures She was seen by neurology for post-stroke seizure, recommended to continue keppra given first seizure in setting of large stroke. A repeat EEG 12/15 showed no seizure but again Risk of left frontotemporal focal-onset seizures. Aspirin 81mg PO QD restarted after repeat CTH 12/15 showed Stable exam.    Patient was evaluated by PM&R and therapy for functional deficits and gait/ ADL impairments and recommended acute rehabilitation. Patient was medically optimized for discharge to [facility] on 12/19.     Allergies:  penicillin (Rash)  IV Contrast (Anaphylaxis; Urticaria)    Subjective:  - Patient was seen and examined at chair with no complaints except for some lethargy. No acute events overnight.   - Patient had some trouble sleeping last night.   - Pain is well controlled with current meds.    - Last BM (12/26), voiding without issues   - Tolerating and participating in 3 hours of daily therapy, progressing      ROS:  - Denies CP, palpitation, cough, fever, SOB, abdominal pain, N/V/D, headache, weakness, joint pain/swelling or constipation.     MEDICATIONS  (STANDING):  artificial tears (preservative free) Ophthalmic Solution 1 Drop(s) Both EYES every 4 hours  aspirin  chewable 81 milliGRAM(s) Oral daily  atorvastatin 80 milliGRAM(s) Oral at bedtime  bacitracin   Ointment 1 Application(s) Topical two times a day  levETIRAcetam 500 milliGRAM(s) Oral every 12 hours  lidocaine   4% Patch 1 Patch Transdermal daily  lisinopril 40 milliGRAM(s) Oral daily  modafinil 100 milliGRAM(s) Oral daily  multivitamin 1 Tablet(s) Oral daily  nystatin    Suspension 463513 Unit(s) Oral four times a day    MEDICATIONS  (PRN):  acetaminophen     Tablet .. 650 milliGRAM(s) Oral every 6 hours PRN Temp greater or equal to 38C (100.4F), Mild Pain (1 - 3)  aluminum hydroxide/magnesium hydroxide/simethicone Suspension 30 milliLiter(s) Oral every 4 hours PRN Dyspepsia  melatonin 3 milliGRAM(s) Oral at bedtime PRN Insomnia  senna 2 Tablet(s) Oral at bedtime PRN Constipation  sodium chloride 0.65% Nasal 1 Spray(s) Both Nostrils three times a day PRN Nasal Congestion    LABS                        11.0   4.77  )-----------( 240      ( 25 Dec 2023 06:23 )             34.1     12-25    141  |  103  |  28<H>  ----------------------------<  90  3.9   |  31  |  0.89    Ca    9.3      25 Dec 2023 06:23    TPro  6.5  /  Alb  2.7<L>  /  TBili  0.4  /  DBili  x   /  AST  31  /  ALT  24  /  AlkPhos  87  12-25    Radiology:    CT Head No Cont (12.14.23 @ 09:34)  IMPRESSION: No change since 12/13/2023. Irregular appearing subacute to   chronic infarct left middle cerebral artery territory may be due to   spared gray matter, hemorrhage or laminar necrosis without change since   12/13/2023. Mass effect and mild midline shift to the right.    CT Head No Cont (12.15.23 @ 10:30)   Impression: Stable exam.    CT Head No Cont (12.19.23 @ 14:04)  IMPRESSION: Large subacute left middle cerebral artery infarct with mass   effect and mild midline shift improved since 12/15/2023. Irregularity of   infarct density likely related to spare gray versus petechial hemorrhage.    Physical Exam:   Vital Signs Last 24 Hrs  T(C): 36.6 (26 Dec 2023 07:45), Max: 36.7 (25 Dec 2023 21:53)  T(F): 97.8 (26 Dec 2023 07:45), Max: 98 (25 Dec 2023 21:53)  HR: 90 (26 Dec 2023 07:45) (80 - 99)  BP: 162/99 (26 Dec 2023 07:45) (123/70 - 162/99)  RR: 16 (26 Dec 2023 07:45) (16 - 16)  SpO2: 96% (25 Dec 2023 21:53) (96% - 96%)    Gen - NAD, Comfortable   HEENT - NCAT, EOMI, AMANDA  Neck - Supple, No limited ROM  Pulm - CTAB, No crackles  Cardiovascular - RRR, S1S2  Abdomen - Soft, NT, ND, (+) BS  Extremities - No Cyanosis, no clubbing, no edema, no calf tenderness  Neuro-     Cognitive - awake, alert, oriented  x 4, follows command       Communication - Expressive and receptive impairment, delayed processing     Attention: Impaired, takes time to answer or perform a task      Memory: unable to recall       Cranial Nerves - right facial weakness     Motor -                     LEFT    UE - 5/5                    RIGHT UE - 4-/5                    LEFT     5/5                    RIGHT LE - 4-/5       Psychiatric - Mood stable, Affect WNL  Skin: Left shin venous stasis wound    IDT: 12/21  TDD: OSIEL 12/30  RN: incontinent x2  SW: private home 1 ZEE, 1 flight inside, lives w/ SO, limited support from SO + children  SLP: pureed/thins, mod-sev language, sev cog, poor command following, poor attention, poor memory/reasoning  OT: max-totalA all transfers and ADLs with fluctuation 2/2 cognition, goals for CG transfers and supervision bADLs  PT: modA bed mobility, min-modA transfers, modA 60' RW w/ WC follow, cognition is biggest barrier, no stairs yet, goals CS bed mobility/transfers + CG ambulation/stairs w/ 24/7 assist in 2wks  Goals: 1. sequence steps for dressing, 2. CG for toileting

## 2023-12-27 PROCEDURE — 99232 SBSQ HOSP IP/OBS MODERATE 35: CPT | Mod: GC

## 2023-12-27 PROCEDURE — 99233 SBSQ HOSP IP/OBS HIGH 50: CPT

## 2023-12-27 RX ORDER — AMLODIPINE BESYLATE 2.5 MG/1
5 TABLET ORAL DAILY
Refills: 0 | Status: DISCONTINUED | OUTPATIENT
Start: 2023-12-27 | End: 2023-12-28

## 2023-12-27 RX ADMIN — Medication 500000 UNIT(S): at 18:57

## 2023-12-27 RX ADMIN — Medication 1 DROP(S): at 02:35

## 2023-12-27 RX ADMIN — Medication 1 APPLICATION(S): at 06:01

## 2023-12-27 RX ADMIN — LEVETIRACETAM 500 MILLIGRAM(S): 250 TABLET, FILM COATED ORAL at 06:00

## 2023-12-27 RX ADMIN — Medication 1 APPLICATION(S): at 21:39

## 2023-12-27 RX ADMIN — Medication 1 DROP(S): at 06:00

## 2023-12-27 RX ADMIN — ATORVASTATIN CALCIUM 80 MILLIGRAM(S): 80 TABLET, FILM COATED ORAL at 21:39

## 2023-12-27 RX ADMIN — Medication 3 MILLIGRAM(S): at 00:24

## 2023-12-27 RX ADMIN — Medication 500000 UNIT(S): at 21:40

## 2023-12-27 RX ADMIN — LIDOCAINE 1 PATCH: 4 CREAM TOPICAL at 19:59

## 2023-12-27 RX ADMIN — LEVETIRACETAM 500 MILLIGRAM(S): 250 TABLET, FILM COATED ORAL at 19:02

## 2023-12-27 RX ADMIN — LISINOPRIL 40 MILLIGRAM(S): 2.5 TABLET ORAL at 05:59

## 2023-12-27 RX ADMIN — Medication 500000 UNIT(S): at 06:00

## 2023-12-27 RX ADMIN — Medication 500000 UNIT(S): at 00:25

## 2023-12-27 RX ADMIN — MODAFINIL 100 MILLIGRAM(S): 200 TABLET ORAL at 14:01

## 2023-12-27 RX ADMIN — AMLODIPINE BESYLATE 5 MILLIGRAM(S): 2.5 TABLET ORAL at 19:01

## 2023-12-27 RX ADMIN — LIDOCAINE 1 PATCH: 4 CREAM TOPICAL at 14:00

## 2023-12-27 NOTE — PROGRESS NOTE ADULT - SUBJECTIVE AND OBJECTIVE BOX
No events overnight  No new complaints offered to me    98.1; 98; 163/82; 16; 95%  Vital trends have been reviewed    Examination:  General: NAD, Comfortable  Pulm: CTA BL No Rhonchi Rales or wheezes  Neck: Supple, No JVD  CVS: RRR No rubs murmurs or gallops  Abdomen: Soft, Nontender, Nondistended; No masses or organomegally  Extremities: No calf tenderness, No pitting edema    Sodium: 141 mmol/L (12-25-23 @ 06:23)  Potassium: 3.9 mmol/L (12-25-23 @ 06:23)  Glucose: 90 mg/dL (12-25-23 @ 06:23)  Blood Urea Nitrogen: 28 mg/dL (12-25-23 @ 06:23)  Creatinine: 0.89 mg/dL (12-25-23 @ 06:23)  Hemoglobin: 11.0 g/dL (12-25-23 @ 06:23)  Hematocrit: 34.1 % (12-25-23 @ 06:23)  Platelet Count - Automated: 240 K/uL (12-25-23 @ 06:23)

## 2023-12-27 NOTE — PROGRESS NOTE ADULT - ASSESSMENT
88F HTN, hypothyroidism, PPM , Breast CA   Admit for Rt HP CVA, IV contrast induced anaphylaxis requiring vent support  TX to rehab  Seizure episode and tx to MICU. Petechial hemorrhages and small midline shift  Tx to rehab    SP Ischemic CVA  - SP PPM interrogation, no sustained afib  - New onset seizures Keppra added  - Asa Statin as ordered    #Essential HTN  - On lisinopril  - Add amlodipine    #Subclinical hypothroidism  - Endo is following. will be fu'd iin clinic    #Oral thrush (resolved)  - completed 5 days of fluconazole

## 2023-12-27 NOTE — PROGRESS NOTE ADULT - SUBJECTIVE AND OBJECTIVE BOX
CC:  Patient is a 88y old  Female who presents with a chief complaint of Left MCA ischemic stroke, right nondominant hemiparesis (25 Dec 2023 13:48)    HPI:   88 year old female with a history of HTN, hypothyroidism, pacemaker, breast cancer who is admitted with CVA and acute respiratory failure due to IV contrast anaphylaxis. Patient had R sided hemiparesis, expressive aphasia. CT imaging confirmed multiple embolic areas. Pacemaker interrogated showing Atrial runs but no sustained Atrial Fibrillation. Patient currently on course of ASA and statin. Hospital course complicated by severe anaphylactic due to iodine contrast allergy from CT performed on admission to ED. 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    Pt was transferred to [facility] for acute rehab on 12/8. rehab course complicated by possible seizure episodes involving right facial twitching for 1 min and RUE shaking subsequently started and was persistent. Per rehab team, pt was awake and speaking but stopped responding to questions for a short period of time when facial twitching started. RRT was called for the seizure activities.     She was admitted to MICU 12/13 for further management. CTH 12/13 showed Redemonstrated left MCA distribution infarction, advanced when compared to prior imaging with likely petechial hemorrhages and/or laminar necrosis. mildine shift 0.5cm to the right. Aspirin and chemical DVT ppx were held. Neurosurgery (Dr. Gilmore) were consulted per neurology recommendation, no acute surgical intervention needed. Repeat CTH 12/14 showed No change since 12/13/2023. EEG 12/13 showed No seizures captured but Risk of left frontotemporal focal-onset seizures She was seen by neurology for post-stroke seizure, recommended to continue keppra given first seizure in setting of large stroke. A repeat EEG 12/15 showed no seizure but again Risk of left frontotemporal focal-onset seizures. Aspirin 81mg PO QD restarted after repeat CTH 12/15 showed Stable exam.    Patient was evaluated by PM&R and therapy for functional deficits and gait/ ADL impairments and recommended acute rehabilitation. Patient was medically optimized for discharge to [facility] on 12/19.     Allergies:  penicillin (Rash)  IV Contrast (Anaphylaxis; Urticaria)    Subjective:  - Patient was seen and examined at chair with no complaints. No acute events overnight.   - Slept well last night, no new complaints    - Pain is well controlled with current meds.    - Last BM (12/26), voiding without issues   - Tolerating and participating in 3 hours of daily therapy, progressing   - BP is running high, contacted the hospitalist for evaluation      ROS:  - Denies CP, palpitation, cough, fever, SOB, abdominal pain, N/V/D, headache, weakness, joint pain/swelling or constipation.     MEDICATIONS  (STANDING):  artificial tears (preservative free) Ophthalmic Solution 1 Drop(s) Both EYES every 4 hours  aspirin  chewable 81 milliGRAM(s) Oral daily  atorvastatin 80 milliGRAM(s) Oral at bedtime  bacitracin   Ointment 1 Application(s) Topical two times a day  levETIRAcetam 500 milliGRAM(s) Oral every 12 hours  lidocaine   4% Patch 1 Patch Transdermal daily  lisinopril 40 milliGRAM(s) Oral daily  modafinil 100 milliGRAM(s) Oral daily  multivitamin 1 Tablet(s) Oral daily  nystatin    Suspension 250331 Unit(s) Oral four times a day    MEDICATIONS  (PRN):  acetaminophen     Tablet .. 650 milliGRAM(s) Oral every 6 hours PRN Temp greater or equal to 38C (100.4F), Mild Pain (1 - 3)  aluminum hydroxide/magnesium hydroxide/simethicone Suspension 30 milliLiter(s) Oral every 4 hours PRN Dyspepsia  melatonin 3 milliGRAM(s) Oral at bedtime PRN Insomnia  senna 2 Tablet(s) Oral at bedtime PRN Constipation  sodium chloride 0.65% Nasal 1 Spray(s) Both Nostrils three times a day PRN Nasal Congestion    LABS                        11.0   4.77  )-----------( 240      ( 25 Dec 2023 06:23 )             34.1     12-25    141  |  103  |  28<H>  ----------------------------<  90  3.9   |  31  |  0.89    Ca    9.3      25 Dec 2023 06:23    TPro  6.5  /  Alb  2.7<L>  /  TBili  0.4  /  DBili  x   /  AST  31  /  ALT  24  /  AlkPhos  87  12-25    Radiology:    CT Head No Cont (12.14.23 @ 09:34)  IMPRESSION: No change since 12/13/2023. Irregular appearing subacute to   chronic infarct left middle cerebral artery territory may be due to   spared gray matter, hemorrhage or laminar necrosis without change since   12/13/2023. Mass effect and mild midline shift to the right.    CT Head No Cont (12.15.23 @ 10:30)   Impression: Stable exam.    CT Head No Cont (12.19.23 @ 14:04)  IMPRESSION: Large subacute left middle cerebral artery infarct with mass   effect and mild midline shift improved since 12/15/2023. Irregularity of   infarct density likely related to spare gray versus petechial hemorrhage.    Physical Exam:   Vital Signs Last 24 Hrs  T(C): 36.7 (27 Dec 2023 08:00), Max: 36.7 (27 Dec 2023 08:00)  T(F): 98.1 (27 Dec 2023 08:00), Max: 98.1 (27 Dec 2023 08:00)  HR: 98 (27 Dec 2023 08:00) (82 - 101)  BP: 163/82 (27 Dec 2023 08:00) (126/60 - 163/82)  RR: 16 (27 Dec 2023 08:00) (16 - 16)  SpO2: 95% (27 Dec 2023 08:00) (95% - 95%)    Gen - NAD, Comfortable   HEENT - NCAT, EOMI, AMANDA  Neck - Supple, No limited ROM  Pulm - CTAB, No crackles  Cardiovascular - RRR, S1S2  Abdomen - Soft, NT, ND, (+) BS  Extremities - No Cyanosis, no clubbing, no edema, no calf tenderness  Neuro-     Cognitive - awake, alert, oriented  x 4, follows command       Communication - Expressive and receptive impairment, delayed processing     Attention: Impaired, takes time to answer or perform a task      Memory: unable to recall       Cranial Nerves - right facial weakness     Motor -                     LEFT    UE - 5/5                    RIGHT UE - 4-/5                    LEFT     5/5                    RIGHT LE - 4-/5       Psychiatric - Mood stable, Affect WNL  Skin: Left shin venous stasis wound    IDT: 12/21  TDD: OSIEL 12/30  RN: incontinent x2  SW: private home 1 ZEE, 1 flight inside, lives w/ SO, limited support from SO + children  SLP: pureed/thins, mod-sev language, sev cog, poor command following, poor attention, poor memory/reasoning  OT: max-totalA all transfers and ADLs with fluctuation 2/2 cognition, goals for CG transfers and supervision bADLs  PT: modA bed mobility, min-modA transfers, modA 60' RW w/ WC follow, cognition is biggest barrier, no stairs yet, goals CS bed mobility/transfers + CG ambulation/stairs w/ 24/7 assist in 2wks  Goals: 1. sequence steps for dressing, 2. CG for toileting       CC:  Patient is a 88y old  Female who presents with a chief complaint of Left MCA ischemic stroke, right nondominant hemiparesis (25 Dec 2023 13:48)    HPI:   88 year old female with a history of HTN, hypothyroidism, pacemaker, breast cancer who is admitted with CVA and acute respiratory failure due to IV contrast anaphylaxis. Patient had R sided hemiparesis, expressive aphasia. CT imaging confirmed multiple embolic areas. Pacemaker interrogated showing Atrial runs but no sustained Atrial Fibrillation. Patient currently on course of ASA and statin. Hospital course complicated by severe anaphylactic due to iodine contrast allergy from CT performed on admission to ED. 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    Pt was transferred to [facility] for acute rehab on 12/8. rehab course complicated by possible seizure episodes involving right facial twitching for 1 min and RUE shaking subsequently started and was persistent. Per rehab team, pt was awake and speaking but stopped responding to questions for a short period of time when facial twitching started. RRT was called for the seizure activities.     She was admitted to MICU 12/13 for further management. CTH 12/13 showed Redemonstrated left MCA distribution infarction, advanced when compared to prior imaging with likely petechial hemorrhages and/or laminar necrosis. mildine shift 0.5cm to the right. Aspirin and chemical DVT ppx were held. Neurosurgery (Dr. Gilmore) were consulted per neurology recommendation, no acute surgical intervention needed. Repeat CTH 12/14 showed No change since 12/13/2023. EEG 12/13 showed No seizures captured but Risk of left frontotemporal focal-onset seizures She was seen by neurology for post-stroke seizure, recommended to continue keppra given first seizure in setting of large stroke. A repeat EEG 12/15 showed no seizure but again Risk of left frontotemporal focal-onset seizures. Aspirin 81mg PO QD restarted after repeat CTH 12/15 showed Stable exam.    Patient was evaluated by PM&R and therapy for functional deficits and gait/ ADL impairments and recommended acute rehabilitation. Patient was medically optimized for discharge to [facility] on 12/19.     Allergies:  penicillin (Rash)  IV Contrast (Anaphylaxis; Urticaria)    Subjective:  - Patient was seen and examined at chair with no complaints. No acute events overnight.   - Slept well last night, no new complaints    - Pain is well controlled with current meds.    - Last BM (12/26), voiding without issues   - Tolerating and participating in 3 hours of daily therapy, progressing   - BP is running high, contacted the hospitalist for evaluation      ROS:  - Denies CP, palpitation, cough, fever, SOB, abdominal pain, N/V/D, headache, weakness, joint pain/swelling or constipation.     MEDICATIONS  (STANDING):  artificial tears (preservative free) Ophthalmic Solution 1 Drop(s) Both EYES every 4 hours  aspirin  chewable 81 milliGRAM(s) Oral daily  atorvastatin 80 milliGRAM(s) Oral at bedtime  bacitracin   Ointment 1 Application(s) Topical two times a day  levETIRAcetam 500 milliGRAM(s) Oral every 12 hours  lidocaine   4% Patch 1 Patch Transdermal daily  lisinopril 40 milliGRAM(s) Oral daily  modafinil 100 milliGRAM(s) Oral daily  multivitamin 1 Tablet(s) Oral daily  nystatin    Suspension [id number] Unit(s) Oral four times a day    MEDICATIONS  (PRN):  acetaminophen     Tablet .. 650 milliGRAM(s) Oral every 6 hours PRN Temp greater or equal to 38C (100.4F), Mild Pain (1 - 3)  aluminum hydroxide/magnesium hydroxide/simethicone Suspension 30 milliLiter(s) Oral every 4 hours PRN Dyspepsia  melatonin 3 milliGRAM(s) Oral at bedtime PRN Insomnia  senna 2 Tablet(s) Oral at bedtime PRN Constipation  sodium chloride 0.65% Nasal 1 Spray(s) Both Nostrils three times a day PRN Nasal Congestion    LABS                        11.0   4.77  )-----------( 240      ( 25 Dec 2023 06:23 )             34.1     12-25    141  |  103  |  28<H>  ----------------------------<  90  3.9   |  31  |  0.89    Ca    9.3      25 Dec 2023 06:23    TPro  6.5  /  Alb  2.7<L>  /  TBili  0.4  /  DBili  x   /  AST  31  /  ALT  24  /  AlkPhos  87  12-25    Radiology:    CT Head No Cont (12.14.23 @ 09:34)  IMPRESSION: No change since 12/13/2023. Irregular appearing subacute to   chronic infarct left middle cerebral artery territory may be due to   spared gray matter, hemorrhage or laminar necrosis without change since   12/13/2023. Mass effect and mild midline shift to the right.    CT Head No Cont (12.15.23 @ 10:30)   Impression: Stable exam.    CT Head No Cont (12.19.23 @ 14:04)  IMPRESSION: Large subacute left middle cerebral artery infarct with mass   effect and mild midline shift improved since 12/15/2023. Irregularity of   infarct density likely related to spare gray versus petechial hemorrhage.    Physical Exam:   Vital Signs Last 24 Hrs  T(C): 36.7 (27 Dec 2023 08:00), Max: 36.7 (27 Dec 2023 08:00)  T(F): 98.1 (27 Dec 2023 08:00), Max: 98.1 (27 Dec 2023 08:00)  HR: 98 (27 Dec 2023 08:00) (82 - 101)  BP: 163/82 (27 Dec 2023 08:00) (126/60 - 163/82)  RR: 16 (27 Dec 2023 08:00) (16 - 16)  SpO2: 95% (27 Dec 2023 08:00) (95% - 95%)    Gen - NAD, Comfortable   HEENT - NCAT, EOMI, AMANDA  Neck - Supple, No limited ROM  Pulm - CTAB, No crackles  Cardiovascular - RRR, S1S2  Abdomen - Soft, NT, ND, (+) BS  Extremities - No Cyanosis, no clubbing, no edema, no calf tenderness  Neuro-     Cognitive - awake, alert, oriented  x 4, follows command       Communication - Expressive and receptive impairment, delayed processing     Attention: Impaired, takes time to answer or perform a task      Memory: unable to recall       Cranial Nerves - right facial weakness     Motor -                     LEFT    UE - 5/5                    RIGHT UE - 4-/5                    LEFT     5/5                    RIGHT LE - 4-/5       Psychiatric - Mood stable, Affect WNL  Skin: Left shin venous stasis wound    IDT: 12/21  TDD: OSIEL 12/30  RN: incontinent x2  SW: private home 1 ZEE, 1 flight inside, lives w/ SO, limited support from SO + children  SLP: pureed/thins, mod-sev language, sev cog, poor command following, poor attention, poor memory/reasoning  OT: max-totalA all transfers and ADLs with fluctuation 2/2 cognition, goals for CG transfers and supervision bADLs  PT: modA bed mobility, min-modA transfers, modA 60' RW w/ WC follow, cognition is biggest barrier, no stairs yet, goals CS bed mobility/transfers + CG ambulation/stairs w/ 24/7 assist in 2wks  Goals: 1. sequence steps for dressing, 2. CG for toileting

## 2023-12-27 NOTE — PROGRESS NOTE ADULT - ASSESSMENT
The patient is an 88 year old female with a history of HTN, hypothyroidism, pacemaker, breast cancer who is admitted with CVA and acute respiratory failure due to IV contrast anaphylaxis.CT head (12/4) showed Large left MCA territory acute to early subacute infarction, thrombosis of multiple distal branches of the left MCA and no acute intracranial hemorrhage. Hosptial course complicated by possible seizure episodes involving right facial twitching for 1 min and RUE shaking subsequently started and was persistent. CTH 12/13 showed redemonstrated left MCA distribution infarction, advanced when compared to prior imaging with likely petechial hemorrhages and/or laminar necrosis and mildine shift 0.5cm to the right. EEG 12/13 showed no seizures captured but at risk of left frontotemporal focal-onset seizures, Admitted for multidisciplinary rehab program    #L MCA CVA, Right dominant hemiparesis    -Pacemaker interrogated showing Atrial runs but no sustained Atrial Fibrillation   -CT head (12/4) showed Large left MCA territory acute to early subacute infarction, thrombosis of multiple distal branches of the left MCA and no acute intracranial hemorrhage.  -ASA 81 mg PO QD  -atorvastatin 80mg PO at bedtime   #Comprehensive Multidisciplinary Rehab Program:  - Gait, ADL, Functional impairments  - PT/OT/ SLP 3 hours a day 5 days a week, 1 hour each   - TSH level 0.033 with normal T4, T3  - Modafinil 100 mg po daily, tolerating well   - Hospitalist consult completed and appreciated   - Endocrinology consult completed with recommendation of:  1. Multiple thyroid nodules/Suppressed TSH - the patient will need a thyroid US as an outpatient. It is unclear at this time what thyroid medication the patient used in the past but review of her outpatient chart from 2022 revealed she was not on any thyroid medications in 2022 and she was euthyroid in May 2022. Differential diagnosis of her suppressed TSH includes subclinical hyperthyroidism versus sick euthyroid syndrome/non-thyroidal illness. I recommend rechecking her TSH in 6 weeks.  2. Empty sella - patient will need evaluation of the hypothalamic pituitary axes and MRI of the pituitary gland as an outpatient.  - Had a prolonged family meeting where we discussed Pascale's progress and discharge plan, in agreement.     #Seizures   -EEG 12/15 showed no seizure but risk of left frontotemporal focal-onset seizures  -CT head 12/15 stable   -c/w keppra 500mg PO BID   - Keppra level (12/15) 17.1     #HTN/ improving   - Lisinopril 20mg PO QD --> increased to 40 mg po daily (12/22)   - Monitor (12/26) 123/70 - 162/99, (12/27) 126/60 - 163/82  - Hospitalist F/U appreciated     #Mood / Cognition:  - Neuropsychology evaluation PRN  - EKG for  (12/22)  - Will hold on starting SSRI      #Sleep:  - Maintain quiet hours and low stim environment    #Pain:  - Tylenol PRN  - lidocaine patch for back  - avoid sedating meds that may affect cognitive recovery    #/Bladder:  - Monitor PVR if no void in 8h; SC for >400 cc. Done    - Toileting schedule q4h  - (+) Incontinence     #Diet / Dysphagia:    - Diet: Pureed, Moderately thick liquids--> upgraded to minced and moist with 1:1 SV and assistance, out of bed for meals. (12/23)    - SLP assessment and treatment appreciated   - Nutrition to follow    #Skin/ Pressure Injury Prevention:  -  Venous stasis ulcer in L anterior shin  [id number]   - Turn Q2hrs in bed while awake, OOB to Chair, PT/OT/SLP     #DVT prophylaxis:  - ASA 81mg PO QD     #Precautions/ Restrictions  - Falls, Aspiration  - COVID PCR:  The patient is an 88 year old female with a history of HTN, hypothyroidism, pacemaker, breast cancer who is admitted with CVA and acute respiratory failure due to IV contrast anaphylaxis.CT head (12/4) showed Large left MCA territory acute to early subacute infarction, thrombosis of multiple distal branches of the left MCA and no acute intracranial hemorrhage. Hosptial course complicated by possible seizure episodes involving right facial twitching for 1 min and RUE shaking subsequently started and was persistent. CTH 12/13 showed redemonstrated left MCA distribution infarction, advanced when compared to prior imaging with likely petechial hemorrhages and/or laminar necrosis and mildine shift 0.5cm to the right. EEG 12/13 showed no seizures captured but at risk of left frontotemporal focal-onset seizures, Admitted for multidisciplinary rehab program    #L MCA CVA, Right dominant hemiparesis    -Pacemaker interrogated showing Atrial runs but no sustained Atrial Fibrillation   -CT head (12/4) showed Large left MCA territory acute to early subacute infarction, thrombosis of multiple distal branches of the left MCA and no acute intracranial hemorrhage.  -ASA 81 mg PO QD  -atorvastatin 80mg PO at bedtime   #Comprehensive Multidisciplinary Rehab Program:  - Gait, ADL, Functional impairments  - PT/OT/ SLP 3 hours a day 5 days a week, 1 hour each   - TSH level 0.033 with normal T4, T3  - Modafinil 100 mg po daily, tolerating well   - Hospitalist consult completed and appreciated   - Endocrinology consult completed with recommendation of:  1. Multiple thyroid nodules/Suppressed TSH - the patient will need a thyroid US as an outpatient. It is unclear at this time what thyroid medication the patient used in the past but review of her outpatient chart from 2022 revealed she was not on any thyroid medications in 2022 and she was euthyroid in May 2022. Differential diagnosis of her suppressed TSH includes subclinical hyperthyroidism versus sick euthyroid syndrome/non-thyroidal illness. I recommend rechecking her TSH in 6 weeks.  2. Empty sella - patient will need evaluation of the hypothalamic pituitary axes and MRI of the pituitary gland as an outpatient.  - Had a prolonged family meeting where we discussed Pascale's progress and discharge plan, in agreement.     #Seizures   -EEG 12/15 showed no seizure but risk of left frontotemporal focal-onset seizures  -CT head 12/15 stable   -c/w keppra 500mg PO BID   - Keppra level (12/15) 17.1     #HTN/ improving   - Lisinopril 20mg PO QD --> increased to 40 mg po daily (12/22)   - Monitor (12/26) 123/70 - 162/99, (12/27) 126/60 - 163/82  - Hospitalist F/U appreciated     #Mood / Cognition:  - Neuropsychology evaluation PRN  - EKG for  (12/22)  - Will hold on starting SSRI      #Sleep:  - Maintain quiet hours and low stim environment    #Pain:  - Tylenol PRN  - lidocaine patch for back  - avoid sedating meds that may affect cognitive recovery    #/Bladder:  - Monitor PVR if no void in 8h; SC for >400 cc. Done    - Toileting schedule q4h  - (+) Incontinence     #Diet / Dysphagia:    - Diet: Pureed, Moderately thick liquids--> upgraded to minced and moist with 1:1 SV and assistance, out of bed for meals. (12/23)    - SLP assessment and treatment appreciated   - Nutrition to follow    #Skin/ Pressure Injury Prevention:  -  Venous stasis ulcer in L anterior shin  [id number]   - Turn Q2hrs in bed while awake, OOB to Chair, PT/OT/SLP     #DVT prophylaxis:  - ASA 81mg PO QD     #Precautions/ Restrictions  - Falls, Aspiration  - COVID PCR:

## 2023-12-28 ENCOUNTER — TRANSCRIPTION ENCOUNTER (OUTPATIENT)
Age: 88
End: 2023-12-28

## 2023-12-28 VITALS
DIASTOLIC BLOOD PRESSURE: 71 MMHG | OXYGEN SATURATION: 97 % | RESPIRATION RATE: 16 BRPM | SYSTOLIC BLOOD PRESSURE: 121 MMHG | TEMPERATURE: 98 F | HEART RATE: 79 BPM

## 2023-12-28 LAB
ALBUMIN SERPL ELPH-MCNC: 2.8 G/DL — LOW (ref 3.3–5)
ALBUMIN SERPL ELPH-MCNC: 2.8 G/DL — LOW (ref 3.3–5)
ALP SERPL-CCNC: 93 U/L — SIGNIFICANT CHANGE UP (ref 40–120)
ALP SERPL-CCNC: 93 U/L — SIGNIFICANT CHANGE UP (ref 40–120)
ALT FLD-CCNC: 25 U/L — SIGNIFICANT CHANGE UP (ref 10–45)
ALT FLD-CCNC: 25 U/L — SIGNIFICANT CHANGE UP (ref 10–45)
ANION GAP SERPL CALC-SCNC: 8 MMOL/L — SIGNIFICANT CHANGE UP (ref 5–17)
ANION GAP SERPL CALC-SCNC: 8 MMOL/L — SIGNIFICANT CHANGE UP (ref 5–17)
AST SERPL-CCNC: 28 U/L — SIGNIFICANT CHANGE UP (ref 10–40)
AST SERPL-CCNC: 28 U/L — SIGNIFICANT CHANGE UP (ref 10–40)
BASOPHILS # BLD AUTO: 0.05 K/UL — SIGNIFICANT CHANGE UP (ref 0–0.2)
BASOPHILS # BLD AUTO: 0.05 K/UL — SIGNIFICANT CHANGE UP (ref 0–0.2)
BASOPHILS NFR BLD AUTO: 0.8 % — SIGNIFICANT CHANGE UP (ref 0–2)
BASOPHILS NFR BLD AUTO: 0.8 % — SIGNIFICANT CHANGE UP (ref 0–2)
BILIRUB SERPL-MCNC: 0.4 MG/DL — SIGNIFICANT CHANGE UP (ref 0.2–1.2)
BILIRUB SERPL-MCNC: 0.4 MG/DL — SIGNIFICANT CHANGE UP (ref 0.2–1.2)
BUN SERPL-MCNC: 20 MG/DL — SIGNIFICANT CHANGE UP (ref 7–23)
BUN SERPL-MCNC: 20 MG/DL — SIGNIFICANT CHANGE UP (ref 7–23)
CALCIUM SERPL-MCNC: 9.3 MG/DL — SIGNIFICANT CHANGE UP (ref 8.4–10.5)
CALCIUM SERPL-MCNC: 9.3 MG/DL — SIGNIFICANT CHANGE UP (ref 8.4–10.5)
CHLORIDE SERPL-SCNC: 105 MMOL/L — SIGNIFICANT CHANGE UP (ref 96–108)
CHLORIDE SERPL-SCNC: 105 MMOL/L — SIGNIFICANT CHANGE UP (ref 96–108)
CO2 SERPL-SCNC: 28 MMOL/L — SIGNIFICANT CHANGE UP (ref 22–31)
CO2 SERPL-SCNC: 28 MMOL/L — SIGNIFICANT CHANGE UP (ref 22–31)
CREAT SERPL-MCNC: 0.69 MG/DL — SIGNIFICANT CHANGE UP (ref 0.5–1.3)
CREAT SERPL-MCNC: 0.69 MG/DL — SIGNIFICANT CHANGE UP (ref 0.5–1.3)
EGFR: 84 ML/MIN/1.73M2 — SIGNIFICANT CHANGE UP
EGFR: 84 ML/MIN/1.73M2 — SIGNIFICANT CHANGE UP
EOSINOPHIL # BLD AUTO: 0.22 K/UL — SIGNIFICANT CHANGE UP (ref 0–0.5)
EOSINOPHIL # BLD AUTO: 0.22 K/UL — SIGNIFICANT CHANGE UP (ref 0–0.5)
EOSINOPHIL NFR BLD AUTO: 3.7 % — SIGNIFICANT CHANGE UP (ref 0–6)
EOSINOPHIL NFR BLD AUTO: 3.7 % — SIGNIFICANT CHANGE UP (ref 0–6)
GLUCOSE SERPL-MCNC: 92 MG/DL — SIGNIFICANT CHANGE UP (ref 70–99)
GLUCOSE SERPL-MCNC: 92 MG/DL — SIGNIFICANT CHANGE UP (ref 70–99)
HCT VFR BLD CALC: 32.6 % — LOW (ref 34.5–45)
HCT VFR BLD CALC: 32.6 % — LOW (ref 34.5–45)
HGB BLD-MCNC: 10.6 G/DL — LOW (ref 11.5–15.5)
HGB BLD-MCNC: 10.6 G/DL — LOW (ref 11.5–15.5)
IMM GRANULOCYTES NFR BLD AUTO: 0.7 % — SIGNIFICANT CHANGE UP (ref 0–0.9)
IMM GRANULOCYTES NFR BLD AUTO: 0.7 % — SIGNIFICANT CHANGE UP (ref 0–0.9)
LYMPHOCYTES # BLD AUTO: 0.97 K/UL — LOW (ref 1–3.3)
LYMPHOCYTES # BLD AUTO: 0.97 K/UL — LOW (ref 1–3.3)
LYMPHOCYTES # BLD AUTO: 16.1 % — SIGNIFICANT CHANGE UP (ref 13–44)
LYMPHOCYTES # BLD AUTO: 16.1 % — SIGNIFICANT CHANGE UP (ref 13–44)
MCHC RBC-ENTMCNC: 26.5 PG — LOW (ref 27–34)
MCHC RBC-ENTMCNC: 26.5 PG — LOW (ref 27–34)
MCHC RBC-ENTMCNC: 32.5 GM/DL — SIGNIFICANT CHANGE UP (ref 32–36)
MCHC RBC-ENTMCNC: 32.5 GM/DL — SIGNIFICANT CHANGE UP (ref 32–36)
MCV RBC AUTO: 81.5 FL — SIGNIFICANT CHANGE UP (ref 80–100)
MCV RBC AUTO: 81.5 FL — SIGNIFICANT CHANGE UP (ref 80–100)
MONOCYTES # BLD AUTO: 0.69 K/UL — SIGNIFICANT CHANGE UP (ref 0–0.9)
MONOCYTES # BLD AUTO: 0.69 K/UL — SIGNIFICANT CHANGE UP (ref 0–0.9)
MONOCYTES NFR BLD AUTO: 11.5 % — SIGNIFICANT CHANGE UP (ref 2–14)
MONOCYTES NFR BLD AUTO: 11.5 % — SIGNIFICANT CHANGE UP (ref 2–14)
NEUTROPHILS # BLD AUTO: 4.04 K/UL — SIGNIFICANT CHANGE UP (ref 1.8–7.4)
NEUTROPHILS # BLD AUTO: 4.04 K/UL — SIGNIFICANT CHANGE UP (ref 1.8–7.4)
NEUTROPHILS NFR BLD AUTO: 67.2 % — SIGNIFICANT CHANGE UP (ref 43–77)
NEUTROPHILS NFR BLD AUTO: 67.2 % — SIGNIFICANT CHANGE UP (ref 43–77)
NRBC # BLD: 0 /100 WBCS — SIGNIFICANT CHANGE UP (ref 0–0)
NRBC # BLD: 0 /100 WBCS — SIGNIFICANT CHANGE UP (ref 0–0)
PLATELET # BLD AUTO: 237 K/UL — SIGNIFICANT CHANGE UP (ref 150–400)
PLATELET # BLD AUTO: 237 K/UL — SIGNIFICANT CHANGE UP (ref 150–400)
POTASSIUM SERPL-MCNC: 3.8 MMOL/L — SIGNIFICANT CHANGE UP (ref 3.5–5.3)
POTASSIUM SERPL-MCNC: 3.8 MMOL/L — SIGNIFICANT CHANGE UP (ref 3.5–5.3)
POTASSIUM SERPL-SCNC: 3.8 MMOL/L — SIGNIFICANT CHANGE UP (ref 3.5–5.3)
POTASSIUM SERPL-SCNC: 3.8 MMOL/L — SIGNIFICANT CHANGE UP (ref 3.5–5.3)
PROT SERPL-MCNC: 6.4 G/DL — SIGNIFICANT CHANGE UP (ref 6–8.3)
PROT SERPL-MCNC: 6.4 G/DL — SIGNIFICANT CHANGE UP (ref 6–8.3)
RBC # BLD: 4 M/UL — SIGNIFICANT CHANGE UP (ref 3.8–5.2)
RBC # BLD: 4 M/UL — SIGNIFICANT CHANGE UP (ref 3.8–5.2)
RBC # FLD: 16.4 % — HIGH (ref 10.3–14.5)
RBC # FLD: 16.4 % — HIGH (ref 10.3–14.5)
SARS-COV-2 RNA SPEC QL NAA+PROBE: SIGNIFICANT CHANGE UP
SARS-COV-2 RNA SPEC QL NAA+PROBE: SIGNIFICANT CHANGE UP
SODIUM SERPL-SCNC: 141 MMOL/L — SIGNIFICANT CHANGE UP (ref 135–145)
SODIUM SERPL-SCNC: 141 MMOL/L — SIGNIFICANT CHANGE UP (ref 135–145)
WBC # BLD: 6.01 K/UL — SIGNIFICANT CHANGE UP (ref 3.8–10.5)
WBC # BLD: 6.01 K/UL — SIGNIFICANT CHANGE UP (ref 3.8–10.5)
WBC # FLD AUTO: 6.01 K/UL — SIGNIFICANT CHANGE UP (ref 3.8–10.5)
WBC # FLD AUTO: 6.01 K/UL — SIGNIFICANT CHANGE UP (ref 3.8–10.5)

## 2023-12-28 PROCEDURE — 99232 SBSQ HOSP IP/OBS MODERATE 35: CPT

## 2023-12-28 PROCEDURE — 99233 SBSQ HOSP IP/OBS HIGH 50: CPT | Mod: GC

## 2023-12-28 RX ORDER — BACITRACIN ZINC 500 UNIT/G
1 OINTMENT IN PACKET (EA) TOPICAL
Qty: 0 | Refills: 0 | DISCHARGE
Start: 2023-12-28

## 2023-12-28 RX ORDER — MODAFINIL 200 MG/1
1 TABLET ORAL
Qty: 0 | Refills: 0 | DISCHARGE
Start: 2023-12-28

## 2023-12-28 RX ORDER — LISINOPRIL 2.5 MG/1
1 TABLET ORAL
Qty: 0 | Refills: 0 | DISCHARGE
Start: 2023-12-28

## 2023-12-28 RX ORDER — ASPIRIN/CALCIUM CARB/MAGNESIUM 324 MG
1 TABLET ORAL
Qty: 0 | Refills: 0 | DISCHARGE
Start: 2023-12-28

## 2023-12-28 RX ORDER — SODIUM CHLORIDE 0.65 %
1 AEROSOL, SPRAY (ML) NASAL THREE TIMES A DAY
Refills: 0 | Status: DISCONTINUED | OUTPATIENT
Start: 2023-12-28 | End: 2023-12-28

## 2023-12-28 RX ORDER — SENNA PLUS 8.6 MG/1
2 TABLET ORAL
Qty: 0 | Refills: 0 | DISCHARGE
Start: 2023-12-28

## 2023-12-28 RX ORDER — LIDOCAINE 4 G/100G
1 CREAM TOPICAL
Qty: 0 | Refills: 0 | DISCHARGE
Start: 2023-12-28

## 2023-12-28 RX ORDER — AMLODIPINE BESYLATE 2.5 MG/1
1 TABLET ORAL
Qty: 0 | Refills: 0 | DISCHARGE
Start: 2023-12-28

## 2023-12-28 RX ORDER — NYSTATIN 500MM UNIT
5 POWDER (EA) MISCELLANEOUS
Qty: 0 | Refills: 0 | DISCHARGE
Start: 2023-12-28

## 2023-12-28 RX ORDER — LEVETIRACETAM 250 MG/1
1 TABLET, FILM COATED ORAL
Qty: 0 | Refills: 0 | DISCHARGE
Start: 2023-12-28

## 2023-12-28 RX ORDER — ATORVASTATIN CALCIUM 80 MG/1
1 TABLET, FILM COATED ORAL
Refills: 0 | DISCHARGE
Start: 2023-12-28

## 2023-12-28 RX ORDER — ACETAMINOPHEN 500 MG
2 TABLET ORAL
Qty: 0 | Refills: 0 | DISCHARGE
Start: 2023-12-28

## 2023-12-28 RX ORDER — LANOLIN ALCOHOL/MO/W.PET/CERES
1 CREAM (GRAM) TOPICAL
Qty: 0 | Refills: 0 | DISCHARGE
Start: 2023-12-28

## 2023-12-28 RX ADMIN — Medication 1 DROP(S): at 17:13

## 2023-12-28 RX ADMIN — LEVETIRACETAM 500 MILLIGRAM(S): 250 TABLET, FILM COATED ORAL at 06:17

## 2023-12-28 RX ADMIN — Medication 1 DROP(S): at 14:18

## 2023-12-28 RX ADMIN — LISINOPRIL 40 MILLIGRAM(S): 2.5 TABLET ORAL at 06:16

## 2023-12-28 RX ADMIN — LIDOCAINE 1 PATCH: 4 CREAM TOPICAL at 02:00

## 2023-12-28 RX ADMIN — Medication 1 APPLICATION(S): at 17:12

## 2023-12-28 RX ADMIN — Medication 500000 UNIT(S): at 17:13

## 2023-12-28 RX ADMIN — Medication 1 APPLICATION(S): at 06:20

## 2023-12-28 RX ADMIN — Medication 1 DROP(S): at 11:54

## 2023-12-28 RX ADMIN — Medication 500000 UNIT(S): at 06:20

## 2023-12-28 RX ADMIN — Medication 1 DROP(S): at 06:20

## 2023-12-28 RX ADMIN — LEVETIRACETAM 500 MILLIGRAM(S): 250 TABLET, FILM COATED ORAL at 17:12

## 2023-12-28 RX ADMIN — AMLODIPINE BESYLATE 5 MILLIGRAM(S): 2.5 TABLET ORAL at 06:17

## 2023-12-28 NOTE — DISCHARGE NOTE PROVIDER - PROVIDER TOKENS
PROVIDER:[TOKEN:[[id number]],FOLLOWUP:[2 weeks]],PROVIDER:[TOKEN:[[id number]],FOLLOWUP:[1 month]] PROVIDER:[TOKEN:[[id number]],FOLLOWUP:[2 weeks]],PROVIDER:[TOKEN:[[id number]],FOLLOWUP:[1 month]]

## 2023-12-28 NOTE — PROGRESS NOTE ADULT - SUBJECTIVE AND OBJECTIVE BOX
CC:  Patient is a 88y old  Female who presents with a chief complaint of Left MCA ischemic stroke, right nondominant hemiparesis (27 Dec 2023 18:00)    HPI:   88 year old female with a history of HTN, hypothyroidism, pacemaker, breast cancer who is admitted with CVA and acute respiratory failure due to IV contrast anaphylaxis. Patient had R sided hemiparesis, expressive aphasia. CT imaging confirmed multiple embolic areas. Pacemaker interrogated showing Atrial runs but no sustained Atrial Fibrillation. Patient currently on course of ASA and statin. Hospital course complicated by severe anaphylactic due to iodine contrast allergy from CT performed on admission to ED. 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    Pt was transferred to [facility] for acute rehab on 12/8. rehab course complicated by possible seizure episodes involving right facial twitching for 1 min and RUE shaking subsequently started and was persistent. Per rehab team, pt was awake and speaking but stopped responding to questions for a short period of time when facial twitching started. RRT was called for the seizure activities.     She was admitted to MICU 12/13 for further management. CTH 12/13 showed Redemonstrated left MCA distribution infarction, advanced when compared to prior imaging with likely petechial hemorrhages and/or laminar necrosis. mildine shift 0.5cm to the right. Aspirin and chemical DVT ppx were held. Neurosurgery (Dr. Gilmore) were consulted per neurology recommendation, no acute surgical intervention needed. Repeat CTH 12/14 showed No change since 12/13/2023. EEG 12/13 showed No seizures captured but Risk of left frontotemporal focal-onset seizures She was seen by neurology for post-stroke seizure, recommended to continue keppra given first seizure in setting of large stroke. A repeat EEG 12/15 showed no seizure but again Risk of left frontotemporal focal-onset seizures. Aspirin 81mg PO QD restarted after repeat CTH 12/15 showed Stable exam.      Patient was evaluated by PM&R and therapy for functional deficits and gait/ ADL impairments and recommended acute rehabilitation. Patient was medically optimized for discharge to [facility] on 12/19.      (19 Dec 2023 15:06)      Allergies:  penicillin (Rash)  IV Contrast (Anaphylaxis; Urticaria)      Subjective:  - Patient was seen and examined at bedside with no complaints. No acute events overnight. Patient slept well last night.   - Pain is well controlled with current meds.    - BM up to date  - Tolerating and participating in 3 hours of daily therapy, progressing      ROS:  - Denies CP, palpitation, cough, fever, SOB, abdominal pain, N/V/D, headache, weakness, joint pain/swelling or constipation.     MEDICATIONS  (STANDING):  amLODIPine   Tablet 5 milliGRAM(s) Oral daily  artificial tears (preservative free) Ophthalmic Solution 1 Drop(s) Both EYES every 4 hours  aspirin  chewable 81 milliGRAM(s) Oral daily  atorvastatin 80 milliGRAM(s) Oral at bedtime  bacitracin   Ointment 1 Application(s) Topical two times a day  levETIRAcetam 500 milliGRAM(s) Oral every 12 hours  lidocaine   4% Patch 1 Patch Transdermal daily  lisinopril 40 milliGRAM(s) Oral daily  modafinil 100 milliGRAM(s) Oral daily  multivitamin 1 Tablet(s) Oral daily  nystatin    Suspension 567516 Unit(s) Oral four times a day    MEDICATIONS  (PRN):  acetaminophen     Tablet .. 650 milliGRAM(s) Oral every 6 hours PRN Temp greater or equal to 38C (100.4F), Mild Pain (1 - 3)  aluminum hydroxide/magnesium hydroxide/simethicone Suspension 30 milliLiter(s) Oral every 4 hours PRN Dyspepsia  melatonin 3 milliGRAM(s) Oral at bedtime PRN Insomnia  senna 2 Tablet(s) Oral at bedtime PRN Constipation  sodium chloride 0.65% Nasal 1 Spray(s) Both Nostrils three times a day PRN Nasal Congestion  sodium chloride 0.65% Nasal 1 Spray(s) Both Nostrils three times a day PRN nose bleed    LABS                            10.6   6.01  )-----------( 237      ( 28 Dec 2023 05:55 )             32.6     12-28    141  |  105  |  20  ----------------------------<  92  3.8   |  28  |  0.69    Ca    9.3      28 Dec 2023 05:55    TPro  6.4  /  Alb  2.8<L>  /  TBili  0.4  /  DBili  x   /  AST  28  /  ALT  25  /  AlkPhos  93  12-28      Urinalysis Basic - ( 28 Dec 2023 05:55 )    Color: x / Appearance: x / SG: x / pH: x  Gluc: 92 mg/dL / Ketone: x  / Bili: x / Urobili: x   Blood: x / Protein: x / Nitrite: x   Leuk Esterase: x / RBC: x / WBC x   Sq Epi: x / Non Sq Epi: x / Bacteria: x      CAPILLARY BLOOD GLUCOSE      Radiology:      CT Head No Cont (12.14.23 @ 09:34)  IMPRESSION: No change since 12/13/2023. Irregular appearing subacute to   chronic infarct left middle cerebral artery territory may be due to   spared gray matter, hemorrhage or laminar necrosis without change since   12/13/2023. Mass effect and mild midline shift to the right.    CT Head No Cont (12.15.23 @ 10:30)   Impression: Stable exam.    CT Head No Cont (12.19.23 @ 14:04)  IMPRESSION: Large subacute left middle cerebral artery infarct with mass   effect and mild midline shift improved since 12/15/2023. Irregularity of   infarct density likely related to spare gray versus petechial hemorrhage.    Physical Exam:     Vital Signs Last 24 Hrs  T(C): 36.8 (28 Dec 2023 08:06), Max: 36.8 (28 Dec 2023 08:06)  T(F): 98.2 (28 Dec 2023 08:06), Max: 98.2 (28 Dec 2023 08:06)  HR: 109 (28 Dec 2023 08:06) (79 - 109)  BP: 145/90 (28 Dec 2023 08:06) (138/63 - 145/90)  BP(mean): --  RR: 16 (28 Dec 2023 08:06) (16 - 16)  SpO2: 97% (28 Dec 2023 08:06) (97% - 97%)    Parameters below as of 28 Dec 2023 08:06  Patient On (Oxygen Delivery Method): room air    Gen - NAD, Comfortable   HEENT - NCAT, EOMI, AMANDA  Neck - Supple, No limited ROM  Pulm - CTAB, No crackles  Cardiovascular - RRR, S1S2  Abdomen - Soft, NT, ND, (+) BS  Extremities - No Cyanosis, no clubbing, no edema, no calf tenderness  Neuro-     Cognitive - awake, alert, oriented  x 4, follows command       Communication - Expressive and receptive impairment, delayed processing     Attention: Impaired, takes time to answer or perform a task      Memory: unable to recall       Cranial Nerves - right facial weakness     Motor -                     LEFT    UE - 5/5                    RIGHT UE - 4-/5                    LEFT     5/5                    RIGHT LE - 4-/5       Psychiatric - Mood stable, Affect WNL  Skin: Left shin venous stasis wound    IDT: 12/21  TDD: OSIEL 12/30  RN: incontinent x2  SW: private home 1 ZEE, 1 flight inside, lives w/ SO, limited support from SO + children  SLP: pureed/thins, mod-sev language, sev cog, poor command following, poor attention, poor memory/reasoning  OT: max-totalA all transfers and ADLs with fluctuation 2/2 cognition, goals for CG transfers and supervision bADLs  PT: modA bed mobility, min-modA transfers, modA 60' RW w/ WC follow, cognition is biggest barrier, no stairs yet, goals CS bed mobility/transfers + CG ambulation/stairs w/ 24/7 assist in 2wks  Goals: 1. sequence steps for dressing, 2. CG for toileting       CC:  Patient is a 88y old  Female who presents with a chief complaint of Left MCA ischemic stroke, right nondominant hemiparesis (27 Dec 2023 18:00)    HPI:   88 year old female with a history of HTN, hypothyroidism, pacemaker, breast cancer who is admitted with CVA and acute respiratory failure due to IV contrast anaphylaxis. Patient had R sided hemiparesis, expressive aphasia. CT imaging confirmed multiple embolic areas. Pacemaker interrogated showing Atrial runs but no sustained Atrial Fibrillation. Patient currently on course of ASA and statin. Hospital course complicated by severe anaphylactic due to iodine contrast allergy from CT performed on admission to ED. 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    Pt was transferred to [facility] for acute rehab on 12/8. rehab course complicated by possible seizure episodes involving right facial twitching for 1 min and RUE shaking subsequently started and was persistent. Per rehab team, pt was awake and speaking but stopped responding to questions for a short period of time when facial twitching started. RRT was called for the seizure activities.     She was admitted to MICU 12/13 for further management. CTH 12/13 showed Redemonstrated left MCA distribution infarction, advanced when compared to prior imaging with likely petechial hemorrhages and/or laminar necrosis. mildine shift 0.5cm to the right. Aspirin and chemical DVT ppx were held. Neurosurgery (Dr. Gilmore) were consulted per neurology recommendation, no acute surgical intervention needed. Repeat CTH 12/14 showed No change since 12/13/2023. EEG 12/13 showed No seizures captured but Risk of left frontotemporal focal-onset seizures She was seen by neurology for post-stroke seizure, recommended to continue keppra given first seizure in setting of large stroke. A repeat EEG 12/15 showed no seizure but again Risk of left frontotemporal focal-onset seizures. Aspirin 81mg PO QD restarted after repeat CTH 12/15 showed Stable exam.      Patient was evaluated by PM&R and therapy for functional deficits and gait/ ADL impairments and recommended acute rehabilitation. Patient was medically optimized for discharge to [facility] on 12/19.      (19 Dec 2023 15:06)      Allergies:  penicillin (Rash)  IV Contrast (Anaphylaxis; Urticaria)      Subjective:  - Patient was seen and examined at bedside with no complaints. No acute events overnight. Patient slept well last night.   - Pain is well controlled with current meds.    - BM up to date  - Tolerating and participating in 3 hours of daily therapy, progressing      ROS:  - Denies CP, palpitation, cough, fever, SOB, abdominal pain, N/V/D, headache, weakness, joint pain/swelling or constipation.     MEDICATIONS  (STANDING):  amLODIPine   Tablet 5 milliGRAM(s) Oral daily  artificial tears (preservative free) Ophthalmic Solution 1 Drop(s) Both EYES every 4 hours  aspirin  chewable 81 milliGRAM(s) Oral daily  atorvastatin 80 milliGRAM(s) Oral at bedtime  bacitracin   Ointment 1 Application(s) Topical two times a day  levETIRAcetam 500 milliGRAM(s) Oral every 12 hours  lidocaine   4% Patch 1 Patch Transdermal daily  lisinopril 40 milliGRAM(s) Oral daily  modafinil 100 milliGRAM(s) Oral daily  multivitamin 1 Tablet(s) Oral daily  nystatin    Suspension 881717 Unit(s) Oral four times a day    MEDICATIONS  (PRN):  acetaminophen     Tablet .. 650 milliGRAM(s) Oral every 6 hours PRN Temp greater or equal to 38C (100.4F), Mild Pain (1 - 3)  aluminum hydroxide/magnesium hydroxide/simethicone Suspension 30 milliLiter(s) Oral every 4 hours PRN Dyspepsia  melatonin 3 milliGRAM(s) Oral at bedtime PRN Insomnia  senna 2 Tablet(s) Oral at bedtime PRN Constipation  sodium chloride 0.65% Nasal 1 Spray(s) Both Nostrils three times a day PRN Nasal Congestion  sodium chloride 0.65% Nasal 1 Spray(s) Both Nostrils three times a day PRN nose bleed    LABS                            10.6   6.01  )-----------( 237      ( 28 Dec 2023 05:55 )             32.6     12-28    141  |  105  |  20  ----------------------------<  92  3.8   |  28  |  0.69    Ca    9.3      28 Dec 2023 05:55    TPro  6.4  /  Alb  2.8<L>  /  TBili  0.4  /  DBili  x   /  AST  28  /  ALT  25  /  AlkPhos  93  12-28      Urinalysis Basic - ( 28 Dec 2023 05:55 )    Color: x / Appearance: x / SG: x / pH: x  Gluc: 92 mg/dL / Ketone: x  / Bili: x / Urobili: x   Blood: x / Protein: x / Nitrite: x   Leuk Esterase: x / RBC: x / WBC x   Sq Epi: x / Non Sq Epi: x / Bacteria: x      CAPILLARY BLOOD GLUCOSE      Radiology:      CT Head No Cont (12.14.23 @ 09:34)  IMPRESSION: No change since 12/13/2023. Irregular appearing subacute to   chronic infarct left middle cerebral artery territory may be due to   spared gray matter, hemorrhage or laminar necrosis without change since   12/13/2023. Mass effect and mild midline shift to the right.    CT Head No Cont (12.15.23 @ 10:30)   Impression: Stable exam.    CT Head No Cont (12.19.23 @ 14:04)  IMPRESSION: Large subacute left middle cerebral artery infarct with mass   effect and mild midline shift improved since 12/15/2023. Irregularity of   infarct density likely related to spare gray versus petechial hemorrhage.    Physical Exam:     Vital Signs Last 24 Hrs  T(C): 36.8 (28 Dec 2023 08:06), Max: 36.8 (28 Dec 2023 08:06)  T(F): 98.2 (28 Dec 2023 08:06), Max: 98.2 (28 Dec 2023 08:06)  HR: 109 (28 Dec 2023 08:06) (79 - 109)  BP: 145/90 (28 Dec 2023 08:06) (138/63 - 145/90)  BP(mean): --  RR: 16 (28 Dec 2023 08:06) (16 - 16)  SpO2: 97% (28 Dec 2023 08:06) (97% - 97%)    Parameters below as of 28 Dec 2023 08:06  Patient On (Oxygen Delivery Method): room air    Gen - NAD, Comfortable   HEENT - NCAT, EOMI, AMADNA  Neck - Supple, No limited ROM  Pulm - CTAB, No crackles  Cardiovascular - RRR, S1S2  Abdomen - Soft, NT, ND, (+) BS  Extremities - No Cyanosis, no clubbing, no edema, no calf tenderness  Neuro-     Cognitive - awake, alert, oriented  x 4, follows command       Communication - Expressive and receptive impairment, delayed processing     Attention: Impaired, takes time to answer or perform a task      Memory: unable to recall       Cranial Nerves - right facial weakness     Motor -                     LEFT    UE - 5/5                    RIGHT UE - 4-/5                    LEFT     5/5                    RIGHT LE - 4-/5       Psychiatric - Mood stable, Affect WNL  Skin: Left shin venous stasis wound    IDT: 12/21  TDD: OSIEL 12/30  RN: incontinent x2  SW: private home 1 ZEE, 1 flight inside, lives w/ SO, limited support from SO + children  SLP: pureed/thins, mod-sev language, sev cog, poor command following, poor attention, poor memory/reasoning  OT: max-totalA all transfers and ADLs with fluctuation 2/2 cognition, goals for CG transfers and supervision bADLs  PT: modA bed mobility, min-modA transfers, modA 60' RW w/ WC follow, cognition is biggest barrier, no stairs yet, goals CS bed mobility/transfers + CG ambulation/stairs w/ 24/7 assist in 2wks  Goals: 1. sequence steps for dressing, 2. CG for toileting       CC:  Patient is a 88y old  Female who presents with a chief complaint of Left MCA ischemic stroke, right nondominant hemiparesis (27 Dec 2023 18:00)    HPI:   88 year old female with a history of HTN, hypothyroidism, pacemaker, breast cancer who is admitted with CVA and acute respiratory failure due to IV contrast anaphylaxis. Patient had R sided hemiparesis, expressive aphasia. CT imaging confirmed multiple embolic areas. Pacemaker interrogated showing Atrial runs but no sustained Atrial Fibrillation. Patient currently on course of ASA and statin. Hospital course complicated by severe anaphylactic due to iodine contrast allergy from CT performed on admission to ED. 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  Pt was transferred to [facility] for acute rehab on 12/8. rehab course complicated by possible seizure episodes involving right facial twitching for 1 min and RUE shaking subsequently started and was persistent. Per rehab team, pt was awake and speaking but stopped responding to questions for a short period of time when facial twitching started. RRT was called for the seizure activities.   She was admitted to MICU 12/13 for further management. CTH 12/13 showed Redemonstrated left MCA distribution infarction, advanced when compared to prior imaging with likely petechial hemorrhages and/or laminar necrosis. mildine shift 0.5cm to the right. Aspirin and chemical DVT ppx were held. Neurosurgery (Dr. Gilmore) were consulted per neurology recommendation, no acute surgical intervention needed. Repeat CTH 12/14 showed No change since 12/13/2023. EEG 12/13 showed No seizures captured but Risk of left frontotemporal focal-onset seizures She was seen by neurology for post-stroke seizure, recommended to continue keppra given first seizure in setting of large stroke. A repeat EEG 12/15 showed no seizure but again Risk of left frontotemporal focal-onset seizures. Aspirin 81mg PO QD restarted after repeat CTH 12/15 showed Stable exam.    Patient was evaluated by PM&R and therapy for functional deficits and gait/ ADL impairments and recommended acute rehabilitation. Patient was medically optimized for discharge to [facility] on 12/19.     Allergies:  penicillin (Rash)  IV Contrast (Anaphylaxis; Urticaria)    Subjective:  - Patient was seen and examined at bedside with no complaints. No acute events overnight.   - Patient slept well last night.   - Pain is well controlled with current meds.    - BM (12/27), voiding without issues   - Tolerating and participating in 3 hours of daily therapy, progressing   - Case discussed at IDT meeting this am for progress and discharge plan.      ROS:  - Denies CP, palpitation, cough, fever, SOB, abdominal pain, N/V/D, headache, weakness, joint pain/swelling or constipation.     MEDICATIONS  (STANDING):  amLODIPine   Tablet 5 milliGRAM(s) Oral daily  artificial tears (preservative free) Ophthalmic Solution 1 Drop(s) Both EYES every 4 hours  aspirin  chewable 81 milliGRAM(s) Oral daily  atorvastatin 80 milliGRAM(s) Oral at bedtime  bacitracin   Ointment 1 Application(s) Topical two times a day  levETIRAcetam 500 milliGRAM(s) Oral every 12 hours  lidocaine   4% Patch 1 Patch Transdermal daily  lisinopril 40 milliGRAM(s) Oral daily  modafinil 100 milliGRAM(s) Oral daily  multivitamin 1 Tablet(s) Oral daily  nystatin    Suspension [id number] Unit(s) Oral four times a day    MEDICATIONS  (PRN):  acetaminophen     Tablet .. 650 milliGRAM(s) Oral every 6 hours PRN Temp greater or equal to 38C (100.4F), Mild Pain (1 - 3)  aluminum hydroxide/magnesium hydroxide/simethicone Suspension 30 milliLiter(s) Oral every 4 hours PRN Dyspepsia  melatonin 3 milliGRAM(s) Oral at bedtime PRN Insomnia  senna 2 Tablet(s) Oral at bedtime PRN Constipation  sodium chloride 0.65% Nasal 1 Spray(s) Both Nostrils three times a day PRN Nasal Congestion  sodium chloride 0.65% Nasal 1 Spray(s) Both Nostrils three times a day PRN nose bleed    LABS                        10.6   6.01  )-----------( 237      ( 28 Dec 2023 05:55 )             32.6     12-28    141  |  105  |  20  ----------------------------<  92  3.8   |  28  |  0.69    Ca    9.3      28 Dec 2023 05:55    TPro  6.4  /  Alb  2.8<L>  /  TBili  0.4  /  DBili  x   /  AST  28  /  ALT  25  /  AlkPhos  93  12-28    Radiology:    CT Head No Cont (12.14.23 @ 09:34)  IMPRESSION: No change since 12/13/2023. Irregular appearing subacute to   chronic infarct left middle cerebral artery territory may be due to   spared gray matter, hemorrhage or laminar necrosis without change since   12/13/2023. Mass effect and mild midline shift to the right.    CT Head No Cont (12.15.23 @ 10:30)   Impression: Stable exam.    CT Head No Cont (12.19.23 @ 14:04)  IMPRESSION: Large subacute left middle cerebral artery infarct with mass   effect and mild midline shift improved since 12/15/2023. Irregularity of   infarct density likely related to spare gray versus petechial hemorrhage.    Physical Exam:   Vital Signs Last 24 Hrs  T(C): 36.8 (28 Dec 2023 08:06), Max: 36.8 (28 Dec 2023 08:06)  T(F): 98.2 (28 Dec 2023 08:06), Max: 98.2 (28 Dec 2023 08:06)  HR: 109 (28 Dec 2023 08:06) (79 - 109)  BP: 145/90 (28 Dec 2023 08:06) (138/63 - 145/90)  RR: 16 (28 Dec 2023 08:06) (16 - 16)  SpO2: 97% (28 Dec 2023 08:06) (97% - 97%)    Gen - NAD, Comfortable   HEENT - NCAT, EOMI, AMANDA  Neck - Supple, No limited ROM  Pulm - CTAB, No crackles  Cardiovascular - RRR, S1S2  Abdomen - Soft, NT, ND, (+) BS  Extremities - No Cyanosis, no clubbing, no edema, no calf tenderness  Neuro-     Cognitive - awake, alert, oriented  x 4, follows command       Communication - Expressive and receptive impairment, delayed processing     Attention: Impaired, takes time to answer or perform a task      Memory: unable to recall       Cranial Nerves - right facial weakness     Motor -                     LEFT    UE - 5/5                    RIGHT UE - 4-/5                    LEFT     5/5                    RIGHT LE - 4-/5       Psychiatric - Mood stable, Affect WNL  Skin: Left shin venous stasis wound    IDT: 12/21  TDD: OSIEL 12/30  RN: incontinent x2  SW: private home 1 ZEE, 1 flight inside, lives w/ SO, limited support from SO + children  SLP: pureed/thins, mod-sev language, sev cog, poor command following, poor attention, poor memory/reasoning  OT: max-totalA all transfers and ADLs with fluctuation 2/2 cognition, goals for CG transfers and supervision bADLs  PT: modA bed mobility, min-modA transfers, modA 60' RW w/ WC follow, cognition is biggest barrier, no stairs yet, goals CS bed mobility/transfers + CG ambulation/stairs w/ 24/7 assist in 2wks  Goals: 1. sequence steps for dressing, 2. CG for toileting       CC:  Patient is a 88y old  Female who presents with a chief complaint of Left MCA ischemic stroke, right nondominant hemiparesis (27 Dec 2023 18:00)    HPI:   88 year old female with a history of HTN, hypothyroidism, pacemaker, breast cancer who is admitted with CVA and acute respiratory failure due to IV contrast anaphylaxis. Patient had R sided hemiparesis, expressive aphasia. CT imaging confirmed multiple embolic areas. Pacemaker interrogated showing Atrial runs but no sustained Atrial Fibrillation. Patient currently on course of ASA and statin. Hospital course complicated by severe anaphylactic due to iodine contrast allergy from CT performed on admission to ED. 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  Pt was transferred to [facility] for acute rehab on 12/8. rehab course complicated by possible seizure episodes involving right facial twitching for 1 min and RUE shaking subsequently started and was persistent. Per rehab team, pt was awake and speaking but stopped responding to questions for a short period of time when facial twitching started. RRT was called for the seizure activities.   She was admitted to MICU 12/13 for further management. CTH 12/13 showed Redemonstrated left MCA distribution infarction, advanced when compared to prior imaging with likely petechial hemorrhages and/or laminar necrosis. mildine shift 0.5cm to the right. Aspirin and chemical DVT ppx were held. Neurosurgery (Dr. Gilmore) were consulted per neurology recommendation, no acute surgical intervention needed. Repeat CTH 12/14 showed No change since 12/13/2023. EEG 12/13 showed No seizures captured but Risk of left frontotemporal focal-onset seizures She was seen by neurology for post-stroke seizure, recommended to continue keppra given first seizure in setting of large stroke. A repeat EEG 12/15 showed no seizure but again Risk of left frontotemporal focal-onset seizures. Aspirin 81mg PO QD restarted after repeat CTH 12/15 showed Stable exam.    Patient was evaluated by PM&R and therapy for functional deficits and gait/ ADL impairments and recommended acute rehabilitation. Patient was medically optimized for discharge to [facility] on 12/19.     Allergies:  penicillin (Rash)  IV Contrast (Anaphylaxis; Urticaria)    Subjective:  - Patient was seen and examined at bedside with no complaints. No acute events overnight.   - Patient slept well last night.   - Pain is well controlled with current meds.    - BM (12/27), voiding without issues   - Tolerating and participating in 3 hours of daily therapy, progressing   - Case discussed at IDT meeting this am for progress and discharge plan.      ROS:  - Denies CP, palpitation, cough, fever, SOB, abdominal pain, N/V/D, headache, weakness, joint pain/swelling or constipation.     MEDICATIONS  (STANDING):  amLODIPine   Tablet 5 milliGRAM(s) Oral daily  artificial tears (preservative free) Ophthalmic Solution 1 Drop(s) Both EYES every 4 hours  aspirin  chewable 81 milliGRAM(s) Oral daily  atorvastatin 80 milliGRAM(s) Oral at bedtime  bacitracin   Ointment 1 Application(s) Topical two times a day  levETIRAcetam 500 milliGRAM(s) Oral every 12 hours  lidocaine   4% Patch 1 Patch Transdermal daily  lisinopril 40 milliGRAM(s) Oral daily  modafinil 100 milliGRAM(s) Oral daily  multivitamin 1 Tablet(s) Oral daily  nystatin    Suspension 771616 Unit(s) Oral four times a day    MEDICATIONS  (PRN):  acetaminophen     Tablet .. 650 milliGRAM(s) Oral every 6 hours PRN Temp greater or equal to 38C (100.4F), Mild Pain (1 - 3)  aluminum hydroxide/magnesium hydroxide/simethicone Suspension 30 milliLiter(s) Oral every 4 hours PRN Dyspepsia  melatonin 3 milliGRAM(s) Oral at bedtime PRN Insomnia  senna 2 Tablet(s) Oral at bedtime PRN Constipation  sodium chloride 0.65% Nasal 1 Spray(s) Both Nostrils three times a day PRN Nasal Congestion  sodium chloride 0.65% Nasal 1 Spray(s) Both Nostrils three times a day PRN nose bleed    LABS                        10.6   6.01  )-----------( 237      ( 28 Dec 2023 05:55 )             32.6     12-28    141  |  105  |  20  ----------------------------<  92  3.8   |  28  |  0.69    Ca    9.3      28 Dec 2023 05:55    TPro  6.4  /  Alb  2.8<L>  /  TBili  0.4  /  DBili  x   /  AST  28  /  ALT  25  /  AlkPhos  93  12-28    Radiology:    CT Head No Cont (12.14.23 @ 09:34)  IMPRESSION: No change since 12/13/2023. Irregular appearing subacute to   chronic infarct left middle cerebral artery territory may be due to   spared gray matter, hemorrhage or laminar necrosis without change since   12/13/2023. Mass effect and mild midline shift to the right.    CT Head No Cont (12.15.23 @ 10:30)   Impression: Stable exam.    CT Head No Cont (12.19.23 @ 14:04)  IMPRESSION: Large subacute left middle cerebral artery infarct with mass   effect and mild midline shift improved since 12/15/2023. Irregularity of   infarct density likely related to spare gray versus petechial hemorrhage.    Physical Exam:   Vital Signs Last 24 Hrs  T(C): 36.8 (28 Dec 2023 08:06), Max: 36.8 (28 Dec 2023 08:06)  T(F): 98.2 (28 Dec 2023 08:06), Max: 98.2 (28 Dec 2023 08:06)  HR: 109 (28 Dec 2023 08:06) (79 - 109)  BP: 145/90 (28 Dec 2023 08:06) (138/63 - 145/90)  RR: 16 (28 Dec 2023 08:06) (16 - 16)  SpO2: 97% (28 Dec 2023 08:06) (97% - 97%)    Gen - NAD, Comfortable   HEENT - NCAT, EOMI, AMANDA  Neck - Supple, No limited ROM  Pulm - CTAB, No crackles  Cardiovascular - RRR, S1S2  Abdomen - Soft, NT, ND, (+) BS  Extremities - No Cyanosis, no clubbing, no edema, no calf tenderness  Neuro-     Cognitive - awake, alert, oriented  x 4, follows command       Communication - Expressive and receptive impairment, delayed processing     Attention: Impaired, takes time to answer or perform a task      Memory: unable to recall       Cranial Nerves - right facial weakness     Motor -                     LEFT    UE - 5/5                    RIGHT UE - 4-/5                    LEFT     5/5                    RIGHT LE - 4-/5       Psychiatric - Mood stable, Affect WNL  Skin: Left shin venous stasis wound    IDT: 12/21  TDD: OSIEL 12/30  RN: incontinent x2  SW: private home 1 ZEE, 1 flight inside, lives w/ SO, limited support from SO + children  SLP: pureed/thins, mod-sev language, sev cog, poor command following, poor attention, poor memory/reasoning  OT: max-totalA all transfers and ADLs with fluctuation 2/2 cognition, goals for CG transfers and supervision bADLs  PT: modA bed mobility, min-modA transfers, modA 60' RW w/ WC follow, cognition is biggest barrier, no stairs yet, goals CS bed mobility/transfers + CG ambulation/stairs w/ 24/7 assist in 2wks  Goals: 1. sequence steps for dressing, 2. CG for toileting       CC:  Patient is a 88y old  Female who presents with a chief complaint of Left MCA ischemic stroke, right nondominant hemiparesis (27 Dec 2023 18:00)    HPI:   88 year old female with a history of HTN, hypothyroidism, pacemaker, breast cancer who is admitted with CVA and acute respiratory failure due to IV contrast anaphylaxis. Patient had R sided hemiparesis, expressive aphasia. CT imaging confirmed multiple embolic areas. Pacemaker interrogated showing Atrial runs but no sustained Atrial Fibrillation. Patient currently on course of ASA and statin. Hospital course complicated by severe anaphylactic due to iodine contrast allergy from CT performed on admission to ED. 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  Pt was [facility] to [facility] for acute rehab on 12/8. rehab course complicated by possible seizure episodes involving right facial twitching for 1 min and RUE shaking subsequently started and was persistent. Per rehab team, pt was awake and speaking but stopped responding to questions for a short period of time when facial twitching started. RRT was called for the seizure activities.   She was admitted to MICU 12/13 for further management. CTH 12/13 showed Redemonstrated left MCA distribution infarction, advanced when compared to prior imaging with likely petechial hemorrhages and/or laminar necrosis. mildine shift 0.5cm to the right. Aspirin and chemical DVT ppx were held. Neurosurgery (Dr. Gilmore) were consulted per neurology recommendation, no acute surgical intervention needed. Repeat CTH 12/14 showed No change since 12/13/2023. EEG 12/13 showed No seizures captured but Risk of left frontotemporal focal-onset seizures She was seen by neurology for post-stroke seizure, recommended to continue keppra given first seizure in setting of large stroke. A repeat EEG 12/15 showed no seizure but again Risk of left frontotemporal focal-onset seizures. Aspirin 81mg PO QD restarted after repeat CTH 12/15 showed Stable exam.    Patient was evaluated by PM&R and therapy for functional deficits and gait/ ADL impairments and recommended acute rehabilitation. Patient was medically optimized for discharge to [facility] on 12/19.     Allergies:  penicillin (Rash)  IV Contrast (Anaphylaxis; Urticaria)    Subjective:  - Patient was seen and examined at bedside with no complaints. No acute events overnight.   - Patient slept well last night.   - Pain is well controlled with current meds.    - BM (12/27), voiding without issues   - Tolerating and participating in 3 hours of daily therapy, progressing   - Case discussed at IDT meeting this am for progress and discharge plan.      ROS:  - Denies CP, palpitation, cough, fever, SOB, abdominal pain, N/V/D, headache, weakness, joint pain/swelling or constipation.     MEDICATIONS  (STANDING):  amLODIPine   Tablet 5 milliGRAM(s) Oral daily  artificial tears (preservative free) Ophthalmic Solution 1 Drop(s) Both EYES every 4 hours  aspirin  chewable 81 milliGRAM(s) Oral daily  atorvastatin 80 milliGRAM(s) Oral at bedtime  bacitracin   Ointment 1 Application(s) Topical two times a day  levETIRAcetam 500 milliGRAM(s) Oral every 12 hours  lidocaine   4% Patch 1 Patch Transdermal daily  lisinopril 40 milliGRAM(s) Oral daily  modafinil 100 milliGRAM(s) Oral daily  multivitamin 1 Tablet(s) Oral daily  nystatin    Suspension 801065 Unit(s) Oral four times a day    MEDICATIONS  (PRN):  acetaminophen     Tablet .. 650 milliGRAM(s) Oral every 6 hours PRN Temp greater or equal to 38C (100.4F), Mild Pain (1 - 3)  aluminum hydroxide/magnesium hydroxide/simethicone Suspension 30 milliLiter(s) Oral every 4 hours PRN Dyspepsia  melatonin 3 milliGRAM(s) Oral at bedtime PRN Insomnia  senna 2 Tablet(s) Oral at bedtime PRN Constipation  sodium chloride 0.65% Nasal 1 Spray(s) Both Nostrils three times a day PRN Nasal Congestion  sodium chloride 0.65% Nasal 1 Spray(s) Both Nostrils three times a day PRN nose bleed    LABS                        10.6   6.01  )-----------( 237      ( 28 Dec 2023 05:55 )             32.6     12-28    141  |  105  |  20  ----------------------------<  92  3.8   |  28  |  0.69    Ca    9.3      28 Dec 2023 05:55    TPro  6.4  /  Alb  2.8<L>  /  TBili  0.4  /  DBili  x   /  AST  28  /  ALT  25  /  AlkPhos  93  12-28    Radiology:    CT Head No Cont (12.14.23 @ 09:34)  IMPRESSION: No change since 12/13/2023. Irregular appearing subacute to   chronic infarct left middle cerebral artery territory may be due to   spared gray matter, hemorrhage or laminar necrosis without change since   12/13/2023. Mass effect and mild midline shift to the right.    CT Head No Cont (12.15.23 @ 10:30)   Impression: Stable exam.    CT Head No Cont (12.19.23 @ 14:04)  IMPRESSION: Large subacute left middle cerebral artery infarct with mass   effect and mild midline shift improved since 12/15/2023. Irregularity of   infarct density likely related to spare gray versus petechial hemorrhage.    Physical Exam:   Vital Signs Last 24 Hrs  T(C): 36.8 (28 Dec 2023 08:06), Max: 36.8 (28 Dec 2023 08:06)  T(F): 98.2 (28 Dec 2023 08:06), Max: 98.2 (28 Dec 2023 08:06)  HR: 109 (28 Dec 2023 08:06) (79 - 109)  BP: 145/90 (28 Dec 2023 08:06) (138/63 - 145/90)  RR: 16 (28 Dec 2023 08:06) (16 - 16)  SpO2: 97% (28 Dec 2023 08:06) (97% - 97%)    Gen - NAD, Comfortable   HEENT - NCAT, EOMI, AMANDA  Neck - Supple, No limited ROM  Pulm - CTAB, No crackles  Cardiovascular - RRR, S1S2  Abdomen - Soft, NT, ND, (+) BS  Extremities - No Cyanosis, no clubbing, no edema, no calf tenderness  Neuro-     Cognitive - awake, alert, oriented  x 4, follows command       Communication - Expressive and receptive impairment, delayed processing     Attention: Impaired, takes time to answer or perform a task      Memory: unable to recall       Cranial Nerves - right facial weakness     Motor -                     LEFT    UE - 5/5                    RIGHT UE - 4-/5                    LEFT     5/5                    RIGHT LE - 4-/5       Psychiatric - Mood stable, Affect WNL  Skin: Left shin venous stasis wound    IDT: 12/21  TDD: OSIEL 12/30  RN: incontinent x2  SW: private home 1 ZEE, 1 flight inside, lives w/ SO, limited support from SO + children  SLP: pureed/thins, mod-sev language, sev cog, poor command following, poor attention, poor memory/reasoning  OT: max-totalA all transfers and ADLs with fluctuation 2/2 cognition, goals for CG transfers and supervision bADLs  PT: modA bed mobility, min-modA transfers, modA 60' RW w/ WC follow, cognition is biggest barrier, no stairs yet, goals CS bed mobility/transfers + CG ambulation/stairs w/ 24/7 assist in 2wks  Goals: 1. sequence steps for dressing, 2. CG for toileting    IDT 12/28  TDD 12/30 OSIEL  mod for bladder and bowel, L and R shin tear, very impulsive   SW plan for OSIEL for Saturday   SLP minced and moist with thin, attention better when eating, severe cog deficits, no insight to her defcitis, impulsive and perseverates, R hemineglect   OT sup min-A for ADL, constant tactile and verbal cues, needs 24 hour supervision   Goals min-A w/ ADL and transfers   PT CG min-A , a lot of cueing   Goals CG / min-A     CC:  Patient is a 88y old  Female who presents with a chief complaint of Left MCA ischemic stroke, right nondominant hemiparesis (27 Dec 2023 18:00)    HPI:   88 year old female with a history of HTN, hypothyroidism, pacemaker, breast cancer who is admitted with CVA and acute respiratory failure due to IV contrast anaphylaxis. Patient had R sided hemiparesis, expressive aphasia. CT imaging confirmed multiple embolic areas. Pacemaker interrogated showing Atrial runs but no sustained Atrial Fibrillation. Patient currently on course of ASA and statin. Hospital course complicated by severe anaphylactic due to iodine contrast allergy from CT performed on admission to ED. 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  Pt was transferred to [facility] for acute rehab on 12/8. rehab course complicated by possible seizure episodes involving right facial twitching for 1 min and RUE shaking subsequently started and was persistent. Per rehab team, pt was awake and speaking but stopped responding to questions for a short period of time when facial twitching started. RRT was called for the seizure activities.   She was admitted to MICU 12/13 for further management. CTH 12/13 showed Redemonstrated left MCA distribution infarction, advanced when compared to prior imaging with likely petechial hemorrhages and/or laminar necrosis. mildine shift 0.5cm to the right. Aspirin and chemical DVT ppx were held. Neurosurgery (Dr. Gilmore) were consulted per neurology recommendation, no acute surgical intervention needed. Repeat CTH 12/14 showed No change since 12/13/2023. EEG 12/13 showed No seizures captured but Risk of left frontotemporal focal-onset seizures She was seen by neurology for post-stroke seizure, recommended to continue keppra given first seizure in setting of large stroke. A repeat EEG 12/15 showed no seizure but again Risk of left frontotemporal focal-onset seizures. Aspirin 81mg PO QD restarted after repeat CTH 12/15 showed Stable exam.    Patient was evaluated by PM&R and therapy for functional deficits and gait/ ADL impairments and recommended acute rehabilitation. Patient was medically optimized for discharge to [facility] on 12/19.     Allergies:  penicillin (Rash)  IV Contrast (Anaphylaxis; Urticaria)    Subjective:  - Patient was seen and examined at bedside with no complaints. No acute events overnight.   - Patient slept well last night.   - Pain is well controlled with current meds.    - BM (12/27), voiding without issues   - Tolerating and participating in 3 hours of daily therapy, progressing   - Case discussed at IDT meeting this am for progress and discharge plan.      ROS:  - Denies CP, palpitation, cough, fever, SOB, abdominal pain, N/V/D, headache, weakness, joint pain/swelling or constipation.     MEDICATIONS  (STANDING):  amLODIPine   Tablet 5 milliGRAM(s) Oral daily  artificial tears (preservative free) Ophthalmic Solution 1 Drop(s) Both EYES every 4 hours  aspirin  chewable 81 milliGRAM(s) Oral daily  atorvastatin 80 milliGRAM(s) Oral at bedtime  bacitracin   Ointment 1 Application(s) Topical two times a day  levETIRAcetam 500 milliGRAM(s) Oral every 12 hours  lidocaine   4% Patch 1 Patch Transdermal daily  lisinopril 40 milliGRAM(s) Oral daily  modafinil 100 milliGRAM(s) Oral daily  multivitamin 1 Tablet(s) Oral daily  nystatin    Suspension [id number] Unit(s) Oral four times a day    MEDICATIONS  (PRN):  acetaminophen     Tablet .. 650 milliGRAM(s) Oral every 6 hours PRN Temp greater or equal to 38C (100.4F), Mild Pain (1 - 3)  aluminum hydroxide/magnesium hydroxide/simethicone Suspension 30 milliLiter(s) Oral every 4 hours PRN Dyspepsia  melatonin 3 milliGRAM(s) Oral at bedtime PRN Insomnia  senna 2 Tablet(s) Oral at bedtime PRN Constipation  sodium chloride 0.65% Nasal 1 Spray(s) Both Nostrils three times a day PRN Nasal Congestion  sodium chloride 0.65% Nasal 1 Spray(s) Both Nostrils three times a day PRN nose bleed    LABS                        10.6   6.01  )-----------( 237      ( 28 Dec 2023 05:55 )             32.6     12-28    141  |  105  |  20  ----------------------------<  92  3.8   |  28  |  0.69    Ca    9.3      28 Dec 2023 05:55    TPro  6.4  /  Alb  2.8<L>  /  TBili  0.4  /  DBili  x   /  AST  28  /  ALT  25  /  AlkPhos  93  12-28    Radiology:    CT Head No Cont (12.14.23 @ 09:34)  IMPRESSION: No change since 12/13/2023. Irregular appearing subacute to   chronic infarct left middle cerebral artery territory may be due to   spared gray matter, hemorrhage or laminar necrosis without change since   12/13/2023. Mass effect and mild midline shift to the right.    CT Head No Cont (12.15.23 @ 10:30)   Impression: Stable exam.    CT Head No Cont (12.19.23 @ 14:04)  IMPRESSION: Large subacute left middle cerebral artery infarct with mass   effect and mild midline shift improved since 12/15/2023. Irregularity of   infarct density likely related to spare gray versus petechial hemorrhage.    Physical Exam:   Vital Signs Last 24 Hrs  T(C): 36.8 (28 Dec 2023 08:06), Max: 36.8 (28 Dec 2023 08:06)  T(F): 98.2 (28 Dec 2023 08:06), Max: 98.2 (28 Dec 2023 08:06)  HR: 109 (28 Dec 2023 08:06) (79 - 109)  BP: 145/90 (28 Dec 2023 08:06) (138/63 - 145/90)  RR: 16 (28 Dec 2023 08:06) (16 - 16)  SpO2: 97% (28 Dec 2023 08:06) (97% - 97%)    Gen - NAD, Comfortable   HEENT - NCAT, EOMI, AMANDA  Neck - Supple, No limited ROM  Pulm - CTAB, No crackles  Cardiovascular - RRR, S1S2  Abdomen - Soft, NT, ND, (+) BS  Extremities - No Cyanosis, no clubbing, no edema, no calf tenderness  Neuro-     Cognitive - awake, alert, oriented  x 4, follows command       Communication - Expressive and receptive impairment, delayed processing     Attention: Impaired, takes time to answer or perform a task      Memory: unable to recall       Cranial Nerves - right facial weakness     Motor -                     LEFT    UE - 5/5                    RIGHT UE - 4-/5                    LEFT     5/5                    RIGHT LE - 4-/5       Psychiatric - Mood stable, Affect WNL  Skin: Left shin venous stasis wound    IDT: 12/21  TDD: OSIEL 12/30  RN: incontinent x2  SW: private home 1 ZEE, 1 flight inside, lives w/ SO, limited support from SO + children  SLP: pureed/thins, mod-sev language, sev cog, poor command following, poor attention, poor memory/reasoning  OT: max-totalA all transfers and ADLs with fluctuation 2/2 cognition, goals for CG transfers and supervision bADLs  PT: modA bed mobility, min-modA transfers, modA 60' RW w/ WC follow, cognition is biggest barrier, no stairs yet, goals CS bed mobility/transfers + CG ambulation/stairs w/ 24/7 assist in 2wks  Goals: 1. sequence steps for dressing, 2. CG for toileting    IDT 12/28  TDD 12/30 OSIEL  mod for bladder and bowel, L and R shin tear, very impulsive   SW plan for OSIEL for Saturday   SLP minced and moist with thin, attention better when eating, severe cog deficits, no insight to her defcitis, impulsive and perseverates, R hemineglect   OT sup min-A for ADL, constant tactile and verbal cues, needs 24 hour supervision   Goals min-A w/ ADL and transfers   PT CG min-A , a lot of cueing   Goals CG / min-A

## 2023-12-28 NOTE — DISCHARGE NOTE NURSING/CASE MANAGEMENT/SOCIAL WORK - NSDCPEFALRISK_GEN_ALL_CORE
For information on Fall & Injury Prevention, visit: [email or web address] OR  [email or web address] OR  https://www.cdc.gov/steadi/patient.html For information on Fall & Injury Prevention, visit: [email or web address] OR  [email or web address] OR  https://www.cdc.gov/steadi/patient.html

## 2023-12-28 NOTE — DISCHARGE NOTE PROVIDER - NSDCCPCAREPLAN_GEN_ALL_CORE_FT
PRINCIPAL DISCHARGE DIAGNOSIS  Diagnosis: CVA (cerebrovascular accident)  Assessment and Plan of Treatment: L MCA CVA, R dominant hemiparesis. Please continue aspirin 81mg once a day and atorvastatin 80mg at bedtime. Please also continue modafantil 100mg daily. Please follow up with Neurology for rest of management and care.      SECONDARY DISCHARGE DIAGNOSES  Diagnosis: Seizures  Assessment and Plan of Treatment: Please continue keppra 500mg twice a day. Please follow up with Neurology for rest of management and care.    Diagnosis: Euthyroid sick syndrome  Assessment and Plan of Treatment: Suppressed TSH may be due to subclinical hyperthyroidism versus sick euthryoid syndrome/nonthyroid illness. Please recheck TSH in 5-6 weeks.  Please follow up with Endocrinology for rest of management and care.    Diagnosis: HTN (hypertension)  Assessment and Plan of Treatment: Please follow up with lisinopril 40mg once a day. Please follow up with PCP for rest of management and care.

## 2023-12-28 NOTE — DISCHARGE NOTE PROVIDER - CARE PROVIDER_API CALL
Cameron Angeles-Jairo  Foot Surgery  [address] [place] [address]  Phone: [phone]  Fax: [phone]  Follow Up Time: 2 weeks    Madyson Richard  Physical/Rehab Medicine  [address] [place] [address]  Phone: [phone]  Fax: [phone]  Follow Up Time: 1 month   Cameron Angeles-Jairo  Foot Surgery  [address] [place] [address]  Phone: [phone]  Fax: [phone]  Follow Up Time: 2 weeks    Madyson Richard  Physical/Rehab Medicine  [address] [place] [address]  Phone: [phone]  Fax: [phone]  Follow Up Time: 1 month

## 2023-12-28 NOTE — PROGRESS NOTE ADULT - REASON FOR ADMISSION
Left MCA ischemic stroke, right nondominant hemiparesis

## 2023-12-28 NOTE — PROGRESS NOTE ADULT - ASSESSMENT
The patient is an 88 year old female with a history of HTN, hypothyroidism, pacemaker, breast cancer who is admitted with CVA and acute respiratory failure due to IV contrast anaphylaxis.CT head (12/4) showed Large left MCA territory acute to early subacute infarction, thrombosis of multiple distal branches of the left MCA and no acute intracranial hemorrhage. Hosptial course complicated by possible seizure episodes involving right facial twitching for 1 min and RUE shaking subsequently started and was persistent. CTH 12/13 showed redemonstrated left MCA distribution infarction, advanced when compared to prior imaging with likely petechial hemorrhages and/or laminar necrosis and mildine shift 0.5cm to the right. EEG 12/13 showed no seizures captured but at risk of left frontotemporal focal-onset seizures, Admitted for multidisciplinary rehab program    #L MCA CVA, Right dominant hemiparesis    -Pacemaker interrogated showing Atrial runs but no sustained Atrial Fibrillation   -CT head (12/4) showed Large left MCA territory acute to early subacute infarction, thrombosis of multiple distal branches of the left MCA and no acute intracranial hemorrhage.  -ASA 81 mg PO QD  -atorvastatin 80mg PO at bedtime   #Comprehensive Multidisciplinary Rehab Program:  - Gait, ADL, Functional impairments  - PT/OT/ SLP 3 hours a day 5 days a week, 1 hour each   - TSH level 0.033 with normal T4, T3  - Modafinil 100 mg po daily, tolerating well   - Hospitalist consult completed and appreciated   - Endocrinology consult completed with recommendation of:  1. Multiple thyroid nodules/Suppressed TSH - the patient will need a thyroid US as an outpatient. It is unclear at this time what thyroid medication the patient used in the past but review of her outpatient chart from 2022 revealed she was not on any thyroid medications in 2022 and she was euthyroid in May 2022. Differential diagnosis of her suppressed TSH includes subclinical hyperthyroidism versus sick euthyroid syndrome/non-thyroidal illness. I recommend rechecking her TSH in 6 weeks.  2. Empty sella - patient will need evaluation of the hypothalamic pituitary axes and MRI of the pituitary gland as an outpatient.  - Had a prolonged family meeting where we discussed Pascale's progress and discharge plan, in agreement.     #Seizures   -EEG 12/15 showed no seizure but risk of left frontotemporal focal-onset seizures  -CT head 12/15 stable   -c/w keppra 500mg PO BID   - Keppra level (12/15) 17.1     #HTN/ improving   - Lisinopril 20mg PO QD --> increased to 40 mg po daily (12/22)   - Monitor (12/26) 123/70 - 162/99, (12/27) 126/60 - 163/82 (12/28) 138/63 - 145/90  - Hospitalist F/U appreciated     #Mood / Cognition:  - Neuropsychology evaluation PRN  - EKG for  (12/22)  - Will hold on starting SSRI      #Sleep:  - Maintain quiet hours and low stim environment    #Pain:  - Tylenol PRN  - lidocaine patch for back  - avoid sedating meds that may affect cognitive recovery    #/Bladder:  - Monitor PVR if no void in 8h; SC for >400 cc. Done    - Toileting schedule q4h  - (+) Incontinence     #Diet / Dysphagia:    - Diet: Pureed, Moderately thick liquids--> upgraded to minced and moist with 1:1 SV and assistance, out of bed for meals. (12/23)    - SLP assessment and treatment appreciated   - Nutrition to follow    #Skin/ Pressure Injury Prevention:  -  Venous stasis ulcer in L anterior shin  [id number]   - Turn Q2hrs in bed while awake, OOB to Chair, PT/OT/SLP     #DVT prophylaxis:  - ASA 81mg PO QD     #Precautions/ Restrictions  - Falls, Aspiration  - COVID PCR:  The patient is an 88 year old female with a history of HTN, hypothyroidism, pacemaker, breast cancer who is admitted with CVA and acute respiratory failure due to IV contrast anaphylaxis.CT head (12/4) showed Large left MCA territory acute to early subacute infarction, thrombosis of multiple distal branches of the left MCA and no acute intracranial hemorrhage. Hosptial course complicated by possible seizure episodes involving right facial twitching for 1 min and RUE shaking subsequently started and was persistent. CTH 12/13 showed redemonstrated left MCA distribution infarction, advanced when compared to prior imaging with likely petechial hemorrhages and/or laminar necrosis and mildine shift 0.5cm to the right. EEG 12/13 showed no seizures captured but at risk of left frontotemporal focal-onset seizures, Admitted for multidisciplinary rehab program    #L MCA CVA, Right dominant hemiparesis    -Pacemaker interrogated showing Atrial runs but no sustained Atrial Fibrillation   -CT head (12/4) showed Large left MCA territory acute to early subacute infarction, thrombosis of multiple distal branches of the left MCA and no acute intracranial hemorrhage.  -ASA 81 mg PO QD  -atorvastatin 80mg PO at bedtime   #Comprehensive Multidisciplinary Rehab Program:  - Gait, ADL, Functional impairments  - PT/OT/ SLP 3 hours a day 5 days a week, 1 hour each   - TSH level 0.033 with normal T4, T3  - Modafinil 100 mg po daily, tolerating well   - Hospitalist consult completed and appreciated   - Endocrinology consult completed with recommendation of:  1. Multiple thyroid nodules/Suppressed TSH - the patient will need a thyroid US as an outpatient. It is unclear at this time what thyroid medication the patient used in the past but review of her outpatient chart from 2022 revealed she was not on any thyroid medications in 2022 and she was euthyroid in May 2022. Differential diagnosis of her suppressed TSH includes subclinical hyperthyroidism versus sick euthyroid syndrome/non-thyroidal illness. I recommend rechecking her TSH in 6 weeks.  2. Empty sella - patient will need evaluation of the hypothalamic pituitary axes and MRI of the pituitary gland as an outpatient.  - Had a prolonged family meeting where we discussed Pascale's progress and discharge plan, in agreement.     #Seizures   -EEG 12/15 showed no seizure but risk of left frontotemporal focal-onset seizures  -CT head 12/15 stable   -c/w keppra 500mg PO BID   - Keppra level (12/15) 17.1     #HTN/ improving   - Lisinopril 20mg PO QD --> increased to 40 mg po daily (12/22)   - Monitor (12/26) 123/70 - 162/99, (12/27) 126/60 - 163/82 (12/28) 138/63 - 145/90  - Hospitalist F/U appreciated     #Mood / Cognition:  - Neuropsychology evaluation PRN  - EKG for  (12/22)  - Will hold on starting SSRI      #Sleep:  - Maintain quiet hours and low stim environment    #Pain:  - Tylenol PRN  - lidocaine patch for back  - avoid sedating meds that may affect cognitive recovery    #/Bladder:  - Monitor PVR if no void in 8h; SC for >400 cc. Done    - Toileting schedule q4h  - (+) Incontinence     #Diet / Dysphagia:    - Diet: Pureed, Moderately thick liquids--> upgraded to minced and moist with 1:1 SV and assistance, out of bed for meals. (12/23)    - SLP assessment and treatment appreciated   - Nutrition to follow    #Skin/ Pressure Injury Prevention:  -  Venous stasis ulcer in L anterior shin  [id number]   - Turn Q2hrs in bed while awake, OOB to Chair, PT/OT/SLP     #DVT prophylaxis:  - ASA 81mg PO QD     #Precautions/ Restrictions  - Falls, Aspiration  - COVID PCR:

## 2023-12-28 NOTE — PROGRESS NOTE ADULT - PROVIDER SPECIALTY LIST ADULT
Hospitalist
Internal Medicine
Physiatry
Internal Medicine
Physiatry

## 2023-12-28 NOTE — PROGRESS NOTE ADULT - ASSESSMENT
88F HTN, hypothyroidism, PPM , Breast CA   Admit for Rt HP CVA, IV contrast induced anaphylaxis requiring vent support  TX to rehab  Seizure episode and tx to MICU. Petechial hemorrhages and small midline shift  Tx to rehab    SP Ischemic CVA  - SP PPM interrogation, no sustained afib  - New onset seizures Keppra added  - Asa Statin as ordered    #Essential HTN  - On lisinopril  - Add amlodipine  -good contol cont meds Dec28    #Subclinical hypothroidism  - Endo is following. will be fu'd iin clinic    #Oral thrush (resolved)  - completed 5 days of fluconazole

## 2023-12-28 NOTE — DISCHARGE NOTE NURSING/CASE MANAGEMENT/SOCIAL WORK - NSDCVIVACCINE_GEN_ALL_CORE_FT
Tylenol/ibuprofen for pain.  Return to the Emergency Department for any worsening, change in condition, or any emergent concerns.    No Vaccines Administered.

## 2023-12-28 NOTE — DISCHARGE NOTE NURSING/CASE MANAGEMENT/SOCIAL WORK - PATIENT PORTAL LINK FT
You can access the FollowMyHealth Patient Portal offered by [facility] by registering at the following website: [email or web address]. By joining [facility]’s FollowMyHealth portal, you will also be able to view your health information using other applications (apps) compatible with our system. You can access the FollowMyHealth Patient Portal offered by [facility] by registering at the following website: [email or web address]. By joining [facility]’s FollowMyHealth portal, you will also be able to view your health information using other applications (apps) compatible with our system.

## 2023-12-28 NOTE — DISCHARGE NOTE PROVIDER - HOSPITAL COURSE
HPI:  88 year old female with a history of HTN, hypothyroidism, pacemaker, breast cancer who is admitted with CVA and acute respiratory failure due to IV contrast anaphylaxis. Patient had R sided hemiparesis, expressive aphasia. CT imaging confirmed multiple embolic areas. Pacemaker interrogated showing Atrial runs but no sustained Atrial Fibrillation. Patient currently on course of ASA and statin. Hospital course complicated by severe anaphylactic due to iodine contrast allergy from CT performed on admission to ED. 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    Pt was transferred to [facility] for acute rehab on 12/8. rehab course complicated by possible seizure episodes involving right facial twitching for 1 min and RUE shaking subsequently started and was persistent. Per rehab team, pt was awake and speaking but stopped responding to questions for a short period of time when facial twitching started. RRT was called for the seizure activities.     She was admitted to MICU 12/13 for further management. CTH 12/13 showed Redemonstrated left MCA distribution infarction, advanced when compared to prior imaging with likely petechial hemorrhages and/or laminar necrosis. mildine shift 0.5cm to the right. Aspirin and chemical DVT ppx were held. Neurosurgery (Dr. Gilmore) were consulted per neurology recommendation, no acute surgical intervention needed. Repeat CTH 12/14 showed No change since 12/13/2023. EEG 12/13 showed No seizures captured but Risk of left frontotemporal focal-onset seizures She was seen by neurology for post-stroke seizure, recommended to continue keppra given first seizure in setting of large stroke. A repeat EEG 12/15 showed no seizure but again Risk of left frontotemporal focal-onset seizures. Aspirin 81mg PO QD restarted after repeat CTH 12/15 showed Stable exam.      Patient was evaluated by PM&R and therapy for functional deficits and gait/ ADL impairments and recommended acute rehabilitation. Patient was medically optimized for discharge to [facility] on 12/19.      (19 Dec 2023 15:06)      Patient was evaluated by PM&R and therapy for gait/ADL impairments and recommended acute rehabilitation. Patient was medically optimized for discharge to [facility] on 12/29. Admitted with gait instability, ADL, and functional impairments.     Rehab course significant for  seizure like episode requiring transfer for further EEG monitoring. EEG 12/15 showed no seizure but risk of left frontotemporal focal-onset seizure. Please continue Keppra 500mg PO BID. Rehab course also significant for suppressed TSH which may be subclinical hyperthyroidism vs. sick euthyroid syndrome. Please monitor TSH in 6 weeks.    IDT 12/28  TDD 12/30 OSIEL  Patient mod A for bladder and bowel, L and R shin tear, very impulsive   SW plan for OSIEL for Saturday   SLP minced and moist with thin, attention better when eating, severe cog deficits, no insight to her deficits, impulsive and perseverates, R hemineglect   OT sup min-A for ADL, constant tactile and verbal cues, needs 24 hour supervision   Goals min-A w/ ADL and transfers   PT CG min-A , a lot of cueing   Goals CG / min-A      All other medical co-morbidities were stable. Patient tolerated course of inpatient PT/OT/SLP rehab with significant improvements and met rehab goals prior to discharge. Patient was medically cleared on ___ for discharge to ___. HPI:  88 year old female with a history of HTN, hypothyroidism, pacemaker, breast cancer who is admitted with CVA and acute respiratory failure due to IV contrast anaphylaxis. Patient had R sided hemiparesis, expressive aphasia. CT imaging confirmed multiple embolic areas. Pacemaker interrogated showing Atrial runs but no sustained Atrial Fibrillation. Patient currently on course of ASA and statin. Hospital course complicated by severe anaphylactic due to iodine contrast allergy from CT performed on admission to ED. 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    Pt was transferred to [facility] for acute rehab on 12/8. rehab course complicated by possible seizure episodes involving right facial twitching for 1 min and RUE shaking subsequently started and was persistent. Per rehab team, pt was awake and speaking but stopped responding to questions for a short period of time when facial twitching started. RRT was called for the seizure activities.     She was admitted to MICU 12/13 for further management. CTH 12/13 showed Redemonstrated left MCA distribution infarction, advanced when compared to prior imaging with likely petechial hemorrhages and/or laminar necrosis. mildine shift 0.5cm to the right. Aspirin and chemical DVT ppx were held. Neurosurgery (Dr. Gilmore) were consulted per neurology recommendation, no acute surgical intervention needed. Repeat CTH 12/14 showed No change since 12/13/2023. EEG 12/13 showed No seizures captured but Risk of left frontotemporal focal-onset seizures She was seen by neurology for post-stroke seizure, recommended to continue keppra given first seizure in setting of large stroke. A repeat EEG 12/15 showed no seizure but again Risk of left frontotemporal focal-onset seizures. Aspirin 81mg PO QD restarted after repeat CTH 12/15 showed Stable exam.      Patient was evaluated by PM&R and therapy for functional deficits and gait/ ADL impairments and recommended acute rehabilitation. Patient was medically optimized for discharge to [facility] on 12/19.      (19 Dec 2023 15:06)      Patient was evaluated by PM&R and therapy for gait/ADL impairments and recommended acute rehabilitation. Patient was medically optimized for discharge to [facility] on 12/29. Admitted with gait instability, ADL, and functional impairments.     Rehab course significant for  seizure like episode requiring transfer for further EEG monitoring. EEG 12/15 showed no seizure but risk of left frontotemporal focal-onset seizure. Please continue Keppra 500mg PO BID. Rehab course also significant for suppressed TSH which may be subclinical hyperthyroidism vs. sick euthyroid syndrome. Please monitor TSH in 6 weeks.    IDT 12/28  TDD 12/30 OSIEL  Patient mod A for bladder and bowel, L and R shin tear, very impulsive   SW plan for OSIEL for Saturday   SLP minced and moist with thin, attention better when eating, severe cog deficits, no insight to her deficits, impulsive and perseverates, R hemineglect   OT sup min-A for ADL, constant tactile and verbal cues, needs 24 hour supervision   Goals min-A w/ ADL and transfers   PT CG min-A , a lot of cueing   Goals CG / min-A      All other medical co-morbidities were stable. Patient tolerated course of inpatient PT/OT/SLP rehab with significant improvements and met rehab goals prior to discharge. Patient was medically cleared on ___ for discharge to ___. HPI:  88 year old female with a history of HTN, hypothyroidism, pacemaker, breast cancer who is admitted with CVA and acute respiratory failure due to IV contrast anaphylaxis. Patient had R sided hemiparesis, expressive aphasia. CT imaging confirmed multiple embolic areas. Pacemaker interrogated showing Atrial runs but no sustained Atrial Fibrillation. Patient currently on course of ASA and statin. Hospital course complicated by severe anaphylactic due to iodine contrast allergy from CT performed on admission to ED. 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    Pt was transferred to [facility] for acute rehab on 12/8. rehab course complicated by possible seizure episodes involving right facial twitching for 1 min and RUE shaking subsequently started and was persistent. Per rehab team, pt was awake and speaking but stopped responding to questions for a short period of time when facial twitching started. RRT was called for the seizure activities.     She was admitted to MICU 12/13 for further management. CTH 12/13 showed Redemonstrated left MCA distribution infarction, advanced when compared to prior imaging with likely petechial hemorrhages and/or laminar necrosis. mildine shift 0.5cm to the right. Aspirin and chemical DVT ppx were held. Neurosurgery (Dr. Gilmore) were consulted per neurology recommendation, no acute surgical intervention needed. Repeat CTH 12/14 showed No change since 12/13/2023. EEG 12/13 showed No seizures captured but Risk of left frontotemporal focal-onset seizures She was seen by neurology for post-stroke seizure, recommended to continue keppra given first seizure in setting of large stroke. A repeat EEG 12/15 showed no seizure but again Risk of left frontotemporal focal-onset seizures. Aspirin 81mg PO QD restarted after repeat CTH 12/15 showed Stable exam.    Patient was evaluated by PM&R and therapy for functional deficits and gait/ ADL impairments and recommended acute rehabilitation. Patient was medically optimized for discharge to [facility] on 12/19.     Patient was evaluated by PM&R and therapy for gait/ADL impairments and recommended acute rehabilitation. Patient was medically optimized for discharge to [facility] on 12/29. Admitted with gait instability, ADL, and functional impairments.     Rehab course significant for  seizure like episode requiring transfer for further EEG monitoring. EEG 12/15 showed no seizure but risk of left frontotemporal focal-onset seizure. Please continue Keppra 500mg PO BID. Rehab course also significant for suppressed TSH which may be subclinical hyperthyroidism vs. sick euthyroid syndrome. Please monitor TSH in 6 weeks.    IDT 12/28  TDD 12/30 OSIEL  Patient mod A for bladder and bowel, L and R shin tear, very impulsive   SW plan for OSIEL for Saturday   SLP minced and moist with thin, attention better when eating, severe cog deficits, no insight to her deficits, impulsive and perseverates, R hemineglect   OT sup min-A for ADL, constant tactile and verbal cues, needs 24 hour supervision   Goals min-A w/ ADL and transfers   PT CG min-A , a lot of cueing   Goals CG / min-A      All other medical co-morbidities were stable. Patient tolerated course of inpatient PT/OT/SLP rehab with significant improvements and met rehab goals prior to discharge. Patient was medically cleared on _12/28__ for discharge to __[facility] HPI:  88 year old female with a history of HTN, hypothyroidism, pacemaker, breast cancer who is admitted with CVA and acute respiratory failure due to IV contrast anaphylaxis. Patient had R sided hemiparesis, expressive aphasia. CT imaging confirmed multiple embolic areas. Pacemaker interrogated showing Atrial runs but no sustained Atrial Fibrillation. Patient currently on course of ASA and statin. Hospital course complicated by severe anaphylactic due to iodine contrast allergy from CT performed on admission to ED. 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    Pt was transferred to [facility] for acute rehab on 12/8. rehab course complicated by possible seizure episodes involving right facial twitching for 1 min and RUE shaking subsequently started and was persistent. Per rehab team, pt was awake and speaking but stopped responding to questions for a short period of time when facial twitching started. RRT was called for the seizure activities.     She was admitted to MICU 12/13 for further management. CTH 12/13 showed Redemonstrated left MCA distribution infarction, advanced when compared to prior imaging with likely petechial hemorrhages and/or laminar necrosis. mildine shift 0.5cm to the right. Aspirin and chemical DVT ppx were held. Neurosurgery (Dr. Gilmore) were consulted per neurology recommendation, no acute surgical intervention needed. Repeat CTH 12/14 showed No change since 12/13/2023. EEG 12/13 showed No seizures captured but Risk of left frontotemporal focal-onset seizures She was seen by neurology for post-stroke seizure, recommended to continue keppra given first seizure in setting of large stroke. A repeat EEG 12/15 showed no seizure but again Risk of left frontotemporal focal-onset seizures. Aspirin 81mg PO QD restarted after repeat CTH 12/15 showed Stable exam.    Patient was evaluated by PM&R and therapy for functional deficits and gait/ ADL impairments and recommended acute rehabilitation. Patient was medically optimized for discharge to [facility] on 12/19.     Patient was evaluated by PM&R and therapy for gait/ADL impairments and recommended acute rehabilitation. Patient was medically optimized for discharge to [facility] on 12/29. Admitted with gait instability, ADL, and functional impairments.     Rehab course significant for  seizure like episode requiring transfer for further EEG monitoring. EEG 12/15 showed no seizure but risk of left frontotemporal focal-onset seizure. Please continue Keppra 500mg PO BID. Rehab course also significant for suppressed TSH which may be subclinical hyperthyroidism vs. sick euthyroid syndrome. Please monitor TSH in 6 weeks.    IDT 12/28  TDD 12/30 OSIEL  Patient mod A for bladder and bowel, L and R shin tear, very impulsive   SW plan for OSIEL for Saturday   SLP minced and moist with thin, attention better when eating, severe cog deficits, no insight to her deficits, impulsive and perseverates, R hemineglect   OT sup min-A for ADL, constant tactile and verbal cues, needs 24 hour supervision   Goals min-A w/ ADL and transfers   PT CG min-A , a lot of cueing   Goals CG / min-A      All other medical co-morbidities were stable. Patient tolerated course of inpatient PT/OT/SLP rehab with significant improvements and met rehab goals prior to discharge. Patient was medically cleared on _12/28__ for discharge to __[facility]

## 2023-12-28 NOTE — PROGRESS NOTE ADULT - NUTRITIONAL ASSESSMENT
This patient has been assessed with a concern for Malnutrition and has been determined to have a diagnosis/diagnoses of Severe protein-calorie malnutrition.    This patient is being managed with:   Diet Minced and Moist-  Entered: Dec 23 2023  2:26PM  
This patient has been assessed with a concern for Malnutrition and has been determined to have a diagnosis/diagnoses of Severe protein-calorie malnutrition.    This patient is being managed with:   Diet Minced and Moist-  Entered: Dec 23 2023  2:26PM  
This patient has been assessed with a concern for Malnutrition and has been determined to have a diagnosis/diagnoses of Severe protein-calorie malnutrition.    This patient is being managed with:   Diet Minced and Moist-  Supplement Feeding Modality:  Oral  Ensure Plus High Protein Cans or Servings Per Day:  1       Frequency:  Two Times a day  Entered: Dec 26 2023  3:02PM  
This patient has been assessed with a concern for Malnutrition and has been determined to have a diagnosis/diagnoses of Severe protein-calorie malnutrition.    This patient is being managed with:   Diet Minced and Moist-  Supplement Feeding Modality:  Oral  Ensure Plus High Protein Cans or Servings Per Day:  1       Frequency:  Two Times a day  Entered: Dec 26 2023  3:02PM  
This patient has been assessed with a concern for Malnutrition and has been determined to have a diagnosis/diagnoses of Severe protein-calorie malnutrition.    This patient is being managed with:   Diet Minced and Moist-  Entered: Dec 23 2023  2:26PM  
This patient has been assessed with a concern for Malnutrition and has been determined to have a diagnosis/diagnoses of Severe protein-calorie malnutrition.    This patient is being managed with:   Diet Minced and Moist-  Supplement Feeding Modality:  Oral  Ensure Plus High Protein Cans or Servings Per Day:  1       Frequency:  Two Times a day  Entered: Dec 26 2023  3:02PM  
This patient has been assessed with a concern for Malnutrition and has been determined to have a diagnosis/diagnoses of Moderate protein-calorie malnutrition.    This patient is being managed with:   Diet Pureed-  Entered: Dec 14 2023  4:03PM  
This patient has been assessed with a concern for Malnutrition and has been determined to have a diagnosis/diagnoses of Severe protein-calorie malnutrition.    This patient is being managed with:   Diet Minced and Moist-  Entered: Dec 23 2023  2:26PM  
This patient has been assessed with a concern for Malnutrition and has been determined to have a diagnosis/diagnoses of Severe protein-calorie malnutrition.    This patient is being managed with:   Diet Minced and Moist-  Supplement Feeding Modality:  Oral  Ensure Plus High Protein Cans or Servings Per Day:  1       Frequency:  Two Times a day  Entered: Dec 26 2023  3:02PM  
This patient has been assessed with a concern for Malnutrition and has been determined to have a diagnosis/diagnoses of Severe protein-calorie malnutrition.    This patient is being managed with:   Diet Pureed-  Supplement Feeding Modality:  Oral  Ensure Plus High Protein Cans or Servings Per Day:  1       Frequency:  Daily  Entered: Dec 20 2023 11:45AM  
This patient has been assessed with a concern for Malnutrition and has been determined to have a diagnosis/diagnoses of Severe protein-calorie malnutrition.    This patient is being managed with:   Diet Pureed-  Supplement Feeding Modality:  Oral  Ensure Plus High Protein Cans or Servings Per Day:  1       Frequency:  Daily  Entered: Dec 20 2023 11:45AM

## 2023-12-28 NOTE — PROGRESS NOTE ADULT - NSPROGADDITIONALINFOA_GEN_ALL_CORE
Spent 1 hour on evaluating, examining and with team meeting where we discussed patient's progress and discharge plan.
Spent 36 minutes on face-face visit, evaluate, examine and treat
Spent 1 hour on evaluating, examining and with team meeting where we discussed patient's progress and discharge plan.  Excluding teaching time
Spent 36 minutes on face-face visit, evaluate, examine and treat, excluding teaching time

## 2023-12-28 NOTE — DISCHARGE NOTE PROVIDER - NSDCMRMEDTOKEN_GEN_ALL_CORE_FT
acetaminophen 325 mg oral tablet: 2 tab(s) orally every 6 hours As needed Temp greater or equal to 38C (100.4F), Mild Pain (1 - 3)  aluminum hydroxide-magnesium hydroxide 200 mg-200 mg/5 mL oral suspension: 30 milliliter(s) orally every 4 hours As needed Dyspepsia  amLODIPine 5 mg oral tablet: 1 tab(s) orally once a day  aspirin 81 mg oral tablet, chewable: 1 tab(s) orally once a day  atorvastatin 80 mg oral tablet: 1 tab(s) orally once a day (at bedtime)  bacitracin 500 units/g topical ointment: 1 Apply topically to affected area 2 times a day  levETIRAcetam 500 mg oral tablet: 1 tab(s) orally every 12 hours  lidocaine 4% topical film: 1 patch transdermal once a day  lisinopril 40 mg oral tablet: 1 tab(s) orally once a day  melatonin 3 mg oral tablet: 1 tab(s) orally once a day (at bedtime) As needed Insomnia  modafinil 100 mg oral tablet: 1 tab(s) orally once a day  Multiple Vitamins oral tablet: 1 tab(s) orally once a day  nystatin 100,000 units/mL oral suspension: 5 milliliter(s) orally 4 times a day  ocular lubricant ophthalmic solution: 1 drop(s) to each affected eye every 4 hours  senna leaf extract oral tablet: 2 tab(s) orally once a day (at bedtime) As needed Constipation

## 2023-12-28 NOTE — DISCHARGE NOTE NURSING/CASE MANAGEMENT/SOCIAL WORK - NSDPFAC_GEN_ALL_CORE
[facility] [place]

## 2024-01-17 PROCEDURE — 83605 ASSAY OF LACTIC ACID: CPT

## 2024-01-17 PROCEDURE — 36415 COLL VENOUS BLD VENIPUNCTURE: CPT

## 2024-01-17 PROCEDURE — 97163 PT EVAL HIGH COMPLEX 45 MIN: CPT

## 2024-01-17 PROCEDURE — 70450 CT HEAD/BRAIN W/O DYE: CPT

## 2024-01-17 PROCEDURE — 80053 COMPREHEN METABOLIC PANEL: CPT

## 2024-01-17 PROCEDURE — 97530 THERAPEUTIC ACTIVITIES: CPT

## 2024-01-17 PROCEDURE — 97116 GAIT TRAINING THERAPY: CPT

## 2024-01-17 PROCEDURE — 92610 EVALUATE SWALLOWING FUNCTION: CPT

## 2024-01-17 PROCEDURE — 82550 ASSAY OF CK (CPK): CPT

## 2024-01-17 PROCEDURE — 97167 OT EVAL HIGH COMPLEX 60 MIN: CPT

## 2024-01-17 PROCEDURE — 97110 THERAPEUTIC EXERCISES: CPT

## 2024-01-17 PROCEDURE — 85027 COMPLETE CBC AUTOMATED: CPT

## 2024-01-17 PROCEDURE — 92523 SPEECH SOUND LANG COMPREHEN: CPT

## 2024-01-17 PROCEDURE — 71045 X-RAY EXAM CHEST 1 VIEW: CPT

## 2024-01-17 PROCEDURE — 74230 X-RAY XM SWLNG FUNCJ C+: CPT

## 2024-01-17 PROCEDURE — 93005 ELECTROCARDIOGRAM TRACING: CPT

## 2024-01-17 PROCEDURE — 95812 EEG 41-60 MINUTES: CPT

## 2024-01-17 PROCEDURE — 92507 TX SP LANG VOICE COMM INDIV: CPT

## 2024-01-17 PROCEDURE — 97535 SELF CARE MNGMENT TRAINING: CPT

## 2024-01-17 PROCEDURE — 84484 ASSAY OF TROPONIN QUANT: CPT

## 2024-01-17 PROCEDURE — 92611 MOTION FLUOROSCOPY/SWALLOW: CPT

## 2024-01-17 PROCEDURE — 85025 COMPLETE CBC W/AUTO DIFF WBC: CPT

## 2024-01-17 PROCEDURE — 82962 GLUCOSE BLOOD TEST: CPT

## 2024-02-01 PROCEDURE — 92507 TX SP LANG VOICE COMM INDIV: CPT

## 2024-02-01 PROCEDURE — 97116 GAIT TRAINING THERAPY: CPT

## 2024-02-01 PROCEDURE — 84443 ASSAY THYROID STIM HORMONE: CPT

## 2024-02-01 PROCEDURE — 97530 THERAPEUTIC ACTIVITIES: CPT

## 2024-02-01 PROCEDURE — 92610 EVALUATE SWALLOWING FUNCTION: CPT

## 2024-02-01 PROCEDURE — 84480 ASSAY TRIIODOTHYRONINE (T3): CPT

## 2024-02-01 PROCEDURE — 92526 ORAL FUNCTION THERAPY: CPT

## 2024-02-01 PROCEDURE — 92523 SPEECH SOUND LANG COMPREHEN: CPT

## 2024-02-01 PROCEDURE — 0225U NFCT DS DNA&RNA 21 SARSCOV2: CPT

## 2024-02-01 PROCEDURE — 85025 COMPLETE CBC W/AUTO DIFF WBC: CPT

## 2024-02-01 PROCEDURE — 97167 OT EVAL HIGH COMPLEX 60 MIN: CPT

## 2024-02-01 PROCEDURE — 97163 PT EVAL HIGH COMPLEX 45 MIN: CPT

## 2024-02-01 PROCEDURE — 80053 COMPREHEN METABOLIC PANEL: CPT

## 2024-02-01 PROCEDURE — 97112 NEUROMUSCULAR REEDUCATION: CPT

## 2024-02-01 PROCEDURE — 97110 THERAPEUTIC EXERCISES: CPT

## 2024-02-01 PROCEDURE — 87635 SARS-COV-2 COVID-19 AMP PRB: CPT

## 2024-02-01 PROCEDURE — 36415 COLL VENOUS BLD VENIPUNCTURE: CPT

## 2024-02-01 PROCEDURE — 97535 SELF CARE MNGMENT TRAINING: CPT

## 2024-02-01 PROCEDURE — 84436 ASSAY OF TOTAL THYROXINE: CPT

## 2024-02-01 PROCEDURE — 93005 ELECTROCARDIOGRAM TRACING: CPT

## 2024-04-19 NOTE — ADDENDUM
[FreeTextEntry1] : Documented by Bety Mcdonnell acting as scribe for Dr. Wagoner on 10/26/2020.\par \par All Medical record entries made by the Scribe were at my, Dr. Wagoner, direction and personally dictated by me on 10/26/2020 . I have reviewed the chart and agree that the record accurately reflects my personal performance of the history, physical exam, assessment and plan. I have also personally directed, reviewed, and agreed with the discharge instructions. 2021/cigarettes

## 2024-04-29 NOTE — OCCUPATIONAL THERAPY INITIAL EVALUATION ADULT - LEVEL OF INDEPENDENCE:TOILET, OT EVAL
Refill Decision Note   Charlessana Wrightost  is requesting a refill authorization.  Brief Assessment and Rationale for Refill:  Approve     Medication Therapy Plan:         Pharmacist review requested: Yes   Extended chart review required: Yes   Comments:     Note composed:5:44 PM 04/29/2024             dependent (less than 25% patients effort)

## 2024-05-09 ENCOUNTER — EMERGENCY (EMERGENCY)
Facility: HOSPITAL | Age: 89
LOS: 1 days | Discharge: ANOTHER HOSPITAL | End: 2024-05-09
Attending: EMERGENCY MEDICINE | Admitting: EMERGENCY MEDICINE
Payer: MEDICARE

## 2024-05-09 VITALS
HEART RATE: 88 BPM | WEIGHT: 139.99 LBS | RESPIRATION RATE: 16 BRPM | DIASTOLIC BLOOD PRESSURE: 87 MMHG | TEMPERATURE: 97 F | HEIGHT: 65 IN | OXYGEN SATURATION: 92 % | SYSTOLIC BLOOD PRESSURE: 162 MMHG

## 2024-05-09 VITALS
HEART RATE: 70 BPM | SYSTOLIC BLOOD PRESSURE: 133 MMHG | OXYGEN SATURATION: 96 % | RESPIRATION RATE: 17 BRPM | TEMPERATURE: 99 F | DIASTOLIC BLOOD PRESSURE: 75 MMHG

## 2024-05-09 DIAGNOSIS — Z98.41 CATARACT EXTRACTION STATUS, RIGHT EYE: Chronic | ICD-10-CM

## 2024-05-09 LAB
ALBUMIN SERPL ELPH-MCNC: 3.5 G/DL — SIGNIFICANT CHANGE UP (ref 3.3–5)
ALP SERPL-CCNC: 87 U/L — SIGNIFICANT CHANGE UP (ref 30–120)
ALT FLD-CCNC: 23 U/L — SIGNIFICANT CHANGE UP (ref 10–60)
ANION GAP SERPL CALC-SCNC: 8 MMOL/L — SIGNIFICANT CHANGE UP (ref 5–17)
APTT BLD: 29 SEC — SIGNIFICANT CHANGE UP (ref 24.5–35.6)
AST SERPL-CCNC: 31 U/L — SIGNIFICANT CHANGE UP (ref 10–40)
BASOPHILS # BLD AUTO: 0 K/UL — SIGNIFICANT CHANGE UP (ref 0–0.2)
BASOPHILS NFR BLD AUTO: 0 % — SIGNIFICANT CHANGE UP (ref 0–2)
BILIRUB SERPL-MCNC: 0.8 MG/DL — SIGNIFICANT CHANGE UP (ref 0.2–1.2)
BLD GP AB SCN SERPL QL: SIGNIFICANT CHANGE UP
BUN SERPL-MCNC: 25 MG/DL — HIGH (ref 7–23)
CALCIUM SERPL-MCNC: 8.8 MG/DL — SIGNIFICANT CHANGE UP (ref 8.4–10.5)
CHLORIDE SERPL-SCNC: 99 MMOL/L — SIGNIFICANT CHANGE UP (ref 96–108)
CO2 SERPL-SCNC: 28 MMOL/L — SIGNIFICANT CHANGE UP (ref 22–31)
CREAT SERPL-MCNC: 0.75 MG/DL — SIGNIFICANT CHANGE UP (ref 0.5–1.3)
EGFR: 77 ML/MIN/1.73M2 — SIGNIFICANT CHANGE UP
EOSINOPHIL # BLD AUTO: 0 K/UL — SIGNIFICANT CHANGE UP (ref 0–0.5)
EOSINOPHIL NFR BLD AUTO: 0 % — SIGNIFICANT CHANGE UP (ref 0–6)
GLUCOSE SERPL-MCNC: 144 MG/DL — HIGH (ref 70–99)
HCT VFR BLD CALC: 35.3 % — SIGNIFICANT CHANGE UP (ref 34.5–45)
HGB BLD-MCNC: 11.9 G/DL — SIGNIFICANT CHANGE UP (ref 11.5–15.5)
INR BLD: 1.08 RATIO — SIGNIFICANT CHANGE UP (ref 0.85–1.18)
LYMPHOCYTES # BLD AUTO: 0.62 K/UL — LOW (ref 1–3.3)
LYMPHOCYTES # BLD AUTO: 6 % — LOW (ref 13–44)
MCHC RBC-ENTMCNC: 29.2 PG — SIGNIFICANT CHANGE UP (ref 27–34)
MCHC RBC-ENTMCNC: 33.7 GM/DL — SIGNIFICANT CHANGE UP (ref 32–36)
MCV RBC AUTO: 86.5 FL — SIGNIFICANT CHANGE UP (ref 80–100)
MONOCYTES # BLD AUTO: 0.52 K/UL — SIGNIFICANT CHANGE UP (ref 0–0.9)
MONOCYTES NFR BLD AUTO: 5 % — SIGNIFICANT CHANGE UP (ref 2–14)
NEUTROPHILS # BLD AUTO: 9.16 K/UL — HIGH (ref 1.8–7.4)
NEUTROPHILS NFR BLD AUTO: 88 % — HIGH (ref 43–77)
NRBC # BLD: 0 /100 WBCS — SIGNIFICANT CHANGE UP (ref 0–0)
NRBC # BLD: SIGNIFICANT CHANGE UP /100 WBCS (ref 0–0)
PLAT MORPH BLD: NORMAL — SIGNIFICANT CHANGE UP
PLATELET # BLD AUTO: 199 K/UL — SIGNIFICANT CHANGE UP (ref 150–400)
PLATELET COUNT - ESTIMATE: NORMAL — SIGNIFICANT CHANGE UP
POTASSIUM SERPL-MCNC: 3.9 MMOL/L — SIGNIFICANT CHANGE UP (ref 3.5–5.3)
POTASSIUM SERPL-SCNC: 3.9 MMOL/L — SIGNIFICANT CHANGE UP (ref 3.5–5.3)
PROT SERPL-MCNC: 7 G/DL — SIGNIFICANT CHANGE UP (ref 6–8.3)
PROTHROM AB SERPL-ACNC: 11.7 SEC — SIGNIFICANT CHANGE UP (ref 9.5–13)
RBC # BLD: 4.08 M/UL — SIGNIFICANT CHANGE UP (ref 3.8–5.2)
RBC # FLD: 13.3 % — SIGNIFICANT CHANGE UP (ref 10.3–14.5)
RBC BLD AUTO: NORMAL — SIGNIFICANT CHANGE UP
SODIUM SERPL-SCNC: 135 MMOL/L — SIGNIFICANT CHANGE UP (ref 135–145)
TROPONIN I, HIGH SENSITIVITY RESULT: 13 NG/L — SIGNIFICANT CHANGE UP
VARIANT LYMPHS # BLD: 1 % — SIGNIFICANT CHANGE UP (ref 0–6)
WBC # BLD: 10.41 K/UL — SIGNIFICANT CHANGE UP (ref 3.8–10.5)
WBC # FLD AUTO: 10.41 K/UL — SIGNIFICANT CHANGE UP (ref 3.8–10.5)

## 2024-05-09 PROCEDURE — 93010 ELECTROCARDIOGRAM REPORT: CPT

## 2024-05-09 PROCEDURE — 70450 CT HEAD/BRAIN W/O DYE: CPT | Mod: MC

## 2024-05-09 PROCEDURE — 84484 ASSAY OF TROPONIN QUANT: CPT

## 2024-05-09 PROCEDURE — 72192 CT PELVIS W/O DYE: CPT | Mod: 26,MC

## 2024-05-09 PROCEDURE — 85025 COMPLETE CBC W/AUTO DIFF WBC: CPT

## 2024-05-09 PROCEDURE — 72192 CT PELVIS W/O DYE: CPT | Mod: MC

## 2024-05-09 PROCEDURE — 96374 THER/PROPH/DIAG INJ IV PUSH: CPT

## 2024-05-09 PROCEDURE — 86900 BLOOD TYPING SEROLOGIC ABO: CPT

## 2024-05-09 PROCEDURE — 72125 CT NECK SPINE W/O DYE: CPT | Mod: MC

## 2024-05-09 PROCEDURE — 96376 TX/PRO/DX INJ SAME DRUG ADON: CPT

## 2024-05-09 PROCEDURE — 71045 X-RAY EXAM CHEST 1 VIEW: CPT | Mod: 26

## 2024-05-09 PROCEDURE — 99285 EMERGENCY DEPT VISIT HI MDM: CPT | Mod: 25

## 2024-05-09 PROCEDURE — 93005 ELECTROCARDIOGRAM TRACING: CPT

## 2024-05-09 PROCEDURE — 99285 EMERGENCY DEPT VISIT HI MDM: CPT

## 2024-05-09 PROCEDURE — 85730 THROMBOPLASTIN TIME PARTIAL: CPT

## 2024-05-09 PROCEDURE — 70450 CT HEAD/BRAIN W/O DYE: CPT | Mod: 26,MC

## 2024-05-09 PROCEDURE — 86901 BLOOD TYPING SEROLOGIC RH(D): CPT

## 2024-05-09 PROCEDURE — 72125 CT NECK SPINE W/O DYE: CPT | Mod: 26,MC

## 2024-05-09 PROCEDURE — 86850 RBC ANTIBODY SCREEN: CPT

## 2024-05-09 PROCEDURE — 96375 TX/PRO/DX INJ NEW DRUG ADDON: CPT

## 2024-05-09 PROCEDURE — 80053 COMPREHEN METABOLIC PANEL: CPT

## 2024-05-09 PROCEDURE — 85610 PROTHROMBIN TIME: CPT

## 2024-05-09 PROCEDURE — 71045 X-RAY EXAM CHEST 1 VIEW: CPT

## 2024-05-09 RX ORDER — ONDANSETRON 8 MG/1
4 TABLET, FILM COATED ORAL ONCE
Refills: 0 | Status: COMPLETED | OUTPATIENT
Start: 2024-05-09 | End: 2024-05-09

## 2024-05-09 RX ORDER — ACETAMINOPHEN 500 MG
1000 TABLET ORAL ONCE
Refills: 0 | Status: COMPLETED | OUTPATIENT
Start: 2024-05-09 | End: 2024-05-09

## 2024-05-09 RX ORDER — SODIUM CHLORIDE 9 MG/ML
500 INJECTION INTRAMUSCULAR; INTRAVENOUS; SUBCUTANEOUS ONCE
Refills: 0 | Status: COMPLETED | OUTPATIENT
Start: 2024-05-09 | End: 2024-05-09

## 2024-05-09 RX ORDER — MORPHINE SULFATE 50 MG/1
2 CAPSULE, EXTENDED RELEASE ORAL ONCE
Refills: 0 | Status: DISCONTINUED | OUTPATIENT
Start: 2024-05-09 | End: 2024-05-09

## 2024-05-09 RX ORDER — ZOLPIDEM TARTRATE 10 MG/1
1 TABLET ORAL
Refills: 0 | DISCHARGE

## 2024-05-09 RX ADMIN — SODIUM CHLORIDE 500 MILLILITER(S): 9 INJECTION INTRAMUSCULAR; INTRAVENOUS; SUBCUTANEOUS at 10:05

## 2024-05-09 RX ADMIN — ONDANSETRON 4 MILLIGRAM(S): 8 TABLET, FILM COATED ORAL at 10:04

## 2024-05-09 RX ADMIN — Medication 400 MILLIGRAM(S): at 11:15

## 2024-05-09 RX ADMIN — MORPHINE SULFATE 2 MILLIGRAM(S): 50 CAPSULE, EXTENDED RELEASE ORAL at 10:04

## 2024-05-09 RX ADMIN — MORPHINE SULFATE 2 MILLIGRAM(S): 50 CAPSULE, EXTENDED RELEASE ORAL at 10:50

## 2024-05-09 RX ADMIN — MORPHINE SULFATE 2 MILLIGRAM(S): 50 CAPSULE, EXTENDED RELEASE ORAL at 14:51

## 2024-05-09 NOTE — ED ADULT NURSE NOTE - NSICDXPASTMEDICALHX_GEN_ALL_CORE_FT
PAST MEDICAL HISTORY:  Breast cancer 2009 left - s/neida castellanos radical mastectomy    Cyst of left ovary     [place] (hard of hearing) bilateral HA (Yvette)    Hyperlipidemia no current medications    Meniscus tear left - 10/2015    SDH (subdural hematoma) 3/2011 - s/p fall - no surgical intervention

## 2024-05-09 NOTE — ED PROVIDER NOTE - PROGRESS NOTE DETAILS
Results discussed with family, and they request patient be transferred to [facility] for her orthopedist Dr. Villegas to operate.  They are aware this is an elective transfer and there may be a cost associated with the transfer.  Case discussed with Dr. Villegas (attending orthopedist at [facility]) he relates he does not have privileges at any [facility] however is willing to accept transfer ER to ER at [facility].  He relates he spoke with the nursing supervisor the admitting hospitalist and the ER doctor they agree with plan will except transfer ER to.  Discussed with Dr. Knight (attending ED at [facility]) she will accept transfer ER to ER.

## 2024-05-09 NOTE — ED PROVIDER NOTE - OBJECTIVE STATEMENT
Patient brought in by EMS for left hip pain status post fall.  Patient poor historian only complaining of pain to her left groin and nausea.  Patient's aide relates patient had gotten up at approximately 2 or 3 in the morning felt dizzy and fell hurting her left hip.  No LOC vomiting chest pain short of breath.  PMD Yousuf

## 2024-05-09 NOTE — ED PROVIDER NOTE - DIFFERENTIAL DIAGNOSIS
Differential including but not limited to pneumonia ICH fracture dislocation sprain electrolyte abnormality dehydration Differential Diagnosis

## 2024-05-09 NOTE — ED PROVIDER NOTE - NSICDXPASTMEDICALHX_GEN_ALL_CORE_FT
PAST MEDICAL HISTORY:  Breast cancer 2009 left - s/neida castellanos radical mastectomy    Cyst of left ovary     [place] (hard of hearing) bilateral HA (Yvette)    Hyperlipidemia no current medications    Meniscus tear left - 10/2015    SDH (subdural hematoma) 3/2011 - s/p fall - no surgical intervention

## 2024-05-09 NOTE — ED ADULT TRIAGE NOTE - CHIEF COMPLAINT QUOTE
as per caregiver " she got up this morning and fell  - she complains of pain left hip " (+) dizziness since last night PMH Dementia

## 2024-05-09 NOTE — ED ADULT NURSE REASSESSMENT NOTE - NS ED NURSE REASSESS COMMENT FT1
pt c/o of worsening pain.  PCA and RN repositioned and cleaned patient.  MD Armstrong aware of pain.

## 2024-05-09 NOTE — ED PROVIDER NOTE - CLINICAL SUMMARY MEDICAL DECISION MAKING FREE TEXT BOX
Patient brought in by EMS for left hip pain status post fall.  Patient poor historian only complaining of pain to her left groin and nausea.  Patient's aide relates patient had gotten up at approximately 2 or 3 in the morning felt dizzy and fell hurting her left hip.  No LOC vomiting chest pain short of breath.  PMD Yousuf    Plan EKG chest x-ray CT head C-spine and pelvis labs IV fluid morphine Zofran    Differential including but not limited to pneumonia ICH fracture dislocation sprain electrolyte abnormality dehydration

## 2024-05-09 NOTE — ED ADULT NURSE NOTE - NSFALLHARMRISKINTERV_ED_ALL_ED

## 2024-05-09 NOTE — ED ADULT NURSE NOTE - OBJECTIVE STATEMENT
89 yo F pmh of dementia and right hip replacement brought in via EMS to ED from home s/p fall.  Pt poor historian and only able to express pain in the groin area.  As per aid, approximately 2-3 am went to use the bathroom, felt dizzy and fell. Denies CP, back pain, SOB, fevers/chills, n/v/d, changes in urinary or bowel habits.  A&Ox2, full ROM BUE, able to move BLE, but c/o pain with movement.  Skin w/d/i.  VSS.  Safety maintained.

## 2024-05-14 RX ORDER — TRAMADOL HYDROCHLORIDE 50 MG/1
1 TABLET ORAL
Qty: 28 | Refills: 5
Start: 2024-05-14 | End: 2024-06-24

## 2024-06-06 RX ORDER — AMLODIPINE BESYLATE 2.5 MG/1
1 TABLET ORAL
Qty: 30 | Refills: 0
Start: 2024-06-06 | End: 2024-07-05

## 2024-06-06 RX ORDER — LEVETIRACETAM 250 MG/1
1 TABLET, FILM COATED ORAL
Qty: 60 | Refills: 0
Start: 2024-06-06 | End: 2024-07-05

## 2024-06-06 RX ORDER — LISINOPRIL 2.5 MG/1
1 TABLET ORAL
Qty: 30 | Refills: 0
Start: 2024-06-06 | End: 2024-07-05

## 2024-06-06 RX ORDER — CARIPRAZINE 1.5 MG/1
1 CAPSULE, GELATIN COATED ORAL
Qty: 30 | Refills: 0
Start: 2024-06-06 | End: 2024-07-05

## 2024-06-06 RX ORDER — FAMOTIDINE 10 MG/ML
1 INJECTION INTRAVENOUS
Qty: 30 | Refills: 0
Start: 2024-06-06 | End: 2024-07-05

## 2024-09-18 ENCOUNTER — APPOINTMENT (OUTPATIENT)
Dept: OTOLARYNGOLOGY | Facility: CLINIC | Age: 89
End: 2024-09-18
Payer: MEDICARE

## 2024-09-18 VITALS
HEART RATE: 63 BPM | WEIGHT: 148 LBS | HEIGHT: 63 IN | SYSTOLIC BLOOD PRESSURE: 105 MMHG | BODY MASS INDEX: 26.22 KG/M2 | DIASTOLIC BLOOD PRESSURE: 68 MMHG

## 2024-09-18 DIAGNOSIS — H90.5 UNSPECIFIED SENSORINEURAL HEARING LOSS: ICD-10-CM

## 2024-09-18 DIAGNOSIS — J31.0 CHRONIC RHINITIS: ICD-10-CM

## 2024-09-18 DIAGNOSIS — H61.22 IMPACTED CERUMEN, LEFT EAR: ICD-10-CM

## 2024-09-18 DIAGNOSIS — J34.2 DEVIATED NASAL SEPTUM: ICD-10-CM

## 2024-09-18 DIAGNOSIS — E04.2 NONTOXIC MULTINODULAR GOITER: ICD-10-CM

## 2024-09-18 DIAGNOSIS — H60.63 UNSPECIFIED CHRONIC OTITIS EXTERNA, BILATERAL: ICD-10-CM

## 2024-09-18 PROCEDURE — 69210 REMOVE IMPACTED EAR WAX UNI: CPT | Mod: LT

## 2024-09-18 PROCEDURE — 99213 OFFICE O/P EST LOW 20 MIN: CPT | Mod: 25

## 2024-09-18 NOTE — PHYSICAL EXAM
[Vaughan Test Lateralizes To Left] : tone lateralization to the left [] : septum deviated to the right [Midline] : trachea located in midline position [Removed] : palatine tonsils previously removed [Normal] : no rashes [de-identified] : thyroid nodule [Hearing Loss Right Only] : normal [Hearing Loss Left Only] : normal [FreeTextEntry8] :  cerumen removed via suction [FreeTextEntry9] :  cerumen impaction removed via suction and alligator forceps [de-identified] :  mildly inflamed turbinates

## 2024-09-18 NOTE — ADDENDUM
[FreeTextEntry1] :  Documented by Juan Pablo Colon acting as scribe for Dr. Wagoner on 09/18/2024. All Medical record entries made by the Scribe were at my, Dr. Wagoner, direction and personally dictated by me on 09/18/2024 . I have reviewed the chart and agree that the record accurately reflects my personal performance of the history, physical exam, assessment and plan. I have also personally directed, reviewed, and agreed with the discharge instructions.

## 2024-09-18 NOTE — HISTORY OF PRESENT ILLNESS
[de-identified] : Pt with h/o bilateral SNHL - wears oticon opn 2 hearing aids, chronic bilateral OE, and epistaxis s/p right nasal cauterization 10/26/2020, 12/1/2020, 12/11/2020, and 7/24/21 presents today with Son for a follow up. Son is not sure if she has had a change in her hearing. Son states that she had a stroke in December 2023. Son states that she is having her hearing checked and hearing aids taken care of somewhere else.

## 2024-09-18 NOTE — ASSESSMENT
[FreeTextEntry1] :  Reviewed and reconciled medications, allergies, PMHx, PSHx, SocHx, FMHx.  Pt with h/o bilateral SNHL - wears oticon opn 2 hearing aids, chronic bilateral OE, and epistaxis s/p right nasal cauterization 10/26/2020, 12/1/2020, 12/11/2020, and 7/24/21 presents today with Son for a follow up. Son is not sure if she has had a change in her hearing. Son states that she had a stroke in December 2023. Son states that she is having her hearing checked and hearing aids taken care of somewhere else.   Physical exam: -right ear canal: cerumen removed via suction -left ear canal: cerumen impaction removed via suction and alligator forceps -tuning forks lateralizes to the left ear -deviated septum right -mildly inflamed turbinates -tonsils removed -thyroid nodule   Plan: -FU in 6 months

## 2024-11-23 NOTE — PATIENT PROFILE ADULT - PATIENT'S PREFERRED PRONOUN
FAMILY HISTORY:  Father  Still living? No  Family history of emphysema, Age at diagnosis: Age Unknown    Mother  Still living? No  Family history of bone cancer, Age at diagnosis: Age Unknown  Family history of emphysema, Age at diagnosis: Age Unknown    
Her/She

## 2024-12-12 NOTE — OCCUPATIONAL THERAPY INITIAL EVALUATION ADULT - REFERRAL TO ANOTHER SERVICE NEEDED, OT EVAL
Benefits, risks, and possible complications of procedure explained to patient/caregiver who verbalized understanding and gave verbal consent. neuro-psychology/physical therapy/social work/speech language pathology

## 2025-03-12 ENCOUNTER — APPOINTMENT (OUTPATIENT)
Dept: OTOLARYNGOLOGY | Facility: CLINIC | Age: 89
End: 2025-03-12
Payer: MEDICARE

## 2025-03-12 ENCOUNTER — NON-APPOINTMENT (OUTPATIENT)
Age: 89
End: 2025-03-12

## 2025-03-12 VITALS
HEIGHT: 63 IN | HEART RATE: 67 BPM | WEIGHT: 130 LBS | BODY MASS INDEX: 23.04 KG/M2 | DIASTOLIC BLOOD PRESSURE: 83 MMHG | SYSTOLIC BLOOD PRESSURE: 177 MMHG

## 2025-03-12 VITALS — DIASTOLIC BLOOD PRESSURE: 81 MMHG | HEART RATE: 62 BPM | SYSTOLIC BLOOD PRESSURE: 162 MMHG

## 2025-03-12 DIAGNOSIS — H90.5 UNSPECIFIED SENSORINEURAL HEARING LOSS: ICD-10-CM

## 2025-03-12 DIAGNOSIS — J31.0 CHRONIC RHINITIS: ICD-10-CM

## 2025-03-12 DIAGNOSIS — H61.23 IMPACTED CERUMEN, BILATERAL: ICD-10-CM

## 2025-03-12 DIAGNOSIS — H60.63 UNSPECIFIED CHRONIC OTITIS EXTERNA, BILATERAL: ICD-10-CM

## 2025-03-12 DIAGNOSIS — J34.2 DEVIATED NASAL SEPTUM: ICD-10-CM

## 2025-03-12 PROCEDURE — 69210 REMOVE IMPACTED EAR WAX UNI: CPT

## 2025-03-12 PROCEDURE — 99213 OFFICE O/P EST LOW 20 MIN: CPT | Mod: 25

## 2025-03-12 RX ORDER — OFLOXACIN OTIC 3 MG/ML
0.3 SOLUTION AURICULAR (OTIC) TWICE DAILY
Qty: 1 | Refills: 3 | Status: ACTIVE | COMMUNITY
Start: 2025-03-12 | End: 1900-01-01

## 2025-03-28 ENCOUNTER — APPOINTMENT (OUTPATIENT)
Dept: OTOLARYNGOLOGY | Facility: CLINIC | Age: 89
End: 2025-03-28